# Patient Record
Sex: MALE | Race: WHITE | NOT HISPANIC OR LATINO | Employment: UNEMPLOYED | URBAN - METROPOLITAN AREA
[De-identification: names, ages, dates, MRNs, and addresses within clinical notes are randomized per-mention and may not be internally consistent; named-entity substitution may affect disease eponyms.]

---

## 2017-08-02 ENCOUNTER — TRANSFERRED RECORDS (OUTPATIENT)
Dept: HEALTH INFORMATION MANAGEMENT | Facility: CLINIC | Age: 27
End: 2017-08-02

## 2017-08-08 ENCOUNTER — TRANSFERRED RECORDS (OUTPATIENT)
Dept: HEALTH INFORMATION MANAGEMENT | Facility: CLINIC | Age: 27
End: 2017-08-08

## 2017-08-31 ENCOUNTER — TRANSFERRED RECORDS (OUTPATIENT)
Dept: HEALTH INFORMATION MANAGEMENT | Facility: CLINIC | Age: 27
End: 2017-08-31

## 2017-12-22 ENCOUNTER — TRANSFERRED RECORDS (OUTPATIENT)
Dept: HEALTH INFORMATION MANAGEMENT | Facility: CLINIC | Age: 27
End: 2017-12-22

## 2017-12-27 ENCOUNTER — TRANSFERRED RECORDS (OUTPATIENT)
Dept: HEALTH INFORMATION MANAGEMENT | Facility: CLINIC | Age: 27
End: 2017-12-27

## 2018-10-17 ENCOUNTER — TRANSFERRED RECORDS (OUTPATIENT)
Dept: HEALTH INFORMATION MANAGEMENT | Facility: CLINIC | Age: 28
End: 2018-10-17

## 2018-10-17 ENCOUNTER — OFFICE VISIT (OUTPATIENT)
Dept: FAMILY MEDICINE | Facility: OTHER | Age: 28
End: 2018-10-17
Payer: COMMERCIAL

## 2018-10-17 ENCOUNTER — RADIANT APPOINTMENT (OUTPATIENT)
Dept: GENERAL RADIOLOGY | Facility: OTHER | Age: 28
End: 2018-10-17
Attending: PHYSICIAN ASSISTANT
Payer: COMMERCIAL

## 2018-10-17 VITALS
BODY MASS INDEX: 23.04 KG/M2 | HEART RATE: 72 BPM | SYSTOLIC BLOOD PRESSURE: 116 MMHG | TEMPERATURE: 98.5 F | RESPIRATION RATE: 16 BRPM | DIASTOLIC BLOOD PRESSURE: 78 MMHG | HEIGHT: 71 IN | WEIGHT: 164.6 LBS

## 2018-10-17 DIAGNOSIS — R10.84 ABDOMINAL PAIN, GENERALIZED: ICD-10-CM

## 2018-10-17 DIAGNOSIS — F32.2 CURRENT SEVERE EPISODE OF MAJOR DEPRESSIVE DISORDER WITHOUT PSYCHOTIC FEATURES WITHOUT PRIOR EPISODE (H): Primary | ICD-10-CM

## 2018-10-17 LAB
ALBUMIN SERPL-MCNC: 4.1 G/DL (ref 3.4–5)
ALBUMIN UR-MCNC: ABNORMAL MG/DL
ALP SERPL-CCNC: 49 U/L (ref 40–150)
ALT SERPL W P-5'-P-CCNC: 33 U/L (ref 0–70)
ANION GAP SERPL CALCULATED.3IONS-SCNC: 6 MMOL/L (ref 3–14)
APPEARANCE UR: ABNORMAL
AST SERPL W P-5'-P-CCNC: 18 U/L (ref 0–45)
BASOPHILS # BLD AUTO: 0 10E9/L (ref 0–0.2)
BASOPHILS NFR BLD AUTO: 0.1 %
BILIRUB SERPL-MCNC: 1.7 MG/DL (ref 0.2–1.3)
BILIRUB UR QL STRIP: ABNORMAL
BUN SERPL-MCNC: 14 MG/DL (ref 7–30)
CALCIUM SERPL-MCNC: 8.5 MG/DL (ref 8.5–10.1)
CHLORIDE SERPL-SCNC: 105 MMOL/L (ref 94–109)
CO2 SERPL-SCNC: 30 MMOL/L (ref 20–32)
COLOR UR AUTO: ABNORMAL
CREAT SERPL-MCNC: 1.02 MG/DL (ref 0.66–1.25)
DIFFERENTIAL METHOD BLD: NORMAL
EOSINOPHIL # BLD AUTO: 0.3 10E9/L (ref 0–0.7)
EOSINOPHIL NFR BLD AUTO: 3.8 %
ERYTHROCYTE [DISTWIDTH] IN BLOOD BY AUTOMATED COUNT: 13.5 % (ref 10–15)
GFR SERPL CREATININE-BSD FRML MDRD: 86 ML/MIN/1.7M2
GLUCOSE SERPL-MCNC: 87 MG/DL (ref 70–99)
GLUCOSE UR STRIP-MCNC: ABNORMAL MG/DL
HCT VFR BLD AUTO: 45.3 % (ref 40–53)
HGB BLD-MCNC: 15.3 G/DL (ref 13.3–17.7)
HGB UR QL STRIP: ABNORMAL
KETONES UR STRIP-MCNC: ABNORMAL MG/DL
LEUKOCYTE ESTERASE UR QL STRIP: ABNORMAL
LYMPHOCYTES # BLD AUTO: 2 10E9/L (ref 0.8–5.3)
LYMPHOCYTES NFR BLD AUTO: 24.9 %
MCH RBC QN AUTO: 28.3 PG (ref 26.5–33)
MCHC RBC AUTO-ENTMCNC: 33.8 G/DL (ref 31.5–36.5)
MCV RBC AUTO: 84 FL (ref 78–100)
MONOCYTES # BLD AUTO: 0.7 10E9/L (ref 0–1.3)
MONOCYTES NFR BLD AUTO: 8.1 %
NEUTROPHILS # BLD AUTO: 5.2 10E9/L (ref 1.6–8.3)
NEUTROPHILS NFR BLD AUTO: 63.1 %
NITRATE UR QL: ABNORMAL
PH UR STRIP: ABNORMAL PH (ref 5–7)
PLATELET # BLD AUTO: 170 10E9/L (ref 150–450)
POTASSIUM SERPL-SCNC: 4.3 MMOL/L (ref 3.4–5.3)
PROT SERPL-MCNC: 7.3 G/DL (ref 6.8–8.8)
RBC # BLD AUTO: 5.41 10E12/L (ref 4.4–5.9)
SODIUM SERPL-SCNC: 141 MMOL/L (ref 133–144)
SOURCE: ABNORMAL
SP GR UR STRIP: ABNORMAL (ref 1–1.03)
T4 FREE SERPL-MCNC: 1.21 NG/DL (ref 0.76–1.46)
TSH SERPL DL<=0.005 MIU/L-ACNC: 0.32 MU/L (ref 0.4–4)
UROBILINOGEN UR STRIP-ACNC: ABNORMAL EU/DL (ref 0.2–1)
WBC # BLD AUTO: 8.2 10E9/L (ref 4–11)

## 2018-10-17 PROCEDURE — 99204 OFFICE O/P NEW MOD 45 MIN: CPT | Performed by: PHYSICIAN ASSISTANT

## 2018-10-17 PROCEDURE — 80053 COMPREHEN METABOLIC PANEL: CPT | Performed by: PHYSICIAN ASSISTANT

## 2018-10-17 PROCEDURE — 82306 VITAMIN D 25 HYDROXY: CPT | Performed by: PHYSICIAN ASSISTANT

## 2018-10-17 PROCEDURE — 36415 COLL VENOUS BLD VENIPUNCTURE: CPT | Performed by: PHYSICIAN ASSISTANT

## 2018-10-17 PROCEDURE — 84439 ASSAY OF FREE THYROXINE: CPT | Performed by: PHYSICIAN ASSISTANT

## 2018-10-17 PROCEDURE — 85025 COMPLETE CBC W/AUTO DIFF WBC: CPT | Performed by: PHYSICIAN ASSISTANT

## 2018-10-17 PROCEDURE — 74019 RADEX ABDOMEN 2 VIEWS: CPT

## 2018-10-17 PROCEDURE — 81003 URINALYSIS AUTO W/O SCOPE: CPT | Performed by: PHYSICIAN ASSISTANT

## 2018-10-17 PROCEDURE — 84443 ASSAY THYROID STIM HORMONE: CPT | Performed by: PHYSICIAN ASSISTANT

## 2018-10-17 RX ORDER — LEVOCETIRIZINE DIHYDROCHLORIDE 5 MG/1
5 TABLET, FILM COATED ORAL EVERY EVENING
COMMUNITY
End: 2019-07-16

## 2018-10-17 RX ORDER — OMEPRAZOLE 40 MG/1
40 CAPSULE, DELAYED RELEASE ORAL DAILY
Qty: 30 CAPSULE | Refills: 1 | Status: SHIPPED | OUTPATIENT
Start: 2018-10-17 | End: 2018-10-29

## 2018-10-17 ASSESSMENT — ANXIETY QUESTIONNAIRES
GAD7 TOTAL SCORE: 12
7. FEELING AFRAID AS IF SOMETHING AWFUL MIGHT HAPPEN: SEVERAL DAYS
7. FEELING AFRAID AS IF SOMETHING AWFUL MIGHT HAPPEN: SEVERAL DAYS
3. WORRYING TOO MUCH ABOUT DIFFERENT THINGS: MORE THAN HALF THE DAYS
2. NOT BEING ABLE TO STOP OR CONTROL WORRYING: MORE THAN HALF THE DAYS
4. TROUBLE RELAXING: NEARLY EVERY DAY
6. BECOMING EASILY ANNOYED OR IRRITABLE: NEARLY EVERY DAY
5. BEING SO RESTLESS THAT IT IS HARD TO SIT STILL: NOT AT ALL
GAD7 TOTAL SCORE: 12
GAD7 TOTAL SCORE: 12
1. FEELING NERVOUS, ANXIOUS, OR ON EDGE: SEVERAL DAYS

## 2018-10-17 ASSESSMENT — PATIENT HEALTH QUESTIONNAIRE - PHQ9
10. IF YOU CHECKED OFF ANY PROBLEMS, HOW DIFFICULT HAVE THESE PROBLEMS MADE IT FOR YOU TO DO YOUR WORK, TAKE CARE OF THINGS AT HOME, OR GET ALONG WITH OTHER PEOPLE: EXTREMELY DIFFICULT
SUM OF ALL RESPONSES TO PHQ QUESTIONS 1-9: 24
SUM OF ALL RESPONSES TO PHQ QUESTIONS 1-9: 24

## 2018-10-17 ASSESSMENT — PAIN SCALES - GENERAL: PAINLEVEL: SEVERE PAIN (7)

## 2018-10-17 NOTE — PROGRESS NOTES
"  SUBJECTIVE:   Chance Torres is a 27 year old male who presents to clinic today for the following health issues:    HPI  Abnormal Mood Symptoms  Onset: a couple of weeks ago.     Description:   Depression: YES   Anxiety: YES    Accompanying Signs & Symptoms:  Still participating in activities that you used to enjoy: no  Fatigue: yes  Irritability: YES  Difficulty concentrating: YES  Changes in appetite: YES-terrible. Everytime he eats his stomach hurts almost immediately and is nauseous.   Problems with sleep: YES  Heart racing/beating fast : sometimes.   Thoughts of hurting yourself or others: none, \"boderline''    History:   Recent stress: YES  Prior depression hospitalization: None, but has seen a psychiatrist two years ago.   Family history of depression: YES  Family history of anxiety: YES    Precipitating factors:   Alcohol/drug use: YES    Alleviating factors:  Marijuana    Therapies Tried and outcome: Marijuana, no other medications.     - Has had symptoms since he could remember, on and off.   - lately only marijuana will control his symptoms but quit.   - last year went on anti-depressants - Prozac (made him angry), Zoloft (numb)  - Gone to psychiatrist 2 years ago.  Has done counseling/therapy - helped a little bit for a little while.   - Used to be anxiety but now more depression  - Social anxiety in past.  Wasn't able to swallow food or eat in front of people, still scared and doesn't go out to eat - afraid of choking.   - Always issues with sleeping - difficulty falling asleep and staying asleep.   - Denies being suicidal but just doesn't feel like he has a purpose in life.  He is expecting a child with his wife and he is excited but can't be excited.   - He doesn't necessarily know if he could tell anyone if he was suicidal because he has a hard time talking with people.       ABDOMINAL PAIN     Onset: 1 week    Description:   Character: Dull pain  Location: generalized  Radiation: " None    Intensity: severe    Progression of Symptoms:  worsening    Accompanying Signs & Symptoms:  Fever/Chills?: YES- Chills, feeling cold.   Gas/Bloating: no   Nausea: YES  Vomitting: no   Diarrhea?: no   Constipation:YES  Dysuria or Hematuria: no   Heartburn: YES - worse than normal, burping, symptoms without even eating  Melena/Hematochezia: No   History:   Trauma: no   Previous similar pain: no    Previous tests done: none    Precipitating factors:   Does the pain change with:     Food: YES- after eating pain is present can last hours.      BM: no     Urination: no     Alleviating factors:  Not eating, laying down    Therapies Tried and outcome: None      - does have issues with IBS but states that is a different feeling, usually lower, usually more constipated.   - BMs normally once per day, sometimes less.   - Urine smells more strongly. But not dehydrated.   - No recent antibiotics, OTC medications, diet changes, raw/undercooked foods.   - no family history of GI disorders.     Problem list and histories reviewed & adjusted, as indicated.  Additional history: as documented    There is no problem list on file for this patient.    History reviewed. No pertinent surgical history.    Social History   Substance Use Topics     Smoking status: Never Smoker     Smokeless tobacco: Never Used     Alcohol use Yes      Comment: rarely     Family History   Problem Relation Age of Onset     Mental Illness Brother      Mental Illness Brother          Current Outpatient Prescriptions   Medication Sig Dispense Refill     levocetirizine (XYZAL ALLERGY 24HR) 5 MG tablet Take 5 mg by mouth every evening       omeprazole (PRILOSEC) 40 MG capsule Take 1 capsule (40 mg) by mouth daily Take 30-60 minutes before a meal. 30 capsule 1       ROS:  Constitutional, HEENT, cardiovascular, pulmonary, GI, , musculoskeletal, neuro, skin, endocrine and psych systems are negative, except as otherwise noted.    OBJECTIVE:     /78   "Pulse 72  Temp 98.5  F (36.9  C) (Temporal)  Resp 16  Ht 5' 10.98\" (1.803 m)  Wt 164 lb 9.6 oz (74.7 kg)  BMI 22.97 kg/m2  Body mass index is 22.97 kg/(m^2).  GENERAL: healthy, alert and no distress  EYES: Eyes grossly normal to inspection, PERRL and conjunctivae and sclerae normal  HENT: ear canals and TM's normal, nose and mouth without ulcers or lesions  NECK: no adenopathy, no asymmetry, masses, or scars and thyroid normal to palpation  RESP: lungs clear to auscultation - no rales, rhonchi or wheezes  CV: regular rate and rhythm, normal S1 S2, no S3 or S4, no murmur, click or rub, no peripheral edema and peripheral pulses strong  ABDOMEN: bowel sounds normal and soft, non-distended, tenderness to palpation RUQ, periumbilical, bilateral lower abdomen slightly, positive [place].   MS: no gross musculoskeletal defects noted, no edema  SKIN: no suspicious lesions or rashes  NEURO: Normal strength and tone, mentation intact and speech normal  PSYCH: mentation appears normal, affect flat, judgement and insight intact, appearance well groomed and poor eye contact.     Diagnostic Test Results:  Results for orders placed or performed in visit on 10/17/18 (from the past 24 hour(s))   CBC with platelets differential   Result Value Ref Range    WBC 8.2 4.0 - 11.0 10e9/L    RBC Count 5.41 4.4 - 5.9 10e12/L    Hemoglobin 15.3 13.3 - 17.7 g/dL    Hematocrit 45.3 40.0 - 53.0 %    MCV 84 78 - 100 fl    MCH 28.3 26.5 - 33.0 pg    MCHC 33.8 31.5 - 36.5 g/dL    RDW 13.5 10.0 - 15.0 %    Platelet Count 170 150 - 450 10e9/L    % Neutrophils 63.1 %    % Lymphocytes 24.9 %    % Monocytes 8.1 %    % Eosinophils 3.8 %    % Basophils 0.1 %    Absolute Neutrophil 5.2 1.6 - 8.3 10e9/L    Absolute Lymphocytes 2.0 0.8 - 5.3 10e9/L    Absolute Monocytes 0.7 0.0 - 1.3 10e9/L    Absolute Eosinophils 0.3 0.0 - 0.7 10e9/L    Absolute Basophils 0.0 0.0 - 0.2 10e9/L    Diff Method Automated Method    *UA reflex to Microscopic   Result Value Ref " Range    Color Urine Test canceled - Lab  error     Appearance Urine Test canceled - Lab  error     Glucose Urine Test canceled - Lab  error (A) NEG^Negative mg/dL    Bilirubin Urine Test canceled - Lab  error (A) NEG^Negative    Ketones Urine Test canceled - Lab  error (A) NEG^Negative mg/dL    Specific Gravity Urine Test canceled - Lab  error 1.003 - 1.035    Blood Urine Test canceled - Lab  error (A) NEG^Negative    pH Urine Test canceled - Lab  error 5.0 - 7.0 pH    Protein Albumin Urine Test canceled - Lab  error (A) NEG^Negative mg/dL    Urobilinogen Urine Test canceled - Lab  error 0.2 - 1.0 EU/dL    Nitrite Urine Test canceled - Lab  error (A) NEG^Negative    Leukocyte Esterase Urine Test canceled - Lab  error (A) NEG^Negative    Source Test canceled - Lab  error         X-ray Abdomen:  ABDOMEN TWO VIEWS 10/17/2018 3:04 PM      HISTORY: ; Abdominal pain, generalized     COMPARISON: None.         IMPRESSION: Bowel gas within normal limits. No evidence for free air.  Lung bases clear.     EZIO NORTON MD    ASSESSMENT/PLAN:       ICD-10-CM    1. Current severe episode of major depressive disorder without psychotic features without prior episode (H) F32.2 TSH with free T4 reflex     Vitamin D Deficiency     MENTAL HEALTH REFERRAL  - Adult; Outpatient Treatment; Individual/Couples/Family/Group Therapy/Health Psychology; [facility] [place]: [facility] [phone]; We will contact you to schedule the appointment or please call with any questions   2. Abdominal pain, generalized R10.84 CBC with platelets differential     *UA reflex to Microscopic     Comprehensive metabolic panel     XR Abdomen 2 Views     US Abdomen Complete     omeprazole (PRILOSEC) 40 MG capsule       1. He has severe symptoms of depression and has failed Prozac and Zoloft.  Recommended either  trialing a new medication or Genesight, patient would like to have Genesight testing done today, will call with results and make medication recommendation.     Discussed with patient the risks and benefits of anti-depressant/anti-anxiety medications. We discussed the common side effects with the medication that most resolve within 2 weeks of starting the medication.  We discussed the black box warning of increased risk of suicidal ideation.  Patient today is is not experiencing suicidal ideation.  Discussed importance of taking medication once daily and not missing doses.  Also how we slowly titrate medication to limit side effects to the most effective dose and if they are ever going to stop the medication they should taper off the medication.  Reminded patient these medications can take 4-6 weeks to see their maximum potential.  Recommend titration to highest dose tolerated before switching medication types.  We also discussed GeneSight testing, when it would be helpful, cost, etc.     Encouraged counseling in addition to medication management.      2. Uncertain the cause of the abdominal pain.  Differential includes but not limited to gastritis, GERD, Gallbladder pathology, ulcer, constipation, IBS, IFB.  His x-ray is nonspecific there is stool throughout the colon and he has a history of constipation and recommend working on this and starting Miralax and Docusate Sodium to have more regular bowel movements.  Also recommend we start Omeprazole to help with any acid production which could be causing more irritation especially since eating makes the pain worse.  Will have him take once daily for the next month.  We are waiting on other labs but CBC is normal therefore less concerned for acute infection or acute abdomen and no red flag symptoms or exam findings today.  Did order ultrasound to assess his gallbladder.  His symptoms could also be related to dysfunctional eating given his history of anxiety with eating  and having others see him eat.  For now bland diet, clear liquids, small portions.  We will call with results and make next step of plan.     Follow-up pending results of the labs and genesight, needs to come in for ultrasound which was scheduled.  Go to the ER if severe pain, high fever or major concerns in the meantime.     Options for treatment and follow-up care were reviewed with the patient and/or guardian. Patient and/or guardian engaged in the decision making process and verbalized understanding of the options discussed and agreed with the final plan.     Nikki Benitez PA-C  [facility]      Answers for HPI/ROS submitted by the patient on 10/17/2018   If you checked off any problems, how difficult have these problems made it for you to do your work, take care of things at home, or get along with other people?: Extremely difficult  PHQ9 TOTAL SCORE: 24  ANSELMO 7 TOTAL SCORE: 12

## 2018-10-17 NOTE — PATIENT INSTRUCTIONS
- We will call with [employer](INOR) results and then recommend medication  - Reminder medication can take 4-6 weeks to see benefits, take once daily, slowly increase dose and if needed slowly reduce dose if not tolerating.   - Typically side effects last 2 weeks and improve, monitor if severe we need to stop medication  - If you are feeling suicidal please call 911 or TEXT [phone] for Crisis Text Line.  - If severe pain or high fevers please go to the ER  - otherwise let's get the ultrasound done - need to not eat/drink for 8 hours prior.   - Start Omeprazole, take once daily preferably before eating in the morning  - [place] diet, very small amounts, water only  - We will call with results and make plan for next evaluation and treatment.   - Start Miralax 17 grams once daily and Docusate Sodium 100 mg daily.

## 2018-10-17 NOTE — MR AVS SNAPSHOT
After Visit Summary   10/17/2018    Chance Torres    MRN: [id number]           Patient Information     Date Of Birth          1990        Visit Information        Provider Department      10/17/2018 2:00 PM Nikki Benitez PA-C [facility]        Today's Diagnoses     Current severe episode of major depressive disorder without psychotic features without prior episode (H)    -  1    Abdominal pain, generalized          Care Instructions    - We will call with Genesight results and then recommend medication  - Reminder medication can take 4-6 weeks to see benefits, take once daily, slowly increase dose and if needed slowly reduce dose if not tolerating.   - Typically side effects last 2 weeks and improve, monitor if severe we need to stop medication  - If you are feeling suicidal please call 911 or TEXT [phone] for Crisis Text Line.  - If severe pain or high fevers please go to the ER  - otherwise let's get the ultrasound done - need to not eat/drink for 8 hours prior.   - Start Omeprazole, take once daily preferably before eating in the morning  - [place] diet, very small amounts, water only  - We will call with results and make plan for next evaluation and treatment.   - Start Miralax 17 grams once daily and Docusate Sodium 100 mg daily.                 Follow-ups after your visit        Additional Services     MENTAL HEALTH REFERRAL  - Adult; Outpatient Treatment; Individual/Couples/Family/Group Therapy/Health Psychology; [facility] [place]: [facility] [phone]; We will contact you to schedule the appointment or please call with any questions       All scheduling is subject to the client's specific insurance plan & benefits, provider/location availability, and provider clinical specialities.  Please arrive 15 minutes early for your first appointment and bring your completed paperwork.    Please be aware that coverage of these services is subject to the terms and limitations  of your health insurance plan.  Call member services at your health plan with any benefit or coverage questions.                            Follow-up notes from your care team     Return in about 2 days (around 10/19/2018) for Ultrasound.      Your next 10 appointments already scheduled     Oct 22, 2018  8:30 AM CDT   [facility]BDOM[facility] COM[facility]TE [facility]US[facility]    [address] [facility] [address]   [phone]           How do I prepare for my exam? (Food and drink instructions) Adults: No eating, smoking, gum chewing or drinking for 8 hours before the exam. You may take medicine with a small sip of water.  Children: * Infants, breast-fed: may have breast milk up to 2 hours before exam. * Infants, formula: may have bottle until 4 hours before exam. * Children 1-5 years: No food or drink for 4 hours before exam. * Children 6 -12 years: No food or drink for 6 hours before exam. * Children over 12 years: No food or drink for 8 hours before exam.  * J Tube Fed: No need to stop feedings.  What should I wear: Wear comfortable clothes.  How long does the exam take: Most ultrasounds take 30 to 60 minutes.  What should I bring: Bring a list of your medicines, including vitamins, minerals and over-the-counter drugs. It is safest to leave personal items at home.  Do I need a :  No  is needed.  What do I need to tell my doctor: Tell your doctor about any allergies you may have.  What should I do after the exam: No restrictions, You may resume normal activities.  What is this test: An ultrasound uses sound waves to make pictures of the body. Sound waves do not cause pain. The only discomfort may be the pressure of the wand against your skin or full bladder.  Who should I call with questions: If you have any questions, please call the Imaging Department where you will have your exam. Directions, parking instructions, and other information is available on our  "website, [email or web address].              Future tests that were ordered for you today     Open Future Orders        Priority Expected Expires Ordered    US Abdomen Complete Routine  10/17/2019 10/17/2018            Who to contact     If you have questions or need follow up information about today's clinic visit or your schedule please contact [facility] directly at [phone].  Normal or non-critical lab and imaging results will be communicated to you by MyChart, letter or phone within 4 business days after the clinic has received the results. If you do not hear from us within 7 days, please contact the clinic through [employer] or phone. If you have a critical or abnormal lab result, we will notify you by phone as soon as possible.  Submit refill requests through [employer] or call your pharmacy and they will forward the refill request to us. Please allow 3 business days for your refill to be completed.          Additional Information About Your Visit        [employer] Information     [employer] gives you secure access to your electronic health record. If you see a primary care provider, you can also send messages to your care team and make appointments. If you have questions, please call your primary care clinic.  If you do not have a primary care provider, please call [phone] and they will assist you.        Care EveryWhere ID     This is your Care EveryWhere ID. This could be used by other organizations to access your [facility] medical records  [id number]        Your Vitals Were     Pulse Temperature Respirations Height BMI (Body Mass Index)       72 98.5  F (36.9  C) (Temporal) 16 5' 10.98\" (1.803 m) 22.97 kg/m2        Blood Pressure from Last 3 Encounters:   10/17/18 116/78    Weight from Last 3 Encounters:   10/17/18 164 lb 9.6 oz (74.7 kg)              We Performed the Following     *UA reflex to Microscopic     CBC with platelets differential     Comprehensive metabolic panel     MENTAL " HEALTH REFERRAL  - Adult; Outpatient Treatment; Individual/Couples/Family/Group Therapy/Health Psychology; G: [facility] [phone]; We will contact you to schedule the appointment or please call with any questions     TSH with free T4 reflex     Vitamin D Deficiency          Today's Medication Changes          These changes are accurate as of 10/17/18  3:23 PM.  If you have any questions, ask your nurse or doctor.               Start taking these medicines.        Dose/Directions    omeprazole 40 MG capsule   Commonly known as:  priLOSEC   Used for:  Abdominal pain, generalized   Started by:  Nikki Benitez PA-C        Dose:  40 mg   Take 1 capsule (40 mg) by mouth daily Take 30-60 minutes before a meal.   Quantity:  30 capsule   Refills:  1            Where to get your medicines      These medications were sent to [facility] [address] [place] [id number] [facility] AT [facility] [address] & [facility]  [address] [facility], [facility] [address]     Phone:  [phone]     omeprazole 40 MG capsule                Primary Care Provider Office Phone # Fax #    Nikki Benitez PA-C [phone]       [address] [place] [address]        Equal Access to Services     MILDRED MONTEZ : [place] cuello Soemilia, waaxda luqadaha, qaybta kaalmada adeleahda, braden rae. So [facility] [phone].    ATENCIÓN: Si habla español, tiene a womack disposición servicios gratuitos de asistencia lingüística. Zaki al [phone].    We comply with applicable federal civil rights laws and [place] laws. We do not discriminate on the basis of race, color, national origin, age, disability, sex, sexual orientation, or gender identity.            Thank you!     Thank you for choosing [facility]  for your care. Our goal is always to provide you with excellent care. Hearing back from our patients is one way we can continue to improve our services. Please take  a few minutes to complete the written survey that you may receive in the mail after your visit with us. Thank you!             Your Updated Medication List - Protect others around you: Learn how to safely use, store and throw away your medicines at www.disposemymeds.org.          This list is accurate as of 10/17/18  3:23 PM.  Always use your most recent med list.                   Brand Name Dispense Instructions for use Diagnosis    omeprazole 40 MG capsule    priLOSEC    30 capsule    Take 1 capsule (40 mg) by mouth daily Take 30-60 minutes before a meal.    Abdominal pain, generalized       XYZAL ALLERGY 24HR 5 MG tablet   Generic drug:  levocetirizine      Take 5 mg by mouth every evening

## 2018-10-18 LAB — DEPRECATED CALCIDIOL+CALCIFEROL SERPL-MC: 21 UG/L (ref 20–75)

## 2018-10-18 ASSESSMENT — PATIENT HEALTH QUESTIONNAIRE - PHQ9: SUM OF ALL RESPONSES TO PHQ QUESTIONS 1-9: 24

## 2018-10-18 ASSESSMENT — ANXIETY QUESTIONNAIRES: GAD7 TOTAL SCORE: 12

## 2018-10-22 ENCOUNTER — RADIANT APPOINTMENT (OUTPATIENT)
Dept: ULTRASOUND IMAGING | Facility: OTHER | Age: 28
End: 2018-10-22
Attending: PHYSICIAN ASSISTANT
Payer: COMMERCIAL

## 2018-10-22 DIAGNOSIS — R10.84 ABDOMINAL PAIN, GENERALIZED: ICD-10-CM

## 2018-10-22 PROCEDURE — 76700 US EXAM ABDOM COMPLETE: CPT

## 2018-10-23 ENCOUNTER — TELEPHONE (OUTPATIENT)
Dept: FAMILY MEDICINE | Facility: OTHER | Age: 28
End: 2018-10-23

## 2018-10-23 DIAGNOSIS — R10.11 ABDOMINAL PAIN, RIGHT UPPER QUADRANT: Primary | ICD-10-CM

## 2018-10-23 NOTE — TELEPHONE ENCOUNTER
Patient returned call and was given message below, patient states he never really has pain but he will call and make an appointment with GI.    Thank you Ita

## 2018-10-23 NOTE — TELEPHONE ENCOUNTER
----- Message from Nikki Benitez PA-C sent at 10/23/2018  7:16 AM CDT -----  Please call patient. See if his abdominal pain has improved at all.  If not then I would recommend that we have him see GI since this is a chronic on going issue.  His Ultrasound showed no gallstones or other concerns but this does not necessarily rule out all gallbladder issues just not gallstones.    Nikki Benitez PA-C

## 2018-10-23 NOTE — TELEPHONE ENCOUNTER
Attempted to reach the patient with the following results:  Left message on voicemail for the patient to call back.   Octavia Bourne MA

## 2018-10-24 ENCOUNTER — TELEPHONE (OUTPATIENT)
Dept: FAMILY MEDICINE | Facility: OTHER | Age: 28
End: 2018-10-24

## 2018-10-24 NOTE — TELEPHONE ENCOUNTER
Please call patient.  His [place] results are available, would like him to make an appointment to discuss the medication selection and initiation.     Nikki Benitez PA-C

## 2018-10-25 NOTE — PROGRESS NOTES
"  SUBJECTIVE:   Chance Torres is a 27 year old male who presents to clinic today for the following health issues:    Gene site testing results.     HPI    Patient Active Problem List   Diagnosis     Current severe episode of major depressive disorder without psychotic features without prior episode (H)     History reviewed. No pertinent surgical history.    Social History   Substance Use Topics     Smoking status: Never Smoker     Smokeless tobacco: Never Used     Alcohol use Yes      Comment: rarely     Family History   Problem Relation Age of Onset     Mental Illness Brother      Mental Illness Brother          Current Outpatient Prescriptions   Medication Sig Dispense Refill     desvenlafaxine succinate (PRISTIQ) 25 MG 24 hr tablet Take 1 tablet daily x 7 days then increase to 2 tablets daily. 60 tablet 1     levocetirizine (XYZAL ALLERGY 24HR) 5 MG tablet Take 5 mg by mouth every evening         ROS:  Constitutional, musculoskeletal, neuro, skin, endocrine and psych systems are negative, except as otherwise noted.    OBJECTIVE:     /68  Pulse 76  Temp 97.7  F (36.5  C) (Temporal)  Resp 14  Wt 170 lb 6.4 oz (77.3 kg)  SpO2 95%  BMI 23.78 kg/m2  Body mass index is 23.78 kg/(m^2).  GENERAL: healthy, alert and no distress  MS: no gross musculoskeletal defects noted, no edema  SKIN: no suspicious lesions or rashes  PSYCH: mentation appears normal and affect flat    Diagnostic Test Results:  none     ASSESSMENT/PLAN:       ICD-10-CM    1. Current severe episode of major depressive disorder without psychotic features without prior episode (H) F32.2 desvenlafaxine succinate (PRISTIQ) 25 MG 24 hr tablet       His Savoredight results show \"Use as Directed\" - Desvenlafaxine, levomilnacipran, selegiline, vilazodone.     Patient has been on Zoloft and Prozac in past and had significant side effects from both medications.   Moderate Gene-Drug Interaction: Sertraline, citalopram, escitalopram, mirtazapine, " trazodone, duloxetine, fluvoxamine  Significant Gene-Drug Interaction: bupropion, venlafaxine, fluoxetine, amitriptyline, cloimipramine, desipramine, doxepin, imipramine, nortriptyline, vortioxetine, paroxetine.     Will scan results into Media Tab.     Discussed with patient the risks and benefits of anti-depressant/anti-anxiety medications. We discussed the common side effects with the medication that most resolve within 2 weeks of starting the medication.  We discussed the black box warning of increased risk of suicidal ideation.  Patient today is not experiencing suicidal ideation.  Discussed importance of taking medication once daily and not missing doses.  Also how we slowly titrate medication to limit side effects to the most effective dose and if they are ever going to stop the medication they should taper off the medication.  Reminded patient these medications can take 4-6 weeks to see their maximum potential.  Recommend titration to highest dose tolerated before switching medication types.      Encouraged counseling in addition to medication management.     Follow-up in 2-4 weeks, sooner if worse or new concerns.     Greater than 20 minutes were spent today with more than 50% dedicated to counseling and coordination of care of the above mentioned problems.     Options for treatment and follow-up care were reviewed with the patient and/or guardian. Patient and/or guardian engaged in the decision making process and verbalized understanding of the options discussed and agreed with the final plan.     Nikki Benitez PA-C  [facility]

## 2018-10-29 ENCOUNTER — OFFICE VISIT (OUTPATIENT)
Dept: FAMILY MEDICINE | Facility: OTHER | Age: 28
End: 2018-10-29
Payer: COMMERCIAL

## 2018-10-29 VITALS
WEIGHT: 170.4 LBS | BODY MASS INDEX: 23.78 KG/M2 | OXYGEN SATURATION: 95 % | HEART RATE: 76 BPM | DIASTOLIC BLOOD PRESSURE: 68 MMHG | SYSTOLIC BLOOD PRESSURE: 124 MMHG | RESPIRATION RATE: 14 BRPM | TEMPERATURE: 97.7 F

## 2018-10-29 DIAGNOSIS — F32.2 CURRENT SEVERE EPISODE OF MAJOR DEPRESSIVE DISORDER WITHOUT PSYCHOTIC FEATURES WITHOUT PRIOR EPISODE (H): Primary | ICD-10-CM

## 2018-10-29 PROCEDURE — 99213 OFFICE O/P EST LOW 20 MIN: CPT | Performed by: PHYSICIAN ASSISTANT

## 2018-10-29 RX ORDER — DESVENLAFAXINE 25 MG/1
TABLET, EXTENDED RELEASE ORAL
Qty: 60 TABLET | Refills: 1 | Status: SHIPPED | OUTPATIENT
Start: 2018-10-29 | End: 2019-04-02

## 2018-10-29 ASSESSMENT — PAIN SCALES - GENERAL: PAINLEVEL: NO PAIN (0)

## 2018-10-29 NOTE — MR AVS SNAPSHOT
After Visit Summary   10/29/2018    Chance Torres    MRN: [id number]           Patient Information     Date Of Birth          1990        Visit Information        Provider Department      10/29/2018 5:10 PM Nikki Benitez PA-C [facility]        Today's Diagnoses     Current severe episode of major depressive disorder without psychotic features without prior episode (H)    -  1       Follow-ups after your visit        Your next 10 appointments already scheduled     Nov 12, 2018  3:50 PM CST   Office Visit with Nikki Benitez PA-C   [facility]    [address]  [facility] [address]   [phone]           Bring a current list of meds and any records pertaining to this visit. For Physicals, please bring immunization records and any forms needing to be filled out. Please arrive 10 minutes early to complete paperwork.              Who to contact     If you have questions or need follow up information about today's clinic visit or your schedule please contact [facility] directly at [phone].  Normal or non-critical lab and imaging results will be communicated to you by Smadexhart, letter or phone within 4 business days after the clinic has received the results. If you do not hear from us within 7 days, please contact the clinic through [employer] or phone. If you have a critical or abnormal lab result, we will notify you by phone as soon as possible.  Submit refill requests through L[employer]izer or call your pharmacy and they will forward the refill request to us. Please allow 3 business days for your refill to be completed.          Additional Information About Your Visit        Smadexhart Information     L[employer]izer gives you secure access to your electronic health record. If you see a primary care provider, you can also send messages to your care team and make appointments. If you have questions, please call your primary  care clinic.  If you do not have a primary care provider, please call [phone] and they will assist you.        Care EveryWhere ID     This is your Care EveryWhere ID. This could be used by other organizations to access your [place] medical records  [id number]        Your Vitals Were     Pulse Temperature Respirations Pulse Oximetry BMI (Body Mass Index)       76 97.7  F (36.5  C) (Temporal) 14 95% 23.78 kg/m2        Blood Pressure from Last 3 Encounters:   10/29/18 124/68   10/17/18 116/78    Weight from Last 3 Encounters:   10/29/18 170 lb 6.4 oz (77.3 kg)   10/17/18 164 lb 9.6 oz (74.7 kg)              Today, you had the following     No orders found for display         Today's Medication Changes          These changes are accurate as of 10/29/18  5:18 PM.  If you have any questions, ask your nurse or doctor.               Start taking these medicines.        Dose/Directions    desvenlafaxine succinate 25 MG 24 hr tablet   Commonly known as:  PRISTIQ   Used for:  Current severe episode of major depressive disorder without psychotic features without prior episode (H)   Started by:  Nikki Benitez PA-C        Take 1 tablet daily x 7 days then increase to 2 tablets daily.   Quantity:  60 tablet   Refills:  1         Stop taking these medicines if you haven't already. Please contact your care team if you have questions.     omeprazole 40 MG capsule   Commonly known as:  priLOSEC   Stopped by:  Nikki Benitez PA-C                Where to get your medicines      These medications were sent to [facility] [address] [place] [id number] [facility] [address]  [address] [facility] [address]     Phone:  [phone]     desvenlafaxine succinate 25 MG 24 hr tablet                Primary Care Provider Office Phone # Fax #    Nikki Benitez PA-C [phone]       [address] [place] [address]        Equal Access to Services     MILDRED MONTEZ AH: [place] cuello  Sarina, channingda ludwayneadaha, qatonota kajaswinder romero, braden rodrigues janimikie laPerfectoharry kyra. So [facility] [phone].    ATENCIÓN: Si milly lentz, tiene a womack disposición servicios gratuitos de asistencia lingüística. Zaki al [phone].    We comply with applicable federal civil rights laws and [place] laws. We do not discriminate on the basis of race, color, national origin, age, disability, sex, sexual orientation, or gender identity.            Thank you!     Thank you for choosing [facility]  for your care. Our goal is always to provide you with excellent care. Hearing back from our patients is one way we can continue to improve our services. Please take a few minutes to complete the written survey that you may receive in the mail after your visit with us. Thank you!             Your Updated Medication List - Protect others around you: Learn how to safely use, store and throw away your medicines at www.disposemymeds.org.          This list is accurate as of 10/29/18  5:18 PM.  Always use your most recent med list.                   Brand Name Dispense Instructions for use Diagnosis    desvenlafaxine succinate 25 MG 24 hr tablet    PRISTIQ    60 tablet    Take 1 tablet daily x 7 days then increase to 2 tablets daily.    Current severe episode of major depressive disorder without psychotic features without prior episode (H)       XYZAL ALLERGY 24HR 5 MG tablet   Generic drug:  levocetirizine      Take 5 mg by mouth every evening

## 2018-11-09 PROBLEM — K58.2 IRRITABLE BOWEL SYNDROME WITH BOTH CONSTIPATION AND DIARRHEA: Status: ACTIVE | Noted: 2017-07-12

## 2018-11-28 ENCOUNTER — TELEPHONE (OUTPATIENT)
Dept: FAMILY MEDICINE | Facility: OTHER | Age: 28
End: 2018-11-28

## 2018-11-28 NOTE — TELEPHONE ENCOUNTER
Reason for call:  Medication   If this is a refill request, has the caller requested the refill from the pharmacy already? N/A  Will the patient be using a [facility]? No  Name of the pharmacy and phone number for the current request: [facility] [place]: [phone]     Name of the medication requested: desvenlafaxine succinate (PRISTIQ) 25 MG 24 hr tablet    Other request: Patient missed a follow up appointment, because he never took the medication. Patient states that he will start taking medication and he will schedule a follow up appointment. He would like to medication today if possible.    Phone number to reach patient:  Home number on file [phone] (home)    Best Time:  anytime    Can we leave a detailed message on this number?  YES

## 2018-12-26 ENCOUNTER — TELEPHONE (OUTPATIENT)
Dept: FAMILY MEDICINE | Facility: OTHER | Age: 28
End: 2018-12-26

## 2018-12-26 NOTE — TELEPHONE ENCOUNTER
Spoke to pt and discussed the message below. Pt scheduled with Pamela Barone MA  December 26, 2018

## 2018-12-26 NOTE — TELEPHONE ENCOUNTER
Reason for call:  Other   Patient called regarding (reason for call): appointment  Additional comments: patient recently started taking anti depressants and wanted to schedule follow but provider not available until April 2019. Patient wants to know how to get more meds once it runs out since provider is not available    Phone number to reach patient:  Home number on file [phone] (home)    Best Time:  After 3:30pm    Can we leave a detailed message on this number?  YES

## 2018-12-27 NOTE — PROGRESS NOTES
SUBJECTIVE:   Chance Torres is a 28 year old male who presents to clinic today for the following health issues:      History of Present Illness     Depression & Anxiety Follow-up:     Depression/Anxiety:  Depression & Anxiety    Status since last visit::  Stable    Other associated symptoms of depression and anxiety::  YES    Significant life event::  No    Current substance use::  None       Today's PHQ-9         PHQ-9 Total Score:     (P) 16   PHQ-9 Q9 Suicidal ideation:   (P) Not at all   Thoughts of suicide or self harm:      Self-harm Plan:        Self-harm Action:          Safety concerns for self or others:       ANSELMO-7 Total Score: (P) 8    He was started on Pristiq a few months ago by Nikki Benitez for depression with after Gene Sight testing revealed this would be a good medication to try, but did not start it until 2 weeks ago because he was nervous about possible side effects. He states he was tolerating the 25 mg dose quite well but ever since he has been on the 50 mg dose, he has has less energy and feels more scatter brained. He also had a headache for a few days while on the 50 mg dose. He does think it is helping somewhat with his depression as he is having less frequent episodes of depression, although he still has notices the symptoms intermittently. He also states he just quit smoking marijuana so is unsure if withdrawing from this is playing a role as he has been smoking marijuana for many years and has found it difficult to quit. He states he also wonders about a possible diagnosis of bipolar disorder as he states he will have periods where he will have a lot of energy with pressured speech and difficulty sleeping for a few days in a row and this will rotate between episodes of severe depression. He has never been tested for bipolar and denies any family history. He denies any thoughts of self harm. He states he has tried counseling before without much benefit.     Answers for HPI/ROS  "submitted by the patient on 1/3/2019   Chronic problems general questions HPI Form  ANSELMO 7 TOTAL SCORE: 8  If you checked off any problems, how difficult have these problems made it for you to do your work, take care of things at home, or get along with other people?: Somewhat difficult  PHQ9 TOTAL SCORE: 16    Problem list and histories reviewed & adjusted, as indicated.  Additional history: none    ROS:  GENERAL: +Fatigue. Denies fever, weakness, weight gain, or weight loss.  CARDIOVASCULAR: Denies chest pain, shortness of breath, irregular heartbeats,  palpitations, or edema.  RESPIRATORY: Denies cough, hemoptysis, and shortness of breath.  NEUROLOGIC: Denies headache, fainting, dizziness, memory loss, numbness, tingling, or seizures.  PSYCHIATRIC: +Slightly improved depression, mood swings. Denies and thoughts of suicide.    OBJECTIVE:     /80   Pulse 86   Temp 97.6  F (36.4  C) (Temporal)   Resp 16   Ht 1.803 m (5' 11\")   Wt 72.6 kg (160 lb)   SpO2 97%   BMI 22.32 kg/m    Body mass index is 22.32 kg/m .  GENERAL: healthy, alert and no distress  RESP: lungs clear to auscultation - no rales, rhonchi or wheezes  CV: regular rate and rhythm, normal S1 S2, no S3 or S4, no murmur, click or rub  NEURO: Normal strength and tone, mentation intact and speech normal. Gait is stable.  PSYCH: mentation appears normal, affect normal/bright    ASSESSMENT/PLAN:       ICD-10-CM    1. Current severe episode of major depressive disorder without psychotic features without prior episode (H) F32.2 MENTAL HEALTH REFERRAL  - Adult; Psychiatry and Medication Management; [place] [facility] [phone].  Medication management & future refills will be returned to G PCP upon completion of evaluation; We matheus...     desvenlafaxine succinate (PRISTIQ) 25 MG 24 hr tablet   2. Recurrent manic episodes, mild (H) F31.89 MENTAL HEALTH REFERRAL  - Adult; Psychiatry and Medication Management; " Psychiatry; [facility] [phone].  Medication management & future refills will be returned to [facility] PCP upon completion of evaluation; We matheus...   3. Marijuana use F12.90        His symptoms are suspicious for bipolar disorder as he explains intermittent mood swings which sound like manic/hypomanic episodes along with episodes of depression. I recommend he go down to the 25 mg dose of Pristiq since he was tolerating this dose better and if he does have bipolar, Pristiq may actually worsen his symptoms but I recommend staying on a low dose for now and will send him to psychiatry for further evaluation. I also recommend he remain off the marijuana as this could precipitate worsening symptoms as well. Will plan on follow up within the next 3 months after he is seen by psychiatry.       Prashant Escobar PA-C  [facility]

## 2019-01-03 ENCOUNTER — OFFICE VISIT (OUTPATIENT)
Dept: FAMILY MEDICINE | Facility: OTHER | Age: 29
End: 2019-01-03
Payer: COMMERCIAL

## 2019-01-03 VITALS
SYSTOLIC BLOOD PRESSURE: 120 MMHG | WEIGHT: 160 LBS | DIASTOLIC BLOOD PRESSURE: 80 MMHG | HEART RATE: 86 BPM | OXYGEN SATURATION: 97 % | RESPIRATION RATE: 16 BRPM | BODY MASS INDEX: 22.4 KG/M2 | TEMPERATURE: 97.6 F | HEIGHT: 71 IN

## 2019-01-03 DIAGNOSIS — F32.2 CURRENT SEVERE EPISODE OF MAJOR DEPRESSIVE DISORDER WITHOUT PSYCHOTIC FEATURES WITHOUT PRIOR EPISODE (H): Primary | ICD-10-CM

## 2019-01-03 DIAGNOSIS — F31.89: ICD-10-CM

## 2019-01-03 DIAGNOSIS — F12.90 MARIJUANA USE: ICD-10-CM

## 2019-01-03 PROCEDURE — 99213 OFFICE O/P EST LOW 20 MIN: CPT | Performed by: PHYSICIAN ASSISTANT

## 2019-01-03 RX ORDER — DESVENLAFAXINE 25 MG/1
TABLET, EXTENDED RELEASE ORAL
Qty: 60 TABLET | Refills: 1 | Status: CANCELLED | OUTPATIENT
Start: 2019-01-03

## 2019-01-03 ASSESSMENT — ANXIETY QUESTIONNAIRES
1. FEELING NERVOUS, ANXIOUS, OR ON EDGE: SEVERAL DAYS
7. FEELING AFRAID AS IF SOMETHING AWFUL MIGHT HAPPEN: SEVERAL DAYS
6. BECOMING EASILY ANNOYED OR IRRITABLE: MORE THAN HALF THE DAYS
GAD7 TOTAL SCORE: 8
3. WORRYING TOO MUCH ABOUT DIFFERENT THINGS: SEVERAL DAYS
5. BEING SO RESTLESS THAT IT IS HARD TO SIT STILL: SEVERAL DAYS
GAD7 TOTAL SCORE: 8
GAD7 TOTAL SCORE: 8
2. NOT BEING ABLE TO STOP OR CONTROL WORRYING: SEVERAL DAYS
4. TROUBLE RELAXING: SEVERAL DAYS

## 2019-01-03 ASSESSMENT — PATIENT HEALTH QUESTIONNAIRE - PHQ9
SUM OF ALL RESPONSES TO PHQ QUESTIONS 1-9: 16
SUM OF ALL RESPONSES TO PHQ QUESTIONS 1-9: 16
10. IF YOU CHECKED OFF ANY PROBLEMS, HOW DIFFICULT HAVE THESE PROBLEMS MADE IT FOR YOU TO DO YOUR WORK, TAKE CARE OF THINGS AT HOME, OR GET ALONG WITH OTHER PEOPLE: SOMEWHAT DIFFICULT

## 2019-01-03 ASSESSMENT — MIFFLIN-ST. JEOR: SCORE: 1707.89

## 2019-01-03 NOTE — PATIENT INSTRUCTIONS
Will continue with the Pristiq but recommend going down to 25 mg to avoid any worsening symptoms since you may have bipolar disorder.  Avoid marijuana.  Will refer to psychiatry to further evaluate for bipolar.

## 2019-01-04 PROBLEM — F12.90 MARIJUANA USE: Status: ACTIVE | Noted: 2019-01-04

## 2019-01-04 ASSESSMENT — PATIENT HEALTH QUESTIONNAIRE - PHQ9: SUM OF ALL RESPONSES TO PHQ QUESTIONS 1-9: 16

## 2019-01-04 ASSESSMENT — ANXIETY QUESTIONNAIRES: GAD7 TOTAL SCORE: 8

## 2019-04-01 DIAGNOSIS — F32.2 CURRENT SEVERE EPISODE OF MAJOR DEPRESSIVE DISORDER WITHOUT PSYCHOTIC FEATURES WITHOUT PRIOR EPISODE (H): ICD-10-CM

## 2019-04-01 RX ORDER — DESVENLAFAXINE 25 MG/1
TABLET, EXTENDED RELEASE ORAL
Qty: 60 TABLET | Refills: 1 | Status: CANCELLED | OUTPATIENT
Start: 2019-04-01

## 2019-04-01 NOTE — TELEPHONE ENCOUNTER
Clinic Action Needed: Yes, please contact patient, okay to leave a detailed message if no answer.     Presenting Problem: Requesting refill on desvenlafaxine succinate (PRISTIQ) 25 MG 24 hr tablet, needs by 4/2/19    Pharmacy: [facility]    Routed to: RN pool    Protocol failed, see below.     Serotonin-Norepinephrine Reuptake Inhibitors Failed4/1 6:45 PM   PHQ-9 score of less than 5 in past 6 months                         1/3/19 OV note reviewed, advised pt to make a follow up appointment with pcp.   He verbalized understanding and had no further questions.     Gilda Palacio RN/FNA

## 2019-04-02 RX ORDER — DESVENLAFAXINE 25 MG/1
25 TABLET, EXTENDED RELEASE ORAL DAILY
Qty: 30 TABLET | Refills: 0 | Status: SHIPPED | OUTPATIENT
Start: 2019-04-02 | End: 2019-04-24

## 2019-04-02 NOTE — TELEPHONE ENCOUNTER
Has patient been able to see Psychiatry yet? Per JM note in January recommended to be evaluated by Psychiatry for Bipolar.     Nikki Benitez PA-C

## 2019-04-02 NOTE — TELEPHONE ENCOUNTER
I refilled medication for a month but he was informed by JM he needs to see psychiatry for evaluation for Bipolar as the medication he is on could make his symptoms worse or not improve his symptoms.     Nikki Benitez PA-C

## 2019-04-11 ENCOUNTER — TRANSFERRED RECORDS (OUTPATIENT)
Dept: HEALTH INFORMATION MANAGEMENT | Facility: CLINIC | Age: 29
End: 2019-04-11

## 2019-04-22 NOTE — PROGRESS NOTES
SUBJECTIVE:   Chance Torres is a 28 year old male who presents to clinic today for the following health issues:      History of Present Illness     Depression & Anxiety Follow-up:     Depression/Anxiety:  Depression & Anxiety    Status since last visit::  Improved    Other associated symptoms of depression and anxiety::  None    Significant life event::  YES    Current substance use::  None       Today's PHQ-9         PHQ-9 Total Score:     (P) 12   PHQ-9 Q9 Thoughts of better off dead/self-harm past 2 weeks :   (P) Not at all   Thoughts of suicide or self harm:      Self-harm Plan:        Self-harm Action:          Safety concerns for self or others:       ANSELMO-7 Total Score: (P) 12    Diet:  Other  Frequency of exercise:  None  Taking medications regularly:  Yes  Medication side effects:  Lightheadedness (from possibly pristiq)  Additional concerns today:  No    Went to [facility] two weeks ago and was prescribed Lamictal. Stopped taking the Lamictal due to side effects-delusions, tired, heard voices.    Wasn't impressed with psychiatrist, spent 20 minutes with him, told him he was bipolar and started him on Lamictal.  Didn't really ask any questions or get to know about Chance.     He did start seeing a [facility] who said he was depressed but not bipolar.  Really enjoyed the therapist and is scheduled to follow-up.     Has had increased stress at home - started remodel of basement, pipes froze, had to deal with insurance, finished the basement just recently, baby is due on May 18th but mom is more swollen so baby may come early.      He noticed when he started pristiq he was more alert in morning, sleeping better.  He did experience some sexual dysfunction, decreased libido.  He also noted some mild jaw clenching.  He did notice however that choking on food went away.  He noticed he was more open to talking as well while on pristiq.  Didn't care for when he increased dose to 50 mg but he was only on  it for a week. Depression didn't worsen and only improved.     Additional history: as documented    Reviewed and updated as needed this visit by clinical staff         Reviewed and updated as needed this visit by Provider         Patient Active Problem List   Diagnosis     Current severe episode of major depressive disorder without psychotic features without prior episode (H)     Irritable bowel syndrome with both constipation and diarrhea     Labral tear of shoulder     Marijuana use     History reviewed. No pertinent surgical history.    Social History     Tobacco Use     Smoking status: Never Smoker     Smokeless tobacco: Never Used   Substance Use Topics     Alcohol use: Yes     Comment: rarely     Family History   Problem Relation Age of Onset     Mental Illness Brother      Mental Illness Brother          Current Outpatient Medications   Medication Sig Dispense Refill     desvenlafaxine succinate (PRISTIQ) 25 MG 24 hr tablet Take 2 tablets (50 mg) by mouth daily 60 tablet 1     levocetirizine (XYZAL ALLERGY 24HR) 5 MG tablet Take 5 mg by mouth every evening         ROS:  Constitutional, HEENT, cardiovascular, pulmonary, GI, , musculoskeletal, neuro, skin, endocrine and psych systems are negative, except as otherwise noted.    OBJECTIVE:     /82   Pulse 68   Temp 98  F (36.7  C) (Temporal)   Resp 14   Wt 77.5 kg (170 lb 12.8 oz)   SpO2 100%   BMI 23.82 kg/m    Body mass index is 23.82 kg/m .  GENERAL: healthy, alert and no distress  MS: no gross musculoskeletal defects noted, no edema  SKIN: no suspicious lesions or rashes  NEURO: Normal strength and tone, mentation intact and speech normal  PSYCH: mentation appears normal, affect flat, judgement and insight intact and appearance well groomed    Diagnostic Test Results:  none     ASSESSMENT/PLAN:       ICD-10-CM    1. Current severe episode of major depressive disorder without psychotic features without prior episode (H) F32.2 desvenlafaxine  succinate (PRISTIQ) 25 MG 24 hr tablet       He is open to restarting the Pristiq and increasing the dose to 50 mg again as he only tried it for a week.  He is going to monitor for worsening side effects (sexual dysfunction, jaw clenching) and if those are not improving while on the higher dose we will discontinue the medication and try something new.     We are waiting on records from Psychiatry to review their diagnosis.  Tend to agree more with therapist that he is more depressed versus Bipolar.  Would be reasonable to continue trying standard SSRI/SNRI therapy versus mood stabilizers at this point.     Reminded of potential side effects.     Follow-up in 2 weeks, sooner if any concerns.     Options for treatment and follow-up care were reviewed with the patient and/or guardian. Patient and/or guardian engaged in the decision making process and verbalized understanding of the options discussed and agreed with the final plan.     Nikki Benitez PA-C  [facility]    Answers for HPI/ROS submitted by the patient on 4/24/2019   Chronic problems general questions HPI Form  If you checked off any problems, how difficult have these problems made it for you to do your work, take care of things at home, or get along with other people?: Somewhat difficult  PHQ9 TOTAL SCORE: 12  ANSELMO 7 TOTAL SCORE: 12

## 2019-04-24 ENCOUNTER — OFFICE VISIT (OUTPATIENT)
Dept: FAMILY MEDICINE | Facility: OTHER | Age: 29
End: 2019-04-24
Payer: COMMERCIAL

## 2019-04-24 VITALS
WEIGHT: 170.8 LBS | DIASTOLIC BLOOD PRESSURE: 82 MMHG | TEMPERATURE: 98 F | RESPIRATION RATE: 14 BRPM | SYSTOLIC BLOOD PRESSURE: 114 MMHG | OXYGEN SATURATION: 100 % | HEART RATE: 68 BPM | BODY MASS INDEX: 23.82 KG/M2

## 2019-04-24 DIAGNOSIS — F32.2 CURRENT SEVERE EPISODE OF MAJOR DEPRESSIVE DISORDER WITHOUT PSYCHOTIC FEATURES WITHOUT PRIOR EPISODE (H): Primary | ICD-10-CM

## 2019-04-24 PROCEDURE — 99213 OFFICE O/P EST LOW 20 MIN: CPT | Performed by: PHYSICIAN ASSISTANT

## 2019-04-24 RX ORDER — LAMOTRIGINE 25 MG/1
TABLET ORAL
Refills: 1 | COMMUNITY
Start: 2019-04-11 | End: 2019-04-24

## 2019-04-24 RX ORDER — DESVENLAFAXINE 25 MG/1
50 TABLET, EXTENDED RELEASE ORAL DAILY
Qty: 60 TABLET | Refills: 1 | Status: SHIPPED | OUTPATIENT
Start: 2019-04-24 | End: 2019-05-08

## 2019-04-24 ASSESSMENT — ANXIETY QUESTIONNAIRES
GAD7 TOTAL SCORE: 12
5. BEING SO RESTLESS THAT IT IS HARD TO SIT STILL: MORE THAN HALF THE DAYS
7. FEELING AFRAID AS IF SOMETHING AWFUL MIGHT HAPPEN: SEVERAL DAYS
6. BECOMING EASILY ANNOYED OR IRRITABLE: MORE THAN HALF THE DAYS
2. NOT BEING ABLE TO STOP OR CONTROL WORRYING: SEVERAL DAYS
1. FEELING NERVOUS, ANXIOUS, OR ON EDGE: SEVERAL DAYS
GAD7 TOTAL SCORE: 12
GAD7 TOTAL SCORE: 12
7. FEELING AFRAID AS IF SOMETHING AWFUL MIGHT HAPPEN: SEVERAL DAYS
3. WORRYING TOO MUCH ABOUT DIFFERENT THINGS: MORE THAN HALF THE DAYS
4. TROUBLE RELAXING: NEARLY EVERY DAY

## 2019-04-24 ASSESSMENT — PATIENT HEALTH QUESTIONNAIRE - PHQ9
SUM OF ALL RESPONSES TO PHQ QUESTIONS 1-9: 12
10. IF YOU CHECKED OFF ANY PROBLEMS, HOW DIFFICULT HAVE THESE PROBLEMS MADE IT FOR YOU TO DO YOUR WORK, TAKE CARE OF THINGS AT HOME, OR GET ALONG WITH OTHER PEOPLE: SOMEWHAT DIFFICULT
SUM OF ALL RESPONSES TO PHQ QUESTIONS 1-9: 12

## 2019-04-25 ASSESSMENT — ANXIETY QUESTIONNAIRES: GAD7 TOTAL SCORE: 12

## 2019-04-25 ASSESSMENT — PATIENT HEALTH QUESTIONNAIRE - PHQ9: SUM OF ALL RESPONSES TO PHQ QUESTIONS 1-9: 12

## 2019-04-26 NOTE — PROGRESS NOTES
SUBJECTIVE:   Chance Torres is a 28 year old male who presents to clinic today for the following health issues:      History of Present Illness     Depression & Anxiety Follow-up:     Depression/Anxiety:  Depression & Anxiety    Status since last visit::  Improved    Other associated symptoms of depression and anxiety::  None    Significant life event::  No    Current substance use::  None       Today's PHQ-9         PHQ-9 Total Score:     (P) 6   PHQ-9 Q9 Thoughts of better off dead/self-harm past 2 weeks :   (P) Not at all   Thoughts of suicide or self harm:      Self-harm Plan:        Self-harm Action:          Safety concerns for self or others:       ANSELMO-7 Total Score: (P) 5    Diet:  Other  Frequency of exercise:  1 day/week  Duration of exercise:  Greater than 60 minutes  Taking medications regularly:  Yes  Medication side effects:  None  Additional concerns today:  No    - Has noticed improvement in his anxiety and depression symptoms.   - Side effects still present but not worse. Tolerable.     Additional history: as documented    Reviewed and updated as needed this visit by clinical staff         Reviewed and updated as needed this visit by Provider             Current Outpatient Medications   Medication Sig Dispense Refill     desvenlafaxine (PRISTIQ) 50 MG 24 hr tablet Take 1 tablet by mouth daily  1     levocetirizine (XYZAL ALLERGY 24HR) 5 MG tablet Take 5 mg by mouth every evening         ROS:  Constitutional, HEENT, cardiovascular, pulmonary, GI, , musculoskeletal, neuro, skin, endocrine and psych systems are negative, except as otherwise noted.    OBJECTIVE:     /70   Pulse 60   Temp 97.8  F (36.6  C) (Temporal)   Resp 16   Wt 78.7 kg (173 lb 6.4 oz)   SpO2 97%   BMI 24.18 kg/m    Body mass index is 24.18 kg/m .  GENERAL: healthy, alert and no distress  MS: no gross musculoskeletal defects noted, no edema  SKIN: no suspicious lesions or rashes  NEURO: Normal strength and tone,  mentation intact and speech normal  PSYCH: mentation appears normal, affect normal/bright    Diagnostic Test Results:  none     ASSESSMENT/PLAN:       ICD-10-CM    1. Current severe episode of major depressive disorder without psychotic features without prior episode (H) F32.2        His mood has improved since increasing his Pristiq dose to 50 mg daily.  He hasn't noticed increase in side effects, they are still present but right now tolerable.  He is going to stay on the 50 mg dose as it will likely continue to be more beneficial over the next 2-4 weeks.     Follow-up in 2-4 weeks if he feels as though his mood is not improved enough or bothered by side effects, otherwise 6 months for medication re-check.     Options for treatment and follow-up care were reviewed with the patient and/or guardian. Patient and/or guardian engaged in the decision making process and verbalized understanding of the options discussed and agreed with the final plan.     Nikki Benitez PA-C  [facility]    Answers for HPI/ROS submitted by the patient on 5/8/2019   Chronic problems general questions HPI Form  If you checked off any problems, how difficult have these problems made it for you to do your work, take care of things at home, or get along with other people?: Not difficult at all  PHQ9 TOTAL SCORE: 6  ANSELMO 7 TOTAL SCORE: 5

## 2019-05-08 ENCOUNTER — OFFICE VISIT (OUTPATIENT)
Dept: FAMILY MEDICINE | Facility: OTHER | Age: 29
End: 2019-05-08
Payer: COMMERCIAL

## 2019-05-08 VITALS
HEART RATE: 60 BPM | TEMPERATURE: 97.8 F | DIASTOLIC BLOOD PRESSURE: 70 MMHG | RESPIRATION RATE: 16 BRPM | WEIGHT: 173.4 LBS | SYSTOLIC BLOOD PRESSURE: 116 MMHG | OXYGEN SATURATION: 97 % | BODY MASS INDEX: 24.18 KG/M2

## 2019-05-08 DIAGNOSIS — F32.2 CURRENT SEVERE EPISODE OF MAJOR DEPRESSIVE DISORDER WITHOUT PSYCHOTIC FEATURES WITHOUT PRIOR EPISODE (H): Primary | ICD-10-CM

## 2019-05-08 PROCEDURE — 99213 OFFICE O/P EST LOW 20 MIN: CPT | Performed by: PHYSICIAN ASSISTANT

## 2019-05-08 RX ORDER — DESVENLAFAXINE 50 MG/1
1 TABLET, FILM COATED, EXTENDED RELEASE ORAL DAILY
Refills: 1 | COMMUNITY
Start: 2019-04-28 | End: 2019-07-10

## 2019-05-08 ASSESSMENT — ANXIETY QUESTIONNAIRES
7. FEELING AFRAID AS IF SOMETHING AWFUL MIGHT HAPPEN: NOT AT ALL
7. FEELING AFRAID AS IF SOMETHING AWFUL MIGHT HAPPEN: NOT AT ALL
1. FEELING NERVOUS, ANXIOUS, OR ON EDGE: SEVERAL DAYS
5. BEING SO RESTLESS THAT IT IS HARD TO SIT STILL: SEVERAL DAYS
GAD7 TOTAL SCORE: 5
GAD7 TOTAL SCORE: 5
3. WORRYING TOO MUCH ABOUT DIFFERENT THINGS: NOT AT ALL
GAD7 TOTAL SCORE: 5
2. NOT BEING ABLE TO STOP OR CONTROL WORRYING: NOT AT ALL
4. TROUBLE RELAXING: MORE THAN HALF THE DAYS
6. BECOMING EASILY ANNOYED OR IRRITABLE: SEVERAL DAYS

## 2019-05-08 ASSESSMENT — PATIENT HEALTH QUESTIONNAIRE - PHQ9
SUM OF ALL RESPONSES TO PHQ QUESTIONS 1-9: 6
10. IF YOU CHECKED OFF ANY PROBLEMS, HOW DIFFICULT HAVE THESE PROBLEMS MADE IT FOR YOU TO DO YOUR WORK, TAKE CARE OF THINGS AT HOME, OR GET ALONG WITH OTHER PEOPLE: NOT DIFFICULT AT ALL
SUM OF ALL RESPONSES TO PHQ QUESTIONS 1-9: 6

## 2019-05-09 ASSESSMENT — ANXIETY QUESTIONNAIRES: GAD7 TOTAL SCORE: 5

## 2019-05-09 ASSESSMENT — PATIENT HEALTH QUESTIONNAIRE - PHQ9: SUM OF ALL RESPONSES TO PHQ QUESTIONS 1-9: 6

## 2019-05-30 NOTE — PROGRESS NOTES
Subjective     Chance Torres is a 28 year old male who presents to clinic today for the following health issues:    History of Present Illness     Mental Health Follow-up:  Patient presents to follow-up on Depression & Anxiety.Patient's depression since last visit has been:  Worse  The patient is not having other symptoms associated with depression.  Patient's anxiety since last visit has been:  Worse  The patient is not having other symptoms associated with anxiety.  Any significant life events: other  Patient is feeling anxious or having panic attacks.  Patient has no concerns about alcohol or drug use.     Social History  Tobacco Use    Smoking status: Never Smoker    Smokeless tobacco: Never Used  Alcohol use: Yes    Comment: rarely  Drug use: Yes    Types: Marijuana      Today's PHQ-9         PHQ-9 Total Score:     (P) 22   PHQ-9 Q9 Thoughts of better off dead/self-harm past 2 weeks :   (P) Several days   Thoughts of suicide or self harm:  (P) No   Self-harm Plan:        Self-harm Action:          Safety concerns for self or others: (P) Yes         He eats 2-3 servings of fruits and vegetables daily.He consumes 0 sweetened beverage(s) daily.  He is taking medications regularly.     - Issues with lying on back, snoring and daytime somnolence.  This was weeks before baby arrived.  With baby here only waking up once per night, sleeping well though.   - Was worried the sleepiness was because of Pristiq so he backed down on the dose to 25 mg and now noticing his anxiety is increasing again.      Current Outpatient Medications   Medication Sig Dispense Refill     busPIRone (BUSPAR) 5 MG tablet Take 1 tablet (5 mg) by mouth 3 times daily 90 tablet 0     desvenlafaxine succinate (PRISTIQ) 25 MG 24 hr tablet   0     levocetirizine (XYZAL ALLERGY 24HR) 5 MG tablet Take 5 mg by mouth every evening       levomilnacipran (FETZIMA) 20 MG 24 hr capsule Take 1 capsule (20 mg) by mouth daily 60 capsule 1     desvenlafaxine  (PRISTIQ) 50 MG 24 hr tablet Take 1 tablet by mouth daily  1     No Known Allergies    Reviewed and updated as needed this visit by Provider  Tobacco  Allergies  Meds  Problems  Med Hx  Surg Hx  Fam Hx         Review of Systems   ROS COMP: Constitutional, HEENT, cardiovascular, pulmonary, GI, , musculoskeletal, neuro, skin, endocrine and psych systems are negative, except as otherwise noted.      Objective    /80   Pulse 72   Temp 98.2  F (36.8  C) (Temporal)   Resp 14   Wt 77.9 kg (171 lb 12.8 oz)   SpO2 96%   BMI 23.96 kg/m    Body mass index is 23.96 kg/m .  Physical Exam   GENERAL: healthy, alert and no distress  MS: no gross musculoskeletal defects noted, no edema  SKIN: no suspicious lesions or rashes  NEURO: Normal strength and tone, mentation intact and speech normal  PSYCH: mentation appears normal, affect flat, judgement and insight intact and appearance well groomed    Diagnostic Test Results:  Labs reviewed in Epic        Assessment & Plan       ICD-10-CM    1. Current severe episode of major depressive disorder without psychotic features without prior episode (H) F32.2 levomilnacipran (FETZIMA) 20 MG 24 hr capsule     busPIRone (BUSPAR) 5 MG tablet   2. Recurrent manic episodes, mild (H) F31.89 levomilnacipran (FETZIMA) 20 MG 24 hr capsule     busPIRone (BUSPAR) 5 MG tablet          Very worried about side effects with medications.  We reviewed side effects of all medications in full.  Discussed there are risks but that we can stop the medications if symptoms develop.  In regards to sleeping concerns, he is now sleeping on his side again and not snoring.  Will see if adjusting medications will help with this and result in resolution of the daytime somnolence, if not consider sleep study or more work-up.    He will stay on 25 mg of Pristiq while getting on Fetzima.  Once up to 40 mg of Fetzima can stop the Pristiq.   Will try Buspar up to 3 times per day, monitor for side effects  from medication.   Advised against benzodiazepines but consider in future.   Recommend referral to Psychiatry for medication assistance if this is not helping enough.     Return in about 3 weeks (around 6/28/2019) for Medication Re-check.    Options for treatment and follow-up care were reviewed with the patient and/or guardian. Patient and/or guardian engaged in the decision making process and verbalized understanding of the options discussed and agreed with the final plan.    Nikki Benitez PA-C  [facility]    Answers for HPI/ROS submitted by the patient on 6/7/2019   If you checked off any problems, how difficult have these problems made it for you to do your work, take care of things at home, or get along with other people?: Very difficult  PHQ9 TOTAL SCORE: 22  ANSELMO 7 TOTAL SCORE: 12

## 2019-06-07 ENCOUNTER — OFFICE VISIT (OUTPATIENT)
Dept: FAMILY MEDICINE | Facility: OTHER | Age: 29
End: 2019-06-07
Payer: COMMERCIAL

## 2019-06-07 VITALS
TEMPERATURE: 98.2 F | RESPIRATION RATE: 14 BRPM | SYSTOLIC BLOOD PRESSURE: 110 MMHG | BODY MASS INDEX: 23.96 KG/M2 | WEIGHT: 171.8 LBS | HEART RATE: 72 BPM | OXYGEN SATURATION: 96 % | DIASTOLIC BLOOD PRESSURE: 80 MMHG

## 2019-06-07 DIAGNOSIS — F32.2 CURRENT SEVERE EPISODE OF MAJOR DEPRESSIVE DISORDER WITHOUT PSYCHOTIC FEATURES WITHOUT PRIOR EPISODE (H): Primary | ICD-10-CM

## 2019-06-07 DIAGNOSIS — F31.89: ICD-10-CM

## 2019-06-07 PROCEDURE — 99214 OFFICE O/P EST MOD 30 MIN: CPT | Performed by: PHYSICIAN ASSISTANT

## 2019-06-07 RX ORDER — DESVENLAFAXINE 25 MG/1
TABLET, EXTENDED RELEASE ORAL
Refills: 0 | COMMUNITY
Start: 2019-04-02 | End: 2019-07-10

## 2019-06-07 RX ORDER — BUSPIRONE HYDROCHLORIDE 5 MG/1
5 TABLET ORAL 3 TIMES DAILY
Qty: 90 TABLET | Refills: 0 | Status: SHIPPED | OUTPATIENT
Start: 2019-06-07 | End: 2019-07-10

## 2019-06-07 ASSESSMENT — ANXIETY QUESTIONNAIRES
GAD7 TOTAL SCORE: 12
4. TROUBLE RELAXING: MORE THAN HALF THE DAYS
GAD7 TOTAL SCORE: 12
5. BEING SO RESTLESS THAT IT IS HARD TO SIT STILL: SEVERAL DAYS
GAD7 TOTAL SCORE: 12
6. BECOMING EASILY ANNOYED OR IRRITABLE: MORE THAN HALF THE DAYS
2. NOT BEING ABLE TO STOP OR CONTROL WORRYING: MORE THAN HALF THE DAYS
1. FEELING NERVOUS, ANXIOUS, OR ON EDGE: NEARLY EVERY DAY
3. WORRYING TOO MUCH ABOUT DIFFERENT THINGS: SEVERAL DAYS
7. FEELING AFRAID AS IF SOMETHING AWFUL MIGHT HAPPEN: SEVERAL DAYS
7. FEELING AFRAID AS IF SOMETHING AWFUL MIGHT HAPPEN: SEVERAL DAYS

## 2019-06-07 ASSESSMENT — PATIENT HEALTH QUESTIONNAIRE - PHQ9
SUM OF ALL RESPONSES TO PHQ QUESTIONS 1-9: 22
SUM OF ALL RESPONSES TO PHQ QUESTIONS 1-9: 22
10. IF YOU CHECKED OFF ANY PROBLEMS, HOW DIFFICULT HAVE THESE PROBLEMS MADE IT FOR YOU TO DO YOUR WORK, TAKE CARE OF THINGS AT HOME, OR GET ALONG WITH OTHER PEOPLE: VERY DIFFICULT

## 2019-06-07 NOTE — PATIENT INSTRUCTIONS
Buspar:  Try at home first time.   If tolerating can take 3 times per day.     Continue on Pristiq 25 mg once daily.   Start Fetzima take 1 capsule daily x 7 days and then increase to 2 capsules daily. If you increase dose to 40 mg daily then stop the Pristiq    Follow-up in 2-3 weeks.

## 2019-06-08 ASSESSMENT — ANXIETY QUESTIONNAIRES: GAD7 TOTAL SCORE: 12

## 2019-06-08 ASSESSMENT — PATIENT HEALTH QUESTIONNAIRE - PHQ9: SUM OF ALL RESPONSES TO PHQ QUESTIONS 1-9: 22

## 2019-06-13 NOTE — TELEPHONE ENCOUNTER
"RECORDS RECEIVED FROM: Cyst on Left Ankle, MRI imaging (2-3 years ago) with [facility], Seeking second opinion, Appt per Pt, no Hx of surgery   DATE RECEIVED: Jun 21, 2019    NOTES STATUS DETAILS   OFFICE NOTE from referring provider N/A    OFFICE NOTE from other specialist N/A    DISCHARGE SUMMARY from hospital N/A    DISCHARGE REPORT from the ER N/A    OPERATIVE REPORT N/A    MEDICATION LIST N/A    IMPLANT RECORD/STICKER N/A    LABS     CBC/DIFF N/A    CULTURES N/A    INJECTIONS DONE IN RADIOLOGY N/A    MRI N/A 2/3 years ago   CT SCAN N/A    XRAYS (IMAGES & REPORTS) N/A    TUMOR     PATHOLOGY  Slides & report N/A      06/13/19   12:17 PM  Faxed request to [facility] for medical records    06/14/19   10:38 AM  \"there is no record for this person having been a patient at [facility]\"  Potentially these are different than [facility]?    06/17/19   10:39 AM  Called to get [facility]'s fax, and sent request.    06/18/19   1:55 PM  [facility] requires signed auth from patient.   "

## 2019-06-20 NOTE — PROGRESS NOTES
Date of Service: 6/21/2019    Chief Complaint   Patient presents with     Consult     Left ankle cyst          HPI: Chance is a 28 year old male who presents today for further evaluation of left ankle cyst.  Nature: sharp and aching    Location: Left bottom of the arch    Duration: years    Onset: gradual. No inciting trauma.     Course: worsening.     Aggravating/alleviating factors: all weight bearing activities are aggravating. Rest also does not alleviate.     Previous Treatments: Wife is a PT and has tried numerous techniques including stretching and dry needling. Was bed bound after hernia sx and this was still painful. Has been booted and given orthotics and these made the pain worse.         Review of Systems: Answers for HPI/ROS submitted by the patient on 6/21/2019   General Symptoms: Yes  Skin Symptoms: No  HENT Symptoms: No  EYE SYMPTOMS: No  HEART SYMPTOMS: No  LUNG SYMPTOMS: Yes  INTESTINAL SYMPTOMS: Yes  URINARY SYMPTOMS: No  REPRODUCTIVE SYMPTOMS: Yes  SKELETAL SYMPTOMS: No  BLOOD SYMPTOMS: No  NERVOUS SYSTEM SYMPTOMS: No  MENTAL HEALTH SYMPTOMS: Yes  Fever: No  Loss of appetite: No  Weight loss: No  Weight gain: No  Fatigue: Yes  Night sweats: No  Chills: No  Increased stress: No  Excessive hunger: No  Excessive thirst: No  Feeling hot or cold when others believe the temperature is normal: No  Loss of height: No  Post-operative complications: No  Surgical site pain: No  Hallucinations: No  Change in or Loss of Energy: Yes  Hyperactivity: No  Confusion: Yes  Cough: No  Sputum or phlegm: No  Coughing up blood: No  Difficulty breating or shortness of breath: Yes  Snoring: Yes  Wheezing: No  Difficulty breathing on exertion: No  Nighttime Cough: No  Difficulty breathing when lying flat: No  Heart burn or indigestion: Yes  Nausea: No  Vomiting: No  Abdominal pain: No  Bloating: No  Constipation: Yes  Diarrhea: No  Blood in stool: No  Black stools: No  Rectal or Anal pain: No  Fecal incontinence:  No  Yellowing of skin or eyes: No  Vomit with blood: No  Change in stools: No  Scrotal pain or swelling: No  Erectile dysfunction: No  Penile discharge: No  Genital ulcers: No  Reduced libido: Yes  Nervous or Anxious: Yes  Depression: Yes  Trouble sleeping: Yes  Trouble thinking or concentrating: Yes  Mood changes: Yes  Panic attacks: Yes      PMH: No past medical history on file.    PSxH: No past surgical history on file.    Allergies: Patient has no known allergies.    SH:   Social History     Socioeconomic History     Marital status:      Spouse name: Not on file     Number of children: Not on file     Years of education: Not on file     Highest education level: Not on file   Occupational History     Not on file   Social Needs     Financial resource strain: Not on file     Food insecurity:     Worry: Not on file     Inability: Not on file     Transportation needs:     Medical: Not on file     Non-medical: Not on file   Tobacco Use     Smoking status: Never Smoker     Smokeless tobacco: Never Used   Substance and Sexual Activity     Alcohol use: Yes     Comment: rarely     Drug use: Yes     Types: Marijuana     Sexual activity: Yes     Partners: Female   Lifestyle     Physical activity:     Days per week: Not on file     Minutes per session: Not on file     Stress: Not on file   Relationships     Social connections:     Talks on phone: Not on file     Gets together: Not on file     Attends Faith service: Not on file     Active member of club or organization: Not on file     Attends meetings of clubs or organizations: Not on file     Relationship status: Not on file     Intimate partner violence:     Fear of current or ex partner: Not on file     Emotionally abused: Not on file     Physically abused: Not on file     Forced sexual activity: Not on file   Other Topics Concern     Parent/sibling w/ CABG, MI or angioplasty before 65F 55M? No   Social History Narrative     Not on file       FH:   Family  History   Problem Relation Age of Onset     Mental Illness Brother      Mental Illness Brother        Objective:  Data Unavailable Data Unavailable Data Unavailable Data Unavailable Data Unavailable 0 lbs 0 oz    PT and DP pulses are 2/4 bilaterally. CRT is instant. Robust pedal hair.   Gross sensation is intact bilaterally.   Equinus is noted bilaterally. No pain with active or passive ROM of the ankle, MTJ, 1st ray, or halluces bilaterally,. Pain noted with palpation of the plantar surface of the left navicular tuberosity. No pain along the courses of the PT, peroneal, or Achilles tendons on the left. Normal ROM. MMT 5/5 without pain.   Nails normal bilaterally. No open lesions are noted.     left foot xrays indicated in 3 weightbearing views.    Some mild arthritic changes noted to the 1st met and medial cuneiform. No acute processes noted.       Assessment: Cyst seen on this phone from a previous MRI. Showed a cyst-like mass plantar to the navicular and medial cuneiform with some cystic changes noted to the above mentioned bones.     Plan:  - Pt seen and evaluated.  - XRs taken and discussed with him.  - Recommend MRI with contrast.   - See again after MRI.

## 2019-06-21 ENCOUNTER — PRE VISIT (OUTPATIENT)
Dept: ORTHOPEDICS | Facility: CLINIC | Age: 29
End: 2019-06-21

## 2019-06-21 ENCOUNTER — ANCILLARY PROCEDURE (OUTPATIENT)
Dept: GENERAL RADIOLOGY | Facility: CLINIC | Age: 29
End: 2019-06-21
Attending: PODIATRIST
Payer: COMMERCIAL

## 2019-06-21 ENCOUNTER — OFFICE VISIT (OUTPATIENT)
Dept: ORTHOPEDICS | Facility: CLINIC | Age: 29
End: 2019-06-21
Payer: COMMERCIAL

## 2019-06-21 VITALS — WEIGHT: 176 LBS | HEIGHT: 72 IN | BODY MASS INDEX: 23.84 KG/M2

## 2019-06-21 DIAGNOSIS — M85.672 BONE CYST OF LEFT FOOT: ICD-10-CM

## 2019-06-21 DIAGNOSIS — M79.672 LEFT FOOT PAIN: Primary | ICD-10-CM

## 2019-06-21 DIAGNOSIS — M79.672 LEFT FOOT PAIN: ICD-10-CM

## 2019-06-21 ASSESSMENT — ENCOUNTER SYMPTOMS
RECTAL PAIN: 0
FEVER: 0
SPUTUM PRODUCTION: 0
INSOMNIA: 1
DYSPNEA ON EXERTION: 0
HEMOPTYSIS: 0
DECREASED CONCENTRATION: 1
NAUSEA: 0
SHORTNESS OF BREATH: 1
PANIC: 1
POLYDIPSIA: 0
FATIGUE: 1
COUGH DISTURBING SLEEP: 0
INCREASED ENERGY: 1
DIARRHEA: 0
BLOATING: 0
WEIGHT GAIN: 0
DECREASED APPETITE: 0
SNORES LOUDLY: 1
NIGHT SWEATS: 0
JAUNDICE: 0
BLOOD IN STOOL: 0
ABDOMINAL PAIN: 0
POLYPHAGIA: 0
VOMITING: 0
WHEEZING: 0
ALTERED TEMPERATURE REGULATION: 0
HEARTBURN: 1
CHILLS: 0
WEIGHT LOSS: 0
POSTURAL DYSPNEA: 0
COUGH: 0
HALLUCINATIONS: 0
BOWEL INCONTINENCE: 0
NERVOUS/ANXIOUS: 1
CONSTIPATION: 1
DEPRESSION: 1

## 2019-06-21 ASSESSMENT — MIFFLIN-ST. JEOR: SCORE: 1796.33

## 2019-06-21 NOTE — LETTER
6/21/2019       RE: Chance Torres  [address]  [place] [address]     Dear Colleague,    Thank you for referring your patient, Chance Torres, to the [facility]. Please see a copy of my visit note below.    Date of Service: 6/21/2019    Chief Complaint   Patient presents with     Consult     Left ankle cyst        HPI: Chance is a 28 year old male who presents today for further evaluation of left ankle cyst.  Nature: sharp and aching    Location: Left bottom of the arch    Duration: years    Onset: gradual. No inciting trauma.     Course: worsening.     Aggravating/alleviating factors: all weight bearing activities are aggravating. Rest also does not alleviate.     Previous Treatments: Wife is a PT and has tried numerous techniques including stretching and dry needling. Was bed bound after hernia sx and this was still painful. Has been booted and given orthotics and these made the pain worse.     PMH: No past medical history on file.    PSxH: No past surgical history on file.    Allergies: Patient has no known allergies.    SH:   Social History     Socioeconomic History     Marital status:      Spouse name: Not on file     Number of children: Not on file     Years of education: Not on file     Highest education level: Not on file   Occupational History     Not on file   Social Needs     Financial resource strain: Not on file     Food insecurity:     Worry: Not on file     Inability: Not on file     Transportation needs:     Medical: Not on file     Non-medical: Not on file   Tobacco Use     Smoking status: Never Smoker     Smokeless tobacco: Never Used   Substance and Sexual Activity     Alcohol use: Yes     Comment: rarely     Drug use: Yes     Types: Marijuana     Sexual activity: Yes     Partners: Female   Lifestyle     Physical activity:     Days per week: Not on file     Minutes per session: Not on file     Stress: Not on file    Relationships     Social connections:     Talks on phone: Not on file     Gets together: Not on file     Attends Gnosticist service: Not on file     Active member of club or organization: Not on file     Attends meetings of clubs or organizations: Not on file     Relationship status: Not on file     Intimate partner violence:     Fear of current or ex partner: Not on file     Emotionally abused: Not on file     Physically abused: Not on file     Forced sexual activity: Not on file   Other Topics Concern     Parent/sibling w/ CABG, MI or angioplasty before 65F 55M? No   Social History Narrative     Not on file       FH:   Family History   Problem Relation Age of Onset     Mental Illness Brother      Mental Illness Brother        Objective:  Data Unavailable Data Unavailable Data Unavailable Data Unavailable Data Unavailable 0 lbs 0 oz    PT and DP pulses are 2/4 bilaterally. CRT is instant. Robust pedal hair.   Gross sensation is intact bilaterally.   Equinus is noted bilaterally. No pain with active or passive ROM of the ankle, MTJ, 1st ray, or halluces bilaterally,. Pain noted with palpation of the plantar surface of the left navicular tuberosity. No pain along the courses of the PT, peroneal, or Achilles tendons on the left. Normal ROM. MMT 5/5 without pain.   Nails normal bilaterally. No open lesions are noted.     left foot xrays indicated in 3 weightbearing views.    Some mild arthritic changes noted to the 1st met and medial cuneiform. No acute processes noted.     Assessment: Cyst seen on this phone from a previous MRI. Showed a cyst-like mass plantar to the navicular and medial cuneiform with some cystic changes noted to the above mentioned bones.     Plan:  - Pt seen and evaluated.  - XRs taken and discussed with him.  - Recommend MRI with contrast.   - See again after MRI.      Again, thank you for allowing me to participate in the care of your patient.      Sincerely,    Baron Mayorga DPM

## 2019-06-21 NOTE — NURSING NOTE
Reason For Visit:   Chief Complaint   Patient presents with     Consult     Left ankle cyst       Ht 1.829 m (6')   Wt 79.8 kg (176 lb)   BMI 23.87 kg/m      Pain Assessment  Patient Currently in Pain: Yes  0-10 Pain Scale: 5  Primary Pain Location: Ankle    Rosemarie Chow ATC

## 2019-06-28 ENCOUNTER — ANCILLARY PROCEDURE (OUTPATIENT)
Dept: MRI IMAGING | Facility: CLINIC | Age: 29
End: 2019-06-28
Attending: PODIATRIST
Payer: COMMERCIAL

## 2019-06-28 DIAGNOSIS — M79.672 LEFT FOOT PAIN: ICD-10-CM

## 2019-06-28 DIAGNOSIS — M85.672 BONE CYST OF LEFT FOOT: ICD-10-CM

## 2019-07-10 ENCOUNTER — OFFICE VISIT (OUTPATIENT)
Dept: SLEEP MEDICINE | Facility: CLINIC | Age: 29
End: 2019-07-10
Payer: COMMERCIAL

## 2019-07-10 VITALS
BODY MASS INDEX: 23.87 KG/M2 | DIASTOLIC BLOOD PRESSURE: 62 MMHG | OXYGEN SATURATION: 100 % | HEIGHT: 72 IN | SYSTOLIC BLOOD PRESSURE: 104 MMHG | HEART RATE: 71 BPM

## 2019-07-10 DIAGNOSIS — G47.33 OSA (OBSTRUCTIVE SLEEP APNEA): Primary | ICD-10-CM

## 2019-07-10 PROCEDURE — 99205 OFFICE O/P NEW HI 60 MIN: CPT | Performed by: INTERNAL MEDICINE

## 2019-07-10 NOTE — PROGRESS NOTES
SLEEP MEDICINE CLINIC NOTE   [facility]  Chance Torres 28 year old male  : 1990  MRN: [id number]      PRIMARY CARE PROVIDER: Nikki Benitez    REFERRING PROVIDER: Referred Self      Visit Date:   07/10/2019      REASON FOR VISIT:  Excessive daytime sleepiness despite getting adequate sleep.      HISTORY OF PRESENT ILLNESS:  The patient is a very pleasant 28-year-old gentleman who is seen today with his wife and accompanied by his 2-month-old daughter.  His comorbidities include severe depression, irritable bowel syndrome, history of significant daily marijuana use.      The patient reports that he is generally tired all day with excessive daytime sleepiness, nonrestorative sleep, snoring.  These issues have been going on for the last 6 months to 1 year.  He reports that he was diagnosed with depression several years ago and about 6 months ago, was started on Pristiq.  At that time, he was also smoking marijuana, but he discontinued the use upon starting Pristiq.  While he was on that medication he reported being very awake and somewhat hyperactive.  He was thought to potentially display symptoms of leah, but Pristiq did not appear to improve his depression symptoms and did not make him feel better, so this was discontinued after 4 months of trial.  Since then, he has been excessively sleepy, falling asleep multiple times through the day.  He reports his [place] Sleepiness Score as 14 with moderate chance of falling asleep while driving.  He reports that while he was on Pristiq he had difficulty initiating and maintaining sleep and felt rested upon awakening.  However, after the Pristiq was discontinued, he has developed difficulty staying awake at night.      He describes his current routine as going to bed between 10:00 and 11:00 p.m.  Prior to this is usually either doing chores around his house or putting his daughter to sleep.  It takes him just a few minutes to fall  asleep.  He does not wake up throughout the night except toss and turn occasionally and use the bathroom once or twice.  It is easy for him to return to sleep.  He finally wakes up with the help of an alarm at 6:45 a.m.  He has sleep inertia.  He does not feel rested.  He feels excessively sleepy and fatigued through the day.  He also described other symptoms like frequent dizziness and shortness of breath.  Up until recently, he was working in a job that required him to be at work at 6:00 a.m. and return at 4:30 a.m.  At that time he would go to sleep at 10:00 and wake up at 4:45 with the help of an alarm.  He just started a new job yesterday.  The work hours began at 8:00 until 4:30 on weekdays.  He follows a similar schedule on weekends for his sleep.      He denies any features of motor restlessness suggestive of restless legs syndrome.  He denies any frequent dreams, nightmares, dream enactment behavior.  He does have bruxism.      He reports waking up with a headache in the morning at least twice a week.  He reports snoring, which is not very loud in intensity.  He reports having gained about 15 pounds of weight over the last 2 months, which has likely resulted in worsening of snoring.  His snoring was much louder when he was on Pristiq, but it has not been that much of a problem since he got off that medication.  He denies any snort arousals or witnessed apneas.  He frequently wakes up with a dry mouth.  He reports difficulty breathing through his nose and has seasonal allergies.  He takes levocetirizine daily for the last several years.  He denies any GERD, but reports occasional dysphagia.  He prefers to sleep on his back or on his sides.  Over the last 1 year, he has been consistently sleeping on his back, but prior to that he used to sleep on his sides.      He denies cataplexy or sleep paralysis, but does report frequent hypnagogic or hypnopompic hallucinations.      SOCIAL HISTORY:  The patient is  , works as a .  His wife works at our hospital as a physical therapist.  He has a 2-month-old daughter.  He denies smoking or any significant alcohol use.  He reports using marijuana inhaled every day up until December last year.      FAMILY HISTORY:  Reports that his mother has hypothyroidism.  His father has excessive daytime sleepiness and snoring.  One of his brothers who is 2 years older, not obese, was diagnosed with obstructive sleep apnea.  Another of his brother and sister have excessive daytime sleepiness.      PAST SURGICAL HISTORY:  Includes inguinal hernia repair, shoulder repair.      PAST MEDICAL HISTORY:  Depression.      MEDICATIONS:  Levocetirizine.      REVIEW OF SYSTEMS:  A complete 14-point review of systems was performed and found negative except as noted above.      PHYSICAL EXAMINATION:   /62   Pulse 71   Ht 1.829 m (6')   SpO2 100%   BMI 23.87 kg/m    GENERAL:  Pleasant gentleman sitting up, speaking in full sentences without any discomfort.   HEENT:  Mallampati class 3 airway.  Tonsils grade 2.   NECK:  Circumference 34 cm.  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   MUSCULOSKELETAL:  No cervical or submandibular lymphadenopathy.   PULMONARY:  Normal intensity breath sounds bilaterally with no added sounds.   CARDIOVASCULAR:  S1, S2 normal.  No murmurs, gallops.  Regular rate and rhythm.   ABDOMEN:  Soft, nontender, nondistended, normoactive bowel sounds.   NEUROLOGIC:  Grossly intact.   PSYCHIATRIC:  Flat affect.   SKIN:  No rashes on limited exam.   MUSCULOSKELETAL:  No lower extremity edema or upper extremity tremors.      ASSESSMENT AND PLAN:  The patient is a very pleasant 28-year-old gentleman with significant history of depression who is being evaluated for excessive daytime sleepiness of relatively recent onset along with a relatively recent onset of snoring.      The patient's excessive daytime sleepiness is likely multifactorial.  He  appears to be getting 6.5 to 7 hours of sleep on a consistent basis, which apparently was adequate for him in the past, but more recently he has been excessively sleepy.  I advised him to increase his sleep intake by at least half an hour to 1 hour on a daily basis consistently to rule out chronic sleep deprivation as a cause of his excessive daytime sleepiness.  He does have some symptoms of obstructive sleep apnea as well as risk factors for ADRIANNA, which include crowded airway and family history of ADRIANNA.  Although overall the pretest probability for obstructive sleep apnea is low to intermediate, we will perform a PSG according to split-night protocol to evaluate for this and treat it as indicated.  We reviewed in detail the pathophysiology of ADRIANNA as well as its various treatment options.  Finally, it is very likely that the primary etiology of his excessive daytime sleepiness is inadequately treated depression.  The patient was encouraged to establish care with a new psychiatrist as he reports that he does not want to go back to his previous psychiatrist.  He felt motivated to that.  Finally, I also suggested discontinuing the use of levocetirizine as antihistamines can cause excessive sleepiness.  The patient will do that on a trial basis.  He will use nasal saline instead for seasonal allergies.      The patient was counseled regarding not driving if drowsy or sleepy.         I spent a total of 60 minutes face to face with Jerry Villatoro during today's office visit. Over 50% of this time was spent counseling the patient and/or coordinating care regarding their sleep disorder.     Abdulkadir Villar MD   of Medicine  Pulmonary, Critical Care and Sleep Medicine  [facility]  Pager: [phone]              D: 07/10/2019   T: 07/10/2019   MT: MARTHA      Name:     JERRY VILLATORO   MRN:      [id number]        Account:      [id number]   :      1990           Visit Date:    07/10/2019      Document: [id number]

## 2019-07-10 NOTE — PATIENT INSTRUCTIONS
Increase your sleep duration by 30 minutes to 1 hour daily on a consistent basis.    Work with a psychiatrist for evaluation and management of depression / mental health conditions.     We are evaluating you for sleep apnea.     Drive Safe... Drive Alive     Sleep health profoundly affects your health, mood, and your safety. 33% of the population (one in three of us) is not getting enough sleep and many have a sleep disorder. Not getting enough sleep or having an untreated / undertreated sleep condition may make us sleepy without even knowing it. In fact, our driving could be dramatically impaired due to our sleep health. As your provider, here are some things I would like you to know about driving:     Here are some warning signs for impairment and dangerous drowsy driving:              -Having been awake more than 16 hours               -Looking tired               -Eyelid drooping              -Head nodding (it could be too late at this point)              -Driving for more than 30 minutes     Some things you could do to make the driving safer if you are experiencing some drowsiness:              -Stop driving and rest              -Call for transportation              -Make sure your sleep disorder is adequately treated     Some things that have been shown NOT to work when experiencing drowsiness while driving:              -Turning on the radio              -Opening windows              -Eating any  distracting  /  entertaining  foods (e.g., sunflower seeds, candy, or any other)              -Talking on the phone      Your decision may not only impact your life, but also the life of others. Please, remember to drive safe for yourself and all of us.    Abdulkadir Villar

## 2019-07-14 NOTE — PROGRESS NOTES
Chief Complaint   Patient presents with     RECHECK     Cyst, left ankle.      Results     MRI          No Known Allergies      Subjective: Chance is a 28 year old male who presents to the clinic today for a follow up of left foot pain. Relates that he did have the MRI performed. Continues to have pain in the foot.     Objective  PT and DP pulses are 2/4 bilaterally. CRT is instant. Robust pedal hair.   Gross sensation is intact bilaterally.   Equinus is noted bilaterally. No pain with active or passive ROM of the ankle, MTJ, 1st ray, or halluces bilaterally,. Pain noted with palpation of the plantar surface of the left navicular tuberosity. No pain along the courses of the PT, peroneal, or Achilles tendons on the left. Normal ROM. MMT 5/5 without pain.   Nails normal bilaterally. No open lesions are noted.      Previous MRI pictures from pt's phone:               MRI Impression:  1. Marker placed tibial to the first tarsometatarsal joint. No  immediately underlying ganglion or synovial cyst.  2. Lobulated/intramammary septated cystic-appearing mass measuring  approximately 5 x 8 x 12 mm at the plantar aspect of the intermediate  and lateral cuneiform near the posterior tibialis, likely ganglion  cyst.     ANDREA HOWARD     Assessment: Cyst seen  from a previous MRI. Showed a cyst-like mass plantar to the navicular and medial cuneiform with some cystic changes noted to the above mentioned bones.      Plan:  - Pt seen and evaluated.  - I would like to speak to Dr. Cuello about management of this lesion. My first impression would be to send him to radiology for US guided injection of the area. Will call Chance in a week and let him know about discussion with Dr. Cuello.

## 2019-07-16 ENCOUNTER — OFFICE VISIT (OUTPATIENT)
Dept: ORTHOPEDICS | Facility: CLINIC | Age: 29
End: 2019-07-16
Payer: COMMERCIAL

## 2019-07-16 DIAGNOSIS — M79.672 LEFT FOOT PAIN: ICD-10-CM

## 2019-07-16 DIAGNOSIS — M85.672 BONE CYST OF LEFT FOOT: Primary | ICD-10-CM

## 2019-07-16 NOTE — LETTER
7/16/2019       RE: Chance Torres  [address]  [place] [address]     Dear Colleague,    Thank you for referring your patient, Chance Torres, to the [facility]. Please see a copy of my visit note below.    Chief Complaint   Patient presents with     RECHECK     Cyst, left ankle.      Results     MRI          No Known Allergies      Subjective: Chance is a 28 year old male who presents to the clinic today for a follow up of left foot pain. Relates that he did have the MRI performed. Continues to have pain in the foot.     Objective  PT and DP pulses are 2/4 bilaterally. CRT is instant. Robust pedal hair.   Gross sensation is intact bilaterally.   Equinus is noted bilaterally. No pain with active or passive ROM of the ankle, MTJ, 1st ray, or halluces bilaterally,. Pain noted with palpation of the plantar surface of the left navicular tuberosity. No pain along the courses of the PT, peroneal, or Achilles tendons on the left. Normal ROM. MMT 5/5 without pain.   Nails normal bilaterally. No open lesions are noted.      Previous MRI pictures from pt's phone:               MRI Impression:  1. Marker placed tibial to the first tarsometatarsal joint. No  immediately underlying ganglion or synovial cyst.  2. Lobulated/intramammary septated cystic-appearing mass measuring  approximately 5 x 8 x 12 mm at the plantar aspect of the intermediate  and lateral cuneiform near the posterior tibialis, likely ganglion  cyst.     ANDREA HOWARD     Assessment: Cyst seen  from a previous MRI. Showed a cyst-like mass plantar to the navicular and medial cuneiform with some cystic changes noted to the above mentioned bones.      Plan:  - Pt seen and evaluated.  - I would like to speak to Dr. Cuello about management of this lesion. My first impression would be to send him to radiology for US guided injection of the area. Will call Chance in a week and let him know  about discussion with Dr. Cuello.           Again, thank you for allowing me to participate in the care of your patient.      Sincerely,    Baron Mayorga DPM

## 2019-07-16 NOTE — NURSING NOTE
Reason For Visit:   Chief Complaint   Patient presents with     RECHECK     Cyst, left ankle.      Results     MRI       Pain Assessment  Patient Currently in Pain: Denies        No Known Allergies        Christiana Chatman LPN

## 2019-07-22 ENCOUNTER — TELEPHONE (OUTPATIENT)
Dept: ORTHOPEDICS | Facility: CLINIC | Age: 29
End: 2019-07-22

## 2019-07-22 NOTE — TELEPHONE ENCOUNTER
[facility] Call Center    Phone Message    May a detailed message be left on voicemail: yes    Reason for Call: Other: Pt calling back, as he is still waiting to hear back from Dr. Mayorga. He was going to talk to Dr. Cuello and discuss the patient's current state, and the patient is wondering what the status of that conversation is. Please follow up with the patient as soon as possible at [phone]     Action Taken: Message routed to:  [facility] ([facility] Ortho

## 2019-07-23 DIAGNOSIS — M67.472 GANGLION CYST OF LEFT FOOT: Primary | ICD-10-CM

## 2019-07-30 ENCOUNTER — TELEPHONE (OUTPATIENT)
Dept: GENERAL RADIOLOGY | Facility: CLINIC | Age: 29
End: 2019-07-30

## 2019-08-13 ENCOUNTER — ANCILLARY PROCEDURE (OUTPATIENT)
Dept: ULTRASOUND IMAGING | Facility: CLINIC | Age: 29
End: 2019-08-13
Attending: PODIATRIST
Payer: COMMERCIAL

## 2019-08-13 DIAGNOSIS — M67.472 GANGLION CYST OF LEFT FOOT: ICD-10-CM

## 2019-08-23 ENCOUNTER — MYC MEDICAL ADVICE (OUTPATIENT)
Dept: FAMILY MEDICINE | Facility: OTHER | Age: 29
End: 2019-08-23

## 2019-08-23 ENCOUNTER — NURSE TRIAGE (OUTPATIENT)
Dept: NURSING | Facility: CLINIC | Age: 29
End: 2019-08-23

## 2019-08-23 NOTE — TELEPHONE ENCOUNTER
"Patient calling with several c/o. Dizziness off and on is a \"long standing\" problem. Admits to history of sleep problems, anxiety and depression. Transferred to central scheduling to set up appointment with his PCP for early next week. (also advised UC or ER is symptoms worsen or change). Patient hung up for call transfer completed so no appointment scheduled.  Alba Alvarado RN  [facility]    Additional Information    Negative: Shock suspected (e.g., cold/pale/clammy skin, too weak to stand, low BP, rapid pulse)    Negative: Difficult to awaken or acting confused (e.g., disoriented, slurred speech)    Negative: Fainted, and still feels dizzy afterwards    Negative: Severe difficulty breathing (e.g., struggling for each breath, speaks in single words)    Negative: Overdose (accidental or intentional) of medications    Negative: New neurologic deficit that is present now: * Weakness of the face, arm, or leg on one side of the body * Numbness of the face, arm, or leg on one side of the body * Loss of speech or garbled speech    Negative: Heart beating < 50 beats per minute OR > 140 beats per minute    Negative: Sounds like a life-threatening emergency to the triager    Negative: Chest pain    Negative: Rectal bleeding, bloody stool, or tarry-black stool    Negative: Vomiting is the main symptom    Negative: Diarrhea is the main symptom    Negative: Headache is the main symptom    Negative: Heat exhaustion suspected (i.e., dehydration from heat exposure)    Negative: Patient states that he/she is having an anxiety/panic attack    Negative: SEVERE dizziness (e.g., unable to stand, requires support to walk, feels like passing out now)    Negative: Severe headache    Negative: Extra heart beats OR irregular heart beating (i.e., 'palpitations')    Negative: Difficulty breathing    Negative: Drinking very little and has signs of dehydration (e.g., no urine > 12 hours, very dry mouth, very lightheaded)    " "Negative: Follows bleeding (e.g., stomach, rectum, vagina) (Exception: became dizzy from sight of small amount blood)    Negative: Patient sounds very sick or weak to the triager    Negative: Lightheadedness (dizziness) present now, after 2 hours of rest and fluids    Negative: Spinning or tilting sensation (vertigo) present now    Negative: Fever > 103 F (39.4 C)    Negative: Fever > 100.0 F (37.8 C) and has diabetes mellitus or a weak immune system (e.g., HIV positive, cancer chemotherapy, organ transplant, splenectomy, chronic steroids)    Negative: Vomiting occurs with dizziness    Patient wants to be seen    Answer Assessment - Initial Assessment Questions  1. DESCRIPTION: \"Describe your dizziness.\"      Off and on for many months  2. LIGHTHEADED: \"Do you feel lightheaded?\" (e.g., somewhat faint, woozy, weak upon standing)      no  3. VERTIGO: \"Do you feel like either you or the room is spinning or tilting?\" (i.e. vertigo)      no  4. SEVERITY: \"How bad is it?\"  \"Do you feel like you are going to faint?\" \"Can you stand and walk?\"    - MILD - walking normally    - MODERATE - interferes with normal activities (e.g., work, school)     - SEVERE - unable to stand, requires support to walk, feels like passing out now.       mild  5. ONSET:  \"When did the dizziness begin?\"      Several months  6. AGGRAVATING FACTORS: \"Does anything make it worse?\" (e.g., standing, change in head position)      No, ? Sleep problems  7. HEART RATE: \"Can you tell me your heart rate?\" \"How many beats in 15 seconds?\"  (Note: not all patients can do this)        ?  8. CAUSE: \"What do you think is causing the dizziness?\"      ?  9. RECURRENT SYMPTOM: \"Have you had dizziness before?\" If so, ask: \"When was the last time?\" \"What happened that time?\"      yes  10. OTHER SYMPTOMS: \"Do you have any other symptoms?\" (e.g., fever, chest pain, vomiting, diarrhea, bleeding)        Eye twitching, increased nosebleeds that are easily stopped  11. " "PREGNANCY: \"Is there any chance you are pregnant?\" \"When was your last menstrual period?\"        no    Protocols used: DIZZINESS-A-[place]      "

## 2019-11-09 ENCOUNTER — HEALTH MAINTENANCE LETTER (OUTPATIENT)
Age: 29
End: 2019-11-09

## 2019-11-24 ENCOUNTER — IMMUNIZATION (OUTPATIENT)
Dept: URGENT CARE | Facility: RETAIL CLINIC | Age: 29
End: 2019-11-24
Payer: COMMERCIAL

## 2019-11-24 DIAGNOSIS — Z23 NEED FOR PROPHYLACTIC VACCINATION AND INOCULATION AGAINST INFLUENZA: Primary | ICD-10-CM

## 2019-11-24 PROCEDURE — 90471 IMMUNIZATION ADMIN: CPT | Performed by: PHYSICIAN ASSISTANT

## 2019-11-24 PROCEDURE — 90686 IIV4 VACC NO PRSV 0.5 ML IM: CPT | Performed by: PHYSICIAN ASSISTANT

## 2019-11-24 PROCEDURE — 99207 ZZC NO CHARGE NURSE ONLY: CPT | Performed by: PHYSICIAN ASSISTANT

## 2019-11-24 NOTE — NURSING NOTE
Prior to injection verified patient identity using patient's name and date of birth.   Patient instructed to remain in clinic for 20 minutes afterwards, and to report any adverse reaction to me immediately.  Jennifer Hernandez MA

## 2019-12-10 ENCOUNTER — OFFICE VISIT (OUTPATIENT)
Dept: FAMILY MEDICINE | Facility: OTHER | Age: 29
End: 2019-12-10
Payer: COMMERCIAL

## 2019-12-10 VITALS
DIASTOLIC BLOOD PRESSURE: 72 MMHG | HEART RATE: 74 BPM | RESPIRATION RATE: 14 BRPM | OXYGEN SATURATION: 95 % | TEMPERATURE: 97.1 F | SYSTOLIC BLOOD PRESSURE: 118 MMHG | WEIGHT: 172.4 LBS | BODY MASS INDEX: 24.14 KG/M2 | HEIGHT: 71 IN

## 2019-12-10 DIAGNOSIS — F32.4 MAJOR DEPRESSIVE DISORDER WITH SINGLE EPISODE, IN PARTIAL REMISSION (H): ICD-10-CM

## 2019-12-10 DIAGNOSIS — Z00.00 ANNUAL PHYSICAL EXAM: Primary | ICD-10-CM

## 2019-12-10 PROBLEM — F12.90 MARIJUANA USE: Status: RESOLVED | Noted: 2019-01-04 | Resolved: 2019-12-10

## 2019-12-10 PROCEDURE — 99395 PREV VISIT EST AGE 18-39: CPT | Performed by: PHYSICIAN ASSISTANT

## 2019-12-10 ASSESSMENT — ENCOUNTER SYMPTOMS
EYE PAIN: 0
SHORTNESS OF BREATH: 1
DIARRHEA: 0
HEMATURIA: 0
NERVOUS/ANXIOUS: 0
COUGH: 0
DYSURIA: 0
HEMATOCHEZIA: 0
CONSTIPATION: 1
WEAKNESS: 0
JOINT SWELLING: 0
HEADACHES: 0
HEARTBURN: 0
MYALGIAS: 0
ARTHRALGIAS: 0
PARESTHESIAS: 0
DIFFICULTY URINATING: 0
CHILLS: 0
SORE THROAT: 0
FEVER: 0
CHEST TIGHTNESS: 0
FREQUENCY: 0
NAUSEA: 0
WHEEZING: 0
PALPITATIONS: 0
DIZZINESS: 0
ABDOMINAL PAIN: 0

## 2019-12-10 ASSESSMENT — ANXIETY QUESTIONNAIRES
1. FEELING NERVOUS, ANXIOUS, OR ON EDGE: NOT AT ALL
4. TROUBLE RELAXING: MORE THAN HALF THE DAYS
7. FEELING AFRAID AS IF SOMETHING AWFUL MIGHT HAPPEN: NOT AT ALL
GAD7 TOTAL SCORE: 5
2. NOT BEING ABLE TO STOP OR CONTROL WORRYING: NOT AT ALL
GAD7 TOTAL SCORE: 5
6. BECOMING EASILY ANNOYED OR IRRITABLE: SEVERAL DAYS
GAD7 TOTAL SCORE: 5
3. WORRYING TOO MUCH ABOUT DIFFERENT THINGS: NOT AT ALL
5. BEING SO RESTLESS THAT IT IS HARD TO SIT STILL: MORE THAN HALF THE DAYS
7. FEELING AFRAID AS IF SOMETHING AWFUL MIGHT HAPPEN: NOT AT ALL

## 2019-12-10 ASSESSMENT — PATIENT HEALTH QUESTIONNAIRE - PHQ9
SUM OF ALL RESPONSES TO PHQ QUESTIONS 1-9: 6
10. IF YOU CHECKED OFF ANY PROBLEMS, HOW DIFFICULT HAVE THESE PROBLEMS MADE IT FOR YOU TO DO YOUR WORK, TAKE CARE OF THINGS AT HOME, OR GET ALONG WITH OTHER PEOPLE: SOMEWHAT DIFFICULT
SUM OF ALL RESPONSES TO PHQ QUESTIONS 1-9: 6

## 2019-12-10 ASSESSMENT — MIFFLIN-ST. JEOR: SCORE: 1763.25

## 2019-12-10 NOTE — PROGRESS NOTES
SUBJECTIVE:   CC: Chance Torres is an 29 year old male who presents for preventative health visit.     Healthy Habits:     Getting at least 3 servings of Calcium per day:  Yes    Bi-annual eye exam:  NO    Dental care twice a year:  Yes    Sleep apnea or symptoms of sleep apnea:  None    Diet:  Gluten-free/reduced and Vegetarian/vegan    Frequency of exercise:  None    Duration of exercise:  N/A    Taking medications regularly:  No    Barriers to taking medications:  None    Medication side effects:  None    PHQ-2 Total Score: 2    Additional concerns today:  No    Today's PHQ-2 Score:   PHQ-2 ( 1999 [employer]) 12/10/2019   Q1: Little interest or pleasure in doing things 1   Q2: Feeling down, depressed or hopeless 1   PHQ-2 Score 2   Q1: Little interest or pleasure in doing things Several days   Q2: Feeling down, depressed or hopeless Several days   PHQ-2 Score 2     Abuse: Current or Past(Physical, Sexual or Emotional)- No  Do you feel safe in your environment? Yes    Social History     Tobacco Use     Smoking status: Never Smoker     Smokeless tobacco: Never Used   Substance Use Topics     Alcohol use: Yes     Comment: rarely     If you drink alcohol do you typically have >3 drinks per day or >7 drinks per week? No    Alcohol Use 12/10/2019   Prescreen: >3 drinks/day or >7 drinks/week? No       Last PSA: No results found for: PSA    Reviewed orders with patient. Reviewed health maintenance and updated orders accordingly - Yes  BP Readings from Last 3 Encounters:   12/10/19 118/72   07/10/19 104/62   06/07/19 110/80    Wt Readings from Last 3 Encounters:   12/10/19 78.2 kg (172 lb 6.4 oz)   06/21/19 79.8 kg (176 lb)   06/07/19 77.9 kg (171 lb 12.8 oz)                  Patient Active Problem List   Diagnosis     Major depressive disorder with single episode, in partial remission (H)     Irritable bowel syndrome with both constipation and diarrhea     Labral tear of shoulder     Past Surgical History:   Procedure  Laterality Date     ARTHROSCOPY SHOULDER SUPERIOR LABRUM ANTERIOR TO POSTERIOR REPAIR Left 2012     HERNIA REPAIR Left 2013    open     HERNIA REPAIR Right 2016    laparoscopic       Social History     Tobacco Use     Smoking status: Never Smoker     Smokeless tobacco: Never Used   Substance Use Topics     Alcohol use: Yes     Comment: rarely     Family History   Problem Relation Age of Onset     Mental Illness Brother      Mental Illness Brother          No current outpatient medications on file.     No Known Allergies    Reviewed and updated as needed this visit by clinical staff  Tobacco  Allergies  Meds  Med Hx  Surg Hx  Fam Hx  Soc Hx        Reviewed and updated as needed this visit by Provider          Review of Systems   Constitutional: Negative for chills and fever.   HENT: Negative for congestion, ear pain, hearing loss and sore throat.    Eyes: Negative for pain and visual disturbance.   Respiratory: Positive for shortness of breath. Negative for cough, chest tightness and wheezing.    Cardiovascular: Negative for chest pain, palpitations and peripheral edema.   Gastrointestinal: Positive for constipation. Negative for abdominal pain, diarrhea, heartburn, hematochezia and nausea.   Genitourinary: Negative for difficulty urinating, discharge, dysuria, frequency, genital sores, hematuria, impotence, penile pain, penile swelling and urgency.   Musculoskeletal: Negative for arthralgias, joint swelling and myalgias.   Skin: Negative for rash.   Neurological: Negative for dizziness, weakness, headaches and paresthesias.   Psychiatric/Behavioral: Negative for mood changes. The patient is not nervous/anxious.      Shortness of breath - notices it if his wife is lying on his chest, just sometimes notices he feels short of breath.  Not related to activity.  No cough, chest pain. He did have an episode of reflux recently but was also dealing with stomach flu through the house.     Urination - he noticed that  "when he is urinating he doesn't seem like he can try to stop it as easily as he has been able to in the past but no pain, no frequency.  He has a normal stream strength, no dribbling.   OBJECTIVE:   /72   Pulse 74   Temp 97.1  F (36.2  C) (Temporal)   Resp 14   Ht 1.81 m (5' 11.26\")   Wt 78.2 kg (172 lb 6.4 oz)   SpO2 95%   BMI 23.87 kg/m      Physical Exam  GENERAL: healthy, alert and no distress  EYES: Eyes grossly normal to inspection, PERRL and conjunctivae and sclerae normal  HENT: ear canals and TM's normal, nose and mouth without ulcers or lesions  NECK: no adenopathy, no asymmetry, masses, or scars and thyroid normal to palpation  RESP: lungs clear to auscultation - no rales, rhonchi or wheezes  CV: regular rate and rhythm, normal S1 S2, no S3 or S4, no murmur, click or rub, no peripheral edema and peripheral pulses strong  ABDOMEN: soft, nontender, no hepatosplenomegaly, no masses and bowel sounds normal   (male): testicles normal without atrophy or masses, hernia - Negative on right, possible small on left and penis normal without urethral discharge  MS: no gross musculoskeletal defects noted, no edema  SKIN: no suspicious lesions or rashes  NEURO: Normal strength and tone, mentation intact and speech normal  PSYCH: mentation appears normal, affect normal/bright    Diagnostic Test Results:  Labs reviewed in Epic    ASSESSMENT/PLAN:       ICD-10-CM    1. Annual physical exam Z00.00    2. Major depressive disorder with single episode, in partial remission (H) F32.4      - His mood has improved since quitting marijuana use about a year ago.  He does crave marijuana but that is getting better.  He doesn't really have any anxiety anymore. Sometimes will go dark and be more depressed but very short period and then is better.  Likely marijuana was worsening his mental health. His sleep has improved with the exception of having an infant.   - Monitor shortness of breath symptoms for now, not " "activity related.   - Monitor urine symptoms, discussed exercise may help.  Improving any constipation symptoms may help as well.  Doesn't appear to be prostate related.   - Possible small inguinal hernia on the left side this has been repaired in the past (both sides have been), he doesn't notice symptoms, we will monitor.     COUNSELING:   Reviewed preventive health counseling, as reflected in patient instructions  Special attention given to:        Regular exercise       Healthy diet/nutrition       Immunizations        Family planning    Estimated body mass index is 23.87 kg/m  as calculated from the following:    Height as of this encounter: 1.81 m (5' 11.26\").    Weight as of this encounter: 78.2 kg (172 lb 6.4 oz).          reports that he has never smoked. He has never used smokeless tobacco.      Counseling Resources:  ATP IV Guidelines  Pooled Cohorts Equation Calculator  FRAX Risk Assessment  ICSI Preventive Guidelines  Dietary Guidelines for Americans, 2010  USDA's MyPlate  ASA Prophylaxis  Lung CA Screening    Nikki Benitez PA-C  [facility]  Answers for HPI/ROS submitted by the patient on 12/10/2019   Chronic problems general questions HPI Form  If you checked off any problems, how difficult have these problems made it for you to do your work, take care of things at home, or get along with other people?: Somewhat difficult  PHQ9 TOTAL SCORE: 6  ANSELMO 7 TOTAL SCORE: 5    "

## 2019-12-11 ASSESSMENT — ANXIETY QUESTIONNAIRES: GAD7 TOTAL SCORE: 5

## 2019-12-11 ASSESSMENT — PATIENT HEALTH QUESTIONNAIRE - PHQ9: SUM OF ALL RESPONSES TO PHQ QUESTIONS 1-9: 6

## 2020-02-03 NOTE — PROGRESS NOTES
Subjective     Chance Torres is a 29 year old male who presents to clinic today for the following health issues:    History of Present Illness        Mental Health Follow-up:  Patient presents to follow-up on Depression.Patient's depression since last visit has been:  Good (not as long lasting, only lasts for about a day or two. )  The patient is not having other symptoms associated with depression.      Any significant life events: No  Patient is not feeling anxious or having panic attacks. (no panic attacks)  Patient has no concerns about alcohol or drug use.     Social History  Tobacco Use    Smoking status: Never Smoker    Smokeless tobacco: Never Used  Alcohol use: Yes    Comment: rarely  Drug use: Not Currently    Types: Marijuana      Today's PHQ-9         PHQ-9 Total Score:     (P) 13   PHQ-9 Q9 Thoughts of better off dead/self-harm past 2 weeks :   (P) Not at all   Thoughts of suicide or self harm:      Self-harm Plan:        Self-harm Action:          Safety concerns for self or others:           He eats 2-3 servings of fruits and vegetables daily.He consumes 0 sweetened beverage(s) daily.He exercises with enough effort to increase his heart rate 20 to 29 minutes per day.  He exercises with enough effort to increase his heart rate 3 or less days per week.   He is taking medications regularly.     Answers for HPI/ROS submitted by the patient on 2/4/2020   Chronic problems general questions HPI Form  If you checked off any problems, how difficult have these problems made it for you to do your work, take care of things at home, or get along with other people?: Very difficult  PHQ9 TOTAL SCORE: 13  ANSELMO 7 TOTAL SCORE: 12      Patient would like to discuss possible ADHD. In regards to not being able to still and relax. He is having a hard time with starting a project and then not finishing it, he will start with one thing and then move onto something else. He has a hard time with racing thoughts, waking up  in the middle of the night, and then he will end up not waking up in enough time and being late. Short term memory has became an issue as well.     He hasn't been treated in the past but should have been.   He has been on Buspar and Pristiq in the past for depression. He says his depression is fine, it is not as severe as it used to be. Patient hasn't smoked marijuana for about 13 months now. He denies any panic attacks, but is currently anxious because of caffeine use.      Chance presents today with his wife and daughter.   He is wondering about ADHD.   His wife had recently asked him if ever thought he had issues with ADD or ADHD.  He was very upset at even the notion that he had that kind of an issue.  However he started to look into and realized he had a lot of the characteristics.   Wife notes he has a problem with interjecting into conversations, has a hard time waiting his turn.  At home he has a hard time completing a task, he'll start many different tasks but get distracted and start another before he finishes one.  He doesn't take care of any finances because he doesn't have the attention to detail to and would forget.  He supposedly did not graduate college he found out because he failed one course freshmen year and his advisor had emailed him but he never responded and Chance doesn't ever remember this.   In college and in highschool he was able to complete tasks and do well on tests because he would procrastinate and study 1 day prior (couldn't study 2 days prior or he would fail).  He  Attributes his ability to do well in course work because he has easy time with memorization and remembering key terms and definitions.  He retains none of this information.  His job he does well at because it is repetitive.      His family is not accepting of mental health concerns.  Mother denied that he had issues with depression.  Chance thinks maybe one of his siblings and father has characteristics of ADHD but  "again they don't treat or discuss this.  He did find an old report card that had noted he had issues with attention and completing tasks.      He thinks that Marijuana helped some of these symptoms (depending on the strain), he would slow down some, he could complete things more or be less anxious about things.  He has not had any marijuana products for 13 months and continues to have no issues with depression or anxiety.     No current outpatient medications on file.     No Known Allergies    Reviewed and updated as needed this visit by Provider  Tobacco  Allergies  Meds  Problems  Med Hx  Surg Hx  Fam Hx         Review of Systems   ROS COMP: Constitutional, cardiovascular, musculoskeletal, neuro, skin, and psych systems are negative, except as otherwise noted.      Objective    /80   Pulse 80   Temp 97.7  F (36.5  C) (Temporal)   Resp 14   Ht 1.81 m (5' 11.26\")   Wt 79.1 kg (174 lb 6.4 oz)   SpO2 98%   BMI 24.15 kg/m    Body mass index is 24.15 kg/m .  Physical Exam   GENERAL: healthy, alert and no distress  MS: no gross musculoskeletal defects noted, no edema  PSYCH: mentation appears normal, affect normal/bright    Diagnostic Test Results:  Labs reviewed in Epic        Assessment & Plan       ICD-10-CM    1. Inattention R41.840 MENTAL HEALTH REFERRAL  - Adult; Assessments and Testing; ADHD; Other: Not Listed - Enter Referral Details in Scheduling Comments Below          Review of patient's history is suspicious for ADHD both inattention and hyperactivity.  He has been doing well as far as depression symptoms since he stopped marijuana use.  He may have underlying anxiety yet but possibly more because of the inattention issues.      Recommended evaluation with [facility] for ADHD assessment, if they feel it is possible then would trial stimulant medications.     Discussed CSA.  Discussed follow-up policy with controlled substances. We discussed use of stimulants for ADHD are to help " patient but not to resolve his symptoms entirely and that therapy can be beneficial to help him with organization and coping with his symptoms.     Greater than 20 minutes were spent today with more than 50% dedicated to counseling and coordination of care of the above mentioned problems.     Return in about 1 month (around 3/4/2020) for Recheck follow-up on evaluation.    Options for treatment and follow-up care were reviewed with the patient and/or guardian. Patient and/or guardian engaged in the decision making process and verbalized understanding of the options discussed and agreed with the final plan.      Nikki Benitez PA-C  [facility]

## 2020-02-04 ENCOUNTER — OFFICE VISIT (OUTPATIENT)
Dept: FAMILY MEDICINE | Facility: OTHER | Age: 30
End: 2020-02-04
Payer: COMMERCIAL

## 2020-02-04 VITALS
HEIGHT: 71 IN | RESPIRATION RATE: 14 BRPM | BODY MASS INDEX: 24.42 KG/M2 | WEIGHT: 174.4 LBS | OXYGEN SATURATION: 98 % | DIASTOLIC BLOOD PRESSURE: 80 MMHG | TEMPERATURE: 97.7 F | SYSTOLIC BLOOD PRESSURE: 114 MMHG | HEART RATE: 80 BPM

## 2020-02-04 DIAGNOSIS — R41.840 INATTENTION: Primary | ICD-10-CM

## 2020-02-04 PROCEDURE — 99213 OFFICE O/P EST LOW 20 MIN: CPT | Performed by: PHYSICIAN ASSISTANT

## 2020-02-04 ASSESSMENT — ANXIETY QUESTIONNAIRES
4. TROUBLE RELAXING: NEARLY EVERY DAY
2. NOT BEING ABLE TO STOP OR CONTROL WORRYING: SEVERAL DAYS
6. BECOMING EASILY ANNOYED OR IRRITABLE: MORE THAN HALF THE DAYS
5. BEING SO RESTLESS THAT IT IS HARD TO SIT STILL: NEARLY EVERY DAY
GAD7 TOTAL SCORE: 12
1. FEELING NERVOUS, ANXIOUS, OR ON EDGE: SEVERAL DAYS
GAD7 TOTAL SCORE: 12
3. WORRYING TOO MUCH ABOUT DIFFERENT THINGS: SEVERAL DAYS
GAD7 TOTAL SCORE: 12
7. FEELING AFRAID AS IF SOMETHING AWFUL MIGHT HAPPEN: SEVERAL DAYS
7. FEELING AFRAID AS IF SOMETHING AWFUL MIGHT HAPPEN: SEVERAL DAYS

## 2020-02-04 ASSESSMENT — PATIENT HEALTH QUESTIONNAIRE - PHQ9
SUM OF ALL RESPONSES TO PHQ QUESTIONS 1-9: 13
SUM OF ALL RESPONSES TO PHQ QUESTIONS 1-9: 13
10. IF YOU CHECKED OFF ANY PROBLEMS, HOW DIFFICULT HAVE THESE PROBLEMS MADE IT FOR YOU TO DO YOUR WORK, TAKE CARE OF THINGS AT HOME, OR GET ALONG WITH OTHER PEOPLE: VERY DIFFICULT

## 2020-02-04 ASSESSMENT — MIFFLIN-ST. JEOR: SCORE: 1772.32

## 2020-02-04 NOTE — PATIENT INSTRUCTIONS
[facility]:  Contact us at [phone] with questions or to schedule an appointment.  [facility]  [address], [address]  [place]  [id number] [phone] office  ?[phone] fax

## 2020-02-05 ASSESSMENT — ANXIETY QUESTIONNAIRES: GAD7 TOTAL SCORE: 12

## 2020-02-05 ASSESSMENT — PATIENT HEALTH QUESTIONNAIRE - PHQ9: SUM OF ALL RESPONSES TO PHQ QUESTIONS 1-9: 13

## 2020-02-06 ENCOUNTER — MYC MEDICAL ADVICE (OUTPATIENT)
Dept: FAMILY MEDICINE | Facility: OTHER | Age: 30
End: 2020-02-06

## 2020-02-19 ENCOUNTER — TRANSFERRED RECORDS (OUTPATIENT)
Dept: HEALTH INFORMATION MANAGEMENT | Facility: CLINIC | Age: 30
End: 2020-02-19

## 2020-02-19 LAB — PHQ9 SCORE: 21

## 2020-02-26 ENCOUNTER — OFFICE VISIT (OUTPATIENT)
Dept: FAMILY MEDICINE | Facility: OTHER | Age: 30
End: 2020-02-26
Payer: COMMERCIAL

## 2020-02-26 VITALS
WEIGHT: 177 LBS | HEART RATE: 61 BPM | HEIGHT: 72 IN | BODY MASS INDEX: 23.98 KG/M2 | DIASTOLIC BLOOD PRESSURE: 62 MMHG | SYSTOLIC BLOOD PRESSURE: 112 MMHG | OXYGEN SATURATION: 97 % | RESPIRATION RATE: 16 BRPM | TEMPERATURE: 97.8 F

## 2020-02-26 DIAGNOSIS — F32.4 MAJOR DEPRESSIVE DISORDER WITH SINGLE EPISODE, IN PARTIAL REMISSION (H): ICD-10-CM

## 2020-02-26 DIAGNOSIS — F90.0 ADHD (ATTENTION DEFICIT HYPERACTIVITY DISORDER), INATTENTIVE TYPE: Primary | ICD-10-CM

## 2020-02-26 PROCEDURE — 99213 OFFICE O/P EST LOW 20 MIN: CPT | Performed by: PHYSICIAN ASSISTANT

## 2020-02-26 ASSESSMENT — PATIENT HEALTH QUESTIONNAIRE - PHQ9
10. IF YOU CHECKED OFF ANY PROBLEMS, HOW DIFFICULT HAVE THESE PROBLEMS MADE IT FOR YOU TO DO YOUR WORK, TAKE CARE OF THINGS AT HOME, OR GET ALONG WITH OTHER PEOPLE: SOMEWHAT DIFFICULT
SUM OF ALL RESPONSES TO PHQ QUESTIONS 1-9: 13
SUM OF ALL RESPONSES TO PHQ QUESTIONS 1-9: 13

## 2020-02-26 ASSESSMENT — ANXIETY QUESTIONNAIRES
6. BECOMING EASILY ANNOYED OR IRRITABLE: SEVERAL DAYS
2. NOT BEING ABLE TO STOP OR CONTROL WORRYING: SEVERAL DAYS
5. BEING SO RESTLESS THAT IT IS HARD TO SIT STILL: MORE THAN HALF THE DAYS
GAD7 TOTAL SCORE: 9
7. FEELING AFRAID AS IF SOMETHING AWFUL MIGHT HAPPEN: NOT AT ALL
7. FEELING AFRAID AS IF SOMETHING AWFUL MIGHT HAPPEN: NOT AT ALL
3. WORRYING TOO MUCH ABOUT DIFFERENT THINGS: SEVERAL DAYS
GAD7 TOTAL SCORE: 9
4. TROUBLE RELAXING: NEARLY EVERY DAY
GAD7 TOTAL SCORE: 9
1. FEELING NERVOUS, ANXIOUS, OR ON EDGE: SEVERAL DAYS

## 2020-02-26 ASSESSMENT — MIFFLIN-ST. JEOR: SCORE: 1787.93

## 2020-02-26 NOTE — PROGRESS NOTES
"Subjective     Chance Torres is a 29 year old male who presents to clinic today for the following health issues:    History of Present Illness        He eats 2-3 servings of fruits and vegetables daily.He consumes 0 sweetened beverage(s) daily.He exercises with enough effort to increase his heart rate 20 to 29 minutes per day.  He exercises with enough effort to increase his heart rate 3 or less days per week.   He is taking medications regularly.     Here to follow up on ADHD results. Was assessed by [facility]   See scan of letter from [facility].   Concerns for depression and ADHD.     Answers for HPI/ROS submitted by the patient on 2/26/2020   Chronic problems general questions HPI Form  If you checked off any problems, how difficult have these problems made it for you to do your work, take care of things at home, or get along with other people?: Somewhat difficult  PHQ9 TOTAL SCORE: 13  ANSELMO 7 TOTAL SCORE: 9    Current Outpatient Medications   Medication Sig Dispense Refill     amphetamine-dextroamphetamine (ADDERALL) 10 MG tablet Take 1 tablet (10 mg) by mouth daily 30 tablet 0     No Known Allergies    Reviewed and updated as needed this visit by Provider  Tobacco  Allergies  Meds  Problems  Med Hx  Surg Hx  Fam Hx         Review of Systems   ROS COMP: Constitutional, HEENT, cardiovascular, pulmonary, GI, , musculoskeletal, neuro, skin, endocrine and psych systems are negative, except as otherwise noted.      Objective    /62 (BP Location: Left arm, Patient Position: Chair, Cuff Size: Adult Large)   Pulse 61   Temp 97.8  F (36.6  C) (Temporal)   Resp 16   Ht 1.816 m (5' 11.5\")   Wt 80.3 kg (177 lb)   SpO2 97%   BMI 24.34 kg/m    Body mass index is 24.34 kg/m .  Physical Exam   GENERAL: healthy, alert and no distress  MS: no gross musculoskeletal defects noted, no edema  NEURO: Normal strength and tone, mentation intact and speech normal  PSYCH: mentation appears normal, affect " normal/bright, speech pressured, judgement and insight intact and appearance well groomed    Diagnostic Test Results:  Labs reviewed in Epic        Assessment & Plan       ICD-10-CM    1. ADHD (attention deficit hyperactivity disorder), inattentive type F90.0 amphetamine-dextroamphetamine (ADDERALL) 10 MG tablet   2. Major depressive disorder with single episode, in partial remission (H) F32.4           - We had extensive discussion about medication, stimulant versus non-stimulant, how the medications work, potential side effects of medications.  Extended release versus immediate release.  CSA was discussed (no early refills, no replaced lost scripts, drug screening, follow-up every 3 months), not signed today, will complete once we find correct dose.  His other mental health issues are well controlled and likely a result of his ADHD.    - Low concerns for diversion of medication.     Return in about 1 week (around 3/4/2020) for Medication Re-check.    Options for treatment and follow-up care were reviewed with the patient and/or guardian. Patient and/or guardian engaged in the decision making process and verbalized understanding of the options discussed and agreed with the final plan.    Nikki Benitez PA-C  [facility]

## 2020-02-27 ASSESSMENT — PATIENT HEALTH QUESTIONNAIRE - PHQ9: SUM OF ALL RESPONSES TO PHQ QUESTIONS 1-9: 13

## 2020-02-27 ASSESSMENT — ANXIETY QUESTIONNAIRES: GAD7 TOTAL SCORE: 9

## 2020-02-28 ENCOUNTER — TELEPHONE (OUTPATIENT)
Dept: FAMILY MEDICINE | Facility: OTHER | Age: 30
End: 2020-02-28

## 2020-02-28 ENCOUNTER — TRANSFERRED RECORDS (OUTPATIENT)
Dept: HEALTH INFORMATION MANAGEMENT | Facility: CLINIC | Age: 30
End: 2020-02-28

## 2020-02-28 RX ORDER — DEXTROAMPHETAMINE SACCHARATE, AMPHETAMINE ASPARTATE, DEXTROAMPHETAMINE SULFATE AND AMPHETAMINE SULFATE 2.5; 2.5; 2.5; 2.5 MG/1; MG/1; MG/1; MG/1
10 TABLET ORAL DAILY
Qty: 30 TABLET | Refills: 0 | Status: SHIPPED | OUTPATIENT
Start: 2020-02-28 | End: 2020-03-27 | Stop reason: DRUGHIGH

## 2020-02-28 NOTE — TELEPHONE ENCOUNTER
*UPDATE*  We did receive the notes if they call back please inform them and close note thank you.      Anila Porter CMA

## 2020-02-28 NOTE — TELEPHONE ENCOUNTER
Called and Left message for [facility] in [place]. ES is in urgent need of notes from them regarding this patient. We still have not received the notes. Please advise and have them send notes over ASAP.      Anila Porter CMA

## 2020-03-12 ENCOUNTER — MYC MEDICAL ADVICE (OUTPATIENT)
Dept: FAMILY MEDICINE | Facility: OTHER | Age: 30
End: 2020-03-12

## 2020-03-16 ENCOUNTER — VIRTUAL VISIT (OUTPATIENT)
Dept: FAMILY MEDICINE | Facility: OTHER | Age: 30
End: 2020-03-16
Payer: COMMERCIAL

## 2020-03-16 DIAGNOSIS — F90.0 ADHD (ATTENTION DEFICIT HYPERACTIVITY DISORDER), INATTENTIVE TYPE: Primary | ICD-10-CM

## 2020-03-16 PROCEDURE — 99441 ZZC PHYSICIAN TELEPHONE EVALUATION 5-10 MIN: CPT | Performed by: PHYSICIAN ASSISTANT

## 2020-03-16 RX ORDER — DEXTROAMPHETAMINE SACCHARATE, AMPHETAMINE ASPARTATE MONOHYDRATE, DEXTROAMPHETAMINE SULFATE AND AMPHETAMINE SULFATE 5; 5; 5; 5 MG/1; MG/1; MG/1; MG/1
20 CAPSULE, EXTENDED RELEASE ORAL DAILY
Qty: 15 CAPSULE | Refills: 0 | Status: SHIPPED | OUTPATIENT
Start: 2020-03-16 | End: 2020-03-27 | Stop reason: DRUGHIGH

## 2020-03-16 NOTE — PROGRESS NOTES
"Chance Torres is a 29 year old male who is being evaluated via a billable telephone visit.      The patient has been notified of following:     \"This telephone visit will be conducted via a call between you and your physician/provider. We have found that certain health care needs can be provided without the need for a physical exam.  This service lets us provide the care you need with a short phone conversation.  If a prescription is necessary we can send it directly to your pharmacy.  If lab work is needed we can place an order for that and you can then stop by our lab to have the test done at a later time.    If during the course of the call the physician/provider feels a telephone visit is not appropriate, you will not be charged for this service.\"       Chance Torres complains of  No chief complaint on file.      I have reviewed and updated the patient's Past Medical History, Social History, Family History and Medication List.    ALLERGIES  Patient has no known allergies.    Lidia Cobb MA  (MA signature)    SUBJECTIVE:  Chance is here today to recheck ADHD/ADD.    Updates since last visit: when its working, better. When its not working it is worse.     Routine for taking medicine, including time: 7am  Time medicine wears off: usually around noon or so.   Issues at school/work: none  Issues at home: none  Control of symptoms: yes, when it works.     Side effects:  Headaches: Yes, has had a few migraines. Last week he says he had a bad migraine, he said he drank a gallon of water by 2pm and then 3pm he had a really bad headache and then only urinated about twice.   Stomach aches: No  Irritability/mood swings: Yes, feeling a little more emotional than normal. A lot has been going on.   Difficulties with sleep: Yes, he says he \"can't slow down\" as easily like used.   Social withdrawal: No - maybe improved very slightly but not by much.   Unusual movements/tics: No  Decreased appetite: Yes, has " decreased a little bit.     Other concerns: none    He thinks that right now it isn't helping more than it is possibly causing him problems.  His potential side effects are on and off and not consistent so not sure if it is even related to the medication or just his mood.     Patient Active Problem List   Diagnosis     Major depressive disorder with single episode, in partial remission (H)     Irritable bowel syndrome with both constipation and diarrhea     Labral tear of shoulder     ADHD (attention deficit hyperactivity disorder), inattentive type       Past Medical History:   Diagnosis Date     Marijuana use 1/4/2019       Past Surgical History:   Procedure Laterality Date     ARTHROSCOPY SHOULDER SUPERIOR LABRUM ANTERIOR TO POSTERIOR REPAIR Left 2012     HERNIA REPAIR Left 2013    open     HERNIA REPAIR Right 2016    laparoscopic       Current Outpatient Medications   Medication     amphetamine-dextroamphetamine (ADDERALL) 10 MG tablet     No current facility-administered medications for this visit.        Assessment/Plan:  (F90.0) ADHD (attention deficit hyperactivity disorder), inattentive type  (primary encounter diagnosis)  Plan: amphetamine-dextroamphetamine (ADDERALL XR) 20         MG 24 hr capsule            Not sure if his symptoms are side effects or mood side effects from the stimulants.   He wants to try to increase dose to see if this levels off.  We will also try an extended release as he doesn't think when it does help he doesn't feel like it lasts long enough.   Sent in 2 weeks of medication and will re-check via Phone or E visit at that time.       I have reviewed the note as documented above.  This accurately captures the substance of my conversation with the patient.    Phone call contact time 5:54 min    Nikki Benitez PA-C

## 2020-03-24 ENCOUNTER — HOSPITAL ENCOUNTER (EMERGENCY)
Age: 30
Discharge: HOME | End: 2020-03-24
Payer: MEDICAID

## 2020-03-24 ENCOUNTER — APPOINTMENT (OUTPATIENT)
Dept: GENERAL RADIOLOGY | Age: 30
End: 2020-03-24
Payer: MEDICAID

## 2020-03-24 VITALS
WEIGHT: 120 LBS | RESPIRATION RATE: 15 BRPM | HEART RATE: 101 BPM | TEMPERATURE: 97.4 F | BODY MASS INDEX: 18.83 KG/M2 | DIASTOLIC BLOOD PRESSURE: 65 MMHG | HEIGHT: 67 IN | SYSTOLIC BLOOD PRESSURE: 107 MMHG | OXYGEN SATURATION: 97 %

## 2020-03-24 LAB
ACETAMINOPHEN LEVEL: < 5 UG/ML (ref 0–20)
ALBUMIN SERPL-MCNC: 5.2 G/DL (ref 3.5–5.1)
ALP BLD-CCNC: 130 U/L (ref 38–126)
ALT SERPL-CCNC: 155 U/L (ref 11–66)
AMPHETAMINE+METHAMPHETAMINE URINE SCREEN: NEGATIVE
ANION GAP SERPL CALCULATED.3IONS-SCNC: 13 MEQ/L (ref 8–16)
AST SERPL-CCNC: 106 U/L (ref 5–40)
BARBITURATE QUANTITATIVE URINE: NEGATIVE
BASOPHILS # BLD: 1.3 %
BASOPHILS ABSOLUTE: 0.2 THOU/MM3 (ref 0–0.1)
BENZODIAZEPINE QUANTITATIVE URINE: NEGATIVE
BILIRUB SERPL-MCNC: 0.2 MG/DL (ref 0.3–1.2)
BILIRUBIN DIRECT: < 0.2 MG/DL (ref 0–0.3)
BILIRUBIN URINE: NEGATIVE
BLOOD, URINE: NEGATIVE
BUN BLDV-MCNC: 12 MG/DL (ref 7–22)
CALCIUM SERPL-MCNC: 9.7 MG/DL (ref 8.5–10.5)
CANNABINOID QUANTITATIVE URINE: NEGATIVE
CHARACTER, URINE: CLEAR
CHLORIDE BLD-SCNC: 105 MEQ/L (ref 98–111)
CO2: 28 MEQ/L (ref 23–33)
COCAINE METABOLITE QUANTITATIVE URINE: NEGATIVE
COLOR: YELLOW
CREAT SERPL-MCNC: 0.8 MG/DL (ref 0.4–1.2)
EKG ATRIAL RATE: 74 BPM
EKG P AXIS: 73 DEGREES
EKG P-R INTERVAL: 132 MS
EKG Q-T INTERVAL: 388 MS
EKG QRS DURATION: 82 MS
EKG QTC CALCULATION (BAZETT): 430 MS
EKG R AXIS: 62 DEGREES
EKG T AXIS: 57 DEGREES
EKG VENTRICULAR RATE: 74 BPM
EOSINOPHIL # BLD: 1.1 %
EOSINOPHILS ABSOLUTE: 0.2 THOU/MM3 (ref 0–0.4)
ERYTHROCYTE [DISTWIDTH] IN BLOOD BY AUTOMATED COUNT: 13.1 % (ref 11.5–14.5)
ERYTHROCYTE [DISTWIDTH] IN BLOOD BY AUTOMATED COUNT: 42.1 FL (ref 35–45)
ETHYL ALCOHOL, SERUM: 0.05 %
ETHYL ALCOHOL, SERUM: 0.18 %
GFR SERPL CREATININE-BSD FRML MDRD: > 90 ML/MIN/1.73M2
GLUCOSE BLD-MCNC: 76 MG/DL (ref 70–108)
GLUCOSE URINE: NEGATIVE MG/DL
HCT VFR BLD CALC: 46.8 % (ref 42–52)
HEMOGLOBIN: 16.6 GM/DL (ref 14–18)
IMMATURE GRANS (ABS): 0.17 THOU/MM3 (ref 0–0.07)
IMMATURE GRANULOCYTES: 1.2 %
KETONES, URINE: NEGATIVE
LEUKOCYTE ESTERASE, URINE: NEGATIVE
LYMPHOCYTES # BLD: 32.1 %
LYMPHOCYTES ABSOLUTE: 4.9 THOU/MM3 (ref 1–4.8)
MCH RBC QN AUTO: 31.3 PG (ref 26–33)
MCHC RBC AUTO-ENTMCNC: 35.5 GM/DL (ref 32.2–35.5)
MCV RBC AUTO: 88.1 FL (ref 80–94)
MONOCYTES # BLD: 6.8 %
MONOCYTES ABSOLUTE: 1 THOU/MM3 (ref 0.4–1.3)
NITRITE, URINE: NEGATIVE
NUCLEATED RED BLOOD CELLS: 0 /100 WBC
OPIATES, URINE: NEGATIVE
OSMOLALITY CALCULATION: 289.1 MOSMOL/KG (ref 275–300)
OXYCODONE: NEGATIVE
PH UA: 6.5 (ref 5–9)
PHENCYCLIDINE QUANTITATIVE URINE: NEGATIVE
PLATELET # BLD: 353 THOU/MM3 (ref 130–400)
PMV BLD AUTO: 8.8 FL (ref 9.4–12.4)
POTASSIUM SERPL-SCNC: 4 MEQ/L (ref 3.5–5.2)
PROTEIN UA: NEGATIVE
RBC # BLD: 5.31 MILL/MM3 (ref 4.7–6.1)
SALICYLATE, SERUM: < 0.3 MG/DL (ref 2–10)
SEG NEUTROPHILS: 57.5 %
SEGMENTED NEUTROPHILS ABSOLUTE COUNT: 8.7 THOU/MM3 (ref 1.8–7.7)
SODIUM BLD-SCNC: 146 MEQ/L (ref 135–145)
SPECIFIC GRAVITY, URINE: 1.02 (ref 1–1.03)
TOTAL PROTEIN: 8.3 G/DL (ref 6.1–8)
TROPONIN T: < 0.01 NG/ML
TSH SERPL DL<=0.05 MIU/L-ACNC: 1.96 UIU/ML (ref 0.4–4.2)
UROBILINOGEN, URINE: 0.2 EU/DL (ref 0–1)
WBC # BLD: 15.2 THOU/MM3 (ref 4.8–10.8)

## 2020-03-24 PROCEDURE — 82248 BILIRUBIN DIRECT: CPT

## 2020-03-24 PROCEDURE — G0480 DRUG TEST DEF 1-7 CLASSES: HCPCS

## 2020-03-24 PROCEDURE — 80307 DRUG TEST PRSMV CHEM ANLYZR: CPT

## 2020-03-24 PROCEDURE — 84443 ASSAY THYROID STIM HORMONE: CPT

## 2020-03-24 PROCEDURE — 36415 COLL VENOUS BLD VENIPUNCTURE: CPT

## 2020-03-24 PROCEDURE — 80053 COMPREHEN METABOLIC PANEL: CPT

## 2020-03-24 PROCEDURE — 93005 ELECTROCARDIOGRAM TRACING: CPT | Performed by: NURSE PRACTITIONER

## 2020-03-24 PROCEDURE — 81003 URINALYSIS AUTO W/O SCOPE: CPT

## 2020-03-24 PROCEDURE — 85025 COMPLETE CBC W/AUTO DIFF WBC: CPT

## 2020-03-24 PROCEDURE — 2709999900 HC NON-CHARGEABLE SUPPLY

## 2020-03-24 PROCEDURE — 99284 EMERGENCY DEPT VISIT MOD MDM: CPT

## 2020-03-24 PROCEDURE — 71046 X-RAY EXAM CHEST 2 VIEWS: CPT

## 2020-03-24 PROCEDURE — 84484 ASSAY OF TROPONIN QUANT: CPT

## 2020-03-24 ASSESSMENT — ENCOUNTER SYMPTOMS
VOMITING: 0
ABDOMINAL PAIN: 0
SHORTNESS OF BREATH: 0
NAUSEA: 0

## 2020-03-24 NOTE — ED NOTES
Pt back from radiology. VS reassessed and respirations unlabored. Temperature increased in room for pt comfort. Lights dimmed for pt comfort. Pt denied other needs at this time.  Will continue to monitor      Claudene Pupa, RN  03/24/20 [id number]
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
## 2020-03-24 NOTE — ED NOTES
ED nurse-to-nurse bedside report    Chief Complaint   Patient presents with    Drug Overdose      LOC: alert and orientated to name, place, date  Vital signs   Vitals:    03/24/20 0431 03/24/20 0511 03/24/20 0600 03/24/20 0654   BP: 102/70 104/64 104/73 96/60   Pulse: 87 84 96 95   Resp: 19 20 18 20   Temp:       TempSrc:       SpO2: 99% 98% 98% 98%   Weight:       Height:          Pain:    Pain Interventions: none  Pain Goal: 0  Oxygen: No    Current needs required room air    Telemetry: Yes  LDAs:    Continuous Infusions:   Mobility: Independent  Guzman Fall Risk Score: No flowsheet data found.   Fall Interventions: side rails up, call light in reach, bed locked and lowest position  Report given to: Brenda Singleton RN  03/24/20 6797

## 2020-03-24 NOTE — ED NOTES
Pt sitting up in bed with food and drink. Vs reassessed. Pt respirations unlabored and regular at this time. Pt denied other needs at this time. Pt is updated on POC.  Pt verbalized understanding      Mita Alvarez RN  03/24/20 [id number]

## 2020-03-24 NOTE — ED NOTES
Pt resting in bed watching tv. VS reassessed and respirations regular. Pt denies other needs at this time.       Victor Manuel Rosales RN  03/24/20 9900

## 2020-03-24 NOTE — ED NOTES
Bed: 004A  Expected date: 3/24/20  Expected time: 2:54 AM  Means of arrival: [facility]  Comments:     Emanuel Thorpe RN  03/24/20 6278

## 2020-03-24 NOTE — ED NOTES
Upon first contact with patient this RN receives bedside shift report received from ZENAIDA Lopez at bedside. Pt awake, resting on cot. Pt denies needs, updated on plan of care.       Ama Brownlee RN  03/24/20 2575

## 2020-03-25 ENCOUNTER — MYC MEDICAL ADVICE (OUTPATIENT)
Dept: FAMILY MEDICINE | Facility: OTHER | Age: 30
End: 2020-03-25

## 2020-03-25 ASSESSMENT — ANXIETY QUESTIONNAIRES
2. NOT BEING ABLE TO STOP OR CONTROL WORRYING: SEVERAL DAYS
6. BECOMING EASILY ANNOYED OR IRRITABLE: SEVERAL DAYS
7. FEELING AFRAID AS IF SOMETHING AWFUL MIGHT HAPPEN: SEVERAL DAYS
7. FEELING AFRAID AS IF SOMETHING AWFUL MIGHT HAPPEN: SEVERAL DAYS
GAD7 TOTAL SCORE: 10
5. BEING SO RESTLESS THAT IT IS HARD TO SIT STILL: MORE THAN HALF THE DAYS
4. TROUBLE RELAXING: NEARLY EVERY DAY
3. WORRYING TOO MUCH ABOUT DIFFERENT THINGS: SEVERAL DAYS
GAD7 TOTAL SCORE: 10
1. FEELING NERVOUS, ANXIOUS, OR ON EDGE: SEVERAL DAYS
GAD7 TOTAL SCORE: 10

## 2020-03-25 ASSESSMENT — PATIENT HEALTH QUESTIONNAIRE - PHQ9
SUM OF ALL RESPONSES TO PHQ QUESTIONS 1-9: 8
10. IF YOU CHECKED OFF ANY PROBLEMS, HOW DIFFICULT HAVE THESE PROBLEMS MADE IT FOR YOU TO DO YOUR WORK, TAKE CARE OF THINGS AT HOME, OR GET ALONG WITH OTHER PEOPLE: SOMEWHAT DIFFICULT
SUM OF ALL RESPONSES TO PHQ QUESTIONS 1-9: 8

## 2020-03-26 ASSESSMENT — PATIENT HEALTH QUESTIONNAIRE - PHQ9: SUM OF ALL RESPONSES TO PHQ QUESTIONS 1-9: 8

## 2020-03-26 ASSESSMENT — ANXIETY QUESTIONNAIRES: GAD7 TOTAL SCORE: 10

## 2020-03-27 ENCOUNTER — VIRTUAL VISIT (OUTPATIENT)
Dept: FAMILY MEDICINE | Facility: OTHER | Age: 30
End: 2020-03-27
Payer: COMMERCIAL

## 2020-03-27 DIAGNOSIS — F90.0 ADHD (ATTENTION DEFICIT HYPERACTIVITY DISORDER), INATTENTIVE TYPE: ICD-10-CM

## 2020-03-27 PROCEDURE — 99213 OFFICE O/P EST LOW 20 MIN: CPT | Mod: TEL | Performed by: PHYSICIAN ASSISTANT

## 2020-03-27 RX ORDER — DEXTROAMPHETAMINE SACCHARATE, AMPHETAMINE ASPARTATE MONOHYDRATE, DEXTROAMPHETAMINE SULFATE AND AMPHETAMINE SULFATE 7.5; 7.5; 7.5; 7.5 MG/1; MG/1; MG/1; MG/1
CAPSULE, EXTENDED RELEASE ORAL
Qty: 15 CAPSULE | Refills: 0 | Status: SHIPPED | OUTPATIENT
Start: 2020-03-27 | End: 2020-04-13

## 2020-03-27 RX ORDER — DEXTROAMPHETAMINE SACCHARATE, AMPHETAMINE ASPARTATE, DEXTROAMPHETAMINE SULFATE AND AMPHETAMINE SULFATE 2.5; 2.5; 2.5; 2.5 MG/1; MG/1; MG/1; MG/1
10 TABLET ORAL DAILY
Qty: 30 TABLET | Refills: 0 | Status: SHIPPED | OUTPATIENT
Start: 2020-03-27 | End: 2020-04-13

## 2020-04-10 ENCOUNTER — MYC MEDICAL ADVICE (OUTPATIENT)
Dept: FAMILY MEDICINE | Facility: OTHER | Age: 30
End: 2020-04-10

## 2020-04-10 ASSESSMENT — ANXIETY QUESTIONNAIRES
5. BEING SO RESTLESS THAT IT IS HARD TO SIT STILL: SEVERAL DAYS
1. FEELING NERVOUS, ANXIOUS, OR ON EDGE: SEVERAL DAYS
7. FEELING AFRAID AS IF SOMETHING AWFUL MIGHT HAPPEN: NOT AT ALL
3. WORRYING TOO MUCH ABOUT DIFFERENT THINGS: NOT AT ALL
GAD7 TOTAL SCORE: 5
7. FEELING AFRAID AS IF SOMETHING AWFUL MIGHT HAPPEN: NOT AT ALL
2. NOT BEING ABLE TO STOP OR CONTROL WORRYING: NOT AT ALL
4. TROUBLE RELAXING: MORE THAN HALF THE DAYS
GAD7 TOTAL SCORE: 5
GAD7 TOTAL SCORE: 5
6. BECOMING EASILY ANNOYED OR IRRITABLE: SEVERAL DAYS

## 2020-04-10 ASSESSMENT — PATIENT HEALTH QUESTIONNAIRE - PHQ9
SUM OF ALL RESPONSES TO PHQ QUESTIONS 1-9: 5
10. IF YOU CHECKED OFF ANY PROBLEMS, HOW DIFFICULT HAVE THESE PROBLEMS MADE IT FOR YOU TO DO YOUR WORK, TAKE CARE OF THINGS AT HOME, OR GET ALONG WITH OTHER PEOPLE: SOMEWHAT DIFFICULT
SUM OF ALL RESPONSES TO PHQ QUESTIONS 1-9: 5

## 2020-04-10 NOTE — PROGRESS NOTES
"Subjective     Chance Torres is a 29 year old male who is being evaluated via a billable telephone visit.      The patient has been notified of following:     \"This telephone visit will be conducted via a call between you and your physician/provider. We have found that certain health care needs can be provided without the need for a physical exam.  This service lets us provide the care you need with a short phone conversation.  If a prescription is necessary we can send it directly to your pharmacy.  If lab work is needed we can place an order for that and you can then stop by our lab to have the test done at a later time.    Telephone visits are billed at different rates depending on your insurance coverage. During this emergency period, for some insurers they may be billed the same as an in-person visit.  Please reach out to your insurance provider with any questions.    If during the course of the call the physician/provider feels a telephone visit is not appropriate, you will not be charged for this service.\"    Patient has given verbal consent for Telephone visit?  Yes    How would you like to obtain your AVS? Jeremiah    Chance Torres complains of   Chief Complaint   Patient presents with     Recheck Medication     Mood FU       ALLERGIES  Patient has no known allergies.    Depression and Anxiety Follow-Up    How are you doing with your depression since your last visit? Improved     How are you doing with your anxiety since your last visit?  Improved     Are you having other symptoms that might be associated with depression or anxiety? Yes:  Hard to fall asleep     Have you had a significant life event? No     Do you have any concerns with your use of alcohol or other drugs? No    Social History     Tobacco Use     Smoking status: Never Smoker     Smokeless tobacco: Never Used   Substance Use Topics     Alcohol use: Yes     Comment: rarely     Drug use: Not Currently     Types: Marijuana     Comment: " has been over a year     PHQ 2/4/2020 2/26/2020 3/25/2020   PHQ-9 Total Score 13 13 8   Q9: Thoughts of better off dead/self-harm past 2 weeks Not at all Not at all Not at all   F/U: Thoughts of suicide or self-harm - - -   F/U: Safety concerns - - -     ANSELMO-7 SCORE 2/4/2020 2/26/2020 3/25/2020   Total Score 12 (moderate anxiety) 9 (mild anxiety) 10 (moderate anxiety)   Total Score 12 9 10       Suicide Assessment Five-step Evaluation and Treatment (SAFE-T)      How many servings of fruits and vegetables do you eat daily?  2-3    On average, how many sweetened beverages do you drink each day (Examples: soda, juice, sweet tea, etc.  Do NOT count diet or artificially sweetened beverages)?   0    How many days per week do you exercise enough to make your heart beat faster? 5    How many minutes a day do you exercise enough to make your heart beat faster? 20 - 29    How many days per week do you miss taking your medication? 0      SUBJECTIVE:  Chance is here today to recheck ADHD/ADD.    Updates since last visit: Last visit: 3/23/2020     Routine for taking medicine, including time: Just taking 30 mg XR right now.  Taking it daily with breakfast.  This has helped.  Around 8/8:30 AM.  Has been working well.  Taking care of his daughter daily right now with being off work.   Time medicine wears off: Lasting all day, 10 hours per day. This is ideally how he would like to last.   Control of symptoms: Good  Right now not working but trying to stay on the same schedule.     Side effects:  Headaches: Yes - sometimes, but needs to remember drink fluids.    Stomach aches: No, improved with taking medication with food.   Irritability/mood swings: Yes Much better now that he stopped taking 10 mg in the AM.   Difficulties with sleep: No  Social withdrawal: No  Unusual movements/tics: No  Decreased appetite: No    Not getting down.  Focusing better.  Keeping routine at home.     Last Visit Plan:  He will continue to take 10 mg  immediate release in the morning and then mid morning/early afternoon start taking 30 mg XR (this is a 10 mg increase from 2 weeks ago).    We will monitor mood, side effects especially insomnia.   If not tolerating to still having problems consider trial of Concerta or Vyvanse at that point.   - amphetamine-dextroamphetamine (ADDERALL XR) 30 MG 24 hr capsule; Take 1 tablet mid morning/early afternoon.  Dispense: 15 capsule; Refill: 0  - amphetamine-dextroamphetamine (ADDERALL) 10 MG tablet; Take 1 tablet (10 mg) by mouth daily  Dispense: 30 tablet; Refill: 0      Patient Active Problem List   Diagnosis     Major depressive disorder with single episode, in partial remission (H)     Irritable bowel syndrome with both constipation and diarrhea     Labral tear of shoulder     ADHD (attention deficit hyperactivity disorder), inattentive type     Past Surgical History:   Procedure Laterality Date     ABDOMEN SURGERY  2013/2016    Hernia repair     ARTHROSCOPY SHOULDER SUPERIOR LABRUM ANTERIOR TO POSTERIOR REPAIR Left 2012     HERNIA REPAIR Left 2013    open     HERNIA REPAIR Right 2016    laparoscopic       Social History     Tobacco Use     Smoking status: Never Smoker     Smokeless tobacco: Former User   Substance Use Topics     Alcohol use: Yes     Comment: rarely     Family History   Problem Relation Age of Onset     Mental Illness Brother      Mental Illness Brother      Cerebrovascular Disease Paternal Grandmother      Thyroid Disease Mother      Unknown/Adopted Mother          Current Outpatient Medications   Medication Sig Dispense Refill     [START ON 6/12/2020] amphetamine-dextroamphetamine (ADDERALL XR) 30 MG 24 hr capsule Take 1 capsule (30 mg) by mouth every morning Take 1 tablet mid morning/early afternoon. 30 capsule 0     No Known Allergies    Reviewed and updated as needed this visit by Provider  Tobacco  Allergies  Meds  Problems  Med Hx  Surg Hx  Fam Hx         Review of Systems   ROS COMP:  Constitutional, HEENT, cardiovascular, pulmonary, GI, , musculoskeletal, neuro, skin, endocrine and psych systems are negative, except as otherwise noted.       Objective   Reported vitals:  There were no vitals taken for this visit.   healthy, alert and no distress  Psych: Alert and oriented times 3; coherent speech, normal   rate and volume, able to articulate logical thoughts, able   to abstract reason, no tangential thoughts, no hallucinations   or delusions  His affect is normal     Diagnostic Test Results:  Labs reviewed in Epic        Assessment/Plan:  1. ADHD (attention deficit hyperactivity disorder), inattentive type  He switched to not taking 10 mg immediate release in the morning and that has helped his irritability.   He is taking 30 mg XR only daily and this seems to be lasting him the whole day, mood is stable, side effects are very minimal.  Needs to remember to stay hydrated to help with intermittent headaches.   He has established a routine being at home.  We will have to see what happens when he returns to work.   Refilled medication for 3 months.  Due for re-check then, sooner if concerns or changes when he returns to work.   - amphetamine-dextroamphetamine (ADDERALL XR) 30 MG 24 hr capsule; Take 1 capsule (30 mg) by mouth every morning Take 1 tablet mid morning/early afternoon.  Dispense: 30 capsule; Refill: 0    Return in about 3 months (around 7/13/2020) for Medication Re-check.      Phone call duration:  10:35 minutes    Options for treatment and follow-up care were reviewed with the patient and/or guardian. Patient and/or guardian engaged in the decision making process and verbalized understanding of the options discussed and agreed with the final plan.    Nikki Benitez PA-C

## 2020-04-11 ASSESSMENT — ANXIETY QUESTIONNAIRES: GAD7 TOTAL SCORE: 5

## 2020-04-13 ENCOUNTER — VIRTUAL VISIT (OUTPATIENT)
Dept: FAMILY MEDICINE | Facility: OTHER | Age: 30
End: 2020-04-13
Payer: COMMERCIAL

## 2020-04-13 DIAGNOSIS — F90.0 ADHD (ATTENTION DEFICIT HYPERACTIVITY DISORDER), INATTENTIVE TYPE: Primary | ICD-10-CM

## 2020-04-13 PROCEDURE — 99213 OFFICE O/P EST LOW 20 MIN: CPT | Mod: TEL | Performed by: PHYSICIAN ASSISTANT

## 2020-04-13 RX ORDER — DEXTROAMPHETAMINE SACCHARATE, AMPHETAMINE ASPARTATE MONOHYDRATE, DEXTROAMPHETAMINE SULFATE AND AMPHETAMINE SULFATE 7.5; 7.5; 7.5; 7.5 MG/1; MG/1; MG/1; MG/1
30 CAPSULE, EXTENDED RELEASE ORAL EVERY MORNING
Qty: 30 CAPSULE | Refills: 0 | Status: SHIPPED | OUTPATIENT
Start: 2020-05-13 | End: 2020-04-13

## 2020-04-13 RX ORDER — DEXTROAMPHETAMINE SACCHARATE, AMPHETAMINE ASPARTATE MONOHYDRATE, DEXTROAMPHETAMINE SULFATE AND AMPHETAMINE SULFATE 7.5; 7.5; 7.5; 7.5 MG/1; MG/1; MG/1; MG/1
30 CAPSULE, EXTENDED RELEASE ORAL EVERY MORNING
Qty: 30 CAPSULE | Refills: 0 | Status: SHIPPED | OUTPATIENT
Start: 2020-04-13 | End: 2020-04-13

## 2020-04-13 RX ORDER — DEXTROAMPHETAMINE SACCHARATE, AMPHETAMINE ASPARTATE MONOHYDRATE, DEXTROAMPHETAMINE SULFATE AND AMPHETAMINE SULFATE 7.5; 7.5; 7.5; 7.5 MG/1; MG/1; MG/1; MG/1
30 CAPSULE, EXTENDED RELEASE ORAL EVERY MORNING
Qty: 30 CAPSULE | Refills: 0 | Status: SHIPPED | OUTPATIENT
Start: 2020-06-12 | End: 2020-07-10

## 2020-04-13 ASSESSMENT — ANXIETY QUESTIONNAIRES
7. FEELING AFRAID AS IF SOMETHING AWFUL MIGHT HAPPEN: NOT AT ALL
1. FEELING NERVOUS, ANXIOUS, OR ON EDGE: SEVERAL DAYS
2. NOT BEING ABLE TO STOP OR CONTROL WORRYING: NOT AT ALL
6. BECOMING EASILY ANNOYED OR IRRITABLE: SEVERAL DAYS
GAD7 TOTAL SCORE: 5
5. BEING SO RESTLESS THAT IT IS HARD TO SIT STILL: SEVERAL DAYS
3. WORRYING TOO MUCH ABOUT DIFFERENT THINGS: NOT AT ALL

## 2020-04-13 ASSESSMENT — PATIENT HEALTH QUESTIONNAIRE - PHQ9
SUM OF ALL RESPONSES TO PHQ QUESTIONS 1-9: 5
5. POOR APPETITE OR OVEREATING: MORE THAN HALF THE DAYS

## 2020-05-13 ENCOUNTER — MYC MEDICAL ADVICE (OUTPATIENT)
Dept: FAMILY MEDICINE | Facility: OTHER | Age: 30
End: 2020-05-13

## 2020-06-15 ENCOUNTER — MYC MEDICAL ADVICE (OUTPATIENT)
Dept: FAMILY MEDICINE | Facility: OTHER | Age: 30
End: 2020-06-15

## 2020-07-08 ENCOUNTER — MYC MEDICAL ADVICE (OUTPATIENT)
Dept: FAMILY MEDICINE | Facility: OTHER | Age: 30
End: 2020-07-08

## 2020-07-08 NOTE — PROGRESS NOTES
"Chance Torres is a 29 year old male who is being evaluated via a billable telephone visit.      The patient has been notified of following:     \"This telephone visit will be conducted via a call between you and your physician/provider. We have found that certain health care needs can be provided without the need for a physical exam.  This service lets us provide the care you need with a short phone conversation.  If a prescription is necessary we can send it directly to your pharmacy.  If lab work is needed we can place an order for that and you can then stop by our lab to have the test done at a later time.    Telephone visits are billed at different rates depending on your insurance coverage. During this emergency period, for some insurers they may be billed the same as an in-person visit.  Please reach out to your insurance provider with any questions.    If during the course of the call the physician/provider feels a telephone visit is not appropriate, you will not be charged for this service.\"    Patient has given verbal consent for Telephone visit?  Yes    What phone number would you like to be contacted at? [phone]    How would you like to obtain your AVS? Jeremiah Mccoy     Chance Torres is a 29 year old male who presents via phone visit today for the following health issues:    HPI      How many servings of fruits and vegetables do you eat daily?  2-3    On average, how many sweetened beverages do you drink each day (Examples: soda, juice, sweet tea, etc.  Do NOT count diet or artificially sweetened beverages)?   0    How many days per week do you exercise enough to make your heart beat faster? 4     How many minutes a day do you exercise enough to make your heart beat faster? 60 or more    How many days per week do you miss taking your medication? 0    Medication Followup of adderall     Taking Medication as prescribed: yes    Side Effects:  None    Medication Helping Symptoms:  " Yes    Working every other week at work right now, has a lot of work, would like to be back full time.     Has been doing well on the medication.     Works well when he is at work compared to when he is at home.  Trying to keep a routine at home but sometimes just can't with a little kid.  He can get very scrambled when the routine is not kept up.     With work things are doing well.  Lasting long enough for him.  Getting out the door is a lot easier.      Not having to drink caffeine in the morning.     Sometimes on days when he is off his routine then he'll notice side effects from medications.     Has a hard time relaxing.  Thinks maybe he worries about depression.       Patient Active Problem List   Diagnosis     Major depressive disorder with single episode, in partial remission (H)     Irritable bowel syndrome with both constipation and diarrhea     Labral tear of shoulder     ADHD (attention deficit hyperactivity disorder), inattentive type     Past Surgical History:   Procedure Laterality Date     ABDOMEN SURGERY  2013/2016    Hernia repair     ARTHROSCOPY SHOULDER SUPERIOR LABRUM ANTERIOR TO POSTERIOR REPAIR Left 2012     HERNIA REPAIR Left 2013    open     HERNIA REPAIR Right 2016    laparoscopic       Social History     Tobacco Use     Smoking status: Never Smoker     Smokeless tobacco: Former User   Substance Use Topics     Alcohol use: Yes     Comment: rarely     Family History   Problem Relation Age of Onset     Mental Illness Brother      Mental Illness Brother      Cerebrovascular Disease Paternal Grandmother      Thyroid Disease Mother      Unknown/Adopted Mother          Current Outpatient Medications   Medication Sig Dispense Refill     [START ON 9/8/2020] amphetamine-dextroamphetamine (ADDERALL XR) 30 MG 24 hr capsule Take 1 capsule (30 mg) by mouth every morning Take 1 tablet mid morning/early afternoon. 30 capsule 0     No Known Allergies    Reviewed and updated as needed this visit by  Provider  Tobacco  Allergies  Meds  Problems  Med Hx  Surg Hx  Fam Hx         Review of Systems   Constitutional, HEENT, cardiovascular, pulmonary, GI, , musculoskeletal, neuro, skin, endocrine and psych systems are negative, except as otherwise noted.       Objective   Reported vitals:  There were no vitals taken for this visit.   healthy, alert and no distress  PSYCH: Alert and oriented times 3; coherent speech, normal   rate and volume, able to articulate logical thoughts, able   to abstract reason, no tangential thoughts, no hallucinations   or delusions  His affect is normal  RESP: No cough, no audible wheezing, able to talk in full sentences  Remainder of exam unable to be completed due to telephone visits    Diagnostic Test Results:  Labs reviewed in Epic        Assessment/Plan:  1. ADHD (attention deficit hyperactivity disorder), inattentive type  Refilled medication for 3 months, tolerating for the most part and seeing benefits just hard when he is out of his routine.   Encouraged counseling, doesn't have time right now but consider.   - amphetamine-dextroamphetamine (ADDERALL XR) 30 MG 24 hr capsule; Take 1 capsule (30 mg) by mouth every morning Take 1 tablet mid morning/early afternoon.  Dispense: 30 capsule; Refill: 0    Return in about 3 months (around 10/10/2020) for Medication Re-check.    Phone call duration:  6:54 minutes    Options for treatment and follow-up care were reviewed with the patient and/or guardian. Patient and/or guardian engaged in the decision making process and verbalized understanding of the options discussed and agreed with the final plan.     Nikki Benitez PA-C

## 2020-07-10 ENCOUNTER — VIRTUAL VISIT (OUTPATIENT)
Dept: FAMILY MEDICINE | Facility: OTHER | Age: 30
End: 2020-07-10
Payer: COMMERCIAL

## 2020-07-10 DIAGNOSIS — F90.0 ADHD (ATTENTION DEFICIT HYPERACTIVITY DISORDER), INATTENTIVE TYPE: Primary | ICD-10-CM

## 2020-07-10 PROCEDURE — 99213 OFFICE O/P EST LOW 20 MIN: CPT | Mod: 95 | Performed by: PHYSICIAN ASSISTANT

## 2020-07-10 RX ORDER — DEXTROAMPHETAMINE SACCHARATE, AMPHETAMINE ASPARTATE MONOHYDRATE, DEXTROAMPHETAMINE SULFATE AND AMPHETAMINE SULFATE 7.5; 7.5; 7.5; 7.5 MG/1; MG/1; MG/1; MG/1
30 CAPSULE, EXTENDED RELEASE ORAL EVERY MORNING
Qty: 30 CAPSULE | Refills: 0 | Status: SHIPPED | OUTPATIENT
Start: 2020-07-10 | End: 2020-07-10

## 2020-07-10 RX ORDER — DEXTROAMPHETAMINE SACCHARATE, AMPHETAMINE ASPARTATE MONOHYDRATE, DEXTROAMPHETAMINE SULFATE AND AMPHETAMINE SULFATE 7.5; 7.5; 7.5; 7.5 MG/1; MG/1; MG/1; MG/1
30 CAPSULE, EXTENDED RELEASE ORAL EVERY MORNING
Qty: 30 CAPSULE | Refills: 0 | Status: SHIPPED | OUTPATIENT
Start: 2020-08-09 | End: 2020-07-10

## 2020-07-10 RX ORDER — DEXTROAMPHETAMINE SACCHARATE, AMPHETAMINE ASPARTATE MONOHYDRATE, DEXTROAMPHETAMINE SULFATE AND AMPHETAMINE SULFATE 7.5; 7.5; 7.5; 7.5 MG/1; MG/1; MG/1; MG/1
30 CAPSULE, EXTENDED RELEASE ORAL EVERY MORNING
Qty: 30 CAPSULE | Refills: 0 | Status: SHIPPED | OUTPATIENT
Start: 2020-09-08 | End: 2020-10-02

## 2020-09-15 ENCOUNTER — MYC MEDICAL ADVICE (OUTPATIENT)
Dept: FAMILY MEDICINE | Facility: OTHER | Age: 30
End: 2020-09-15

## 2020-10-01 ASSESSMENT — PATIENT HEALTH QUESTIONNAIRE - PHQ9
10. IF YOU CHECKED OFF ANY PROBLEMS, HOW DIFFICULT HAVE THESE PROBLEMS MADE IT FOR YOU TO DO YOUR WORK, TAKE CARE OF THINGS AT HOME, OR GET ALONG WITH OTHER PEOPLE: SOMEWHAT DIFFICULT
SUM OF ALL RESPONSES TO PHQ QUESTIONS 1-9: 6
SUM OF ALL RESPONSES TO PHQ QUESTIONS 1-9: 6

## 2020-10-01 NOTE — PROGRESS NOTES
Subjective     Chance Torres is a 29 year old male who presents to clinic today for the following health issues:    HPI        Answers for HPI/ROS submitted by the patient on 10/1/2020   If you checked off any problems, how difficult have these problems made it for you to do your work, take care of things at home, or get along with other people?: Somewhat difficult  PHQ9 TOTAL SCORE: 6    Concern - nose bleeds  Onset: 2 years  Description: would like to talk about getting it cauterized  Intensity: moderate, severe  Progression of Symptoms:  worsening and and becoming more frequent  Accompanying Signs & Symptoms: gets lightheadedness when it happens  Previous history of similar problem:   Precipitating factors: yes        Worsened by:   Alleviating factors:        Improved by:   Therapies tried and outcome:  none     - Right side, worse in august, worse this last year.   - Always happens on the right side.  Has gotten them for years.   - Notices that spring and fall are worst and that is when he has more allergy symptoms  - Was using Nettipot but recently stopped and that seemed to help resolve the bloody nose.    - does notice some congestion one side more than other at times.      Concern - low sex drive  Onset: 1 year  Description: has been getting lower over the years  Intensity: moderate  Progression of Symptoms:  worsening  Accompanying Signs & Symptoms:   Previous history of similar problem: thinks it from the medication  Precipitating factors:        Worsened by:   Alleviating factors:        Improved by:   Therapies tried and outcome:  none     - Has had this as a chronic issue but noticed that it hasn't gotten worse over time and ever since starting Adderall.   - He has decreased drive, he says that it is boring sex, that it is short lived.   - He denies any testicular pain or masses, no changes in size of testicles, no urinary changes.  He has noticed he'll drink close to a gallon of water a day and  "he'll not urinate much.  He notices this mainly just when at work he won't urinate but still urinating regularly just not as much as he would expect, the urine is light yellow in color.  Yesterday slightly cloudy once but otherwise normal.      ADHD:  - Doing very well on Adderall.   - Still knows he has symptoms he needs to work on overall but much improved with medication  - Side effects - headaches, decreased libido.       Review of Systems   Constitutional, HEENT, cardiovascular, pulmonary, GI, , musculoskeletal, neuro, skin, endocrine and psych systems are negative, except as otherwise noted.      Objective    /76   Pulse 104   Temp 97.4  F (36.3  C) (Temporal)   Resp 16   Ht 1.827 m (5' 11.93\")   Wt 76 kg (167 lb 8 oz)   SpO2 99%   BMI 22.76 kg/m    Body mass index is 22.76 kg/m .  Physical Exam   GENERAL: healthy, alert and no distress  EYES: Eyes grossly normal to inspection, PERRL and conjunctivae and sclerae normal  HENT: normal cephalic/atraumatic, ear canals and TM's normal, nasal mucosa edematous , rhinorrhea clear, oropharynx clear, oral mucous membranes moist and Left side nasal septum mild irritation without bleeding, right side nasal septum there is a small brown superficial scab without oozing, drainage, surrounding erythema or ulceration.   MS: no gross musculoskeletal defects noted, no edema  NEURO: Normal strength and tone, mentation intact and speech normal  PSYCH: mentation appears normal, affect normal/bright    No results found for this or any previous visit (from the past 24 hour(s)).        Assessment & Plan     Chance was seen today for imm/inj.    Diagnoses and all orders for this visit:    ADHD (attention deficit hyperactivity disorder), inattentive type  -     Discontinue: amphetamine-dextroamphetamine (ADDERALL XR) 30 MG 24 hr capsule; Take 1 capsule (30 mg) by mouth every morning Take 1 tablet mid morning/early afternoon.  -     Discontinue: " amphetamine-dextroamphetamine (ADDERALL XR) 30 MG 24 hr capsule; Take 1 capsule (30 mg) by mouth every morning Take 1 tablet mid morning/early afternoon.  -     amphetamine-dextroamphetamine (ADDERALL XR) 30 MG 24 hr capsule; Take 1 capsule (30 mg) by mouth every morning Take 1 tablet mid morning/early afternoon.    Low libido  -     CBC with platelets; Future  -     **Testosterone Free and Total FUTURE anytime; Future  -     Lutropin; Future  -     Follicle stimulating hormone; Future  -     Comprehensive metabolic panel; Future  -     TSH with free T4 reflex; Future    Epistaxis  -     CBC with platelets; Future    Major depressive disorder with single episode, in full remission (H)    Need for prophylactic vaccination and inoculation against influenza  -     INFLUENZA VACCINE IM > 6 MONTHS VALENT IIV4 [97401]  -     Vaccine Administration, Each Additional [83713]            ADHD:  -  reviewed 10/02/20, as expected  - Refilled medication for 3 months  - Discussed that his symptoms could be related to stimulant but he doesn't want to change this for now so he will monitor symptoms, consider switching stimulant in the future.   - Depression is in full remission.     Epistaxis:  - Likely related to his allergy seasons.    - Recommended topical vaseline regularly  - Recommended use of nasal steroids for allergies, discussed how to properly use to avoid septal irritation.   - No active bleeding at this time therefore left the area alone, no cautery today.  The lesion does not appear concerning as it is superficial without erythema or necrosis present.   - If getting worse cautery or see ENT.     Decreased Libido:  - Acute on chronic, worsened with start of adderall  - Will obtain labs to rule out other causes for this.   - Consider switching stimulant.       Return in about 3 months (around 1/2/2021) for Medication Re-check.    Options for treatment and follow-up care were reviewed with the patient and/or guardian.  Patient and/or guardian engaged in the decision making process and verbalized understanding of the options discussed and agreed with the final plan.    Nikki Benitez PA-C  [facility]

## 2020-10-02 ENCOUNTER — OFFICE VISIT (OUTPATIENT)
Dept: FAMILY MEDICINE | Facility: OTHER | Age: 30
End: 2020-10-02
Payer: COMMERCIAL

## 2020-10-02 VITALS
HEIGHT: 72 IN | RESPIRATION RATE: 16 BRPM | WEIGHT: 167.5 LBS | TEMPERATURE: 97.4 F | HEART RATE: 104 BPM | BODY MASS INDEX: 22.69 KG/M2 | DIASTOLIC BLOOD PRESSURE: 76 MMHG | OXYGEN SATURATION: 99 % | SYSTOLIC BLOOD PRESSURE: 116 MMHG

## 2020-10-02 DIAGNOSIS — Z23 NEED FOR PROPHYLACTIC VACCINATION AND INOCULATION AGAINST INFLUENZA: ICD-10-CM

## 2020-10-02 DIAGNOSIS — F32.5 MAJOR DEPRESSIVE DISORDER WITH SINGLE EPISODE, IN FULL REMISSION (H): ICD-10-CM

## 2020-10-02 DIAGNOSIS — R68.82 LOW LIBIDO: ICD-10-CM

## 2020-10-02 DIAGNOSIS — F90.0 ADHD (ATTENTION DEFICIT HYPERACTIVITY DISORDER), INATTENTIVE TYPE: Primary | ICD-10-CM

## 2020-10-02 DIAGNOSIS — R04.0 EPISTAXIS: ICD-10-CM

## 2020-10-02 PROCEDURE — 90686 IIV4 VACC NO PRSV 0.5 ML IM: CPT | Performed by: PHYSICIAN ASSISTANT

## 2020-10-02 PROCEDURE — 99214 OFFICE O/P EST MOD 30 MIN: CPT | Performed by: PHYSICIAN ASSISTANT

## 2020-10-02 RX ORDER — DEXTROAMPHETAMINE SACCHARATE, AMPHETAMINE ASPARTATE MONOHYDRATE, DEXTROAMPHETAMINE SULFATE AND AMPHETAMINE SULFATE 7.5; 7.5; 7.5; 7.5 MG/1; MG/1; MG/1; MG/1
30 CAPSULE, EXTENDED RELEASE ORAL EVERY MORNING
Qty: 30 CAPSULE | Refills: 0 | Status: SHIPPED | OUTPATIENT
Start: 2020-11-01 | End: 2020-10-02

## 2020-10-02 RX ORDER — DEXTROAMPHETAMINE SACCHARATE, AMPHETAMINE ASPARTATE MONOHYDRATE, DEXTROAMPHETAMINE SULFATE AND AMPHETAMINE SULFATE 7.5; 7.5; 7.5; 7.5 MG/1; MG/1; MG/1; MG/1
30 CAPSULE, EXTENDED RELEASE ORAL EVERY MORNING
Qty: 30 CAPSULE | Refills: 0 | Status: SHIPPED | OUTPATIENT
Start: 2020-12-01 | End: 2021-01-20

## 2020-10-02 RX ORDER — DEXTROAMPHETAMINE SACCHARATE, AMPHETAMINE ASPARTATE MONOHYDRATE, DEXTROAMPHETAMINE SULFATE AND AMPHETAMINE SULFATE 7.5; 7.5; 7.5; 7.5 MG/1; MG/1; MG/1; MG/1
30 CAPSULE, EXTENDED RELEASE ORAL EVERY MORNING
Qty: 30 CAPSULE | Refills: 0 | Status: SHIPPED | OUTPATIENT
Start: 2020-10-02 | End: 2020-10-02

## 2020-10-02 ASSESSMENT — PATIENT HEALTH QUESTIONNAIRE - PHQ9: SUM OF ALL RESPONSES TO PHQ QUESTIONS 1-9: 6

## 2020-10-02 ASSESSMENT — MIFFLIN-ST. JEOR: SCORE: 1751.65

## 2020-10-06 DIAGNOSIS — R68.82 LOW LIBIDO: ICD-10-CM

## 2020-10-06 DIAGNOSIS — R04.0 EPISTAXIS: ICD-10-CM

## 2020-10-06 LAB
ALBUMIN SERPL-MCNC: 4.2 G/DL (ref 3.4–5)
ALP SERPL-CCNC: 56 U/L (ref 40–150)
ALT SERPL W P-5'-P-CCNC: 28 U/L (ref 0–70)
ANION GAP SERPL CALCULATED.3IONS-SCNC: 4 MMOL/L (ref 3–14)
AST SERPL W P-5'-P-CCNC: 21 U/L (ref 0–45)
BILIRUB SERPL-MCNC: 1.1 MG/DL (ref 0.2–1.3)
BUN SERPL-MCNC: 19 MG/DL (ref 7–30)
CALCIUM SERPL-MCNC: 9 MG/DL (ref 8.5–10.1)
CHLORIDE SERPL-SCNC: 106 MMOL/L (ref 94–109)
CO2 SERPL-SCNC: 29 MMOL/L (ref 20–32)
CREAT SERPL-MCNC: 0.94 MG/DL (ref 0.66–1.25)
ERYTHROCYTE [DISTWIDTH] IN BLOOD BY AUTOMATED COUNT: 14.2 % (ref 10–15)
FSH SERPL-ACNC: 5.3 IU/L (ref 0.7–10.8)
GFR SERPL CREATININE-BSD FRML MDRD: >90 ML/MIN/{1.73_M2}
GLUCOSE SERPL-MCNC: 81 MG/DL (ref 70–99)
HCT VFR BLD AUTO: 45.8 % (ref 40–53)
HGB BLD-MCNC: 15.4 G/DL (ref 13.3–17.7)
LH SERPL-ACNC: 7.4 IU/L (ref 1.5–9.3)
MCH RBC QN AUTO: 28.1 PG (ref 26.5–33)
MCHC RBC AUTO-ENTMCNC: 33.6 G/DL (ref 31.5–36.5)
MCV RBC AUTO: 84 FL (ref 78–100)
PLATELET # BLD AUTO: 178 10E9/L (ref 150–450)
POTASSIUM SERPL-SCNC: 3.9 MMOL/L (ref 3.4–5.3)
PROT SERPL-MCNC: 7.7 G/DL (ref 6.8–8.8)
RBC # BLD AUTO: 5.48 10E12/L (ref 4.4–5.9)
SODIUM SERPL-SCNC: 139 MMOL/L (ref 133–144)
TSH SERPL DL<=0.005 MIU/L-ACNC: 0.86 MU/L (ref 0.4–4)
WBC # BLD AUTO: 6 10E9/L (ref 4–11)

## 2020-10-06 PROCEDURE — 83001 ASSAY OF GONADOTROPIN (FSH): CPT | Performed by: PHYSICIAN ASSISTANT

## 2020-10-06 PROCEDURE — 80053 COMPREHEN METABOLIC PANEL: CPT | Performed by: PHYSICIAN ASSISTANT

## 2020-10-06 PROCEDURE — 99000 SPECIMEN HANDLING OFFICE-LAB: CPT | Performed by: PHYSICIAN ASSISTANT

## 2020-10-06 PROCEDURE — 83002 ASSAY OF GONADOTROPIN (LH): CPT | Performed by: PHYSICIAN ASSISTANT

## 2020-10-06 PROCEDURE — 36415 COLL VENOUS BLD VENIPUNCTURE: CPT | Performed by: PHYSICIAN ASSISTANT

## 2020-10-06 PROCEDURE — 84270 ASSAY OF SEX HORMONE GLOBUL: CPT | Performed by: PHYSICIAN ASSISTANT

## 2020-10-06 PROCEDURE — 84443 ASSAY THYROID STIM HORMONE: CPT | Performed by: PHYSICIAN ASSISTANT

## 2020-10-06 PROCEDURE — 85027 COMPLETE CBC AUTOMATED: CPT | Performed by: PHYSICIAN ASSISTANT

## 2020-10-06 PROCEDURE — 84403 ASSAY OF TOTAL TESTOSTERONE: CPT | Mod: 90 | Performed by: PHYSICIAN ASSISTANT

## 2020-10-08 LAB
SHBG SERPL-SCNC: 30 NMOL/L (ref 11–80)
TESTOST FREE SERPL-MCNC: 16.11 NG/DL (ref 4.7–24.4)
TESTOST SERPL-MCNC: 688 NG/DL (ref 240–950)

## 2020-10-15 ENCOUNTER — TELEPHONE (OUTPATIENT)
Dept: FAMILY MEDICINE | Facility: OTHER | Age: 30
End: 2020-10-15

## 2020-10-15 NOTE — TELEPHONE ENCOUNTER
Prior Authorization Approval    Authorization Effective Date: 10/15/2020  Authorization Expiration Date: 10/15/2021  Medication: amphetamine-dextroamphetamine (ADDERALL XR) 30 MG 24 hr capsule  Approved Dose/Quantity:    Reference #:     Insurance Company: [facility] - Phone [phone] Fax [phone]  Expected CoPay:       CoPay Card Available:      Foundation Assistance Needed:    Which Pharmacy is filling the prescription (Not needed for infusion/clinic administered): [id number] - [place], [place] - [address] AT [facility] [address]  Pharmacy Notified: Yes  Patient Notified: Yes  **Instructed pharmacy to notify patient when script is ready to /ship.**

## 2020-10-15 NOTE — TELEPHONE ENCOUNTER
Prior Authorization Retail Medication Request    Medication/Dose: amphetamine-dextroamphetamine (ADDERALL XR) 30 MG 24 hr capsule  ICD code (if different than what is on RX):    Previously Tried and Failed:    Rationale:      Insurance Name:    Insurance ID:        Pharmacy Information (if different than what is on RX)  Name:    Phone:

## 2020-10-15 NOTE — TELEPHONE ENCOUNTER
Central Prior Authorization Team   Phone: [phone]      PA Initiation    Medication: amphetamine-dextroamphetamine (ADDERALL XR) 30 MG 24 hr capsule  Insurance Company: [employer] - Phone [phone] Fax [phone]  Pharmacy Filling the Rx: [facility] #[id number] - [place], [place] - [address] [facility] [address]  Filling Pharmacy Phone: [phone]  Filling Pharmacy Fax:    Start Date: 10/15/2020

## 2020-10-19 ENCOUNTER — MYC MEDICAL ADVICE (OUTPATIENT)
Dept: FAMILY MEDICINE | Facility: OTHER | Age: 30
End: 2020-10-19

## 2020-10-19 DIAGNOSIS — N52.9 ERECTILE DYSFUNCTION, UNSPECIFIED ERECTILE DYSFUNCTION TYPE: Primary | ICD-10-CM

## 2020-10-19 RX ORDER — SILDENAFIL 50 MG/1
50 TABLET, FILM COATED ORAL DAILY PRN
Qty: 6 TABLET | Refills: 0 | Status: SHIPPED | OUTPATIENT
Start: 2020-10-19 | End: 2020-11-23

## 2020-10-19 RX ORDER — SILDENAFIL 50 MG/1
50 TABLET, FILM COATED ORAL DAILY PRN
Qty: 6 TABLET | Refills: 0 | Status: SHIPPED | OUTPATIENT
Start: 2020-10-19 | End: 2020-10-19

## 2020-10-19 NOTE — TELEPHONE ENCOUNTER
Please let patient know in the next hour. Could be [facility] also if needed depending on availability   [phone]

## 2020-11-23 ENCOUNTER — VIRTUAL VISIT (OUTPATIENT)
Dept: FAMILY MEDICINE | Facility: OTHER | Age: 30
End: 2020-11-23
Payer: COMMERCIAL

## 2020-11-23 ENCOUNTER — MYC MEDICAL ADVICE (OUTPATIENT)
Dept: FAMILY MEDICINE | Facility: OTHER | Age: 30
End: 2020-11-23

## 2020-11-23 ENCOUNTER — OFFICE VISIT (OUTPATIENT)
Dept: URGENT CARE | Facility: URGENT CARE | Age: 30
End: 2020-11-23
Attending: PHYSICIAN ASSISTANT
Payer: COMMERCIAL

## 2020-11-23 DIAGNOSIS — Z20.822 EXPOSURE TO COVID-19 VIRUS: Primary | ICD-10-CM

## 2020-11-23 DIAGNOSIS — Z20.822 SUSPECTED COVID-19 VIRUS INFECTION: ICD-10-CM

## 2020-11-23 DIAGNOSIS — Z20.822 EXPOSURE TO COVID-19 VIRUS: ICD-10-CM

## 2020-11-23 PROCEDURE — U0003 INFECTIOUS AGENT DETECTION BY NUCLEIC ACID (DNA OR RNA); SEVERE ACUTE RESPIRATORY SYNDROME CORONAVIRUS 2 (SARS-COV-2) (CORONAVIRUS DISEASE [COVID-19]), AMPLIFIED PROBE TECHNIQUE, MAKING USE OF HIGH THROUGHPUT TECHNOLOGIES AS DESCRIBED BY CMS-2020-01-R: HCPCS | Performed by: PHYSICIAN ASSISTANT

## 2020-11-23 PROCEDURE — 99213 OFFICE O/P EST LOW 20 MIN: CPT | Mod: TEL | Performed by: PHYSICIAN ASSISTANT

## 2020-11-23 NOTE — PROGRESS NOTES
"Chance Torres is a 29 year old male who is being evaluated via a billable telephone visit.      The patient has been notified of following:     \"This telephone visit will be conducted via a call between you and your physician/provider. We have found that certain health care needs can be provided without the need for a physical exam.  This service lets us provide the care you need with a short phone conversation.  If a prescription is necessary we can send it directly to your pharmacy.  If lab work is needed we can place an order for that and you can then stop by our lab to have the test done at a later time.    Telephone visits are billed at different rates depending on your insurance coverage. During this emergency period, for some insurers they may be billed the same as an in-person visit.  Please reach out to your insurance provider with any questions.    If during the course of the call the physician/provider feels a telephone visit is not appropriate, you will not be charged for this service.\"    Patient has given verbal consent for Telephone visit?  Yes    What phone number would you like to be contacted at? [phone]    How would you like to obtain your AVS? Monishat    Subjective     Chance Torres is a 29 year old male who presents via phone visit today for the following health issues:    HPI      COVID CONCERNS  Niece is confirmed positive about 2 weeks ago, saw her on election day    Pt is having some chills, fatigue, some sob, decreased appetite with some slight nausea. Some diarrhea, different than his usual IBS    Concern for COVID-19  About how many days ago did these symptoms start? Felt some symptoms developing last week but says he was working through them at that time.  He has been more distracted because he has a lot of work right now.  Has been feeling Fatigued.  Saturday he had chills.  He had upset stomach, didn't want to eat.  Still like that right now.  If he eats something he'll " get nauseated.  Has been tired more than normal.  He just feels out of it.  He has had symptoms like this before in the winter.    Is this your first visit for this illness? Yes  In the 14 days before your symptoms started, have you had close contact with someone with COVID-19 (Coronavirus)? Yes, I have been in contact with someone who has COVID-19/Coronavirus (confirmed by lab test). Has been over 2 weeks since he was exposed to his Niece. She was at her house and with her kids. Kids have been feeling well, wife has not had any symptoms.   Do you have a fever or chills? Yes has had chills, has not checked temp.   Are you having new or worsening difficulty breathing? Has noticed more restricted breathing than normal.   Do you have new or worsening cough? No  Have you had any new or unexplained body aches? YES    Have you experienced any of the following NEW symptoms?    Headache: YES    Sore throat: No    Loss of taste or smell: No    Chest pain: No    Diarrhea: yes    Rash: No  What treatments have you tried? None  Who do you live with? Wife and two children  Are you, or a household member, a healthcare worker or a ? YES  Do you live in a nursing home, group home, or shelter? No  Do you have a way to get food/medications if quarantined? Yes, I have a friend or family member who can help me.      Review of Systems   Constitutional, HEENT, cardiovascular, pulmonary, gi and gu systems are negative, except as otherwise noted.       Objective          Vitals:  No vitals were obtained today due to virtual visit.    alert and no distress  PSYCH: Alert and oriented times 3; coherent speech, normal   rate and volume, able to articulate logical thoughts, able   to abstract reason, no tangential thoughts, no hallucinations   or delusions  His affect is normal  RESP: No cough, no audible wheezing, able to talk in full sentences  Remainder of exam unable to be completed due to telephone visits    No results found  for this or any previous visit (from the past 24 hour(s)).        Assessment/Plan:    Assessment & Plan     Diagnoses and all orders for this visit:    Exposure to COVID-19 virus  -     Symptomatic COVID-19 Virus (Coronavirus) by PCR; Future    Suspected COVID-19 virus infection  -     Symptomatic COVID-19 Virus (Coronavirus) by PCR; Future            Patient has had exposure but was > 14 days ago but has also developed symptoms.  His wife is a healthcare worker for [employer].  Recommended testing for COVID.  Discussed proper isolation at this time.  Treat symptomatically as needed.  COVID testing pending recommend follow-up if not improving, worse or new concerns.     Return in about 5 days (around 11/28/2020) for If not improving, sooner if worse or new concerns.    Options for treatment and follow-up care were reviewed with the patient and/or guardian. Patient and/or guardian engaged in the decision making process and verbalized understanding of the options discussed and agreed with the final plan.    Nikki Benitez PA-C  [facility]    Phone call duration:  7:16 minutes

## 2020-11-24 LAB
SARS-COV-2 RNA SPEC QL NAA+PROBE: NOT DETECTED
SPECIMEN SOURCE: NORMAL

## 2020-11-29 ENCOUNTER — HOSPITAL ENCOUNTER (EMERGENCY)
Age: 30
Discharge: HOME | End: 2020-11-29
Payer: MEDICAID

## 2020-11-29 VITALS
BODY MASS INDEX: 21.97 KG/M2 | RESPIRATION RATE: 20 BRPM | SYSTOLIC BLOOD PRESSURE: 128 MMHG | WEIGHT: 140 LBS | OXYGEN SATURATION: 99 % | HEIGHT: 67 IN | DIASTOLIC BLOOD PRESSURE: 86 MMHG | TEMPERATURE: 98.1 F | HEART RATE: 88 BPM

## 2020-11-29 LAB
ALBUMIN SERPL-MCNC: 4.4 G/DL (ref 3.5–5.1)
ALP BLD-CCNC: 159 U/L (ref 38–126)
ALT SERPL-CCNC: 191 U/L (ref 11–66)
ANION GAP SERPL CALCULATED.3IONS-SCNC: 17 MEQ/L (ref 8–16)
AST SERPL-CCNC: 190 U/L (ref 5–40)
BACTERIA: ABNORMAL /HPF
BASOPHILS # BLD: 1 %
BASOPHILS ABSOLUTE: 0.1 THOU/MM3 (ref 0–0.1)
BILIRUB SERPL-MCNC: 0.6 MG/DL (ref 0.3–1.2)
BILIRUBIN DIRECT: < 0.2 MG/DL (ref 0–0.3)
BILIRUBIN URINE: ABNORMAL
BLOOD, URINE: NEGATIVE
BUN BLDV-MCNC: 10 MG/DL (ref 7–22)
CALCIUM SERPL-MCNC: 9.6 MG/DL (ref 8.5–10.5)
CASTS 2: ABNORMAL /LPF
CASTS UA: ABNORMAL /LPF
CHARACTER, URINE: ABNORMAL
CHLORIDE BLD-SCNC: 98 MEQ/L (ref 98–111)
CO2: 25 MEQ/L (ref 23–33)
COLOR: ABNORMAL
CREAT SERPL-MCNC: 0.7 MG/DL (ref 0.4–1.2)
CRYSTALS, UA: ABNORMAL
EOSINOPHIL # BLD: 2.3 %
EOSINOPHILS ABSOLUTE: 0.2 THOU/MM3 (ref 0–0.4)
EPITHELIAL CELLS, UA: ABNORMAL /HPF
ERYTHROCYTE [DISTWIDTH] IN BLOOD BY AUTOMATED COUNT: 12.5 % (ref 11.5–14.5)
ERYTHROCYTE [DISTWIDTH] IN BLOOD BY AUTOMATED COUNT: 43.9 FL (ref 35–45)
GFR SERPL CREATININE-BSD FRML MDRD: > 90 ML/MIN/1.73M2
GLUCOSE BLD-MCNC: 93 MG/DL (ref 70–108)
GLUCOSE URINE: NEGATIVE MG/DL
HCT VFR BLD CALC: 47.7 % (ref 42–52)
HEMOGLOBIN: 16.3 GM/DL (ref 14–18)
ICTOTEST: NEGATIVE
IMMATURE GRANS (ABS): 0.02 THOU/MM3 (ref 0–0.07)
IMMATURE GRANULOCYTES: 0.3 %
KETONES, URINE: ABNORMAL
LEUKOCYTE ESTERASE, URINE: ABNORMAL
LIPASE: 13.2 U/L (ref 5.6–51.3)
LYMPHOCYTES # BLD: 32.5 %
LYMPHOCYTES ABSOLUTE: 2.4 THOU/MM3 (ref 1–4.8)
MCH RBC QN AUTO: 32.5 PG (ref 26–33)
MCHC RBC AUTO-ENTMCNC: 34.2 GM/DL (ref 32.2–35.5)
MCV RBC AUTO: 95.2 FL (ref 80–94)
MISCELLANEOUS 2: ABNORMAL
MONOCYTES # BLD: 11.1 %
MONOCYTES ABSOLUTE: 0.8 THOU/MM3 (ref 0.4–1.3)
NITRITE, URINE: NEGATIVE
NUCLEATED RED BLOOD CELLS: 0 /100 WBC
OSMOLALITY CALCULATION: 278.1 MOSMOL/KG (ref 275–300)
PH UA: 7.5 (ref 5–9)
PLATELET # BLD: 202 THOU/MM3 (ref 130–400)
PMV BLD AUTO: 9.2 FL (ref 9.4–12.4)
POTASSIUM REFLEX MAGNESIUM: 3.9 MEQ/L (ref 3.5–5.2)
PROTEIN UA: NEGATIVE
RBC # BLD: 5.01 MILL/MM3 (ref 4.7–6.1)
RBC URINE: ABNORMAL /HPF
RENAL EPITHELIAL, UA: ABNORMAL
SEG NEUTROPHILS: 52.8 %
SEGMENTED NEUTROPHILS ABSOLUTE COUNT: 3.9 THOU/MM3 (ref 1.8–7.7)
SODIUM BLD-SCNC: 140 MEQ/L (ref 135–145)
SPECIFIC GRAVITY, URINE: 1.03 (ref 1–1.03)
TOTAL PROTEIN: 7.3 G/DL (ref 6.1–8)
UROBILINOGEN, URINE: 1 EU/DL (ref 0–1)
WBC # BLD: 7.3 THOU/MM3 (ref 4.8–10.8)
WBC UA: ABNORMAL /HPF
YEAST: ABNORMAL

## 2020-11-29 PROCEDURE — 81001 URINALYSIS AUTO W/SCOPE: CPT

## 2020-11-29 PROCEDURE — 80076 HEPATIC FUNCTION PANEL: CPT

## 2020-11-29 PROCEDURE — 36415 COLL VENOUS BLD VENIPUNCTURE: CPT

## 2020-11-29 PROCEDURE — 85025 COMPLETE CBC W/AUTO DIFF WBC: CPT

## 2020-11-29 PROCEDURE — 6370000000 HC RX 637 (ALT 250 FOR IP): Performed by: NURSE PRACTITIONER

## 2020-11-29 PROCEDURE — 83690 ASSAY OF LIPASE: CPT

## 2020-11-29 PROCEDURE — 99284 EMERGENCY DEPT VISIT MOD MDM: CPT

## 2020-11-29 PROCEDURE — 80048 BASIC METABOLIC PNL TOTAL CA: CPT

## 2020-11-29 RX ORDER — PANTOPRAZOLE SODIUM 40 MG/1
40 TABLET, DELAYED RELEASE ORAL ONCE
Status: COMPLETED | OUTPATIENT
Start: 2020-11-29 | End: 2020-11-29

## 2020-11-29 RX ORDER — ONDANSETRON 2 MG/ML
4 INJECTION INTRAMUSCULAR; INTRAVENOUS ONCE
Status: DISCONTINUED | OUTPATIENT
Start: 2020-11-29 | End: 2020-11-29

## 2020-11-29 RX ORDER — PANTOPRAZOLE SODIUM 40 MG/10ML
40 INJECTION, POWDER, LYOPHILIZED, FOR SOLUTION INTRAVENOUS ONCE
Status: DISCONTINUED | OUTPATIENT
Start: 2020-11-29 | End: 2020-11-29

## 2020-11-29 RX ORDER — 0.9 % SODIUM CHLORIDE 0.9 %
1000 INTRAVENOUS SOLUTION INTRAVENOUS ONCE
Status: DISCONTINUED | OUTPATIENT
Start: 2020-11-29 | End: 2020-11-29

## 2020-11-29 RX ORDER — SUCRALFATE 1 G/1
1 TABLET ORAL 2 TIMES DAILY
Qty: 120 TABLET | Refills: 3 | Status: SHIPPED | OUTPATIENT
Start: 2020-11-29 | End: 2021-04-01

## 2020-11-29 RX ORDER — OMEPRAZOLE 20 MG/1
20 CAPSULE, DELAYED RELEASE ORAL
Qty: 30 CAPSULE | Refills: 0 | Status: SHIPPED | OUTPATIENT
Start: 2020-11-29 | End: 2021-04-01

## 2020-11-29 RX ADMIN — PANTOPRAZOLE SODIUM 40 MG: 40 TABLET, DELAYED RELEASE ORAL at 17:48

## 2020-11-29 RX ADMIN — LIDOCAINE HYDROCHLORIDE: 20 SOLUTION ORAL; TOPICAL at 17:48

## 2020-11-29 ASSESSMENT — ENCOUNTER SYMPTOMS
NAUSEA: 0
COUGH: 0
BLOOD IN STOOL: 1
WHEEZING: 0
ABDOMINAL PAIN: 1
SORE THROAT: 0
EYE REDNESS: 1
ABDOMINAL DISTENTION: 0
SHORTNESS OF BREATH: 0
VOMITING: 0

## 2020-11-29 ASSESSMENT — PAIN DESCRIPTION - PAIN TYPE: TYPE: ACUTE PAIN

## 2020-11-29 ASSESSMENT — PAIN SCALES - GENERAL: PAINLEVEL_OUTOF10: 7

## 2020-11-29 ASSESSMENT — PAIN DESCRIPTION - LOCATION: LOCATION: ABDOMEN

## 2020-11-29 NOTE — ED PROVIDER NOTES
Edgardo Griffith 13 COMPLAINT       Chief Complaint   Patient presents with    Abdominal Pain       Nurses Notes reviewed and I agree except as noted in the HPI. HISTORY OF PRESENT ILLNESS    Christophe Erickson is a 26 y.o. male who presents to the Emergency Department for the evaluation of abdominal pain x 3-4 days and an episode of vomiting x3 yesterday. He complains of intermittent heartburn with his symptoms worsening when he drinks alcohol. He reports having \"a pint of wine\" yesterday. He reports his pain as being \"dull\" and a 7/10 with radiation to his back. He took TUMS and omeprazole this AM which helped \"a little\". He reports one black bowel movement yesterday. He reports he used to take Prilosec for GERD but doesn't currently. Denies hematemesis, hematochezia, chest pain, shortness of breath, illicit drug use, urinary symptoms. The HPI was provided by the patient. REVIEW OF SYSTEMS     Review of Systems   Constitutional: Negative for chills, diaphoresis and fever. HENT: Negative for congestion and sore throat. Eyes: Positive for redness. Respiratory: Negative for cough, shortness of breath and wheezing. Cardiovascular: Negative for chest pain and leg swelling. Gastrointestinal: Positive for abdominal pain (epigastric) and blood in stool (melena x1). Negative for abdominal distention, nausea and vomiting. Genitourinary: Negative for dysuria, frequency and urgency. Skin: Negative for pallor. Neurological: Negative for dizziness, syncope and light-headedness. PAST MEDICAL HISTORY    has a past medical history of Hypertension and Seizures ([address] SURGICAL HISTORY      has a past surgical history that includes [place] tooth extraction. CURRENT MEDICATIONS       Discharge Medication List as of 11/29/2020  5:37 PM          ALLERGIES     is allergic to dilantin [phenytoin sodium extended].     FAMILY HISTORY     has no family status information on file. family history is not on file. SOCIAL HISTORY      reports that he has been smoking cigarettes. He has never used smokeless tobacco. He reports current alcohol use of about 1.0 standard drinks of alcohol per week. He reports current drug use. Drug: Opiates . PHYSICAL EXAM     INITIAL VITALS:  height is 5' 7\" (1.702 m) and weight is 140 lb (63.5 kg). His oral temperature is 98.1 °F (36.7 °C). His blood pressure is 128/86 and his pulse is 88. His respiration is 20 and oxygen saturation is 99%. Physical Exam  Constitutional:       General: He is not in acute distress. Appearance: He is well-developed. He is not ill-appearing. HENT:      Mouth/Throat:      Mouth: Mucous membranes are moist.   Eyes:      Extraocular Movements: Extraocular movements intact. Pupils: Pupils are equal, round, and reactive to light. Cardiovascular:      Rate and Rhythm: Normal rate and regular rhythm. Heart sounds: Normal heart sounds. No murmur. Pulmonary:      Effort: Pulmonary effort is normal. No respiratory distress. Breath sounds: Normal breath sounds. No wheezing. Abdominal:      General: Abdomen is flat. Bowel sounds are normal. There is no distension. Palpations: Abdomen is soft. Tenderness: There is abdominal tenderness in the epigastric area. There is no guarding. Negative signs include Hartley's sign and McBurney's sign. Skin:     General: Skin is warm. Coloration: Skin is not jaundiced or pale. Neurological:      Mental Status: He is alert.           DIFFERENTIAL DIAGNOSIS:   Alcoholic gastritis, PUD, less likely pancreatitis    DIAGNOSTIC RESULTS     EKG: All EKG's are interpreted by the Emergency Department Physician who either signs or Co-signs this chart in the absence of a cardiologist.    None    RADIOLOGY: non-plainfilm images(s) such as CT, Ultrasound and MRI are read by the radiologist.    No orders to display       LABS:     Labs Reviewed   CBC WITH AUTO DIFFERENTIAL - Abnormal; Notable for the following components:       Result Value    MCV 95.2 (*)     MPV 9.2 (*)     All other components within normal limits   HEPATIC FUNCTION PANEL - Abnormal; Notable for the following components:    Alkaline Phosphatase 159 (*)      (*)      (*)     All other components within normal limits   URINE WITH REFLEXED MICRO - Abnormal; Notable for the following components:    Bilirubin Urine SMALL (*)     Ketones, Urine TRACE (*)     Leukocyte Esterase, Urine TRACE (*)     Color, UA DK YELLOW (*)     Character, Urine CLOUDY (*)     All other components within normal limits   ANION GAP - Abnormal; Notable for the following components:    Anion Gap 17.0 (*)     All other components within normal limits   BASIC METABOLIC PANEL W/ REFLEX TO MG FOR LOW K   LIPASE   BILE ACIDS, TOTAL   GLOMERULAR FILTRATION RATE, ESTIMATED   OSMOLALITY       EMERGENCY DEPARTMENT COURSE:   Vitals:    Vitals:    11/29/20 1528 11/29/20 1718   BP: (!) 122/92 128/86   Pulse: 92 88   Resp: 18 20   Temp: 98.1 °F (36.7 °C)    TempSrc: Oral    SpO2: 98% 99%   Weight: 140 lb (63.5 kg)    Height: 5' 7\" (1.702 m)        3:30 PM EST: The patient was seen and evaluated. MDM:  Mr. Nathan Koenig presented to the ED for abdominal pain x 3-4 days with an episode of 3 emesis yesterday after drinking a pint of wine. He reports taking some TUMS and omeprazole this AM which did help. Upon arrival to the ED vitals were assessed and found to be stable. On PE, there is epigastric tenderness to palpation. Appropriate labs were ordered and showed no acute blood loss anemia. Patient states that he wishes to be evaluated for ulcers, explained that ED testing unlikely to reveal peptic ulcers. Recommended follow-up with GI. Patient will be prescribed Prilosec and Carafate, encouraged to limit alcohol consumption.   He had no other complaints or concerns, he will be referred to GI, patient discharged in stable condition    CRITICAL CARE:   None    CONSULTS:  None    PROCEDURES:  None    FINAL IMPRESSION      1. Abdominal pain, epigastric    2. Nausea    3. Non-intractable vomiting with nausea, unspecified vomiting type          DISPOSITION/PLAN   Discharge    PATIENT REFERRED TO:  Scott King MD  [address] [facility] [address]  [phone]    Schedule an appointment as soon as possible for a visit in 1 day        DISCHARGE MEDICATIONS:  Discharge Medication List as of 11/29/2020  5:37 PM      START taking these medications    Details   omeprazole (PRILOSEC) 20 MG delayed release capsule Take 1 capsule by mouth every morning (before breakfast), Disp-30 capsule,R-0Print      sucralfate (CARAFATE) 1 GM tablet Take 1 tablet by mouth 2 times daily, Disp-120 tablet,R-3Print             (Please note that portions of this note were completed with a voice recognition program.  Efforts were made to edit the dictations but occasionally words are mis-transcribed.)    The patient was given an opportunity to see the Emergency Attending. The patient voiced understanding that I was a Mid-LevelProvider and was in agreement with being seen independently by myself.      HALI Bunch CNP, 11/29/20, 9:29 AM       HALI Bunch CNP  12/09/20 5760

## 2020-11-29 NOTE — ED TRIAGE NOTES
Patient to room 30 from triage for complaint of abdominal pain x 2 days. Patient states that his stools have appeared darker than normal as well. Patient denies other complaints at this time. Patient ambulatory to restroom to obtain urine sample.

## 2020-12-11 ENCOUNTER — APPOINTMENT (OUTPATIENT)
Dept: CT IMAGING | Age: 30
DRG: 930 | End: 2020-12-11
Payer: MEDICAID

## 2020-12-11 ENCOUNTER — APPOINTMENT (OUTPATIENT)
Dept: GENERAL RADIOLOGY | Age: 30
DRG: 930 | End: 2020-12-11
Payer: MEDICAID

## 2020-12-11 ENCOUNTER — HOSPITAL ENCOUNTER (INPATIENT)
Age: 30
LOS: 5 days | Discharge: NURSING, REHAB OR LONG-TERM CARE | DRG: 930 | End: 2020-12-16
Attending: EMERGENCY MEDICINE | Admitting: SURGERY
Payer: MEDICAID

## 2020-12-11 PROBLEM — J93.9 PNEUMOTHORAX ON RIGHT: Status: ACTIVE | Noted: 2020-12-11

## 2020-12-11 LAB
ABO: NORMAL
ALBUMIN SERPL-MCNC: 4.7 G/DL (ref 3.5–5.1)
ALP BLD-CCNC: 164 U/L (ref 38–126)
ALT SERPL-CCNC: 135 U/L (ref 11–66)
AMORPHOUS: ABNORMAL
AMPHETAMINE+METHAMPHETAMINE URINE SCREEN: NEGATIVE
ANION GAP SERPL CALCULATED.3IONS-SCNC: 15 MEQ/L (ref 8–16)
ANTIBODY SCREEN: NORMAL
AST SERPL-CCNC: 202 U/L (ref 5–40)
BACTERIA: ABNORMAL
BARBITURATE QUANTITATIVE URINE: NEGATIVE
BASOPHILS # BLD: 0.3 %
BASOPHILS ABSOLUTE: 0.1 THOU/MM3 (ref 0–0.1)
BENZODIAZEPINE QUANTITATIVE URINE: NEGATIVE
BILIRUB SERPL-MCNC: 0.6 MG/DL (ref 0.3–1.2)
BILIRUBIN URINE: ABNORMAL
BLOOD, URINE: ABNORMAL
BUN BLDV-MCNC: 9 MG/DL (ref 7–22)
CALCIUM SERPL-MCNC: 9.7 MG/DL (ref 8.5–10.5)
CANNABINOID QUANTITATIVE URINE: NEGATIVE
CASTS: ABNORMAL /LPF
CASTS: ABNORMAL /LPF
CHARACTER, URINE: ABNORMAL
CHLORIDE BLD-SCNC: 100 MEQ/L (ref 98–111)
CO2: 24 MEQ/L (ref 23–33)
COCAINE METABOLITE QUANTITATIVE URINE: NEGATIVE
COLOR: ABNORMAL
CREAT SERPL-MCNC: 0.9 MG/DL (ref 0.4–1.2)
CRYSTALS: ABNORMAL
EOSINOPHIL # BLD: 0.1 %
EOSINOPHILS ABSOLUTE: 0 THOU/MM3 (ref 0–0.4)
EPITHELIAL CELLS, UA: ABNORMAL /HPF
ERYTHROCYTE [DISTWIDTH] IN BLOOD BY AUTOMATED COUNT: 13.2 % (ref 11.5–14.5)
ERYTHROCYTE [DISTWIDTH] IN BLOOD BY AUTOMATED COUNT: 45.9 FL (ref 35–45)
ETHYL ALCOHOL, SERUM: 0.03 %
GFR SERPL CREATININE-BSD FRML MDRD: > 90 ML/MIN/1.73M2
GLUCOSE BLD-MCNC: 90 MG/DL (ref 70–108)
GLUCOSE, URINE: NEGATIVE MG/DL
HCT VFR BLD CALC: 46.1 % (ref 42–52)
HEMOGLOBIN: 16.1 GM/DL (ref 14–18)
ICTOTEST: NEGATIVE
IMMATURE GRANS (ABS): 0.25 THOU/MM3 (ref 0–0.07)
IMMATURE GRANULOCYTES: 1.3 %
KETONES, URINE: 15
LEUKOCYTE EST, POC: ABNORMAL
LIPASE: 53.5 U/L (ref 5.6–51.3)
LYMPHOCYTES # BLD: 6.6 %
LYMPHOCYTES ABSOLUTE: 1.3 THOU/MM3 (ref 1–4.8)
MCH RBC QN AUTO: 33.1 PG (ref 26–33)
MCHC RBC AUTO-ENTMCNC: 34.9 GM/DL (ref 32.2–35.5)
MCV RBC AUTO: 94.9 FL (ref 80–94)
MONOCYTES # BLD: 5.3 %
MONOCYTES ABSOLUTE: 1 THOU/MM3 (ref 0.4–1.3)
MUCUS: ABNORMAL
NITRITE, URINE: NEGATIVE
NUCLEATED RED BLOOD CELLS: 0 /100 WBC
OPIATES, URINE: NEGATIVE
OSMOLALITY CALCULATION: 275.8 MOSMOL/KG (ref 275–300)
OXYCODONE: NEGATIVE
PH UA: 5.5 (ref 5–9)
PHENCYCLIDINE QUANTITATIVE URINE: NEGATIVE
PLATELET # BLD: 213 THOU/MM3 (ref 130–400)
PMV BLD AUTO: 8.9 FL (ref 9.4–12.4)
POTASSIUM REFLEX MAGNESIUM: 4.1 MEQ/L (ref 3.5–5.2)
PROTEIN UA: >= 300 MG/DL
RBC # BLD: 4.86 MILL/MM3 (ref 4.7–6.1)
RBC URINE: ABNORMAL /HPF
RH FACTOR: NORMAL
SEG NEUTROPHILS: 86.4 %
SEGMENTED NEUTROPHILS ABSOLUTE COUNT: 17 THOU/MM3 (ref 1.8–7.7)
SODIUM BLD-SCNC: 139 MEQ/L (ref 135–145)
SPECIFIC GRAVITY UA: > 1.03 (ref 1–1.03)
TOTAL PROTEIN: 7.6 G/DL (ref 6.1–8)
UROBILINOGEN, URINE: 1 EU/DL (ref 0–1)
WBC # BLD: 19.7 THOU/MM3 (ref 4.8–10.8)
WBC UA: ABNORMAL /HPF

## 2020-12-11 PROCEDURE — 85025 COMPLETE CBC W/AUTO DIFF WBC: CPT

## 2020-12-11 PROCEDURE — 74177 CT ABD & PELVIS W/CONTRAST: CPT

## 2020-12-11 PROCEDURE — 99252 IP/OBS CONSLTJ NEW/EST SF 35: CPT | Performed by: PSYCHIATRY & NEUROLOGY

## 2020-12-11 PROCEDURE — 71260 CT THORAX DX C+: CPT

## 2020-12-11 PROCEDURE — 36415 COLL VENOUS BLD VENIPUNCTURE: CPT

## 2020-12-11 PROCEDURE — 72170 X-RAY EXAM OF PELVIS: CPT

## 2020-12-11 PROCEDURE — 71045 X-RAY EXAM CHEST 1 VIEW: CPT

## 2020-12-11 PROCEDURE — G0480 DRUG TEST DEF 1-7 CLASSES: HCPCS

## 2020-12-11 PROCEDURE — 6360000004 HC RX CONTRAST MEDICATION: Performed by: EMERGENCY MEDICINE

## 2020-12-11 PROCEDURE — 2580000003 HC RX 258: Performed by: PHYSICIAN ASSISTANT

## 2020-12-11 PROCEDURE — 96375 TX/PRO/DX INJ NEW DRUG ADDON: CPT

## 2020-12-11 PROCEDURE — 6360000002 HC RX W HCPCS

## 2020-12-11 PROCEDURE — 86901 BLOOD TYPING SEROLOGIC RH(D): CPT

## 2020-12-11 PROCEDURE — 6360000002 HC RX W HCPCS: Performed by: PHYSICIAN ASSISTANT

## 2020-12-11 PROCEDURE — 76376 3D RENDER W/INTRP POSTPROCES: CPT

## 2020-12-11 PROCEDURE — 80053 COMPREHEN METABOLIC PANEL: CPT

## 2020-12-11 PROCEDURE — 86900 BLOOD TYPING SEROLOGIC ABO: CPT

## 2020-12-11 PROCEDURE — 99223 1ST HOSP IP/OBS HIGH 75: CPT | Performed by: SURGERY

## 2020-12-11 PROCEDURE — 80307 DRUG TEST PRSMV CHEM ANLYZR: CPT

## 2020-12-11 PROCEDURE — 0W9930Z DRAINAGE OF RIGHT PLEURAL CAVITY WITH DRAINAGE DEVICE, PERCUTANEOUS APPROACH: ICD-10-PCS | Performed by: SURGERY

## 2020-12-11 PROCEDURE — 83690 ASSAY OF LIPASE: CPT

## 2020-12-11 PROCEDURE — 2060000000 HC ICU INTERMEDIATE R&B

## 2020-12-11 PROCEDURE — 86850 RBC ANTIBODY SCREEN: CPT

## 2020-12-11 PROCEDURE — 99285 EMERGENCY DEPT VISIT HI MDM: CPT

## 2020-12-11 PROCEDURE — 6360000002 HC RX W HCPCS: Performed by: EMERGENCY MEDICINE

## 2020-12-11 PROCEDURE — 72125 CT NECK SPINE W/O DYE: CPT

## 2020-12-11 PROCEDURE — 96376 TX/PRO/DX INJ SAME DRUG ADON: CPT

## 2020-12-11 PROCEDURE — 96365 THER/PROPH/DIAG IV INF INIT: CPT

## 2020-12-11 PROCEDURE — 70450 CT HEAD/BRAIN W/O DYE: CPT

## 2020-12-11 PROCEDURE — APPSS180 APP SPLIT SHARED TIME > 60 MINUTES: Performed by: PHYSICIAN ASSISTANT

## 2020-12-11 PROCEDURE — 81001 URINALYSIS AUTO W/SCOPE: CPT

## 2020-12-11 RX ORDER — MORPHINE SULFATE 2 MG/ML
2 INJECTION, SOLUTION INTRAMUSCULAR; INTRAVENOUS
Status: DISCONTINUED | OUTPATIENT
Start: 2020-12-11 | End: 2020-12-16 | Stop reason: HOSPADM

## 2020-12-11 RX ORDER — MORPHINE SULFATE 4 MG/ML
4 INJECTION, SOLUTION INTRAMUSCULAR; INTRAVENOUS
Status: DISCONTINUED | OUTPATIENT
Start: 2020-12-11 | End: 2020-12-16 | Stop reason: HOSPADM

## 2020-12-11 RX ORDER — FENTANYL CITRATE 50 UG/ML
50 INJECTION, SOLUTION INTRAMUSCULAR; INTRAVENOUS ONCE
Status: COMPLETED | OUTPATIENT
Start: 2020-12-11 | End: 2020-12-11

## 2020-12-11 RX ORDER — LIDOCAINE HYDROCHLORIDE AND EPINEPHRINE 10; 10 MG/ML; UG/ML
INJECTION, SOLUTION INFILTRATION; PERINEURAL
Status: DISPENSED
Start: 2020-12-11 | End: 2020-12-12

## 2020-12-11 RX ORDER — FENTANYL CITRATE 50 UG/ML
INJECTION, SOLUTION INTRAMUSCULAR; INTRAVENOUS
Status: COMPLETED
Start: 2020-12-11 | End: 2020-12-11

## 2020-12-11 RX ADMIN — FENTANYL CITRATE 50 MCG: 50 INJECTION, SOLUTION INTRAMUSCULAR; INTRAVENOUS at 23:01

## 2020-12-11 RX ADMIN — MORPHINE SULFATE 4 MG: 4 INJECTION, SOLUTION INTRAMUSCULAR; INTRAVENOUS at 20:13

## 2020-12-11 RX ADMIN — FENTANYL CITRATE 50 MCG: 50 INJECTION, SOLUTION INTRAMUSCULAR; INTRAVENOUS at 22:08

## 2020-12-11 RX ADMIN — LEVETIRACETAM 500 MG: 100 INJECTION, SOLUTION INTRAVENOUS at 21:50

## 2020-12-11 RX ADMIN — IOPAMIDOL 80 ML: 755 INJECTION, SOLUTION INTRAVENOUS at 20:18

## 2020-12-11 ASSESSMENT — PAIN DESCRIPTION - FREQUENCY
FREQUENCY: CONTINUOUS
FREQUENCY: CONTINUOUS

## 2020-12-11 ASSESSMENT — ENCOUNTER SYMPTOMS
SORE THROAT: 0
COUGH: 0
BACK PAIN: 1
PHOTOPHOBIA: 0
SINUS PRESSURE: 0
VOMITING: 0
CONSTIPATION: 0
SHORTNESS OF BREATH: 0
EYE PAIN: 0
DIARRHEA: 0
SINUS PAIN: 0
ABDOMINAL PAIN: 0
ABDOMINAL DISTENTION: 0
NAUSEA: 0

## 2020-12-11 ASSESSMENT — PAIN DESCRIPTION - LOCATION
LOCATION: HIP;LEG
LOCATION: HIP;LEG;CHEST

## 2020-12-11 ASSESSMENT — PAIN SCALES - GENERAL
PAINLEVEL_OUTOF10: 5
PAINLEVEL_OUTOF10: 9
PAINLEVEL_OUTOF10: 10
PAINLEVEL_OUTOF10: 10
PAINLEVEL_OUTOF10: 5
PAINLEVEL_OUTOF10: 10
PAINLEVEL_OUTOF10: 8

## 2020-12-11 ASSESSMENT — PAIN DESCRIPTION - DESCRIPTORS
DESCRIPTORS: SHARP
DESCRIPTORS: SHARP;SORE

## 2020-12-11 ASSESSMENT — PAIN DESCRIPTION - ORIENTATION
ORIENTATION: RIGHT
ORIENTATION: RIGHT

## 2020-12-11 ASSESSMENT — PAIN DESCRIPTION - PAIN TYPE
TYPE: ACUTE PAIN
TYPE: ACUTE PAIN

## 2020-12-11 NOTE — ED NOTES
Warm blankets applied at this time, room temperature also increased.       Namrata Horn RN  12/11/20 7364

## 2020-12-12 ENCOUNTER — APPOINTMENT (OUTPATIENT)
Dept: GENERAL RADIOLOGY | Age: 30
DRG: 930 | End: 2020-12-12
Payer: MEDICAID

## 2020-12-12 LAB
ALBUMIN SERPL-MCNC: 4 G/DL (ref 3.5–5.1)
ALP BLD-CCNC: 132 U/L (ref 38–126)
ALT SERPL-CCNC: 103 U/L (ref 11–66)
ANION GAP SERPL CALCULATED.3IONS-SCNC: 13 MEQ/L (ref 8–16)
AST SERPL-CCNC: 120 U/L (ref 5–40)
BILIRUB SERPL-MCNC: 0.8 MG/DL (ref 0.3–1.2)
BUN BLDV-MCNC: 9 MG/DL (ref 7–22)
CALCIUM SERPL-MCNC: 8.7 MG/DL (ref 8.5–10.5)
CHLORIDE BLD-SCNC: 104 MEQ/L (ref 98–111)
CO2: 21 MEQ/L (ref 23–33)
CREAT SERPL-MCNC: 0.7 MG/DL (ref 0.4–1.2)
ERYTHROCYTE [DISTWIDTH] IN BLOOD BY AUTOMATED COUNT: 13.5 % (ref 11.5–14.5)
ERYTHROCYTE [DISTWIDTH] IN BLOOD BY AUTOMATED COUNT: 48.4 FL (ref 35–45)
GFR SERPL CREATININE-BSD FRML MDRD: > 90 ML/MIN/1.73M2
GLUCOSE BLD-MCNC: 103 MG/DL (ref 70–108)
HCT VFR BLD CALC: 34.5 % (ref 42–52)
HCT VFR BLD CALC: 35.9 % (ref 42–52)
HCT VFR BLD CALC: 42.1 % (ref 42–52)
HEMOGLOBIN: 12.4 GM/DL (ref 14–18)
HEMOGLOBIN: 13 GM/DL (ref 14–18)
HEMOGLOBIN: 14 GM/DL (ref 14–18)
MCH RBC QN AUTO: 32.7 PG (ref 26–33)
MCHC RBC AUTO-ENTMCNC: 33.3 GM/DL (ref 32.2–35.5)
MCV RBC AUTO: 98.4 FL (ref 80–94)
MRSA SCREEN RT-PCR: NEGATIVE
PLATELET # BLD: 172 THOU/MM3 (ref 130–400)
PMV BLD AUTO: 9.3 FL (ref 9.4–12.4)
POTASSIUM REFLEX MAGNESIUM: 4 MEQ/L (ref 3.5–5.2)
RBC # BLD: 4.28 MILL/MM3 (ref 4.7–6.1)
REASON FOR REJECTION: NORMAL
REJECTED TEST: NORMAL
SODIUM BLD-SCNC: 138 MEQ/L (ref 135–145)
TOTAL PROTEIN: 6.4 G/DL (ref 6.1–8)
VANCOMYCIN RESISTANT ENTEROCOCCUS: NEGATIVE
WBC # BLD: 11.7 THOU/MM3 (ref 4.8–10.8)

## 2020-12-12 PROCEDURE — 87500 VANOMYCIN DNA AMP PROBE: CPT

## 2020-12-12 PROCEDURE — 87641 MR-STAPH DNA AMP PROBE: CPT

## 2020-12-12 PROCEDURE — 71045 X-RAY EXAM CHEST 1 VIEW: CPT

## 2020-12-12 PROCEDURE — 85014 HEMATOCRIT: CPT

## 2020-12-12 PROCEDURE — 92610 EVALUATE SWALLOWING FUNCTION: CPT

## 2020-12-12 PROCEDURE — 6370000000 HC RX 637 (ALT 250 FOR IP): Performed by: PSYCHIATRY & NEUROLOGY

## 2020-12-12 PROCEDURE — 87086 URINE CULTURE/COLONY COUNT: CPT

## 2020-12-12 PROCEDURE — 85027 COMPLETE CBC AUTOMATED: CPT

## 2020-12-12 PROCEDURE — 85018 HEMOGLOBIN: CPT

## 2020-12-12 PROCEDURE — APPSS60 APP SPLIT SHARED TIME 46-60 MINUTES: Performed by: PHYSICIAN ASSISTANT

## 2020-12-12 PROCEDURE — 6370000000 HC RX 637 (ALT 250 FOR IP): Performed by: PHYSICIAN ASSISTANT

## 2020-12-12 PROCEDURE — 2580000003 HC RX 258: Performed by: PHYSICIAN ASSISTANT

## 2020-12-12 PROCEDURE — 99233 SBSQ HOSP IP/OBS HIGH 50: CPT | Performed by: SURGERY

## 2020-12-12 PROCEDURE — 87081 CULTURE SCREEN ONLY: CPT

## 2020-12-12 PROCEDURE — 2060000000 HC ICU INTERMEDIATE R&B

## 2020-12-12 PROCEDURE — 36415 COLL VENOUS BLD VENIPUNCTURE: CPT

## 2020-12-12 PROCEDURE — 80053 COMPREHEN METABOLIC PANEL: CPT

## 2020-12-12 RX ORDER — HYDROCODONE BITARTRATE AND ACETAMINOPHEN 5; 325 MG/1; MG/1
1 TABLET ORAL EVERY 4 HOURS PRN
Status: DISCONTINUED | OUTPATIENT
Start: 2020-12-12 | End: 2020-12-14

## 2020-12-12 RX ORDER — NICOTINE 21 MG/24HR
1 PATCH, TRANSDERMAL 24 HOURS TRANSDERMAL DAILY
Status: DISCONTINUED | OUTPATIENT
Start: 2020-12-13 | End: 2020-12-12

## 2020-12-12 RX ORDER — CARBAMAZEPINE 200 MG/1
200 TABLET ORAL 2 TIMES DAILY
Status: DISCONTINUED | OUTPATIENT
Start: 2020-12-12 | End: 2020-12-16 | Stop reason: HOSPADM

## 2020-12-12 RX ORDER — LIDOCAINE 4 G/G
1 PATCH TOPICAL DAILY
Status: DISCONTINUED | OUTPATIENT
Start: 2020-12-12 | End: 2020-12-16 | Stop reason: HOSPADM

## 2020-12-12 RX ORDER — FENTANYL CITRATE 50 UG/ML
50 INJECTION, SOLUTION INTRAMUSCULAR; INTRAVENOUS
Status: DISCONTINUED | OUTPATIENT
Start: 2020-12-12 | End: 2020-12-12

## 2020-12-12 RX ORDER — FAMOTIDINE 20 MG/1
20 TABLET, FILM COATED ORAL 2 TIMES DAILY
Status: DISCONTINUED | OUTPATIENT
Start: 2020-12-12 | End: 2020-12-16 | Stop reason: HOSPADM

## 2020-12-12 RX ORDER — SODIUM CHLORIDE 0.9 % (FLUSH) 0.9 %
10 SYRINGE (ML) INJECTION PRN
Status: DISCONTINUED | OUTPATIENT
Start: 2020-12-12 | End: 2020-12-16 | Stop reason: HOSPADM

## 2020-12-12 RX ORDER — PROMETHAZINE HYDROCHLORIDE 25 MG/1
12.5 TABLET ORAL EVERY 6 HOURS PRN
Status: DISCONTINUED | OUTPATIENT
Start: 2020-12-12 | End: 2020-12-16 | Stop reason: HOSPADM

## 2020-12-12 RX ORDER — HYDROCODONE BITARTRATE AND ACETAMINOPHEN 5; 325 MG/1; MG/1
2 TABLET ORAL EVERY 4 HOURS PRN
Status: DISCONTINUED | OUTPATIENT
Start: 2020-12-12 | End: 2020-12-14

## 2020-12-12 RX ORDER — ACETAMINOPHEN 325 MG/1
650 TABLET ORAL EVERY 4 HOURS PRN
Status: DISCONTINUED | OUTPATIENT
Start: 2020-12-12 | End: 2020-12-16 | Stop reason: HOSPADM

## 2020-12-12 RX ORDER — NICOTINE 21 MG/24HR
1 PATCH, TRANSDERMAL 24 HOURS TRANSDERMAL DAILY
Status: DISCONTINUED | OUTPATIENT
Start: 2020-12-12 | End: 2020-12-16 | Stop reason: HOSPADM

## 2020-12-12 RX ORDER — SODIUM CHLORIDE 9 MG/ML
INJECTION, SOLUTION INTRAVENOUS CONTINUOUS
Status: DISCONTINUED | OUTPATIENT
Start: 2020-12-12 | End: 2020-12-14

## 2020-12-12 RX ORDER — CYCLOBENZAPRINE HCL 10 MG
10 TABLET ORAL 2 TIMES DAILY PRN
Status: DISCONTINUED | OUTPATIENT
Start: 2020-12-12 | End: 2020-12-16 | Stop reason: HOSPADM

## 2020-12-12 RX ORDER — POLYETHYLENE GLYCOL 3350 17 G/17G
17 POWDER, FOR SOLUTION ORAL DAILY PRN
Status: DISCONTINUED | OUTPATIENT
Start: 2020-12-12 | End: 2020-12-16 | Stop reason: HOSPADM

## 2020-12-12 RX ORDER — ONDANSETRON 2 MG/ML
4 INJECTION INTRAMUSCULAR; INTRAVENOUS EVERY 6 HOURS PRN
Status: DISCONTINUED | OUTPATIENT
Start: 2020-12-12 | End: 2020-12-16 | Stop reason: HOSPADM

## 2020-12-12 RX ORDER — SODIUM CHLORIDE 0.9 % (FLUSH) 0.9 %
10 SYRINGE (ML) INJECTION EVERY 12 HOURS SCHEDULED
Status: DISCONTINUED | OUTPATIENT
Start: 2020-12-12 | End: 2020-12-16 | Stop reason: HOSPADM

## 2020-12-12 RX ORDER — FENTANYL CITRATE 50 UG/ML
25 INJECTION, SOLUTION INTRAMUSCULAR; INTRAVENOUS
Status: DISCONTINUED | OUTPATIENT
Start: 2020-12-12 | End: 2020-12-12

## 2020-12-12 RX ADMIN — CARBAMAZEPINE 200 MG: 200 TABLET ORAL at 10:06

## 2020-12-12 RX ADMIN — CARBAMAZEPINE 200 MG: 200 TABLET ORAL at 01:50

## 2020-12-12 RX ADMIN — CARBAMAZEPINE 200 MG: 200 TABLET ORAL at 20:27

## 2020-12-12 RX ADMIN — HYDROCODONE BITARTRATE AND ACETAMINOPHEN 2 TABLET: 5; 325 TABLET ORAL at 10:06

## 2020-12-12 RX ADMIN — SODIUM CHLORIDE: 9 INJECTION, SOLUTION INTRAVENOUS at 00:53

## 2020-12-12 RX ADMIN — HYDROCODONE BITARTRATE AND ACETAMINOPHEN 2 TABLET: 5; 325 TABLET ORAL at 00:43

## 2020-12-12 RX ADMIN — SODIUM CHLORIDE: 9 INJECTION, SOLUTION INTRAVENOUS at 17:52

## 2020-12-12 RX ADMIN — FAMOTIDINE 20 MG: 20 TABLET, FILM COATED ORAL at 20:28

## 2020-12-12 RX ADMIN — FAMOTIDINE 20 MG: 20 TABLET, FILM COATED ORAL at 00:43

## 2020-12-12 RX ADMIN — FAMOTIDINE 20 MG: 20 TABLET, FILM COATED ORAL at 10:06

## 2020-12-12 RX ADMIN — HYDROCODONE BITARTRATE AND ACETAMINOPHEN 2 TABLET: 5; 325 TABLET ORAL at 15:30

## 2020-12-12 ASSESSMENT — PAIN DESCRIPTION - FREQUENCY
FREQUENCY: CONTINUOUS
FREQUENCY: CONTINUOUS

## 2020-12-12 ASSESSMENT — PAIN SCALES - GENERAL
PAINLEVEL_OUTOF10: 8
PAINLEVEL_OUTOF10: 8
PAINLEVEL_OUTOF10: 5
PAINLEVEL_OUTOF10: 8
PAINLEVEL_OUTOF10: 10

## 2020-12-12 ASSESSMENT — PAIN DESCRIPTION - LOCATION
LOCATION: PELVIS;CHEST
LOCATION: CHEST;PELVIS
LOCATION: PELVIS

## 2020-12-12 ASSESSMENT — PAIN DESCRIPTION - DESCRIPTORS
DESCRIPTORS: SHARP;SORE
DESCRIPTORS: ACHING

## 2020-12-12 ASSESSMENT — PAIN DESCRIPTION - PROGRESSION
CLINICAL_PROGRESSION: NOT CHANGED
CLINICAL_PROGRESSION: GRADUALLY IMPROVING

## 2020-12-12 ASSESSMENT — PAIN DESCRIPTION - ONSET
ONSET: ON-GOING
ONSET: ON-GOING

## 2020-12-12 ASSESSMENT — PAIN DESCRIPTION - PAIN TYPE
TYPE: ACUTE PAIN

## 2020-12-12 ASSESSMENT — PAIN DESCRIPTION - ORIENTATION
ORIENTATION: MID
ORIENTATION: MID;RIGHT

## 2020-12-12 NOTE — PROGRESS NOTES
[facility]  Daily Progress Note  Pt Name: Kevin Thorne  Medical Record Number: [id number]  Date of Birth 1990   Today's Date: 12/12/2020    HD: # 1    CC: \"Good\"    ASSESSMENT  1.   Active Hospital Problems    Diagnosis Date Noted    Pneumothorax on right [J93.9] 12/11/2020         PLAN  Patient admitted under Trauma Services with Telemetry     Right superior and inferior pubic rami fracture, mildly displaced, with adjacent pelvic hematoma              -Consult Ortho              -Pain control   -Awaiting further recommendations from orthopedic surgery   -Bedrest until seen by orthopedic surgery     Fall from 2 story roof              -Pain control              -Secondary from seizure while on roof     Right sided pneumothorax              - Monitor O2 and respiratory status              - Chest tube in place, no air leak   - Repeat CXR this AM showed moderate right apical pneumo which has increased from prior exam   - Repeat daily CXR while chest tube in place     Liver Laceration, possible renal contusion              -1.5 cm on CT abdomen              - Serial Hgb every 6 hours x 3, hgb 14.0 this AM              - Liver enzymes appear chronically elevated     Fx of left 11th rib              -rib fracture protocol              -pain control              -Lidoderm patches              -Flexeril              -IS, cough and deep breathing     Fx of right sacral Ala              -pain control              -ortho consulted   -Awaiting further recommendations from orthopedic surgery     Leukocytosis              -WBC 19.7 on admission, down to 11.7 this AM              -Likely reactive to trauma              -Repeat labs in AM            Seizure              -Consult Neuro              -Neuro checks, seizure precautions              -Neuro ordered Carbamezapine   -Follow up with neurology outpatient in 1 to 2 months   -No further seizure work-up per neurology as this is a known seizure patient   -No driving, no climbing, no heavy machinery use, no swimming per neurology     History of IV Drug use              -Urine drug screen negative              -Sober x 1 year per patient     Consults: Ortho, Neurology     Pain Management              -Morphine, Norco     Prophylaxis: SCD's, Incentive Spirometry, Gylcolax, Pepcid, Zofran     General diet     IVF Management  Regular Neurovascular Checks  Repeat Labs Tomorrow AM  PT/OT/SLP Eval and Treat  Bedrest     Planned Discharge pending clinical course      SUBJECTIVE  Patient seen on 4K this morning. Patient stated he was doing good. Patient endorsed having pain in his right hip and sacrum. Patient also endorsed having tingling in his right foot. Patient denied any other pain or complaints. Patient denied any headaches, lightheadedness, dizziness, chest pain, shortness of breath, abdominal pain, nausea/vomiting. On exam patient was awake, alert and oriented, no acute distress. PMS intact in all 4 extremities. Strength equal bilaterally. Abrasions noted to the right side of his forehead and right forearm with no bleeding or drainage. Awaiting recommendations from orthopedic surgery regarding sacral and pelvic fractures. Continue to monitor H&H's every 6 hours. Repeat hemoglobin today was 14. Chest tube in place in right side with no air leak noted. Repeat chest x-ray this morning showed moderate right apical pneumo which has increased in size. Continue to monitor. Daily chest x-ray with chest tube in place. Case discussed with trauma surgeon, Dr. Carlos Xavier.     Wt Readings from Last 3 Encounters:   12/12/20 133 lb 11.2 oz (60.6 kg)   11/29/20 140 lb (63.5 kg)   03/24/20 120 lb (54.4 kg)     Temp Readings from Last 3 Encounters:   12/12/20 97.8 °F (36.6 °C) (Oral)   11/29/20 98.1 °F (36.7 °C) (Oral)   03/24/20 97.4 °F (36.3 °C) (Oral)     BP Readings from Last 3 Encounters:   12/12/20 111/69   11/29/20 128/86   03/24/20 107/65     Pulse Readings from Last 3 Encounters:   12/12/20 67   11/29/20 88   03/24/20 101       24 HR INTAKE/OUTPUT :     Intake/Output Summary (Last 24 hours) at 12/12/2020 0759  Last data filed at 12/12/2020 0445  Gross per 24 hour   Intake 685.2 ml   Output 250 ml   Net 435.2 ml     Diet NPO Effective Now    OBJECTIVE  CURRENT VITALS /69   Pulse 67   Temp 97.8 °F (36.6 °C) (Oral)   Resp 18   Ht 5' 7\" (1.702 m)   Wt 133 lb 11.2 oz (60.6 kg)   SpO2 100%   BMI 20.94 kg/m²   GENERAL: Awake, alert and oriented, in no acute distress, on supplemental oxygen via nasal cannula. Pleasant and cooperative with exam  NEURO: Alert and oriented, conversing appropriately, PMS intact, no signs of focal neurological deficits. LUNGS: Lungs are clear to auscultation bilaterally with no wheezes, rales, rhonchi. HEART: normal rate, regular rhythm, normal S1 and S2, no murmurs, no gallops and intact distal pulses  ABDOMEN: soft, non-tender, non-distended, normal bowel sounds, no masses or organomegaly  WOUNDS: forehead and right forearm abrasions healing well, no significant drainage, no significant erythema  EXTREMITY: no cyanosis, clubbing or edema. PMS intact in all 4 extremities. Strength equal bilaterally.       LABS  CBC :   Recent Labs     12/11/20 2010 12/12/20 0419   WBC 19.7* 11.7*   HGB 16.1 14.0   HCT 46.1 42.1   MCV 94.9* 98.4*    172     BMP:   Recent Labs     12/11/20 2010 12/12/20 0419    138   K 4.1 4.0    104   CO2 24 21*   BUN 9 9   CREATININE 0.9 0.7     COAGS:   Recent Labs     12/11/20 2010 12/12/20 0419   PROT 7.6 6.4     Pancreas/HFP:    Recent Labs     12/11/20 2010   LIPASE 53.5*     Recent Labs     12/11/20 2010 12/12/20 0419   * 120*   * 103*   BILITOT 0.6 0.8   ALKPHOS 164* 132*       RADIOLOGY  Narrative   PROCEDURE: XR CHEST PORTABLE       CLINICAL INFORMATION: Pneumothorax        COMPARISON: 12/11/2020       TECHNIQUE:  AP mobile chest single view.         FINDINGS:        There is a large-bore right-sided chest tube present with the tip overlying the lateral right apex. There is a moderate sized right apical pneumothorax. This is increased in size from the prior examination.       The heart size is normal. No pleural effusion is seen. No focal consolidation is demonstrated.           Impression   1. Large bore right-sided chest tube is present with the tip overlying the lateral right apex. There is a moderate size right apical pneumothorax which is increased from prior examination.               **This report has been created using voice recognition software.  It may contain minor errors which are inherent in voice recognition technology. **       Final report electronically signed by Dr. Hermelinda Winn on 12/12/2020 8:59 AM         Electronically signed by Cesar Lim PA-C on 12/12/2020 at 7:59 AM  Patient seen independently in a.m. on 12/12/2020. Hemodynamically stable. Awaiting orthopedic consultation. Hemoglobin slight drop very small possible liver contusion. Pelvic hematoma associated with fractures. Watts darker urine. Chronic elevations liver function test.  Continue to monitor. Await input from consultants. Neurology has seen. Tegretol for seizures. Care coordinated directly with Aubrey Morillo PA-C. All new data reviewed patient examined. Patient has no new complaints.

## 2020-12-12 NOTE — PROGRESS NOTES
[address] THERAPY MISSED TREATMENT NOTE  [facility] ICU STEPDOWN TELEMETRY 4K  4K-20/020-A      Date: 2020  Patient Name: Kevin Thorne        CSN: [id number]   : 1990  (26 y.o.)  Gender: male                REASON FOR MISSED TREATMENT: Hold Treatment per Nursing; patient is still on strict bedrest per orders.

## 2020-12-12 NOTE — ED NOTES
Patient to room 3 from triage following a witnessed fall from a second story roof. 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 Patient has not been taking his seizure medication. Patient then fell to the first story roof and hit his head on the shingles there. Patient states he then fell to the ground and landed on his right leg/hip. Patient reports that bystanders then noted that he lost consciousness and continued to seize. Reported LOC for 15 minutes. Patient is reporting right hip, leg, and chest pain. Patient with abrasions as documented. Dr. Tiara Alcala at bedside for assessment.      Ranulfo Limon, ZENAIDA  12/11/20 0704

## 2020-12-12 NOTE — ED NOTES
ED nurse-to-nurse bedside report    Chief Complaint   Patient presents with    Fall    Head Injury      LOC: alert and orientated to name, place, date  Vital signs   Vitals:    12/11/20 2107 12/11/20 2155 12/11/20 2227 12/11/20 2256   BP: 112/74 111/70 116/83 120/81   Pulse: 87 85 92 110   Resp: 21 18 23 24   Temp:       SpO2: 96% 100% 100% 100%   Weight:       Height:          Pain:    Pain Interventions: Fentanyl, Morphine  Pain Goal: 4  Oxygen: Yes    Current needs required NRB   Telemetry: Yes  LDAs:   Peripheral IV 12/11/20 Right Upper arm (Active)   Site Assessment Clean;Dry; Intact 12/11/20 2007   Line Status Blood return noted; Flushed;Specimen collected;Normal saline locked 12/11/20 2007   Dressing Status Clean;Dry; Intact 12/11/20 2007   Dressing Intervention New 12/11/20 2007     Continuous Infusions:   Mobility: Requires assistance * 2  Guzman Fall Risk Score: No flowsheet data found.   Fall Interventions: Hourly Rounding  Report given to: Anna Paulino RN  12/11/20 0274

## 2020-12-12 NOTE — FLOWSHEET NOTE
12/12/20 0130   Provider Notification   Reason for Communication Evaluate;New orders   Provider Name Lyn Harvey   Provider Notification Advance Practice Clinician (CNS, NP, CNM, CRNA, PA)   Method of Communication Secure Message   Response See orders   Notification Time 0130 0130- Secure message sent for order for urine culture with catheter placement.  And clarification on pain med orders    Orders given for urine culture and to discontinue IV fentanyl orders

## 2020-12-12 NOTE — ED PROVIDER NOTES
[address] [facility] [address] [address] EMERGENCY DEPT    EMERGENCY MEDICINE     Pt Name: Simeon Hills  MRN: [id number]  [place] 1990  Date of evaluation: 12/11/2020  Provider: Herbie Carranza MD, [address]       Chief Complaint   Patient presents with    Fall    Head Injury       HISTORY OF PRESENT ILLNESS    Simeon Hills is a 26 y.o. male who just prior to arrival, was on a roof, and had a seizure. Patient's had a witnessed seizure, and fell onto the roof below, and then onto the ground. Patient was postictal and loss of consciousness for approximately 10 to 15 minutes. When he woke up, he was somewhat confused, and lethargic, and his friend carried him to the car and brought him to the hospital.  Patient's otherwise complains of right hip pain, and has various scrapes on head, and extremities, but does not complain of any headache, nausea or vomiting, vision changes, fever chills, chest pain or shortness of breath. No abdominal pain. Right hip pain is described as throbbing, severe, worse with movement, unable to walk. Triage notes and Nursing notes were reviewed by myself. Any discrepancies are addressed above. PAST MEDICAL HISTORY     Past Medical History:   Diagnosis Date    Hypertension     Seizures ([address]        SURGICAL HISTORY       Past Surgical History:   Procedure Laterality Date    WISDOM TOOTH EXTRACTION         CURRENT MEDICATIONS       Previous Medications    OMEPRAZOLE (PRILOSEC) 20 MG DELAYED RELEASE CAPSULE    Take 1 capsule by mouth every morning (before breakfast)    SUCRALFATE (CARAFATE) 1 GM TABLET    Take 1 tablet by mouth 2 times daily       ALLERGIES     Dilantin [phenytoin sodium extended]    FAMILY HISTORY     History reviewed. No pertinent family history.      SOCIAL HISTORY       Social History     Socioeconomic History    Marital status: Single     Spouse name: None    Number of children: None    Years of education: None    Highest education level: None   Occupational History    None   Social Needs    Financial resource strain: None    Food insecurity     Worry: None     Inability: None    Transportation needs     Medical: None     Non-medical: None   Tobacco Use    Smoking status: Current Every Day Smoker     Types: Cigarettes    Smokeless tobacco: Never Used   Substance and Sexual Activity    Alcohol use: Yes     Alcohol/week: 1.0 standard drinks     Types: 1 Cans of beer per week    Drug use: Yes     Types: Opiates     Sexual activity: None   Lifestyle    Physical activity     Days per week: None     Minutes per session: None    Stress: None   Relationships    Social connections     Talks on phone: None     Gets together: None     Attends Confucianist service: None     Active member of club or organization: None     Attends meetings of clubs or organizations: None     Relationship status: None    Intimate partner violence     Fear of current or ex partner: None     Emotionally abused: None     Physically abused: None     Forced sexual activity: None   Other Topics Concern    None   Social History Narrative    None       REVIEW OF SYSTEMS     Review of Systems    A full 10 point review of systems is otherwise negative except as mentioned in the above HPI    PHYSICAL EXAM    (up to 7 for level 4, 8 or more for level 5)     ED Triage Vitals   BP Temp Temp src Pulse Resp SpO2 Height Weight   12/11/20 1852 12/11/20 1852 -- 12/11/20 1852 12/11/20 1852 12/11/20 1852 12/11/20 1853 12/11/20 1853   130/81 98.4 °F (36.9 °C)  101 22 99 % 5' 7\" (1.702 m) 135 lb (61.2 kg)       Physical Exam  Vitals signs and nursing note reviewed. Constitutional:       General: He is not in acute distress. HENT:      Head:      Comments: Patient has a superficial abrasion to the right side of the forehead, extending into patient's right scalp. No laceration or bleeding. Patient does not have any obvious hematoma.      Right Ear: Tympanic membrane normal.      Left Ear: Tympanic membrane normal.      Nose: Nose normal.      Mouth/Throat:      Mouth: Mucous membranes are moist.      Comments: Laceration to the right lateral tongue  Eyes:      Extraocular Movements: Extraocular movements intact. Pupils: Pupils are equal, round, and reactive to light. Comments: 3 mm and reactive bilaterally   Neck:      Musculoskeletal: Normal range of motion and neck supple. Comments: Nontender, no obvious deformity. Cardiovascular:      Rate and Rhythm: Normal rate and regular rhythm. Pulses: Normal pulses. Pulmonary:      Effort: Pulmonary effort is normal.      Breath sounds: Normal breath sounds. No wheezing or rhonchi. Abdominal:      Palpations: Abdomen is soft. Tenderness: There is no abdominal tenderness. Comments: Nontender, no bruising, FAST exam negative and did not show any intra-abdominal bleeding. Musculoskeletal: Normal range of motion. General: No deformity. Comments: Patient has full range of motion of the shoulders, elbows, wrists, with no pain in the fingers, and full range of motion of the left hip, knee, and ankle. Patient cannot move the right hip, due to pain. Patient has full range of motion of the right knee and right ankle. No obvious deformity or limb the length disparity. Skin:     General: Skin is warm. Capillary Refill: Capillary refill takes less than 2 seconds. Coloration: Skin is not pale. Comments: Patient has abrasion to the right forearm and the right posterior shoulder with no bleeding or laceration. Neurological:      General: No focal deficit present. Mental Status: He is alert and oriented to person, place, and time. Comments: No confusion, alert and oriented, follows all commands, no acute's numbness or tingling or weakness.    Psychiatric:         Mood and Affect: Mood normal.         Behavior: Behavior normal.         DIAGNOSTIC RESULTS     EKG:  All EKG's are interpreted by the Emergency Department Physician who either signs or Co-signs this chart in the absence of a cardiologist.  EKG interpretation will be found in the ED Course    RADIOLOGY: (none if blank)   Interpretation per the Radiologist below, if available at the time of this note:    XR CHEST PORTABLE   Final Result   Interval placement of a right-sided chest tube with its tip overlying the    lateral aspect of the upper lobe. Complete or near complete interval    resolution of the previously seen right-sided pneumothorax. There may be a    tiny residual apical pneumothorax. This document has been electronically signed by: Mary Grace Christianson MD on    12/11/2020 11:44 PM         CT ABDOMEN PELVIS W IV CONTRAST Additional Contrast? Radiologist Recommendation   Final Result   1. Acute fractures of the right sacral ala with extension to involve the    S1, S2 and S3 neural foramina. Acute fractures of the right superior and    inferior pubic rami. Adjacent extraperitoneal hematoma within the pelvis. 2. Possible nondisplaced fracture of the posterior medial aspect of the    left 11th rib. 3. Small focus of liver laceration involving the anterior cortex of the    medial segment extending approximately 1.5 cm in parenchymal depth. Trace    adjacent peritoneal blood products. 4.  Infiltration of fat adjacent to the inferior pole of the right kidney    with trace adjacent blood products. Cannot exclude an otherwise occult    renal contusion. Alternatively, this may have extended from one of the    other documented areas of injury. This document has been electronically signed by: Mary Grace Christianson MD on    12/11/2020 09:50 PM      All CT scans at this facility use dose modulation, iterative    reconstruction, and/or weight-based   dosing when appropriate to reduce radiation dose to as low as reasonably    achievable. CT CERVICAL SPINE WO CONTRAST   Final Result   1. No evidence of cervical spine fracture.    2. Partial visualization of a right-sided pneumothorax. Please see    dedicated chest CT report. This document has been electronically signed by: Talia Denton MD on    12/11/2020 09:37 PM      All CT scans at this facility use dose modulation, iterative    reconstruction, and/or weight-based   dosing when appropriate to reduce radiation dose to as low as reasonably    achievable. CT CHEST W CONTRAST   Final Result   1. Small right-sided pneumothorax estimated at 15-20%. 2. Groundglass opacity within the posterior aspect of the right lower lobe    may be secondary to atelectasis or contusion. 3. Possible nondisplaced fracture of the posterior medial aspect of the    left 11th rib. This document has been electronically signed by: Talia Denton MD on    12/11/2020 09:33 PM      All CT scans at this facility use dose modulation, iterative    reconstruction, and/or weight-based   dosing when appropriate to reduce radiation dose to as low as reasonably    achievable. CT HEAD WO CONTRAST   Final Result   1. No acute intracranial findings      This document has been electronically signed by: Talia Denton MD on    12/11/2020 09:26 PM      All CT scans at this facility use dose modulation, iterative    reconstruction, and/or weight-based   dosing when appropriate to reduce radiation dose to as low as reasonably    achievable. CT LUMBAR RECONSTRUCTION WO POST PROCESS   Final Result   No evidence of lumbar spine fracture. Acute fracture of the right sacral    ala with extension to involve the S1-S3 neural foramina and the right S1    facet. Adjacent extraperitoneal hematoma.   Possible nondisplaced fracture    of the left 11th rib      This document has been electronically signed by: Talia Denton MD on    12/11/2020 09:52 PM      All CT scans at this facility use dose modulation, iterative    reconstruction, and/or weight-based   dosing when appropriate to reduce radiation dose to as low as reasonably    achievable. CT THORACIC RECONSTRUCTION WO POST PROCESS   Final Result   No evidence of thoracic spine fracture. Possible nondisplaced fracture of    the posterior medial aspect of the left 11th rib. This document has been electronically signed by: Kwasi Curry MD on    12/11/2020 09:53 PM      All CT scans at this facility use dose modulation, iterative    reconstruction, and/or weight-based   dosing when appropriate to reduce radiation dose to as low as reasonably    achievable. XR CHEST PORTABLE   Final Result   No acute abnormality identified. **This report has been created using voice recognition software. It may contain minor errors which are inherent in voice recognition technology. **      Final report electronically signed by Dr. Renan Whlean MD on 12/11/2020 7:30 PM      XR PELVIS (1-2 VIEWS)   Final Result    Mildly displaced superior pubic ramus fracture on the right. **This report has been created using voice recognition software. It may contain minor errors which are inherent in voice recognition technology. **      Final report electronically signed by Dr. Renan Whelan MD on 12/11/2020 7:31 PM          LABS:  Labs Reviewed   CBC WITH AUTO DIFFERENTIAL - Abnormal; Notable for the following components:       Result Value    WBC 19.7 (*)     MCV 94.9 (*)     MCH 33.1 (*)     RDW-SD 45.9 (*)     MPV 8.9 (*)     Segs Absolute 17.0 (*)     Immature Grans (Abs) 0.25 (*)     All other components within normal limits   COMPREHENSIVE METABOLIC PANEL W/ REFLEX TO MG FOR LOW K - Abnormal; Notable for the following components:     (*)     Alkaline Phosphatase 164 (*)      (*)     All other components within normal limits   LIPASE - Abnormal; Notable for the following components:    Lipase 53.5 (*)     All other components within normal limits   MICROSCOPIC URINALYSIS - Abnormal; Notable for the following components: Bilirubin Urine SMALL (*)     Ketones, Urine 15 (*)     Specific Gravity, UA >1.030 (*)     Blood, Urine LARGE (*)     Protein, UA >= 300 (*)     Leukocytes, UA TRACE (*)     Color, UA DK YELLOW (*)     Character, Urine CLOUDY (*)     All other components within normal limits   ETHANOL   URINE DRUG SCREEN   GLOMERULAR FILTRATION RATE, ESTIMATED   OSMOLALITY   ICTOTEST, URINE   ANION GAP   TYPE AND SCREEN       All other labs were within normal range or not returned as of this dictation. Please note, any cultures that may have been sent were not resulted at the time of this patient visit. EMERGENCY DEPARTMENT COURSE andMedical Decision Making:     MDM/  ED Course as of Dec 12 0018   Fri Dec 11, 2020   7631 Patient was evaluated by the trauma team, as well as by me. Patient has abrasion to the right forehead, abrasion to the right forearm and the right posterior shoulder, and has right hip pain. Pan scan, no neurological deficits, had a seizure prior to falling, laceration to the right side of tongue as well. Patient had loss of consciousness. Patient does not drive, and cannot drive until cleared by neurologist.  Will have patient follow-up with neurology, due to his seizure that he had today, last seizure being 3 years ago. Is not on any medications at this time for seizure    [AL]   1918 Patient has normal FAST exam, hemodynamically stable. Alert and oriented, normal neurological examination. [AL]   1918 Tetanus up-to-date    [AL]   1936 X-ray pelvis shows Mildly displaced superior pubic ramus fracture on the right    [AL]   1943 Spoke with trauma team.  Patient most likely will need to be admitted, with Ortho consult. [AL]   2147 Patient CT scan of the chest did show a small pneumothorax, putting patient on nonrebreather at this time, 15 L, I do not believe that pneumothorax is big enough at this time to warrant a chest tube, and may self resolve.   We will consult with trauma team to decide if intervention is needed. No acute distress at this time, satting well on room air prior to this finding     [AL]   2147 CT chest showed 1. Small right-sided pneumothorax estimated at 15-20%. 2. Groundglass opacity within the posterior aspect of the right lower lobe   may be secondary to atelectasis or contusion. 3. Possible nondisplaced fracture of the posterior medial aspect of the   left 11th rib. [AL]   2156 Spoke with trauma PAdio about CT findings, liver laceration, fractures of sacral Ala, S1-s3, pneumothorax. [AL]   2159 Trauma team is okay with conservatively managing patient pneumothorax at this time, will get repeat imaging while patient is upstairs, to make sure that it is not expanding.    [AL]   2159 No tension pneumothorax    [AL]   2204 Upon my evaluation, this patient had a high probability of imminent or life-threatening deterioration due to pneumothorax, trauma, multiple fractures, intraabdominal bleed, which required my direct attention, intervention, and personal management. I have personally provided 40 minutes of critical care time exclusive of time spent on separately billable procedures. Time includes review of laboratory data, radiology results, discussion with consultants, and managing and monitoring for potential decompensation. Interventions were performed as documented above. [AL]   1313 Trauma surgery called and would like patient to have pigtail catheter for pneumothorax, which she will place here in the emergency department. [AL]   2218 Chest tube was placed, no complication.     [AL]      ED Course User Index  [AL] Daniela Frost MD       Strict returnprecautions and follow up instructions were discussed with the patient with which the patient agrees    ED Medications administered this visit:    Medications   morphine (PF) injection 2 mg ( Intravenous See Alternative 12/11/20 2013)     Or   morphine injection 4 mg (4 mg Intravenous Given 12/11/20 2013) iopamidol (ISOVUE-370) 76 % injection 80 mL (has no administration in time range)   lidocaine-EPINEPHrine 1 percent-1:[id number] injection (has no administration in time range)   iopamidol (ISOVUE-370) 76 % injection 80 mL (80 mLs Intravenous Given 12/11/20 2018)   levETIRAcetam (KEPPRA) 500 mg in sodium chloride 0.9 % 100 mL IVPB (0 mg Intravenous Stopped 12/11/20 2216)   fentaNYL (SUBLIMAZE) injection 50 mcg (50 mcg Intravenous Given 12/11/20 2208)   fentaNYL (SUBLIMAZE) injection 50 mcg (50 mcg Intravenous Given 12/11/20 2301)         Chest Tube    Date/Time: 12/11/2020 11:08 PM  Performed by: Diana Garnett MD  Authorized by: Diana Garnett MD     Consent:     Consent obtained:  Verbal    Consent given by:  Patient    Risks discussed:  Bleeding, incomplete drainage, pain, infection, nerve damage and damage to surrounding structures    Alternatives discussed:  No treatment  Pre-procedure details:     Skin preparation:  ChloraPrep  Sedation:     Sedation type: Anxiolysis  Anesthesia (see MAR for exact dosages): Anesthesia method:  Local infiltration    Local anesthetic:  Lidocaine 1% w/o epi  Procedure details:     Placement location:  R lateral    Scalpel size:  10    Tube size (Fr):  24    Dissection instrument:  Finger, Martha clamp and scissors    Ultrasound guidance: no      Tension pneumothorax: no      Tube connected to:  Suction    Drainage characteristics:  Air only    Suture material:  0 silk    Dressing:  4x4 sterile gauze, petrolatum-impregnated gauze and Xeroform gauze  Post-procedure details:     Post-insertion x-ray findings: tube in good position      Patient tolerance of procedure: Tolerated well, no immediate complications    : (None if blank)       CLINICAL       1. Traumatic pneumothorax, initial encounter    2.  Closed fracture of multiple rami of right pubis, initial encounter ([facility] [address]    3. Closed fracture of sacrum, unspecified portion of sacrum, initial encounter ([facility] [address]          DISPOSITION/PLAN DISPOSITION Admitted 12/11/2020 11:25:21 PM      PATIENT REFERRED TO:  No follow-up provider specified.     DISCHARGE MEDICATIONS:  New Prescriptions    No medications on file              (Please note that portions of this note were completed with a voice recognition program.  Efforts were made to edit the dictations but occasionallywords are mis-transcribed.)      Sung Smith MD,FACEP (electronically signed)  Attending Physician, Emergency Department         Sung Smith MD  12/12/20 [id number]

## 2020-12-12 NOTE — PROGRESS NOTES
SBIRT screening completed per trauma protocol. SBIRT Findings are Negative. Patient denies alcohol and/or drug use. Also denies depressive symptoms. Brief Intervention and Referral to Treatment Summary N/A    Patient was provided PHQ-9, AUDIT and DAST Screening:      PHQ-9 Score:  Negative  AUDIT Score:  Negative  DAST Score:   Negative    Patients substance use is considered-Negative    Low Risk/Healthy  Moderate Risk  Harmful  Dependent    Patients depression is considered:-Negative    Minimal  Mild   Moderate  Moderately Severe  Severe    Brief Education Was Provided N/A    Patient was receptive  Patient was not receptive      Brief Intervention Is Provided (Only for AUDIT or DAST) N/A    Patient reports readiness to decrease and/or stop use and a plan was discussed   Patient denies readiness to decrease and/or stop use and a plan was not discussed      Recommendations/Referrals for Brief and/or Specialized Treatment Provided to Patient  N/A    Clinician would like to note pt reportedly is diagnosed with anxiety, prescribed vistaril by his psychiatrist,Dr. Chema Tang.

## 2020-12-12 NOTE — ED NOTES
ED to inpatient nurses report    Chief Complaint   Patient presents with    Fall    Head Injury      Present to ED from home  LOC: alert and orientated to name, place, date  Vital signs   Vitals:    12/11/20 1904 12/11/20 2004 12/11/20 2107 12/11/20 2155   BP: 120/86 (!) 125/99 112/74 111/70   Pulse: 104 101 87 85   Resp: 18 22 21 18   Temp:       SpO2: 96% 97% 96% 100%   Weight:       Height:          Oxygen Baseline Room Air 96%    Current needs required NRB due to trauma Bipap/Cpap No  LDAs:   Peripheral IV 12/11/20 Right Upper arm (Active)   Site Assessment Clean;Dry; Intact 12/11/20 2007   Line Status Blood return noted; Flushed;Specimen collected;Normal saline locked 12/11/20 2007   Dressing Status Clean;Dry; Intact 12/11/20 2007   Dressing Intervention New 12/11/20 2007     Mobility: Requires assistance * 2  Pending ED orders: None  Present condition: Stable      Electronically signed by Artemus Osgood, RN on 12/11/2020 at 9:58 PM       Artemus Osgood, [address] [place] [address]  12/11/20 [id number]

## 2020-12-12 NOTE — PROGRESS NOTES
[address] ICU STEPDOWN TELEMETRY 4K  Bedside Swallowing Evaluation      SLP Individual Minutes  Time In: 1639  Time Out: 3949  Minutes: 12  Timed Code Treatment Minutes: 0 Minutes       Date: 2020  Patient Name: Sandeep Jolly      CSN: [id number]   : 1990  (26 y.o.)  Gender: male   Referring Physician:  Yisel Boucher PA-C  Diagnosis: Pneumothorax  Secondary Diagnosis: Concerns related to swallowing    History of Present Illness/Injury: Patient admitted to [facility] with above diagnosis; see physician H&P for full report. Per chart review, \"Patient is a 66-year-old male who presented to the ED per private vehicle as a level II trauma activation for a fall from two-story roof. Patient was alert and oriented x3, with patent airway and in no respiratory distress on presentation. Hemodynamically stable. He endorses pain to his right hip and right buttocks area as well as an abrasion to his right scalp. Patient past medical history significant for seizures and IV drug use. Patient states that while in the roof he began to feel a seizure coming on at which point he remembers nothing until woken up on the ground. Witnesses report patient began to have a seizure on the roof and fell off, hitting the first story roof then his right side at impact on the ground. Patient had a loss of consciousness for 15 minutes following fall. Patient notes he had a period of confusion after awakening that resolved shortly after return of consciousness. \" ST consulted to evaluate patient swallowing function and determine appropriate POC as medically warranted. Past Medical History:   Diagnosis Date    Hypertension     Seizures ([facility] [address]        SUBJECTIVE:  Patient seen with RN approval to complete PO trials this date despite NPO status. Patient alert and cooperative throughout evaluation. Patient with family member present during evaluation.     OBJECTIVE:    Pain:  2/10 - Pain location: Patient reported generalized pain with CareBoard indicating most recent pain medication administered at 4:45AM    Current Diet: NPO    Respiratory Status:  Independent    Behavioral Observation:  Alert    Oral Mechanism Evaluation:      Facial / Labial WFL    Lingual WFL    Dentition Impaired Sparse natural dentition   Velum Not Tested    Vocal Quality WFL    Sensation [facility]    Cough WFL      Patient Evaluated Using:  Ice chips, thin via cup and straw, puree, puree with ramila cracker, ramila cracker    Oral Phase:  WFL    Pharyngeal Phase: WFL:  Pharyngeal phase appears WFL but cannot rule out pharyngeal phase deficits from a bedside swallowing evaluation alone. Signs and Symptoms of Laryngeal Penetration/Aspiration: No signs/symptoms of aspiration evident in this evaluation, but cannot rule out silent aspiration. Impresssions: Patient presents with swallowing function grossly intact as evidenced by evaluation results outlined above. Patient completed PO trials of thin liquid via cup and straw, puree, puree with ramila cracker, and ramila cracker. Patient oral phase with all PO trials characterized by good bolus control and formation with adequate AP transit; no oral residuals to follow. Pharyngeal phase with adequate hyolaryngeal elevation and excursion evidenced upon manual palpation. NO overt s/s of aspiration present during evaluation, however, silent aspiration cannot be ruled out at bedside alone. Instrumental evaluation not warranted at this time due to overall success with PO trials. Recommend regular diet with thin liquids with diet monitor x1-2 with meal tray to determine patient safely consuming recommended diet level. Addtionally, patient cognitive status to be monitored and eptoca-[id number] evaluation to be completed as appropriate. Patient and RN informed of ST recommendation and in agreement with plan.      RECOMMENDATIONS/ASSESSMENT:   Modified Barium Swallow:  MBS is not

## 2020-12-12 NOTE — ED NOTES
Patient returns to ED at this time. Provider in to update patient on plan to admit.       Ban Mcqueen, RN  12/11/20 2036

## 2020-12-12 NOTE — CONSULTS
NEUROLOGY CONSULT NOTE      Requesting Physician: Ledy Mayer MD    Reason for Consult:  Evaluate for seizure    History of Present Illness:  Liliane Goodwin is a 26 y.o. male admitted to [address] [place] [address] on 12/11/2020.       26year old man with seizure since 13years old, last seizure was 3 years ago, pt was on a roof today, he suddenly felt the aura that a seizure is about to come and he went into a seizure and fell, he now has a right pneumothorax. Pt used to take tegetrol 200mg bID for seizure, but stopped taking it for a long time as he believes his seizure likely went away. He is not seeing any neurologist now. Reports No shortness of breath with exertion. Reports no neck pain. No vision changes. No dysphagia. No fever. No rash. No weight loss. Past Medical History:        Diagnosis Date    Hypertension     Seizures ([facility] [address]            Procedure Laterality Date    WISDOM TOOTH EXTRACTION         Allergies: Allergies   Allergen Reactions    Dilantin [Phenytoin Sodium Extended] Hives        Current Medications:   morphine (PF) injection 2 mg, Q2H PRN    Or  morphine injection 4 mg, Q2H PRN  iopamidol (ISOVUE-370) 76 % injection 80 mL, ONCE PRN  levETIRAcetam (KEPPRA) 500 mg in sodium chloride 0.9 % 100 mL IVPB, Q12H         Social History:  Social History     Tobacco Use   Smoking Status Current Every Day Smoker    Types: Cigarettes   Smokeless Tobacco Never Used     Social History     Substance and Sexual Activity   Alcohol Use Yes    Alcohol/week: 1.0 standard drinks    Types: 1 Cans of beer per week     Social History     Substance and Sexual Activity   Drug Use Yes    Types: Opiates      Single    Family History:   History reviewed. No pertinent family history. Review of Systems:  All systems reviewed and are all negative except what is mentioned in history of present illness. I reviewed the patient PMH,medications, family history, social history.     Physical Exam:  BP 111/70   Pulse 85   Temp 98.4 °F (36.9 °C)   Resp 18   Ht 5' 7\" (1.702 m)   Wt 135 lb (61.2 kg)   SpO2 100%   BMI 21.14 kg/m²  I Body mass index is 21.14 kg/m². I   Wt Readings from Last 1 Encounters:   12/11/20 135 lb (61.2 kg)           General appearance - alert, in no distress, oriented to  person, place, and time and weight  Mental status -Level of Alertness: awake  Orientation: person, place, time  Memory: normal  Fund of Knowledge: normal  Attention/Concentration: normal  Language: normal. Mood is normal  Neck - supple, no neck adenopathy, carotids upstroke normal bilaterally, no carotid bruits. There is no LAP in the neck. There is no thyroid enlargement. Neurological -   Cranial Gnpcfn-VU-WIR:   Cranial nerve II: Normal. There is full visual fields  Cranial nerve III: Pupils: equal, round, reactive to light   Cranial nerves III, IV, VI: Extraocular Movements: intact   Cranial nerve V: Facial sensation: intact   Cranial nerve VII:Facial strength: intact   Cranial nerve VIII: Hearing: intact  Cranial nerve IX: Palate Elevation intact bilaterally  Cranial nerve XI: Shoulder shrug intact bilaterally  Cranial nerve XII: Tongue midline   neck supple without rigidity    Motor exam is 5/5 in the upper and lower extremities . Normal muscle tone . There is no muscle atrophy. There is no muscle fasciculation  Sensation normal  Coordination: finger to nose intact  Gait and station not tested    Abnormal movement none. Long tracts are intact  Skin - no rashes or lesions, warm and dry to touch  Superficial temporal artery pulses are normal.   Musculoskeletal: Has no hand arthritis, no limitation of ROM in any of the four extremities, . no joint tenderness, deformity or swelling. There is no leg edema. The Heart was regular in rate and rhythm. No heart murmur  Chest- Clear  Abdomen: soft, intact bowel sounds.         Labs:    CBC:   Recent Labs     12/11/20 2010   WBC 19.7*   HGB 16.1      MCV 94.9* MCH 33.1*   MCHC 34.9     CMP:  Recent Labs     12/11/20 2010         CO2 24   BUN 9   CREATININE 0.9   LABGLOM >90   GLUCOSE 90   CALCIUM 9.7     Liver:   Recent Labs     12/11/20 2010   *   *   ALKPHOS 164*   PROT 7.6   LABALBU 4.7   BILITOT 0.6     MRI BRAIN:  No results found for this or any previous visit. No results found for this or any previous visit. No results found for this or any previous visit. No results found for this or any previous visit. No results found for this or any previous visit. No results found for this or any previous visit. No results found for this or any previous visit. Results for orders placed during the hospital encounter of 12/11/20   CT HEAD WO CONTRAST    Narrative CT head without contrast    Comparison: None    Findings:  No intracranial mass, midline shift, hydrocephalus, or acute hemorrhage. No significant atrophy-like change or white matter disease. The visualized paranasal sinuses and mastoid air cells are normal.  The orbits are unremarkable. There is no acute fracture. Impression 1. No acute intracranial findings    This document has been electronically signed by: Shaq Crane MD on   12/11/2020 09:26 PM    All CT scans at this facility use dose modulation, iterative   reconstruction, and/or weight-based  dosing when appropriate to reduce radiation dose to as low as reasonably   achievable. We reviewed the patient records and available information in the EHR       Impression:    26year old man with breakthrough seizure and not being on seizure meds, now also with pneumothorax.     Recommendations:    - will place him back carbamazepine 200mg BID, it was his seizure med that worked for him before he took it off himself  - he will need to see me in the clinic in 1 - 2 months to see if adjustment is needed  - no further seizure workup, this is a known seizure patient  - no driving, no climbing, no heavy machine use, no swimming                                            1. Discussed with primary service. 2. Please call if any questions. Time spent was at least 46 min of time evaluating patient, discussion with staff,  planning for treatment and evaluation. The time was spent examining patient, reviewing the images personally, reviewing the chart, perform high complexity decision making and speaking with the nursing staff regarding recommendations.      Electronically signed by Gene Dodson MD on 12/11/2020 at 9:58 PM    Nery Taylor MD  Attending Neurologist/Neurointensivist

## 2020-12-12 NOTE — ED NOTES
Pt is transported to [facility] without incident. Floor nurse was contacted prior to departure.

## 2020-12-12 NOTE — H&P
Trauma H&P    Patient:  Virgilio Orlando  Admit date: 12/11/2020   YOB: 1990 Date of Evaluation: 12/11/2020  MRN: [id number]  Acct: [de-identified]    Injury Date: 12/11/20  Injury time: 17:30  PCP: No primary care provider on file.    Referring physician: Dr. Michael Yee MD  Time of Trauma Surgeon Notification:  18:52  Time of COLLEEN Arrival: 18:56  Time of Trauma Surgeon Arrival:  19:24    Assessment:      Right superior pubic ramius, mildly displaced    Fall from 2 story roof    Right sided Pneumothorax    Liver Laceration    Fx of left 11th rib    Fx of right sacral ala    Leukocytosis    Seizure    History of IV drug use      Plan:    Patient admitted under Trauma Services with Telemetry    Right superior pubic ramius, mildly displaced   -Consult Ortho   -Pain control    Fall from 2 story roof   -Pain control   -Secondary from seizure while on roof    Right sided pneumothorax   -Monitor O2 and respiratory status, repeat CXR in am   -Chest tube placed in the OR    Liver Laceration   -1.5 cm on CT abdomen   -serial HMG every 6 hours x 3   -recheck liver panel in am    Fx of left 11th rib   -rib fracture protocol   -pain control   -Lidoderm patches   -Flexeril   -IS, cough and deep breathing    Fx of right sacral Ala   -pain control   -ortho consulted    Leukocytosis   -WBC 19.7 on admission   -Likely reactive to trauma   -repeat labs in AM     Seizure   -Consult Neuro   -Neuro checks, seizure precautions   -Seizure Prophylaxis with Keppra    History of IV Drug use   -Urine drug screen negative   -Sober x 1 year per patient    Consults: Ortho, Neurology    Pain Management   -Fentynal, Norco    Prophylaxis: SCD's, Incentive Spirometry, Colace, Pepcid, Zofran    NPO    IVF Management  Regular Neurovascular Checks  Repeat Labs Tomorrow AM  PT/OT/SLP Eval and Treat  Bedrest    Planned Discharge pending clinical course       Activation: []Level I (Trauma Alert) [x]Level II (Injury Call) []Level III (Trauma liver laceration approximately 1.5 cm in parenchymal depth trace sedation peritoneal blood and infiltration of fat adjacent to inferior pole of the right kidney indicating a possible occult renal contusion, and mass-effect on the prostate and urinary bladder by adjacent extraperitoneal hematoma. CT of the head and neck were negative, thoracic and lumbar spine were negative for fractures. CBC shows Leukocytosis with WBC at 19.7 and hemoglobin stable at 16.1, elevated liver enzymes with AST at 202 and ALT at 135. Chest tube was placed at bedside in the ED. Patient notes that he has been non-compliant with his seizure medication, states he thought he \"got over them\". He is not currently following with a neurologist.    Care in coordination with Dr. Lizette Perez MD    Review of Systems:   Review of Systems   Constitutional: Negative for activity change and fever. HENT: Negative for congestion, ear pain, sinus pressure, sinus pain and sore throat. Eyes: Negative for photophobia and pain. Respiratory: Negative for cough and shortness of breath. Cardiovascular: Negative for chest pain and palpitations. Gastrointestinal: Negative for abdominal distention, abdominal pain, constipation, diarrhea, nausea and vomiting. Genitourinary: Negative for difficulty urinating, dysuria, frequency and hematuria. Musculoskeletal: Positive for back pain. Negative for neck pain and neck stiffness. Right hip pain   Skin: Positive for wound. Negative for pallor. Abrasion to right forehead, right shoulder   Neurological: Positive for seizures. Negative for dizziness, syncope, weakness, light-headedness, numbness and headaches. LOC   Psychiatric/Behavioral: Negative for agitation and confusion. The patient is not nervous/anxious.       Dilantin [phenytoin sodium extended]  Past Surgical History:   Procedure Laterality Date    WISDOM TOOTH EXTRACTION       Past Medical History:   Diagnosis Date    Hypertension     Seizures ([facility] [address]      Past Surgical History:   Procedure Laterality Date    WISDOM TOOTH EXTRACTION       Social History     Socioeconomic History    Marital status: Single     Spouse name: None    Number of children: None    Years of education: None    Highest education level: None   Occupational History    None   Social Needs    Financial resource strain: None    Food insecurity     Worry: None     Inability: None    Transportation needs     Medical: None     Non-medical: None   Tobacco Use    Smoking status: Current Every Day Smoker     Types: Cigarettes    Smokeless tobacco: Never Used   Substance and Sexual Activity    Alcohol use: Yes     Alcohol/week: 1.0 standard drinks     Types: 1 Cans of beer per week    Drug use: Yes     Types: Opiates     Sexual activity: None   Lifestyle    Physical activity     Days per week: None     Minutes per session: None    Stress: None   Relationships    Social connections     Talks on phone: None     Gets together: None     Attends [employer] service: None     Active member of club or organization: None     Attends meetings of clubs or organizations: None     Relationship status: None    Intimate partner violence     Fear of current or ex partner: None     Emotionally abused: None     Physically abused: None     Forced sexual activity: None   Other Topics Concern    None   Social History Narrative    None     History reviewed. No pertinent family history.     Home medications:    Previous Medications    OMEPRAZOLE (PRILOSEC) 20 MG DELAYED RELEASE CAPSULE    Take 1 capsule by mouth every morning (before breakfast)    SUCRALFATE (CARAFATE) 1 GM TABLET    Take 1 tablet by mouth 2 times daily       Hospital medications:  Scheduled Meds:  Continuous Infusions:  PRN Meds:  Objective   ED TRIAGE VITALS  BP: 120/86, Temp: 98.4 °F (36.9 °C), Pulse: 104, Resp: 18, SpO2: 96 %  [place] Coma Scale  Eye Opening: Spontaneous  Best Verbal Response: Oriented  Best Motor Response: Obeys commands  Raul Coma Scale Score: 15  No results found for this visit on 12/11/20. Physical Exam:  Patient Vitals for the past 24 hrs:   BP Temp Pulse Resp SpO2 Height Weight   12/11/20 1904 120/86 -- 104 18 96 % -- --   12/11/20 1853 -- -- -- -- -- 5' 7\" (1.702 m) 135 lb (61.2 kg)   12/11/20 1852 130/81 98.4 °F (36.9 °C) 101 22 99 % -- --     Primary Assessment:  Airway: Patent, trachea midline  Breathing: Breath sounds present and equal bilaterally, spontaneous, and unlabored  Circulation: Hemodynamically stable, 2+ cental and peripheral pulses. Disability: MCKAY x 4, following commands. GCS =15    Secondary Assessment:  General: Alert, NAD. Head: Normocephalic, mid face stable, Tympanic membranes intact, Nares patent bilaterally, no epistaxis. Mouth clear of foreign bodies, Tongue with bite laceration on left with swelling, small amount of oozing blood, teeth intact, abrasion to right head. Eyes: PERRLA, EOMI, Nontraumatic  Neurologic: A & O x3. Following commands. CN 2-12 intact  Neck: trachea midline. Cervical spines NTTP midline, without step-offs, crepitus or deformity. Back:T spines are TTP, no step offs or crepitus, L spines NTTP midline, without step-offs, crepitus or deformity. Abrasion on right shoulder  Lungs: Clear to auscultation bilaterally. Chest Wall: Chest rise symmetrical.  Chest wall without tenderness to palpation. No crepitus, deformities, lacerations, or abrasions. Heart: RRR. Normal S1/S2. No obvious M/G/R. Abdomen:  Soft, NTTP. No guarding. Non-peritoneal. FAST negative  Pelvis:  NTTP, stable to compression. Femoral pulses 2+. GI/: No blood at the urinary meatus. No gross hematuria. Extremities: No gross deformities. PMS intact. Radial /DP/PT pulses 2+ bilaterally. Skin: Skin warm and dry. Normal for ethnicity.       Radiology:     CT ABDOMEN PELVIS W IV CONTRAST Additional Contrast? Radiologist Recommendation    (Results Pending)   CT CERVICAL SPINE WO CONTRAST    (Results Pending)   CT CHEST W CONTRAST    (Results Pending)   CT HEAD WO CONTRAST    (Results Pending)   CT LUMBAR RECONSTRUCTION WO POST PROCESS    (Results Pending)   CT THORACIC RECONSTRUCTION WO POST PROCESS    (Results Pending)   XR CHEST PORTABLE    (Results Pending)   XR PELVIS INLET OUTLET    (Results Pending)     Fast Exam: Yes. Negative for fluid in all four quadrants performed by me under qualified supervision physician. Electronically signed by Eros Garcia PA-C on 12/11/2020 at 7:18 PM     Patient seen and evaluated in the emergency department in conjunction with Laura Pabon PA-C. Arrived hemodynamically stable 90 minutes after a fall from roof. Patient had witnessed seizure. Known seizure disorder not taking medications. Complaining of right hip pain and multiple abrasions. Question brief loss of consciousness versus post ictal.  Initial chest x-ray no evidence of pneumothorax or visualized rib fractures. Superior pubic ramus fracture on plain pelvis film. Subsequent CT scans multiple injuries as above. On chest CT fair amount of air anterior right chest with pneumothorax. Small available chest tube placed without complication. Admit for pain control. Neurology to see recommends Tegretol for seizure disorder. Orthopedics to evaluate bedrest until evaluated by orthopedics. Continue chest tube to suction for 24 to 48 hours. No evidence of air leak after chest tube placed. Greater than 90 minutes spent in direct patient care.

## 2020-12-12 NOTE — ED NOTES
IV access established per ultrasound guided procedure. Patient providing urine sample then to be transported to CT.      Artemus Osgood, RN  12/11/20 2010

## 2020-12-12 NOTE — ED NOTES
Keppra started as documented. Patient placed on NRB due to finding of pneumothorax. Provider updated on new CT results. Patient and significant other reminded of patient's NPO status pending radiology results.      Vega Plummer RN  12/11/20 6376

## 2020-12-13 ENCOUNTER — APPOINTMENT (OUTPATIENT)
Dept: GENERAL RADIOLOGY | Age: 30
DRG: 930 | End: 2020-12-13
Payer: MEDICAID

## 2020-12-13 LAB
ALBUMIN SERPL-MCNC: 3.3 G/DL (ref 3.5–5.1)
ALP BLD-CCNC: 104 U/L (ref 38–126)
ALT SERPL-CCNC: 68 U/L (ref 11–66)
ANION GAP SERPL CALCULATED.3IONS-SCNC: 9 MEQ/L (ref 8–16)
AST SERPL-CCNC: 62 U/L (ref 5–40)
BILIRUB SERPL-MCNC: 0.5 MG/DL (ref 0.3–1.2)
BUN BLDV-MCNC: 8 MG/DL (ref 7–22)
CALCIUM SERPL-MCNC: 7.7 MG/DL (ref 8.5–10.5)
CHLORIDE BLD-SCNC: 108 MEQ/L (ref 98–111)
CO2: 21 MEQ/L (ref 23–33)
CREAT SERPL-MCNC: 0.6 MG/DL (ref 0.4–1.2)
ERYTHROCYTE [DISTWIDTH] IN BLOOD BY AUTOMATED COUNT: 13.5 % (ref 11.5–14.5)
ERYTHROCYTE [DISTWIDTH] IN BLOOD BY AUTOMATED COUNT: 49.7 FL (ref 35–45)
GFR SERPL CREATININE-BSD FRML MDRD: > 90 ML/MIN/1.73M2
GLUCOSE BLD-MCNC: 92 MG/DL (ref 70–108)
HCT VFR BLD CALC: 38.9 % (ref 42–52)
HEMOGLOBIN: 12.9 GM/DL (ref 14–18)
MCH RBC QN AUTO: 33.3 PG (ref 26–33)
MCHC RBC AUTO-ENTMCNC: 33.2 GM/DL (ref 32.2–35.5)
MCV RBC AUTO: 100.5 FL (ref 80–94)
PLATELET # BLD: 143 THOU/MM3 (ref 130–400)
PMV BLD AUTO: 9.4 FL (ref 9.4–12.4)
POTASSIUM SERPL-SCNC: 3.9 MEQ/L (ref 3.5–5.2)
RBC # BLD: 3.87 MILL/MM3 (ref 4.7–6.1)
SODIUM BLD-SCNC: 138 MEQ/L (ref 135–145)
TOTAL PROTEIN: 5.3 G/DL (ref 6.1–8)
WBC # BLD: 9.3 THOU/MM3 (ref 4.8–10.8)

## 2020-12-13 PROCEDURE — 99232 SBSQ HOSP IP/OBS MODERATE 35: CPT | Performed by: PSYCHIATRY & NEUROLOGY

## 2020-12-13 PROCEDURE — 94761 N-INVAS EAR/PLS OXIMETRY MLT: CPT

## 2020-12-13 PROCEDURE — 36415 COLL VENOUS BLD VENIPUNCTURE: CPT

## 2020-12-13 PROCEDURE — 94799 UNLISTED PULMONARY SVC/PX: CPT

## 2020-12-13 PROCEDURE — 6370000000 HC RX 637 (ALT 250 FOR IP): Performed by: PHYSICIAN ASSISTANT

## 2020-12-13 PROCEDURE — 2580000003 HC RX 258: Performed by: PHYSICIAN ASSISTANT

## 2020-12-13 PROCEDURE — 2060000000 HC ICU INTERMEDIATE R&B

## 2020-12-13 PROCEDURE — APPSS60 APP SPLIT SHARED TIME 46-60 MINUTES: Performed by: PHYSICIAN ASSISTANT

## 2020-12-13 PROCEDURE — 71045 X-RAY EXAM CHEST 1 VIEW: CPT

## 2020-12-13 PROCEDURE — 85027 COMPLETE CBC AUTOMATED: CPT

## 2020-12-13 PROCEDURE — 99232 SBSQ HOSP IP/OBS MODERATE 35: CPT | Performed by: SURGERY

## 2020-12-13 PROCEDURE — 6370000000 HC RX 637 (ALT 250 FOR IP): Performed by: PSYCHIATRY & NEUROLOGY

## 2020-12-13 PROCEDURE — 80053 COMPREHEN METABOLIC PANEL: CPT

## 2020-12-13 RX ORDER — DOCUSATE SODIUM 100 MG/1
100 CAPSULE, LIQUID FILLED ORAL DAILY
Status: DISCONTINUED | OUTPATIENT
Start: 2020-12-13 | End: 2020-12-14

## 2020-12-13 RX ADMIN — HYDROCODONE BITARTRATE AND ACETAMINOPHEN 2 TABLET: 5; 325 TABLET ORAL at 19:40

## 2020-12-13 RX ADMIN — HYDROCODONE BITARTRATE AND ACETAMINOPHEN 2 TABLET: 5; 325 TABLET ORAL at 00:12

## 2020-12-13 RX ADMIN — CARBAMAZEPINE 200 MG: 200 TABLET ORAL at 19:40

## 2020-12-13 RX ADMIN — HYDROCODONE BITARTRATE AND ACETAMINOPHEN 2 TABLET: 5; 325 TABLET ORAL at 14:35

## 2020-12-13 RX ADMIN — CYCLOBENZAPRINE 10 MG: 10 TABLET, FILM COATED ORAL at 08:42

## 2020-12-13 RX ADMIN — DOCUSATE SODIUM 100 MG: 100 CAPSULE, LIQUID FILLED ORAL at 14:35

## 2020-12-13 RX ADMIN — HYDROCODONE BITARTRATE AND ACETAMINOPHEN 2 TABLET: 5; 325 TABLET ORAL at 08:42

## 2020-12-13 RX ADMIN — CARBAMAZEPINE 200 MG: 200 TABLET ORAL at 08:42

## 2020-12-13 RX ADMIN — FAMOTIDINE 20 MG: 20 TABLET, FILM COATED ORAL at 08:42

## 2020-12-13 RX ADMIN — SODIUM CHLORIDE: 9 INJECTION, SOLUTION INTRAVENOUS at 01:34

## 2020-12-13 RX ADMIN — FAMOTIDINE 20 MG: 20 TABLET, FILM COATED ORAL at 19:40

## 2020-12-13 ASSESSMENT — PAIN DESCRIPTION - LOCATION
LOCATION: CHEST

## 2020-12-13 ASSESSMENT — PAIN DESCRIPTION - ORIENTATION
ORIENTATION: RIGHT

## 2020-12-13 ASSESSMENT — PAIN DESCRIPTION - DESCRIPTORS
DESCRIPTORS: SHARP
DESCRIPTORS: SHARP

## 2020-12-13 ASSESSMENT — PAIN SCALES - GENERAL
PAINLEVEL_OUTOF10: 8
PAINLEVEL_OUTOF10: 8
PAINLEVEL_OUTOF10: 7
PAINLEVEL_OUTOF10: 3
PAINLEVEL_OUTOF10: 7
PAINLEVEL_OUTOF10: 8

## 2020-12-13 ASSESSMENT — PAIN DESCRIPTION - PAIN TYPE
TYPE: ACUTE PAIN

## 2020-12-13 NOTE — PROGRESS NOTES
[address] THERAPY MISSED TREATMENT NOTE  [facility] ICU STEPDOWN TELEMETRY 4K  4K-20/020-A      Date: 2020  Patient Name: Benjamin Bolden        CSN: [id number]   : 1990  (26 y.o.)  Gender: male                REASON FOR MISSED TREATMENT: Patient continues to be on strict bed rest orders. OT to evaluate patient when permitted for out of bed activities.

## 2020-12-13 NOTE — PROGRESS NOTES
[facility] RESPIRATORY ASSESSMENT PROGRAM (RAP)  30 kg and over      Patient Name: Gregor Price Room#: 4K-20/020-A : 1990     Admitting diagnosis:      ASSESSMENT     Vitals  Pulse: 83   Resp: 12  BP: 122/80  SpO2: 98 %  Temp: 98.1 °F (36.7 °C)  Breath Sounds:clear         Inspiratory Capacity:   Preoperative/predictive value: 3000 ml   33% of value: 990 ml      75% of value: 2250 ml   10 ml/kg of IBW: 661 ml   Patient's Actual Inspiratory Capacity: 3000 ml    CARE PLAN  All Care Plan selections must be based on the approved algorithms located on line in the Policy and Procedures under Respiratory Assessment Adult Protocol Handbook    INDICATIONS FOR THERAPY BASED ON HISTORY AND ASSESSMENT    Bronchial Hygiene Goal: Improvement in sputum mobilization in patients with ineffective airway clearance. Reverse the atelectasis. No indications  Volume Expansion Goal: Prevent atelectasis, Absence or improvement in signs of atelectasis, improve respiratory muscle performance   Presence of pulmonary atelectasis or conditions predisposing to the development of pulmonary atelectasis. Example: Upper/lower abdominal surgery, thoracic surgery, surgery in COPD patient, prolonged bed rest, lack of pain control, presence of thoracic or abdominal binders. Inhaled Medications Goal: Improve respiratory functions in patients with airway disease and decrease WOB  Albuterol Indications   No indications. Ipratropium Bromide Indications   No indications  Oxygenation Goal: Reverse hypoxemia and improve tissue oxygenation. (See oxygen protocol order)   No indications    THERAPIES SELECTED BASED ON ALGORITHMS    Bronchial Hygiene   No therapy recommended  Volume expansion   Incentive spirometry  Inhaled Medication   No therapy recommended   Oxygenation   No therapy recommended    BRONCHODILATOR ASSESSMENT SCORE      ASSESSMENT OUTCOMES     Bronchial Hygiene    Not assessed. Volume Expansion   Rib. Fx.  And chest tube Inhaled Medication   Not assessed.      Oxygenation   SpO2 greater than or equal to physician's ordered SpO2 goal.    Action Plan Based on Assessment:

## 2020-12-13 NOTE — PROGRESS NOTES
Respiratory Care is following the rib fracture order set. Patient's position when testing was done was semi fowlers. A Negative Inspiratory Force (NIF) was performed with patient achieving a NIF of >-40 cm H2O. The NIF was greater than 25 cm H2O. A Forced Vital Capacity (FVC) was obtained with patient achieving an FVC of 3.51 liters. The patient's calculated ideal body weight, (IBW) is  66.1 kg. 0.020 liters/kg of the patient's IBW is 1.32 liters. The patient's FVC was greater than 0.020 liters/kg of IBW. Based on the spirometry measurement alone, patient does not meet ICU admission criteria. This was the first screening. Physician was not called regarding spirometry measurement.

## 2020-12-13 NOTE — PROGRESS NOTES
Silvina Conrad 58  PHYSICAL THERAPY MISSED TREATMENT NOTE  [facility] ICU STEPDOWN TELEMETRY 4K    Date: 2020  Patient Name: Jaden Galicia        MRN: [id number]   : 1990  (26 y.o.)  Gender: male                REASON FOR MISSED TREATMENT:  Hold treatment per nursing request.  Still on strict bedrest orders. Will perform PT assessment once permitted for out of bed activities.

## 2020-12-13 NOTE — PROGRESS NOTES
[]Quadriplegic or bed rest, unable to position self  Pulmonary Function Test   [x]None available   []Normal   []Obstructive  []Restrictive   []Diffusion defect        LAB  Hematology:   Lab Results   Component Value Date    WBC 9.3 12/13/2020    RBC 3.87 12/13/2020    HGB 12.9 12/13/2020    HCT 38.9 12/13/2020     12/13/2020     Chemistry:   No results found for: PH, PO2, PCO2, HCO3, BE, O2SAT  Blood Culture:   Sputum Culture:     Home pulmonary medications: none  Home Bipap or CPAP No  Pulmonary Diagnosis none

## 2020-12-13 NOTE — CONSULTS
Orthopedic Consult    Requesting Physician: Kaaren Hodgkin PA-C    CHIEF COMPLAINT:  Right sided Pelvis pain    HISTORY OF PRESENT ILLNESS:      The patient is a 26 y.o. male  who presents with right sided pelvis pain after sustaining a traumatic injury on 12/11/2020. Patient has a history of epilepsy and states he was on a roof that day and actually had an epileptic episode while on the roof. This episode caused him to fall off of one tier of the roof and onto the lower tier and eventually off of the lower tier and to the ground. He does not recall the fall and only remembers waking up while on the ground with increased right hip/pelvis pain. He was brought to [address] [place] [address] ED and further work-up showed a mildly displaced inferior and superior pubic rami fractures on xray and CT scan showed a mildly comminuted minimally displaced right sacral ala fracture. Patient was admitted under trauma services for further monitoring. We were asked to evaluate secondary to the above mentioned fractures.     Past Medical History:    Past Medical History:   Diagnosis Date    Hypertension     Seizures ([facility] [address]        Past Surgical History:    Past Surgical History:   Procedure Laterality Date    WISDOM TOOTH EXTRACTION         Medications Prior to Admission:   Current Facility-Administered Medications   Medication Dose Route Frequency Provider Last Rate Last Admin    sodium chloride flush 0.9 % injection 10 mL  10 mL Intravenous 2 times per day Paula Wynne PA-C        sodium chloride flush 0.9 % injection 10 mL  10 mL Intravenous PRN Paula Wynne PA-C        acetaminophen (TYLENOL) tablet 650 mg  650 mg Oral Q4H PRN Paulangoc Wynne PA-C        polyethylene glycol (GLYCOLAX) packet 17 g  17 g Oral Daily PRN KEELY Sales-C        promethazine (PHENERGAN) tablet 12.5 mg  12.5 mg Oral Q6H PRN PaulaKEELY Brito-LUPE        Or    ondansetron (ZOFRAN) injection 4 mg  4 mg Intravenous Q6H PRN PaulaKEELY Brito-C        0.9 % diabetes  Heme: Negative for DVT   Neuro: Positive for seizure  Ortho:  Positive for arthralgia, joint swelling, pelvis pain    PHYSICAL EXAM:  Patient Vitals for the past 24 hrs:   BP Temp Temp src Pulse Resp SpO2 Height Weight   12/12/20 2316 109/68 98.6 °F (37 °C) Oral 82 18 96 %     12/12/20 2025 106/64 97.5 °F (36.4 °C) Oral 79 18 97 %     12/12/20 1547 132/69 98.3 °F (36.8 °C) Oral 82 12 99 %     12/12/20 1123 (!) 101/59 98.4 °F (36.9 °C) Oral 72 18 99 %     12/12/20 0445 111/69 97.8 °F (36.6 °C) Oral 67 18 100 %     12/12/20 0023 126/75 97.9 °F (36.6 °C) Oral 77 22 100 % 5' 7\" (1.702 m) 133 lb 11.2 oz (60.6 kg)     Gen: alert and oriented x3  Head: normorcephalic  Abdomen: soft  Pelvis: stable  Bilateral lower extremities:  Log roll of bilateral hips does not elicit tenderness however flexion of the right hip causes mild discomfort right side pelvis. Non-tender throughout palpation of bilateral femurs, knees, calves, ankles, feet, and toes. No obvious deformities noted. Patient able to easily move bilateral ankle/toes without pain. Neurovascular status equal and intact in bilateral extremities. Dorsalis pedal and posterior tibialis pulses strong and regular bilaterally. No signs of neurodeficits bilaterally. DTRs intact bilateral lower extremities. Capillary refill brisk and intact. Intact sensation to light touch proximal to distal and bilaterally. DATA:  CBC:   Lab Results   Component Value Date    WBC 11.7 12/12/2020    HGB 12.4 12/12/2020     12/12/2020     BMP:    Lab Results   Component Value Date     12/12/2020    K 4.0 12/12/2020     12/12/2020    CO2 21 12/12/2020    BUN 9 12/12/2020    CREATININE 0.7 12/12/2020    CALCIUM 8.7 12/12/2020    GLUCOSE 103 12/12/2020     PT/INR:  No results found for: PROTIME, INR  Troponin:  No results found for: Kristina Georgia    Radiology: Xray Pelvis:  Mildly displaced inferior and superior pubic rami fractures on the right.   CT ABD/PELVIS:    1. Acute fractures of the right sacral ala with extension to involve the    S1, S2 and S3 neural foramina. Acute fractures of the right superior and    inferior pubic rami.  Adjacent extraperitoneal hematoma within the pelvis. 2. Possible nondisplaced fracture of the posterior medial aspect of the    left 11th rib. 3. Small focus of liver laceration involving the anterior cortex of the    medial segment extending approximately 1.5 cm in parenchymal depth. Trace    adjacent peritoneal blood products. 4.  Infiltration of fat adjacent to the inferior pole of the right kidney    with trace adjacent blood products.  Cannot exclude an otherwise occult    renal contusion.  Alternatively, this may have extended from one of the    other documented areas of injury. ASSESSMENT:  1)  Inferior and Superior Pubic Rami Fractures  2)  Right Sacral Ala fracture     PLAN:  As discussed with Dr Deepak Paz, ortho attending surgeon, plan at this time is to keep the patient NWB to right lower extremity, WBAT left lower extremity. Continue with routine neurovascular checks to both lower extremities and notify orthopaedics if any changes in his neuro status. PT/OT to evaluate and treat. Patient can follow-up with Dr Graciela Schwartz at [address] [place] [address] in 2-3 weeks, please call to make an appointment. This case was discussed in full detail with Dr Deepak Paz and he agrees with the above noted plan at this time. Sandi King PA-C    Attending Supervising Physician's Attestation Statement  I performed a history and physical examination on the patient and discussed the management with the physician assistant. I reviewed and agree with the findings and plan as documented in his note. Exam:  RLE: Skin intact. Soft compartments. 2+ DP pulse. TA/EHL/FHL/GS motor intact. DP/SP/T/S/Saph SILT with paresthesia in S1 distribution. Plan:  NWB RLE. PT/OT. Pain control and med management per primary.   OK to DC when medically appropriate. F/u [facility] Dr. Esther Pollard in 2 weeks.       Electronically signed by Dada Jeffries DO on 12/13/20 at 12:35 PM EST

## 2020-12-14 ENCOUNTER — APPOINTMENT (OUTPATIENT)
Dept: CT IMAGING | Age: 30
DRG: 930 | End: 2020-12-14
Payer: MEDICAID

## 2020-12-14 ENCOUNTER — APPOINTMENT (OUTPATIENT)
Dept: GENERAL RADIOLOGY | Age: 30
DRG: 930 | End: 2020-12-14
Payer: MEDICAID

## 2020-12-14 LAB
ANION GAP SERPL CALCULATED.3IONS-SCNC: 13 MEQ/L (ref 8–16)
BASOPHILS # BLD: 0.6 %
BASOPHILS ABSOLUTE: 0.1 THOU/MM3 (ref 0–0.1)
BUN BLDV-MCNC: 6 MG/DL (ref 7–22)
CALCIUM SERPL-MCNC: 8.6 MG/DL (ref 8.5–10.5)
CHLORIDE BLD-SCNC: 107 MEQ/L (ref 98–111)
CO2: 21 MEQ/L (ref 23–33)
CREAT SERPL-MCNC: 0.6 MG/DL (ref 0.4–1.2)
EOSINOPHIL # BLD: 1.4 %
EOSINOPHILS ABSOLUTE: 0.2 THOU/MM3 (ref 0–0.4)
ERYTHROCYTE [DISTWIDTH] IN BLOOD BY AUTOMATED COUNT: 13.4 % (ref 11.5–14.5)
ERYTHROCYTE [DISTWIDTH] IN BLOOD BY AUTOMATED COUNT: 44.4 FL (ref 35–45)
GFR SERPL CREATININE-BSD FRML MDRD: > 90 ML/MIN/1.73M2
GLUCOSE BLD-MCNC: 100 MG/DL (ref 70–108)
HCT VFR BLD CALC: 36.4 % (ref 42–52)
HCT VFR BLD CALC: 37 % (ref 42–52)
HEMOGLOBIN: 13.4 GM/DL (ref 14–18)
HEMOGLOBIN: 13.5 GM/DL (ref 14–18)
IMMATURE GRANS (ABS): 0.25 THOU/MM3 (ref 0–0.07)
IMMATURE GRANULOCYTES: 2.3 %
LYMPHOCYTES # BLD: 12.5 %
LYMPHOCYTES ABSOLUTE: 1.4 THOU/MM3 (ref 1–4.8)
MCH RBC QN AUTO: 33.2 PG (ref 26–33)
MCHC RBC AUTO-ENTMCNC: 36.5 GM/DL (ref 32.2–35.5)
MCV RBC AUTO: 90.9 FL (ref 80–94)
MONOCYTES # BLD: 6.3 %
MONOCYTES ABSOLUTE: 0.7 THOU/MM3 (ref 0.4–1.3)
MRSA SCREEN: NORMAL
NUCLEATED RED BLOOD CELLS: 1 /100 WBC
PLATELET # BLD: 112 THOU/MM3 (ref 130–400)
PMV BLD AUTO: 10 FL (ref 9.4–12.4)
POTASSIUM SERPL-SCNC: 5.7 MEQ/L (ref 3.5–5.2)
RBC # BLD: 4.07 MILL/MM3 (ref 4.7–6.1)
REASON FOR REJECTION: NORMAL
REASON FOR REJECTION: NORMAL
REJECTED TEST: NORMAL
REJECTED TEST: NORMAL
SEG NEUTROPHILS: 76.9 %
SEGMENTED NEUTROPHILS ABSOLUTE COUNT: 8.4 THOU/MM3 (ref 1.8–7.7)
SODIUM BLD-SCNC: 141 MEQ/L (ref 135–145)
URINE CULTURE, ROUTINE: NORMAL
WBC # BLD: 10.9 THOU/MM3 (ref 4.8–10.8)

## 2020-12-14 PROCEDURE — 71045 X-RAY EXAM CHEST 1 VIEW: CPT

## 2020-12-14 PROCEDURE — 97162 PT EVAL MOD COMPLEX 30 MIN: CPT

## 2020-12-14 PROCEDURE — 92523 SPEECH SOUND LANG COMPREHEN: CPT

## 2020-12-14 PROCEDURE — 6370000000 HC RX 637 (ALT 250 FOR IP): Performed by: NURSE PRACTITIONER

## 2020-12-14 PROCEDURE — 6370000000 HC RX 637 (ALT 250 FOR IP): Performed by: PHYSICIAN ASSISTANT

## 2020-12-14 PROCEDURE — 94010 BREATHING CAPACITY TEST: CPT

## 2020-12-14 PROCEDURE — 97110 THERAPEUTIC EXERCISES: CPT

## 2020-12-14 PROCEDURE — 85018 HEMOGLOBIN: CPT

## 2020-12-14 PROCEDURE — 85025 COMPLETE CBC W/AUTO DIFF WBC: CPT

## 2020-12-14 PROCEDURE — C1729 CATH, DRAINAGE: HCPCS

## 2020-12-14 PROCEDURE — APPSS60 APP SPLIT SHARED TIME 46-60 MINUTES: Performed by: NURSE PRACTITIONER

## 2020-12-14 PROCEDURE — 6370000000 HC RX 637 (ALT 250 FOR IP): Performed by: PSYCHIATRY & NEUROLOGY

## 2020-12-14 PROCEDURE — 97166 OT EVAL MOD COMPLEX 45 MIN: CPT

## 2020-12-14 PROCEDURE — 77012 CT SCAN FOR NEEDLE BIOPSY: CPT

## 2020-12-14 PROCEDURE — C1769 GUIDE WIRE: HCPCS

## 2020-12-14 PROCEDURE — 36415 COLL VENOUS BLD VENIPUNCTURE: CPT

## 2020-12-14 PROCEDURE — 94799 UNLISTED PULMONARY SVC/PX: CPT

## 2020-12-14 PROCEDURE — 6360000002 HC RX W HCPCS: Performed by: RADIOLOGY

## 2020-12-14 PROCEDURE — 97530 THERAPEUTIC ACTIVITIES: CPT

## 2020-12-14 PROCEDURE — C1894 INTRO/SHEATH, NON-LASER: HCPCS

## 2020-12-14 PROCEDURE — 6360000002 HC RX W HCPCS

## 2020-12-14 PROCEDURE — 80048 BASIC METABOLIC PNL TOTAL CA: CPT

## 2020-12-14 PROCEDURE — 2060000000 HC ICU INTERMEDIATE R&B

## 2020-12-14 PROCEDURE — 2580000003 HC RX 258: Performed by: PHYSICIAN ASSISTANT

## 2020-12-14 PROCEDURE — 32551 INSERTION OF CHEST TUBE: CPT

## 2020-12-14 PROCEDURE — 2709999900 HC NON-CHARGEABLE SUPPLY

## 2020-12-14 PROCEDURE — 85014 HEMATOCRIT: CPT

## 2020-12-14 PROCEDURE — 99232 SBSQ HOSP IP/OBS MODERATE 35: CPT | Performed by: SURGERY

## 2020-12-14 PROCEDURE — 97535 SELF CARE MNGMENT TRAINING: CPT

## 2020-12-14 RX ORDER — FENTANYL CITRATE 50 UG/ML
50 INJECTION, SOLUTION INTRAMUSCULAR; INTRAVENOUS ONCE
Status: COMPLETED | OUTPATIENT
Start: 2020-12-14 | End: 2020-12-14

## 2020-12-14 RX ORDER — SENNA PLUS 8.6 MG/1
2 TABLET ORAL NIGHTLY
Status: DISCONTINUED | OUTPATIENT
Start: 2020-12-14 | End: 2020-12-16 | Stop reason: HOSPADM

## 2020-12-14 RX ORDER — OXYCODONE HYDROCHLORIDE AND ACETAMINOPHEN 5; 325 MG/1; MG/1
1 TABLET ORAL EVERY 4 HOURS PRN
Status: DISCONTINUED | OUTPATIENT
Start: 2020-12-14 | End: 2020-12-16 | Stop reason: HOSPADM

## 2020-12-14 RX ORDER — OXYCODONE HYDROCHLORIDE AND ACETAMINOPHEN 5; 325 MG/1; MG/1
2 TABLET ORAL EVERY 4 HOURS PRN
Status: DISCONTINUED | OUTPATIENT
Start: 2020-12-14 | End: 2020-12-16 | Stop reason: HOSPADM

## 2020-12-14 RX ORDER — DOCUSATE SODIUM 100 MG/1
100 CAPSULE, LIQUID FILLED ORAL 2 TIMES DAILY
Status: DISCONTINUED | OUTPATIENT
Start: 2020-12-14 | End: 2020-12-16 | Stop reason: HOSPADM

## 2020-12-14 RX ORDER — POLYETHYLENE GLYCOL 3350 17 G/17G
17 POWDER, FOR SOLUTION ORAL DAILY
Status: DISCONTINUED | OUTPATIENT
Start: 2020-12-14 | End: 2020-12-16 | Stop reason: HOSPADM

## 2020-12-14 RX ADMIN — FAMOTIDINE 20 MG: 20 TABLET, FILM COATED ORAL at 09:02

## 2020-12-14 RX ADMIN — HYDROCODONE BITARTRATE AND ACETAMINOPHEN 2 TABLET: 5; 325 TABLET ORAL at 06:25

## 2020-12-14 RX ADMIN — OXYCODONE HYDROCHLORIDE AND ACETAMINOPHEN 2 TABLET: 5; 325 TABLET ORAL at 18:16

## 2020-12-14 RX ADMIN — Medication 10 ML: at 20:48

## 2020-12-14 RX ADMIN — FENTANYL CITRATE 50 MCG: 50 INJECTION, SOLUTION INTRAMUSCULAR; INTRAVENOUS at 13:51

## 2020-12-14 RX ADMIN — NALOXEGOL OXALATE 12.5 MG: 12.5 TABLET, FILM COATED ORAL at 14:26

## 2020-12-14 RX ADMIN — CARBAMAZEPINE 200 MG: 200 TABLET ORAL at 20:48

## 2020-12-14 RX ADMIN — DOCUSATE SODIUM 100 MG: 100 CAPSULE, LIQUID FILLED ORAL at 09:02

## 2020-12-14 RX ADMIN — CYCLOBENZAPRINE 10 MG: 10 TABLET, FILM COATED ORAL at 09:02

## 2020-12-14 RX ADMIN — FAMOTIDINE 20 MG: 20 TABLET, FILM COATED ORAL at 20:50

## 2020-12-14 RX ADMIN — OXYCODONE HYDROCHLORIDE AND ACETAMINOPHEN 2 TABLET: 5; 325 TABLET ORAL at 11:05

## 2020-12-14 RX ADMIN — SODIUM CHLORIDE: 9 INJECTION, SOLUTION INTRAVENOUS at 06:26

## 2020-12-14 RX ADMIN — POLYETHYLENE GLYCOL 3350 17 G: 17 POWDER, FOR SOLUTION ORAL at 11:59

## 2020-12-14 RX ADMIN — DOCUSATE SODIUM 100 MG: 100 CAPSULE, LIQUID FILLED ORAL at 20:50

## 2020-12-14 RX ADMIN — CYCLOBENZAPRINE 10 MG: 10 TABLET, FILM COATED ORAL at 20:50

## 2020-12-14 RX ADMIN — STANDARDIZED SENNA CONCENTRATE 17.2 MG: 8.6 TABLET ORAL at 20:48

## 2020-12-14 RX ADMIN — CARBAMAZEPINE 200 MG: 200 TABLET ORAL at 09:02

## 2020-12-14 ASSESSMENT — PAIN SCALES - GENERAL
PAINLEVEL_OUTOF10: 10
PAINLEVEL_OUTOF10: 8
PAINLEVEL_OUTOF10: 4
PAINLEVEL_OUTOF10: 7
PAINLEVEL_OUTOF10: 8

## 2020-12-14 ASSESSMENT — PAIN DESCRIPTION - PAIN TYPE
TYPE: ACUTE PAIN

## 2020-12-14 ASSESSMENT — PAIN DESCRIPTION - ONSET: ONSET: ON-GOING

## 2020-12-14 ASSESSMENT — PAIN DESCRIPTION - ORIENTATION
ORIENTATION: RIGHT;LEFT
ORIENTATION: RIGHT

## 2020-12-14 ASSESSMENT — PAIN DESCRIPTION - FREQUENCY: FREQUENCY: CONTINUOUS

## 2020-12-14 ASSESSMENT — PAIN DESCRIPTION - DESCRIPTORS
DESCRIPTORS: SHARP
DESCRIPTORS: SHARP

## 2020-12-14 ASSESSMENT — PAIN - FUNCTIONAL ASSESSMENT: PAIN_FUNCTIONAL_ASSESSMENT: ACTIVITIES ARE NOT PREVENTED

## 2020-12-14 ASSESSMENT — PAIN DESCRIPTION - PROGRESSION: CLINICAL_PROGRESSION: GRADUALLY WORSENING

## 2020-12-14 ASSESSMENT — PAIN DESCRIPTION - LOCATION
LOCATION: LEG
LOCATION: HIP;CHEST
LOCATION: HIP;BUTTOCKS
LOCATION: CHEST

## 2020-12-14 NOTE — H&P
Formulation and discussion of sedation / procedure plans, risks, benefits, side effects and alternatives with patient and/or responsible adult completed.     Electronically signed by Jered Boggs MD on 12/14/2020 at 2:18 PM

## 2020-12-14 NOTE — OP NOTE
Department of Radiology  Post Procedure Progress Note      Pre-Procedure Diagnosis:  Right pneumothorax    Procedure Performed:  CT guided chest tube placement    Anesthesia: local and fentanyl    Findings: successful    Immediate Complications:  None    Estimated Blood Loss: minimal    SEE DICTATED PROCEDURE NOTE FOR COMPLETE DETAILS.     Electronically signed by Anai Sims MD on 12/14/2020 at 2:19 PM

## 2020-12-14 NOTE — PROGRESS NOTES
Patient Currently in Pain: Yes  Pain Assessment: 0-10  Pain Level: 8  Pain Location: Hip; Buttocks  Pain Orientation: Right    Social/Functional History:  Lives With: Significant other(girlfriend and her mother)  Type of Home: House  Home Layout: Two level, Bed/Bath upstairs  Home Access: Stairs to enter with rails  Entrance Stairs - Number of Steps: full flight of stairs to bedroom/bathroom  Home Equipment: Rolling walker, 4 wheeled walker   Bathroom Shower/Tub: Tub/Shower unit  Bathroom Toilet: Standard       ADL Assistance: [address]: Independent  Homemaking Responsibilities: Yes  Ambulation Assistance: Independent  Transfer Assistance: Independent    Active : Yes     Additional Comments: SO works during the day, but there will always be someone at the house when SO is at work. VISION:WFL    HEARING:  WFL    COGNITION: Slow Processing--monitor cog, slow to respond & easily agitated    RANGE OF MOTION:  Bilateral Upper Extremity:  WFL    STRENGTH:  Bilateral Upper Extremity:  MOHSEN d/t rib fx and pain with movement    ADL:   Lower Extremity Dressing: Moderate Assistance. dependent for socks and Ro for pants. (donned pants while in bed then stood to complete remainder of task    BALANCE:  Sitting Balance:  Supervision. Standing Balance: Contact Guard Assistance. BED MOBILITY:  Supine to Sit: Minimal Assistance    Scooting: Minimal Assistance      TRANSFERS:  Sit to Stand:  Contact Guard Assistance. Stand to Sit: Contact Guard Assistance. FUNCTIONAL MOBILITY:  Assistive Device: Rolling Walker  Assist Level:  Contact Guard Assistance. Distance: EOB to recliner       Activity Tolerance:  Patient tolerance of  treatment: good. Educated on progression of therapy and monitoring for concussion. Pt with agitation and slow processing noted at times during session. Educated on decreasing stimuli. Assessment:    Pt s/p admission for pneumothorax.  Pt exhibiting deficits detailed below, requiring additional OT intervention to restore independent PLOF. Pt now requiring assist for ADLs, IADLs, mobility, t/fs, and standing balance. Pt with significant increase in burden of care. Without skilled OT intervention pt at risk for functional decline, increased falls, and readmission to hospital.    Performance deficits / Impairments: Decreased functional mobility , Decreased balance, Decreased ADL status, Decreased endurance, Decreased strength  Prognosis: Good  REQUIRES OT FOLLOW UP: Yes    Treatment Initiated: Treatment and education initiated within context of evaluation. Evaluation time included review of current medical information, gathering information related to past medical, social and functional history, completion of standardized testing, formal and informal observation of tasks, assessment of data and development of plan of care and goals. Treatment time included skilled education and facilitation of tasks to increase safety and independence with ADL's for improved functional independence and quality of life. Discharge Recommendations:  Continue to assess pending progress    Patient Education:  OT Education: OT Role, Plan of Care, Precautions, ADL Adaptive Strategies, Transfer Training, Family Education    Equipment Recommendations: Other: BSC, walker, continue to assess needs    Plan:  Times per week: 6x  Current Treatment Recommendations: Strengthening, Balance Training, Functional Mobility Training, Endurance Training, Self-Care / ADL, Patient/Caregiver Education & Training, Safety Education & Training, Equipment Evaluation, Education, & procurement, Home Management Training. See long-term goal time frame for expected duration of plan of care. If no long-term goals established, a short length of stay is anticipated.     Goals:     Short term goals  Time Frame for Short term goals: By Discharge  Short term goal 1: Pt will complete functional mobility to/from [facility] and

## 2020-12-14 NOTE — PROGRESS NOTES
WNL per nurse, Milad, and pt, no difficulty consuming current diet at this time. RECOMMENDATIONS/ASSESSMENT:  DIAGNOSTIC IMPRESSIONS: Pt demonstrated evidence of a mild cognitive-communication deficit characterized by decreased short term recall and math computation. Pt with initial errors during 2/3 executive functioning tasks, however, independently identified and corrected errors. Pt does report he has noticed intermittent deficits with memory possibly correlated to seizures. It is recommended pt continue to received skilled ST services to address aforementioned cognitive deficits in order for pt to safely d/c to least restrictive environment and return to work. Rehabilitation Potential: good    EDUCATION:  Learner: Patient  Education:  Reviewed results and recommendations of this evaluation, Reviewed ST goals and Plan of Care and Reviewed recommendations for follow-up  Evaluation of Education: Verbalizes understanding and Family not present    PLAN:  Skilled SLP intervention on acute care 3-5 x per week or until goals met and/or pt plateaus in function. Specific interventions for next session may include: memory strategies and medication management tasks. PATIENT GOAL:    Return to prior level of function. SHORT TERM GOALS:  Short-term Goals  Timeframe for Short-term Goals: 2 weeks  Goal 1: Patient will safely consume regular diet with thin liquids (ok straw) withOUT overt s/s of aspiration in order to maintain adequate nutrition standards.   Goal 2: Pt will complete memory tasks (working and short term recall) with focus on memory strategies with 80% accuracy given min cues in order to recall functional information to complete ADLs/IADLs  Goal 3: Pt will complete higher level executive functioning tasks (medication/financial management) with 85% accuracy given min cues in order to independently complete ADLs/IADLs    LONG TERM GOALS:  No LTGs d/t short AMANDO Wilkins M.A., CCC-SLP [id number]

## 2020-12-14 NOTE — PROGRESS NOTES
[facility]  Daily Progress Note    Pt Name: Estella Oh  Medical Record Number: [id number]  Date of Birth 1990   Today's Date: 12/14/2020    HD: # 3    CC: \"My right hip hurts a lot today\"    [address] Problems    Diagnosis Date Noted    Pneumothorax on right [J93.9] 12/11/2020       PLAN  Patient admitted under Trauma Services to  with tele.     Fall from second story roof   - Secondary to seizure   - Neurology assisting with management, has now signed off   - Carbamazepine 200mg BID per Neuro   - No further seizure work-up per neurology as this is a known seizure patient   - Follow up outpatient 1-2 months   - No driving, climbing, heavy machine use, swimming x 3 months    Right superior and inferior pubic rami fracture, mildly displaced, with adjacent pelvic hematoma, right sacral ala fracture              - Ortho managing non operatively    - NWB RLE, WBAT LLE    - Neurovascular checks   - PT/OT continue to treat as warranted   - Follow up with Dr. Kira Bolden in 2-3 weeks              - Pain control     Right sided pneumothorax   - Right sided chest tube retracted with sentinel hole exposed, noted on film this AM   - CT removed, follow up CXR ordered   - If pneumothorax >10%, will send to IR for pigtail catheter   - Repeat CXR in AM     Liver Laceration, possible renal contusion              - 1.5 cm on CT abdomen              - Serial Hgb remain stable              - Liver enzymes appear chronically elevated     Fx of left 11th rib              - Rib fracture protocol continues, tolerating well   - IS, cough and deep breathing              - Flexeril   - Lidoderm   - Pain control     Leukocytosis - resolved              - WBC 19.7 on admission, down to 9.3               - Likely reactive to trauma          History of IV Drug use              - Urine drug screen negative              - Sober x 1 year per patient     Pain Management - Change Norco to Percocet     Prophylaxis: SCD's, Incentive Spirometry, Gylcolax, Pepcid, Zofran   - Add Movantik, Senna and Miralax     General diet     Stop IVF Management  Regular Neurovascular Checks  Repeat Labs Tomorrow AM  PT/OT/SLP continue to treat as warranted  Weightbearing status per Ortho     Planned Discharge pending clinical course. - PM&R consulted for possible IPR    SUBJECTIVE  Patient seen on 4K this morning. He states that he has more pain in his right hip today. Pain at the chest tube site and right chest have improved over the last 24 hours. Chest tube removed without incident. Dressing applied to right chest wall. No respiratory distress post chest tube removal.  Patient states that he is tolerating a general diet and is passing flatus but has not had a bowel movement yet. He is having a difficult time sitting in the chair due to right hip pain. He is leaning on his left hip. Also states that he has numbness in his right foot, states it has been this way since the fall. Has been working with therapies, continues to have difficulties with ambulating due to pain. Will check CXR at 11 am.  If pneumothorax is greater than 10%, will have IR place pigtail catheter to right chest for apical pneumothorax. If 10% or less, will just repeat CXR in AM.  Case discussed with trauma surgeon, Dr. Carlos Xavier.      Wt Readings from Last 3 Encounters:   12/14/20 131 lb 4.8 oz (59.6 kg)   11/29/20 140 lb (63.5 kg)   03/24/20 120 lb (54.4 kg)     Temp Readings from Last 3 Encounters:   12/14/20 97.6 °F (36.4 °C) (Oral)   11/29/20 98.1 °F (36.7 °C) (Oral)   03/24/20 97.4 °F (36.3 °C) (Oral)     BP Readings from Last 3 Encounters:   12/14/20 110/75   11/29/20 128/86   03/24/20 107/65     Pulse Readings from Last 3 Encounters:   12/14/20 80   11/29/20 88   03/24/20 101       24 HR INTAKE/OUTPUT :     Intake/Output Summary (Last 24 hours) at 12/14/2020 0823  Last data filed at 12/14/2020 0298  Gross per 24 hour   Intake 2021.2 ml   Output 1096 ml   Net 925.2 ml     DIET GENERAL;    OBJECTIVE  CURRENT VITALS /75   Pulse 80   Temp 97.6 °F (36.4 °C) (Oral)   Resp 20   Ht 5' 7\" (1.702 m)   Wt 131 lb 4.8 oz (59.6 kg)   SpO2 98%   BMI 20.56 kg/m²     GENERAL: Awake, alert and oriented, in no acute distress. Pleasant and cooperative with exam.  NEURO: Alert and oriented, conversing appropriately, PMS intact, no signs of focal neurological deficits. LUNGS: Lungs are clear to left and diminished throughout on right with no wheezes, rales, rhonchi. Chest tube in place to right chest - removed as noted above. HEART: Normal rate, regular rhythm, normal S1 and S2, no murmurs, no gallops and intact distal pulses. ABDOMEN: Soft, non-tender, non-distended, normal bowel sounds, no masses or organomegaly. WOUNDS: Forehead and right forearm abrasions healing well, no significant drainage, no significant erythema. EXTREMITY: No cyanosis, clubbing or edema. PMS intact in all 4 extremities. Strength equal bilaterally.     LABS  CBC :   Recent Labs     12/11/20 2010 12/12/20 0419 12/12/20  1444 12/12/20  2105 12/13/20  0445   WBC 19.7* 11.7*  --   --  9.3   HGB 16.1 14.0 13.0* 12.4* 12.9*   HCT 46.1 42.1 35.9* 34.5* 38.9*   MCV 94.9* 98.4*  --   --  100.5*    172  --   --  143     BMP:   Recent Labs     12/11/20 2010 12/12/20 0419 12/13/20  0445    138 138   K 4.1 4.0 3.9    104 108   CO2 24 21* 21*   BUN 9 9 8   CREATININE 0.9 0.7 0.6     COAGS:   Recent Labs     12/11/20 2010 12/12/20 0419 12/13/20  0445   PROT 7.6 6.4 5.3*     Pancreas/HFP:    Recent Labs     12/11/20 2010   LIPASE 53.5*     Recent Labs     12/11/20 2010 12/12/20 0419 12/13/20  0445   * 120* 62*   * 103* 68*   BILITOT 0.6 0.8 0.5   ALKPHOS 164* 132* 104       RADIOLOGY:  Narrative   ** ADDENDUM #1 **   This report was discussed with DANNA EVANS RN on Dec 14, 2020 06:19:00 EST.       This document has been electronically signed by: Lucinda Navarro on    12/14/2020 06:20 AM   ** ORIGINAL REPORT **   1 view chest x-ray.       Comparison: 12/13/2020 12:34 AM EST (12/12/2020 11:34 PM CST) : CR,SR: XR    CHEST PORTABLE       Findings:       Right chest tube has retracted from the apex to the mid lung level.  There    is now an approximately 10 percent right apical pneumothorax with 1.9 cm    of pleural separation. No acute infiltrates noted. Heart size normal.   No bony abnormality.           Impression   Impression:   Right apical pneumothorax. RIght chest tube position change as described.       This document has been electronically signed by: Carolee Garcia MD on    12/14/2020 06:15 AM           Electronically signed by HALI Magallanes - CNP on 12/14/2020 at 8:23 AM     Patient seen and evaluated independently this a.m. Orthopedics nonoperative management. Nonweightbearing right lower extremity with weightbearing as tolerated on left. He is up in chair eating breakfast.  Watts catheter removed he is voiding. All new data reviewed. Chest x-ray chest tube retracted last hole is out of the chest causing chamber to bubble. Will have chest tube removed if pneumothorax enlarges will have interventional radiology placed pigtail catheter. Therapies to see and evaluate and consult rehab. If hemoglobin stable will start Lovenox for VTE prophylaxis. Agree as documented. Care coordinated directly with ZIYAD Gomez.

## 2020-12-14 NOTE — CARE COORDINATION
DISASTER CHARTING    12/14/20, 7:53 AM EST    DISCHARGE ONGOING EVALUATION:     [facility] day: 3  Location: 4K-20/020-A Reason for admit: Pneumothorax on right [J93.9]   Barriers to Discharge: fell off 2nd story roof, seizure on Keppra, neurology, Right superior and inferior pubic rami fracture, rib fracture, right sided chest tube , on room air, PT/OT evals, pain control/patch, 12/14 removed rt chest tube, rib fracture order set. PCP: No primary care provider on file. 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 New PCP set up; see AVS. Follow for possible pigtail catheter to right chest post CXR.

## 2020-12-14 NOTE — PROGRESS NOTES
1340 Patient received in CT scanning for pre-procedure assessment with family at bedside. Monitor applied. 26 Dr. Siomne Taylor here; spoke to patient. 1345 This procedure has been fully reviewed with the patient and written informed consent has been obtained. 1350 Patient assisted to CT table and pre scan started. 1351 Procedure started with Dr. Simone Taylor. 1353 An 8french pigtail chest tube inserted to right anterior chest.   1400 Procedure completed; patient tolerated well. Post scan obtained. 1402 Patient on bed; comfort ensured. 1405 Report called to 4K and patient taken to 4K via bed with family at bedside.

## 2020-12-14 NOTE — PROGRESS NOTES
Presence of pulmonary atelectasis or conditions predisposing to the development of pulmonary atelectasis. Example: Upper/lower abdominal surgery, thoracic surgery, surgery in COPD patient, prolonged bed rest, lack of pain control, presence of thoracic or abdominal binders. Inhaled Medications Goal: Improve respiratory functions in patients with airway disease and decrease WOB  Albuterol Indications   No indications. Ipratropium Bromide Indications   No indications  Oxygenation Goal: Reverse hypoxemia and improve tissue oxygenation. (See oxygen protocol order)   No indications    THERAPIES SELECTED BASED ON ALGORITHMS    Bronchial Hygiene   No therapy recommended  Volume expansion   Incentive spirometry  Inhaled Medication   No therapy recommended   Oxygenation   No therapy recommended      ASSESSMENT OUTCOMES     Bronchial Hygiene    Other NA    Volume Expansion   Continue incentive spirometry     Inhaled Medication   Other NA    Oxygenation   Other NA    Action Plan Based on Assessment:    Continue plan as ordered.     Comments:

## 2020-12-14 NOTE — PROGRESS NOTES
Subjective: RN approved PT evaluation. Upon entry, pt seated in bedside chair, pleasant and agreeable to participate in therapy. Requested to return to bed at end of session to releive pressure on R pelvis. Post session, pt supine in bed with bed alarm on and all needs within reach. General:  Overall Orientation Status: Within Functional Limits  Follows Commands: Within Functional Limits    Vision: Impaired(reports vision going blurry randomly)    Hearing: Within functional limits         Pain: 8/10: R pelvis, buttock and LE    Social/Functional History:    Lives With: Significant other  Type of Home: House  Home Layout: Two level, Bed/Bath upstairs  Home Access: Stairs to enter with rails  Entrance Stairs - Number of Steps: 5  Entrance Stairs - Rails: Both  Home Equipment: Rolling walker, Crutches     Bathroom Shower/Tub: Tub/Shower unit  Bathroom Toilet: Standard       ADL Assistance: [address]: Independent  Homemaking Responsibilities: Yes  Ambulation Assistance: Independent  Transfer Assistance: Independent    Active : Yes     Additional Comments: SO works during the day, but there will always be someone at the house when SO is at work. Reports being very Indep PTA, was not using AD for mobility. OBJECTIVE:  Range of Motion:  Right Lower Extremity: knee and ankle WFL, hip limited secondary to multiple fractures  Left Lower Extremity: [facility]    Strength:  Right Lower Extremity: hip not tested secondary to multiple fractures, able to perform march.  knee and ankle 4/5  Left Lower Extremity: grossly 4/5    Balance:  Static Standing Balance: Contact Guard Assistance  Dynamic Standing Balance: Contact Guard Assistance, with RW    Bed Mobility:  Supine to Sit: Minimal Assistance, with verbal cues , with increased time for completion, assist at R LE  Scooting: Supervision    Transfers:  Sit to Stand: [employer], to/from chair with arms  Stand to [employer] Assistance, to/from chair with arms, cues for safety to ensure pt completes turn prior to initiating sitting   *maintained NWB R LE    Ambulation:  Contact Guard Assistance, with increased time for completion  Distance: 15ft  Surface: Level Tile  Device:Rolling Walker  Gait Deviations:  Slow Oralia, Decreased Step Length on Left, Decreased Gait Speed, Decreased Heel Strike on Right and good stability. *maintain NWB R LE    Exercise:  Patient was guided in 1 set(s) 10 reps of exercise to both lower extremities. Ankle pumps, Glut sets, Quad sets, Heelslides and Hip abduction/adduction. Exercises were completed for increased independence with functional mobility. Active assist required to complete R LE heel slide and B hip ABD/ADD    Functional Outcome Measures: Completed  AM-PAC Inpatient Mobility Raw Score : 15  -PAC Inpatient T-Scale Score : 39.45    ASSESSMENT:  Activity Tolerance:  Patient tolerance of  treatment: good. Limited by R LE/pelvis pain      Treatment Initiated: Treatment and education initiated within context of evaluation. Evaluation time included review of current medical information, gathering information related to past medical, social and functional history, completion of standardized testing, formal and informal observation of tasks, assessment of data and development of plan of care and goals. Treatment time included skilled education and facilitation of tasks to increase safety and independence with functional mobility for improved independence and quality of life. Pt education provided regarding the importance of mobility during admission. Pt encouraged to ambulate 3x/day with staff and sit in bedside chair for all meals. Assessment:   Body structures, Functions, Activity limitations: Decreased functional mobility , Decreased strength, Decreased endurance, Decreased safe awareness, Decreased balance, Increased pain  Assessment: Pt presents with R pneumothorax and multiple

## 2020-12-14 NOTE — PROGRESS NOTES
NEUROLOGY INPATIENT PROGRESS NOTE    Sirena Stewart    MRN -  [id number]   Acct # - [de-identified]      - 1990    26 y.o. Subjective: The patient is seen as follow up for breakthrough seizure. Patient is alert at this time. No event, c/o pain in the chest tube site. Objective:   /85   Pulse 83   Temp 98.8 °F (37.1 °C) (Oral)   Resp 18   Ht 5' 7\" (1.702 m)   Wt 133 lb 11.2 oz (60.6 kg)   SpO2 98%   BMI 20.94 kg/m²       Intake/Output Summary (Last 24 hours) at 2020 2213  Last data filed at 2020 1935  Gross per 24 hour   Intake 3574.26 ml   Output 792 ml   Net 2782.26 ml       Physical Exam:  General:  Awake,  laying in bed,  not in distress. Language is intact. HEENT: pink conjunctiva, unicteric sclera, moist oral mucosa. There is no neck lymphadenopathy. Neck: There is no carotid bruits. The Neck is supple. Neuro: CN 2-12 grossly intact with no focal deficits. Power 5/5 Throughout symmetric,  Long tracts are intact. Cerebellar exam is Intact. Sensory exam is intact to light touch. Gait is not testable. No abnormal movement. Osteo: There is no LROM of any of the 4 extremity joints, no joint tenderness. Leg edema none  Skin: no lesions, no rash, warm and moist to touch. Abdomen is soft, intact bowel sounds. ROS:    Cardiac: no chest pain. No palpitations.   Renal : no flank pain, no hematuria  Skin: no rash    Medications:     docusate sodium  100 mg Oral Daily    sodium chloride flush  10 mL Intravenous 2 times per day    lidocaine  1 patch Transdermal Daily    famotidine  20 mg Oral BID    carBAMazepine  200 mg Oral BID    nicotine  1 patch Transdermal Daily       Data:   CBC:   Recent Labs     209 20  1444 205   WBC 19.7* 11.7*  --   --  9.3   HGB 16.1 14.0 13.0* 12.4* 12.9*    172  --   --  143     BMP:    Recent Labs     200 12/13/20  0445    138 138   K 4.1 4.0 3.9    104 108   CO2 24 21* 21*   BUN 9 9 8   CREATININE 0.9 0.7 0.6   GLUCOSE 90 103 92         No results found for this or any previous visit. No results found for this or any previous visit. No results found for this or any previous visit. No results found for this or any previous visit. No results found for this or any previous visit. No results found for this or any previous visit. No results found for this or any previous visit. Results for orders placed during the hospital encounter of 12/11/20   CT HEAD WO CONTRAST    Narrative CT head without contrast    Comparison: None    Findings:  No intracranial mass, midline shift, hydrocephalus, or acute hemorrhage. No significant atrophy-like change or white matter disease. The visualized paranasal sinuses and mastoid air cells are normal.  The orbits are unremarkable. There is no acute fracture. Impression 1. No acute intracranial findings    This document has been electronically signed by: Kwasi Curry MD on   12/11/2020 09:26 PM    All CT scans at this facility use dose modulation, iterative   reconstruction, and/or weight-based  dosing when appropriate to reduce radiation dose to as low as reasonably   achievable. Assessment:    26year old man with breakthrough seizure and not being on seizure meds, now also with pneumothorax. Plan:    1. Con't CBZ 200mg BID  2. No neurological work up needed at this time  3.  no driving, no climbing, no heavy machine use, no swimming for at least 3 months  4. Will sign off, please call if there is question  5.  Patient need to see me or any neurologist to have his seizure medication monitored and adjusted    Viridiana Cardenas MD, 12/13/2020 10:13 PM     Gilbert Lucas MD  Attending Neurologist/Neurointensivist

## 2020-12-15 ENCOUNTER — APPOINTMENT (OUTPATIENT)
Dept: GENERAL RADIOLOGY | Age: 30
DRG: 930 | End: 2020-12-15
Payer: MEDICAID

## 2020-12-15 LAB
ANION GAP SERPL CALCULATED.3IONS-SCNC: 12 MEQ/L (ref 8–16)
BASOPHILS # BLD: 0.7 %
BASOPHILS ABSOLUTE: 0.1 THOU/MM3 (ref 0–0.1)
BUN BLDV-MCNC: 8 MG/DL (ref 7–22)
CALCIUM SERPL-MCNC: 8.8 MG/DL (ref 8.5–10.5)
CHLORIDE BLD-SCNC: 110 MEQ/L (ref 98–111)
CO2: 22 MEQ/L (ref 23–33)
CREAT SERPL-MCNC: 0.6 MG/DL (ref 0.4–1.2)
EOSINOPHIL # BLD: 3.5 %
EOSINOPHILS ABSOLUTE: 0.3 THOU/MM3 (ref 0–0.4)
ERYTHROCYTE [DISTWIDTH] IN BLOOD BY AUTOMATED COUNT: 13.4 % (ref 11.5–14.5)
ERYTHROCYTE [DISTWIDTH] IN BLOOD BY AUTOMATED COUNT: 44.9 FL (ref 35–45)
GFR SERPL CREATININE-BSD FRML MDRD: > 90 ML/MIN/1.73M2
GLUCOSE BLD-MCNC: 118 MG/DL (ref 70–108)
HCT VFR BLD CALC: 37.7 % (ref 42–52)
HEMOGLOBIN: 13.8 GM/DL (ref 14–18)
IMMATURE GRANS (ABS): 0.12 THOU/MM3 (ref 0–0.07)
IMMATURE GRANULOCYTES: 1.4 %
LYMPHOCYTES # BLD: 27 %
LYMPHOCYTES ABSOLUTE: 2.2 THOU/MM3 (ref 1–4.8)
MCH RBC QN AUTO: 33.6 PG (ref 26–33)
MCHC RBC AUTO-ENTMCNC: 36.6 GM/DL (ref 32.2–35.5)
MCV RBC AUTO: 91.7 FL (ref 80–94)
MONOCYTES # BLD: 9.2 %
MONOCYTES ABSOLUTE: 0.8 THOU/MM3 (ref 0.4–1.3)
NUCLEATED RED BLOOD CELLS: 0 /100 WBC
PLATELET # BLD: 168 THOU/MM3 (ref 130–400)
PMV BLD AUTO: 9.6 FL (ref 9.4–12.4)
POTASSIUM SERPL-SCNC: 5.1 MEQ/L (ref 3.5–5.2)
RBC # BLD: 4.11 MILL/MM3 (ref 4.7–6.1)
SEG NEUTROPHILS: 58.2 %
SEGMENTED NEUTROPHILS ABSOLUTE COUNT: 4.8 THOU/MM3 (ref 1.8–7.7)
SODIUM BLD-SCNC: 144 MEQ/L (ref 135–145)
WBC # BLD: 8.3 THOU/MM3 (ref 4.8–10.8)

## 2020-12-15 PROCEDURE — 85025 COMPLETE CBC W/AUTO DIFF WBC: CPT

## 2020-12-15 PROCEDURE — 80048 BASIC METABOLIC PNL TOTAL CA: CPT

## 2020-12-15 PROCEDURE — 2060000000 HC ICU INTERMEDIATE R&B

## 2020-12-15 PROCEDURE — 97110 THERAPEUTIC EXERCISES: CPT

## 2020-12-15 PROCEDURE — 6370000000 HC RX 637 (ALT 250 FOR IP): Performed by: PHYSICIAN ASSISTANT

## 2020-12-15 PROCEDURE — 94010 BREATHING CAPACITY TEST: CPT

## 2020-12-15 PROCEDURE — 6370000000 HC RX 637 (ALT 250 FOR IP): Performed by: PSYCHIATRY & NEUROLOGY

## 2020-12-15 PROCEDURE — 92526 ORAL FUNCTION THERAPY: CPT

## 2020-12-15 PROCEDURE — 97530 THERAPEUTIC ACTIVITIES: CPT

## 2020-12-15 PROCEDURE — APPSS60 APP SPLIT SHARED TIME 46-60 MINUTES: Performed by: PHYSICIAN ASSISTANT

## 2020-12-15 PROCEDURE — 36415 COLL VENOUS BLD VENIPUNCTURE: CPT

## 2020-12-15 PROCEDURE — 71045 X-RAY EXAM CHEST 1 VIEW: CPT

## 2020-12-15 PROCEDURE — 97129 THER IVNTJ 1ST 15 MIN: CPT

## 2020-12-15 PROCEDURE — 99232 SBSQ HOSP IP/OBS MODERATE 35: CPT | Performed by: SURGERY

## 2020-12-15 PROCEDURE — 94760 N-INVAS EAR/PLS OXIMETRY 1: CPT

## 2020-12-15 PROCEDURE — 94799 UNLISTED PULMONARY SVC/PX: CPT

## 2020-12-15 PROCEDURE — 6370000000 HC RX 637 (ALT 250 FOR IP): Performed by: NURSE PRACTITIONER

## 2020-12-15 PROCEDURE — 2580000003 HC RX 258: Performed by: PHYSICIAN ASSISTANT

## 2020-12-15 RX ADMIN — CARBAMAZEPINE 200 MG: 200 TABLET ORAL at 07:54

## 2020-12-15 RX ADMIN — NALOXEGOL OXALATE 12.5 MG: 12.5 TABLET, FILM COATED ORAL at 07:54

## 2020-12-15 RX ADMIN — Medication 10 ML: at 19:35

## 2020-12-15 RX ADMIN — STANDARDIZED SENNA CONCENTRATE 17.2 MG: 8.6 TABLET ORAL at 19:34

## 2020-12-15 RX ADMIN — OXYCODONE HYDROCHLORIDE AND ACETAMINOPHEN 2 TABLET: 5; 325 TABLET ORAL at 07:54

## 2020-12-15 RX ADMIN — FAMOTIDINE 20 MG: 20 TABLET, FILM COATED ORAL at 19:34

## 2020-12-15 RX ADMIN — DOCUSATE SODIUM 100 MG: 100 CAPSULE, LIQUID FILLED ORAL at 07:54

## 2020-12-15 RX ADMIN — OXYCODONE HYDROCHLORIDE AND ACETAMINOPHEN 2 TABLET: 5; 325 TABLET ORAL at 15:20

## 2020-12-15 RX ADMIN — DOCUSATE SODIUM 100 MG: 100 CAPSULE, LIQUID FILLED ORAL at 19:35

## 2020-12-15 RX ADMIN — Medication 10 ML: at 07:55

## 2020-12-15 RX ADMIN — FAMOTIDINE 20 MG: 20 TABLET, FILM COATED ORAL at 07:55

## 2020-12-15 RX ADMIN — CARBAMAZEPINE 200 MG: 200 TABLET ORAL at 19:34

## 2020-12-15 RX ADMIN — OXYCODONE HYDROCHLORIDE AND ACETAMINOPHEN 1 TABLET: 5; 325 TABLET ORAL at 19:40

## 2020-12-15 ASSESSMENT — PAIN SCALES - GENERAL
PAINLEVEL_OUTOF10: 4
PAINLEVEL_OUTOF10: 4
PAINLEVEL_OUTOF10: 10
PAINLEVEL_OUTOF10: 4
PAINLEVEL_OUTOF10: 8
PAINLEVEL_OUTOF10: 8

## 2020-12-15 ASSESSMENT — PAIN DESCRIPTION - PAIN TYPE
TYPE: ACUTE PAIN
TYPE: ACUTE PAIN

## 2020-12-15 ASSESSMENT — PAIN DESCRIPTION - DESCRIPTORS: DESCRIPTORS: SHARP

## 2020-12-15 ASSESSMENT — PAIN DESCRIPTION - FREQUENCY: FREQUENCY: INTERMITTENT

## 2020-12-15 ASSESSMENT — PAIN DESCRIPTION - ORIENTATION: ORIENTATION: RIGHT;LEFT

## 2020-12-15 ASSESSMENT — PAIN DESCRIPTION - LOCATION: LOCATION: LEG

## 2020-12-15 NOTE — PROGRESS NOTES
[facility] ICU STEPDOWN TELEMETRY 4K  Occupational Therapy  Daily Note  Time:   Time In: 08  Time Out: 0848  Timed Code Treatment Minutes: 23 Minutes  Minutes: 23          Date: 12/15/2020  Patient Name: Joanne Conde,   Gender: male      Room: [facility]/020-A  MRN: [id number]  : 1990  (26 y.o.)  Referring Practitioner: Reed Singh PA-C  Diagnosis: Pneumothorax on R  Additional Pertinent Hx: The patient is a 26 y.o. male  who presents with right sided pelvis pain after sustaining a traumatic injury on 2020. Patient has a history of epilepsy and states he was on a roof that day and actually had an epileptic episode while on the roof. This episode caused him to fall off of one tier of the roof and onto the lower tier and eventually off of the lower tier and to the ground. He does not recall the fall and only remembers waking up while on the ground with increased right hip/pelvis pain. He was brought to [facility] ED and further work-up showed a mildly displaced inferior and superior pubic rami fractures on xray and CT scan showed a mildly comminuted minimally displaced right sacral ala fracture. Patient was admitted under trauma services for further monitoring.   Pt s/p Right superior and inferior pubic rami fracture, mildly displaced, with adjacent pelvic hematoma, right sacral ala fracture, Right sided pneumothorax, Liver Laceration, possible renal contusion, Fx of left 11th rib, Leukocytosis    Restrictions/Precautions:  Restrictions/Precautions: Weight Bearing, General Precautions, Fall Risk  Right Lower Extremity Weight Bearing: Non Weight Bearing  Left Lower Extremity Weight Bearing: Weight Bearing As Tolerated  Position Activity Restriction  Other position/activity restrictions: chest tubes to continuous suction - removed PM      SUBJECTIVE: Pt seated upright in bed upon arrival, agreeable to OT session,    PAIN: Did not rate: RLE COGNITION: Slow Processing and Decreased Insight    ADL:   No ADL's completed this session. BALANCE:  Sitting Balance:  Supervision. EOB  Standing Balance: Stand By Assistance, [address] x1 minute in prep for mobility. BED MOBILITY:  Supine to Sit: Stand By Assistance    Scooting: Stand By Assistance EOB    TRANSFERS:  Sit to Stand:  Stand By Assistance. Stand to Sit: Stand By Assistance. FUNCTIONAL MOBILITY:  Assistive Device: Rolling Walker  Assist Level:  Stand By Assistance and [address] Distance: Completed functional mobility x3 laps within pt room at slow pace, no LOB noted. Pt with good adherance to NWB throughout using RLE as balance during standing rest breaks- lenghty seated rest break after trial of mobility, mod-min fatigue noted. ADDITIONAL ACTIVITIES:  Patient identified a personal goal to increase UB strength and improve overall endurance so they can complete their toilet & shower transfers; skilled edu on UE strengthening. Completed BUE AROM exercises x10 reps x1 sets in all available joints/planes to increase strength and endurance required for ADLs- pt limited by chest tube so resistance omitted at this time. Pt required brief rest break between each exercise and min v/c for proper technique. ASSESSMENT:     Activity Tolerance:  Patient tolerance of  treatment: good. Discharge Recommendations: Continue to assess pending progress   Equipment Recommendations:  Other: BSC, walker, continue to assess needs  Plan: Times per week: 6x  Current Treatment Recommendations: Strengthening, Balance Training, Functional Mobility Training, Endurance Training, Self-Care / ADL, Patient/Caregiver Education & Training, Safety Education & Training, Equipment Evaluation, Education, & procurement, Home Management Training    Patient Education

## 2020-12-15 NOTE — CONSULTS
Patient seen and examined for fall from a roof resulting in a right pneumothorax, mild cognitive deficits, right superior and inferior pubic rami fractures with adjacent pelvic hematoma and right sided sacral alae fractures with nonweightbearing status of the right lower limb and weightbearing as tolerated for the left lower limb. Additional injuries include a liver laceration and fracture of the left 11th rib. Patient is a good candidate for rehab admission and is agreeable to do so. He understands that under his [facility] he will require prior authorization and that the length of stay would be expected to be approximately 8 days given his current nonweightbearing status and limitations on progression with that typically offered rehab therapies. Thank you for the consult.     Candace Menchaca MD

## 2020-12-15 NOTE — FLOWSHEET NOTE
12/15/20 1025   Encounter Summary   Services provided to: Patient   Referral/Consult From: Rounding   Continue Visiting Yes  (12/15)   Complexity of Encounter Moderate   Length of Encounter 15 minutes   Routine   Type Initial   Assessment Calm; Approachable   Intervention Nurtured hope   Outcome Comfort;Expressed gratitude   Assessment: In my encounter with the 26 yr old patient, while rounding  the unit 4K,  I provided spiritual care to patient through conversation, I also came to assess the patients spiritual needs present. The pt was admitted due to pneumonia. Interventions:  I provided prayer, emotional support and words of comfort. Outcomes: The patient was encouraged and didnt share any further spiritual needs at this time. The pt remains optimistic and hopeful. The pt shared that they were appreciative for the support. Plan:  1. Chaplains will follow-up at a later time for assessment of any spiritual care needs present.         2.   The Chaplains will be available to provide further emotional support per request.

## 2020-12-15 NOTE — PROGRESS NOTES
[address] PHYSICAL THERAPY  DAILY NOTE  [facility] ICU STEPDOWN TELEMETRY 4K - 4K-20/020-A    Time In: 1320  Time Out: 1010  Timed Code Treatment Minutes: 24 Minutes  Minutes: 24          Date: 12/15/2020  Patient Name: Simeon Hills,  Gender:  male        MRN: [id number]  : 1990  (26 y.o.)     Referring Practitioner: Lidia Brunner PA-C  Diagnosis: Pneumothorax on right  Additional Pertinent Hx: Per H&P, \"The patient is a 26 y.o. male  who presents with right sided pelvis pain after sustaining a traumatic injury on 2020. Patient has a history of epilepsy and states he was on a roof that day and actually had an epileptic episode while on the roof. This episode caused him to fall off of one tier of the roof and onto the lower tier and eventually off of the lower tier and to the ground. He does not recall the fall and only remembers waking up while on the ground with increased right hip/pelvis pain. He was brought to [facility] ED and further work-up showed a mildly displaced inferior and superior pubic rami fractures on xray and CT scan showed a mildly comminuted minimally displaced right sacral ala fracture. Patient was admitted under trauma services for further monitoring. Pt s/p Right superior and inferior pubic rami fracture, mildly displaced, with adjacent pelvic hematoma, right sacral ala fracture, Right sided pneumothorax, Liver Laceration, possible renal contusion, Fx of left 11th rib, Leukocytosis. \"     Prior Level of Function:  Lives With: Significant other  Type of Home: House  Home Layout: Two level, Bed/Bath upstairs  Home Access: Stairs to enter with rails  Entrance Stairs - Number of Steps: 5  Entrance Stairs - Rails: Both  Home Equipment: Rolling walker, Crutches   Bathroom Shower/Tub: Tub/Shower unit  Bathroom Toilet: Standard    ADL Assistance: [facility]: Independent  Homemaking Responsibilities: Yes  Ambulation Assistance: Independent Transfer Assistance: Independent  Active : Yes  Additional Comments: SO works during the day, but there will always be someone at the house when SO is at work. Reports being very Indep PTA, was not using AD for mobility. Restrictions/Precautions:  Restrictions/Precautions: Weight Bearing, General Precautions, Fall Risk  Right Lower Extremity Weight Bearing: Non Weight Bearing  Left Lower Extremity Weight Bearing: Weight Bearing As Tolerated  Position Activity Restriction  Other position/activity restrictions: chest tubes to continuous suction 12/14- removed PM 12/14     SUBJECTIVE: RN approved PT treatment. Upon entry, pt seated in bedside chair, pleasant and agreeable to participate in therapy. Post session, pt reclined in bedside chair, with chair alarm on and all needs within reach. PAIN: 8/10: R pelvis, buttock, and LE with mobiltiy    OBJECTIVE:  Bed Mobility:  Not Tested    Transfers:  Sit to Stand: [employer], with verbal cues, cues for technique to crutches  Stand to Sit:Contact [employer], with verbal cues, cues to complete turn prior to intitiaing sitting. Ambulation:  Contact Guard Assistance, with verbal cues , with increased time for completion, cues to reduce pace during turns for improved stability  Distance: 30ft, and 60ft  Surface: Level Tile  Device:Crutches  Gait Deviations:  Slow Oralia, Decreased Step Length on Left, Decreased Gait Speed, Decreased Heel Strike on Left and unsteady during turns. Required min A to correct balance dur to mild LOB during turn. Maintained R LE NWB precautions throughout amb     Balance:  Dynamic Sitting Balance: Supervision, indep with doffing pants in sitting  Static Standing Balance: Contact Guard Assistance  Dynamic Standing Balance: Contact Guard Assistance, able to assist with donning pants. Exercise:  Patient was guided in 1 set(s) 10 reps of exercise to both lower extremities.   Glut sets, Seated marches, Seated hamstring curls, Seated heel/toe raises and Long arc quads. Exercises were completed for increased independence with functional mobility. Functional Outcome Measures: Completed  AM-PAC Inpatient Mobility Raw Score : 15  AM-PAC Inpatient T-Scale Score : 39.45    ASSESSMENT:  Assessment: Patient progressing toward established goals. Activity Tolerance:  Patient tolerance of  treatment: good. Demonstrated good gait mechanics when amb with crutches. Slightly unsteady during turns however stability improved with cues to reduce pace. Equipment Recommendations:Equipment Needed: No  Discharge Recommendations:  IP Rehab    Plan: Times per week: 6x T  Current Treatment Recommendations: Strengthening, ROM, Balance Training, Functional Mobility Training, Transfer Training, Stair training, Gait Training, Endurance Training, Home Exercise Program, Safety Education & Training, Patient/Caregiver Education & Training, Equipment Evaluation, Education, & procurement    Patient Education  Patient Education: Plan of Care, Equipment Education, Transfers, Gait, Verbal Exercise Instruction, discussed safety when using crutches and proper technique of transfers and ambulation. Discussed reducing pace during amb, especially during turns to impropve stability. Also discussed rehab for further PT services prior to returning home. Goals:  Patient goals : to return home  Short term goals  Time Frame for Short term goals: by discharge  Short term goal 1: Pt to perform bed mobility with SBA to promote ease of getting in and out of bed. Short term goal 2: Pt to perform sit<>stand with mod I to improve ability to perform transfers. Short term goal 3: Pt to ambulate > 75ft with RW with supervision to improve ability to navigate within the home. Short term goal 4: Pt to negotiate 5 steps with B HR and 1 flight of step with HR and AD at CGA to improve ability to enter home and get to bed and bath.   Long term goals  Time Frame for Long term goals :

## 2020-12-15 NOTE — PROGRESS NOTES
Martha Maria  Daily Progress Note    Pt Name: Indy Koenig  Medical Record Number: [id number]  Date of Birth 1990   Today's Date: 12/15/2020    HD: # 4    CC: \"My right hip is better today with Percocet\"    [address] Problems    Diagnosis Date Noted    Pneumothorax on right [J93.9] 12/11/2020       PLAN  Patient admitted under Trauma Services to  with tele.     Fall from second story roof   - Secondary to seizure   - Neurology assisting with management, has now signed off   - Carbamazepine 200mg BID per Neuro   - No further seizure work-up per neurology as this is a known seizure patient   - Follow up outpatient 1-2 months   - No driving, climbing, heavy machine use, swimming x 3 months    Right superior and inferior pubic rami fracture, mildly displaced, with adjacent pelvic hematoma, right sacral ala fracture              - Ortho managing non operatively    - NWB RLE, WBAT LLE    - Neurovascular checks   - PT/OT continue to treat as warranted   - Follow up with Dr. Anuja Lugo in 2-3 weeks              - Pain control     Right sided pneumothorax   - Right sided chest tube retracted with sentinel hole exposed, noted on film this AM   - CT removed, follow up CXR ordered   - IR placed pigtail catheter 12/15   - Repeat CXR shows residual 5% pneumothorax on the right, repeat CXR in AM     Liver Laceration, possible renal contusion              - 1.5 cm on CT abdomen              - Serial Hgb remain stable              - Liver enzymes appear chronically elevated     Fx of left 11th rib              - Rib fracture protocol continues, tolerating well   - IS, cough and deep breathing              - Flexeril   - Lidoderm   - Pain control     Leukocytosis - resolved              - WBC 19.7 on admission, down to 8.3               - Likely reactive to trauma          History of IV Drug use              - Urine drug screen negative              - Sober x 1 year per patient     Pain Management              - Change Norco to Percocet     Prophylaxis: SCD's, Incentive Spirometry, Gylcolax, Pepcid, Zofran   - Add Movantik, Senna and Miralax     General diet     Stop IVF Management  Regular Neurovascular Checks  Repeat Labs Tomorrow AM  PT/OT/SLP continue to treat as warranted  Weightbearing status per Ortho     Planned Discharge pending clinical course. - PM&R consulted for possible IPR    SUBJECTIVE  Patient seen on 4K this morning. Patient notes improved pain control with oral Percocet and notes he can move better today. Patient was noted to have a repeat Pneumothorax on repeat CXR after chest tube was pulled, IR placed a pigtail catheter. Repeat chest x-ray shows residual pneumothorax of 5% on the right side today. Patient states that he is tolerating a general diet and is passing flatus but has not had a bowel movement yet. He denies any changes since yesterday on movement or sensation. He has been working with therapies, continues to have difficulties with ambulating due to pain. Case discussed with trauma surgeon, Dr. Freddi Burkitt.      Wt Readings from Last 3 Encounters:   12/15/20 138 lb 9.6 oz (62.9 kg)   11/29/20 140 lb (63.5 kg)   03/24/20 120 lb (54.4 kg)     Temp Readings from Last 3 Encounters:   12/15/20 97.6 °F (36.4 °C) (Oral)   11/29/20 98.1 °F (36.7 °C) (Oral)   03/24/20 97.4 °F (36.3 °C) (Oral)     BP Readings from Last 3 Encounters:   12/15/20 125/70   11/29/20 128/86   03/24/20 107/65     Pulse Readings from Last 3 Encounters:   12/15/20 68   11/29/20 88   03/24/20 101       24 HR INTAKE/OUTPUT :     Intake/Output Summary (Last 24 hours) at 12/15/2020 0846  Last data filed at 12/15/2020 0803  Gross per 24 hour   Intake 1006.38 ml   Output 1555 ml   Net -548.62 ml     DIET GENERAL;    OBJECTIVE  CURRENT VITALS /70   Pulse 68   Temp 97.6 °F (36.4 °C) (Oral)   Resp 18   Ht 5' 7\" (1.702 m)   Wt 138 lb 9.6 oz (62.9 kg)   SpO2 99%   BMI 21.71 kg/m²     GENERAL: Awake, alert and oriented, in no acute distress. Pleasant and cooperative with exam.  NEURO: Alert and oriented, conversing appropriately, PMS intact, no signs of focal neurological deficits. LUNGS: Lungs are clear to left and diminished throughout on right with no wheezes, rales, rhonchi. Chest tube in place to right chest - removed as noted above. HEART: Normal rate, regular rhythm, normal S1 and S2, no murmurs, no gallops and intact distal pulses. ABDOMEN: Soft, non-tender, non-distended, hypoactive bowel sounds, no masses or organomegaly. WOUNDS: Forehead and right forearm abrasions healing well, no significant drainage, no significant erythema. EXTREMITY: No cyanosis, clubbing or edema. PMS intact in all 4 extremities. Strength equal bilaterally. LABS  CBC :   Recent Labs     12/13/20  0445 12/14/20  1218 12/14/20  1224 12/15/20  0752   WBC 9.3  --  10.9* 8.3   HGB 12.9* 13.4* 13.5* 13.8*   HCT 38.9* 36.4* 37.0* 37.7*   .5*  --  90.9 91.7     --  112* 168     BMP:   Recent Labs     12/13/20  0445 12/14/20  1225 12/15/20  0752    141 144   K 3.9 5.7* 5.1    107 110   CO2 21* 21* 22*   BUN 8 6* 8   CREATININE 0.6 0.6 0.6     COAGS:   Recent Labs     12/13/20  0445   PROT 5.3*     Pancreas/HFP:    No results for input(s): LIPASE, AMYLASE in the last 72 hours.   Recent Labs     12/13/20  0445   AST 62*   ALT 68*   BILITOT 0.5   ALKPHOS 104       RADIOLOGY:  Narrative   ** ADDENDUM #1 **   This report was discussed with DANNA EVANS RN on Dec 14, 2020 06:19:00 EST.       This document has been electronically signed by: Lucinda Navarro on    12/14/2020 06:20 AM   ** ORIGINAL REPORT **   1 view chest x-ray.       Comparison: 12/13/2020 12:34 AM EST (12/12/2020 11:34 PM CST) : CR,SR: XR    CHEST PORTABLE       Findings:       Right chest tube has retracted from the apex to the mid lung level.  There    is now an approximately 10 percent right apical pneumothorax with 1.9 cm    of pleural separation. No acute infiltrates noted. Heart size normal.   No bony abnormality.           Impression   Impression:   Right apical pneumothorax. RIght chest tube position change as described.       This document has been electronically signed by: Micki Law MD on    12/14/2020 06:15 AM           Electronically signed by Daisha Limon PA-C on 12/15/2020 at 8:46 AM     Patient seen and evaluated independently this AM.  Sitting at bedside. Has pleural catheter to Heimlich valve. New x-rays reviewed. Small apical pneumothorax stable. Patient deemed to be a good candidate for rehab by Dr. Bridegt Panchal. Await precertification. All new data and imaging reviewed. Care coordinated with Charissa Molina PA-C. Agree as documented.

## 2020-12-15 NOTE — PROGRESS NOTES
[address] [place] [facility]  [facility] ICU STEPDOWN TELEMETRY 4K  DAILY NOTE    TIME   SLP Individual Minutes  Time In: 1753  Time Out: 9080  Minutes: 19  Timed Code Treatment Minutes: 11 Minutes     Cog tx: 11 min   Dysphagia tx: 8 min     Date: 12/15/2020  Patient Name: Christian Rey      CSN: [id number]   : 1990  (26 y.o.)  Gender: male   Referring Physician: Jocelyn Cantu PA-C  Diagnosis: Pneumothorax Right  Secondary Diagnosis: Cognitive-Communication Deficit  Precautions: Fall Risk   Current Diet: Regular and Thins   Swallowing Strategies: Standard Universal Swallow Precautions  Date of Last MBS: Not Applicable    Pain:   - Pain location: R Hip/Leg    Subjective:  RN, Usama Hidden, approved tx session this date. Upon arrival to room, pt awake, sitting in recliner. Pt was agreeable to participate in skilled ST services this date. Pt was alert, cooperative, and pleasant throughout tx session. Short-Term Goals:  SHORT TERM GOAL #1: GOAL MET   Goal 1: Patient will safely consume regular diet with thin liquids (ok straw) withOUT overt s/s of aspiration in order to maintain adequate nutrition standards. INTERVENTIONS: With po trials of hard/coarse texture and thin liquids, pt demonstrated evidence of good bolus control, timely mastication, timely bolus formation, timely ap transit, timely swallow initiation, good bolus clearance, and no overt s/s of aspiration noted with trials this date. Pt does not report any difficulty with current diet level or overt s/s of aspiration with meal trays. It is recommended pt continue with a regular texture and thin liquids at this time.      SHORT TERM GOAL #2:  Goal 2: Pt will complete memory tasks (working and short term recall) with focus on memory strategies with 80% accuracy given min cues in order to recall functional information to complete ADLs/IADLs INTERVENTIONS: Completed review of STM strategies using the acronym WRAP--Write it down, Repeat it, Associate it, and Pictures it. ST then provided pt with 5 new units of information related to ST Asiya Salgado, Speech Therapist, [facility], and  in October). Immediate Recall:   5/5 Independently     Delayed Recall:   4/5 Independently (4 min)  4/5 Independently (8 min)     SHORT TERM GOAL #3:  Goal 3: Pt will complete higher level executive functioning tasks (medication/financial management) with 85% accuracy given min cues in order to independently complete ADLs/IADLs  INTERVENTIONS:  Reading a prescription label: 7/9 Independently       Long-Term Goals:     No long term goals recommended d/t short ELOS          EDUCATION:  Learner: Patient  Education: Reviewed diet and strategies, Reviewed ST goals and Plan of Care and Reviewed recommendations for follow-up  Evaluation of Education: Verbalizes understanding and Demonstrates without assistance    ASSESSMENT/PLAN:  Activity Tolerance:  Patient tolerance of  treatment: good. Assessment/Plan: Patient progressing toward established goals. Continues to require skilled care of licensed speech pathologist to progress toward achievement of established goals and plan of care. .     Plan for Next Session: Higher Level Executive Functioning Tasks      Romayne Nao, Jerline Links., [address]

## 2020-12-15 NOTE — CARE COORDINATION
Update: IPR precert (day 2); collaborated with Stephen Akhtar, IPR Coordinator  Electronically signed by Joel Beasley RN on 12/15/2020 at 12:57 PM

## 2020-12-15 NOTE — PROGRESS NOTES
[facility] RESPIRATORY ASSESSMENT PROGRAM (RAP)  30 kg and over      Patient Name: Sally Uvalda Room#: 4K-20/020-A : 1990     Admitting diagnosis:      ASSESSMENT     Vitals  Pulse: 74   Resp: 18  BP: 128/88  SpO2: 99 %  Temp: 98 °F (36.7 °C)  Breath Sounds: clear      Inspiratory Capacity:   Preoperative/predictive value: 3000 ml   33% of value: 990 ml      75% of value: 2250 ml   10 ml/kg of IBW: 661 ml   Patient's Actual Inspiratory Capacity: 4000 ml    CARE PLAN  All Care Plan selections must be based on the approved algorithms located on line in the Policy and Procedures under Respiratory Assessment Adult Protocol Handbook    INDICATIONS FOR THERAPY BASED ON HISTORY AND ASSESSMENT    Bronchial Hygiene Goal: Improvement in sputum mobilization in patients with ineffective airway clearance. Reverse the atelectasis. No indications  Volume Expansion Goal: Prevent atelectasis, Absence or improvement in signs of atelectasis, improve respiratory muscle performance   Presence of pulmonary atelectasis or conditions predisposing to the development of pulmonary atelectasis. Example: Upper/lower abdominal surgery, thoracic surgery, surgery in COPD patient, prolonged bed rest, lack of pain control, presence of thoracic or abdominal binders. Inhaled Medications Goal: Improve respiratory functions in patients with airway disease and decrease WOB  Albuterol Indications   No indications. Ipratropium Bromide Indications   No indications  Oxygenation Goal: Reverse hypoxemia and improve tissue oxygenation. (See oxygen protocol order)   No indications    THERAPIES SELECTED BASED ON ALGORITHMS    Bronchial Hygiene   No therapy recommended  Volume expansion   Incentive spirometry  Inhaled Medication   No therapy recommended   Oxygenation   No therapy recommended      Action Plan Based on Assessment:    Discontinue therapy and RAP (indications no longer present).     Comments: Encouraged pt to do IS on own

## 2020-12-15 NOTE — PROGRESS NOTES
Respiratory Care is following the rib fracture order set. Patient's position when testing was done was sitting. A Negative Inspiratory Force (NIF) was performed with patient achieving a NIF of >-40 cm H2O. The NIF was greater than 25 cm H2O. A Forced Vital Capacity (FVC) was obtained with patient achieving an FVC of 4.35 liters. The patient's calculated ideal body weight, (IBW) is  66.1 kg. 0.020 liters/kg of the patient's IBW is 1.32 liters. The patient's FVC was greater than 0.020 liters/kg of IBW. Based on the spirometry measurement alone, patient does not meet ICU admission criteria. Previous FVC was 3.66 liters and previous NIF was -40 cm H2O. Patient's NIF and FVC are improving from previous screening(s). Last pain medication was given on  12/15 @ 0754. Physician was not called regarding spirometry measurement. FVC and NIF changed to PRN.

## 2020-12-16 ENCOUNTER — HOSPITAL ENCOUNTER (INPATIENT)
Age: 30
LOS: 6 days | Discharge: HOME | DRG: 930 | End: 2020-12-22
Attending: PHYSICAL MEDICINE & REHABILITATION | Admitting: PHYSICAL MEDICINE & REHABILITATION
Payer: MEDICAID

## 2020-12-16 ENCOUNTER — APPOINTMENT (OUTPATIENT)
Dept: GENERAL RADIOLOGY | Age: 30
DRG: 930 | End: 2020-12-16
Payer: MEDICAID

## 2020-12-16 VITALS
WEIGHT: 136.1 LBS | HEART RATE: 87 BPM | DIASTOLIC BLOOD PRESSURE: 73 MMHG | BODY MASS INDEX: 21.36 KG/M2 | SYSTOLIC BLOOD PRESSURE: 132 MMHG | RESPIRATION RATE: 18 BRPM | HEIGHT: 67 IN | TEMPERATURE: 98.2 F | OXYGEN SATURATION: 100 %

## 2020-12-16 PROBLEM — T07.XXXA MULTIPLE INJURIES DUE TO TRAUMA: Status: ACTIVE | Noted: 2020-12-16

## 2020-12-16 PROCEDURE — 71046 X-RAY EXAM CHEST 2 VIEWS: CPT

## 2020-12-16 PROCEDURE — 6370000000 HC RX 637 (ALT 250 FOR IP): Performed by: PHYSICIAN ASSISTANT

## 2020-12-16 PROCEDURE — 6370000000 HC RX 637 (ALT 250 FOR IP): Performed by: SURGERY

## 2020-12-16 PROCEDURE — 6370000000 HC RX 637 (ALT 250 FOR IP): Performed by: NURSE PRACTITIONER

## 2020-12-16 PROCEDURE — 2580000003 HC RX 258: Performed by: PHYSICIAN ASSISTANT

## 2020-12-16 PROCEDURE — 97129 THER IVNTJ 1ST 15 MIN: CPT

## 2020-12-16 PROCEDURE — 2580000003 HC RX 258: Performed by: SURGERY

## 2020-12-16 PROCEDURE — 97110 THERAPEUTIC EXERCISES: CPT

## 2020-12-16 PROCEDURE — APPSS60 APP SPLIT SHARED TIME 46-60 MINUTES: Performed by: NURSE PRACTITIONER

## 2020-12-16 PROCEDURE — 97116 GAIT TRAINING THERAPY: CPT

## 2020-12-16 PROCEDURE — 99233 SBSQ HOSP IP/OBS HIGH 50: CPT | Performed by: SURGERY

## 2020-12-16 PROCEDURE — 1180000000 HC REHAB R&B

## 2020-12-16 PROCEDURE — 6370000000 HC RX 637 (ALT 250 FOR IP): Performed by: PSYCHIATRY & NEUROLOGY

## 2020-12-16 PROCEDURE — 71045 X-RAY EXAM CHEST 1 VIEW: CPT

## 2020-12-16 RX ORDER — POLYETHYLENE GLYCOL 3350 17 G/17G
17 POWDER, FOR SOLUTION ORAL DAILY
Status: CANCELLED | OUTPATIENT
Start: 2020-12-17

## 2020-12-16 RX ORDER — SENNA PLUS 8.6 MG/1
2 TABLET ORAL NIGHTLY
Status: CANCELLED | OUTPATIENT
Start: 2020-12-16

## 2020-12-16 RX ORDER — CARBAMAZEPINE 200 MG/1
200 TABLET ORAL 2 TIMES DAILY
Status: CANCELLED | OUTPATIENT
Start: 2020-12-16

## 2020-12-16 RX ORDER — LIDOCAINE 4 G/G
1 PATCH TOPICAL DAILY
Status: DISCONTINUED | OUTPATIENT
Start: 2020-12-17 | End: 2020-12-22 | Stop reason: HOSPADM

## 2020-12-16 RX ORDER — NICOTINE 21 MG/24HR
1 PATCH, TRANSDERMAL 24 HOURS TRANSDERMAL DAILY
Status: DISCONTINUED | OUTPATIENT
Start: 2020-12-17 | End: 2020-12-19

## 2020-12-16 RX ORDER — DOCUSATE SODIUM 100 MG/1
100 CAPSULE, LIQUID FILLED ORAL 2 TIMES DAILY
Status: DISCONTINUED | OUTPATIENT
Start: 2020-12-16 | End: 2020-12-22 | Stop reason: HOSPADM

## 2020-12-16 RX ORDER — POLYETHYLENE GLYCOL 3350 17 G/17G
17 POWDER, FOR SOLUTION ORAL DAILY PRN
Status: CANCELLED | OUTPATIENT
Start: 2020-12-16

## 2020-12-16 RX ORDER — SODIUM CHLORIDE 0.9 % (FLUSH) 0.9 %
10 SYRINGE (ML) INJECTION PRN
Status: DISCONTINUED | OUTPATIENT
Start: 2020-12-16 | End: 2020-12-19

## 2020-12-16 RX ORDER — FAMOTIDINE 20 MG/1
20 TABLET, FILM COATED ORAL 2 TIMES DAILY
Status: CANCELLED | OUTPATIENT
Start: 2020-12-16

## 2020-12-16 RX ORDER — DOCUSATE SODIUM 100 MG/1
100 CAPSULE, LIQUID FILLED ORAL 2 TIMES DAILY
Status: CANCELLED | OUTPATIENT
Start: 2020-12-16

## 2020-12-16 RX ORDER — SODIUM CHLORIDE 0.9 % (FLUSH) 0.9 %
10 SYRINGE (ML) INJECTION PRN
Status: CANCELLED | OUTPATIENT
Start: 2020-12-16

## 2020-12-16 RX ORDER — CYCLOBENZAPRINE HCL 10 MG
10 TABLET ORAL 2 TIMES DAILY PRN
Status: CANCELLED | OUTPATIENT
Start: 2020-12-16

## 2020-12-16 RX ORDER — ACETAMINOPHEN 325 MG/1
650 TABLET ORAL EVERY 4 HOURS PRN
Status: DISCONTINUED | OUTPATIENT
Start: 2020-12-16 | End: 2020-12-22 | Stop reason: HOSPADM

## 2020-12-16 RX ORDER — SODIUM CHLORIDE 0.9 % (FLUSH) 0.9 %
10 SYRINGE (ML) INJECTION EVERY 12 HOURS SCHEDULED
Status: DISCONTINUED | OUTPATIENT
Start: 2020-12-16 | End: 2020-12-18

## 2020-12-16 RX ORDER — MORPHINE SULFATE 2 MG/ML
4 INJECTION, SOLUTION INTRAMUSCULAR; INTRAVENOUS
Status: DISCONTINUED | OUTPATIENT
Start: 2020-12-16 | End: 2020-12-16

## 2020-12-16 RX ORDER — NICOTINE 21 MG/24HR
1 PATCH, TRANSDERMAL 24 HOURS TRANSDERMAL DAILY
Status: CANCELLED | OUTPATIENT
Start: 2020-12-17

## 2020-12-16 RX ORDER — SENNA PLUS 8.6 MG/1
2 TABLET ORAL NIGHTLY
Status: DISCONTINUED | OUTPATIENT
Start: 2020-12-16 | End: 2020-12-22 | Stop reason: HOSPADM

## 2020-12-16 RX ORDER — SUCRALFATE 1 G/1
1 TABLET ORAL 2 TIMES DAILY
Status: DISCONTINUED | OUTPATIENT
Start: 2020-12-16 | End: 2020-12-22 | Stop reason: HOSPADM

## 2020-12-16 RX ORDER — CARBAMAZEPINE 200 MG/1
200 TABLET ORAL 2 TIMES DAILY
Status: DISCONTINUED | OUTPATIENT
Start: 2020-12-16 | End: 2020-12-22 | Stop reason: HOSPADM

## 2020-12-16 RX ORDER — MORPHINE SULFATE 2 MG/ML
2 INJECTION, SOLUTION INTRAMUSCULAR; INTRAVENOUS
Status: DISCONTINUED | OUTPATIENT
Start: 2020-12-16 | End: 2020-12-16

## 2020-12-16 RX ORDER — OXYCODONE HYDROCHLORIDE AND ACETAMINOPHEN 5; 325 MG/1; MG/1
1 TABLET ORAL EVERY 4 HOURS PRN
Status: DISCONTINUED | OUTPATIENT
Start: 2020-12-16 | End: 2020-12-22 | Stop reason: HOSPADM

## 2020-12-16 RX ORDER — OXYCODONE HYDROCHLORIDE AND ACETAMINOPHEN 5; 325 MG/1; MG/1
2 TABLET ORAL EVERY 4 HOURS PRN
Status: CANCELLED | OUTPATIENT
Start: 2020-12-16

## 2020-12-16 RX ORDER — ONDANSETRON 2 MG/ML
4 INJECTION INTRAMUSCULAR; INTRAVENOUS EVERY 6 HOURS PRN
Status: DISCONTINUED | OUTPATIENT
Start: 2020-12-16 | End: 2020-12-19

## 2020-12-16 RX ORDER — POLYETHYLENE GLYCOL 3350 17 G/17G
17 POWDER, FOR SOLUTION ORAL DAILY PRN
Status: DISCONTINUED | OUTPATIENT
Start: 2020-12-16 | End: 2020-12-22 | Stop reason: HOSPADM

## 2020-12-16 RX ORDER — SUCRALFATE 1 G/1
1 TABLET ORAL 2 TIMES DAILY
Status: CANCELLED | OUTPATIENT
Start: 2020-12-16

## 2020-12-16 RX ORDER — SODIUM CHLORIDE 0.9 % (FLUSH) 0.9 %
10 SYRINGE (ML) INJECTION EVERY 12 HOURS SCHEDULED
Status: CANCELLED | OUTPATIENT
Start: 2020-12-16

## 2020-12-16 RX ORDER — MORPHINE SULFATE 4 MG/ML
4 INJECTION, SOLUTION INTRAMUSCULAR; INTRAVENOUS
Status: CANCELLED | OUTPATIENT
Start: 2020-12-16

## 2020-12-16 RX ORDER — PROMETHAZINE HYDROCHLORIDE 25 MG/1
12.5 TABLET ORAL EVERY 6 HOURS PRN
Status: DISCONTINUED | OUTPATIENT
Start: 2020-12-16 | End: 2020-12-19

## 2020-12-16 RX ORDER — PROMETHAZINE HYDROCHLORIDE 25 MG/1
12.5 TABLET ORAL EVERY 6 HOURS PRN
Status: CANCELLED | OUTPATIENT
Start: 2020-12-16

## 2020-12-16 RX ORDER — POLYETHYLENE GLYCOL 3350 17 G/17G
17 POWDER, FOR SOLUTION ORAL DAILY
Status: DISCONTINUED | OUTPATIENT
Start: 2020-12-17 | End: 2020-12-22 | Stop reason: HOSPADM

## 2020-12-16 RX ORDER — CYCLOBENZAPRINE HCL 10 MG
10 TABLET ORAL 2 TIMES DAILY PRN
Status: DISCONTINUED | OUTPATIENT
Start: 2020-12-16 | End: 2020-12-22 | Stop reason: HOSPADM

## 2020-12-16 RX ORDER — FAMOTIDINE 20 MG/1
20 TABLET, FILM COATED ORAL 2 TIMES DAILY
Status: DISCONTINUED | OUTPATIENT
Start: 2020-12-16 | End: 2020-12-22 | Stop reason: HOSPADM

## 2020-12-16 RX ORDER — ONDANSETRON 2 MG/ML
4 INJECTION INTRAMUSCULAR; INTRAVENOUS EVERY 6 HOURS PRN
Status: CANCELLED | OUTPATIENT
Start: 2020-12-16

## 2020-12-16 RX ORDER — OXYCODONE HYDROCHLORIDE AND ACETAMINOPHEN 5; 325 MG/1; MG/1
1 TABLET ORAL EVERY 4 HOURS PRN
Status: CANCELLED | OUTPATIENT
Start: 2020-12-16

## 2020-12-16 RX ORDER — LIDOCAINE 4 G/G
1 PATCH TOPICAL DAILY
Status: CANCELLED | OUTPATIENT
Start: 2020-12-17

## 2020-12-16 RX ORDER — MORPHINE SULFATE 2 MG/ML
2 INJECTION, SOLUTION INTRAMUSCULAR; INTRAVENOUS
Status: CANCELLED | OUTPATIENT
Start: 2020-12-16

## 2020-12-16 RX ORDER — ACETAMINOPHEN 325 MG/1
650 TABLET ORAL EVERY 4 HOURS PRN
Status: CANCELLED | OUTPATIENT
Start: 2020-12-16

## 2020-12-16 RX ORDER — OXYCODONE HYDROCHLORIDE AND ACETAMINOPHEN 5; 325 MG/1; MG/1
2 TABLET ORAL EVERY 4 HOURS PRN
Status: DISCONTINUED | OUTPATIENT
Start: 2020-12-16 | End: 2020-12-22 | Stop reason: HOSPADM

## 2020-12-16 RX ADMIN — FAMOTIDINE 20 MG: 20 TABLET, FILM COATED ORAL at 08:17

## 2020-12-16 RX ADMIN — FAMOTIDINE 20 MG: 20 TABLET, FILM COATED ORAL at 21:11

## 2020-12-16 RX ADMIN — SUCRALFATE 1 G: 1 TABLET ORAL at 21:10

## 2020-12-16 RX ADMIN — OXYCODONE AND ACETAMINOPHEN 1 TABLET: 5; 325 TABLET ORAL at 21:03

## 2020-12-16 RX ADMIN — OXYCODONE HYDROCHLORIDE AND ACETAMINOPHEN 1 TABLET: 5; 325 TABLET ORAL at 12:38

## 2020-12-16 RX ADMIN — DOCUSATE SODIUM 100 MG: 100 CAPSULE, LIQUID FILLED ORAL at 21:11

## 2020-12-16 RX ADMIN — OXYCODONE HYDROCHLORIDE AND ACETAMINOPHEN 2 TABLET: 5; 325 TABLET ORAL at 08:17

## 2020-12-16 RX ADMIN — DOCUSATE SODIUM 100 MG: 100 CAPSULE, LIQUID FILLED ORAL at 08:17

## 2020-12-16 RX ADMIN — CARBAMAZEPINE 200 MG: 200 TABLET ORAL at 08:16

## 2020-12-16 RX ADMIN — NALOXEGOL OXALATE 12.5 MG: 12.5 TABLET, FILM COATED ORAL at 08:16

## 2020-12-16 RX ADMIN — POLYETHYLENE GLYCOL 3350 17 G: 17 POWDER, FOR SOLUTION ORAL at 08:20

## 2020-12-16 RX ADMIN — CYCLOBENZAPRINE 10 MG: 10 TABLET, FILM COATED ORAL at 21:10

## 2020-12-16 RX ADMIN — CARBAMAZEPINE 200 MG: 200 TABLET ORAL at 21:10

## 2020-12-16 RX ADMIN — STANDARDIZED SENNA CONCENTRATE 17.2 MG: 8.6 TABLET ORAL at 21:10

## 2020-12-16 RX ADMIN — OXYCODONE AND ACETAMINOPHEN 2 TABLET: 5; 325 TABLET ORAL at 17:01

## 2020-12-16 RX ADMIN — Medication 10 ML: at 08:27

## 2020-12-16 RX ADMIN — SODIUM CHLORIDE, PRESERVATIVE FREE 10 ML: 5 INJECTION INTRAVENOUS at 21:09

## 2020-12-16 ASSESSMENT — PAIN SCALES - GENERAL
PAINLEVEL_OUTOF10: 8
PAINLEVEL_OUTOF10: 2
PAINLEVEL_OUTOF10: 7
PAINLEVEL_OUTOF10: 2
PAINLEVEL_OUTOF10: 8
PAINLEVEL_OUTOF10: 0
PAINLEVEL_OUTOF10: 6
PAINLEVEL_OUTOF10: 8
PAINLEVEL_OUTOF10: 5

## 2020-12-16 ASSESSMENT — PAIN DESCRIPTION - PAIN TYPE: TYPE: ACUTE PAIN

## 2020-12-16 ASSESSMENT — PAIN DESCRIPTION - ORIENTATION: ORIENTATION: RIGHT;LEFT

## 2020-12-16 ASSESSMENT — PAIN DESCRIPTION - LOCATION: LOCATION: LEG

## 2020-12-16 NOTE — DISCHARGE SUMMARY
Discharge Summary     Patient Identification:  Leslie Haney  : 1990  MRN: [id number]   Account: [de-identified]     Admit date: 2020  Discharge date: 20   Attending provider: Jeronimo Murillo MD        Primary care provider: No primary care provider on file. Discharge Diagnoses: Active Problems:    Pneumothorax on right  Resolved Problems:    * No resolved hospital problems. *       Hospital Course:   Leslie Haney is a 26 y.o. male admitted to [address] [place] [address] on 2020 for treatment of injuries sustained when he suffered a seizure and fell from a two-story roof. He reported that he had stopped taking the Tegretol as he did not like the side effects and he felt that he no longer suffered from seizures. He was diagnosed with a right sided pneumothorax, liver laceration, left 11th rib fracture, right sacral ala fracture and right superior pubic rami fracture. Orthopedic surgery was consulted for the pelvic fractures. He was managed non operatively with no weight bearing to the right lower extremity. A chest tube was inserted for the pneumothorax. Three days following admission the chest tube was removed due to the sentinel hole being exposed from retraction and a small pigtail catheter was inserted by IR for recurrent pneumothorax. The pigtail catheter was kept in place for 2 days and once the pneumothorax resolved, the small catheter was removed. The liver laceration was monitored with serial hgb checks. The rib fracture protocol was instituted and Candace Martinez was able to meet calculated indices. Therapies recommended continued treatment on IPR of which Candace Martinez was agreeable to and he was discharged there in stable condition when insurance approval was obtained.                Discharge Medications:   Margarita Flores   Home Medication Instructions [facility]:[id number]    Printed on:20 1442   Medication Information                      omeprazole (PRILOSEC) 20 MG delayed release capsule  Take 1 capsule by mouth every morning (before breakfast)             sucralfate (CARAFATE) 1 GM tablet  Take 1 tablet by mouth 2 times daily                 Patient Instructions: Activity: NWB to RLE  Diet: DIET GENERAL;    Code Status: Full Code    Follow-up visits:   HALI Lopez - CNP  [address] [facility] [address]  [phone]    On 12/29/2020  appt 12/29  at 12:40 pm, bring photo ID, insurance card, wear mask, co-pay       Procedures: Chest tube insertion in ER, pigtail catheter chest tube insertion by IR    Consults:   rehabilitation medicine and orthopedic surgery    Examination:  Vitals:  Vitals:    12/15/20 2334 12/16/20 0318 12/16/20 0814 12/16/20 1118   BP: 119/72 114/66 (!) 140/65 132/73   Pulse: 71 79 71 87   Resp: 18 16 16 18   Temp: 97.6 °F (36.4 °C) 97.6 °F (36.4 °C) 97.8 °F (36.6 °C) 98.2 °F (36.8 °C)   TempSrc: Oral Oral Oral Oral   SpO2: 99% 98% 99% 100%   Weight:  136 lb 1.6 oz (61.7 kg)     Height:         Weight: Weight: 136 lb 1.6 oz (61.7 kg)     24 hour intake/output:    Intake/Output Summary (Last 24 hours) at 12/16/2020 1446  Last data filed at 12/16/2020 1230  Gross per 24 hour   Intake 1150 ml   Output 259 ml   Net 891 ml         Significant Diagnostics:   Radiology: Xr Pelvis (1-2 Views)    Result Date: 12/11/2020  PROCEDURE: XR PELVIS (1-2 VIEWS) CLINICAL INFORMATION: trauma. COMPARISON: No prior study. TECHNIQUE: AP view of the pelvis. FINDINGS:  There is a mildly displaced fracture of the right superior pubic ramus near its junction with the acetabulum. No other fracture is evident. Mildly displaced superior pubic ramus fracture on the right. **This report has been created using voice recognition software. It may contain minor errors which are inherent in voice recognition technology. ** Final report electronically signed by Dr. Ellyn Mcpherson MD on 12/11/2020 7:31 PM    Ct Head Wo Contrast    Result Date: 12/11/2020  CT head without contrast Comparison: None Findings: No intracranial mass, midline shift, hydrocephalus, or acute hemorrhage. No significant atrophy-like change or white matter disease. The visualized paranasal sinuses and mastoid air cells are normal. The orbits are unremarkable. There is no acute fracture. 1. No acute intracranial findings This document has been electronically signed by: Rebeca Zhang MD on 12/11/2020 09:26 PM All CT scans at this facility use dose modulation, iterative reconstruction, and/or weight-based dosing when appropriate to reduce radiation dose to as low as reasonably achievable. Ct Chest W Contrast    Result Date: 12/11/2020  * ADDENDUM #1 * This report was discussed with Guillaume Albrecht RN on Dec 11, 2020 21:36:00 EST. This document has been electronically signed by: Skye Gould on 12/11/2020 09:36 PM * ORIGINAL REPORT * CT chest with contrast. Comparison: None Findings: The heart size is normal. The thyroid and mediastinum are unremarkable. No consolidation or pleural effusion. No evidence of vascular injury. 1. Small right-sided pneumothorax estimated at 15-20%. 2. Groundglass opacity within the posterior aspect of the right lower lobe may be secondary to atelectasis or contusion. 3. Possible nondisplaced fracture of the posterior medial aspect of the left 11th rib. This document has been electronically signed by: Rebeca Zhang MD on 12/11/2020 09:33 PM All CT scans at this facility use dose modulation, iterative reconstruction, and/or weight-based dosing when appropriate to reduce radiation dose to as low as reasonably achievable. Ct Cervical Spine Wo Contrast    Result Date: 12/11/2020  CT cervical spine without contrast Comparison: None Findings: No acute findings on limited view of the intracranial contents. Contusion of the soft tissues of the left jaw. Vertebral alignment is within normal limits. No acute fractures or dislocations. No significant degenerative change.      1. No evidence of cervical spine fracture. 2. Partial visualization of a right-sided pneumothorax. Please see dedicated chest CT report. This document has been electronically signed by: Elisabeth Tolliver MD on 12/11/2020 09:37 PM All CT scans at this facility use dose modulation, iterative reconstruction, and/or weight-based dosing when appropriate to reduce radiation dose to as low as reasonably achievable. Ct Abdomen Pelvis W Iv Contrast Additional Contrast? Radiologist Recommendation    Result Date: 12/11/2020  * ADDENDUM #1 * This report was discussed with Antonia Alvarado RN on Dec 11, 2020 21:53:00 EST. This document has been electronically signed by: Bettie Quinones on 12/11/2020 09:53 PM * ORIGINAL REPORT * CT abdomen and pelvis with contrast Comparison: None Findings: Partial visualization of a right-sided pneumothorax. Groundglass opacity within the posterior aspect of the right lower lobe. Please see dedicated chest CT report. Unremarkable spleen, pancreas, adrenal glands and kidneys. No bowel obstruction, pneumoperitoneum, or pneumatosis. Poor distention of the urinary bladder. Mass-effect on the prostate and urinary bladder by an adjacent extraperitoneal hematoma. Normal appendix. 1. Acute fractures of the right sacral ala with extension to involve the S1, S2 and S3 neural foramina. Acute fractures of the right superior and inferior pubic rami. Adjacent extraperitoneal hematoma within the pelvis. 2. Possible nondisplaced fracture of the posterior medial aspect of the left 11th rib. 3. Small focus of liver laceration involving the anterior cortex of the medial segment extending approximately 1.5 cm in parenchymal depth. Trace adjacent peritoneal blood products. 4.  Infiltration of fat adjacent to the inferior pole of the right kidney with trace adjacent blood products. Cannot exclude an otherwise occult renal contusion.   Alternatively, this may have extended from one of the other documented areas of appear clear. The pulmonary vasculature and cardiomediastinal silhouette are within normal limits. Visualized osseous structures appear grossly intact. No acute abnormality identified. **This report has been created using voice recognition software. It may contain minor errors which are inherent in voice recognition technology. ** Final report electronically signed by Dr. Thao Valdez MD on 12/11/2020 7:30 PM    Ct Lumbar Reconstruction Wo Post Process    Result Date: 12/11/2020  CT lumbar spine without contrast Comparison: None Findings: Normal vertebral body alignment. No lumbar spine fractures or dislocations. No significant degenerative change. Findings at the level of the chest, abdomen and pelvis reported separately. No evidence of lumbar spine fracture. Acute fracture of the right sacral ala with extension to involve the S1-S3 neural foramina and the right S1 facet. Adjacent extraperitoneal hematoma. Possible nondisplaced fracture of the left 11th rib This document has been electronically signed by: Julio Banks MD on 12/11/2020 09:52 PM All CT scans at this facility use dose modulation, iterative reconstruction, and/or weight-based dosing when appropriate to reduce radiation dose to as low as reasonably achievable. Ct Thoracic Reconstruction Wo Post Process    Result Date: 12/11/2020  CT thoracic spine without contrast Comparison: None Findings: Normal vertebral body alignment. No acute fractures or dislocations. No significant degenerative change. Right-sided pneumothorax. Please see dedicated chest CT report. No evidence of thoracic spine fracture. Possible nondisplaced fracture of the posterior medial aspect of the left 11th rib.  This document has been electronically signed by: Julio Banks MD on 12/11/2020 09:53 PM All CT scans at this facility use dose modulation, iterative reconstruction, and/or weight-based dosing when appropriate to reduce radiation dose to as low as 12:25 PM   Result Value Ref Range    Est, Glom Filt Rate >90 ml/min/1.73m2   CBC Auto Differential    Collection Time: 12/15/20  7:52 AM   Result Value Ref Range    WBC 8.3 4.8 - 10.8 thou/mm3    RBC 4.11 (L) 4.70 - 6.10 mill/mm3    Hemoglobin 13.8 (L) 14.0 - 18.0 gm/dl    Hematocrit 37.7 (L) 42.0 - 52.0 %    MCV 91.7 80.0 - 94.0 fL    MCH 33.6 (H) 26.0 - 33.0 pg    MCHC 36.6 (H) 32.2 - 35.5 gm/dl    RDW-CV 13.4 11.5 - 14.5 %    RDW-SD 44.9 35.0 - 45.0 fL    Platelets 440 645 - 249 thou/mm3    MPV 9.6 9.4 - 12.4 fL    Seg Neutrophils 58.2 %    Lymphocytes 27.0 %    Monocytes 9.2 %    Eosinophils 3.5 %    Basophils 0.7 %    Immature Granulocytes 1.4 %    Segs Absolute 4.8 1.8 - 7.7 thou/mm3    Lymphocytes Absolute 2.2 1.0 - 4.8 thou/mm3    Monocytes Absolute 0.8 0.4 - 1.3 thou/mm3    Eosinophils Absolute 0.3 0.0 - 0.4 thou/mm3    Basophils Absolute 0.1 0.0 - 0.1 thou/mm3    Immature Grans (Abs) 0.12 (H) 0.00 - 0.07 thou/mm3    nRBC 0 /100 wbc   Basic Metabolic Panel    Collection Time: 12/15/20  7:52 AM   Result Value Ref Range    Sodium 144 135 - 145 meq/L    Potassium 5.1 3.5 - 5.2 meq/L    Chloride 110 98 - 111 meq/L    CO2 22 (L) 23 - 33 meq/L    Glucose 118 (H) 70 - 108 mg/dL    BUN 8 7 - 22 mg/dL    CREATININE 0.6 0.4 - 1.2 mg/dL    Calcium 8.8 8.5 - 10.5 mg/dL   Anion Gap    Collection Time: 12/15/20  7:52 AM   Result Value Ref Range    Anion Gap 12.0 8.0 - 16.0 meq/L   Glomerular Filtration Rate, Estimated    Collection Time: 12/15/20  7:52 AM   Result Value Ref Range    Est, Glom Filt Rate >90 ml/min/1.73m2       Discharge condition: stable  Disposition: Discharge/Readmit  Time spent on discharge: >35 minutes    Electronically signed by HALI Tenorio CNP on 12/16/2020 at 2:46 PM

## 2020-12-16 NOTE — PROGRESS NOTES
[address] [facility] PHYSICAL THERAPY  DAILY NOTE  STRZ ICU STEPDOWN TELEMETRY 4K - 4K-20/020-A    Time In: 0818  Time Out: 3953  Timed Code Treatment Minutes: 23 Minutes  Minutes: 23          Date: 2020  Patient Name: Myrna Stuart,  Gender:  male        MRN: [id number]  : 1990  (26 y.o.)     Referring Practitioner: Gerson Whelan PA-C  Diagnosis: Pneumothorax on right  Additional Pertinent Hx: Per H&P, \"The patient is a 26 y.o. male  who presents with right sided pelvis pain after sustaining a traumatic injury on 2020. Patient has a history of epilepsy and states he was on a roof that day and actually had an epileptic episode while on the roof. This episode caused him to fall off of one tier of the roof and onto the lower tier and eventually off of the lower tier and to the ground. He does not recall the fall and only remembers waking up while on the ground with increased right hip/pelvis pain. He was brought to [facility] ED and further work-up showed a mildly displaced inferior and superior pubic rami fractures on xray and CT scan showed a mildly comminuted minimally displaced right sacral ala fracture. Patient was admitted under trauma services for further monitoring. Pt s/p Right superior and inferior pubic rami fracture, mildly displaced, with adjacent pelvic hematoma, right sacral ala fracture, Right sided pneumothorax, Liver Laceration, possible renal contusion, Fx of left 11th rib, Leukocytosis. \"     Prior Level of Function:  Lives With: Significant other  Type of Home: House  Home Layout: Two level, Bed/Bath upstairs  Home Access: Stairs to enter with rails  Entrance Stairs - Number of Steps: 5  Entrance Stairs - Rails: Both  Home Equipment: Rolling walker, Crutches   Bathroom Shower/Tub: Tub/Shower unit  Bathroom Toilet: Standard    ADL Assistance: [address] [place] [address] Avenue: Independent  Homemaking Responsibilities: Yes  Ambulation Assistance: Independent Transfer Assistance: Independent  Active : Yes  Additional Comments: SO works during the day, but there will always be someone at the house when SO is at work. Reports being very Indep PTA, was not using AD for mobility. Restrictions/Precautions:  Restrictions/Precautions: Weight Bearing, General Precautions, Fall Risk  Right Lower Extremity Weight Bearing: Non Weight Bearing  Left Lower Extremity Weight Bearing: Weight Bearing As Tolerated  Position Activity Restriction  Other position/activity restrictions: chest tubes to continuous suction 12/14- removed PM 12/14     SUBJECTIVE: RN approved session. Pt cooperative for session, pt reporting increased pain in R hip/LE    PAIN: tailbone and Rhip/LE did not quantify    OBJECTIVE:  Bed Mobility:  Supine to Sit: Stand By Assistance  Scooting: Stand By Assistance    Transfers:  Sit to Stand: Stand By Assistance, Contact Guard Assistance  Stand to Sit:Stand By Assistance, [employer], with verbal cues  Pt stood on RLE, put down crutches and turned to sit- educated pt on propr technique. Pt NWB to LLE throughout    Ambulation:  Stand By Assistance, [employer], with verbal cues   Distance: 20ft  Surface: Level Tile  Device:Crutches  Gait Deviations:  Slow Oralia, Decreased Step Length Bilaterally, Decreased Gait Speed, Decreased Heel Strike Bilaterally, Unsteady Gait and ues to slow down, pt switching between 3 point and 4 point gait patterns    Exercise:  Patient was guided in 1 set(s) 10 reps of exercise to both lower extremities. Ankle pumps, Glut sets, Quad sets, Heelslides and Hip abduction/adduction. Exercises were completed for increased independence with functional mobility. Functional Outcome Measures: Completed  AM-PAC Inpatient Mobility Raw Score : 15  AM-PAC Inpatient T-Scale Score : 39.45    ASSESSMENT:  Assessment: Patient progressing toward established goals.   Activity Tolerance:  Patient tolerance of  treatment: good. Pt limited by pain, needing rest breaks throughout session     Equipment Recommendations:Equipment Needed: No  Discharge Recommendations:  IP Rehab    Plan: Times per week: 6x T  Current Treatment Recommendations: Strengthening, ROM, Balance Training, Functional Mobility Training, Transfer Training, Stair training, Gait Training, Endurance Training, Home Exercise Program, Safety Education & Training, Patient/Caregiver Education & Training, Equipment Evaluation, Education, & procurement    Patient Education  Patient Education: Plan of Care, [employer] Mobility, Transfers, Gait, Verbal Exercise Instruction    Goals:  Patient goals : to return home  Short term goals  Time Frame for Short term goals: by discharge  Short term goal 1: Pt to perform bed mobility with SBA to promote ease of getting in and out of bed. Short term goal 2: Pt to perform sit<>stand with mod I to improve ability to perform transfers. Short term goal 3: Pt to ambulate > 75ft with RW with supervision to improve ability to navigate within the home. Short term goal 4: Pt to negotiate 5 steps with B HR and 1 flight of step with HR and AD at CGA to improve ability to enter home and get to bed and bath. Long term goals  Time Frame for Long term goals : n/a due to short ELOS    Following session, patient left in safe position with all fall risk precautions in place.

## 2020-12-16 NOTE — PROGRESS NOTES
[address] [facility] THERAPY MISSED TREATMENT NOTE  STRZ ICU STEPDOWN TELEMETRY 4K  4K-20/020-A      Date: 2020  Patient Name: Estella Oh        CSN: [id number]   : 1990  (26 y.o.)  Gender: male   Referring Practitioner: Rosalba Curiel PA-C  Diagnosis: Pneumothorax on R         REASON FOR MISSED TREATMENT: Pt preparing to transfer to IP rehab. OT discharging at this time.

## 2020-12-16 NOTE — PROGRESS NOTES
[address] [address]  Acute Inpatient Rehab Preadmission Assessment    Patient Name: Mariana Villa        MRN: [id number]    : 1990  (26 y.o.)  Gender: male     Admitted from:. Initial Assessment    Date of admission to the hospital: 2020  6:47 PM  Date patient eligible for admission:2020    Primary Diagnosis:Multiple Trauma   Did patient have surgery?  no     Physicians: Yany Jay MD, Dr. Mason Chandra, Dr. Carla Yeager, Dr. Hardin Aid for clinical complications/co-morbidities:   Past Medical History:   Diagnosis Date    Hypertension     Seizures [facility])      Financial Information  Primary insurance: Medicaid HMO    Secondary Insurance:  . Has the patient had two or more falls in the past year or any fall with injury in the past year? yes    Did the patient have major surgery during the 100 days prior to admission? yes    Precautions:   falls, infections and skin  Restrictions/Precautions: Weight Bearing, General Precautions, Fall Risk  Other position/activity restrictions: chest tubes to continuous suction - removed PM   Right Lower Extremity Weight Bearing: Non Weight Bearing  Left Lower Extremity Weight Bearing: Weight Bearing As Tolerated    Isolation Precautions: None       Physiatrist: Dr. Holly Schultz    Patients Occupation: Retired  Reviewed Lab and Diagnostic reports from Current Admission: Yes    Patients Prior Functional  Level: Prior Function  ADL Assistance: Independent  Homemaking Assistance: Independent  Ambulation Assistance: Independent  Transfer Assistance: Independent  Additional Comments: SO works during the day, but there will always be someone at the house when SO is at work. Reports being very Indep PTA, was not using AD for mobility.     Current functional status for upper extremity ADLs: contact guard assistance    Current functional status for lower extremity ADLs: contact guard assistance    Current functional status for bed, chair, wheelchair transfers: Rehabilitation Admissions Coordinator.     Antolin Purvis MD

## 2020-12-16 NOTE — PROGRESS NOTES
[facility]  Daily Progress Note    Pt Name: Gregor Price  Medical Record Number: [id number]  Date of Birth 1990   Today's Date: 12/16/2020    HD: # 5    CC: \"I feel a lot better today\"    [address] Problems    Diagnosis Date Noted    Pneumothorax on right [J93.9] 12/11/2020       PLAN  Patient admitted under Trauma Services to  with tele.     Fall from second story roof   - Secondary to seizure   - Neurology assisting with management, has now signed off   - Carbamazepine 200mg BID per Neuro   - No further seizure work-up per neurology as this is a known seizure patient   - Follow up outpatient 1-2 months   - No driving, climbing, heavy machine use, swimming x 3 months    Right superior and inferior pubic rami fracture, mildly displaced, with adjacent pelvic hematoma, right sacral ala fracture              - Ortho managing non operatively    - NWB RLE, WBAT LLE    - Neurovascular checks   - PT/OT continue to treat as warranted   - Follow up with Dr. Ant Gallegos in 2-3 weeks              - Pain control     Right sided pneumothorax   - Right sided chest tube retracted with sentinel hole exposed, noted on film 12/14   - CT removed 12/14, follow up CXR noted increase in pneumo with slight tension, patient sent to IR for pigtail catheter insertion   - Repeat CXR 12/15 shows residual 5% pneumothorax on the right   - CXR this AM notes resolution of pneumo - pigtail removed today - post removal CXR ordered     Liver Laceration, possible renal contusion              - 1.5 cm on CT abdomen              - Serial Hgb remain stable              - Liver enzymes appear chronically elevated     Fx of left 11th rib              - Rib fracture protocol continues, tolerating well   - IS, cough and deep breathing              - Flexeril   - Lidoderm   - Pain control     Leukocytosis - resolved              - WBC 19.7 on admission, down to 8.3               - Likely reactive to trauma          History of IV Drug use              - Urine drug screen negative              - Sober x 1 year per patient     Pain Management              - Percocet     Prophylaxis: SCD's, Incentive Spirometry, Gylcolax, Pepcid, Zofran   - Add Movantik, Senna and Miralax     General diet     Regular Neurovascular Checks  Repeat Labs Tomorrow AM  PT/OT/SLP continue to treat as warranted  Weightbearing status per Ortho     Planned Discharge pending clinical course. - IPR today    SUBJECTIVE  Patient seen on 4K this morning. He states that he feels much better. Pigtail catheter removed from right anterior chest wall. No respiratory distress prior to or after pigtail removal.  Small amount of sanguinous drainage in heimlich valve. Dressing applied to right anterior chest wall. Patient states that he is tolerating a general diet. Has had a bowel movement. Percocet controlling his pain and he states that he is able to sit and ambulate more comfortably now as compared to when he was taking [address] for IPR today, pending CXR results post pigtail removal. Case discussed with trauma surgeon, Dr. Sharmin Galvez.      Wt Readings from Last 3 Encounters:   12/16/20 136 lb 1.6 oz (61.7 kg)   11/29/20 140 lb (63.5 kg)   03/24/20 120 lb (54.4 kg)     Temp Readings from Last 3 Encounters:   12/16/20 97.8 °F (36.6 °C) (Oral)   11/29/20 98.1 °F (36.7 °C) (Oral)   03/24/20 97.4 °F (36.3 °C) (Oral)     BP Readings from Last 3 Encounters:   12/16/20 (!) 140/65   11/29/20 128/86   03/24/20 107/65     Pulse Readings from Last 3 Encounters:   12/16/20 71   11/29/20 88   03/24/20 101       24 HR INTAKE/OUTPUT :     Intake/Output Summary (Last 24 hours) at 12/16/2020 0932  Last data filed at 12/16/2020 0319  Gross per 24 hour   Intake 750 ml   Output 259 ml   Net 491 ml     DIET GENERAL;    OBJECTIVE  CURRENT VITALS BP (!) 140/65   Pulse 71   Temp 97.8 °F (36.6 °C) (Oral)   Resp 16   Ht 5' 7\" (1.702 m)   Wt 136 lb 1.6 oz Pj Smith CNP on 12/16/2020 at 9:47 AM     Patient seen and evaluated independently this a.m. He is feeling much better reports he has had a bowel movement. Chest x-ray pneumothorax resolved. Patient has been accepted to rehab unit. He is agreeable to be transferred there to continue his therapies. Discharge care coordinated with ZIYAD Mayo. Pull pleural catheter prior to discharge. Agree as documented. Patient to follow-up in 2 weeks with Dr. Murlene Rinne at [address]

## 2020-12-16 NOTE — PROGRESS NOTES
Plan inpatient rehab today if CT comes out and patient is medically cleared. Soledad Schmitt will go to [phone].  Bib BUTLER made aware

## 2020-12-16 NOTE — PROGRESS NOTES
[address] [address]  INPATIENT SPEECH THERAPY  [facility] ICU STEPDOWN TELEMETRY 4K  DAILY NOTE    TIME   SLP Individual Minutes  Time In: 5067  Time Out: 8485  Minutes: 14  Timed Code Treatment Minutes: 14 Minutes       Date: 2020  Patient Name: Magdalena Lowery      CSN: [id number]   : 1990  (26 y.o.)  Gender: male   Referring Physician: Pavithra Nascimento PA-C  Diagnosis: Pneumothorax Right  Secondary Diagnosis: Cognitive-Communication Deficit  Precautions: Fall Risk   Current Diet: Regular and Thins   Swallowing Strategies: Standard Universal Swallow Precautions  Date of Last MBS: Not Applicable    Pain:   \"While sitting still\"    Subjective:  Patient seen sitting upright in chair; alert and very pleasant. Patient and RN Carlo Barrera reporting planning IP Rehab, waiting orders for transfer.      Short-Term Goals:  SHORT TERM GOAL #1: GOAL MET     SHORT TERM GOAL #2:  Goal 2: Pt will complete memory tasks (working and short term recall) with focus on memory strategies with 80% accuracy given min cues in order to recall functional information to complete ADLs/IADLs  INTERVENTIONS:   O-lo/30     Functional recall x5 unit list from [employer]: Immediate recall: 5/5 indep, 10 minute delay: 5/5 indep    Working memory via mental manipulation of x3 single digit number in reverse order: 8/10 indep, 1/10 min cues (x1 repetition), 1/10 mod cues (x3 repetitions)    Patient denies concussive-like symptoms including headache, nausea, vomiting, sensitivity to light or noise     SHORT TERM GOAL #3:  Goal 3: Pt will complete higher level executive functioning tasks (medication/financial management) with 85% accuracy given min cues in order to independently complete ADLs/IADLs  INTERVENTIONS: DNT secondary to focus on other goals    Long-Term Goals:     No long term goals recommended d/t short ELOS          EDUCATION:  Learner: Patient  Education: Reviewed diet and strategies, Reviewed ST goals and Plan of Care and Reviewed recommendations for follow-up  Evaluation of Education: Verbalizes understanding and Demonstrates without assistance    ASSESSMENT/PLAN:  Activity Tolerance:  Patient tolerance of  treatment: good. Assessment/Plan: Patient progressing toward established goals. Continues to require skilled care of licensed speech pathologist to progress toward achievement of established goals and plan of care. .     Plan for Next Session: IP Rehab; BREANNE Pelaez 23

## 2020-12-16 NOTE — PROGRESS NOTES
Admitted to the Inpatient Rehabilitation Unit via wheelchair. Patient was then oriented to room and unit. Education provided on the rehabilitation routine: three hours of therapy five days per week and dining room for lunch. Explained patients right to have family, representative or physician notified of their admission. Patient has Declined for physician to be notified. Patient has Declined for family/representative to be notified. Admitting medication orders compared with acute stay medications; home medication list reviewed with patient/family. Medication issues identified No  Medication issue: No  If yes, physician notified Dr Ted Aponte. Bladder and Bowel Function Assessment:  1. Prior history of bladder problems: no problems with bladder  2. Number of pads used per day: 1  3. Frequency of night time voiding: once  4. Fluid intake volume and pattern: 6-8 glasses  5. Last BM: 12/16  6. Prior history of bowel problems: No      Incontinence      Frequent diarrhea      Constipation      Hemorrhoids      Diverticulitis      Bowel Surgery     Two nurse skin assessment performed by Maria D ESTEVEZ and  Aspen Carter LPN . Care plan was created with patient's input and goals were agreed upon. Admission folder provided with education regarding patients diagnoses, fall prevention, skin care, and M in the box. \"Data Collection Information Summary for Patients in Inpatient Rehabilitation Facilities\" and \"Privacy Act Statement - Health Care Records\" provided. Please refer to the admission navigator for further information.

## 2020-12-16 NOTE — CARE COORDINATION
12/16/20, 2:37 PM EST  Plans IPR today; collaborated with nursing, SONU Hussein Amber, IPR Coordinator  Patient goals/plan/ treatment preferences discussed by  and . Patient goals/plan/ treatment preferences reviewed with patient/ family. Patient/ family verbalize understanding of discharge plan and are in agreement with goal/plan/treatment preferences. Understanding was demonstrated using the teach back method. AVS provided by RN at time of discharge, which includes all necessary medical information pertaining to the patients current course of illness, treatment, post-discharge goals of care, and treatment preferences.

## 2020-12-16 NOTE — DISCHARGE INSTR - DIET
? Good nutrition is important when healing from an illness, injury, or surgery. Follow any nutrition recommendations given to you during your hospital stay. ? If you were given an oral nutrition supplement while in the hospital, continue to take this supplement at home. You can take it with meals, in-between meals, and/or before bedtime. These supplements can be purchased at most local grocery stores, pharmacies, and chain [employer]-stores. ? If you have any questions about your diet or nutrition, call the hospital and ask for the dietitian.     Regular as tolerated

## 2020-12-17 ENCOUNTER — APPOINTMENT (OUTPATIENT)
Dept: GENERAL RADIOLOGY | Age: 30
DRG: 930 | End: 2020-12-17
Attending: PHYSICAL MEDICINE & REHABILITATION
Payer: MEDICAID

## 2020-12-17 PROBLEM — S36.113A LIVER LACERATION, CLOSED, INITIAL ENCOUNTER: Status: ACTIVE | Noted: 2020-12-17

## 2020-12-17 PROBLEM — S32.810D MULTIPLE CLOSED FRACTURES OF PELVIS WITH STABLE DISRUPTION OF PELVIC RING WITH ROUTINE HEALING: Status: ACTIVE | Noted: 2020-12-17

## 2020-12-17 PROBLEM — S22.32XD CLOSED FRACTURE OF ONE RIB OF LEFT SIDE WITH ROUTINE HEALING: Status: ACTIVE | Noted: 2020-12-17

## 2020-12-17 PROBLEM — S37.019A: Status: ACTIVE | Noted: 2020-12-17

## 2020-12-17 PROBLEM — D62 ACUTE BLOOD LOSS ANEMIA: Status: ACTIVE | Noted: 2020-12-17

## 2020-12-17 LAB
ALBUMIN SERPL-MCNC: 3.3 G/DL (ref 3.5–5.1)
ALP BLD-CCNC: 103 U/L (ref 38–126)
ALT SERPL-CCNC: 38 U/L (ref 11–66)
ANION GAP SERPL CALCULATED.3IONS-SCNC: 9 MEQ/L (ref 8–16)
AST SERPL-CCNC: 36 U/L (ref 5–40)
BILIRUB SERPL-MCNC: 0.5 MG/DL (ref 0.3–1.2)
BUN BLDV-MCNC: 11 MG/DL (ref 7–22)
CALCIUM SERPL-MCNC: 8.8 MG/DL (ref 8.5–10.5)
CHLORIDE BLD-SCNC: 101 MEQ/L (ref 98–111)
CO2: 26 MEQ/L (ref 23–33)
CREAT SERPL-MCNC: 0.6 MG/DL (ref 0.4–1.2)
ERYTHROCYTE [DISTWIDTH] IN BLOOD BY AUTOMATED COUNT: 13.9 % (ref 11.5–14.5)
ERYTHROCYTE [DISTWIDTH] IN BLOOD BY AUTOMATED COUNT: 49.5 FL (ref 35–45)
GFR SERPL CREATININE-BSD FRML MDRD: > 90 ML/MIN/1.73M2
GLUCOSE BLD-MCNC: 89 MG/DL (ref 70–108)
HCT VFR BLD CALC: 38.8 % (ref 42–52)
HEMOGLOBIN: 13.3 GM/DL (ref 14–18)
MCH RBC QN AUTO: 33.1 PG (ref 26–33)
MCHC RBC AUTO-ENTMCNC: 34.3 GM/DL (ref 32.2–35.5)
MCV RBC AUTO: 96.5 FL (ref 80–94)
PLATELET # BLD: 227 THOU/MM3 (ref 130–400)
PMV BLD AUTO: 8.7 FL (ref 9.4–12.4)
POTASSIUM SERPL-SCNC: 3.8 MEQ/L (ref 3.5–5.2)
RBC # BLD: 4.02 MILL/MM3 (ref 4.7–6.1)
SODIUM BLD-SCNC: 136 MEQ/L (ref 135–145)
TOTAL PROTEIN: 6.1 G/DL (ref 6.1–8)
VITAMIN D 25-HYDROXY: 36 NG/ML (ref 30–100)
WBC # BLD: 10.4 THOU/MM3 (ref 4.8–10.8)

## 2020-12-17 PROCEDURE — 97530 THERAPEUTIC ACTIVITIES: CPT

## 2020-12-17 PROCEDURE — 2580000003 HC RX 258: Performed by: SURGERY

## 2020-12-17 PROCEDURE — 97116 GAIT TRAINING THERAPY: CPT

## 2020-12-17 PROCEDURE — 82306 VITAMIN D 25 HYDROXY: CPT

## 2020-12-17 PROCEDURE — 71045 X-RAY EXAM CHEST 1 VIEW: CPT

## 2020-12-17 PROCEDURE — 1180000000 HC REHAB R&B

## 2020-12-17 PROCEDURE — 97110 THERAPEUTIC EXERCISES: CPT

## 2020-12-17 PROCEDURE — 97162 PT EVAL MOD COMPLEX 30 MIN: CPT

## 2020-12-17 PROCEDURE — 85027 COMPLETE CBC AUTOMATED: CPT

## 2020-12-17 PROCEDURE — 97165 OT EVAL LOW COMPLEX 30 MIN: CPT

## 2020-12-17 PROCEDURE — 6370000000 HC RX 637 (ALT 250 FOR IP): Performed by: SURGERY

## 2020-12-17 PROCEDURE — 80053 COMPREHEN METABOLIC PANEL: CPT

## 2020-12-17 PROCEDURE — 96125 COGNITIVE TEST BY HC PRO: CPT | Performed by: SPEECH-LANGUAGE PATHOLOGIST

## 2020-12-17 PROCEDURE — 36415 COLL VENOUS BLD VENIPUNCTURE: CPT

## 2020-12-17 PROCEDURE — 97535 SELF CARE MNGMENT TRAINING: CPT

## 2020-12-17 RX ADMIN — SUCRALFATE 1 G: 1 TABLET ORAL at 08:30

## 2020-12-17 RX ADMIN — SUCRALFATE 1 G: 1 TABLET ORAL at 23:19

## 2020-12-17 RX ADMIN — DOCUSATE SODIUM 100 MG: 100 CAPSULE, LIQUID FILLED ORAL at 08:30

## 2020-12-17 RX ADMIN — CARBAMAZEPINE 200 MG: 200 TABLET ORAL at 23:20

## 2020-12-17 RX ADMIN — NALOXEGOL OXALATE 12.5 MG: 12.5 TABLET, FILM COATED ORAL at 08:30

## 2020-12-17 RX ADMIN — FAMOTIDINE 20 MG: 20 TABLET, FILM COATED ORAL at 08:30

## 2020-12-17 RX ADMIN — DOCUSATE SODIUM 100 MG: 100 CAPSULE, LIQUID FILLED ORAL at 23:23

## 2020-12-17 RX ADMIN — CYCLOBENZAPRINE 10 MG: 10 TABLET, FILM COATED ORAL at 23:21

## 2020-12-17 RX ADMIN — SODIUM CHLORIDE, PRESERVATIVE FREE 10 ML: 5 INJECTION INTRAVENOUS at 08:33

## 2020-12-17 RX ADMIN — CARBAMAZEPINE 200 MG: 200 TABLET ORAL at 08:30

## 2020-12-17 RX ADMIN — STANDARDIZED SENNA CONCENTRATE 17.2 MG: 8.6 TABLET ORAL at 23:19

## 2020-12-17 RX ADMIN — OXYCODONE AND ACETAMINOPHEN 2 TABLET: 5; 325 TABLET ORAL at 06:00

## 2020-12-17 RX ADMIN — OXYCODONE AND ACETAMINOPHEN 1 TABLET: 5; 325 TABLET ORAL at 23:22

## 2020-12-17 RX ADMIN — OXYCODONE AND ACETAMINOPHEN 2 TABLET: 5; 325 TABLET ORAL at 10:55

## 2020-12-17 RX ADMIN — FAMOTIDINE 20 MG: 20 TABLET, FILM COATED ORAL at 23:22

## 2020-12-17 ASSESSMENT — PAIN SCALES - GENERAL
PAINLEVEL_OUTOF10: 10
PAINLEVEL_OUTOF10: 8
PAINLEVEL_OUTOF10: 5
PAINLEVEL_OUTOF10: 0
PAINLEVEL_OUTOF10: 8

## 2020-12-17 ASSESSMENT — ENCOUNTER SYMPTOMS
TROUBLE SWALLOWING: 0
SHORTNESS OF BREATH: 0
ALLERGIC/IMMUNOLOGIC NEGATIVE: 1
SHORTNESS OF BREATH: 1
NAUSEA: 0
BLOOD IN STOOL: 0
VOICE CHANGE: 0
DIARRHEA: 0
CONSTIPATION: 0
BACK PAIN: 1
COUGH: 0

## 2020-12-17 NOTE — PROGRESS NOTES
Standard  Bathroom Accessibility: Walker accessible    ADL Assistance: Independent  Ambulation Assistance: Independent  Transfer Assistance: Independent  Active : Yes  Additional Comments: S/O works during the day, but there will always be someone at the house when S/O is at work. Reports being very Indep PTA, was not using AD for mobility. Pt reports 8 dogs, 8 cats and other animals in the home. Restrictions/Precautions:  Restrictions/Precautions: Weight Bearing, General Precautions, Fall Risk  Right Lower Extremity Weight Bearing: Non Weight Bearing  Left Lower Extremity Weight Bearing: Weight Bearing As Tolerated  Position Activity Restriction  Other position/activity restrictions: No driving, climbing, heavy machine use, swimming x 3 months per neuro     SUBJECTIVE: pt in w/c on arrival, agrees to therapy session    PAIN: not rated during session    OBJECTIVE:  Bed Mobility:  Not Tested    Transfers:  Sit to Stand: Contact Guard Assistance  Stand to [employer] Assistance    Ambulation:  Contact Guard Assistance, with verbal cues , for consistency of hop length on L and crutch placement  Distance: 10ft, 160ft with increased instability last 30ft  Surface: Level Tile  Device:Crutches  Gait Deviations:  Slow Oralia, Decreased Step Length on Left, Decreased Gait Speed, Mild Path Deviations and 2 instances of not clearing crutches while advancing resulting in instability with no LOB. 3 point pattern throughout    Stairs:  Stairs:  6\" steps. X 4 using L ascending rail and both crutches on contralateral side and Contact Guard Assistance, with verbal cues , ascended in retro fashion, cues for sequencing. Platform:  4\" platform X 4 - first 2 trials with B rails in // bars, final 2 trials with crutch and single rail for home simulation.  one rep of each trial performed ascending retro using see above for AD and Contact Guard Assistance, with verbal cues , with increased time for completion. Balance:  Dynamic Standing Balance: Contact Guard Assistance  Pt. Completed standing dynamic balance activity with Normal ZAIRE on level tile and single UE support progressing to no UE support on One Handrail with reaching 2-3 inches outside ZAIRE in all planes with RUE. Activity completed to improve balance, enhance functional mobility and, reduce risk of falls. Exercise:  Patient was guided in 1 set(s) 12 reps of exercise to both lower extremities. Seated hamstring curls with green tband and Long arc quads. Exercises were completed for increased independence with functional mobility. Functional Outcome Measures: Not completed       ASSESSMENT:  Assessment: Patient progressing toward established goals. Activity Tolerance:  Patient tolerance of  treatment: good. Much improved gait mechanics and safety     Equipment Recommendations:Equipment Needed: No(continue to monitor for w/c)  Discharge Recommendations:  Continue to assess pending progress    Plan: Times per week: 5x/wk 90 min, 1x/wk 30 min  Current Treatment Recommendations: Strengthening, ROM, Balance Training, Functional Mobility Training, Transfer Training, Stair training, Gait Training, Endurance Training, Home Exercise Program, Safety Education & Training, Patient/Caregiver Education & Training, Equipment Evaluation, Education, & procurement, Modalities, Wheelchair Mobility Training    Patient Education  Patient Education: Plan of Care, Transfers, Gait, Stairs, Verbal Exercise Instruction    Goals:  Patient goals : go home using crutches  Short term goals  Time Frame for Short term goals: 1 week  Short term goal 1: Pt to perform bed mobility with SBA to promote ease of getting in and out of bed. Short term goal 2: Pt to perform sit<>stand at SBA to improve ability to perform transfers. Short term goal 3: Pt to ambulate > 100ft with crutches with SBA to improve ability to navigate within the home.   Short term goal 4: Pt to negotiate 5 steps with B HR and AD as needed at [address] [address] to improve ability to enter home . Short term goal 5: Pt to negotiate 1 flight of step with L ascending rail and AD as needed at [address] [address] to get to bed and bath. Long term goals  Time Frame for Long term goals : 2 weeks  Long term goal 1: Pt to perform bed mobility at Mod I to get in and out of bed. Long term goal 2: Pt to perform sit<>stand at Mod I to improve ability to perform transfers. Long term goal 3: Pt to ambulate 50ft with crutches at Mod I, >150ft at [facility] for mobility in the home and community  Long term goal 4: Pt to negotiate 5 steps with B HR and AD as needed at S to improve ability to enter home . Long term goal 5: Pt to negotiate 1 flight of step with L ascending rail and AD as needed at S to get to bed and bath. Long term goal 6: pt to perform car transfer at Mod I for transport to home and medical appointments    Following session, patient left in safe position with all fall risk precautions in place.

## 2020-12-17 NOTE — PROGRESS NOTES
[address]  EVALUATION    Time:    Time In: 1100  Time Out: 1230  Timed Code Treatment Minutes: 75 Minutes  Minutes: 90      Date: 2020  Patient Name: Liliane Goodwin,   Gender: male      MRN: [id number]  : 1990  (26 y.o.)  Referring Practitioner: Dr. Martinez Moravian  Diagnosis: Multiple injuries due to trauma  Additional Pertinent Hx: The patient is a 26 y.o. male  who presents with right sided pelvis pain after sustaining a traumatic injury on 2020. Patient has a history of epilepsy and states he was on a roof that day and actually had an epileptic episode while on the roof. This episode caused him to fall off of one tier of the roof and onto the lower tier and eventually off of the lower tier and to the ground. He does not recall the fall and only remembers waking up while on the ground with increased right hip/pelvis pain. He was brought to [facility] ED and further work-up showed a mildly displaced inferior and superior pubic rami fractures on xray and CT scan showed a mildly comminuted minimally displaced right sacral ala fracture. Patient was admitted under trauma services for further monitoring. Pt s/p Right superior and inferior pubic rami fracture, mildly displaced, with adjacent pelvic hematoma, right sacral ala fracture, Right sided pneumothorax, Liver Laceration, possible renal contusion, Fx of left 11th rib, Leukocytosis. \" To IPR on     Restrictions/Precautions:  Restrictions/Precautions: Weight Bearing, General Precautions, Fall Risk  Right Lower Extremity Weight Bearing: Non Weight Bearing  Left Lower Extremity Weight Bearing: Weight Bearing As Tolerated  Position Activity Restriction  Other position/activity restrictions: No driving, climbing, heavy machine use, swimming x 3 months per neuro    Subjective  Chart Reviewed: Yes, Orders, Progress Notes, History and Physical  Patient assessed for rehabilitation services?: Yes    Subjective: Pt seated in wheelchair, agreeable to OT. Dr Beth Reece approved a shower. Pain:  Pain Assessment  Patient Currently in Pain: Yes  Pain Level: 5    Social/Functional History:  Lives With: Significant other(s/o's mother and brother)  Type of Home: House  Home Layout: Two level, Bed/Bath upstairs(~12 steps with L ascending rail to upstairs)  Home Access: Stairs to enter with rails  Entrance Stairs - Number of Steps: 5  Entrance Stairs - Rails: Both(wide - difficut to grasp simaltaneously)  Home Equipment: Rolling walker, Crutches   Bathroom Shower/Tub: Tub/Shower unit, Curtain  Bathroom Toilet: Standard  Bathroom Accessibility: Walker accessible       ADL Assistance: Independent  Ambulation Assistance: Independent  Transfer Assistance: Independent    Active : Yes     Additional Comments: S/O works during the day, but there will always be someone at the house when S/O is at work. Reports being very Indep PTA, was not using AD for mobility. Pt reports 8 dogs, 8 cats and other animals in the home. VISION:WFL    HEARING:  WNL    COGNITION: Decreased Problem Solving, Decreased Safety Awareness and Impaired Attention    RANGE OF MOTION:  Bilateral Upper Extremity:  WNL    STRENGTH:  Bilateral Upper Extremity:  4+/5     ADL:   EATING:Setup or clean-up assistance. Candelario Flores CARE Score: 5. ORAL HYGIENE:Supervision or touching assistance. Candelario Flores CARE Score: 4. TOILETING HYGIENE:Partial/moderate assistance. A to pull up back of pants. CARE Score: 3. SHOWERING/BATHING:Partial/moderate assistance. A for feet. CARE Score: 3.     UPPER BODY DRESSING:Supervision or touching assistance. Candelario Flores CARE Score: 4. LOWER BODY DRESSING:Partial/moderate assistance. A to thread RLE, CGA for standing balance, mod vcs for NWBing. CARE Score: 3. FOOTWEAR:Dependent   . CARE Score: 1.     TOILET TRANSFER: Supervision or touching assistance.   CGA, cues for NWBing as patient tends to touch down R foot for steadying assist.  Edu on transfer tech. CARE Score: 4.      TUB SHOWER TRANSFER: CGA to sit down/stand up from tub transfer bench. SBA as pt able to bring BLEs over tub. Edu on tub transfer bench provided. BALANCE:  Sitting Balance:  Supervision, Stand By Assistance. Standing Balance: Contact Guard Assistance, Minimal Assistance. TRANSFERS:  Sit to Stand: impulsive at times to stand, [employer], Minimal Assistance, X 1, cues for hand placement, with verbal cues, to/from chair with arms. cues for NWBing as patient tends to touch down toes to steady self. OT provided demonstration with improved compliance following demo and cues. Stand to Sit: Contact Guard Assistance, X 1, with increased time for completion, cues for hand placement. pt struggled to maintain [address] RLE when reaching back with RUE first. 2nd trial, pt prompted to reach back with LUE to lower self and able to maintain NWBing during remaining transfers     FUNCTIONAL MOBILITY:  Assistive Device: Crutches   Assist Level:  Contact Guard Assistance, Minimal Assistance, X 1 and with verbal cues . Distance: To and from bathroom  1 loss of balance when pt tends to move at fast pace, cues to slow down for safety. Improved with prompts and education. Assistive Device: Wheelchair  Assist level: supervision  Distance: to and from apartment using BUEs to propell wheelchair. Pt with fair recall of new environment requiring mod vcs for navigation back to room. Anticipate path deviation related to attention. Patient completed IADL homemaking task this date of retrieving items - task was graded to challenge wheelchair maneuverability and safety while maintaining NWBING. Patient was able to retrieve all items from table top, fridge and counter with mod I  and no VCs for safety during the retrieval and transportation of items.  Patient reports the home he shares with his girlfriend is cluttered and a w/c most likely will not fit into spaces due to furniture and numerous pets (8 dogs, 8 cats, guinea pigs and rabbits). OT discussed POC goal to use crutches next session to complete meal prep/ home mgt tasks to improve access to home and IADLs. Pt agreeable     Activity Tolerance:  Patient tolerance of  treatment: good. Assessment:  Assessment: Pt presents to occupational therapy following fall with resultant Right superior and inferior pubic rami fracture, mildly displaced, with adjacent pelvic hematoma, right sacral ala fracture, Right sided pneumothorax, Liver Laceration, possible renal contusion, Fx of left 11th rib, Leukocytosis. with patient complaints of pain, weakness, NWBing RLE impacting patient's ability to complete self care at prior level of functioning. Pt currently requires CGA to light min A during transfer and mobility due to unsteadiness, min A with LB ADLs and CGA for standing tasks. Due to patient's performance deficits, patient would greatly benefit from continued occupational therapy for ADL remediation, endurance building, strengthening and adaptive stragies to return to prior level of functioning and safe return to home environment. Performance deficits / Impairments: Decreased functional mobility , Decreased balance, Decreased ADL status, Decreased endurance, Decreased strength, Decreased safe awareness, Decreased coordination, Decreased high-level IADLs  Prognosis: Good    Treatment Initiated: Treatment and education initiated within context of evaluation. Evaluation time included review of current medical information, gathering information related to past medical, social and functional history, completion of standardized testing, formal and informal observation of tasks, assessment of data and development of plan of care and goals.   Treatment time included skilled education and facilitation of tasks to increase safety and independence with ADL's for improved functional independence and quality of life. Discharge Recommendations:  Home with assist PRN, Home with Home health OT    Patient Education:  OT Education: OT Role, Plan of Care, Precautions, ADL Adaptive Strategies, Transfer Training, Family Education, Equipment    Equipment Recommendations: Other: Recommend tub transfer bench, BSC. Monitor for a reacher. Plan:  Times per week: 5x/wk for 90 min and 1x/wk for 30 min  Current Treatment Recommendations: Strengthening, Balance Training, Functional Mobility Training, Endurance Training, Self-Care / ADL, Patient/Caregiver Education & Training, Safety Education & Training, Equipment Evaluation, Education, & procurement, Home Management Training. See long-term goal time frame for expected duration of plan of care. If no long-term goals established, a short length of stay is anticipated. Goals:  Patient goals : To get home  Short term goals  Time Frame for Short term goals: 1 week  Short term goal 1: Pt will maintain NWBing RLE during transfers on/off toilet with SBA and 0 vcs for safety/NWBing to improve indep with toileting. Short term goal 2: Pt will maintain NWBing during 2 minute unilateral release task to improve clothing mgt and standing ADL tasks. Short term goal 3: Pt will navigate to and from bathroom using crutches with SBA to improve indep with accessing bathroom upstairs. Short term goal 4: Pt will use crutches to prepare light meal with SBA and min vcs for safety to improve indep  with preparing lunch for self at home. Short term goal 5: Pt will complete LB dressing tasks with CGA to improve indep with self care. Long term goals  Time Frame for Long term goals : 2 weeks  Long term goal 1: Pt will complete BADL routine with mod I and 0 vcs for adaptive technique to improve indep with self care. Long term goal 2: Pt will safely complete IADL task with mod I and 0 vcs for safety to improve indep within home.          Following session, patient left in safe position with all fall risk precautions in place.

## 2020-12-17 NOTE — PROGRESS NOTES
[facility]  Speech - Language - Cognitive Evaluation    SLP Individual Minutes  Time In: 1000  Time Out: 1100  Minutes: 60  Timed Code Treatment Minutes: 60 Minutes       Date: 2020  Patient Name: Magdalena Lowery      CSN: [id number]   : 1990  (26 y.o.)  Gender: male   Referring Physician:  Dr. 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 CT of the head was negative for any intracranial process. Per documentation from acute stay, patient has cleared post concussive symptoms. He did sustain mildly displaced inferior and superior pubic rami fractures and a mildly comminuted minimally displaced right sacral ala fracture. Please refer to physician H&P for further details. ST consulted to complete standardized cognitive assessment. Past Medical History:   Diagnosis Date    Hypertension     Seizures ([facility] [address]        Pain: 8/10 - Pain location: Right hip pain. ZENAIDA Montiel notified. Subjective:  Patient seen sitting upright in recliner, pleasant and cooperative. SOCIAL HISTORY:   Living Arrangements: Lives with girlfriend   Work History: Works in construction  Education Level: 12th grade  Driving Status: Active   Finance Management: Independent  Medication Management: Independent  ADL's: Independent.    Vision Status: WNL  Hearing: WNL  Type of Home: House  Home Layout: Two level, Bed/Bath upstairs(~12 steps with L ascending rail to upstairs)  Home Access: Stairs to enter with rails  Entrance Stairs - Number of Steps: 5  Entrance Stairs - Rails: Both(wide - difficut to grasp simaltaneously)  Home Equipment: Rolling walker, functioning. Question some underlying difficulty with cognition given history of epilepsy, however given patient is an active , works and manages his own finances/household responsibilities, recommend inpatient rehab speech therapy services to focus on areas of deficit outlined above to maximize cognitive functioning. Rehabilitation Potential: excellent    EDUCATION:  Learner: Patient  Education:  Reviewed results and recommendations of this evaluation and Reviewed ST goals and Plan of Care  Evaluation of Education: Verbalizes understanding, Needs further instruction and Family not present    PLAN:  Skilled SLP intervention on IP Rehab or TCU 30 minutes per day 5 days per week. Specific interventions for next session may include: Functional math, Attention, Problem solving    PATIENT GOAL:    Return to prior level of function. SHORT TERM GOALS:  Short-term Goals  Timeframe for Short-term Goals: 1 week  Goal 1: Patient will complete functional problem solving, reasoning and information processing tasks (including math computation related to measurements for job and finances) with 75% accuracy, mod cues to improve logical thinking and processing skills needed for return to work and household functions. Goal 2: Patient will complete higher level executive functioning and thought organization tasks with 80% accuracy given min cues in order to independently complete ADLs/IADL's  Goal 3: Patient will complete higher level attention (selective, alternating, divided) tasks with no more than 3 errors in a 3-5 minute time frame to improve skills needed for return to driving and work related tasks. LONG TERM GOALS:  Long-term Goals  Timeframe for Long-term Goals: 2 weeks  Goal 1: Patient will improve cognitive functioning to a modified independent level to improve skills needed for successful return to work and household responsibilties. Yamilex Slot.  [address], [address], [address] [place]

## 2020-12-17 NOTE — PROGRESS NOTES
[facility]  Individualized Disclosure Statement      Patient: Andrea President      Scope of Service  [facility] provides 24 hour individualized service to patients with functional limitations due to, but not limited to: stroke, brain injury, spinal cord injury, major multiple trauma, fractures, amputation, and neurological disorders. The [address] [place] [address] provides rehabilitative nursing and medical services as well as physical, occupational, speech, and recreation therapies. [facility] [facility] is fully accredited by the Commission on Accreditation of Rehabilitation Facilities (CARF) as a comprehensive provider of rehabilitation services. Patients admitted to the [address] [place] [address] receive a minimum of three hours of therapy per day, at least six days per week, with a revised therapy schedule on weekends and holidays. Physical therapy, occupational therapy, and speech therapy are provided seven days per week including holidays. Other therapeutic services are available on weekends and evenings as needed or scheduled. Intensity of Treatment  Your treatment program will consist of Nursing Care and:  1.5 hours of Physical Therapy, per day  1.5 hours of Occupational Therapy, per day   30-60 minutes of Speech Therapy, per day  1 hour of Recreational Therapy, per week    [address] [address] maintains contracts with most insurance plans. Depending on the type of coverage, the insurance may impose limits on the coverage for rehabilitation care. Coverage is based on the premise that you are able to fully participate in the rehabilitation program and show continued progress. Please verify your own insurance information A copy of this was given to the patient/ family on this date.   Insurance Coverage  Your insurance company has made the following determination relative to the length of your stay:   Your estimated length of stay is 14 days   Your insurance Coverage has been verified as follows:    Primary Insurance:[place]   Deductible:  Coverage:active  Secondary Insurance:;secondary insurance policies often cover co-pay amounts, but to ensure payment please contact your insurance company.     Alternative Resources: Please ask the  for more information [phone]

## 2020-12-17 NOTE — PLAN OF CARE
education, O2 saturation management, energy conservation, stress management techniques, fall prevention, alarms protocol, seating and positioning, skin/wound care, pressure relief instruction, dressing changes, infection protection, DVT prophylaxis, assistance with safe transfers , and/or assistance with bathroom activities and hygiene. PHYSICAL THERAPY:  Goals:        Short term goals  Time Frame for Short term goals: 1 week  Short term goal 1: Pt to perform bed mobility with SBA to promote ease of getting in and out of bed. Short term goal 2: Pt to perform sit<>stand at SBA to improve ability to perform transfers. Short term goal 3: Pt to ambulate > 100ft with crutches with SBA to improve ability to navigate within the home. Short term goal 4: Pt to negotiate 5 steps with B HR and AD as needed at [facility] to improve ability to enter home . Short term goal 5: Pt to negotiate 1 flight of step with L ascending rail and AD as needed at [facility] to get to bed and bath. Long term goals  Time Frame for Long term goals : 2 weeks  Long term goal 1: Pt to perform bed mobility at Mod I to get in and out of bed. Long term goal 2: Pt to perform sit<>stand at [facility] I to improve ability to perform transfers. Long term goal 3: Pt to ambulate 50ft with crutches at [facility] I, >150ft at A for mobility in the home and community  Long term goal 4: Pt to negotiate 5 steps with B HR and AD as needed at S to improve ability to enter home . Long term goal 5: Pt to negotiate 1 flight of step with L ascending rail and AD as needed at S to get to bed and bath.   Long term goal 6: pt to perform car transfer at [facility] I for transport to home and medical appointments    Plan of Care:  Patient to be seen by physical therapy services 90 minutes per day Monday through Friday and 30 minutes on Saturday or Sunday    Anticipated interventions may include therapeutic exercises, gait training, neuromuscular re-ed, transfer training, community reintegration, bed mobility, w/c mobility and training. OCCUPATIONAL THERAPY:  Goals:             Short term goals  Time Frame for Short term goals: 1 week  Short term goal 1: Pt will maintain NWBing RLE during transfers on/off toilet with SBA and 0 vcs for safety/NWBing to improve indep with toileting. Short term goal 2: Pt will maintain NWBing during 2 minute unilateral release task to improve clothing mgt and standing ADL tasks. Short term goal 3: Pt will navigate to and from bathroom using crutches with SBA to improve indep with accessing bathroom upstairs. Short term goal 4: Pt will use crutches to prepare light meal with SBA and min vcs for safety to improve indep  with preparing lunch for self at home. Short term goal 5: Pt will complete LB dressing tasks with CGA to improve indep with self care. Long term goals  Time Frame for Long term goals : 2 weeks  Long term goal 1: Pt will complete BADL routine with mod I and 0 vcs for adaptive technique to improve indep with self care. Long term goal 2: Pt will safely complete IADL task with mod I and 0 vcs for safety to improve indep within home. Plan of Care:  Patient to be seen by occupational therapy services 90 minutes per day Monday through Friday and 30 minutes on Saturday or Sunday    Anticipated interventions may include ADL and IADL retraining, strengthening, safety education and training, patient/caregiver education and training, equipment evaluation/ training/procurement, neuromuscular reeducation, wheelchair mobility training. SPEECH THERAPY:   Goals:  Short-term Goals  Timeframe for Short-term Goals: 1 week  Goal 1: Patient will complete functional problem solving, reasoning and information processing tasks (including math computation related to measurements for job and finances) with 75% accuracy, mod cues to improve logical thinking and processing skills needed for return to work and household functions.   Goal 2: Patient will complete higher level executive functioning and thought organization tasks with 80% accuracy given min cues in order to independently complete ADLs/IADL's  Goal 3: Patient will complete higher level attention (selective, alternating, divided) tasks with no more than 3 errors in a 3-5 minute time frame to improve skills needed for return to driving and work related tasks. Timeframe for Short-term Goals: 1 week    Plan of Care: Patient to be seen by speech therapy services 30 minutes per day Monday through Friday     Anticipated interventions may include speech/language/communication therapy, cognitive training, group therapy, education, and/or dysphagia therapy based on the above goals. CASE MANAGEMENT:  Goals:   Assist patient/family with discharge planning, patient/family counseling,  and coordination with insurance during the inpatient rehabilitation stay. Other members of the multidisciplinary rehabilitation team that will be involved in the patient's plan of care include recreational therapy, dietary, respiratory therapy, and neuropsychology. Medical issues being managed closely and that require 24 hour availability of a physician:  Bowel/Bladder function, Weight bearing precautions, Wound care, Pain management, Infection protection, DVT prophylaxis, Fall precautions, Fluid/Electrolyte balance, Nutritional status and Anemia                                           Physician anticipated functional outcomes: Improved independence with functional measures   Estimated length of stay for this admission 10 days. Medical Prognosis: Good  Anticipated disposition: Home. The potential to achieve the above medical and rehabilitative goals is very good. This plan of care has been developed with the assistance and input of the multidisciplinary rehabilitation team.  The plan was reviewed with the patient.   The patient has had the opportunity to provide input to the therapy team.    I have reviewed this Individualized Plan of Care and agree with its contents. Above documentation has been expanded, modified, adjusted to reflect the findings of my evaluations and goals for the patient.     Physician:  Nader Orantes MD

## 2020-12-17 NOTE — PROGRESS NOTES
[address] [address]  INPATIENT PHYSICAL THERAPY  EVALUATION  [facility] - [id number]    Time In: 0730  Time Out: 0830  Timed Code Treatment Minutes: 50 Minutes  Minutes: 60     Date: 2020  Patient Name: Mally Hodges,  Gender:  male        MRN: [id number]  : 1990  (26 y.o.)      Referring Practitioner: ELIAZAR Lund MD  Diagnosis: Multiple injuries due to trauma  Additional Pertinent Hx: Per H&P, \"The patient is a 26 y.o. male  who presents with right sided pelvis pain after sustaining a traumatic injury on 2020. Patient has a history of epilepsy and states he was on a roof that day and actually had an epileptic episode while on the roof. This episode caused him to fall off of one tier of the roof and onto the lower tier and eventually off of the lower tier and to the ground. He does not recall the fall and only remembers waking up while on the ground with increased right hip/pelvis pain. He was brought to [facility] ED and further work-up showed a mildly displaced inferior and superior pubic rami fractures on xray and CT scan showed a mildly comminuted minimally displaced right sacral ala fracture. Patient was admitted under trauma services for further monitoring. Pt s/p Right superior and inferior pubic rami fracture, mildly displaced, with adjacent pelvic hematoma, right sacral ala fracture, Right sided pneumothorax, Liver Laceration, possible renal contusion, Fx of left 11th rib, Leukocytosis. \" To [facility] on      Restrictions/Precautions:  Restrictions/Precautions: Weight Bearing, General Precautions, Fall Risk  Right Lower Extremity Weight Bearing: Non Weight Bearing  Left Lower Extremity Weight Bearing: Weight Bearing As Tolerated    Subjective:  Chart Reviewed: Yes  Patient assessed for rehabilitation services?: Yes  Subjective: pt in chair on arrival, agrees to therapy session    General:       Vision: Impaired(reports intermittent blurry improve level of independence with maintenance of NWB of RLE         Discharge Recommendations:  Discharge Recommendations: Continue to assess pending progress    Patient Education:  PT Education: Goals, PT Role, Plan of Care, Home Exercise Program, Transfer Training, Gait Training, Functional Mobility Training, Weight-bearing Education    Equipment Recommendations:  Equipment Needed: No(continue to monitor for w/c)    Plan:  Times per week: 5x/wk 90 min, 1x/wk 30 min  Current Treatment Recommendations: Strengthening, ROM, Balance Training, Functional Mobility Training, Transfer Training, Stair training, Gait Training, Endurance Training, Home Exercise Program, Safety Education & Training, Patient/Caregiver Education & Training, Equipment Evaluation, Education, & procurement, Modalities, Wheelchair Mobility Training    Goals:  Patient goals : go home using crutches  Short term goals  Time Frame for Short term goals: 1 week  Short term goal 1: Pt to perform bed mobility with SBA to promote ease of getting in and out of bed. Short term goal 2: Pt to perform sit<>stand at SBA to improve ability to perform transfers. Short term goal 3: Pt to ambulate > 100ft with crutches with SBA to improve ability to navigate within the home. Short term goal 4: Pt to negotiate 5 steps with B HR and AD as needed at [facility] to improve ability to enter home . Short term goal 5: Pt to negotiate 1 flight of step with L ascending rail and AD as needed at [facility] to get to bed and bath. Long term goals  Time Frame for Long term goals : 2 weeks  Long term goal 1: Pt to perform bed mobility at Mod I to get in and out of bed. Long term goal 2: Pt to perform sit<>stand at Mod I to improve ability to perform transfers. Long term goal 3: Pt to ambulate 50ft with crutches at Mod I, >150ft at SBA for mobility in the home and community  Long term goal 4: Pt to negotiate 5 steps with B HR and AD as needed at S to improve ability to enter home .   Long

## 2020-12-17 NOTE — PROGRESS NOTES
[address] [address]  Recreational Therapy  Evaluation  Inpatient Rehabilitation Unit      Time Spent with Patient: 15 minutes    Date:  12/17/2020       Patient Name: Denise Ferraro      MRN: [id number]       YOB: 1990 (26 y.o.)       Gender: male  Diagnosis: Multiple injuries due to trauma  Referring Practitioner: Dr. Selena Pallas    RESTRICTIONS/PRECAUTIONS:  Restrictions/Precautions: Weight Bearing, General Precautions, Fall Risk  Vision: Impaired(reports intermittent blurry vision)  Hearing: Within functional limits    PAIN: 8-just finished with therapy    SUBJECTIVE:  Pt lives with his girlfriend, her mom and her brother     VISION:  Visual Impairment-blurred vision    HEARING: Within Normal Limit    LEISURE INTERESTS:   Pt states he likes to [employer] and clean, he works construction, he does his own laundry, gave him some word search puzzles and a deck of cards to play solitaire -pleasant and appreciative -states he moved here from [place] [employer] he was living with his boss but they had a falling out and now lives with girlfriend and her family    BARRIERS TO LEISURE INTERESTS:    Decreased endurance, Impaired vision and Pain      Patient Education  New Education Provided: Importance of Leisure, RT Plan of Care    Plan:  Continue to follow patient through this admission  See patient individually    Electronically signed by: REX Wilhelm  Date: 12/17/2020

## 2020-12-17 NOTE — PLAN OF CARE
Problem: DISCHARGE BARRIERS  Goal: Patient's continuum of care needs are met  Note:   [address] [address]  Physical Medicine Case Management Assessment    [x] Inpatient Rehabilitation Unit  [] Transitional Care Unit    Patient Name: Gregor Price        MRN: [id number]    : 1990  (26 y.o.)  Gender: male     Date of Admission: 2020      Family/Social/Home Environment: Prior to accident and hospitalization, patient was independent with ADL's and personal care. Patient lives with girlfriend, Apryl Serrano, and her mother and brother. Patient was working independently in construction. Patient was able to drive, manage own finances, complete errands, laundry, meal preparation, and housekeeping. Patient indicates girlfriend, Apryl Serrano, works, but will have someone available throughout the day if girlfriend is not available. Social/Functional History  Lives With: Significant other(s/o's mother and brother)  Type of Home: House  Home Layout: Two level, Bed/Bath upstairs(~12 steps with L ascending rail to upstairs)  Home Access: Stairs to enter with rails  Entrance Stairs - Number of Steps: 5  Entrance Stairs - Rails: Both(wide - difficut to grasp simaltaneously)  Home Equipment: Rolling walker, Crutches  Ambulation Assistance: Independent  Transfer Assistance: Independent  Active : Yes  Additional Comments: S/O works during the day, but there will always be someone at the house when S/O is at work. Reports being very Indep PTA, was not using AD for mobility. Contact/Guardian Information: GirlienTelly lema, [phone]. Piter Agosto, [phone] (lives in [place]). Community Resources Utilized: No community resources utilized prior to admission. Sexuality/Intimacy: No issues or concerns identified at time of SW assessment. Complementary Health Approaches: Patient with no current interest in complementary health approaches at time of SW assessment. Anticipated Needs/Discharge Plans: Anticipate patient would benefit from continued therapy upon discharge. SW met with patient to introduce self and explain role, complete SW assessment, and initiate discharge planning. Prior to accident and hospitalization, patient was independent with ADL's and personal care. Patient lives with girlfriend, Fan Collins, and her mother and brother. Patient was working independently in construction. Patient was able to drive, manage own finances, complete errands, laundry, meal preparation, and housekeeping. Patient indicates girlfriend, Fan Collins, works, but will have someone available throughout the day if girlfriend is not available. Anticipate patient would benefit from continued therapy upon discharge. SW reviewed with patient team conference on Monday, 12/21/2020, to further discuss progress and discharge recommendations. SW to follow and maintain involvement in discharge planning.             Discharge Planning  Living Arrangements: Spouse/Significant Other  Support Systems: Spouse/Significant Other  Potential Assistance Needed: N/A  Potential Assistance Purchasing Medications: No  Meds-to-Beds: Does the patient want to have any new prescriptions delivered to bedside prior to discharge?: No  Patient expects to be discharged to[de-identified] home  Expected Discharge Date: (Undetermined)  Follow Up Appointment: Best Day/Time : Wednesday PM    Electronically signed by JORGE Gardner on 12/17/2020 at 9:36 AM

## 2020-12-17 NOTE — H&P
was discontinued on 12/14 secondary to a leak, on 12/15 he had a persistent 5% pneumothorax on the right and a pigtail has been placed by Interventional Radiology on 12/14 and was removed on 12/16 with interval imaging showing no presence of the pneumothorax at this time. On initial consultation exam the patient was seen resting comfortably in his hospital bed. He stated that his pain was well controlled with his current pain medications and did not provide pain scale rating. He currently has the pigtail catheter on the right chest wall with a very small amount of fluid noted in the collection container. The potential for coming to the acute inpatient rehabilitation unit was discussed  and he felt that this would be beneficial.  He did understand that under his [facility] benefits he would have to be approved to do so. He remained in agreement with pursuing this option as part of his recovery from his injuries. In the interim the pigtail drain was removed from the right chest wall with patient stating he does not have any issues breathing or any significant pain over the right chest wall. His last bowel movement was noted to have been today. He states that his pain is well controlled at this time. The expectations for the acute inpatient rehabilitation unit were discussed and all of his questions were answered to his satisfaction. Previously was independent of ADLs and used no AD for ambulation prior to recent admission. Initially has ambulated 20' with crutches at contact-guard assistance with PT. With therapy is standby assistance for bed mobility, standby assistance for transfers, and contact-guard assistance with balance.     ROM restrictions, WB restrictions, precautions:      Fall Risk    Current Rehabilitation Assessments:  Physical Therapy:  OBJECTIVE:  Bed Mobility:  Supine to Sit: Stand By Assistance  Scooting: Stand By Assistance     Transfers:  Sit to Stand: Stand By Assistance, Contact Guard Assistance  Stand to Sit:Stand By Assistance, [employer], with verbal cues  Pt stood on RLE, put down crutches and turned to sit- educated pt on propr technique. Pt NWB to LLE throughout     Ambulation:  Stand By Assistance, [employer], with verbal cues   Distance: 20ft  Surface: Level Tile  Device:Crutches  Gait Deviations:  Slow Oralia, Decreased Step Length Bilaterally, Decreased Gait Speed, Decreased Heel Strike Bilaterally, Unsteady Gait and ues to slow down, pt switching between 3 point and 4 point gait patterns     Exercise:  Patient was guided in 1 set(s) 10 reps of exercise to both lower extremities. Ankle pumps, Glut sets, Quad sets, Heelslides and Hip abduction/adduction. Exercises were completed for increased independence with functional mobility.     Functional Outcome Measures: Completed  AM-PAC Inpatient Mobility Raw Score : 15  AM-PAC Inpatient T-Scale Score : 39.45     ASSESSMENT:  Assessment: Patient progressing toward established goals. Activity Tolerance:  Patient tolerance of  treatment: good. Pt limited by pain, needing rest breaks throughout session     Equipment Recommendations:Equipment Needed: No  Discharge Recommendations:  IP Rehab 1     Occupational Therapy:  COGNITION: Slow Processing and Decreased Insight     ADL:   No ADL's completed this session.     BALANCE:  Sitting Balance:  Supervision. EOB  Standing Balance: Stand By Assistance, [employer]. x1 minute in prep for mobility.     BED MOBILITY:  Supine to Sit: Stand By Assistance    Scooting: Stand By Assistance EOB     TRANSFERS:  Sit to Stand:  Stand By Assistance. Stand to Sit: Stand By Assistance.       FUNCTIONAL MOBILITY:  Assistive Device: Rolling Walker  Assist Level:  Stand By Assistance and [employer]. Distance: Completed functional mobility x3 laps within pt room at slow pace, no LOB noted.  Pt with good adherance to NWB throughout using RLE as balance during standing rest breaks- lenghty seated rest break after trial of mobility, mod-min fatigue noted.     ADDITIONAL ACTIVITIES:  Patient identified a personal goal to increase UB strength and improve overall endurance so they can complete their toilet & shower transfers; skilled edu on UE strengthening. Completed BUE AROM exercises x10 reps x1 sets in all available joints/planes to increase strength and endurance required for ADLs- pt limited by chest tube so resistance omitted at this time. Pt required brief rest break between each exercise and min v/c for proper technique.     ASSESSMENT:  Activity Tolerance:  Patient tolerance of  treatment: good. Discharge Recommendations: Continue to assess pending progress   Equipment Recommendations: Other: BSC, walker, continue to assess needs    Speech Therapy:  RECOMMENDATIONS/ASSESSMENT:  DIAGNOSTIC IMPRESSIONS: Pt demonstrated evidence of a mild cognitive-communication deficit characterized by decreased short term recall and math computation. Pt with initial errors during 2/3 executive functioning tasks, however, independently identified and corrected errors. Pt does report he has noticed intermittent deficits with memory possibly correlated to seizures. It is recommended pt continue to received skilled ST services to address aforementioned cognitive deficits in order for pt to safely d/c to least restrictive environment and return to work. Rehabilitation Potential: good    Past Medical History:      Diagnosis Date    Closed fracture of one rib of left side with routine healing 12/17/2020    Hypertension     Liver laceration, closed, initial encounter 12/17/2020    Multiple closed fractures of pelvis with stable disruption of pelvic ring with routine healing 12/17/2020    Pneumothorax, traumatic     Seizures ([facility] [address]      Primary care provider: No primary care provider on file.      Past Surgical History:      Procedure Laterality Date    CT GUIDED CHEST TUBE  12/14/2020    CT GUIDED CHEST TUBE 12/14/2020 [facility] CT SCAN    WISDOM TOOTH EXTRACTION       Allergies:    Dilantin [phenytoin sodium extended]    Current Medications:    Current Facility-Administered Medications: acetaminophen (TYLENOL) tablet 650 mg, 650 mg, Oral, Q4H PRN  famotidine (PEPCID) tablet 20 mg, 20 mg, Oral, BID  [START ON 12/17/2020] lidocaine 4 % external patch 1 patch, 1 patch, Transdermal, Daily  polyethylene glycol (GLYCOLAX) packet 17 g, 17 g, Oral, Daily PRN  promethazine (PHENERGAN) tablet 12.5 mg, 12.5 mg, Oral, Q6H PRN **OR** ondansetron (ZOFRAN) injection 4 mg, 4 mg, Intravenous, Q6H PRN  sodium chloride flush 0.9 % injection 10 mL, 10 mL, Intravenous, 2 times per day  sodium chloride flush 0.9 % injection 10 mL, 10 mL, Intravenous, PRN  carBAMazepine (TEGRETOL) tablet 200 mg, 200 mg, Oral, BID  cyclobenzaprine (FLEXERIL) tablet 10 mg, 10 mg, Oral, BID PRN  docusate sodium (COLACE) capsule 100 mg, 100 mg, Oral, BID  [START ON 12/17/2020] naloxegol (MOVANTIK) tablet 12.5 mg, 12.5 mg, Oral, QAM  [START ON 12/17/2020] nicotine (NICODERM CQ) 21 MG/24HR 1 patch, 1 patch, Transdermal, Daily  oxyCODONE-acetaminophen (PERCOCET) 5-325 MG per tablet 1 tablet, 1 tablet, Oral, Q4H PRN **OR** oxyCODONE-acetaminophen (PERCOCET) 5-325 MG per tablet 2 tablet, 2 tablet, Oral, Q4H PRN  [START ON 12/17/2020] polyethylene glycol (GLYCOLAX) packet 17 g, 17 g, Oral, Daily  senna (SENOKOT) tablet 17.2 mg, 2 tablet, Oral, Nightly  sucralfate (CARAFATE) tablet 1 g, 1 g, Oral, BID     Social History:   reports that he has been smoking cigarettes. He has never used smokeless tobacco. He reports current alcohol use of about 1.0 standard drinks of alcohol per week. He reports current drug use. Drug: Opiates . Lives at [address] [address].      Social/Functional History  Lives With: Significant other(s/o's mother and brother)  Type of Home: House  Home Layout: Two level, Bed/Bath upstairs(~12 steps with L ascending rail to upstairs)  Home Access: Stairs to enter with rails  Entrance Stairs - Number of Steps: 5  Entrance Stairs - Rails: Both(wide - difficut to grasp simaltaneously)  Bathroom Shower/Tub: Tub/Shower unit, Curtain  Bathroom Toilet: Standard  Bathroom Accessibility: Walker accessible  Home Equipment: Rolling walker, Crutches  ADL Assistance: Independent  Ambulation Assistance: Independent  Transfer Assistance: Independent  Active : Yes  Additional Comments: S/O works during the day, but there will always be someone at the house when S/O is at work. Reports being very Indep PTA, was not using AD for mobility. Pt reports 8 dogs, 8 cats and other animals in the home. Functional History:  Prior Function  ADL Assistance: Independent  Ambulation Assistance: Independent  Transfer Assistance: Independent  Additional Comments: S/O works during the day, but there will always be someone at the house when S/O is at work. Reports being very Indep PTA, was not using AD for mobility. Pt reports 8 dogs, 8 cats and other animals in the home. Family History:   History reviewed. No pertinent family history. Review of Systems:  Review of Systems   Constitutional: Positive for activity change. Negative for appetite change, chills, fatigue and fever. HENT: Negative for trouble swallowing and voice change. Eyes: Negative for visual disturbance. Respiratory: Negative for cough and shortness of breath. Cardiovascular: Positive for chest pain (rib pain). Gastrointestinal: Negative for constipation, diarrhea and nausea. Endocrine: Negative for cold intolerance. Genitourinary: Negative for frequency and urgency. Musculoskeletal: Positive for back pain, gait problem and myalgias. Skin: Negative for pallor. Allergic/Immunologic: Negative. Neurological: Positive for seizures and weakness. Negative for dizziness, tremors, speech difficulty and headaches.    Hematological: Negative. Psychiatric/Behavioral: Negative for confusion, decreased concentration, dysphoric mood and hallucinations. The patient is not nervous/anxious. All other systems reviewed and are negative. Physical Exam:  /67   Pulse 77   Temp 98.4 °F (36.9 °C) (Oral)   Resp 18   Ht 5' 7\" (1.702 m)   Wt 131 lb 6.4 oz (59.6 kg)   SpO2 97%   BMI 20.58 kg/m²     awake  Orientation:   person, place, time, situation  Mood: within normal limits  Affect: calm  General appearance: Patient is well nourished, well developed, well groomed and in no acute distress, appearing stated age, up in chair    Memory:   Grossly normal,   Attention/Concentration: grossly normal  Language:  normal    Cranial Nerves:  cranial nerves II-XII are grossly intact  ROM:  abnormal -right pubic rami fractures    Motor Exam:  Motor exam is 5 out of 5 all extremities with the exception of right hip not tested    Tone:  normal  Muscle bulk: within normal limits  Sensory:  Sensory intact  Coordination:   normal  Deep Tendon Reflexes:  Reflexes are intact and symmetrical bilaterally    Skin: warm and dry, no rash or erythema and abrasion over right forehead blending into the hairline at the temple  Peripheral vascular: Pulses: Normal upper and lower extremity pulses; Edema: no    Diagnostics:  No results found for this or any previous visit (from the past 24 hour(s)). Recent Labs     12/15/20  0752 12/14/20  1224 12/14/20  1218 12/13/20  0445   WBC 8.3 10.9*  --  9.3   HGB 13.8* 13.5* 13.4* 12.9*   HCT 37.7* 37.0* 36.4* 38.9*   MCV 91.7 90.9  --  100.5*    112*  --  143     Impression:  1. Fall from second story roof secondary to having active seizure. 2. Noncompliance with seizure medications. 3. History of seizures since age 13.  3. Traumatic brain injury with loss of consciousness of 15 minutes. 5. Mild cognitive-communication deficit. (MOCA) version 7.2 completed.   Patient scored 25/30.   6. Right superior and inferior pubic rami fractures on the right with mild displacement and pelvic hematoma. 7. Gait instability. 8. Decreased ability for ADLs. 9. Posttraumatic pain. 10. Right sacral alae fracture with involvement of the S1, S2, and S3 neural foramina. 11. Nondisplaced fracture of the posterior medial aspect of the left 11th rib. 12. Liver laceration. 13. Renal contusion. 14. Right-sided pneumothorax requiring placement of chest tube and subsequent placement of pigtail catheter by Interventional Radiology. 15. Prior history of IV drug abuse, sober x1 year. 16. Current smoker. Plan:   The patient demonstrates good potential to participate in an inpatient rehabilitation program involving at least 3 hours per day, 5 days per week of intensive rehabilitation. Rehabilitation services will include PT, OT and SLP/RT in order to improve functional status prior to discharge. Family education and training will be completed. Equipment evaluations and recommendations will be completed as appropriate. Medical management: Per primary team and Dr. Young Eugene. Consultants: Surgery, Interventional Radiology, Neurology, Orthopedic Surgery, Family Medicine, Physical Medicine    Narcotic usage: Oxycodone last 24 hour usage pending. Last BM:    12/16    Acute/Rehabilitation Problems:  1. Fall from second story roof secondary to having active seizure. 2. Noncompliance with seizure medications. 1. Patient started on Tegretol by Neurology. 2. Seizure precautions. 3. 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 1. (MOCA) version 7.2 completed. Patient scored 25/30. 2. SLP. 3. TBI guidelines if indicated. 4. Right superior and inferior pubic rami fractures on the right with mild displacement and pelvic hematoma. 1. NWB to right lower limb, WBAT left lower limb. 5. Gait instability/Decreased ability for ADLs. 1. PT/OT.   6. Posttraumatic pain.  1. Flexeril. 2. Percocet. 3. Tylenol. 4. Lidoderm patches. 7. Right sacral alae fracture with involvement of the S1, S2, and S3 neural foramina. 1. Pain control as above. 8. Nondisplaced fracture of the posterior medial aspect of the left 11th rib. 1. Pain control as above. 9. Liver laceration. 1. Stable. 10. Renal contusion. 1. Stable. 11. Right-sided pneumothorax requiring placement of chest tube and subsequent placement of pigtail catheter by Interventional Radiology. 1. Interval chest x-ray on 12/16 was absent sign of pneumothorax after pigtail was removed on 12/16. 2. Trauma Surgery has ordered follow-up imaging for 12/17. 12. Nutrition:  Consultation to dietician for nutritional counseling and recommendations. 1. CMP pending. 13. Electrolytes. 14. Bladder: No issues. 15. Bowel: Senna, colace, MOM  16. Rehabilitation nursing will be involved for bowel, bladder, skin, and pain management. Nursing will also provide education and training to patient and family. 17. Prophylaxis:  DVT: SCDs. GI: Sucralfate/Pepcid. 18.  and case management consultations for coordination of care and discharge planning. Chronic Problems:  1. History of seizures since age 13.  2. Prior history of IV drug abuse, sober x1 year. Same  1. Wean patient off of Percocet prior to discharge given his history. 3. Current smoker. 1. NicoDerm patch. Labs reviewed on:  1. 12/15. Infectious Disease:  1. N/A. The main medical problem(s) and comorbidities being actively managed by the physicians and requiring 24 hour rehabilitation nursing care during this stay include wound care, pain control, bowel management, seizure management, and maintenance of weightbearing precautions.       The domains of functional impairment present in this patient which will require an intensive and interdisciplinary rehabilitation environment include self care, mobility, bowel/bladder management, pain management, safety and cognitive function. Estimated length of stay for this admission 10 days. Anticipated disposition: Home. The potential to achieve that is very good. The post admission physician evaluation (JIMMIE) is consistent with the pre-admission assessment. See above findings to reflect the elements required in the JIMMIE. Patient's admitting condition is consistent with the findings of the preadmission assessment by the rehabilitation admissions coordinator. Greater than 50% of 70 minutes was spent formulating an acute inpatient rehabilitation unit admission plan, reviewing imaging, labs, the electronic medical record, and examining and answering the patient's questions in detail.     Prachi Dalal MD

## 2020-12-17 NOTE — PROGRESS NOTES
Physical Medicine & Rehabilitation  Progress Note    Chief Complaint:  Fall from roof resulting in multiple trauma    Subjective: Today he states that his pain is well controlled with his current pain regimen with pain being rated as a 4 out of 10 primarily at the right pelvis. He has continued walking with crutches with Physical Therapy. His labs were discussed with encouragement for him to make sure that he eats all his meals to maintain adequate reserves for healing his injuries. He did not have any additional specific concerns or questions. Rehabilitation:   Physical Therapy:  OBJECTIVE:  Bed Mobility:  Not Tested     Transfers:  Sit to Stand: Contact Guard Assistance  Stand to [employer] Assistance     Ambulation:  Contact Guard Assistance, with verbal cues , for consistency of hop length on L and crutch placement  Distance: 10ft, 160ft with increased instability last 30ft  Surface: Level Tile  Device:Crutches  Gait Deviations:  Slow Oralia, Decreased Step Length on Left, Decreased Gait Speed, Mild Path Deviations and 2 instances of not clearing crutches while advancing resulting in instability with no LOB. 3 point pattern throughout     Stairs:  Stairs:  6\" steps. X 4 using L ascending rail and both crutches on contralateral side and Contact Guard Assistance, with verbal cues , ascended in retro fashion, cues for sequencing. Platform:  4\" platform X 4 - first 2 trials with B rails in // bars, final 2 trials with crutch and single rail for home simulation. one rep of each trial performed ascending retro using see above for AD and Contact Guard Assistance, with verbal cues , with increased time for completion.     Balance:  Dynamic Standing Balance: Contact Guard Assistance  Pt. Completed standing dynamic balance activity with Normal ZAIRE on level tile and single UE support progressing to no UE support on One Handrail with reaching 2-3 inches outside ZAIRE in all planes with RUE.   Activity completed to improve balance, enhance functional mobility and, reduce risk of falls.      Exercise:  Patient was guided in 1 set(s) 12 reps of exercise to both lower extremities. Seated hamstring curls with green tband and Long arc quads. Exercises were completed for increased independence with functional mobility.     Functional Outcome Measures: Not completed     ASSESSMENT:  Assessment: Patient progressing toward established goals. Activity Tolerance:  Patient tolerance of  treatment: good. Much improved gait mechanics and safety     Equipment Recommendations:Equipment Needed: No(continue to monitor for w/c)  Discharge Recommendations:  Continue to assess pending progress    Occupational Therapy:  COGNITION: Decreased Problem Solving, Decreased Safety Awareness and Impaired Attention     RANGE OF MOTION:  Bilateral Upper Extremity:  WNL     STRENGTH:  Bilateral Upper Extremity:  4+/5      ADL:   EATING:Setup or clean-up assistance. Fionann Maglida CARE Score: 5. ORAL HYGIENE:Supervision or touching assistance. Robbielynn Magic CARE Score: 4. TOILETING HYGIENE:Partial/moderate assistance. A to pull up back of pants. CARE Score: 3. SHOWERING/BATHING:Partial/moderate assistance. A for feet. CARE Score: 3.     UPPER BODY DRESSING:Supervision or touching assistance. Darlynn Magic CARE Score: 4. LOWER BODY DRESSING:Partial/moderate assistance. A to thread RLE, CGA for standing balance, mod vcs for NWBing. CARE Score: 3. FOOTWEAR:Dependent   . CARE Score: 1.     TOILET TRANSFER: Supervision or touching assistance. CGA, cues for NWBing as patient tends to touch down R foot for steadying assist.  Edu on transfer tech. CARE Score: 4.      TUB SHOWER TRANSFER: CGA to sit down/stand up from tub transfer bench. SBA as pt able to bring BLEs over tub. Edu on tub transfer bench provided.      BALANCE:  Sitting Balance:  Supervision, Stand By Assistance. Standing Balance: Contact Guard Assistance, Minimal Assistance.    TRANSFERS:  Sit to Stand: impulsive at times to stand, [employer], Minimal Assistance, X 1, cues for hand placement, with verbal cues, to/from chair with arms. cues for NWBing as patient tends to touch down toes to steady self. OT provided demonstration with improved compliance following demo and cues. Stand to Sit: Contact Guard Assistance, X 1, with increased time for completion, cues for hand placement. pt struggled to maintain [address] RLE when reaching back with RUE first. 2nd trial, pt prompted to reach back with LUE to lower self and able to maintain NWBing during remaining transfers      FUNCTIONAL MOBILITY:  Assistive Device: Crutches   Assist Level:  Contact Guard Assistance, Minimal Assistance, X 1 and with verbal cues . Distance: To and from bathroom  1 loss of balance when pt tends to move at fast pace, cues to slow down for safety. Improved with prompts and education. Assistive Device: Wheelchair  Assist level: supervision  Distance: to and from apartment using BUEs to propell wheelchair. Pt with fair recall of new environment requiring mod vcs for navigation back to room. Anticipate path deviation related to attention.      Patient completed IADL homemaking task this date of retrieving items - task was graded to challenge wheelchair maneuverability and safety while maintaining NWBING. Patient was able to retrieve all items from table top, fridge and counter with mod I  and no VCs for safety during the retrieval and transportation of items. Patient reports the home he shares with his girlfriend is cluttered and a w/c most likely will not fit into spaces due to furniture and numerous pets (8 dogs, 8 cats, guinea pigs and rabbits). OT discussed POC goal to use crutches next session to complete meal prep/ home mgt tasks to improve access to home and IADLs.   Pt agreeable      Activity Tolerance:  Patient tolerance of  treatment: good.         Assessment:  Assessment: Pt presents to occupational therapy following fall with resultant Right superior and inferior pubic rami fracture, mildly displaced, with adjacent pelvic hematoma, right sacral ala fracture, Right sided pneumothorax, Liver Laceration, possible renal contusion, Fx of left 11th rib, Leukocytosis. with patient complaints of pain, weakness, NWBing RLE impacting patient's ability to complete self care at prior level of functioning. Pt currently requires CGA to light min A during transfer and mobility due to unsteadiness, min A with LB ADLs and CGA for standing tasks. Due to patient's performance deficits, patient would greatly benefit from continued occupational therapy for ADL remediation, endurance building, strengthening and adaptive stragies to return to prior level of functioning and safe return to home environment.      Performance deficits / Impairments: Decreased functional mobility , Decreased balance, Decreased ADL status, Decreased endurance, Decreased strength, Decreased safe awareness, Decreased coordination, Decreased high-level IADLs  Prognosis: Good     Treatment Initiated: Treatment and education initiated within context of evaluation. Evaluation time included review of current medical information, gathering information related to past medical, social and functional history, completion of standardized testing, formal and informal observation of tasks, assessment of data and development of plan of care and goals. Treatment time included skilled education and facilitation of tasks to increase safety and independence with ADL's for improved functional independence and quality of life.     Discharge Recommendations:  Home with assist PRN, Home with Home health OT     Patient Education:  OT Education: OT Role, Plan of Care, Precautions, ADL Adaptive Strategies, Transfer Training, Family Education, Equipment     Equipment Recommendations: Other: Recommend tub transfer bench, BSC.  Monitor for a simon. Speech Therapy:  RECOMMENDATIONS/ASSESSMENT:  DIAGNOSTIC IMPRESSIONS:  Patient present with mild cognitive impairments evidenced by a score of 82 on the [facility] evaluation. Areas of notable deficit were problem solving/reasoning (including functional math computation), attention (including selective, alternating and divided), writing (difficulty with spelling), and reading comprehension (including information processing). Overall verbal expression and auditory comprehension were WNL for basic and moderately complex communication. Patient reporting that he feels he is \"close to baseline\" functioning. Question some underlying difficulty with cognition given history of epilepsy, however given patient is an active , works and manages his own finances/household responsibilities, recommend inpatient rehab speech therapy services to focus on areas of deficit outlined above to maximize cognitive functioning. Rehabilitation Potential: excellent    Review of Systems:  Review of Systems   Constitutional: Positive for activity change. HENT: Negative for trouble swallowing and voice change. Eyes: Negative for visual disturbance. Respiratory: Positive for shortness of breath. Negative for cough. Cardiovascular: Positive for chest pain (Rib pain). Gastrointestinal: Negative for blood in stool, constipation, diarrhea and nausea. Endocrine: Positive for cold intolerance. Genitourinary: Negative for frequency and urgency. Musculoskeletal: Positive for back pain, gait problem and myalgias. Skin: Negative for pallor. Allergic/Immunologic: Negative. Neurological: Positive for seizures and weakness. Negative for dizziness, speech difficulty, light-headedness, numbness and headaches. Hematological: Negative. Psychiatric/Behavioral: Negative for confusion, decreased concentration, dysphoric mood and hallucinations. The patient is not nervous/anxious.     All other systems reviewed and are negative. Objective:  /75   Pulse 93   Temp 98.1 °F (36.7 °C) (Oral)   Resp 18   Ht 5' 7\" (1.702 m)   Wt 131 lb 6.4 oz (59.6 kg)   SpO2 98%   BMI 20.58 kg/m²   CURRENT VITALS:  height is 5' 7\" (1.702 m) and weight is 131 lb 6.4 oz (59.6 kg). His oral temperature is 98.1 °F (36.7 °C). His blood pressure is 122/75 and his pulse is 93. His respiration is 18 and oxygen saturation is 98%. Body mass index is 20.58 kg/m².   Temperature Range (24h):Temp: 98.1 °F (36.7 °C) Temp  Av.1 °F (36.7 °C)  Min: 98.1 °F (36.7 °C)  Max: 98.1 °F (36.7 °C)  BP Range (62S): Systolic (32CWJ), RMF:515 , Min:122 , TVY:987     Diastolic (26WIV), YLB:17, Min:75, Max:75    Pulse Range (24h): Pulse  Av  Min: 93  Max: 93  Respiration Range (24h): Resp  Av  Min: 18  Max: 18  Current Pulse Ox (24h):  SpO2: 98 %  Pulse Ox Range (24h):  SpO2  Av %  Min: 98 %  Max: 98 %  Oxygen Amount and Delivery:      awake  Orientation:   person, place, time, situation  Mood: within normal limits  Affect: calm  General appearance: in no acute distress, up in chair    Memory:   Grossly normal,   Attention/Concentration: grossly normal  Language:  normal    Cranial Nerves:  cranial nerves II-XII are grossly intact  ROM:  abnormal -right pubic rami fractures    Motor Exam:  Motor exam is 5 out of 5 all extremities with the exception of right hip not tested    Tone:  normal  Muscle bulk: within normal limits  Sensory:  Sensory intact  Coordination:   normal  Deep Tendon Reflexes:  Reflexes are intact and symmetrical bilaterally    Skin: warm and dry, no rash or erythema and abrasion over right forehead blending into the hairline at the temple  Peripheral vascular: Pulses: Normal upper and lower extremity pulses; Edema: no    Diagnostics:   Recent Results (from the past 24 hour(s))   CBC    Collection Time: 20  4:47 AM   Result Value Ref Range    WBC 10.4 4.8 - 10.8 thou/mm3    RBC 4.02 (L) 4.70 - 6.10 mill/mm3 Hemoglobin 13.3 (L) 14.0 - 18.0 gm/dl    Hematocrit 38.8 (L) 42.0 - 52.0 %    MCV 96.5 (H) 80.0 - 94.0 fL    MCH 33.1 (H) 26.0 - 33.0 pg    MCHC 34.3 32.2 - 35.5 gm/dl    RDW-CV 13.9 11.5 - 14.5 %    RDW-SD 49.5 (H) 35.0 - 45.0 fL    Platelets 998 823 - 015 thou/mm3    MPV 8.7 (L) 9.4 - 12.4 fL   Comprehensive Metabolic Panel    Collection Time: 12/17/20  4:47 AM   Result Value Ref Range    Glucose 89 70 - 108 mg/dL    CREATININE 0.6 0.4 - 1.2 mg/dL    BUN 11 7 - 22 mg/dL    Sodium 136 135 - 145 meq/L    Potassium 3.8 3.5 - 5.2 meq/L    Chloride 101 98 - 111 meq/L    CO2 26 23 - 33 meq/L    Calcium 8.8 8.5 - 10.5 mg/dL    AST 36 5 - 40 U/L    Alkaline Phosphatase 103 38 - 126 U/L    Total Protein 6.1 6.1 - 8.0 g/dL    Alb 3.3 (L) 3.5 - 5.1 g/dL    Total Bilirubin 0.5 0.3 - 1.2 mg/dL    ALT 38 11 - 66 U/L   Vitamin D 25 Hydroxy    Collection Time: 12/17/20  4:47 AM   Result Value Ref Range    Vit D, 25-Hydroxy 36 30 - 100 ng/ml   Anion Gap    Collection Time: 12/17/20  4:47 AM   Result Value Ref Range    Anion Gap 9.0 8.0 - 16.0 meq/L   Glomerular Filtration Rate, Estimated    Collection Time: 12/17/20  4:47 AM   Result Value Ref Range    Est, Glom Filt Rate >90 ml/min/1.73m2     Labs Renal Latest Ref Rng & Units 12/17/2020 12/15/2020 12/14/2020 12/13/2020 12/12/2020   BUN 7 - 22 mg/dL 11 8 6(L) 8 9   Cr 0.4 - 1.2 mg/dL 0.6 0.6 0.6 0.6 0.7   K 3.5 - 5.2 meq/L 3.8 5.1 5.7(H) 3.9 4.0   Na 135 - 145 meq/L 136 144 141 138 138      Recent Labs     12/17/20  0447 12/15/20  0752 12/14/20  1224   WBC 10.4 8.3 10.9*   HGB 13.3* 13.8* 13.5*   HCT 38.8* 37.7* 37.0*   MCV 96.5* 91.7 90.9    168 112*      Xr Chest (2 Vw)    Result Date: 12/16/2020  PROCEDURE: XR CHEST (2 VW) CLINICAL INFORMATION: s/p pigtail removal. COMPARISON: December 16, 2020. TECHNIQUE: PA and lateral views the chest. FINDINGS: Interval removal of right pigtail catheter. The lungs appear well expanded. Costophrenic recesses are preserved.  No cardiomegaly. No acute osseous findings. Interval removal of right pigtail catheter. No definite residual pneumothorax. **This report has been created using voice recognition software. It may contain minor errors which are inherent in voice recognition technology. ** Final report electronically signed by Dr. Rachel Major on 12/16/2020 1:08 PM    Xr Chest Portable    Result Date: 12/17/2020  Exam: One View of the chest Comparison: 12/16/2020, 12/15/2020 Findings: Pigtail catheter in the right chest has been removed. Cardiac silhouette is not enlarged. No pneumothorax. No pleural fluid collection. No pneumonia. Impression: 1. Status post removal of right-sided chest tube. No pneumothorax. This document has been electronically signed by: Mayuri Simpson MD on 12/17/2020 01:53 AM     Xr Chest Portable    Result Date: 12/16/2020  1 view chest x-ray Comparison:  CR,SR  - XR CHEST PORTABLE  - 12/15/2020 12:58 AM EST Findings: Stable right chest tubes curled in the right upper thorax. Previous residual right apical pneumothorax appears resolved. Normal size heart. No pulmonary vascular congestion. No acute fracture. Previous residual right apical pneumothorax appears resolved. This document has been electronically signed by: Fredi Petersen MD on 12/16/2020 03:15 AM     Impression:  1. Fall from second story roof secondary to having active seizure. 2. Noncompliance with seizure medications. 3. History of seizures since age 13.  4. Mild cognitive-communication deficit. (MOCA) version 7.2 completed. Patient scored 25/30. SCCAN of 82.  5. 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 6. Right superior and inferior pubic rami fractures on the right with mild displacement and pelvic hematoma. 7. Gait instability. 8. Decreased ability for ADLs. 9. Posttraumatic pain. 10. Right sacral alae fracture with involvement of the S1, S2, and S3 neural foramina.   11. Nondisplaced fracture of the posterior medial aspect of the left 11th rib. 12. Liver laceration. 13. Renal contusion. 14. Right-sided pneumothorax requiring placement of chest tube and subsequent placement of pigtail catheter by Interventional Radiology. 15. Prior history of IV drug abuse, sober x1 year. 16. Current smoker. Plan:     Medical management: Per primary team and Dr. Derrell Sarmiento. Consultants: Surgery, Interventional Radiology, Neurology, Orthopedic Surgery, Family Medicine, Physical Medicine    Narcotic usage: Oxycodone last 24 hour usage 20 mg. Last BM:       FUNCTIONAL OUTCOMES TOOLS:    ALEXANDER -      Tinetti -      TUG -      Acute/Rehabilitation Problems:  1. Fall from second story roof secondary to having active seizure. 2. Noncompliance with seizure medications. 1. Patient started on Tegretol by Neurology. 2. Seizure precautions. 3. 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 1. (MOCA) version 7.2 completed. Patient scored 25/30.   2. SCCAN of 82.  3. SLP. 4. TBI guidelines if indicated. 5. Consider bright light phototherapy if tolerated. 4. Right superior and inferior pubic rami fractures on the right with mild displacement and pelvic hematoma. 1. NWB to right lower limb, WBAT left lower limb. 5. Gait instability/Decreased ability for ADLs. 1. PT/OT. 6. Posttraumatic pain. 1. Flexeril. 2. Percocet. 3. Tylenol. 4. Lidoderm patches. 7. Right sacral alae fracture with involvement of the S1, S2, and S3 neural foramina. 1. Pain control as above. 8. Nondisplaced fracture of the posterior medial aspect of the left 11th rib. 1. Pain control as above. 9. Liver laceration. 1. Stable. 10. Renal contusion. 1. Stable. 11. Right-sided pneumothorax requiring placement of chest tube and subsequent placement of pigtail catheter by Interventional Radiology.   1. Interval chest x-ray on 12/16 was absent sign of pneumothorax after pigtail was

## 2020-12-18 PROCEDURE — 97530 THERAPEUTIC ACTIVITIES: CPT

## 2020-12-18 PROCEDURE — 1180000000 HC REHAB R&B

## 2020-12-18 PROCEDURE — 97129 THER IVNTJ 1ST 15 MIN: CPT | Performed by: SPEECH-LANGUAGE PATHOLOGIST

## 2020-12-18 PROCEDURE — 94760 N-INVAS EAR/PLS OXIMETRY 1: CPT

## 2020-12-18 PROCEDURE — 97535 SELF CARE MNGMENT TRAINING: CPT

## 2020-12-18 PROCEDURE — 97110 THERAPEUTIC EXERCISES: CPT

## 2020-12-18 PROCEDURE — 6370000000 HC RX 637 (ALT 250 FOR IP): Performed by: SURGERY

## 2020-12-18 PROCEDURE — 97116 GAIT TRAINING THERAPY: CPT

## 2020-12-18 PROCEDURE — 97130 THER IVNTJ EA ADDL 15 MIN: CPT | Performed by: SPEECH-LANGUAGE PATHOLOGIST

## 2020-12-18 RX ADMIN — FAMOTIDINE 20 MG: 20 TABLET, FILM COATED ORAL at 20:25

## 2020-12-18 RX ADMIN — DOCUSATE SODIUM 100 MG: 100 CAPSULE, LIQUID FILLED ORAL at 20:25

## 2020-12-18 RX ADMIN — STANDARDIZED SENNA CONCENTRATE 17.2 MG: 8.6 TABLET ORAL at 20:22

## 2020-12-18 RX ADMIN — CARBAMAZEPINE 200 MG: 200 TABLET ORAL at 20:22

## 2020-12-18 RX ADMIN — NALOXEGOL OXALATE 12.5 MG: 12.5 TABLET, FILM COATED ORAL at 09:40

## 2020-12-18 RX ADMIN — CARBAMAZEPINE 200 MG: 200 TABLET ORAL at 09:40

## 2020-12-18 RX ADMIN — OXYCODONE AND ACETAMINOPHEN 2 TABLET: 5; 325 TABLET ORAL at 13:14

## 2020-12-18 RX ADMIN — SUCRALFATE 1 G: 1 TABLET ORAL at 20:22

## 2020-12-18 RX ADMIN — POLYETHYLENE GLYCOL 3350 17 G: 17 POWDER, FOR SOLUTION ORAL at 09:41

## 2020-12-18 RX ADMIN — FAMOTIDINE 20 MG: 20 TABLET, FILM COATED ORAL at 09:40

## 2020-12-18 RX ADMIN — DOCUSATE SODIUM 100 MG: 100 CAPSULE, LIQUID FILLED ORAL at 09:40

## 2020-12-18 RX ADMIN — CYCLOBENZAPRINE 10 MG: 10 TABLET, FILM COATED ORAL at 20:22

## 2020-12-18 RX ADMIN — SUCRALFATE 1 G: 1 TABLET ORAL at 09:40

## 2020-12-18 RX ADMIN — OXYCODONE AND ACETAMINOPHEN 2 TABLET: 5; 325 TABLET ORAL at 07:28

## 2020-12-18 RX ADMIN — OXYCODONE AND ACETAMINOPHEN 2 TABLET: 5; 325 TABLET ORAL at 20:21

## 2020-12-18 ASSESSMENT — PAIN SCALES - GENERAL
PAINLEVEL_OUTOF10: 8
PAINLEVEL_OUTOF10: 9
PAINLEVEL_OUTOF10: 7
PAINLEVEL_OUTOF10: 8
PAINLEVEL_OUTOF10: 7
PAINLEVEL_OUTOF10: 7

## 2020-12-18 ASSESSMENT — ENCOUNTER SYMPTOMS
VOICE CHANGE: 0
CONSTIPATION: 0
NAUSEA: 0
BLOOD IN STOOL: 0
ALLERGIC/IMMUNOLOGIC NEGATIVE: 1
SHORTNESS OF BREATH: 1
DIARRHEA: 0
TROUBLE SWALLOWING: 0
BACK PAIN: 1
COUGH: 0

## 2020-12-18 ASSESSMENT — PAIN DESCRIPTION - LOCATION
LOCATION: PELVIS;LEG

## 2020-12-18 ASSESSMENT — PAIN DESCRIPTION - PAIN TYPE: TYPE: ACUTE PAIN

## 2020-12-18 NOTE — PROGRESS NOTES
[address] [facility] THERAPY  [address] [facility]  PROGRESS NOTE    TIME   SLP Individual Minutes  Time In: 7453  Time Out: 8823  Minutes: 32  Timed Code Treatment Minutes: 32 Minutes       Date: 2020  Patient Name: Linda Matos      CSN: [id number]   : 1990  (26 y.o.)  Gender: male   Referring Physician:  Dr. Kari Palomares   Diagnosis: Multiple injuries due to trauma   Secondary Diagnosis: Cognitive impairments  Precautions: Fall risk   Current Diet: Regular with thin liquids  Swallowing Strategies: Standard Universal Swallow Precautions  Date of Last MBS: Not Applicable    Pain:  No pain reported. Subjective:  Patient seen sitting upright in wheelchair, pleasant and cooperative. Reviewed results of standardized cognitive assessment that was completed during yesterday's session, discussing recommendations for patient to continue with speech therapy services to address deficits identified with the testing. Patient agreed with POC. Short-Term Goals:  SHORT TERM GOAL #1:  Goal 1: Patient will complete functional problem solving, reasoning and information processing tasks (including math computation related to measurements for job and finances) with 75% accuracy, mod cues to improve logical thinking and processing skills needed for return to work and household functions. 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 Patient states that he \"isn't great with numbers\" and requires fractions to be listed on his tape measure, but utilizes these skills on a daily basis. Functional math word problems (with use of a calculator)- 8/10 indep, 1/10 with self correction during review, 1/10 with min cues to correct.    *Errors related to mis interpretation of the prompt, but able to correct without additional cueing, as well as x1 error with basic math computation (despite use of calculator). **Will continue to address in a contextualized setting (measuring pieces of wood) next session. SHORT TERM GOAL #2:  Goal 2: Patient will complete higher level executive functioning and thought organization tasks with 80% accuracy given min cues in order to independently complete ADLs/IADL's  INTERVENTIONS: Moderate complexity executive functioning task (generating a schedule to organize steps needed to complete 2 major home renovation projects): 10/10 indep with patient explaining that he holds all clean up until all tasks are completed. *Good success with inclusion of all pertinent steps and attention to details. *Appropriate sequential ordering. SHORT TERM GOAL #3:  Goal 3: Patient will complete higher level attention (selective, alternating, divided) tasks with no more than 3 errors in a 3-5 minute time frame to improve skills needed for return to driving and work related tasks. INTERVENTIONS: Selective attention (Identifying 2 targets in a random list of letters):  -Task duration- 45 seconds. -x2 errors    Divided attention (Identifying 2 visual targets from a random list of letters, while simultaneously listening for an auditory target):   -Task duration- 2 minutes, 14 seconds  -x5 errors  -Patient stating that he heard \"10\" auditory targets, when only x6 were presented. *Reviewed performance, discussing breakdowns in divided attention and how this can impact job performance and safety with work related tasks, as well as driving. Patient expressed understanding. Provided skilled education on strategies to assist with mental focus of multiple domains to improve success with future complex attention tasks.      Long-Term Goals:  Timeframe for Long-term Goals: 2 weeks    LONG TERM GOAL #1:  Goal 1: Patient will improve cognitive functioning to a modified independent level to improve skills needed for successful return to work and household responsibilties. Comprehension: 6 - Complex ideas 90% or device (hearing aid or glasses- if patient is primarily a visual learner)  Expression: 6 - Device used to express complex ideas/needs  Social Interaction: 7 - Patient has appropriate behavior/relations 100% of the time  Problem Solvin - Patient solves simple/routine tasks 75-90%+   Memory: 5 - Patient requires prompting with stress/unfamiliar situations    EDUCATION:  Learner: Patient  Education:  Reviewed results and recommendations of this evaluation, Reviewed ST goals and Plan of Care, Reviewed recommendations for follow-up and Education Related to Potential Risks and Complications Due to Impairment/Illness/Injury  Evaluation of Education: Verbalizes understanding and Family not present    ASSESSMENT/PLAN:  Activity Tolerance:  Patient tolerance of  treatment: good. Appropriate participation throughout session     Assessment/Plan: Patient progressing toward established goals. Continues to require skilled care of licensed speech pathologist to progress toward achievement of established goals and plan of care. .     Plan for Next Session: Math computation (measurements), Deductive reasoning, Complex attention     Meenakshi RODRIGUEZ  [address], [address]

## 2020-12-18 NOTE — PROGRESS NOTES
[address] [place] [address]  INPATIENT PHYSICAL THERAPY  DAILY NOTE  [phone] [facility],4[address] Floor - [id number]    Time In: 0730  Time Out: 0830  Timed Code Treatment Minutes: 60 Minutes  Minutes: 60          Date: 2020  Patient Name: Joanne Conde,  Gender:  male        MRN: [id number]  : 1990  (26 y.o.)     Referring Practitioner: ELIAZAR Luna MD  Diagnosis: Multiple injuries due to trauma  Additional Pertinent Hx: Per H&P, \"The patient is a 26 y.o. male  who presents with right sided pelvis pain after sustaining a traumatic injury on 2020. Patient has a history of epilepsy and states he was on a roof that day and actually had an epileptic episode while on the roof. This episode caused him to fall off of one tier of the roof and onto the lower tier and eventually off of the lower tier and to the ground. He does not recall the fall and only remembers waking up while on the ground with increased right hip/pelvis pain. He was brought to [facility] ED and further work-up showed a mildly displaced inferior and superior pubic rami fractures on xray and CT scan showed a mildly comminuted minimally displaced right sacral ala fracture. Patient was admitted under trauma services for further monitoring. Pt s/p Right superior and inferior pubic rami fracture, mildly displaced, with adjacent pelvic hematoma, right sacral ala fracture, Right sided pneumothorax, Liver Laceration, possible renal contusion, Fx of left 11th rib, Leukocytosis. \" To [facility] on      Prior Level of Function:  Lives With: Significant other(s/o's mother and brother)  Type of Home: House  Home Layout: Two level, Bed/Bath upstairs(~12 steps with L ascending rail to upstairs)  Home Access: Stairs to enter with rails  Entrance Stairs - Number of Steps: 5  Entrance Stairs - Rails: Both(wide - difficut to grasp simaltaneously)  Home Equipment: Rolling walker, Crutches   Bathroom Shower/Tub: Tub/Shower unit, Curtain  Bathroom Toilet: Standard  Bathroom Accessibility: Walker accessible    ADL Assistance: Independent  Ambulation Assistance: Independent  Transfer Assistance: Independent  Active : Yes  Additional Comments: S/O works during the day, but there will always be someone at the house when S/O is at work. Reports being very Indep PTA, was not using AD for mobility. Pt reports 8 dogs, 8 cats and other animals in the home. Restrictions/Precautions:  Restrictions/Precautions: Weight Bearing, General Precautions, Fall Risk  Right Lower Extremity Weight Bearing: Non Weight Bearing  Left Lower Extremity Weight Bearing: Weight Bearing As Tolerated  Position Activity Restriction  Other position/activity restrictions: No driving, climbing, heavy machine use, swimming x 3 months per neuro     SUBJECTIVE: Patient up in [facility] upon arrival, agreed and cooperative for therapy. Reports just receiving pain meds right before session started. PAIN: 8/10 R hip/groin/tail-bone region. OBJECTIVE:  Bed Mobility:  Supine to Sit: Stand By Assistance, with increased time for completion  Sit to Supine: Contact Guard Assistance, with increased time for completion     Transfers:  Sit to Stand: Contact Guard Assistance  Stand to [facility], cues for hand placement, with verbal cues, for correct placement of crutches when going to sit  Stand Pivot:Contact Guard Assistance, with crutches    Ambulation:  Contact Guard Assistance  Distance: 15 ft x1; 5 ft. x1   Surface: Level Tile  Device:Crutches  Gait Deviations: Forward Flexed Posture, Slow Oralia and Decreased Gait Speed, mild path deviations    Stairs:  Stairs:  6\" steps. X 4 using left ascending rail and crutch on R and Contact Guard Assistance, with increased time for completion, used retro technique to ascend steps, forward to descend. Exercise:  Patient was guided in 1 set(s) 10-15 reps of exercise to both lower extremities.   Ankle pumps, Glut sets, [employer], Heelslides, Short and AD as needed at [facility] to improve ability to enter home . Short term goal 5: Pt to negotiate 1 flight of step with L ascending rail and AD as needed at [facility] to get to bed and bath. Long term goals  Time Frame for Long term goals : 2 weeks  Long term goal 1: Pt to perform bed mobility at Mod I to get in and out of bed. Long term goal 2: Pt to perform sit<>stand at [facility] to improve ability to perform transfers. Long term goal 3: Pt to ambulate 50ft with crutches at [facility], >150ft at [facility] for mobility in the home and community  Long term goal 4: Pt to negotiate 5 steps with B HR and AD as needed at S to improve ability to enter home . Long term goal 5: Pt to negotiate 1 flight of step with L ascending rail and AD as needed at S to get to bed and bath. Long term goal 6: pt to perform car transfer at [facility] for transport to home and medical appointments    Following session, patient left in safe position with all fall risk precautions in place.

## 2020-12-18 NOTE — PROGRESS NOTES
functional math computation), attention (including selective, alternating and divided), writing (difficulty with spelling), and reading comprehension (including information processing). Overall verbal expression and auditory comprehension were WNL for basic and moderately complex communication. Patient reporting that he feels he is \"close to baseline\" functioning. Question some underlying difficulty with cognition given history of epilepsy, however given patient is an active , works and manages his own finances/household responsibilities, recommend inpatient rehab speech therapy services to focus on areas of deficit outlined above to maximize cognitive functioning. OCCUPATIONAL THERAPY  Kimi Diaz is making steady progress on IP Rehab. He has progressed to a set-up level with UB ADLs and intermittently requires min A for LB ADLs due to pelvic pain, but can complete with pain levels are controlled. Patient completes IADLs at a SBA level for balance but does require min cues for NWB status (he often attempts to do TTWB). Pt does demo some decreaed safety awareness at times, requiring cues to problem solve better scenarioes (example: wanted to pivot into tub, but placed w/c 5 ft away from tub and did not have crutches. Pt attempted to stand, but required cues to problem solve that he couldn't hop 5 pt without any AD). He does live with a girlfriend who can provide intermittent A but will be alone a lot so he will need to be mod I at discharge. He cont to require skilled OT services to progress with ADL and IADL remediation, improve safety & problem solving with daily routines and to decrease risk of fall / injury. Other: Recommend tub transfer bench and BSC if patient cannot get up stairs to bathroom. RECREATIONAL THERAPY  Patient has been offered participation in recreational therapy activities and participates as able. Pt states he likes to cook and clean, he works construction, he does his own laundry, gave him some word search puzzles and a deck of cards to play [employer] -pleasant and appreciative -states he moved here from [facility] he was living with his boss but they had a falling out and now lives with girlfriend and her family-Pt states he will have visitors this weekend and explained to pt that his girl friend is able to take him to the lobby downstairs to visit to get off the floor for awhile-is not interested in any other leisure materials for in room use -he has word search puzzles and deck of cards to play [employer]-also will enjoy the tv -Pt declined playing bingo with peers this am due to increase pain and wanting to rest and watch baseball-RT gave him a word  Puzzle book to use in his free time and enjoys looking at the newspaper      NUTRITION  Weight: 131 lb 6.4 oz (59.6 kg) / Body mass index is 20.58 kg/m². Current diet: DIET GENERAL;  Dietary Nutrition Supplements: Standard High Calorie Oral Supplement  Please see nutrition note for details. NURSING  Continent of Bowel and Bladder: Yes  Pain is Managed:  Yes  Signs and Symptoms of Infection:  No  Signs and Symptoms of Skin Breakdown:  No  Injury and Fall Free during Inpatient Rehabilitation Admission: Yes    Consultations/Labs/X-rays: No medical issues    No results for input(s): POCGLU in the last 72 hours.     No results found for: LDLCALC, LDLCHOLESTEROL, LDLDIRECT      Vitals:    12/20/20 0900 12/20/20 0949 12/20/20 2030 12/21/20 0815   BP: 107/62  126/61 122/81   Pulse: 76  74 86   Resp: 18   18   Temp: 97.6 °F (36.4 °C)  97.5 °F (36.4 °C) 97.3 °F (36.3 °C)   TempSrc: Oral  Oral Oral   SpO2:  96% 98% 99%   Weight:       Height:                Family Education: Family available and participating in education   Fall Risk:  Falling star program initiated  Is the patient appropriate for a stay in the functional apartment? no    Discharge Plan   Estimated Discharge Date: 12/22/2020   Destination: discharge home with supervision  Services at Discharge: None  Is patient appropriate for an outpatient driving evaluation? no  Equipment at Discharge: tub transfer bench and bedside commode, crutches, rolling walker  Factors facilitating achievement of predicted outcomes: Family support, Motivated and Cooperative  Barriers to the achievement of predicted outcomes: Pain, Decreased endurance and Lower extremity weakness    Team Members Present at Conference:  :Lluvia Clinton, [address] [facility] OTR/L [address], [address], [address], [address]  Nurse:Lori Merlinda Govern, RN  Psychologist: N/A    I approve the established interdisciplinary plan of care as documented within the medical record of Mally Hodges.     Meghann Pastor

## 2020-12-18 NOTE — PROGRESS NOTES
Physical Medicine & Rehabilitation  Progress Note    Chief Complaint:  Fall from roof resulting in multiple trauma    Subjective: With activity is stated to be 8-10 out of 10 with primary referral of the pain from his right pelvis into the leg. He also states that he feels itchy over his chest where he had his chest tube. He denies any shortness of breath at this time. Pain medications are working appropriately. He has been using crutches from home and has demonstrated household level distances at this point as well as negotiating steps. He feels he is progressing well towards being able to discharge home soon. He did not have any other concerns or questions at this time. Rehabilitation:   Physical Therapy:  OBJECTIVE:     Transfers:  Sit to Stand: Contact Guard Assistance  Stand to Sit:Contact Guard Assistance     Ambulation:  Contact Guard Assistance  Distance: 60 ft. X1; 20 ft x1   Surface: Level Tile  Device:Crutches  Gait Deviations: Forward Flexed Posture, Slow Oralia, Decreased Gait Speed and Mild Path Deviations     Stairs:  Stairs:  6\" steps. X 4 using left ascending rail, crutch on R and Contact Guard Assistance, ascent with retro technique, descent forward.     Wheelchair Mobility:  Modified Independent  Extremities Used: Bilateral Upper Extremities  Type of Chair:Manual  Surface: Level Tile  Distance: 150 ft. X1; 90 ft x1   Quality: Good pace and Velocity and Short Strokes     Exercise:  Patient was guided in 1 set(s) 10 reps of exercise to both lower extremities. Seated marches, Seated heel/toe raises and Long arc quads. Exercises were completed for increased independence with functional mobility.     Functional Outcome Measures: Not completed     ASSESSMENT:  Assessment: Patient progressing toward established goals. Activity Tolerance:  Patient tolerance of  treatment: good. Limited by pain.       Equipment Recommendations:Equipment Needed: No(continue to monitor for w/c)  Discharge Recommendations:  Continue to assess pending progress     Occupational Therapy:  COGNITION: Decreased Insight and Decreased Safety Awareness     ADL:   No ADL's completed this session. .     BALANCE:  Sitting Balance:  Modified Independent. W/c. Standing Balance: Stand By Assistance, Contact Guard Assistance.       BED MOBILITY:  Not Tested     TRANSFERS:  Sit to Stand:  Stand By Assistance. Stand to Sit: Stand By Assistance.       FUNCTIONAL MOBILITY:  Assistive Device: Crutches  Assist Level:  Stand By Assistance and [employer]. Distance:   Completed functional mobility within therapy apartment x2 trials and from apartment to pt room at slow pace, no LOB noted. Pt requires seated rest break after trial of mobility, min fatigue noted.      ADDITIONAL ACTIVITIES:  Pt participated in IADL task to ambulate within simulated home set-up with use of AD to maneuver around obstacles on floor level- task graded to include moving obstacles as pt has multiple pets at home, good righting reactions avoiding two obstacles simultaneously. Pt required CGA-SBA for balance and min cues for safe technique. Completed to increase kitchen safety and facilitate functional reaching required for simple meal prep tasks.     Patient identified a personal goal to increase UB strength and improve overall endurance so they can complete their toilet & shower transfers; skilled edu on UE strengthening. Completed BUE exercises x10 reps x1 sets using mod resistance band in all joints/planes to increase strength and endurance required for ADLs. Pt required brief rest break between each exercise and no v/c for proper technique.     ASSESSMENT:  Activity Tolerance:  Patient tolerance of  treatment: good. Discharge Recommendations: Home with assist PRN, Home with Home health OT   Equipment Recommendations: Other: Recommend tub transfer bench, BSC. Monitor for a reacher.     Speech Therapy:  Short-Term Goals:  SHORT TERM GOAL #1:  Goal 1: Patient will complete functional problem solving, reasoning and information processing tasks (including math computation related to measurements for job and finances) with 75% accuracy, mod cues to improve logical thinking and processing skills needed for return to work and household functions. 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 Patient states that he \"isn't great with numbers\" and requires fractions to be listed on his tape measure, but utilizes these skills on a daily basis.      Functional math word problems (with use of a calculator)- 8/10 indep, 1/10 with self correction during review, 1/10 with min cues to correct. *Errors related to mis interpretation of the prompt, but able to correct without additional cueing, as well as x1 error with basic math computation (despite use of calculator). **Will continue to address in a contextualized setting (measuring pieces of wood) next session.       SHORT TERM GOAL #2:  Goal 2: Patient will complete higher level executive functioning and thought organization tasks with 80% accuracy given min cues in order to independently complete ADLs/IADL's  INTERVENTIONS: Moderate complexity executive functioning task (generating a schedule to organize steps needed to complete 2 major home renovation projects): 10/10 indep with patient explaining that he holds all clean up until all tasks are completed. *Good success with inclusion of all pertinent steps and attention to details. *Appropriate sequential ordering.         SHORT TERM GOAL #3:  Goal 3: Patient will complete higher level attention (selective, alternating, divided) tasks with no more than 3 errors in a 3-5 minute time frame to improve skills needed for return to driving and work related tasks.   INTERVENTIONS: Selective attention (Identifying 2 targets in a random list of letters):  -Task duration- 45 seconds. -x2 errors     Divided attention (Identifying 2 visual targets from a random list of letters, while simultaneously listening for an auditory target):   -Task duration- 2 minutes, 14 seconds  -x5 errors  -Patient stating that he heard \"10\" auditory targets, when only x6 were presented. *Reviewed performance, discussing breakdowns in divided attention and how this can impact job performance and safety with work related tasks, as well as driving. Patient expressed understanding. Provided skilled education on strategies to assist with mental focus of multiple domains to improve success with future complex attention tasks.      Long-Term Goals:  Timeframe for Long-term Goals: 2 weeks     LONG TERM GOAL #1:  Goal 1: Patient will improve cognitive functioning to a modified independent level to improve skills needed for successful return to work and household responsibilties.     Comprehension: 6 - Complex ideas 90% or device (hearing aid or glasses- if patient is primarily a visual learner)  Expression: 6 - Device used to express complex ideas/needs  Social Interaction: 7 - Patient has appropriate behavior/relations 100% of the time  Problem Solvin - Patient solves simple/routine tasks 75-90%+   Memory: 5 - Patient requires prompting with stress/unfamiliar situations     EDUCATION:  Learner: Patient  Education:  Reviewed results and recommendations of this evaluation, Reviewed ST goals and Plan of Care, Reviewed recommendations for follow-up and Education Related to Potential Risks and Complications Due to Impairment/Illness/Injury  Evaluation of Education: Verbalizes understanding and Family not present     ASSESSMENT/PLAN:  Activity Tolerance:  Patient tolerance of  treatment: good. Appropriate participation throughout session     Assessment/Plan: Patient progressing toward established goals.   Continues to require skilled care of inferior pubic rami fractures on the right with mild displacement and pelvic hematoma. 7. Gait instability. 8. Decreased ability for ADLs. 9. Posttraumatic pain. 10. Right sacral alae fracture with involvement of the S1, S2, and S3 neural foramina. 11. Nondisplaced fracture of the posterior medial aspect of the left 11th rib. 12. Liver laceration. 13. Renal contusion. 14. Right-sided pneumothorax requiring placement of chest tube and subsequent placement of pigtail catheter by Interventional Radiology. 15. Prior history of IV drug abuse, sober x1 year. 16. Current smoker. Plan:     Medical management: Per primary team and Dr. Kendall Powell. Consultants: Surgery, Interventional Radiology, Neurology, Orthopedic Surgery, Family Medicine, Physical Medicine    Narcotic usage: Oxycodone last 24 hour usage 30 mg. Recent. Last BM:       FUNCTIONAL OUTCOMES TOOLS:    ALEXANDER -      Tinetti -      TUG -      Acute/Rehabilitation Problems:  1. Fall from second story roof secondary to having active seizure. 2. Noncompliance with seizure medications. 1. Patient started on Tegretol by Neurology. 2. Seizure precautions. 3. 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 1. (MOCA) version 7.2 completed. Patient scored 25/30.   2. SCCAN of 82.  3. SLP. 4. TBI guidelines if indicated. 5. Consider bright light phototherapy if tolerated. 4. Right superior and inferior pubic rami fractures on the right with mild displacement and pelvic hematoma. 1. NWB to right lower limb, WBAT left lower limb. 5. Gait instability/Decreased ability for ADLs. 1. PT/OT. 6. Posttraumatic pain. 1. Flexeril. 2. Percocet. 3. Tylenol. 4. Lidoderm patches. 7. Right sacral alae fracture with involvement of the S1, S2, and S3 neural foramina. 1. Pain control as above. 8. Nondisplaced fracture of the posterior medial aspect of the left 11th rib.   1. Pain control as above.  9. Liver laceration. 1. Stable. 10. Renal contusion. 1. Stable. 11. Right-sided pneumothorax requiring placement of chest tube and subsequent placement of pigtail catheter by Interventional Radiology. 1. Interval chest x-ray on 12/16 was absent signs of pneumothorax after pigtail was removed on 12/16. 2. Trauma Surgery has ordered follow-up imaging for 12/17. No pneumothorax noted. 12. Nutrition:  Consultation to dietician for nutritional counseling and recommendations. 1. Total protein 6.1, normal and albumin 3.3, low on 12/17/2020.  2. Vitamin D 25 hydroxy level was 36. Normal.  13. Electrolytes. 1. Normal on 12/17. 14. Bladder: No issues. 15. Bowel: Senna, colace, MOM  16. Rehabilitation nursing will be involved for bowel, bladder, skin, and pain management. Nursing will also provide education and training to patient and family. 17. Prophylaxis:  DVT: SCDs. GI: Sucralfate/Pepcid. 18.  and case management consultations for coordination of care and discharge planning. Chronic Problems:  1. History of seizures since age 13.  2. Prior history of IV drug abuse, sober x1 year. Same  1. Wean patient off of Percocet prior to discharge given his history. 3. Current smoker. 1. NicoDerm patch. Labs reviewed on:  1. 12/15.  2. 12/17. Infectious Disease:  1. N/A. Missed Therapy Time:  None     DME:    Discharge Plan:      Greater than 50% of 30 minutes was spent with the patient reviewing his progress with using crutches for ambulation, his current level of pain control, and progress with therapies.      Jose Angel Shelby MD

## 2020-12-18 NOTE — PROGRESS NOTES
Apical pulse 72. Respirations 14. Blood pressure 120/62. States constant pain in pelvis and right leg, 8/10. Pleasant and cooperative. Alert and oriented to person, place, and time. CHRIS 3-2 mm brisk. Mucous membranes pink and moist. Skin warm and dry. Speech clear. Denies difficulty swallowing. Lung sounds clear, anterior, posterior, and lateral. Respirations easy and regular. No cough noted. Heart sounds strong and regular. Bowel sounds active in all 4 quadrants. Abdomen soft and flat. Denies tenderness to palpation. Denies difficulty passing gas. Denies difficulty urinating. Last BM 12/16/20. Radial pulse strong and equal bilaterally. Hand grasp strong and equal bilaterally. Arm drift negative bilaterally. Capillary refill less than 3 seconds. Skin turgor brisk. No edema noted. Pedal pulses strong bilaterally. Homans sign negative on left leg, positive on right leg due to fracture. Pedal push and pull strong left leg, weak right. Leg lift strong on left side, weak on right. Denies numbness or tingling. Chair wheels locked. Chair alarm on. Call light and over bed table within reach. Requests 2 percocet tablets before therapy.

## 2020-12-18 NOTE — PLAN OF CARE
Problem: Falls - Risk of:  Goal: Will remain free from falls  Description: Will remain free from falls  Outcome: Ongoing     Problem: Falls - Risk of:  Goal: Absence of physical injury  Description: Absence of physical injury  Outcome: Ongoing     Problem: Pain:  Goal: Pain level will decrease  Description: Pain level will decrease  Outcome: Ongoing     Problem: Pain:  Goal: Control of acute pain  Description: Control of acute pain  Outcome: Ongoing     Problem: IP BOWEL ELIMINATION  Goal: STG - Patient will verbalize signs and symptoms of constipation and how to prevent/alleviate  Outcome: Ongoing     Problem: Physical Regulation:  Goal: Will remain free from infection  Description: Will remain free from infection  Outcome: Ongoing

## 2020-12-18 NOTE — PROGRESS NOTES
Apical pulse 90. Respirations 15. Blood pressure 122/72. States constant pain in right leg and pelvis, 7/10. Alert and oriented to person, place, and time. CHRIS 3-2 mm brisk. Mucous membranes pink and moist. Skin warm and dry. Speech clear. Denies difficulty swallowing. Lung sounds clear anterior, posterior, and lateral. Respirations easy and regular. No cough noted. Heart sounds strong and regular. Bowel sounds active in all 4 quadrants. Abdomen soft and flat. Denies tenderness to palpation. Denies difficulty passing gas. Denies difficulty urinating. Last BM 12/16/20. Radial pulse strong and equal bilaterally. Hand grasp strong and equal bilaterally. Arm drift negative bilaterally. Capillary refill less than 3 seconds. Skin turgor brisk. No edema noted. Pedal pulses strong bilaterally. Alfonso's sign negative on left leg, positive on right due to fracture. Pedal push and pull strong on left side, weak on right. Leg lift strong on left side, weak on right. Denies numbness or tingling. Girlfriend in room with him. Wheelchair wheels locked. Call light and over bed table in reach. Requests 2 percocet tablets.

## 2020-12-18 NOTE — PROGRESS NOTES
[facility]  [address] [facility]  Occupational Therapy  Daily Note  Time:   Time In: 1130  Time Out: 1230  Timed Code Treatment Minutes: 60 Minutes  Minutes: 60    Date: 2020  Patient Name: Sally Black,   Gender: male      Room: -67/067-A  MRN: [id number]  : 1990  (26 y.o.)  Referring Practitioner: Dr. Sugar Mendoza  Diagnosis: Multiple injuries due to trauma  Additional Pertinent Hx: The patient is a 26 y.o. male  who presents with right sided pelvis pain after sustaining a traumatic injury on 2020. Patient has a history of epilepsy and states he was on a roof that day and actually had an epileptic episode while on the roof. This episode caused him to fall off of one tier of the roof and onto the lower tier and eventually off of the lower tier and to the ground. He does not recall the fall and only remembers waking up while on the ground with increased right hip/pelvis pain. He was brought to [facility] ED and further work-up showed a mildly displaced inferior and superior pubic rami fractures on xray and CT scan showed a mildly comminuted minimally displaced right sacral ala fracture. Patient was admitted under trauma services for further monitoring. Pt s/p Right superior and inferior pubic rami fracture, mildly displaced, with adjacent pelvic hematoma, right sacral ala fracture, Right sided pneumothorax, Liver Laceration, possible renal contusion, Fx of left 11th rib, Leukocytosis. \" To IPR on     Restrictions/Precautions:  Restrictions/Precautions: Weight Bearing, General Precautions, Fall Risk  Right Lower Extremity Weight Bearing: Non Weight Bearing  Left Lower Extremity Weight Bearing: Weight Bearing As Tolerated  Position Activity Restriction  Other position/activity restrictions: No driving, climbing, heavy machine use, swimming x 3 months per neuro     SUBJECTIVE: Patient pleasant and cooperative, agreeable to OT     PAIN: 8 to 10/10 in pelvis and chest tube site. Discussed ice for non pharmaceutical interventions, pt declined. COGNITION: Impaired Attention and possibly baseline, was appropriate with meal prep tasks     ADL:   No ADL's completed this session. Rubens [place]in BALANCE:  Sitting Balance:  Modified Independent. Standing Balance: Stand By Assistance. Does struggle with NWB adherence during static and dynamic standing IADLs, completes TTWB a lot. BED MOBILITY:  Not Tested    TRANSFERS:  Sit to Stand:  Stand By Assistance. w/c, recliner. Pain with transitions, but good hand placement. Stand to Sit: Stand By Assistance. pain with transitions. FUNCTIONAL MOBILITY:  Assistive Device: Crutches   Assist Level:  Stand By Assistance. Good adherence to NWB during crutches. Distance: x 15 ft in apartment during IADLs. MI with w/c to/from apt. ADDITIONAL ACTIVITIES:  Patient completed IADL homemaking task this date of making an egg on stovetop as well as making a pot of coffee  - task was graded to challenge crutches safety, adherence to NWB precautions, standing endurance nabd lanace. Patient was able to retrieve all items from upper / lower cupboards and drawers with SBA and min VCs for safety during the retrieval and transportation of items. Patient required SBA throughout task with a standing endurance of 15 min. Patient required no VCs for stovetop mgmt and safety, fair + attn. Patient identified a personal goal to increase UB strength and improve overall endurance so they can complete their toilet & shower transfers; skilled edu on UE strengthening and patient completed BUE strengthening exercises x15 reps x1 set this date with a med resistive band in all joints and all planes. Patient tolerated well, requiring min rest breaks. Pt required min cues for technique. ASSESSMENT:     Activity Tolerance:  Patient tolerance of  treatment: good.        Discharge Recommendations: Home with assist PRN, Home with [address]

## 2020-12-18 NOTE — CONSULTS
Comprehensive Nutrition Assessment    Type and Reason for Visit:  Initial, Consult(New Rehab Admit)    Nutrition Recommendations/Plan:   *Recommend a Multivitamin w/minerals daily. *Started Ensure Enlive TID. *Continue current diet. Nutrition Assessment: Pt. nutritionally compromised AEB increased nutrient needs d/t multiple fractures. At risk for further nutrition compromise r/t -3.7% weight loss in one weekl; however, note different scales used on different floors and underlying medical condition (hx HTN). Nutrition recommendations/interventions as per above. Malnutrition Assessment:  Malnutrition Status:  No malnutrition    Context:  Acute Illness     Findings of the 6 clinical characteristics of malnutrition:  Energy Intake:  No significant decrease in energy intake  Weight Loss:  No significant weight loss     Body Fat Loss:  No significant body fat loss     Muscle Mass Loss:  No significant muscle mass loss    Fluid Accumulation:  No significant fluid accumulation Extremities   Strength:  Not Performed    Nutrition Related Findings:  admit s/p epileptic seizure during work resulting in a fall from a building roof with multiple fractures; pt seen; he reports good appetite and intake since admit consuming most of his meals; pt amenable to Chocolate Ensure Enlive TID; pt denies N/V or difficulty chewing/swallowing food; last BM x1 on 12/17. SLP following d/t cognitive impairment. Rx includes: Colace, Movantik, Glycolax, Senna and Carafate. Wounds:  (pubic rami fractures, closed fracture of one rib of left side and right sacral fracture; liver laceration)       Current Nutrition Therapies:    DIET GENERAL;     Anthropometric Measures:  · Height: 5' 7\" (170.2 cm)  · Current Body Weight: 131 lb 6.4 oz (59.6 kg)(12/16; no edema noted)   · Admission Body Weight: 131 lb 6.4 oz (59.6 kg)(12/16; no edema noted)    · Usual Body Weight: (Per EMR: 136 lb 1.6 oz on 12/11/20)     · Ideal Body Weight: 148 lbs   · BMI: 20.6  · BMI Categories: Normal Weight (BMI 18.5-24. 9)       Nutrition Diagnosis:   · Increased nutrient needs related to increase demand for energy/nutrients as evidenced by wounds(multiple fractures)    Nutrition Interventions:   Food and/or Nutrient Delivery:  Continue Current Diet, Start Oral Nutrition Supplement, Vitamin Supplement  Nutrition Education/Counseling:  Education initiated(Encouraged po intake of meals and ONS at best effort)   Coordination of Nutrition Care:  Continue to monitor while inpatient    Goals:  Pt will consume 75% or more of meals during LOS       Nutrition Monitoring and Evaluation:   Behavioral-Environmental Outcomes:  None Identified   Food/Nutrient Intake Outcomes:  Food and Nutrient Intake, Supplement Intake, Vitamin/Mineral Intake  Physical Signs/Symptoms Outcomes:  Biochemical Data, Weight, Skin, Nutrition Focused Physical Findings, Constipation, Fluid Status or Edema     Discharge Planning:     Too soon to determine     Electronically signed by Dyana Cantu RD, LD on 12/18/20 at 10:43 AM EST    Contact: [phone]

## 2020-12-18 NOTE — PROGRESS NOTES
[facility]  [address]  Occupational Therapy  Daily Note  Time:   Time In: 1430  Time Out: 1500  Timed Code Treatment Minutes: 30 Minutes  Minutes: 30          Date: 2020  Patient Name: King Carline,   Gender: male      Room: 7E-67/067-A  MRN: [id number]  : 1990  (26 y.o.)  Referring Practitioner: Dr. Galdino Strickland  Diagnosis: Multiple injuries due to trauma  Additional Pertinent Hx: The patient is a 26 y.o. male  who presents with right sided pelvis pain after sustaining a traumatic injury on 2020. Patient has a history of epilepsy and states he was on a roof that day and actually had an epileptic episode while on the roof. This episode caused him to fall off of one tier of the roof and onto the lower tier and eventually off of the lower tier and to the ground. He does not recall the fall and only remembers waking up while on the ground with increased right hip/pelvis pain. He was brought to [facility] ED and further work-up showed a mildly displaced inferior and superior pubic rami fractures on xray and CT scan showed a mildly comminuted minimally displaced right sacral ala fracture. Patient was admitted under trauma services for further monitoring. Pt s/p Right superior and inferior pubic rami fracture, mildly displaced, with adjacent pelvic hematoma, right sacral ala fracture, Right sided pneumothorax, Liver Laceration, possible renal contusion, Fx of left 11th rib, Leukocytosis. \" To IPR on     Restrictions/Precautions:  Restrictions/Precautions: Weight Bearing, General Precautions, Fall Risk  Right Lower Extremity Weight Bearing: Non Weight Bearing  Left Lower Extremity Weight Bearing: Weight Bearing As Tolerated  Position Activity Restriction  Other position/activity restrictions: No driving, climbing, heavy machine use, swimming x 3 months per neuro     SUBJECTIVE: Pt seated in w/c upon arrival, agreeable to OT session.     PAIN: 7/10: RLE at end of session    COGNITION: Decreased Insight and Decreased Safety Awareness    ADL:   No ADL's completed this session. Sherron Torres BALANCE:  Sitting Balance:  Modified Independent. W/c. Standing Balance: Stand By Assistance, [employer]. BED MOBILITY:  Not Tested    TRANSFERS:  Sit to Stand:  Stand By Assistance. Stand to Sit: Stand By Assistance. FUNCTIONAL MOBILITY:  Assistive Device: Crutches  Assist Level:  Stand By Assistance and Contact Guard Assistance. Distance:   Completed functional mobility within therapy apartment x2 trials and from apartment to pt room at slow pace, no LOB noted. Pt requires seated rest break after trial of mobility, min fatigue noted. ADDITIONAL ACTIVITIES:  Pt participated in IADL task to ambulate within simulated home set-up with use of AD to maneuver around obstacles on floor level- task graded to include moving obstacles as pt has multiple pets at home, good righting reactions avoiding two obstacles simultaneously. Pt required CGA-SBA for balance and min cues for safe technique. Completed to increase kitchen safety and facilitate functional reaching required for simple meal prep tasks. Patient identified a personal goal to increase UB strength and improve overall endurance so they can complete their toilet & shower transfers; skilled edu on UE strengthening. Completed BUE exercises x10 reps x1 sets using mod resistance band in all joints/planes to increase strength and endurance required for ADLs. Pt required brief rest break between each exercise and no v/c for proper technique. ASSESSMENT:     Activity Tolerance:  Patient tolerance of  treatment: good. Discharge Recommendations: Home with assist PRN, Home with Home health OT   Equipment Recommendations: Other: Recommend tub transfer bench, BSC. Monitor for a reacher.   Plan: Times per week: 5x/wk for 90 min and 1x/wk for 30 min  Current Treatment Recommendations: Strengthening, Balance Training, Functional Mobility Training, Endurance Training, Self-Care / ADL, Patient/Caregiver Education & Training, Safety Education & Training, Equipment Evaluation, Education, & procurement, Home Management Training    Patient Education  Patient Education: IADL's, Home Exercise Program, Home Safety, Assistive Device Safety and Safety with transfers and mobility. Goals  Short term goals  Time Frame for Short term goals: 1 week  Short term goal 1: Pt will maintain NWBing RLE during transfers on/off toilet with SBA and 0 vcs for safety/NWBing to improve indep with toileting. Short term goal 2: Pt will maintain NWBing during 2 minute unilateral release task to improve clothing mgt and standing ADL tasks. Short term goal 3: Pt will navigate to and from bathroom using crutches with SBA to improve indep with accessing bathroom upstairs. Short term goal 4: Pt will use crutches to prepare light meal with SBA and min vcs for safety to improve indep  with preparing lunch for self at home. Short term goal 5: Pt will complete LB dressing tasks with CGA to improve indep with self care. Long term goals  Time Frame for Long term goals : 2 weeks  Long term goal 1: Pt will complete BADL routine with mod I and 0 vcs for adaptive technique to improve indep with self care. Long term goal 2: Pt will safely complete IADL task with mod I and 0 vcs for safety to improve indep within home. Following session, patient left in safe position with all fall risk precautions in place.

## 2020-12-18 NOTE — PROGRESS NOTES
[address] [address]  INPATIENT PHYSICAL THERAPY  DAILY NOTE  [facility]- [address] - 7E-67/067-A    Time In: 1330  Time Out: 1400  Timed Code Treatment Minutes: 30 Minutes  Minutes: 30          Date: 2020  Patient Name: Andrea Dean,  Gender:  male        MRN: [id number]  : 1990  (26 y.o.)     Referring Practitioner: ELIAZAR Hart MD  Diagnosis: Multiple injuries due to trauma  Additional Pertinent Hx: Per H&P, \"The patient is a 26 y.o. male  who presents with right sided pelvis pain after sustaining a traumatic injury on 2020. Patient has a history of epilepsy and states he was on a roof that day and actually had an epileptic episode while on the roof. This episode caused him to fall off of one tier of the roof and onto the lower tier and eventually off of the lower tier and to the ground. He does not recall the fall and only remembers waking up while on the ground with increased right hip/pelvis pain. He was brought to [facility] ED and further work-up showed a mildly displaced inferior and superior pubic rami fractures on xray and CT scan showed a mildly comminuted minimally displaced right sacral ala fracture. Patient was admitted under trauma services for further monitoring. Pt s/p Right superior and inferior pubic rami fracture, mildly displaced, with adjacent pelvic hematoma, right sacral ala fracture, Right sided pneumothorax, Liver Laceration, possible renal contusion, Fx of left 11th rib, Leukocytosis. \" To [facility] on      Prior Level of Function:  Lives With: Significant other(s/o's mother and brother)  Type of Home: House  Home Layout: Two level, Bed/Bath upstairs(~12 steps with L ascending rail to upstairs)  Home Access: Stairs to enter with rails  Entrance Stairs - Number of Steps: 5  Entrance Stairs - Rails: Both(wide - difficut to grasp simaltaneously)  Home Equipment: Rolling walker, Crutches   Bathroom Shower/Tub: Tub/Shower unit, Curtain  Bathroom Toilet: Exercises were completed for increased independence with functional mobility. Functional Outcome Measures: Not completed       ASSESSMENT:  Assessment: Patient progressing toward established goals. Activity Tolerance:  Patient tolerance of  treatment: good. Limited by pain. Equipment Recommendations:Equipment Needed: No(continue to monitor for w/c)  Discharge Recommendations:  Continue to assess pending progress    Plan: Times per week: 5x/wk 90 min, 1x/wk 30 min  Current Treatment Recommendations: Strengthening, ROM, Balance Training, Functional Mobility Training, Transfer Training, Stair training, Gait Training, Endurance Training, Home Exercise Program, Safety Education & Training, Patient/Caregiver Education & Training, Equipment Evaluation, Education, & procurement, Modalities, Wheelchair Mobility Training    Patient Education  Patient Education: Plan of Care, Precautions/Restrictions, Equipment Education, Transfers, Reviewed Prior Education, Gait, Stairs, Wheelchair Mobility, Health Promotion and Wellness Education, Home Safety Education,  - Patient Verbalized Understanding, - Patient Requires Continued Education    Goals:  Patient goals : go home using crutches  Short term goals  Time Frame for Short term goals: 1 week  Short term goal 1: Pt to perform bed mobility with SBA to promote ease of getting in and out of bed. Short term goal 2: Pt to perform sit<>stand at SBA to improve ability to perform transfers. Short term goal 3: Pt to ambulate > 100ft with crutches with SBA to improve ability to navigate within the home. Short term goal 4: Pt to negotiate 5 steps with B HR and AD as needed at [facility] to improve ability to enter home . Short term goal 5: Pt to negotiate 1 flight of step with L ascending rail and AD as needed at [facility] to get to bed and bath. Long term goals  Time Frame for Long term goals : 2 weeks  Long term goal 1: Pt to perform bed mobility at Mod I to get in and out of bed.   Long term goal 2: Pt to perform sit<>stand at Mod I to improve ability to perform transfers. Long term goal 3: Pt to ambulate 50ft with crutches at Mod I, >150ft at SBA for mobility in the home and community  Long term goal 4: Pt to negotiate 5 steps with B HR and AD as needed at S to improve ability to enter home . Long term goal 5: Pt to negotiate 1 flight of step with L ascending rail and AD as needed at S to get to bed and bath. Long term goal 6: pt to perform car transfer at Mod I for transport to home and medical appointments    Following session, patient left in safe position with all fall risk precautions in place.

## 2020-12-19 PROCEDURE — 97535 SELF CARE MNGMENT TRAINING: CPT

## 2020-12-19 PROCEDURE — 97129 THER IVNTJ 1ST 15 MIN: CPT

## 2020-12-19 PROCEDURE — 94760 N-INVAS EAR/PLS OXIMETRY 1: CPT

## 2020-12-19 PROCEDURE — 97530 THERAPEUTIC ACTIVITIES: CPT

## 2020-12-19 PROCEDURE — 1180000000 HC REHAB R&B

## 2020-12-19 PROCEDURE — 6370000000 HC RX 637 (ALT 250 FOR IP): Performed by: SURGERY

## 2020-12-19 PROCEDURE — 97130 THER IVNTJ EA ADDL 15 MIN: CPT

## 2020-12-19 PROCEDURE — 97116 GAIT TRAINING THERAPY: CPT

## 2020-12-19 PROCEDURE — 97110 THERAPEUTIC EXERCISES: CPT

## 2020-12-19 RX ORDER — NICOTINE 21 MG/24HR
1 PATCH, TRANSDERMAL 24 HOURS TRANSDERMAL EVERY 24 HOURS
Status: DISCONTINUED | OUTPATIENT
Start: 2020-12-19 | End: 2020-12-22 | Stop reason: HOSPADM

## 2020-12-19 RX ORDER — ONDANSETRON 4 MG/1
4 TABLET, FILM COATED ORAL EVERY 8 HOURS PRN
Status: DISCONTINUED | OUTPATIENT
Start: 2020-12-19 | End: 2020-12-22 | Stop reason: HOSPADM

## 2020-12-19 RX ADMIN — OXYCODONE AND ACETAMINOPHEN 2 TABLET: 5; 325 TABLET ORAL at 05:57

## 2020-12-19 RX ADMIN — OXYCODONE AND ACETAMINOPHEN 2 TABLET: 5; 325 TABLET ORAL at 17:24

## 2020-12-19 RX ADMIN — DOCUSATE SODIUM 100 MG: 100 CAPSULE, LIQUID FILLED ORAL at 20:49

## 2020-12-19 RX ADMIN — FAMOTIDINE 20 MG: 20 TABLET, FILM COATED ORAL at 20:49

## 2020-12-19 RX ADMIN — NALOXEGOL OXALATE 12.5 MG: 12.5 TABLET, FILM COATED ORAL at 08:40

## 2020-12-19 RX ADMIN — SUCRALFATE 1 G: 1 TABLET ORAL at 20:49

## 2020-12-19 RX ADMIN — DOCUSATE SODIUM 100 MG: 100 CAPSULE, LIQUID FILLED ORAL at 08:44

## 2020-12-19 RX ADMIN — CYCLOBENZAPRINE 10 MG: 10 TABLET, FILM COATED ORAL at 20:49

## 2020-12-19 RX ADMIN — STANDARDIZED SENNA CONCENTRATE 17.2 MG: 8.6 TABLET ORAL at 20:49

## 2020-12-19 RX ADMIN — CARBAMAZEPINE 200 MG: 200 TABLET ORAL at 08:40

## 2020-12-19 RX ADMIN — SUCRALFATE 1 G: 1 TABLET ORAL at 08:40

## 2020-12-19 RX ADMIN — OXYCODONE AND ACETAMINOPHEN 2 TABLET: 5; 325 TABLET ORAL at 10:57

## 2020-12-19 RX ADMIN — FAMOTIDINE 20 MG: 20 TABLET, FILM COATED ORAL at 08:40

## 2020-12-19 RX ADMIN — CARBAMAZEPINE 200 MG: 200 TABLET ORAL at 20:49

## 2020-12-19 ASSESSMENT — PAIN SCALES - GENERAL
PAINLEVEL_OUTOF10: 7
PAINLEVEL_OUTOF10: 0
PAINLEVEL_OUTOF10: 8
PAINLEVEL_OUTOF10: 9

## 2020-12-19 NOTE — PROGRESS NOTES
[facility] [address] [facility]  [address] [facility]  Occupational Therapy  Daily Note  Time:   Time In: 730  Time Out: 830  Timed Code Treatment Minutes: 30 Minutes  Minutes: 60          Date: 2020  Patient Name: Mariana Villa,   Gender: male      Room: [facility]67/067-A  MRN: [id number]  : 1990  (26 y.o.)  Referring Practitioner: Dr. Oleg Tony  Diagnosis: Multiple injuries due to trauma  Additional Pertinent Hx: The patient is a 26 y.o. male  who presents with right sided pelvis pain after sustaining a traumatic injury on 2020. Patient has a history of epilepsy and states he was on a roof that day and actually had an epileptic episode while on the roof. This episode caused him to fall off of one tier of the roof and onto the lower tier and eventually off of the lower tier and to the ground. He does not recall the fall and only remembers waking up while on the ground with increased right hip/pelvis pain. He was brought to [facility] ED and further work-up showed a mildly displaced inferior and superior pubic rami fractures on xray and CT scan showed a mildly comminuted minimally displaced right sacral ala fracture. Patient was admitted under trauma services for further monitoring. Pt s/p Right superior and inferior pubic rami fracture, mildly displaced, with adjacent pelvic hematoma, right sacral ala fracture, Right sided pneumothorax, Liver Laceration, possible renal contusion, Fx of left 11th rib, Leukocytosis. \" To IPR on     Restrictions/Precautions:  Restrictions/Precautions: Weight Bearing, General Precautions, Fall Risk  Right Lower Extremity Weight Bearing: Non Weight Bearing  Left Lower Extremity Weight Bearing: Weight Bearing As Tolerated  Position Activity Restriction  Other position/activity restrictions: No driving, climbing, heavy machine use, swimming x 3 months per neuro     SUBJECTIVE: Pt okay with OT session for ADLs today.     PAIN: 7/10: Pt reports that he was pretty sore this morning and his pain was 10/10 but he is feeling better after having been given pain meds    COGNITION: Decreased Insight and Decreased Safety Awareness    ADL:   Grooming: Stand By Assistance and with verbal cues standing at sink while shaving  Bathing: Stand By Assistance, with verbal cues  and with increased time for completion. Upper Extremity Dressing: Stand By Assistance and with verbal cues . Lower Extremity Dressing: Minimal Assistance and with verbal cues . Tub Transfer: [address] and with verbal cues . Verbal cues were provided throughout for maintaining NWB status. Pt states that he is, but that his toe is touching lightly for balance. Discussed the difference between TTWB and NWB. Also provided strategies to reduce pain with LB dressing to lois/susan DANG first. Pt notes this is helpful. BALANCE:  Sitting Balance:  Independent. EOB, tub bench  Standing Balance: Stand By Assistance, [address] no LOB, cues for adherance to WB precautions, pt does feel more comfortable with TTWB. Strategies to utilize sink/crutches for support to maintain NWB      TRANSFERS:  Sit to Stand:  Stand By Assistance, [address], with verbal cues. Stand to Sit: Stand By Assistance, [address], with verbal cues. FUNCTIONAL MOBILITY:  Assistive Device: crutches    Assist Level:  Stand By Assistance. Distance: To and from bathroom  And within therapy apartment kitchen. No LOB. ADDITIONAL ACTIVITIES:  Task completed of heating up coffee. Pt unable to problem solve transporting cup with liquid from sink to microwave. Strategy to slide along counter and perform. Pt states that he would like to go home before [place] and that he will be alone for the most part. Discussed importance of OT role in implementing strategies for preparing meals and transporting safely noting that these things will be addressed at his upcoming care conference. ASSESSMENT:     Activity Tolerance:  Patient tolerance of  treatment: good. Discharge Recommendations: Home with assist PRN, Home with Home health OT     Equipment Recommendations: Other: Recommend tub transfer bench, BSC. Monitor for a reacher. Plan: Times per week: 5x/wk for 90 min and 1x/wk for 30 min  Current Treatment Recommendations: Strengthening, Balance Training, Functional Mobility Training, Endurance Training, Self-Care / ADL, Patient/Caregiver Education & Training, Safety Education & Training, Equipment Evaluation, Education, & procurement, Home Management Training    Patient Education  Patient Education: Role of OT, Plan of Care, ADL's, IADL's, Precautions and Reviewed Prior Education    Goals  Short term goals  Time Frame for Short term goals: 1 week  Short term goal 1: Pt will maintain NWBing RLE during transfers on/off toilet with SBA and 0 vcs for safety/NWBing to improve indep with toileting. Short term goal 2: Pt will maintain NWBing during 2 minute unilateral release task to improve clothing mgt and standing ADL tasks. Short term goal 3: Pt will navigate to and from bathroom using crutches with SBA to improve indep with accessing bathroom upstairs. Short term goal 4: Pt will use crutches to prepare light meal with SBA and min vcs for safety to improve indep  with preparing lunch for self at home. Short term goal 5: Pt will complete LB dressing tasks with CGA to improve indep with self care. Long term goals  Time Frame for Long term goals : 2 weeks  Long term goal 1: Pt will complete BADL routine with mod I and 0 vcs for adaptive technique to improve indep with self care. Long term goal 2: Pt will safely complete IADL task with mod I and 0 vcs for safety to improve indep within home. Following session, patient left in safe position with all fall risk precautions in place.

## 2020-12-19 NOTE — PLAN OF CARE
Care plan reviewed with patient and verbalize understanding of the plan of care and contribute to goal setting. Problem: Falls - Risk of:  Goal: Will remain free from falls  Description: Will remain free from falls  Outcome: Ongoing  Note: Remains free from falls. Will continue to monitor. Problem: Falls - Risk of:  Goal: Absence of physical injury  Description: Absence of physical injury  Outcome: Ongoing     Problem: Pain:  Goal: Pain level will decrease  Description: Pain level will decrease  Outcome: Ongoing     Problem: Physical Regulation:  Goal: Will remain free from infection  Description: Will remain free from infection  Outcome: Ongoing  Note: Free from infection. Will continue to monitor.

## 2020-12-19 NOTE — PROGRESS NOTES
[address] [facility] THERAPY  [address] [facility]  PROGRESS NOTE    TIME   SLP Individual Minutes  Time In: 9017  Time Out: 2100  Minutes: 30          Date: 2020  Patient Name: Mariana Villa      CSN: [id number]   : 1990  (26 y.o.)  Gender: male   Referring Physician:  Dr. Last Stiles   Diagnosis: Multiple injuries due to trauma   Secondary Diagnosis: Cognitive impairments  Precautions: Fall risk   Current Diet: Regular with thin liquids  Swallowing Strategies: Standard Universal Swallow Precautions  Date of Last MBS: Not Applicable    Pain:  No pain reported. Subjective:  Patient seen sitting upright in wheelchair, pleasant and cooperative. Short-Term Goals:  SHORT TERM GOAL #1:  Goal 1: Patient will complete functional problem solving, reasoning and information processing tasks (including math computation related to measurements for job and finances) with 75% accuracy, mod cues to improve logical thinking and processing skills needed for return to work and household functions. 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 Patient states that he \"isn't great with numbers\" and requires fractions to be listed on his tape measure, but utilizes these skills on a daily basis. Functional math word problems (with use of a calculator)- 3/6 min cues, 3/6 mod cues   Needed assistance with computing square footage and some assistance with use of phone calculator. Put a decimal in where comma went, ST told him he did not need to worry about the comma ([phone]). Basic math functions like remembering math formulas for perimeter and area, how to find the DIFFERENCE between 2 numbers was difficult and required some refreshers.      Reading measurements from a tape measure- without fractions listed on them. ST reviewed and walked through first one with him. 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 Also talked about different jobs- cutting boards, cutting the shingles, measuring, requesting specific items to guys on the ground, supervisor of job gives jobs. Organized, sequential verbalization of steps. ST not familiar with job but could easily follow patient's description. SHORT TERM GOAL #3:  Goal 3: Patient will complete higher level attention (selective, alternating, divided) tasks with no more than 3 errors in a 3-5 minute time frame to improve skills needed for return to driving and work related tasks. INTERVENTIONS: Did not directly address this due to focus on other goals- specifically math and measurements for goal #1. Long-Term Goals:  Timeframe for Long-term Goals: 2 weeks    LONG TERM GOAL #1:  Goal 1: Patient will improve cognitive functioning to a modified independent level to improve skills needed for successful return to work and household responsibilties.             Comprehension: 6 - Complex ideas 90% or device (hearing aid or glasses- if patient is primarily a visual learner)  Expression: 6 - Device used to express complex ideas/needs  Social Interaction: 7 - Patient has appropriate behavior/relations 100% of the time  Problem Solvin - Patient solves simple/routine tasks 75-90%+   Memory: 5 - Patient requires prompting with stress/unfamiliar situations    EDUCATION:  Learner: Patient  Education:  Education Related to Potential Risks and Complications Due to Impairment/Illness/Injury, Education Related to Prevention of Recurrence of Impairment/Illness/Injury and Education Related to [employer] and Wellness  Evaluation of Education: Verbalizes understanding and Family not present- patient asked questions about his crutches, ST let him know PT would follow ST today, they would be able to answer his questions. ASSESSMENT/PLAN:  Activity Tolerance:  Patient tolerance of  treatment: good. Appropriate participation throughout session     Assessment/Plan: Patient progressing toward established goals. Continues to require skilled care of licensed speech pathologist to progress toward achievement of established goals and plan of care. .     Plan for Next Session: Complex attention, math computation      Anali Jensen M.A.  [id number] [phone]

## 2020-12-19 NOTE — PROGRESS NOTES
[address] [address]  INPATIENT PHYSICAL THERAPY  DAILY NOTE  [address] [facility],4Th Floor - [id number]    Time In: 1000  Time Out: 1100  Timed Code Treatment Minutes: 60 Minutes  Minutes: 60          Date: 2020  Patient Name: Andra Calvo,  Gender:  male        MRN: [id number]  : 1990  (26 y.o.)     Referring Practitioner: ELIAZAR Mccoy MD  Diagnosis: Multiple injuries due to trauma  Additional Pertinent Hx: Per H&P, \"The patient is a 26 y.o. male  who presents with right sided pelvis pain after sustaining a traumatic injury on 2020. Patient has a history of epilepsy and states he was on a roof that day and actually had an epileptic episode while on the roof. This episode caused him to fall off of one tier of the roof and onto the lower tier and eventually off of the lower tier and to the ground. He does not recall the fall and only remembers waking up while on the ground with increased right hip/pelvis pain. He was brought to [facility] ED and further work-up showed a mildly displaced inferior and superior pubic rami fractures on xray and CT scan showed a mildly comminuted minimally displaced right sacral ala fracture. Patient was admitted under trauma services for further monitoring. Pt s/p Right superior and inferior pubic rami fracture, mildly displaced, with adjacent pelvic hematoma, right sacral ala fracture, Right sided pneumothorax, Liver Laceration, possible renal contusion, Fx of left 11th rib, Leukocytosis. \" To [facility] on      Prior Level of Function:  Lives With: Significant other(s/o's mother and brother)  Type of Home: House  Home Layout: Two level, Bed/Bath upstairs(~12 steps with L ascending rail to upstairs)  Home Access: Stairs to enter with rails  Entrance Stairs - Number of Steps: 5  Entrance Stairs - Rails: Both(wide - difficut to grasp simaltaneously)  Home Equipment: Rolling walker, Crutches   Bathroom Shower/Tub: Tub/Shower unit, Curtain  Bathroom Toilet: Standard  Bathroom Accessibility: Walker accessible    ADL Assistance: Independent  Ambulation Assistance: Independent  Transfer Assistance: Independent  Active : Yes  Additional Comments: S/O works during the day, but there will always be someone at the house when S/O is at work. Reports being very Indep PTA, was not using AD for mobility. Pt reports 8 dogs, 8 cats and other animals in the home. Restrictions/Precautions:  Restrictions/Precautions: Weight Bearing, General Precautions, Fall Risk  Right Lower Extremity Weight Bearing: Non Weight Bearing  Left Lower Extremity Weight Bearing: Weight Bearing As Tolerated  Position Activity Restriction  Other position/activity restrictions: No driving, climbing, heavy machine use, swimming x 3 months per neuro    SUBJECTIVE: Patient in [facility] upon arrival, agreed and cooperative for therapy. Reports more pain and soreness today. Girlfriend bring in crutches from home for therapist to check. This PTA adjusting to patient height, appeared to be in good condition and worked well for pt. PAIN: Not rated. Left shoulder and R hip/groin    OBJECTIVE:  Bed Mobility:  Supine to Sit: Stand By Assistance, with increased time for completion  Sit to Supine: Stand By Assistance, with verbal cues , with increased time for completion   Scooting: Stand By Assistance    Transfers:  Sit to Stand: Stand By Assistance, with verbal cues, for correct hand placement on crutches with R hand  Stand to Sit:Stand By Assistance, cues for hand placement, on crutches  Car:Contact Guard Assistance, with verbal cues   Comments: Completed several sit <- > stand trials in apartment from different surfaces. Ambulation:  Stand By Assistance  Distance: 10 ft. X2, 4 ft. x1   Surface: Level Tile and carpet  Device:Crutches  Gait Deviations:  Slow Oralia, Decreased Gait Speed and good foot clearance on LLE with hopping, does touch R toe down for balance but reports no weight being placed. Wheelchair Mobility:  Modified Independent  Extremities Used: Bilateral Upper Extremities  Type of Chair:Manual  Surface: Level Tile  Distance: 150 ft. X1; 75 ft. x2   Quality: Good pace and maneuverability    Exercise:  Patient was guided in 1 set(s) 10-15 reps of exercise to both lower extremities. Ankle pumps, Glut sets, Quad sets, Heelslides, Short arc quads and Hip abduction/adduction. Exercises were completed for increased independence with functional mobility. Functional Outcome Measures: Not completed       ASSESSMENT:  Assessment: Patient progressing toward established goals. Activity Tolerance:  Patient tolerance of  treatment: good. Limited due to pain. Equipment Recommendations:Equipment Needed: No(continue to monitor for w/c)  Discharge Recommendations: Home with assist, HEP, Continue to assess pending progress    Plan: Times per week: 5x/wk 90 min, 1x/wk 30 min  Current Treatment Recommendations: Strengthening, ROM, Balance Training, Functional Mobility Training, Transfer Training, Stair training, Gait Training, Endurance Training, Home Exercise Program, Safety Education & Training, Patient/Caregiver Education & Training, Equipment Evaluation, Education, & procurement, Modalities, Wheelchair Mobility Training    Patient Education  Patient Education: Plan of Care, Precautions/Restrictions, [employer] Mobility, Equipment Education, Transfers, Reviewed Prior Education, Gait, Car Transfers, Wheelchair Mobility, Health Promotion and Wellness Education, Home Safety Education,  - Patient Verbalized Understanding    Goals:  Patient goals : go home using crutches  Short term goals  Time Frame for Short term goals: 1 week  Short term goal 1: Pt to perform bed mobility with SBA to promote ease of getting in and out of bed. Short term goal 2: Pt to perform sit<>stand at SBA to improve ability to perform transfers.   Short term goal 3: Pt to ambulate > 100ft with crutches with SBA to improve ability to navigate within the home. Short term goal 4: Pt to negotiate 5 steps with B HR and AD as needed at [facility] to improve ability to enter home . Short term goal 5: Pt to negotiate 1 flight of step with L ascending rail and AD as needed at [facility] to get to bed and bath. Long term goals  Time Frame for Long term goals : 2 weeks  Long term goal 1: Pt to perform bed mobility at Mod I to get in and out of bed. Long term goal 2: Pt to perform sit<>stand at Mod I to improve ability to perform transfers. Long term goal 3: Pt to ambulate 50ft with crutches at Mod I, >150ft at [facility] for mobility in the home and community  Long term goal 4: Pt to negotiate 5 steps with B HR and AD as needed at S to improve ability to enter home . Long term goal 5: Pt to negotiate 1 flight of step with L ascending rail and AD as needed at S to get to bed and bath. Long term goal 6: pt to perform car transfer at Mod I for transport to home and medical appointments    Following session, patient left in safe position with all fall risk precautions in place.

## 2020-12-19 NOTE — PROGRESS NOTES
Standard  Bathroom Accessibility: Walker accessible    ADL Assistance: Independent  Ambulation Assistance: Independent  Transfer Assistance: Independent  Active : Yes  Additional Comments: S/O works during the day, but there will always be someone at the house when S/O is at work. Reports being very Indep PTA, was not using AD for mobility. Pt reports 8 dogs, 8 cats and other animals in the home. Restrictions/Precautions:  Restrictions/Precautions: Weight Bearing, General Precautions, Fall Risk  Right Lower Extremity Weight Bearing: Non Weight Bearing  Left Lower Extremity Weight Bearing: Weight Bearing As Tolerated  Position Activity Restriction  Other position/activity restrictions: No driving, climbing, heavy machine use, swimming x 3 months per neuro     SUBJECTIVE: Patient in [facility] upon arrival, agreed and cooperative for therapy. Girlfriend present during session. Patient reports feeling better than this morning. PAIN: Yes, R hip/groin, not rated. Donned ice at the end of session. OBJECTIVE:    Transfers:  Sit to Stand: Stand By Assistance  Stand to Sit:Stand By Assistance    Ambulation:  Stand By Assistance  Distance: 150 ft. X1; 15 ft. x1  Surface: Level Tile  Device:Crutches  Gait Deviations:  Decreased Gait Speed and good foot clearance on LLE when hopping, touches R toe down for balance but no weight being distributed. Stairs:  Stairs:  6\" steps. X 8 using left ascending rail, crutch on R and Stand By Assistance. Platform:  6\" platform X 2 using Crutches and Contact Guard Assistance, with verbal cues . Wheelchair Mobility:  Modified Independent  Extremities Used: Bilateral Upper Extremities  Type of Chair:Manual  Surface: Level Tile  Distance: 150 ft x1   Quality: Good pace and manueverability    Exercise:  Patient was guided in 1 set(s) 10-15 reps of exercise to both lower extremities.   Seated marches, Seated hamstring curls against orange band on LLE only, Seated heel/toe raises, bed.  Long term goal 2: Pt to perform sit<>stand at Mod I to improve ability to perform transfers. Long term goal 3: Pt to ambulate 50ft with crutches at Mod I, >150ft at SBA for mobility in the home and community  Long term goal 4: Pt to negotiate 5 steps with B HR and AD as needed at S to improve ability to enter home . Long term goal 5: Pt to negotiate 1 flight of step with L ascending rail and AD as needed at S to get to bed and bath. Long term goal 6: pt to perform car transfer at Mod I for transport to home and medical appointments    Following session, patient left in safe position with all fall risk precautions in place.

## 2020-12-20 LAB — CARBAMAZEPINE, TOTAL: 8.7 MCG/ML (ref 2–10)

## 2020-12-20 PROCEDURE — 36415 COLL VENOUS BLD VENIPUNCTURE: CPT

## 2020-12-20 PROCEDURE — 6370000000 HC RX 637 (ALT 250 FOR IP): Performed by: FAMILY MEDICINE

## 2020-12-20 PROCEDURE — 1180000000 HC REHAB R&B

## 2020-12-20 PROCEDURE — 94760 N-INVAS EAR/PLS OXIMETRY 1: CPT

## 2020-12-20 PROCEDURE — 6370000000 HC RX 637 (ALT 250 FOR IP): Performed by: SURGERY

## 2020-12-20 PROCEDURE — 80156 ASSAY CARBAMAZEPINE TOTAL: CPT

## 2020-12-20 RX ADMIN — DOCUSATE SODIUM 100 MG: 100 CAPSULE, LIQUID FILLED ORAL at 20:43

## 2020-12-20 RX ADMIN — FAMOTIDINE 20 MG: 20 TABLET, FILM COATED ORAL at 08:37

## 2020-12-20 RX ADMIN — OXYCODONE AND ACETAMINOPHEN 2 TABLET: 5; 325 TABLET ORAL at 12:38

## 2020-12-20 RX ADMIN — SUCRALFATE 1 G: 1 TABLET ORAL at 20:38

## 2020-12-20 RX ADMIN — STANDARDIZED SENNA CONCENTRATE 17.2 MG: 8.6 TABLET ORAL at 20:38

## 2020-12-20 RX ADMIN — SUCRALFATE 1 G: 1 TABLET ORAL at 08:37

## 2020-12-20 RX ADMIN — FAMOTIDINE 20 MG: 20 TABLET, FILM COATED ORAL at 20:42

## 2020-12-20 RX ADMIN — OXYCODONE AND ACETAMINOPHEN 2 TABLET: 5; 325 TABLET ORAL at 08:38

## 2020-12-20 RX ADMIN — NALOXEGOL OXALATE 12.5 MG: 12.5 TABLET, FILM COATED ORAL at 08:37

## 2020-12-20 RX ADMIN — DOCUSATE SODIUM 100 MG: 100 CAPSULE, LIQUID FILLED ORAL at 08:37

## 2020-12-20 RX ADMIN — OXYCODONE AND ACETAMINOPHEN 2 TABLET: 5; 325 TABLET ORAL at 02:22

## 2020-12-20 RX ADMIN — OXYCODONE AND ACETAMINOPHEN 2 TABLET: 5; 325 TABLET ORAL at 19:40

## 2020-12-20 RX ADMIN — CARBAMAZEPINE 200 MG: 200 TABLET ORAL at 20:39

## 2020-12-20 RX ADMIN — CARBAMAZEPINE 200 MG: 200 TABLET ORAL at 08:37

## 2020-12-20 ASSESSMENT — PAIN SCALES - GENERAL
PAINLEVEL_OUTOF10: 9
PAINLEVEL_OUTOF10: 7
PAINLEVEL_OUTOF10: 6
PAINLEVEL_OUTOF10: 5
PAINLEVEL_OUTOF10: 8

## 2020-12-20 NOTE — CONSULTS
Department of Family Practice  Consult Note        Reason for Consult:  Medical management while on the Inpatient Rehab unit. Requesting Physician:  Dr. Geraldo Matos:  The need to continue the time with therapies following the acute hospital stay for trauma. History Obtained From:  patient, EMR    HISTORY OF PRESENT ILLNESS:              The patient is a 26 y.o. male with significant past medical history of       Diagnosis Date    Closed fracture of one rib of left side with routine healing 12/17/2020    Hypertension     Liver laceration, closed, initial encounter 12/17/2020    Multiple closed fractures of pelvis with stable disruption of pelvic ring with routine healing 12/17/2020    Pneumothorax, traumatic     Seizures ([facility] [address]       who presents with a fall from a second story roof from which he sustained multiple injuries. He had several fractures, a pneumothorax, liver laceration and renal contusion. He eventually became more medically stable and has come to Inpatient Rehab for more intensive time with therapies before the return home.     Past Medical History:        Diagnosis Date    Closed fracture of one rib of left side with routine healing 12/17/2020    Hypertension     Liver laceration, closed, initial encounter 12/17/2020    Multiple closed fractures of pelvis with stable disruption of pelvic ring with routine healing 12/17/2020    Pneumothorax, traumatic     Seizures (HCC)      Past Surgical History:        Procedure Laterality Date    CT GUIDED CHEST TUBE  12/14/2020    CT GUIDED CHEST TUBE 12/14/2020 [facility] CT SCAN    WISDOM TOOTH EXTRACTION       Current Medications:   Current Facility-Administered Medications: ondansetron (ZOFRAN) tablet 4 mg, 4 mg, Oral, Q8H PRN  nicotine (NICODERM CQ) 14 MG/24HR 1 patch, 1 patch, Transdermal, Q24H  acetaminophen (TYLENOL) tablet 650 mg, 650 mg, Oral, Q4H PRN  famotidine (PEPCID) tablet 20 mg, 20 mg, Oral, BID  lidocaine 4 % external patch 1 patch, 1 patch, Transdermal, Daily  polyethylene glycol (GLYCOLAX) packet 17 g, 17 g, Oral, Daily PRN  carBAMazepine (TEGRETOL) tablet 200 mg, 200 mg, Oral, BID  cyclobenzaprine (FLEXERIL) tablet 10 mg, 10 mg, Oral, BID PRN  docusate sodium (COLACE) capsule 100 mg, 100 mg, Oral, BID  naloxegol (MOVANTIK) tablet 12.5 mg, 12.5 mg, Oral, QAM  oxyCODONE-acetaminophen (PERCOCET) 5-325 MG per tablet 1 tablet, 1 tablet, Oral, Q4H PRN **OR** oxyCODONE-acetaminophen (PERCOCET) 5-325 MG per tablet 2 tablet, 2 tablet, Oral, Q4H PRN  polyethylene glycol (GLYCOLAX) packet 17 g, 17 g, Oral, Daily  senna (SENOKOT) tablet 17.2 mg, 2 tablet, Oral, Nightly  sucralfate (CARAFATE) tablet 1 g, 1 g, Oral, BID  Allergies:  Dilantin [phenytoin sodium extended]    Social History:   MARITAL STATUS:  single    Family History:   History reviewed. No pertinent family history.   REVIEW OF SYSTEMS:    CONSTITUTIONAL:  negative for  fatigue  EYES:  negative for  eye discharge  HEENT:  negative for  nasal congestion  RESPIRATORY:  negative for  dyspnea  CARDIOVASCULAR:  negative for  chest pain  GASTROINTESTINAL:  negative for constipation  GENITOURINARY:  negative for dysuria  INTEGUMENT/BREAST:  negative for rash  HEMATOLOGIC/LYMPHATIC:  negative for petechiae  ALLERGIC/IMMUNOLOGIC:  negative for anaphylaxis  ENDOCRINE:  negative for tremor  MUSCULOSKELETAL:  positive for  myalgias, arthralgias, stiff joints, muscle weakness and bone pain  NEUROLOGICAL:  positive for gait problems and weakness  BEHAVIOR/PSYCH:  negative for elated mood  PHYSICAL EXAM:      Vitals:    /66   Pulse 102   Temp 97.9 °F (36.6 °C) (Oral)   Resp 18   Ht 5' 7\" (1.702 m)   Wt 131 lb 6.4 oz (59.6 kg)   SpO2 97%   BMI 20.58 kg/m²     Well developed well nourished white male who is awake alert and cooperative  Skin warm and dry  Membranes moist  Head normocephalic  Neck without mass  Chest symmetrical expansion  Heart S1S2 without murmur  Lungs CTA  Abd soft, non tender, normoactive BS and no mass  Ext without edema  Neuro weak  Psy pleasant    IMPRESSION/RECOMMENDATIONS:      Active Hospital Problems    Diagnosis Date Noted    Closed fracture of one rib of left side with routine healing [S22.32XD] 12/17/2020    Liver laceration, closed, initial encounter [S36.113A] 12/17/2020    Multiple closed fractures of pelvis with stable disruption of pelvic ring with routine healing [S32.810D] 12/17/2020    Acute blood loss anemia [D62] 12/17/2020    Renal contusion, unspecified laterality, initial encounter [S37.019A] 12/17/2020    Multiple injuries due to trauma [T07. XXXA] 12/16/2020    Pneumothorax on right [J93.9] 12/11/2020     Reduce the strength of the nicotine patch

## 2020-12-20 NOTE — PLAN OF CARE
Problem: Falls - Risk of:  Goal: Will remain free from falls  Description: Will remain free from falls  12/20/2020 0420 by Kylee Garcia LPN  Outcome: Ongoing  12/19/2020 1512 by Toy Valdez LPN  Outcome: Ongoing  Note: Remains free from falls. Will continue to monitor. Goal: Absence of physical injury  Description: Absence of physical injury  12/20/2020 0420 by Kylee Garcia LPN  Outcome: Ongoing  12/19/2020 1512 by Toy Valdez LPN  Outcome: Ongoing     Problem: Pain:  Goal: Pain level will decrease  Description: Pain level will decrease  12/20/2020 0420 by Kylee Garcia LPN  Outcome: Ongoing  12/19/2020 1512 by Toy Valdez LPN  Outcome: Ongoing  Goal: Control of acute pain  Description: Control of acute pain  12/20/2020 0420 by Kylee Garcia LPN  Outcome: Ongoing  12/19/2020 1512 by Toy Valdez LPN  Outcome: Ongoing     Problem: IP BOWEL ELIMINATION  Goal: STG - Patient will verbalize signs and symptoms of constipation and how to prevent/alleviate  12/20/2020 0420 by Kylee Garcia LPN  Outcome: Ongoing  12/19/2020 1512 by Toy Valdez LPN  Outcome: Ongoing     Problem: Physical Regulation:  Goal: Will remain free from infection  Description: Will remain free from infection  12/20/2020 0420 by Kylee Garcia LPN  Outcome: Ongoing  12/19/2020 1512 by Toy Valdez LPN  Outcome: Ongoing  Note: Free from infection. Will continue to monitor.       Problem: IP BALANCE  Goal: LTG - Patient will maintain balance to allow for safe/functional mobility  12/20/2020 0420 by Kylee Garcia LPN  Outcome: Ongoing  12/19/2020 1512 by Toy Valdez LPN  Outcome: Ongoing     Problem: DISCHARGE BARRIERS  Goal: Patient's continuum of care needs are met  12/20/2020 0420 by Kylee Garcia LPN  Outcome: Ongoing  12/19/2020 1512 by Toy Valdez LPN  Outcome: Ongoing     Problem: IP DRESSINGS LOWER EXTREMITIES  Goal: LTG - patient will dress lower body with or without assistive device  12/19/2020 1512 by Hans Subramanian LPN  Outcome: Ongoing     Problem: IP COMMUNICATION/DYSARTHRIA  Goal: LTG - Patient will effectively communicate in all situations with the use of compensatory strategies  12/19/2020 1512 by Hans Subramanian LPN  Outcome: Ongoing  Care plan reviewed with patient. Patient verbalizes understanding of the plan of care and contribute to goal setting.

## 2020-12-21 PROBLEM — J93.9 PNEUMOTHORAX ON RIGHT: Status: RESOLVED | Noted: 2020-12-11 | Resolved: 2020-12-21

## 2020-12-21 PROBLEM — D62 ACUTE BLOOD LOSS ANEMIA: Status: RESOLVED | Noted: 2020-12-17 | Resolved: 2020-12-21

## 2020-12-21 PROBLEM — T07.XXXA MULTIPLE INJURIES DUE TO TRAUMA: Status: RESOLVED | Noted: 2020-12-16 | Resolved: 2020-12-21

## 2020-12-21 PROCEDURE — 97116 GAIT TRAINING THERAPY: CPT

## 2020-12-21 PROCEDURE — 97129 THER IVNTJ 1ST 15 MIN: CPT | Performed by: SPEECH-LANGUAGE PATHOLOGIST

## 2020-12-21 PROCEDURE — 6370000000 HC RX 637 (ALT 250 FOR IP): Performed by: NURSE PRACTITIONER

## 2020-12-21 PROCEDURE — 97110 THERAPEUTIC EXERCISES: CPT

## 2020-12-21 PROCEDURE — 99232 SBSQ HOSP IP/OBS MODERATE 35: CPT | Performed by: NURSE PRACTITIONER

## 2020-12-21 PROCEDURE — 6370000000 HC RX 637 (ALT 250 FOR IP): Performed by: SURGERY

## 2020-12-21 PROCEDURE — 97530 THERAPEUTIC ACTIVITIES: CPT

## 2020-12-21 PROCEDURE — 97542 WHEELCHAIR MNGMENT TRAINING: CPT

## 2020-12-21 PROCEDURE — 6370000000 HC RX 637 (ALT 250 FOR IP): Performed by: FAMILY MEDICINE

## 2020-12-21 PROCEDURE — 97535 SELF CARE MNGMENT TRAINING: CPT

## 2020-12-21 PROCEDURE — 94760 N-INVAS EAR/PLS OXIMETRY 1: CPT

## 2020-12-21 PROCEDURE — 97130 THER IVNTJ EA ADDL 15 MIN: CPT | Performed by: SPEECH-LANGUAGE PATHOLOGIST

## 2020-12-21 PROCEDURE — 1180000000 HC REHAB R&B

## 2020-12-21 RX ORDER — SENNA PLUS 8.6 MG/1
2 TABLET ORAL NIGHTLY
Qty: 60 TABLET | Refills: 0 | Status: SHIPPED | OUTPATIENT
Start: 2020-12-21 | End: 2021-04-01

## 2020-12-21 RX ORDER — IBUPROFEN 400 MG/1
400 TABLET ORAL EVERY 8 HOURS PRN
COMMUNITY
Start: 2020-12-21 | End: 2021-04-01 | Stop reason: ALTCHOICE

## 2020-12-21 RX ORDER — CYCLOBENZAPRINE HCL 10 MG
10 TABLET ORAL 2 TIMES DAILY PRN
Qty: 60 TABLET | Refills: 0 | Status: SHIPPED | OUTPATIENT
Start: 2020-12-21 | End: 2021-01-20

## 2020-12-21 RX ORDER — IBUPROFEN 400 MG/1
400 TABLET ORAL EVERY 6 HOURS PRN
Status: DISCONTINUED | OUTPATIENT
Start: 2020-12-21 | End: 2020-12-22 | Stop reason: HOSPADM

## 2020-12-21 RX ORDER — OXYCODONE HYDROCHLORIDE AND ACETAMINOPHEN 5; 325 MG/1; MG/1
1 TABLET ORAL EVERY 4 HOURS PRN
Qty: 40 TABLET | Refills: 0 | Status: SHIPPED
Start: 2020-12-21 | End: 2020-12-22 | Stop reason: HOSPADM

## 2020-12-21 RX ORDER — CARBAMAZEPINE 200 MG/1
200 TABLET ORAL 2 TIMES DAILY
Qty: 60 TABLET | Refills: 0 | Status: SHIPPED | OUTPATIENT
Start: 2020-12-21 | End: 2020-12-29 | Stop reason: SDUPTHER

## 2020-12-21 RX ORDER — PSEUDOEPHEDRINE HCL 30 MG
100 TABLET ORAL 2 TIMES DAILY
Qty: 60 CAPSULE | Refills: 0 | Status: SHIPPED | OUTPATIENT
Start: 2020-12-21 | End: 2022-07-22 | Stop reason: SDUPTHER

## 2020-12-21 RX ORDER — NICOTINE 21 MG/24HR
1 PATCH, TRANSDERMAL 24 HOURS TRANSDERMAL EVERY 24 HOURS
Qty: 30 PATCH | Refills: 0 | Status: SHIPPED | OUTPATIENT
Start: 2020-12-22 | End: 2021-04-01

## 2020-12-21 RX ADMIN — OXYCODONE AND ACETAMINOPHEN 2 TABLET: 5; 325 TABLET ORAL at 16:30

## 2020-12-21 RX ADMIN — NALOXEGOL OXALATE 12.5 MG: 12.5 TABLET, FILM COATED ORAL at 08:31

## 2020-12-21 RX ADMIN — DOCUSATE SODIUM 100 MG: 100 CAPSULE, LIQUID FILLED ORAL at 08:30

## 2020-12-21 RX ADMIN — OXYCODONE AND ACETAMINOPHEN 2 TABLET: 5; 325 TABLET ORAL at 03:48

## 2020-12-21 RX ADMIN — SUCRALFATE 1 G: 1 TABLET ORAL at 21:18

## 2020-12-21 RX ADMIN — FAMOTIDINE 20 MG: 20 TABLET, FILM COATED ORAL at 21:19

## 2020-12-21 RX ADMIN — FAMOTIDINE 20 MG: 20 TABLET, FILM COATED ORAL at 08:30

## 2020-12-21 RX ADMIN — STANDARDIZED SENNA CONCENTRATE 17.2 MG: 8.6 TABLET ORAL at 21:18

## 2020-12-21 RX ADMIN — OXYCODONE AND ACETAMINOPHEN 2 TABLET: 5; 325 TABLET ORAL at 21:19

## 2020-12-21 RX ADMIN — IBUPROFEN 400 MG: 400 TABLET, FILM COATED ORAL at 21:19

## 2020-12-21 RX ADMIN — CARBAMAZEPINE 200 MG: 200 TABLET ORAL at 08:30

## 2020-12-21 RX ADMIN — CARBAMAZEPINE 200 MG: 200 TABLET ORAL at 21:18

## 2020-12-21 RX ADMIN — DOCUSATE SODIUM 100 MG: 100 CAPSULE, LIQUID FILLED ORAL at 21:19

## 2020-12-21 RX ADMIN — OXYCODONE AND ACETAMINOPHEN 2 TABLET: 5; 325 TABLET ORAL at 08:30

## 2020-12-21 RX ADMIN — SUCRALFATE 1 G: 1 TABLET ORAL at 08:30

## 2020-12-21 RX ADMIN — CYCLOBENZAPRINE 10 MG: 10 TABLET, FILM COATED ORAL at 03:47

## 2020-12-21 ASSESSMENT — PAIN SCALES - GENERAL
PAINLEVEL_OUTOF10: 4
PAINLEVEL_OUTOF10: 6
PAINLEVEL_OUTOF10: 7
PAINLEVEL_OUTOF10: 10

## 2020-12-21 NOTE — PLAN OF CARE
Problem: Falls - Risk of:  Goal: Will remain free from falls  Description: Will remain free from falls  Outcome: Met This Shift  Note: No falls or incidents noted.  Patient without attempts  Goal: Absence of physical injury  Description: Absence of physical injury  Outcome: Met This Shift     Problem: Pain:  Goal: Pain level will decrease  Description: Pain level will decrease  Outcome: Ongoing  Goal: Control of acute pain  Description: Control of acute pain  Outcome: Ongoing

## 2020-12-21 NOTE — PROGRESS NOTES
Physical Medicine & Rehabilitation   Progress Note    Chief Complaint:  TBI. Fall from roof. Rehab needs    Subjective:  Patient sitting up in chair. Patient with complaints of heaviness in right leg and soreness. Discussed low dose ibuprofen. Patient understanding. Discharge planning for tomorrow. Patient denies any other questions or concerns. Rehabilitation:  PT:   Bed Mobility:  Supine to Sit: Stand By Assistance  Sit to Supine: Minimal Assistance   Transfers:  Sit to Stand: Stand By Assistance, Contact Guard Assistance  Stand to Sit:Stand By Assistance  Stand Pivot:Stand By Assistance  Ambulation:  Stand By Assistance, Contact Guard Assistance  Distance: 45' x 1  Surface: Level Tile  Device:Crutches  Gait Deviations:  Slow Oralia, Decreased Gait Speed and cues for NWBing compliance. Wheelchair Mobility:  Modified Independent  Extremities Used: Bilateral Upper Extremities  Type of Chair:Manual  Surface: Level Tile  Distance: 150' x 2  Quality: good maneuverability and velocity  Stairs:  Stairs:  6\" steps. X 8 using one handrail and one crutch and Stand By Assistance, [address] Cues for NWB status. PT. Ascending retro, descending fwd. Balance:  Pt. Completed standing dynamic balance activity with Single Leg Stance on level tile and Single UE support on Crutches with Stand By Assistance, [address] Activity completed to improve balance, enhance functional mobility and, reduce risk of falls. OT:   FUNCTIONAL MOBILITY:  Assistive Device: Wheelchair  Assist Level:  Modified Independent. Distance:   Completed functional mobility to/from therapy apartment and gym at good pace, no LOB noted. Pt requires brief rest break after trial of mobility, min fatigue noted. ADDITIONAL ACTIVITIES:  Patient identified a personal goal to increase UB strength and improve overall endurance so they can complete their toilet & shower transfers; skilled edu on UE strengthening.  Completed BUE exercises x15-20 reps x1 sets using min resistance band in all joints/planes to increase strength and endurance required for ADLs. Pt required brief rest break between each exercise and min v/c for proper technique. Pt participated in IADL task at w/c level to reach outside of ZAIRE to retrieve clothing from dryer. Pt completed with Ira- no cues for safe technique. Completed to increase safety and facilitate functional reaching required for IADL tasks. ST:  SHORT TERM GOAL #1:  Goal 1: Patient will complete functional problem solving, reasoning and information processing tasks (including math computation related to measurements for job and finances) with 75% accuracy, mod cues to improve logical thinking and processing skills needed for return to work and household functions. INTERVENTIONS: Functional time word problems: 7/9 indep, 1/9 self correction with clarification of instructions, 1/9 with min cues to ensure all details of instructions were addressed. *Good success overall with basic math computation. *Cueing needed x2 for thoroughness of responses and comprehension of the prompts.      SHORT TERM GOAL #2:  Goal 2: Patient will complete higher level executive functioning and thought organization tasks with 80% accuracy given min cues in order to independently complete ADLs/IADL's  INTERVENTIONS: Reviewed patient's method for recalling appointments/events, to which he states that he asks for reminder card and places it in his wallet. When asked if he utilizes a personal calendar, he stated \"yes\" and indicated that he takes the appointment cards home with him and places them on his written calendar. Calendar organization task (patient given 10 events and prompted to organize the information on his calendar with inclusion of all pertinent details):  10/10 indep  *Excellent selective attention and visual scanning  *Appropriate organization of information with inclusion of pertinent details.   *No spelling errors      SHORT TERM GOAL #3:  Goal 3: Patient will complete higher level attention (selective, alternating, divided) tasks with no more than 3 errors in a 3-5 minute time frame to improve skills needed for return to driving and work related tasks. INTERVENTIONS: Complex attention task (Patient instructed on rules for a novel card game, with baseball game playing on TV): patient sustained attention to task for 15 minutes with x2 rounds of card play with EXCELLENT attention to task despite external distractions and EXCELLENT recall/reasoning for retention/comprehension of rules of game play. Review of Systems:  CONSTITUTIONAL:  negative  RESPIRATORY:  negative  CARDIOVASCULAR:  negative  GASTROINTESTINAL:  negative  GENITOURINARY:  negative  MUSCULOSKELETAL:  positive for  pain, decreased range of motion and muscle weakness  NEUROLOGICAL:  positive for gait problems  BEHAVIOR/PSYCH:  negative  System review otherwise negative    Objective:  /81   Pulse 86   Temp 97.3 °F (36.3 °C) (Oral)   Resp 18   Ht 5' 7\" (1.702 m)   Wt 131 lb 6.4 oz (59.6 kg)   SpO2 99%   BMI 20.58 kg/m²   awake  Orientation:   person, place, time  Mood: within normal limits  Affect: calm  General appearance: Patient is well nourished, well developed, well groomed and in no acute distress    Memory:   normal,   Attention/Concentration: normal  Language:  normal    Cranial Nerves:  cranial nerves II-XII are grossly intact  ROM:  abnormal - right hip limited due to pain  Muscle bulk: within normal limits  Sensory:  Sensory intact     Lungs: no labored breathing, wheezing or cough  Skin: warm and dry, no rash or erythema  Peripheral vascular: Pulses: Normal upper and lower extremity pulses; Edema: no      Diagnostics:   No results found for this or any previous visit (from the past 24 hour(s)). Impression:  1. Fall from second story roof secondary to having active seizure.   2. Noncompliance with seizure medications. 3. History of seizures since age 13.  3. Traumatic brain injury with loss of consciousness of 15 minutes. 5. Mild cognitive-communication deficit. (MOCA)  25/30.   6. Right superior and inferior pubic rami fractures on the right with mild displacement and pelvic hematoma. 7. Gait instability. 8. Decreased ability for ADLs. 9. Posttraumatic pain. 10. Right sacral alae fracture with involvement of the S1, S2, and S3 neural foramina. 11. Nondisplaced fracture of the posterior medial aspect of the left 11th rib. 12. Liver laceration. 13. Renal contusion. 14. Right-sided pneumothorax requiring placement of chest tube and subsequent placement of pigtail catheter by Interventional Radiology. 15. Prior history of IV drug abuse, sober x1 year. 16. Current smoker. Plan:  Continue current therapies  Prophylaxis: DVT - scd, GI - Carafate, pepcid  Pain: tylenol, Percocet 1-2 tabs every 4 hours PRN   Taking 2 tabs regularly 4 times a day. Bowels: miralax, senna, colace, movantik  Seizure: tegretolHAYDE  Discharge planning for 12/22/2020. Home.    Add ibuprofen for pain    Missed Therapy Time:  · None    Brianna Cohen, APRN - CNP

## 2020-12-21 NOTE — PROGRESS NOTES
[address] [facility] THERAPY  [address] [facility]  DAILY NOTE    TIME   SLP Individual Minutes  Time In: 0900  Time Out: 0930  Minutes: 30  Timed Code Treatment Minutes: 30 Minutes       Date: 2020  Patient Name: Linda Matos      CSN: [id number]   : 1990  (26 y.o.)  Gender: male   Referring Physician:  Dr. Bety Alvarez  Diagnosis: Multiple injuries due to trauma   Secondary Diagnosis: Cognitive impairments  Precautions: Fall risk   Current Diet: Regular with thin liquids  Swallowing Strategies: Standard Universal Swallow Precautions  Date of Last MBS: Not Applicable    Pain:   - Pain location: Right hip. RN had adminstered medication prior to session. Patient also utilizing ice. Subjective:  Patient sitting upright in wheelchair for duration of session, pleasant and attentive. Short-Term Goals:  SHORT TERM GOAL #1:  Goal 1: Patient will complete functional problem solving, reasoning and information processing tasks (including math computation related to measurements for job and finances) with 75% accuracy, mod cues to improve logical thinking and processing skills needed for return to work and household functions. INTERVENTIONS: Functional time word problems:  indep,  self correction with clarification of instructions,  with min cues to ensure all details of instructions were addressed. *Good success overall with basic math computation. *Cueing needed x2 for thoroughness of responses and comprehension of the prompts. SHORT TERM GOAL #2:  Goal 2: Patient will complete higher level executive functioning and thought organization tasks with 80% accuracy given min cues in order to independently complete ADLs/IADL's  INTERVENTIONS: Reviewed patient's method for recalling appointments/events, to which he states that he asks for reminder card and places it in his wallet.  When asked if he utilizes a personal calendar, he stated \"yes\" and indicated that he takes the appointment cards home with him and places them on his written calendar. Calendar organization task (patient given 10 events and prompted to organize the information on his calendar with inclusion of all pertinent details):  10/10 indep  *Excellent selective attention and visual scanning  *Appropriate organization of information with inclusion of pertinent details. *No spelling errors     SHORT TERM GOAL #3:  Goal 3: Patient will complete higher level attention (selective, alternating, divided) tasks with no more than 3 errors in a 3-5 minute time frame to improve skills needed for return to driving and work related tasks. INTERVENTIONS: Complex attention task (Patient instructed on rules for a novel card game, with baseball game playing on TV): patient sustained attention to task for 15 minutes with x2 rounds of card play with EXCELLENT attention to task despite external distractions and EXCELLENT recall/reasoning for retention/comprehension of rules of game play. Long-Term Goals:  Timeframe for Long-term Goals: 2 weeks    LONG TERM GOAL #1:  Goal 1: Patient will improve cognitive functioning to a modified independent level to improve skills needed for successful return to work and household responsibilties. Comprehension: 7 - Patient understands complex ideas (math/planning)  Expression: 7 - Patient expresses complex ideas/needs  Social Interaction: 7 - Patient has appropriate behavior/relations 100% of the time  Problem Solvin - Patient able to solve simple/routine tasks  Memory: 6 - Patient requires device to recall (e.g. memory book)    EDUCATION:  Learner: Patient  Education:  Reviewed recommendations for follow-up  Evaluation of Education: Verbalizes understanding and Family not present    ASSESSMENT/PLAN:  Activity Tolerance:  Patient tolerance of  treatment: good.  Appropriate participation throughout session     Assessment/Plan: Patient progressing toward established goals. Continues to require skilled care of licensed speech pathologist to progress toward achievement of established goals and plan of care. .     Plan for Next Session: Complex attention, math computation      Meenakshi RODRIGUEZ  [address], [address] [place]

## 2020-12-21 NOTE — PLAN OF CARE
Care plan reviewed with patient and verbalize understanding of the plan of care and contribute to goal setting. Problem: Falls - Risk of:  Goal: Will remain free from falls  Description: Will remain free from falls  12/21/2020 1119 by Cullen Pham LPN  Outcome: Ongoing  Note: Remains free from falls. Continues to use crutches to ambulate. Tolerated well. Problem: Falls - Risk of:  Goal: Absence of physical injury  Description: Absence of physical injury  12/21/2020 1119 by Cullen Pham LPN  Outcome: Ongoing     Problem: Pain:  Goal: Pain level will decrease  Description: Pain level will decrease  12/21/2020 1119 by Cullen Pham LPN  Outcome: Ongoing     Problem: IP BOWEL ELIMINATION  Goal: STG - Patient will verbalize signs and symptoms of constipation and how to prevent/alleviate  Outcome: Ongoing     Problem: Physical Regulation:  Goal: Will remain free from infection  Description: Will remain free from infection  Outcome: Ongoing  Note: Remains free from infection. Will continue to monitor.

## 2020-12-21 NOTE — PROGRESS NOTES
Date: 2020  Patient Name: Myrna Stuart        MRN: [id number]   Account: [de-identified]   : 1990  (26 y.o.)  Gender: male   Referring Practitioner: Dr. Adelina Gage  Diagnosis: Multiple injuries due to trauma  Additional Pertinent Hx: The patient is a 26 y.o. male  who presents with right sided pelvis pain after sustaining a traumatic injury on 2020. Patient has a history of epilepsy and states he was on a roof that day and actually had an epileptic episode while on the roof. This episode caused him to fall off of one tier of the roof and onto the lower tier and eventually off of the lower tier and to the ground. He does not recall the fall and only remembers waking up while on the ground with increased right hip/pelvis pain. He was brought to [facility] ED and further work-up showed a mildly displaced inferior and superior pubic rami fractures on xray and CT scan showed a mildly comminuted minimally displaced right sacral ala fracture. Patient was admitted under trauma services for further monitoring. Pt s/p Right superior and inferior pubic rami fracture, mildly displaced, with adjacent pelvic hematoma, right sacral ala fracture, Right sided pneumothorax, Liver Laceration, possible renal contusion, Fx of left 11th rib, Leukocytosis. \" To [facility] on     Myrna Stuart requires a Bedside Commode and a tub transfer bench to complete bathing, toileting, dressing and grooming tasks. Patient requires a Bedside Commode and a tub transfer bench due to Upper Extremity/Lower Extremity Weakness and limited ambulation and is unable to walk to the bathroom at home. Without the Bedside Commode and tub transfer bench, Myrna Stuart is at increased risk for falls and would require increased assistance for ADL's and mobility.

## 2020-12-21 NOTE — PROGRESS NOTES
[address] [place] [address]  Recreational Therapy  Daily Note  [address]    Time Spent with Patient: 10 minutes    Date:  12/21/2020       Patient Name: Benjamin Bolden      MRN: [id number]      YOB: 1990 (26 y.o.)       Gender: male  Diagnosis: Multiple injuries due to trauma  Referring Practitioner: Dr. Ashley Manley    RESTRICTIONS/PRECAUTIONS:  Restrictions/Precautions: Weight Bearing, General Precautions, Fall Risk  Vision: Impaired(reports intermittent blurry vision)  Hearing: Within functional limits    PAIN: 8    SUBJECTIVE:  I just want to rest right now    OBJECTIVE:  Pt declined playing bingo with peers this am due to increase pain and wanting to rest and watch baseball-RT gave him a word  Puzzle book to use in his free time and enjoys looking at the newspaper       Patient Education  New Education Provided: Importance of Leisure,     Electronically signed by: REX Arnold  Date: 12/21/2020

## 2020-12-21 NOTE — PROGRESS NOTES
[address] [place] [address]  INPATIENT PHYSICAL THERAPY  DAILY NOTE  [address] [facility],[address] - [id number]    Time In: 0730  Time Out: 0830  Timed Code Treatment Minutes: 60 Minutes  Minutes: 60          Date: 2020  Patient Name: Christian Rey,  Gender:  male        MRN: [id number]  : 1990  (26 y.o.)     Referring Practitioner: ELIAZAR Reece MD  Diagnosis: Multiple injuries due to trauma  Additional Pertinent Hx: Per H&P, \"The patient is a 26 y.o. male  who presents with right sided pelvis pain after sustaining a traumatic injury on 2020. Patient has a history of epilepsy and states he was on a roof that day and actually had an epileptic episode while on the roof. This episode caused him to fall off of one tier of the roof and onto the lower tier and eventually off of the lower tier and to the ground. He does not recall the fall and only remembers waking up while on the ground with increased right hip/pelvis pain. He was brought to [facility] ED and further work-up showed a mildly displaced inferior and superior pubic rami fractures on xray and CT scan showed a mildly comminuted minimally displaced right sacral ala fracture. Patient was admitted under trauma services for further monitoring. Pt s/p Right superior and inferior pubic rami fracture, mildly displaced, with adjacent pelvic hematoma, right sacral ala fracture, Right sided pneumothorax, Liver Laceration, possible renal contusion, Fx of left 11th rib, Leukocytosis. \" To [facility] on      Prior Level of Function:  Lives With: Significant other(s/o's mother and brother)  Type of Home: House  Home Layout: Two level, Bed/Bath upstairs(~12 steps with L ascending rail to upstairs)  Home Access: Stairs to enter with rails  Entrance Stairs - Number of Steps: 5  Entrance Stairs - Rails: Both(wide - difficut to grasp simaltaneously)  Home Equipment: Rolling walker, Crutches   Bathroom Shower/Tub: Tub/Shower unit, Curtain  Bathroom Toilet: Standard  Bathroom Accessibility: Walker accessible    ADL Assistance: Independent  Ambulation Assistance: Independent  Transfer Assistance: Independent  Active : Yes  Additional Comments: S/O works during the day, but there will always be someone at the house when S/O is at work. Reports being very Indep PTA, was not using AD for mobility. Pt reports 8 dogs, 8 cats and other animals in the home. Restrictions/Precautions:  Restrictions/Precautions: Weight Bearing, General Precautions, Fall Risk  Right Lower Extremity Weight Bearing: Non Weight Bearing  Left Lower Extremity Weight Bearing: Weight Bearing As Tolerated  Position Activity Restriction  Other position/activity restrictions: No driving, climbing, heavy machine use, swimming x 3 months per neuro     SUBJECTIVE: Pt. Seated in WC upon arrival and pleasantly agrees to therapy session. PAIN: Not rated: R pelvis    OBJECTIVE:  Bed Mobility:  Supine to Sit: Stand By Assistance  Sit to Supine: Minimal Assistance     Transfers:  Sit to Stand: Stand By Assistance, Contact Guard Assistance  Stand to Sit:Stand By Assistance  Stand Pivot:Stand By Assistance    Ambulation:  Stand By Assistance, Contact Guard Assistance  Distance: 45' x 1  Surface: Level Tile  Device:Crutches  Gait Deviations:  Slow Orlaia, Decreased Gait Speed and cues for NWBing compliance. Wheelchair Mobility:  Modified Independent  Extremities Used: Bilateral Upper Extremities  Type of Chair:Manual  Surface: Level Tile  Distance: 150' x 2  Quality: good maneuverability and velocity    Stairs:  Stairs:  6\" steps. X 8 using one handrail and one crutch and Stand By Assistance, [employer]. Cues for NWB status. PT. Ascending retro, descending fwd. Balance:  Pt. Completed standing dynamic balance activity with Single Leg Stance on level tile and Single UE support on Crutches with Stand By Assistance, [employer].  Activity completed to improve balance, enhance functional mobility and, reduce risk of falls. Exercise:  Patient was guided in 1 set(s) 10 reps of exercise to both lower extremities. Glut sets, Quad sets, Heelslides, Hip abduction/adduction, Seated marches, Seated hamstring curls, Seated heel/toe raises and Long arc quads. Exercises were completed for increased independence with functional mobility. Functional Outcome Measures: Not completed       ASSESSMENT:  Assessment: Patient progressing toward established goals. Limited by pain. Activity Tolerance:  Patient tolerance of  treatment: good. Equipment Recommendations:Equipment Needed: No(continue to monitor for w/c)  Discharge Recommendations:  Continue to assess pending progress    Plan: Times per week: 5x/wk 90 min, 1x/wk 30 min  Current Treatment Recommendations: Strengthening, ROM, Balance Training, Functional Mobility Training, Transfer Training, Stair training, Gait Training, Endurance Training, Home Exercise Program, Safety Education & Training, Patient/Caregiver Education & Training, Equipment Evaluation, Education, & procurement, Modalities, Wheelchair Mobility Training    Patient Education  Patient Education: Plan of Care, Precautions/Restrictions, Transfers, Gait, Stairs, Verbal Exercise Instruction    Goals:  Patient goals : go home using crutches  Short term goals  Time Frame for Short term goals: 1 week  Short term goal 1: Pt to perform bed mobility with SBA to promote ease of getting in and out of bed. Short term goal 2: Pt to perform sit<>stand at SBA to improve ability to perform transfers. Short term goal 3: Pt to ambulate > 100ft with crutches with SBA to improve ability to navigate within the home. Short term goal 4: Pt to negotiate 5 steps with B HR and AD as needed at [facility] to improve ability to enter home . Short term goal 5: Pt to negotiate 1 flight of step with L ascending rail and AD as needed at [facility] to get to bed and bath.   Long term goals  Time Frame for Long

## 2020-12-21 NOTE — PROGRESS NOTES
[facility]  [address] [facility]  Occupational Therapy  Daily Note  Time:   Time In: 1000  Time Out: 1100  Timed Code Treatment Minutes: 60 Minutes  Minutes: 60    Date: 2020  Patient Name: Joanne Conde,   Gender: male      Room: [facility]67/067-A  MRN: [id number]  : 1990  (26 y.o.)  Referring Practitioner: Dr. Eneida Garland  Diagnosis: Multiple injuries due to trauma  Additional Pertinent Hx: The patient is a 26 y.o. male  who presents with right sided pelvis pain after sustaining a traumatic injury on 2020. Patient has a history of epilepsy and states he was on a roof that day and actually had an epileptic episode while on the roof. This episode caused him to fall off of one tier of the roof and onto the lower tier and eventually off of the lower tier and to the ground. He does not recall the fall and only remembers waking up while on the ground with increased right hip/pelvis pain. He was brought to [facility] ED and further work-up showed a mildly displaced inferior and superior pubic rami fractures on xray and CT scan showed a mildly comminuted minimally displaced right sacral ala fracture. Patient was admitted under trauma services for further monitoring. Pt s/p Right superior and inferior pubic rami fracture, mildly displaced, with adjacent pelvic hematoma, right sacral ala fracture, Right sided pneumothorax, Liver Laceration, possible renal contusion, Fx of left 11th rib, Leukocytosis. \" To IPR on     Restrictions/Precautions:  Restrictions/Precautions: Weight Bearing, General Precautions, Fall Risk  Right Lower Extremity Weight Bearing: Non Weight Bearing  Left Lower Extremity Weight Bearing: Weight Bearing As Tolerated  Position Activity Restriction  Other position/activity restrictions: No driving, climbing, heavy machine use, swimming x 3 months per neuro     SUBJECTIVE: Patient pleasant and cooperative. Agreeable to OT.      PAIN: 8/10: Pelvis     COGNITION: Decreased Insight and Decreased Safety Awareness    ADL:   Grooming: Supervision. seated, self retrieved and completed. Bathing: Stand By Assistance. in tub shower combo. min cues for NWB as he attempted to TTWB. Upper Extremity Dressing: Supervision. retrieved in w/c, donned without issue. Lower Extremity Dressing: Minimal Assistance. A to doff B socks and to don due to pelvic pain. SBA clothing mgmt but with mod cues for safety and NWB adherence. Tub Transfer: Contact Guard Assistance. mod VCs for safety and problem solving technique and pivot. Pt attempting to park w/c 5 ft away from bench but pivot over. Pt stood and attemped to lean forward to \"grab\" edge of tub tf bench and hop 5 ft. Disccsused why this is unsafe and pt showed min inisight, safety awareness still impaired. Assist reqired with safe set-up, where pt did report \"oh that is easier\" . BALANCE:  Sitting Balance:  Supervision. Standing Balance: Stand By Assistance. BED MOBILITY:  Not Tested    TRANSFERS:  Sit to Stand:  Stand By Assistance. w/c, tub tf bench. Stand to Sit: Stand By Assistance. Stand Pivot: Stand By Assistance. w/c <> tub tf bench. FUNCTIONAL MOBILITY:  Assistive Device: Wheelchair  Assist Level:  Modified Independent. Distance: To and from bathroom, To and from shower room and To and from therapy apartment     ADDITIONAL ACTIVITIES:  Pt completed IADL task of laundry - pt stood at washer x 4 min with SBA and released 1 UE to place clothing in washer - no LOB. Pt able to problem solve washer settings. ASSESSMENT:  Assessment: Francisca Aldana is making steady progress on IP Rehab. He has progressed to a set-up level with UB ADLs and intermittently requires min A for LB ADLs due to pelvic pain, but can complete with pain levels are controlled. Patient completes IADLs at a SBA level for balance but does require min cues for NWB status (he often attempts to do TTWB).  Pt does demo some decreaed safety awareness at times, requiring cues to problem solve better scenarioes (example: wanted to pivot into tub, but placed w/c 5 ft away from tub and did not have crutches. Pt attempted to stand, but required cues to problem solve that he couldn't hop 5 pt without any AD). He does live with a girlfriend who can provide intermittent A but will be alone a lot so he will need to be mod I at discharge. He cont to require skilled OT services to progress with ADL and IADL remediation, improve safety & problem solving with daily routines and to decrease risk of fall / injury. Activity Tolerance:  Patient tolerance of  treatment: good. Discharge Recommendations: Home with assist PRN, Home with Home health OT   Equipment Recommendations: Other: Recommend tub transfer bench and BSC if patient cannot get up stairs to bathroom. Plan: Times per week: 5x/wk for 90 min and 1x/wk for 30 min  Current Treatment Recommendations: Strengthening, Balance Training, Functional Mobility Training, Endurance Training, Self-Care / ADL, Patient/Caregiver Education & Training, Safety Education & Training, Equipment Evaluation, Education, & procurement, Home Management Training    Patient Education  Patient Education: ADL's, IADL's, Precautions, Equipment Education, Reviewed Prior Education, Assistive Device Safety and importance of NWB adherence, safety with transfers     Goals  Short term goals  Time Frame for Short term goals: 1 week  Short term goal 1: Pt will maintain NWBing RLE during transfers on/off toilet with SBA and 0 vcs for safety/NWBing to improve indep with toileting. Short term goal 2: Pt will maintain NWBing during 2 minute unilateral release task to improve clothing mgt and standing ADL tasks. Short term goal 3: Pt will navigate to and from bathroom using crutches with SBA to improve indep with accessing bathroom upstairs.   Short term goal 4: Pt will use crutches to prepare light meal with SBA and min vcs for safety to improve indep

## 2020-12-21 NOTE — PROGRESS NOTES
[address] [place] [address]  Diagnosis List for Inpatient Rehab facility (IRF) - Patient Assessment Instrument (NATHALIE)    Patient Name: Tereza Hunt        MRN: [id number]    : 1990  (26 y.o.)  Gender: male     Primary impairment requiring rehabilitation: 14.2 Brain + Multiple Fracture/ Amputation     Etiologic Diagnosis that led to the condition:  Traumatic brain injury with loss of consciousness of 15 minutes. Right superior and inferior pubic rami fractures  Right sacral alae fracture with involvement of the S1, S2, and S3 neural foramina. Comorbid conditions affecting rehabilitation:  1. Fall from second story roof secondary to having active seizure. 2. Noncompliance with seizure medications. 3. History of seizures since age 13.  3. Traumatic brain injury with loss of consciousness of 15 minutes. 5. Mild cognitive-communication deficit. 6. Right superior and inferior pubic rami fractures on the right with mild displacement and pelvic hematoma. 7. Gait instability. 8. Decreased ability for ADLs. 9. Posttraumatic pain. 10. Right sacral alae fracture with involvement of the S1, S2, and S3 neural foramina. 11. Nondisplaced fracture of the posterior medial aspect of the left 11th rib. 12. Liver laceration. 13. Renal contusion. 14. Right-sided pneumothorax requiring placement of chest tube and subsequent placement of pigtail catheter by Interventional Radiology. 15. Prior history of IV drug abuse, sober x1 year.   16. Current smoker.     Austin Robertson MD

## 2020-12-21 NOTE — PROGRESS NOTES
oz (59.6 kg)   SpO2 99%   BMI 20.58 kg/m²   General appearance: alert, appears stated age and cooperative  Head: Normocephalic, without obvious abnormality, atraumatic  Lungs: clear to auscultation bilaterally  Chest wall: no tenderness  Heart: regular rate and rhythm, S1, S2 normal, no murmur, click, rub or gallop  Abdomen: soft, non-tender; bowel sounds normal; no masses,  no organomegaly  Extremities: extremities normal, atraumatic, no cyanosis or edema  Skin: Skin color, texture, turgor normal. No rashes or lesions  Neurologic: weak   Back with no spasm felt to the posterior left shoulder or skin changes seen      Electronically signed by Hever Driscoll MD on 12/21/2020 at 4:27 PM

## 2020-12-21 NOTE — PROGRESS NOTES
[facility]  INPATIENT PHYSICAL THERAPY  DAILY NOTE  [address] [facility],4Th Floor - 7E-67/067-A    Time In: 1200  Time Out: 1230  Timed Code Treatment Minutes: 30 Minutes  Minutes: 30          Date: 2020  Patient Name: Sami Waters,  Gender:  male        MRN: [id number]  : 1990  (26 y.o.)     Referring Practitioner: ELIAZAR Harmon MD  Diagnosis: Multiple injuries due to trauma  Additional Pertinent Hx: Per H&P, \"The patient is a 26 y.o. male  who presents with right sided pelvis pain after sustaining a traumatic injury on 2020. Patient has a history of epilepsy and states he was on a roof that day and actually had an epileptic episode while on the roof. This episode caused him to fall off of one tier of the roof and onto the lower tier and eventually off of the lower tier and to the ground. He does not recall the fall and only remembers waking up while on the ground with increased right hip/pelvis pain. He was brought to [facility] ED and further work-up showed a mildly displaced inferior and superior pubic rami fractures on xray and CT scan showed a mildly comminuted minimally displaced right sacral ala fracture. Patient was admitted under trauma services for further monitoring. Pt s/p Right superior and inferior pubic rami fracture, mildly displaced, with adjacent pelvic hematoma, right sacral ala fracture, Right sided pneumothorax, Liver Laceration, possible renal contusion, Fx of left 11th rib, Leukocytosis. \" To [facility] on      Prior Level of Function:  Lives With: Significant other(s/o's mother and brother)  Type of Home: House  Home Layout: Two level, Bed/Bath upstairs(~12 steps with L ascending rail to upstairs)  Home Access: Stairs to enter with rails  Entrance Stairs - Number of Steps: 5  Entrance Stairs - Rails: Both(wide - difficut to grasp simaltaneously)  Home Equipment: Rolling walker, Crutches   Bathroom Shower/Tub: Tub/Shower unit, Curtain  Bathroom Toilet: Standard  Bathroom Accessibility: Walker accessible    ADL Assistance: Independent  Ambulation Assistance: Independent  Transfer Assistance: Independent  Active : Yes  Additional Comments: S/O works during the day, but there will always be someone at the house when S/O is at work. Reports being very Indep PTA, was not using AD for mobility. Pt reports 8 dogs, 8 cats and other animals in the home. Restrictions/Precautions:  Restrictions/Precautions: Weight Bearing, General Precautions, Fall Risk  Right Lower Extremity Weight Bearing: Non Weight Bearing  Left Lower Extremity Weight Bearing: Weight Bearing As Tolerated  Position Activity Restriction  Other position/activity restrictions: No driving, climbing, heavy machine use, swimming x 3 months per neuro     SUBJECTIVE: Pt. Seated in WC upon arrival and pleasantly agrees to therapy session. PAIN: Not rated    OBJECTIVE:  Bed Mobility:  Not Tested    Transfers:  Sit to Stand: Modified Independent  Stand to Sit:Modified Independent    Ambulation:  Modified Independent  Distance: 150' x 1  Surface: Level Tile  Device:Crutches  Gait Deviations: Pt. Maneuvering with crutches well. Wheelchair Mobility:  Modified Independent  Extremities Used: Bilateral Upper Extremities  Type of Chair:Manual  Surface: Level Tile  Distance: 150' x 1  Quality: Good maneuverability and velocity. Stairs:  Stairs:  6\" steps. X 4 using One handrail and one crutch and Modified Independent. Balance:  Pt. Completed standing dynamic balance activity with Normal ZAIRE on level tile and on foam Surface and Single UE support and No UE support on Crutches/no UE support with Stand By Assistance, [employer]. Activity completed to improve balance, enhance functional mobility and, reduce risk of falls. Exercise:  None    Functional Outcome Measures: Not completed       ASSESSMENT:  Assessment: Patient progressing toward established goals.   Activity Tolerance: Patient tolerance of  treatment: good. Equipment Recommendations:Equipment Needed: No(continue to monitor for w/c)  Discharge Recommendations:  Continue to assess pending progress(HEP)    Plan: Times per week: 5x/wk 90 min, 1x/wk 30 min  Current Treatment Recommendations: Strengthening, ROM, Balance Training, Functional Mobility Training, Transfer Training, Stair training, Gait Training, Endurance Training, Home Exercise Program, Safety Education & Training, Patient/Caregiver Education & Training, Equipment Evaluation, Education, & procurement, Modalities, Wheelchair Mobility Training    Patient Education  Patient Education: Plan of Care, Reviewed Prior Education, Home Safety Education    Goals:  Patient goals : go home using crutches  Short term goals  Time Frame for Short term goals: 1 week  Short term goal 1: Pt to perform bed mobility with SBA to promote ease of getting in and out of bed. Short term goal 2: Pt to perform sit<>stand at SBA to improve ability to perform transfers. Short term goal 3: Pt to ambulate > 100ft with crutches with SBA to improve ability to navigate within the home. Short term goal 4: Pt to negotiate 5 steps with B HR and AD as needed at [place] to improve ability to enter home . Short term goal 5: Pt to negotiate 1 flight of step with L ascending rail and AD as needed at [place] to get to bed and bath. Long term goals  Time Frame for Long term goals : 2 weeks  Long term goal 1: Pt to perform bed mobility at Mod I to get in and out of bed. Long term goal 2: Pt to perform sit<>stand at Mod I to improve ability to perform transfers. Long term goal 3: Pt to ambulate 50ft with crutches at Mod I, >150ft at SBA for mobility in the home and community  Long term goal 4: Pt to negotiate 5 steps with B HR and AD as needed at S to improve ability to enter home . Long term goal 5: Pt to negotiate 1 flight of step with L ascending rail and AD as needed at S to get to bed and bath.   Long term goal 6: pt to perform car transfer at Mod I for transport to home and medical appointments    Following session, patient left in safe position with all fall risk precautions in place.

## 2020-12-21 NOTE — PROGRESS NOTES
[address]  Occupational Therapy  Daily Note  Time:   Time In: 1330  Time Out: 1400  Timed Code Treatment Minutes: 30 Minutes  Minutes: 30          Date: 2020  Patient Name: Magdalena Lowery,   Gender: male      Room: -67/067-A  MRN: [id number]  : 1990  (26 y.o.)  Referring Practitioner: Dr. Jensen Gama  Diagnosis: Multiple injuries due to trauma  Additional Pertinent Hx: The patient is a 26 y.o. male  who presents with right sided pelvis pain after sustaining a traumatic injury on 2020. Patient has a history of epilepsy and states he was on a roof that day and actually had an epileptic episode while on the roof. This episode caused him to fall off of one tier of the roof and onto the lower tier and eventually off of the lower tier and to the ground. He does not recall the fall and only remembers waking up while on the ground with increased right hip/pelvis pain. He was brought to [facility] ED and further work-up showed a mildly displaced inferior and superior pubic rami fractures on xray and CT scan showed a mildly comminuted minimally displaced right sacral ala fracture. Patient was admitted under trauma services for further monitoring. Pt s/p Right superior and inferior pubic rami fracture, mildly displaced, with adjacent pelvic hematoma, right sacral ala fracture, Right sided pneumothorax, Liver Laceration, possible renal contusion, Fx of left 11th rib, Leukocytosis. \" To IPR on     Restrictions/Precautions:  Restrictions/Precautions: Weight Bearing, General Precautions, Fall Risk  Right Lower Extremity Weight Bearing: Non Weight Bearing  Left Lower Extremity Weight Bearing: Weight Bearing As Tolerated  Position Activity Restriction  Other position/activity restrictions: No driving, climbing, heavy machine use, swimming x 3 months per neuro     SUBJECTIVE: Pt seated in w/c upon arrival, agreeable to OT session.     PAIN: Did not rate: RLE, B shoulders    COGNITION: Decreased Insight and Decreased Safety Awareness    ADL:   No ADL's completed this session. BALANCE:  Sitting Balance:  Supervision. BED MOBILITY:  Not Tested    TRANSFERS:  Not completed. FUNCTIONAL MOBILITY:  Assistive Device: Wheelchair  Assist Level:  Modified Independent. Distance:   Completed functional mobility to/from therapy apartment and gym at good pace, no LOB noted. Pt requires brief rest break after trial of mobility, min fatigue noted. ADDITIONAL ACTIVITIES:  Patient identified a personal goal to increase UB strength and improve overall endurance so they can complete their toilet & shower transfers; skilled edu on UE strengthening. Completed BUE exercises x15-20 reps x1 sets using min resistance band in all joints/planes to increase strength and endurance required for ADLs. Pt required brief rest break between each exercise and min v/c for proper technique. Pt participated in IADL task at w/c level to reach outside of ZAIRE to retrieve clothing from dryer. Pt completed with Ira- no cues for safe technique. Completed to increase safety and facilitate functional reaching required for IADL tasks. ASSESSMENT:  Activity Tolerance:  Patient tolerance of  treatment: good. Discharge Recommendations: Home with assist PRN, Home with Home health OT   Equipment Recommendations: Other: Recommend tub transfer bench and BSC if patient cannot get up stairs to bathroom. Plan: Times per week: 5x/wk for 90 min and 1x/wk for 30 min  Current Treatment Recommendations: Strengthening, Balance Training, Functional Mobility Training, Endurance Training, Self-Care / ADL, Patient/Caregiver Education & Training, Safety Education & Training, Equipment Evaluation, Education, & procurement, Home Management Training    Patient Education  Patient Education: IADL's, Home Exercise Program and Safety with transfers and mobility.     Goals  Short term goals  Time Frame for Short term goals: 1 week  Short term goal 1: Pt will maintain NWBing RLE during transfers on/off toilet with SBA and 0 vcs for safety/NWBing to improve indep with toileting. Short term goal 2: Pt will maintain NWBing during 2 minute unilateral release task to improve clothing mgt and standing ADL tasks. Short term goal 3: Pt will navigate to and from bathroom using crutches with SBA to improve indep with accessing bathroom upstairs. Short term goal 4: Pt will use crutches to prepare light meal with SBA and min vcs for safety to improve indep  with preparing lunch for self at home. Short term goal 5: Pt will complete LB dressing tasks with CGA to improve indep with self care. Long term goals  Time Frame for Long term goals : 2 weeks  Long term goal 1: Pt will complete BADL routine with mod I and 0 vcs for adaptive technique to improve indep with self care. Long term goal 2: Pt will safely complete IADL task with mod I and 0 vcs for safety to improve indep within home. Following session, patient left in safe position with all fall risk precautions in place.

## 2020-12-22 VITALS
WEIGHT: 131.4 LBS | HEART RATE: 93 BPM | DIASTOLIC BLOOD PRESSURE: 87 MMHG | OXYGEN SATURATION: 99 % | TEMPERATURE: 98.1 F | RESPIRATION RATE: 16 BRPM | HEIGHT: 67 IN | BODY MASS INDEX: 20.62 KG/M2 | SYSTOLIC BLOOD PRESSURE: 127 MMHG

## 2020-12-22 PROCEDURE — 97530 THERAPEUTIC ACTIVITIES: CPT

## 2020-12-22 PROCEDURE — 6370000000 HC RX 637 (ALT 250 FOR IP): Performed by: FAMILY MEDICINE

## 2020-12-22 PROCEDURE — 97535 SELF CARE MNGMENT TRAINING: CPT

## 2020-12-22 PROCEDURE — 97130 THER IVNTJ EA ADDL 15 MIN: CPT | Performed by: SPEECH-LANGUAGE PATHOLOGIST

## 2020-12-22 PROCEDURE — 6370000000 HC RX 637 (ALT 250 FOR IP): Performed by: SURGERY

## 2020-12-22 PROCEDURE — 99239 HOSP IP/OBS DSCHRG MGMT >30: CPT | Performed by: NURSE PRACTITIONER

## 2020-12-22 PROCEDURE — 97129 THER IVNTJ 1ST 15 MIN: CPT | Performed by: SPEECH-LANGUAGE PATHOLOGIST

## 2020-12-22 PROCEDURE — 97110 THERAPEUTIC EXERCISES: CPT

## 2020-12-22 RX ORDER — GABAPENTIN 100 MG/1
CAPSULE ORAL
Qty: 75 CAPSULE | Refills: 0 | Status: SHIPPED | OUTPATIENT
Start: 2020-12-22 | End: 2020-12-29 | Stop reason: ALTCHOICE

## 2020-12-22 RX ORDER — ACETAMINOPHEN 325 MG/1
650 TABLET ORAL EVERY 4 HOURS PRN
COMMUNITY
Start: 2020-12-22 | End: 2021-06-03

## 2020-12-22 RX ADMIN — DOCUSATE SODIUM 100 MG: 100 CAPSULE, LIQUID FILLED ORAL at 08:31

## 2020-12-22 RX ADMIN — OXYCODONE AND ACETAMINOPHEN 2 TABLET: 5; 325 TABLET ORAL at 08:30

## 2020-12-22 RX ADMIN — CARBAMAZEPINE 200 MG: 200 TABLET ORAL at 08:31

## 2020-12-22 RX ADMIN — SUCRALFATE 1 G: 1 TABLET ORAL at 08:31

## 2020-12-22 RX ADMIN — FAMOTIDINE 20 MG: 20 TABLET, FILM COATED ORAL at 08:31

## 2020-12-22 RX ADMIN — NALOXEGOL OXALATE 12.5 MG: 12.5 TABLET, FILM COATED ORAL at 08:31

## 2020-12-22 RX ADMIN — OXYCODONE AND ACETAMINOPHEN 2 TABLET: 5; 325 TABLET ORAL at 13:06

## 2020-12-22 ASSESSMENT — PAIN - FUNCTIONAL ASSESSMENT: PAIN_FUNCTIONAL_ASSESSMENT: PREVENTS OR INTERFERES SOME ACTIVE ACTIVITIES AND ADLS

## 2020-12-22 ASSESSMENT — PAIN SCALES - GENERAL
PAINLEVEL_OUTOF10: 8
PAINLEVEL_OUTOF10: 7

## 2020-12-22 ASSESSMENT — PAIN DESCRIPTION - PAIN TYPE: TYPE: ACUTE PAIN

## 2020-12-22 ASSESSMENT — PAIN DESCRIPTION - ONSET: ONSET: ON-GOING

## 2020-12-22 ASSESSMENT — PAIN DESCRIPTION - LOCATION: LOCATION: PELVIS

## 2020-12-22 ASSESSMENT — PAIN DESCRIPTION - PROGRESSION: CLINICAL_PROGRESSION: NOT CHANGED

## 2020-12-22 ASSESSMENT — PAIN DESCRIPTION - DIRECTION: RADIATING_TOWARDS: DOWN RIGHT LEG

## 2020-12-22 ASSESSMENT — PAIN DESCRIPTION - ORIENTATION: ORIENTATION: OTHER (COMMENT)

## 2020-12-22 ASSESSMENT — PAIN DESCRIPTION - DESCRIPTORS: DESCRIPTORS: ACHING;SHOOTING

## 2020-12-22 ASSESSMENT — PAIN DESCRIPTION - FREQUENCY: FREQUENCY: CONTINUOUS

## 2020-12-22 NOTE — PROGRESS NOTES
[facility]  INPATIENT PHYSICAL THERAPY  DISCHARGE NOTE  [address] [facility] - 7E-67/067-A    Time In: 0800  Time Out: 0823  Timed Code Treatment Minutes: 23 Minutes  Minutes: 23          Date: 2020  Patient Name: Annette Stauffer,  Gender:  male        MRN: [id number]  : 1990  (26 y.o.)     Referring Practitioner: ELIAZAR Smith MD  Diagnosis: Multiple injuries due to trauma  Additional Pertinent Hx: Per H&P, \"The patient is a 26 y.o. male  who presents with right sided pelvis pain after sustaining a traumatic injury on 2020. Patient has a history of epilepsy and states he was on a roof that day and actually had an epileptic episode while on the roof. This episode caused him to fall off of one tier of the roof and onto the lower tier and eventually off of the lower tier and to the ground. He does not recall the fall and only remembers waking up while on the ground with increased right hip/pelvis pain. He was brought to [facility] ED and further work-up showed a mildly displaced inferior and superior pubic rami fractures on xray and CT scan showed a mildly comminuted minimally displaced right sacral ala fracture. Patient was admitted under trauma services for further monitoring. Pt s/p Right superior and inferior pubic rami fracture, mildly displaced, with adjacent pelvic hematoma, right sacral ala fracture, Right sided pneumothorax, Liver Laceration, possible renal contusion, Fx of left 11th rib, Leukocytosis. \" To [facility] on      Prior Level of Function:  Lives With: Significant other(s/o's mother and brother)  Type of Home: House  Home Layout: Two level, Bed/Bath upstairs(~12 steps with L ascending rail to upstairs)  Home Access: Stairs to enter with rails  Entrance Stairs - Number of Steps: 5  Entrance Stairs - Rails: Both(wide - difficut to grasp simaltaneously)  Home Equipment: Rolling walker, Crutches   Bathroom Shower/Tub: Tub/Shower unit, Curtain  Bathroom crutches including step negotiation required for access to second story of home and home entry. He has voiced no concerns with returning home at this time and will be discharged to home with assist from s/o as needed and HEP. Activity Tolerance:  Patient tolerance of  treatment: good. Equipment Recommendations:Equipment Needed: No(continue to monitor for w/c)  Discharge Recommendations:  Continue to assess pending progress(HEP)    Plan: d/c to home with HEP    Patient Education  Patient Education: Plan of Care, Home Exercise Program, Home Safety Education    Goals:  Patient goals : go home using crutches  Short term goals  Time Frame for Short term goals: 1 week  Short term goal 1: Pt to perform bed mobility with SBA to promote ease of getting in and out of bed. - GOAL MET  Short term goal 2: Pt to perform sit<>stand at SBA to improve ability to perform transfers. - GOAL MET  Short term goal 3: Pt to ambulate > 100ft with crutches with SBA to improve ability to navigate within the home. GOAL MET  Short term goal 4: Pt to negotiate 5 steps with B HR and AD as needed at [facility] to improve ability to enter home . GOAL MET  Short term goal 5: Pt to negotiate 1 flight of step with L ascending rail and AD as needed at [facility] to get to bed and bath. GOAL MET  Long term goals  Time Frame for Long term goals : 2 weeks  Long term goal 1: Pt to perform bed mobility at Mod I to get in and out of bed. -GOAL MET  Long term goal 2: Pt to perform sit<>stand at Mod I to improve ability to perform transfers. - GOAL MET  Long term goal 3: Pt to ambulate 50ft with crutches at Mod I, >150ft at SBA for mobility in the home and community GOAL MET  Long term goal 4: Pt to negotiate 5 steps with B HR and AD as needed at S to improve ability to enter home . GOAL MET  Long term goal 5: Pt to negotiate 1 flight of step with L ascending rail and AD as needed at S to get to bed and bath.  GOAL MET  Long term goal 6: pt to perform car transfer at Mod I for transport to home and medical appointments GOAL MET    Following session, patient left in safe position with all fall risk precautions in place.

## 2020-12-22 NOTE — PROGRESS NOTES
[address] [address]  Inpatient Rehabilitation  Occupational Therapy  Discharge Note  Time:  Time In: 4249  Time Out: 0800  Timed Code Treatment Minutes: 55 Minutes  Minutes: 55    Date: 2020  Patient Name: Mally Hodges,   Gender: male      Room: [facility]67/067-A  MRN: [id number]  : 1990  (26 y.o.)  Referring Practitioner: Dr. Clenton Kayser  Diagnosis: Multiple injuries due to trauma  Additional Pertinent Hx: The patient is a 26 y.o. male  who presents with right sided pelvis pain after sustaining a traumatic injury on 2020. Patient has a history of epilepsy and states he was on a roof that day and actually had an epileptic episode while on the roof. This episode caused him to fall off of one tier of the roof and onto the lower tier and eventually off of the lower tier and to the ground. He does not recall the fall and only remembers waking up while on the ground with increased right hip/pelvis pain. He was brought to [facility] ED and further work-up showed a mildly displaced inferior and superior pubic rami fractures on xray and CT scan showed a mildly comminuted minimally displaced right sacral ala fracture. Patient was admitted under trauma services for further monitoring. Pt s/p Right superior and inferior pubic rami fracture, mildly displaced, with adjacent pelvic hematoma, right sacral ala fracture, Right sided pneumothorax, Liver Laceration, possible renal contusion, Fx of left 11th rib, Leukocytosis. \" To IPR on     Restrictions/Precautions:  Restrictions/Precautions: Weight Bearing, General Precautions, Fall Risk  Right Lower Extremity Weight Bearing: Non Weight Bearing  Left Lower Extremity Weight Bearing: Weight Bearing As Tolerated  Position Activity Restriction  Other position/activity restrictions: No driving, climbing, heavy machine use, swimming x 3 months per neuro    SUBJECTIVE: Patient in w/c upon arrival, agreeable to OT. Reports he is excited to go home today.      PAIN: No c/o pain during this session, did not ask to rate     COGNITION: Decreased Insight    ADL:     EATING:Independent. [place] Hilt CARE Score: 6. ORAL HYGIENE:Independent. retrieves in w/c, donned without issues. CARE Score: 6. TOILETING HYGIENE:Independent. MI with clothing mgmt and hygiene. CARE Score: 6. SHOWERING/BATHING:Independent. MI.  CARE Score: 6. UPPER BODY DRESSING:Independent. retrieved & completed without issues. CARE Score: 6. LOWER BODY DRESSING:Independent. MI.  CARE Score: 6. FOOTWEAR:Independent  MI.  CARE Score: 6. TOILET TRANSFER: Independent. [place] Hilt CARE Score: 6. BALANCE:  Sitting Balance:  Modified Independent. Standing Balance: Modified Independent. BED MOBILITY:  Not Tested    TRANSFERS:  Sit to Stand:  Modified Independent. w/c, sh chair. Stand to Sit: Modified Independent. Stand Pivot: Modified Independent. w/c <> tub tf bench     FUNCTIONAL MOBILITY:  Assistive Device: Wheelchair  Assist Level:  Modified Independent. Distance: To and from bathroom, To and from shower room and To and from therapy gym     ADDITIONAL ACTIVITIES:  Patient identified a personal goal to increase UB strength and improve overall endurance so they can complete their toilet & shower transfers; skilled edu on UE strengthening and patient completed BUE strengthening exercises x15 reps x1 set this date with a max resistive band in all joints and all planes. Patient tolerated well, requiring occasional rest breaks. Pt required min cues for technique. Did upgrade pt to more resistive HEP as he is progressing. ASSESSMENT:  Activity Tolerance:  Patient tolerance of  treatment: good. Assessment: Assessment: Kristin Serna has made steady progress on IP Rehab. Although pt is limited at times by safety judgement and insight, it appears that some of this was his baseline behavior. He completes his UB and LB ADLs with MI.   He does live with a girlfriend who can provide intermittent A but will be alone a lot so he will need to be mod I at discharge. He would benefit from cont skilled OT services on a [facility] basis. Discharge Recommendations: Home with assist PRN, Home with Home health OT  Equipment Recommendations: Other: Recommend tub transfer bench and BSC. Ordered on 12-21-20, cancelled [facility] per pt request as he plans on staying on the second story  Plan: Discharge home with [facility] OT and supportive girlfriend to intermittently assist.       Patient Education  Patient Education: ADL's, Home Exercise Program and Precautions    Goals  Short term goals  Time Frame for Short term goals: 1 week  Short term goal 1: Pt will maintain NWBing RLE during transfers on/off toilet with SBA and 0 vcs for safety/NWBing to improve indep with toileting. GOAL MET   Short term goal 2: Pt will maintain NWBing during 2 minute unilateral release task to improve clothing mgt and standing ADL tasks. GOAL MET   Short term goal 3: Pt will navigate to and from bathroom using crutches with SBA to improve indep with accessing bathroom upstairs. GOAL MET   Short term goal 4: Pt will use crutches to prepare light meal with SBA and min vcs for safety to improve indep  with preparing lunch for self at home. GOAL MET   Short term goal 5: Pt will complete LB dressing tasks with CGA to improve indep with self care. GOAL MET   Long term goals  Time Frame for Long term goals : 2 weeks  Long term goal 1: Pt will complete BADL routine with mod I and 0 vcs for adaptive technique to improve indep with self care. GOAL MET   Long term goal 2: Pt will safely complete IADL task with mod I and 0 vcs for safety to improve indep within home. GOAL MET     Following session, patient left in safe position with all fall risk precautions in place.

## 2020-12-22 NOTE — PROGRESS NOTES
[address] [facility]  [address]  DISCHARGE NOTE    TIME   SLP Individual Minutes  Time In: 7803  Time Out: 5107  Minutes: 23  Timed Code Treatment Minutes: 23 Minutes       Date: 2020  Patient Name: Ever Garland      CSN: [id number]   : 1990  (26 y.o.)  Gender: male   Referring Physician:  Dr. Roselyn Hayward  Diagnosis: Multiple injuries due to trauma   Secondary Diagnosis: Cognitive impairments  Precautions: Fall risk   Current Diet: Regular with thin liquids  Swallowing Strategies: Standard Universal Swallow Precautions  Date of Last MBS: Not Applicable    Pain:  No pain reported. Subjective:  Patient upright in wheelchair during session. No family present. *Reviewed need to refrain from driving until medically cleared by a physician. Short-Term Goals:  SHORT TERM GOAL #1:  Goal 1: Patient will complete functional problem solving, reasoning and information processing tasks (including math computation related to measurements for job and finances) with 75% accuracy, mod cues to improve logical thinking and processing skills needed for return to work and household functions. GOAL MET  INTERVENTIONS: Math word problems using charted information (answering questions pertaining to different lengths and depths of shelves):  indep,  with min cues. *Cueing needed x1 for problem solving for problem set up. *Cueing needed x1 for math computation. PREVIOUS SESSION:   Functional time word problems:  indep,  self correction with clarification of instructions,  with min cues to ensure all details of instructions were addressed. *Good success overall with basic math computation. *Cueing needed x2 for thoroughness of responses and comprehension of the prompts.      SHORT TERM GOAL #2:  Goal 2: Patient will complete higher level executive functioning and thought organization tasks with 80% accuracy given min cues in order to independently complete ADLs/IADL's GOAL MET  INTERVENTIONS: Did not address due to focus on other goals. PREVIOUS SESSION:  Reviewed patient's method for recalling appointments/events, to which he states that he asks for reminder card and places it in his wallet. When asked if he utilizes a personal calendar, he stated \"yes\" and indicated that he takes the appointment cards home with him and places them on his written calendar. Calendar organization task (patient given 10 events and prompted to organize the information on his calendar with inclusion of all pertinent details):  10/10 indep  *Excellent selective attention and visual scanning  *Appropriate organization of information with inclusion of pertinent details. *No spelling errors     SHORT TERM GOAL #3:  Goal 3: Patient will complete higher level attention (selective, alternating, divided) tasks with no more than 3 errors in a 3-5 minute time frame to improve skills needed for return to driving and work related tasks. GOAL MET  INTERVENTIONS: Completed moderately complex card game with ongoing conversation regarding plans for [place] with TV on. Patient sustained attention to task for ~7 minutes without difficulty. No errors evident. Discussed with patient ways to optimize success at home, during driving and at work, regarding complex attention. Suggested patient limit external distractions, when able (limiting radio/TV), and using self talk to assist with mental focus. Patient expressed understanding. PREVIOUS SESSION:  Complex attention task (Patient instructed on rules for a novel card game, with baseball game playing on TV): patient sustained attention to task for 15 minutes with x2 rounds of card play with EXCELLENT attention to task despite external distractions and EXCELLENT recall/reasoning for retention/comprehension of rules of game play.      Long-Term Goals:  Timeframe for Long-term Goals: 2 weeks    LONG TERM GOAL #1:  Goal 1: Patient will improve cognitive functioning to a modified independent level to improve skills needed for successful return to work and household responsibilties. GOAL MET            Comprehension: 6 - Complex ideas 90% or device (hearing aid or glasses- if patient is primarily a visual learner)  Expression: 7 - Patient expresses complex ideas/needs  Social Interaction: 6 - Patient requires medication for mood and/or effect  Problem Solvin - Independent with device (e.g. notes, schedules)  Memory: 6 - Patient requires device to recall (e.g. memory book)    EDUCATION:  Learner: Patient  Education:  Education Related to Prevention of Recurrence of Impairment/Illness/Injury  Evaluation of Education: Verbalizes understanding and Family not present    ASSESSMENT/PLAN:  SUMMARY: Patient met 3/3 short term goals and 1/1 long term goals over the course of his treatment. Functional memory and thought organization are relative strengths. Patient continues to present with mild difficulty completing moderately complex mathematical tasks, but reports that he is \"not good with numbers. \" Focus of treatment has been on skills related to measuring and functionally using those measurements as they relate to his job skills. Feel patient has returned to baseline functioning, therefore ongoing ST services are not warranted at this time. Activity Tolerance:  Patient tolerance of  treatment: good. Appropriate participation throughout session     Assessment/Plan: Patient discharged from Speech Therapy at this time due to completion of inpatient rehab stay . Discharge Disposition: home with g/f. Continued Speech Therapy Services recommended: Sagar RODRIGUEZ  [address], [address]

## 2020-12-22 NOTE — PROGRESS NOTES
[address] [address]  Recreational Therapy  Discharge Note  Inpatient Rehabilitation Unit           Date:  12/22/2020       Patient Name: Shreyas Aly      MRN: [id number]       YOB: 1990 (26 y.o.)       Gender: male  Diagnosis: Multiple injuries due to trauma  Referring Practitioner: Dr. Sara Kay    Patient discharged from Recreational Therapy at this time. See recreational therapy notes for details.     Electronically signed by: REX Yap  Date: 12/22/2020

## 2020-12-22 NOTE — PROGRESS NOTES
Patient discharged in stable condition as per order of attending physician to home per staff at Time: [id number] to car. AVS provided by RN at time of discharge, which includes all necessary medical information pertaining to the patients current course of illness, treatment, medications, post-discharge goals of care, and treatment preferences. Patient/ family verbalize understanding of discharge plan and are in agreement with goal/plan/treatment preferences. Belongings including clothing sent with patient. Home medications sent home with patient yes, medication from our pharmacy (meds to beds) sent with patient    Availability of \"My Chart\" offered to patient as a tool for updated health record.   Steps for activation discussed with patient as mentioned on AVS.

## 2020-12-22 NOTE — PROGRESS NOTES
Team conference held Monday, 12/21/2020. Recommendations of the team were explained to the patient and significant other, Mayank Reece, by JORGE Briones, Dr. Last Stubbs, and MARIA Bowman. Team is recommending that patient continue on acute inpatient rehab for one more day, with expected discharge date of Tuesday, 12/22/2020. Following discharge, team is recommending continued therapy using home exercise program established by therapy staff. Patient was recommended to refrain from driving until follow up with ortho and weight bearing status to be advanced. Additional therapy needs to be determined by ortho at follow up visit. Care plan reviewed with patient and significant other, Mayank Reece. Patient and significant other, Mayank Reece, verbalized understanding of the plan of care and contributed to goal setting.

## 2020-12-22 NOTE — DISCHARGE SUMMARY
pelvic region as well as NWB status of RLE. He requires skilled PT services to improve level of independence with maintenance of NWB of RLE    Occupational therapy:  ,  , Assessment: Olvin Alatorre is making steady progress on IP Rehab. He has progressed to a set-up level with UB ADLs and intermittently requires min A for LB ADLs due to pelvic pain, but can complete with pain levels are controlled. Patient completes IADLs at a SBA level for balance but does require min cues for NWB status (he often attempts to do TTWB). Pt does demo some decreaed safety awareness at times, requiring cues to problem solve better scenarioes (example: wanted to pivot into tub, but placed w/c 5 ft away from tub and did not have crutches. Pt attempted to stand, but required cues to problem solve that he couldn't hop 5 pt without any AD). He does live with a girlfriend who can provide intermittent A but will be alone a lot so he will need to be mod I at discharge. He cont to require skilled OT services to progress with ADL and IADL remediation, improve safety & problem solving with daily routines and to decrease risk of fall / injury. Speech therapy:  Comprehension: 7 - Patient understands complex ideas (math/planning)  Expression: 7 - Patient expresses complex ideas/needs  Social Interaction: 7 - Patient has appropriate behavior/relations 100% of the time  Problem Solvin - Patient able to solve simple/routine tasks  Memory: 6 - Patient requires device to recall (e.g. memory book)      Inpatient Rehabilitation Course:   Ever Garland is a 26 y.o. male admitted to inpatient rehabilitation on 2020 for trauma with multiple fractures. The patient participated in an aggressive multidisciplinary inpatient rehabilitation program involving 3 hours per day, 5 days per week of rehabilitation. Dr Janessa Dunham followed on IP Rehab for medical management    Pain controlled with tylenol and Percocet.  Patient with history of IV drug use, sober x2 year (per conversation today) with treatment of suboxone and currently on vivitrol injection monthly. Started Ibuprofen due to this to assist with pain control short term. Discussed with patient about risk with prescribing opioids at discharge, outside of a controlled environment. Patient pleasant and understanding. Discussed use of tylenol and ibuprofen at discharge and will add gabapentin taper at night. Patient understanding with plan. GI prophylaxis with pepcid and carafate. Patient started on tegretol for seizure prophylaxis, had stopped seizure meds three years prior, seizure caused fall off roof this admission. Consults:   neurology and orthopedic surgery    Significant Diagnostics:   CBC:   Lab Results   Component Value Date    WBC 10.4 12/17/2020    RBC 4.02 12/17/2020    HGB 13.3 12/17/2020    HCT 38.8 12/17/2020    MCV 96.5 12/17/2020    MCH 33.1 12/17/2020    MCHC 34.3 12/17/2020     12/17/2020    MPV 8.7 12/17/2020     BMP:    Lab Results   Component Value Date     12/17/2020    K 3.8 12/17/2020    K 4.0 12/12/2020     12/17/2020    CO2 26 12/17/2020    BUN 11 12/17/2020    LABALBU 3.3 12/17/2020    CREATININE 0.6 12/17/2020    CALCIUM 8.8 12/17/2020    LABGLOM >90 12/17/2020    GLUCOSE 89 12/17/2020     Hepatic Function Panel:    Lab Results   Component Value Date    ALKPHOS 103 12/17/2020    ALT 38 12/17/2020    AST 36 12/17/2020    PROT 6.1 12/17/2020    BILITOT 0.5 12/17/2020    BILIDIR <0.2 11/29/2020    LABALBU 3.3 12/17/2020     Calcium:    Lab Results   Component Value Date    CALCIUM 8.8 12/17/2020     TSH:    Lab Results   Component Value Date    TSH 1.960 03/24/2020       Patient Instructions:    Home with supervision  Therapy orders: HEP   Discharge lab work: none  Code status: Full Code   Activity: No driving.  NWB RLE  Diet: DIET GENERAL;  Dietary Nutrition Supplements: Standard High Calorie Oral Supplement    Wound Care: none needed  Equipment: tub transfer bench and bedside commode, crutches, rolling walker    Follow-up visits: See after visit summary from hospitalization    Discharge Medications:   Katlin Michel   Home Medication Instructions [facility]:[id number]    Printed on:12/22/20 1012   Medication Information                      acetaminophen (TYLENOL) 325 MG tablet  Take 2 tablets by mouth every 4 hours as needed for Pain             carBAMazepine (TEGRETOL) 200 MG tablet  Take 1 tablet by mouth 2 times daily             cyclobenzaprine (FLEXERIL) 10 MG tablet  Take 1 tablet by mouth 2 times daily as needed for Muscle spasms             docusate sodium (COLACE, DULCOLAX) 100 MG CAPS  Take 100 mg by mouth 2 times daily             gabapentin (NEURONTIN) 100 MG capsule  Take 1 capsule by mouth nightly for 5 days, THEN 2 capsules nightly for 5 days, THEN 3 capsules nightly for 20 days. Intended supply: 30 days. ibuprofen (ADVIL;MOTRIN) 400 MG tablet  Take 1 tablet by mouth every 8 hours as needed for Pain             nicotine (NICODERM CQ) 14 MG/24HR  Place 1 patch onto the skin every 24 hours             omeprazole (PRILOSEC) 20 MG delayed release capsule  Take 1 capsule by mouth every morning (before breakfast)             senna (SENOKOT) 8.6 MG tablet  Take 2 tablets by mouth nightly             sucralfate (CARAFATE) 1 GM tablet  Take 1 tablet by mouth 2 times daily                 Controlled substances monitoring: signs of potential drug abuse or diversion identified- previous IV drug user currently in treatment and [place]AR[place] report reviewed today- activity consistent with treatment plan.      45 minutes spent preparing the patient for discharge    HALI Hayden - CNP

## 2020-12-29 ENCOUNTER — OFFICE VISIT (OUTPATIENT)
Dept: FAMILY MEDICINE CLINIC | Age: 30
End: 2020-12-29
Payer: MEDICAID

## 2020-12-29 VITALS
HEIGHT: 66 IN | RESPIRATION RATE: 10 BRPM | SYSTOLIC BLOOD PRESSURE: 104 MMHG | WEIGHT: 144 LBS | BODY MASS INDEX: 23.14 KG/M2 | DIASTOLIC BLOOD PRESSURE: 62 MMHG | TEMPERATURE: 98.6 F | HEART RATE: 82 BPM | OXYGEN SATURATION: 98 %

## 2020-12-29 PROBLEM — M54.50 CHRONIC BILATERAL LOW BACK PAIN: Status: ACTIVE | Noted: 2020-12-29

## 2020-12-29 PROBLEM — F17.210 CIGARETTE NICOTINE DEPENDENCE WITHOUT COMPLICATION: Status: ACTIVE | Noted: 2020-12-29

## 2020-12-29 PROBLEM — G40.909 SEIZURE DISORDER (HCC): Status: ACTIVE | Noted: 2020-12-29

## 2020-12-29 PROBLEM — G89.29 CHRONIC BILATERAL LOW BACK PAIN: Status: ACTIVE | Noted: 2020-12-29

## 2020-12-29 PROCEDURE — 4004F PT TOBACCO SCREEN RCVD TLK: CPT | Performed by: NURSE PRACTITIONER

## 2020-12-29 PROCEDURE — G8427 DOCREV CUR MEDS BY ELIG CLIN: HCPCS | Performed by: NURSE PRACTITIONER

## 2020-12-29 PROCEDURE — G8484 FLU IMMUNIZE NO ADMIN: HCPCS | Performed by: NURSE PRACTITIONER

## 2020-12-29 PROCEDURE — 99406 BEHAV CHNG SMOKING 3-10 MIN: CPT | Performed by: NURSE PRACTITIONER

## 2020-12-29 PROCEDURE — G8420 CALC BMI NORM PARAMETERS: HCPCS | Performed by: NURSE PRACTITIONER

## 2020-12-29 PROCEDURE — 1111F DSCHRG MED/CURRENT MED MERGE: CPT | Performed by: NURSE PRACTITIONER

## 2020-12-29 PROCEDURE — 99203 OFFICE O/P NEW LOW 30 MIN: CPT | Performed by: NURSE PRACTITIONER

## 2020-12-29 RX ORDER — CARBAMAZEPINE 200 MG/1
200 TABLET ORAL 2 TIMES DAILY
Qty: 60 TABLET | Refills: 1 | Status: SHIPPED | OUTPATIENT
Start: 2020-12-29 | End: 2021-04-01 | Stop reason: SDUPTHER

## 2020-12-29 RX ORDER — GABAPENTIN 600 MG/1
600 TABLET ORAL 3 TIMES DAILY
Qty: 270 TABLET | Refills: 1 | Status: SHIPPED | OUTPATIENT
Start: 2020-12-29 | End: 2021-04-01 | Stop reason: SDUPTHER

## 2020-12-29 ASSESSMENT — PATIENT HEALTH QUESTIONNAIRE - PHQ9
SUM OF ALL RESPONSES TO PHQ QUESTIONS 1-9: 0
SUM OF ALL RESPONSES TO PHQ QUESTIONS 1-9: 0
SUM OF ALL RESPONSES TO PHQ9 QUESTIONS 1 & 2: 0
1. LITTLE INTEREST OR PLEASURE IN DOING THINGS: 0
2. FEELING DOWN, DEPRESSED OR HOPELESS: 0
SUM OF ALL RESPONSES TO PHQ QUESTIONS 1-9: 0

## 2020-12-29 NOTE — PROGRESS NOTES
[facility] Medicine at [address] [facility] [address]  Chief Complaint   Patient presents with   Oval Simmering Doctor     former  pt  Dr Shawn Broussard , [address] from [facility] - fall  fx left ribs  and pelvis        History obtained from chart review and the patient. SUBJECTIVE:  Kevin Thorne is a 26 y.o. male that presents today for establishing care with new physician, etc. New patient, [facility] [address]. Presents today to [facility] care and also for hospital follow up. Patient recently admitted to [facility] after a fall from 2 story roof. Prior Hx significant for seizure and IV drug use. Witnessed seizure activity on the roof, and he fell off hitting the first story roof and then ride side impact on the ground. He did have LOC for 15 minutes after fall. He suffered right superior pubic ramis fracture, right sided pneumothorax, liver laceration, sacral fracture, rib fracture. Neurology did start him on Keppra. He was stabilized and then d/c to rehab for further therapy. See d/c summary:    Inpatient Rehabilitation Course:   Kevin Thorne is a 26 y.o. male admitted to inpatient rehabilitation on 12/16/2020 for trauma with multiple fractures. The patient participated in an aggressive multidisciplinary inpatient rehabilitation program involving 3 hours per day, 5 days per week of rehabilitation. Dr Roni Wilson followed on IP Rehab for medical management     Pain controlled with tylenol and Percocet. Patient with history of IV drug use, sober x2 year (per conversation today) with treatment of suboxone and currently on vivitrol injection monthly. Started Ibuprofen due to this to assist with pain control short term. Discussed with patient about risk with prescribing opioids at discharge, outside of a controlled environment. Patient pleasant and understanding. Discussed use of tylenol and ibuprofen at discharge and will add gabapentin taper at night. Patient understanding with plan.       GI prophylaxis with pepcid and carafate.     Patient started on tegretol for seizure prophylaxis, had stopped seizure meds three years prior, seizure caused fall off roof this admission. Patient is home and is doing well. He is still pretty sore and in quite a bit of pain. He is taking tylenol and it doesn't help. He does take motrin but this bothers his stomach. He is taking Gabapentin 300 mg nightly and it isn't helping him sleep. He reports that he also has chronic back pain and prior PCP in [place] had prescribed him 600 mg tid and this did seem to help his back pain. He is moving around quite well, but reports that he is still having a lot of pain and stiffness. He declines any further need for therapy. He does have a follow up appt with [facility] tomorrow. He is currently non weight bearing on his right leg, he does use crutches. He had been on suboxone in the past and vivitrol from [employer]. He reports that he has since quit doing the Vivitrol because the injections were painful in his right hip and he is having a lot of pain in that area as it is. Seizure Disorder  He is taking the Tegretol for seizures. He does not have a follow up appt at this time. He denies any side effects from the Tegretol, had been on this previously.     Tobacco Dependence    Currently smokes 0.5 ppd, has smoked for 10 years  Attempts to quit in the past? Yes  Interventions that have helped with previous cessation attempts:  Cold turkey  Motivation to quit (scale of 1-10):  4/10      Age/Gender Health Maintenance    Lipid - 40  DM Screen - 40  Colon Cancer Screening - 48  Lung Cancer Screening (Age 54 to [de-identified] with 30 pack year hx, current smoker or quit within past 15 years) - 54    Tetanus - needs  Influenza Vaccine - yearly  Pneumonia Vaccine - 65  Zostavax - 50   HPV Vaccine - n/a    PSA testing discussion - 55  AAA Screening - 65    Falls screening - n/a    Current Outpatient Medications Medication Sig Dispense Refill    gabapentin (NEURONTIN) 600 MG tablet Take 1 tablet by mouth 3 times daily for 180 days. 270 tablet 1    carBAMazepine (TEGRETOL) 200 MG tablet Take 1 tablet by mouth 2 times daily 60 tablet 1    acetaminophen (TYLENOL) 325 MG tablet Take 2 tablets by mouth every 4 hours as needed for Pain      ibuprofen (ADVIL;MOTRIN) 400 MG tablet Take 1 tablet by mouth every 8 hours as needed for Pain      docusate sodium (COLACE, DULCOLAX) 100 MG CAPS Take 100 mg by mouth 2 times daily 60 capsule 0    cyclobenzaprine (FLEXERIL) 10 MG tablet Take 1 tablet by mouth 2 times daily as needed for Muscle spasms 60 tablet 0    omeprazole (PRILOSEC) 20 MG delayed release capsule Take 1 capsule by mouth every morning (before breakfast) 30 capsule 0    sucralfate (CARAFATE) 1 GM tablet Take 1 tablet by mouth 2 times daily 120 tablet 3    senna (SENOKOT) 8.6 MG tablet Take 2 tablets by mouth nightly 60 tablet 0    nicotine (NICODERM CQ) 14 MG/24HR Place 1 patch onto the skin every 24 hours 30 patch 0     No current facility-administered medications for this visit. Orders Placed This Encounter   Medications    gabapentin (NEURONTIN) 600 MG tablet     Sig: Take 1 tablet by mouth 3 times daily for 180 days. Dispense:  270 tablet     Refill:  1    carBAMazepine (TEGRETOL) 200 MG tablet     Sig: Take 1 tablet by mouth 2 times daily     Dispense:  60 tablet     Refill:  1         All medications reviewed and reconciled, including OTC and herbal medications. Updated list given to patient.        Patient Active Problem List   Diagnosis    Pneumothorax, traumatic    Closed fracture of one rib of left side with routine healing    Liver laceration, closed, initial encounter    Multiple closed fractures of pelvis with stable disruption of pelvic ring with routine healing    Renal contusion, unspecified laterality, initial encounter    Chronic bilateral low back pain    Seizure disorder ([facility] [address]  Cigarette nicotine dependence without complication       Past Medical History:   Diagnosis Date    Closed fracture of one rib of left side with routine healing 12/17/2020    Hypertension     Liver laceration, closed, initial encounter 12/17/2020    Multiple closed fractures of pelvis with stable disruption of pelvic ring with routine healing 12/17/2020    Pneumothorax, traumatic     Seizures (HCC)        Past Surgical History:   Procedure Laterality Date    CT GUIDED CHEST TUBE  12/14/2020    CT GUIDED CHEST TUBE 12/14/2020 [facility] CT SCAN    WISDOM TOOTH EXTRACTION         Allergies   Allergen Reactions    Dilantin [Phenytoin Sodium Extended] Hives       Social History     Socioeconomic History    Marital status: Single     Spouse name: Not on file    Number of children: Not on file    Years of education: Not on file    Highest education level: Not on file   Occupational History    Not on file   Social Needs    Financial resource strain: Not on file    Food insecurity     Worry: Not on file     Inability: Not on file    Transportation needs     Medical: Not on file     Non-medical: Not on file   Tobacco Use    Smoking status: Current Every Day Smoker     Packs/day: 0.50     Years: 10.00     Pack years: 5.00     Types: Cigarettes     Start date: 12/29/2010    Smokeless tobacco: Never Used   Substance and Sexual Activity    Alcohol use:  Yes     Alcohol/week: 1.0 standard drinks     Types: 1 Cans of beer per week    Drug use: Not Currently     Types: Opiates     Sexual activity: Yes   Lifestyle    Physical activity     Days per week: Not on file     Minutes per session: Not on file    Stress: Not on file   Relationships    Social connections     Talks on phone: Not on file     Gets together: Not on file     Attends Anabaptism service: Not on file     Active member of club or organization: Not on file     Attends meetings of clubs or organizations: Not on file     Relationship status: Not on Appearance: A&O x 3, No acute distress,well developed and well- nourished  Head: normocephalic and atraumatic  Eyes: pupils equal, round, and reactive to light, extraocular eye movements intact, conjunctivae and eye lids without erythema  ENT: external ear and ear canal clear bilaterally, TMs intact and regular, nose without deformity, nasal mucosa and turbinates normal without polyps, oropharynx normal, dentition is normal for age  Neck: supple and non-tender without mass, no thyromegaly or thyroid nodules, no cervical lymphadenopathy  Pulmonary/Chest: clear to auscultation bilaterally- no wheezes, rales or rhonchi, normal air movement, no respiratory distress or retractions  Cardiovascular: S1 and S2 auscultated w/ RRR. No murmurs, rubs, clicks, or gallops, distal pulses intact. Abdomen: soft, non-tender, non-distended, bowl sounds physiologic,  no rebound or guarding, no masses or hernias noted. Liver and spleen without enlargement. Extremities: no cyanosis, clubbing or edema of the lower extremities. +2 PT/DP bilaterally. Musculoskeletal: No joint swelling or gross deformity   Neuro:  Alert, 5/5 strength globally and symmetrically, 2+ patellar reflexes bilaterally  Psych: Affect appropriate. Mood normal. Thought process is normal without evidence of depression or psychosis. Good insight and appropriate interaction. Cognition and memory appear to be intact. Skin: warm and dry, no rash or erythema  Lymph:  No cervical, auricular or supraclavicular lymph nodes palpated    ASSESSMENT & PLAN  Mynor Omer was seen today for established new doctor and follow-up from hospital.    Diagnoses and all orders for this visit:    Multiple closed fractures of pelvis with stable disruption of pelvic ring with routine healing  -     gabapentin (NEURONTIN) 600 MG tablet; Take 1 tablet by mouth 3 times daily for 180 days.     Encounter to establish care with new doctor    Seizure disorder [facility])  Evelyn Call MD, Neurology, [facility] [address]  -     carBAMazepine (TEGRETOL) 200 MG tablet; Take 1 tablet by mouth 2 times daily    Chronic bilateral low back pain, unspecified whether sciatica present  -     gabapentin (NEURONTIN) 600 MG tablet; Take 1 tablet by mouth 3 times daily for 180 days. Closed fracture of one rib of left side with routine healing  -     gabapentin (NEURONTIN) 600 MG tablet; Take 1 tablet by mouth 3 times daily for 180 days. Cigarette nicotine dependence without complication  -     TX TOBACCO USE CESSATION INTERMEDIATE 3-10 MINUTES      - increase Gabapentin to 600 mg tid  - con't tylenol/motrin prn  - no narcotics due to prior Hx of drug abuse  - con't non weight bearing status  - f/u with ortho as scheduled  - con't Tegretol and refer to Neuro    DISPOSITION    Return in about 3 months (around 3/29/2021) for chronic conditions. Varsha Kwon released without restrictions. PATIENT COUNSELING    Counseling was provided today regarding the following topics: Healthy eating habits, Regular exercise, substance abuse and healthy sleep habits. Varsha Kwon received counseling on the following healthy behaviors: medication adherence and tobacco cessation    Patient given educational materials on: See Attached    I have instructed Varsha Kwon to complete a self tracking handout onnone  and instructed them to bring it with them to his next appointment. Barriers to learning and self management: none    Discussed use, benefit, and side effects of prescribed medications. Barriers to medication compliance addressed. All patient questions answered. Pt voiced understanding.        Electronically signed by HALI Monet CNP on 12/29/2020 at 10:03 AM

## 2020-12-31 ENCOUNTER — HOSPITAL ENCOUNTER (EMERGENCY)
Age: 30
Discharge: HOME | End: 2020-12-31
Payer: MEDICAID

## 2020-12-31 VITALS
BODY MASS INDEX: 21.5 KG/M2 | HEART RATE: 78 BPM | SYSTOLIC BLOOD PRESSURE: 121 MMHG | WEIGHT: 137 LBS | OXYGEN SATURATION: 98 % | HEIGHT: 67 IN | DIASTOLIC BLOOD PRESSURE: 68 MMHG | RESPIRATION RATE: 16 BRPM | TEMPERATURE: 96.5 F

## 2020-12-31 DIAGNOSIS — Z20.2 EXPOSURE TO TRICHOMONAS: Primary | ICD-10-CM

## 2020-12-31 LAB
CHLAMYDIA TRACHOMATIS BY RT-PCR: NOT DETECTED
CT/NG SOURCE: NORMAL
NEISSERIA GONORRHOEAE BY RT-PCR: NOT DETECTED

## 2020-12-31 PROCEDURE — 87591 N.GONORRHOEAE DNA AMP PROB: CPT

## 2020-12-31 PROCEDURE — 99213 OFFICE O/P EST LOW 20 MIN: CPT

## 2020-12-31 PROCEDURE — 99214 OFFICE O/P EST MOD 30 MIN: CPT | Performed by: NURSE PRACTITIONER

## 2020-12-31 PROCEDURE — 87491 CHLMYD TRACH DNA AMP PROBE: CPT

## 2020-12-31 RX ORDER — METRONIDAZOLE 500 MG/1
500 TABLET ORAL 2 TIMES DAILY
Qty: 14 TABLET | Refills: 0 | Status: SHIPPED | OUTPATIENT
Start: 2020-12-31 | End: 2021-01-07

## 2020-12-31 NOTE — ED TRIAGE NOTES
Pt discharge teaching taught via teach back method. Talked with pt about medication and follow up. No other concerns voiced. Pt ambulated to leave with crutches, rr easy and unlabored.

## 2020-12-31 NOTE — ED PROVIDER NOTES
[facility]  Urgent Care Encounter      CHIEF COMPLAINT       Chief Complaint   Patient presents with    Other     std check       Nurses Notes reviewed and I agree except as noted in the HPI. HISTORY OF PRESENT ILLNESS   Ladan Horn is a 26 y.o. male who presents with complaint that his girlfriend was recently treated for trichomoniasis. Patient has no STI symptoms. REVIEW OF SYSTEMS     Review of Systems   Constitutional: Negative for activity change, fatigue and fever. HENT: Negative for congestion, ear pain, rhinorrhea, sore throat and trouble swallowing. Eyes: Negative for pain, discharge and redness. Respiratory: Negative for cough, shortness of breath and wheezing. Cardiovascular: Negative. Gastrointestinal: Negative for abdominal pain, constipation, diarrhea, nausea and vomiting. Endocrine: Negative. Genitourinary: Negative for dysuria, frequency and urgency. Musculoskeletal: Negative for arthralgias, back pain and myalgias. Skin: Negative for rash. Allergic/Immunologic: Negative. Neurological: Negative for dizziness, tremors, weakness and headaches. Hematological: Negative. Psychiatric/Behavioral: Negative for dysphoric mood and sleep disturbance. The patient is not nervous/anxious. PAST MEDICAL HISTORY         Diagnosis Date    Closed fracture of one rib of left side with routine healing 12/17/2020    Hypertension     Liver laceration, closed, initial encounter 12/17/2020    Multiple closed fractures of pelvis with stable disruption of pelvic ring with routine healing 12/17/2020    Pneumothorax, traumatic     Seizures ([facility] [address]        SURGICAL HISTORY     Patient  has a past surgical history that includes [place] tooth extraction and CT GUIDED CHEST TUBE (12/14/2020).     CURRENT MEDICATIONS       Discharge Medication List as of 12/31/2020 12:35 PM      CONTINUE these medications which have NOT CHANGED    Details   gabapentin ear normal.      Nose: Nose normal.   Eyes:      General:         Right eye: No discharge. Left eye: No discharge. Conjunctiva/sclera: Conjunctivae normal.      Pupils: Pupils are equal, round, and reactive to light. Neck:      Musculoskeletal: Normal range of motion. Vascular: No JVD. Cardiovascular:      Rate and Rhythm: Normal rate and regular rhythm. Pulmonary:      Effort: Pulmonary effort is normal. No respiratory distress. Musculoskeletal: Normal range of motion. General: No tenderness or deformity. Skin:     General: Skin is warm and dry. Capillary Refill: Capillary refill takes less than 2 seconds. Coloration: Skin is not pale. Findings: No erythema or rash. Neurological:      Mental Status: He is alert and oriented to person, place, and time. Coordination: Coordination normal.   Psychiatric:         Behavior: Behavior normal.         Thought Content: Thought content normal.         Judgment: Judgment normal.         DIAGNOSTIC RESULTS   Labs: No results found for this visit on 12/31/20. IMAGING:    URGENT CARE COURSE:     Vitals:    12/31/20 1229   BP: 121/68   Pulse: 78   Resp: 16   Temp: 96.5 °F (35.8 °C)   TempSrc: Tympanic   SpO2: 98%   Weight: 137 lb (62.1 kg)   Height: 5' 7\" (1.702 m)       Medications - No data to display  PROCEDURES:  None  FINAL IMPRESSION      1. Exposure to trichomonas        DISPOSITION/PLAN   DISPOSITION Decision To Discharge 12/31/2020 12:31:40 PM    Treated for trichomoniasis. Urine sent to the lab for follow-up testing for chlamydia and gonorrhea.     PATIENT REFERRED TO:  Ryland Dutta, APRN - CNP  [address] [place]  [phone]      As needed    DISCHARGE MEDICATIONS:  Discharge Medication List as of 12/31/2020 12:35 PM      START taking these medications    Details   metroNIDAZOLE (FLAGYL) 500 MG tablet Take 1 tablet by mouth 2 times daily for 7 days, Disp-14 tablet, R-0Normal           Discharge Medication List as of 12/31/2020 12:35 PM          HALI Ledezma CNP, APRN - CNP  12/31/20 1241

## 2021-01-03 ENCOUNTER — HOSPITAL ENCOUNTER (EMERGENCY)
Age: 31
Discharge: HOME | End: 2021-01-03
Payer: MEDICAID

## 2021-01-03 ENCOUNTER — APPOINTMENT (OUTPATIENT)
Dept: GENERAL RADIOLOGY | Age: 31
End: 2021-01-03
Payer: MEDICAID

## 2021-01-03 VITALS
WEIGHT: 135 LBS | DIASTOLIC BLOOD PRESSURE: 84 MMHG | BODY MASS INDEX: 21.19 KG/M2 | HEIGHT: 67 IN | HEART RATE: 86 BPM | OXYGEN SATURATION: 100 % | SYSTOLIC BLOOD PRESSURE: 146 MMHG | RESPIRATION RATE: 16 BRPM | TEMPERATURE: 98 F

## 2021-01-03 DIAGNOSIS — S32.591S: ICD-10-CM

## 2021-01-03 DIAGNOSIS — M25.551 RIGHT HIP PAIN: Primary | ICD-10-CM

## 2021-01-03 PROCEDURE — 99282 EMERGENCY DEPT VISIT SF MDM: CPT

## 2021-01-03 PROCEDURE — 6370000000 HC RX 637 (ALT 250 FOR IP): Performed by: EMERGENCY MEDICINE

## 2021-01-03 PROCEDURE — 73502 X-RAY EXAM HIP UNI 2-3 VIEWS: CPT

## 2021-01-03 RX ORDER — HYDROCODONE BITARTRATE AND ACETAMINOPHEN 5; 325 MG/1; MG/1
1 TABLET ORAL ONCE
Status: COMPLETED | OUTPATIENT
Start: 2021-01-03 | End: 2021-01-03

## 2021-01-03 RX ADMIN — HYDROCODONE BITARTRATE AND ACETAMINOPHEN 1 TABLET: 5; 325 TABLET ORAL at 17:55

## 2021-01-03 ASSESSMENT — ENCOUNTER SYMPTOMS
SHORTNESS OF BREATH: 0
COLOR CHANGE: 0
COUGH: 0
ABDOMINAL PAIN: 0

## 2021-01-03 ASSESSMENT — PAIN SCALES - GENERAL: PAINLEVEL_OUTOF10: 8

## 2021-01-03 NOTE — ED PROVIDER NOTES
[address] COMPLAINT       Chief Complaint   Patient presents with    Leg Pain     right upper inner       Nurses Notes reviewed and I agree except as noted in the HPI. HISTORY OF PRESENT ILLNESS    Brittni Glass is a 26 y.o. male who presents to the Emergency Department for the evaluation of right hip and groin pain. Patient states that proximately 3 weeks ago he fell off of a roof. He was at the peak of the roof. Patient had a pelvic fracture, liver laceration, and other injuries. Patient has been discharged home. He is still using crutches. He states that he had Norco for pain at home but was out of the medication yesterday. The HPI was provided by the patient. REVIEW OF SYSTEMS     Review of Systems   Constitutional: Negative for chills, diaphoresis and fatigue. Respiratory: Negative for cough and shortness of breath. Gastrointestinal: Negative for abdominal pain. Musculoskeletal: Positive for arthralgias (right thigh pain, hip pain). Skin: Negative for color change. Neurological: Negative for dizziness and headaches. Psychiatric/Behavioral: Negative for behavioral problems. PAST MEDICAL HISTORY    has a past medical history of Closed fracture of one rib of left side with routine healing, Hypertension, Liver laceration, closed, initial encounter, Multiple closed fractures of pelvis with stable disruption of pelvic ring with routine healing, Pneumothorax, traumatic, and Seizures ([facility] [address] SURGICAL HISTORY      has a past surgical history that includes [place] tooth extraction and CT GUIDED CHEST TUBE (12/14/2020).     CURRENT MEDICATIONS       Discharge Medication List as of 1/3/2021  6:38 PM      CONTINUE these medications which have NOT CHANGED    Details   metroNIDAZOLE (FLAGYL) 500 MG tablet Take 1 tablet by mouth 2 times daily for 7 days, Disp-14 tablet, R-0Normal      gabapentin (NEURONTIN) 600 MG tablet Take 1 tablet by mouth 3 times daily for 180 days. , Disp-270 tablet, R-1Normal      carBAMazepine (TEGRETOL) 200 MG tablet Take 1 tablet by mouth 2 times daily, Disp-60 tablet, R-1Normal      acetaminophen (TYLENOL) 325 MG tablet Take 2 tablets by mouth every 4 hours as needed for PainOTC      ibuprofen (ADVIL;MOTRIN) 400 MG tablet Take 1 tablet by mouth every 8 hours as needed for PainOTC      docusate sodium (COLACE, DULCOLAX) 100 MG CAPS Take 100 mg by mouth 2 times daily, Disp-60 capsule, R-0Normal      senna (SENOKOT) 8.6 MG tablet Take 2 tablets by mouth nightly, Disp-60 tablet, R-0Normal      nicotine (NICODERM CQ) 14 MG/24HR Place 1 patch onto the skin every 24 hours, Disp-30 patch, R-0Normal      cyclobenzaprine (FLEXERIL) 10 MG tablet Take 1 tablet by mouth 2 times daily as needed for Muscle spasms, Disp-60 tablet, R-0Normal      omeprazole (PRILOSEC) 20 MG delayed release capsule Take 1 capsule by mouth every morning (before breakfast), Disp-30 capsule,R-0Print      sucralfate (CARAFATE) 1 GM tablet Take 1 tablet by mouth 2 times daily, Disp-120 tablet,R-3Print             ALLERGIES     is allergic to dilantin [phenytoin sodium extended]. FAMILY HISTORY     He indicated that his mother is . He indicated that his father is . family history includes No Known Problems in his mother; Other in his father. SOCIAL HISTORY      reports that he has been smoking cigarettes. He started smoking about 10 years ago. He has a 5.00 pack-year smoking history. He has never used smokeless tobacco. He reports current alcohol use of about 1.0 standard drinks of alcohol per week. He reports previous drug use. Drug: Opiates . PHYSICAL EXAM     INITIAL VITALS:  height is 5' 7\" (1.702 m) and weight is 135 lb (61.2 kg). His oral temperature is 98 °F (36.7 °C). His blood pressure is 146/84 (abnormal) and his pulse is 86. His respiration is 16 and oxygen saturation is 100%.     Physical Exam  Constitutional:       General: He is not in acute distress. Appearance: He is normal weight. He is not ill-appearing. HENT:      Head: Normocephalic. Mouth/Throat:      Mouth: Mucous membranes are moist.   Cardiovascular:      Rate and Rhythm: Normal rate. Pulmonary:      Effort: Pulmonary effort is normal.   Musculoskeletal:        Legs:    Skin:     General: Skin is warm and dry. Capillary Refill: Capillary refill takes less than 2 seconds. Neurological:      General: No focal deficit present. Mental Status: He is alert. Psychiatric:         Mood and Affect: Mood normal.         Behavior: Behavior normal.          DIFFERENTIAL DIAGNOSIS:   Pelvis fracture, hip fracture, contusion    DIAGNOSTIC RESULTS     EKG: All EKG's are interpreted by the Emergency Department Physician who either signs or Co-signs this chart in the absence of a cardiologist.        RADIOLOGY: non-plainfilm images(s) such as CT, Ultrasound and MRI are read by the radiologist.    XR HIP 2-3 VW W PELVIS RIGHT   Final Result   1. Redemonstration of fractures of the right sacral ala, right superior pubic ramus at the junction with the right acetabulum and right inferior pubic ramus which were acute on the CT abdomen/pelvis examination dated 12/11/2020. No new fracture is seen. **This report has been created using voice recognition software. It may contain minor errors which are inherent in voice recognition technology. **      Final report electronically signed by Dr. Cherl Cabot on 1/3/2021 6:25 PM          LABS:     Labs Reviewed - No data to display    EMERGENCY DEPARTMENT COURSE:   Vitals:    Vitals:    01/03/21 1630   BP: (!) 146/84   Pulse: 86   Resp: 16   Temp: 98 °F (36.7 °C)   TempSrc: Oral   SpO2: 100%   Weight: 135 lb (61.2 kg)   Height: 5' 7\" (1.702 m)       5:53 PM EST: The patient was seen and evaluated. X-ray shows previous pelvis fractures with no acute findings.     Full range of motion of right leg. Pulses intact. Sensation intact. MDM:  Patient likely has contusion to the thigh. He was given 1 [place] in the emergency department. Chart review shows patient was previously on Vivitrol before his fall from the roof. Patient was receiving Tylenol with codeine and not Norco.  He was also on ibuprofen and Flexeril. Patient states the ibuprofen irritates his stomach. He is taking his Flexeril as directed. Patient will be discharged home. Advised take Tylenol for pain. Continue his Flexeril. Ice to the area frequently. [facility] [place] [facility] for reevaluation of his injuries. Follow-up with his pain management doctor or orthopedist for any further pain needs. Patient verbalized understanding of all instructions. CRITICAL CARE:   None    CONSULTS:  None    PROCEDURES:  None    FINAL IMPRESSION      1. Right hip pain    2. Closed fracture of pubic ramus, right, sequela          DISPOSITION/PLAN   Discharge    PATIENT REFERRED TO:  [address]  [phone]  In 1 day  call tomorrow for an appointment      DISCHARGE MEDICATIONS:  Discharge Medication List as of 1/3/2021  6:38 PM          (Please note that portions of this note were completed with a voice recognition program.  Efforts were made to edit the dictations but occasionally words are mis-transcribed.)    The patient was given an opportunity to see the Emergency Attending. The patient voiced understanding that I was a Mid-LevelProvider and was in agreement with being seen independently by myself.           Ranny Goodell, APRN - CNP  01/03/21 2013

## 2021-01-03 NOTE — ED TRIAGE NOTES
Patient presents to ER with complaints of right upper inner leg pain that started last night after tripping over coax cord. Patient reports he had a hip injury 3 weeks ago and is using crutches from it. Patient reports having pelvic fractures.

## 2021-01-15 ENCOUNTER — HEALTH MAINTENANCE LETTER (OUTPATIENT)
Age: 31
End: 2021-01-15

## 2021-01-20 ENCOUNTER — MYC MEDICAL ADVICE (OUTPATIENT)
Dept: FAMILY MEDICINE | Facility: OTHER | Age: 31
End: 2021-01-20

## 2021-01-20 DIAGNOSIS — F90.0 ADHD (ATTENTION DEFICIT HYPERACTIVITY DISORDER), INATTENTIVE TYPE: ICD-10-CM

## 2021-01-20 RX ORDER — DEXTROAMPHETAMINE SACCHARATE, AMPHETAMINE ASPARTATE MONOHYDRATE, DEXTROAMPHETAMINE SULFATE AND AMPHETAMINE SULFATE 7.5; 7.5; 7.5; 7.5 MG/1; MG/1; MG/1; MG/1
30 CAPSULE, EXTENDED RELEASE ORAL EVERY MORNING
Qty: 30 CAPSULE | Refills: 0 | Status: SHIPPED | OUTPATIENT
Start: 2021-01-20 | End: 2021-01-25

## 2021-01-22 NOTE — PROGRESS NOTES
Chance is a 30 year old who is being evaluated via a billable telephone visit.      What phone number would you like to be contacted at? [phone]  How would you like to obtain your AVS? Jeremiah     Assessment & Plan     ADHD (attention deficit hyperactivity disorder), inattentive type   reviewed 01/25/21, no concerns as expected.   Received January refill on 1/20/2021, will be due for med check in May  Did continue on Adderall XR 30 mg #30 per month.   Elected to start him on Adderall IR 5 mg dose #30 per month (given one month Rx for now) Hopefully this will give him a faster boost in the morning to get into his routine.   We discussed ways to prep his routine the night before and writing down a routine regularly.   - amphetamine-dextroamphetamine (ADDERALL XR) 30 MG 24 hr capsule; Take 1 capsule (30 mg) by mouth every morning Take 1 tablet mid morning/early afternoon.  - amphetamine-dextroamphetamine (ADDERALL) 5 MG tablet; Take 1 tablet (5 mg) by mouth daily    14:30 minutes spent on the date of the encounter doing chart review, patient visit and documentation     Return in about 4 months (around 5/25/2021) for Medication Re-check.    Options for treatment and follow-up care were reviewed with the patient and/or guardian. Patient and/or guardian engaged in the decision making process and verbalized understanding of the options discussed and agreed with the final plan.    Nikki Benitez PA-C  [facility]    Subjective     Chance is a 30 year old who presents to clinic today for the following health issues     HPI     SUBJECTIVE:  Chance is here today to recheck ADHD/ADD.    Updates since last visit:     Routine for taking medicine, including time: first thing 7am, but having a hard time getting out of the house, having a hard time getting 2 kids ready in the morning.   Time medicine wears off: 2-3pm  Issues at school/work: still having persistent symptoms of COVID, brain fog and  fatigued so focusing is really tough at work but usually he is fine when he gets in a rhythm.   Issues at home: having a hard time in the mornings.   Control of symptoms: having a hard time focusing in the mornings.     Side effects:  Headaches: No  Stomach aches: No  Irritability/mood swings: No  Difficulties with sleep: Yes, has been weird, but also thought he had COVID so unsure  Social withdrawal: No  Unusual movements/tics: Yes, eye twitching.   Decreased appetite: Yes    Other concerns: left eye twitching for the last month, vision has been a little worse and not sure if it medication related. Stopped taking medication for a day and also stopped taking Melatonin and it seemed to subsided.     Wakes up at 6:20 AM  Going to bed between 10-11:30 PM    More stress with wife going back to work and she is usually out of the house by the time that the kids are getting up so he is in charge of the morning routine.     Patient Active Problem List   Diagnosis     Major depressive disorder with single episode, in full remission (H)     Irritable bowel syndrome with both constipation and diarrhea     Labral tear of shoulder     ADHD (attention deficit hyperactivity disorder), inattentive type       Past Medical History:   Diagnosis Date     Marijuana use 1/4/2019       Past Surgical History:   Procedure Laterality Date     ABDOMEN SURGERY  2013/2016    Hernia repair     ARTHROSCOPY SHOULDER SUPERIOR LABRUM ANTERIOR TO POSTERIOR REPAIR Left 2012     HERNIA REPAIR Left 2013    open     HERNIA REPAIR Right 2016    laparoscopic       Current Outpatient Medications   Medication     [START ON 4/20/2021] amphetamine-dextroamphetamine (ADDERALL XR) 30 MG 24 hr capsule     amphetamine-dextroamphetamine (ADDERALL) 5 MG tablet     No current facility-administered medications for this visit.          Review of Systems   Constitutional, cardiovascular, pulmonary, musculoskeletal, neuro, skin, and psych systems are negative, except as  otherwise noted.      Objective           Vitals:  No vitals were obtained today due to virtual visit.    Physical Exam   healthy, alert and no distress  PSYCH: Alert and oriented times 3; coherent speech, normal   rate and volume, able to articulate logical thoughts, able   to abstract reason, no tangential thoughts, no hallucinations   or delusions  His affect is normal  RESP: No cough, no audible wheezing, able to talk in full sentences  Remainder of exam unable to be completed due to telephone visits        Phone call duration: 14:30 minutes

## 2021-01-25 ENCOUNTER — VIRTUAL VISIT (OUTPATIENT)
Dept: FAMILY MEDICINE | Facility: OTHER | Age: 31
End: 2021-01-25
Payer: COMMERCIAL

## 2021-01-25 DIAGNOSIS — F90.0 ADHD (ATTENTION DEFICIT HYPERACTIVITY DISORDER), INATTENTIVE TYPE: Primary | ICD-10-CM

## 2021-01-25 PROCEDURE — 99214 OFFICE O/P EST MOD 30 MIN: CPT | Mod: 95 | Performed by: PHYSICIAN ASSISTANT

## 2021-01-25 RX ORDER — DEXTROAMPHETAMINE SACCHARATE, AMPHETAMINE ASPARTATE, DEXTROAMPHETAMINE SULFATE AND AMPHETAMINE SULFATE 1.25; 1.25; 1.25; 1.25 MG/1; MG/1; MG/1; MG/1
5 TABLET ORAL DAILY
Qty: 30 TABLET | Refills: 0 | Status: SHIPPED | OUTPATIENT
Start: 2021-01-25 | End: 2021-02-10 | Stop reason: DRUGHIGH

## 2021-01-25 RX ORDER — DEXTROAMPHETAMINE SACCHARATE, AMPHETAMINE ASPARTATE MONOHYDRATE, DEXTROAMPHETAMINE SULFATE AND AMPHETAMINE SULFATE 7.5; 7.5; 7.5; 7.5 MG/1; MG/1; MG/1; MG/1
30 CAPSULE, EXTENDED RELEASE ORAL EVERY MORNING
Qty: 30 CAPSULE | Refills: 0 | Status: SHIPPED | OUTPATIENT
Start: 2021-03-21 | End: 2021-01-25

## 2021-01-25 RX ORDER — DEXTROAMPHETAMINE SACCHARATE, AMPHETAMINE ASPARTATE MONOHYDRATE, DEXTROAMPHETAMINE SULFATE AND AMPHETAMINE SULFATE 7.5; 7.5; 7.5; 7.5 MG/1; MG/1; MG/1; MG/1
30 CAPSULE, EXTENDED RELEASE ORAL EVERY MORNING
Qty: 30 CAPSULE | Refills: 0 | Status: SHIPPED | OUTPATIENT
Start: 2021-02-19 | End: 2021-01-25

## 2021-01-25 RX ORDER — DEXTROAMPHETAMINE SACCHARATE, AMPHETAMINE ASPARTATE MONOHYDRATE, DEXTROAMPHETAMINE SULFATE AND AMPHETAMINE SULFATE 7.5; 7.5; 7.5; 7.5 MG/1; MG/1; MG/1; MG/1
30 CAPSULE, EXTENDED RELEASE ORAL EVERY MORNING
Qty: 30 CAPSULE | Refills: 0 | Status: SHIPPED | OUTPATIENT
Start: 2021-04-20 | End: 2021-02-02 | Stop reason: DRUGHIGH

## 2021-01-26 ENCOUNTER — HOSPITAL ENCOUNTER (OUTPATIENT)
Age: 31
Discharge: HOME | End: 2021-01-26
Payer: MEDICAID

## 2021-01-26 ENCOUNTER — TELEPHONE (OUTPATIENT)
Dept: FAMILY MEDICINE | Facility: OTHER | Age: 31
End: 2021-01-26

## 2021-01-26 ENCOUNTER — OFFICE VISIT (OUTPATIENT)
Dept: FAMILY MEDICINE CLINIC | Age: 31
End: 2021-01-26
Payer: MEDICAID

## 2021-01-26 VITALS
SYSTOLIC BLOOD PRESSURE: 126 MMHG | HEART RATE: 80 BPM | RESPIRATION RATE: 16 BRPM | DIASTOLIC BLOOD PRESSURE: 84 MMHG | TEMPERATURE: 97.8 F | BODY MASS INDEX: 22.49 KG/M2 | HEIGHT: 65 IN | WEIGHT: 135 LBS

## 2021-01-26 DIAGNOSIS — Z72.51 UNPROTECTED SEXUAL INTERCOURSE: ICD-10-CM

## 2021-01-26 DIAGNOSIS — Z20.2 EXPOSURE TO STD: Primary | ICD-10-CM

## 2021-01-26 DIAGNOSIS — Z20.2 EXPOSURE TO STD: ICD-10-CM

## 2021-01-26 LAB — TRICHOMONAS, URINE, MALE: NORMAL

## 2021-01-26 PROCEDURE — 87591 N.GONORRHOEAE DNA AMP PROB: CPT

## 2021-01-26 PROCEDURE — G8427 DOCREV CUR MEDS BY ELIG CLIN: HCPCS | Performed by: NURSE PRACTITIONER

## 2021-01-26 PROCEDURE — G8420 CALC BMI NORM PARAMETERS: HCPCS | Performed by: NURSE PRACTITIONER

## 2021-01-26 PROCEDURE — 99213 OFFICE O/P EST LOW 20 MIN: CPT | Performed by: NURSE PRACTITIONER

## 2021-01-26 PROCEDURE — 4004F PT TOBACCO SCREEN RCVD TLK: CPT | Performed by: NURSE PRACTITIONER

## 2021-01-26 PROCEDURE — 87210 SMEAR WET MOUNT SALINE/INK: CPT

## 2021-01-26 PROCEDURE — G8484 FLU IMMUNIZE NO ADMIN: HCPCS | Performed by: NURSE PRACTITIONER

## 2021-01-26 PROCEDURE — 87491 CHLMYD TRACH DNA AMP PROBE: CPT

## 2021-01-26 ASSESSMENT — PATIENT HEALTH QUESTIONNAIRE - PHQ9
2. FEELING DOWN, DEPRESSED OR HOPELESS: 0
1. LITTLE INTEREST OR PLEASURE IN DOING THINGS: 0
SUM OF ALL RESPONSES TO PHQ QUESTIONS 1-9: 0

## 2021-01-26 NOTE — PATIENT INSTRUCTIONS
air.  ? Pull down the loose skin (foreskin) from the head of an uncircumcised penis. ? While squeezing the tip of the condom, unroll it all the way down to the base of the firm penis. ? Never use petroleum jelly (such as Vaseline), grease, hand lotion, baby oil, or anything with oil in it. These products can make holes in the condom. ? After sex, hold the condom on your penis as you remove your penis from your partner. This will keep semen from spilling out of the condom. · Learn to use a female condom:  ? You can put in a female condom up to 8 hours before sex. ? Squeeze the smaller ring at the closed end and insert it deep into the vagina. The larger ring at the open end should stay outside the vagina. ? During sex, make sure the penis goes into the condom. ? After the penis is removed, close the open end of the condom by twisting it. Remove the condom. · Do not use a female condom and male condom at the same time. · Do not have sex with anyone who has symptoms of an STI, such as sores on the genitals or mouth. The herpes virus that causes cold sores can spread to and from the penis and vagina. · Do not drink a lot of alcohol or use drugs before sex. This can cause you to let down your guard and not practice safer sex. · Having one sex partner (who does not have STIs and does not have sex with anyone else) is a sure way to avoid STIs. · Talk to your partner before you have sex. Find out if he or she has or is at risk for any STI. Keep in mind that a person may be able to spread an STI even if he or she does not have symptoms. You and your partner may want to get an HIV test. You should get tested again 6 months later. Where can you learn more? Go to https://[email or web address]. org and sign in to your [employer] account. Enter O931 in the [employer].com box to learn more about \"Safer Sex: Care Instructions. \"     If you do not have an account, please click on the \"Sign Up Now\" link.  Current as of: February 26, 2020               Content Version: 12.6  © 6243-8205 [employer]. Care instructions adapted under license by [facility] ([facility]). If you have questions about a medical condition or this instruction, always ask your healthcare professional. [address] any warranty or liability for your use of this information.

## 2021-01-26 NOTE — PROGRESS NOTES
[facility] / [address] [place] [address] [place]  Chief Complaint   Patient presents with    Sexually Transmitted Diseases     follow from previous positive (did have issues yesterday starting)       History obtained from chart review and the patient. SUBJECTIVE:  Ladan Horn is a 26 y.o. male that presents today for follow up STD exposure    Recently exposed to [place] and he did test positive for trich. He is not currently having any symptoms. He is currently sexually active with 1 single partner. He would like to be retested before resuming sex with his partner. Age/Gender Health Maintenance    Lipid - 40  DM Screen - 40  Colon Cancer Screening - 48  Lung Cancer Screening (Age 54 to [de-identified] with 30 pack year hx, current smoker or quit within past 15 years) - 54    Tetanus - needs  Influenza Vaccine - yearly  Pneumonia Vaccine - 65  Zostavax - 50   HPV Vaccine - n/a    PSA testing discussion - 55  AAA Screening - 65    Falls screening - n/a    Current Outpatient Medications   Medication Sig Dispense Refill    gabapentin (NEURONTIN) 600 MG tablet Take 1 tablet by mouth 3 times daily for 180 days.  270 tablet 1    carBAMazepine (TEGRETOL) 200 MG tablet Take 1 tablet by mouth 2 times daily 60 tablet 1    acetaminophen (TYLENOL) 325 MG tablet Take 2 tablets by mouth every 4 hours as needed for Pain      ibuprofen (ADVIL;MOTRIN) 400 MG tablet Take 1 tablet by mouth every 8 hours as needed for Pain      docusate sodium (COLACE, DULCOLAX) 100 MG CAPS Take 100 mg by mouth 2 times daily 60 capsule 0    senna (SENOKOT) 8.6 MG tablet Take 2 tablets by mouth nightly 60 tablet 0    nicotine (NICODERM CQ) 14 MG/24HR Place 1 patch onto the skin every 24 hours 30 patch 0    omeprazole (PRILOSEC) 20 MG delayed release capsule Take 1 capsule by mouth every morning (before breakfast) 30 capsule 0    sucralfate (CARAFATE) 1 GM tablet Take 1 tablet by mouth 2 times daily 120 tablet 3     No current facility-administered medications for this visit. No orders of the defined types were placed in this encounter. All medications reviewed and reconciled, including OTC and herbal medications. Updated list given to patient.        Patient Active Problem List   Diagnosis    Pneumothorax, traumatic    Closed fracture of one rib of left side with routine healing    Liver laceration, closed, initial encounter    Multiple closed fractures of pelvis with stable disruption of pelvic ring with routine healing    Renal contusion, unspecified laterality, initial encounter    Chronic bilateral low back pain    Seizure disorder ([facility] [address]    Cigarette nicotine dependence without complication       Past Medical History:   Diagnosis Date    Closed fracture of one rib of left side with routine healing 12/17/2020    Hypertension     Liver laceration, closed, initial encounter 12/17/2020    Multiple closed fractures of pelvis with stable disruption of pelvic ring with routine healing 12/17/2020    Pneumothorax, traumatic     Seizures (HCC)        Past Surgical History:   Procedure Laterality Date    CT GUIDED CHEST TUBE  12/14/2020    CT GUIDED CHEST TUBE 12/14/2020 [facility] CT SCAN    WISDOM TOOTH EXTRACTION         Allergies   Allergen Reactions    Dilantin [Phenytoin Sodium Extended] Hives       Social History     Socioeconomic History    Marital status: Single     Spouse name: Not on file    Number of children: Not on file    Years of education: Not on file    Highest education level: Not on file   Occupational History    Not on file   Social Needs    Financial resource strain: Not on file    Food insecurity     Worry: Not on file     Inability: Not on file    Transportation needs     Medical: Not on file     Non-medical: Not on file   Tobacco Use    Smoking status: Current Every Day Smoker     Packs/day: 0.50     Years: 10.00     Pack years: 5.00     Types: Cigarettes     Start date: 12/29/2010   Jessy Koenig Smokeless tobacco: Never Used   Substance and Sexual Activity    Alcohol use: Yes     Alcohol/week: 1.0 standard drinks     Types: 1 Cans of beer per week    Drug use: Not Currently     Types: Opiates     Sexual activity: Yes   Lifestyle    Physical activity     Days per week: Not on file     Minutes per session: Not on file    Stress: Not on file   Relationships    Social connections     Talks on phone: Not on file     Gets together: Not on file     Attends Yarsani service: Not on file     Active member of club or organization: Not on file     Attends meetings of clubs or organizations: Not on file     Relationship status: Not on file    Intimate partner violence     Fear of current or ex partner: Not on file     Emotionally abused: Not on file     Physically abused: Not on file     Forced sexual activity: Not on file   Other Topics Concern    Not on file   Social History Narrative    Not on file       Family History   Problem Relation Age of Onset    No Known Problems Mother     Other Father         liver dx          I have reviewed the patient's past medical history, past surgical history, allergies, medications, social and family history and I have made updates where appropriate.       Review of Systems  Positive responses are highlighted in bold    Constitutional:  Fever, Chills, Night Sweats, Fatigue, Unexpected changes in weight  Eyes:  Eye discharge, Eye pain, Eye redness, Visual disturbances   HENT:  Ear pain, Tinnitus, Nosebleeds, Trouble swallowing, Hearing loss, Sore throat  Cardiovascular:  Chest Pain, Palpitations, Orthopnea, Paroxysmal Nocturnal Dyspnea  Respiratory:  Cough, Wheezing, Shortness of breath, Chest tightness, Apnea  Gastrointestinal:  Nausea, Vomiting, Diarrhea, Constipation, Heartburn, Blood in stool  Genitourinary:  Difficulty or painful urination, Flank pain, Change in frequency, Urgency  Skin:  Color change, Rash, Itching, Wound  Psychiatric:  Hallucinations, Anxiety, diseases. Diagnoses and all orders for this visit:    Exposure to STD  -     Trichomonas, Male, Urine; Future  -     C. Trachomatis / N. Gonorrhoeae, DNA Probe; Future    Unprotected sexual intercourse  -     Trichomonas, Male, Urine; Future  -     C. Trachomatis / N. Gonorrhoeae, DNA Probe; Future      - safe sex discussed  - retest for GC/chlamydia and Trich    DISPOSITION    Return if symptoms worsen or fail to improve. Santos Sanz released without restrictions. PATIENT COUNSELING    Counseling was provided today regarding the following topics: Healthy eating habits, Regular exercise, substance abuse and healthy sleep habits. Santos Sanz received counseling on the following healthy behaviors: medication adherence and tobacco cessation    Patient given educational materials on: See Attached    I have instructed Santos Sanz to complete a self tracking handout onnone  and instructed them to bring it with them to his next appointment. Barriers to learning and self management: none    Discussed use, benefit, and side effects of prescribed medications. Barriers to medication compliance addressed. All patient questions answered. Pt voiced understanding.        Electronically signed by HALI Isidro CNP on 1/26/2021 at 4:21 PM

## 2021-01-26 NOTE — TELEPHONE ENCOUNTER
D-Amphetamine Er 30MG salt combo    Is this to be taken once or twice daily ? Please clarify  ,thanks

## 2021-01-27 ENCOUNTER — TELEPHONE (OUTPATIENT)
Dept: FAMILY MEDICINE CLINIC | Age: 31
End: 2021-01-27

## 2021-01-27 NOTE — TELEPHONE ENCOUNTER
----- Message from Amira Marie DO sent at 1/27/2021  7:17 AM EST -----  Please let pt know that urine neg for trich  GC/chlamydia pending  Let me know if questions, thanks!

## 2021-01-29 LAB
CHLAMYDIA TRACHOMATIS AMPLIFIED DET: NEGATIVE
NEISSERIA GONORRHOEAE BY AMP: NEGATIVE
SPECIMEN SOURCE: NORMAL

## 2021-02-01 ENCOUNTER — TELEPHONE (OUTPATIENT)
Dept: FAMILY MEDICINE CLINIC | Age: 31
End: 2021-02-01

## 2021-02-01 NOTE — TELEPHONE ENCOUNTER
----- Message from HALI Lakhani - CNP sent at 2/1/2021  8:40 AM EST -----  Let Tracy Can know the chlamydia and gonorrhea test was negative.

## 2021-02-02 ENCOUNTER — MYC MEDICAL ADVICE (OUTPATIENT)
Dept: FAMILY MEDICINE | Facility: OTHER | Age: 31
End: 2021-02-02

## 2021-02-02 DIAGNOSIS — F90.0 ADHD (ATTENTION DEFICIT HYPERACTIVITY DISORDER), INATTENTIVE TYPE: Primary | ICD-10-CM

## 2021-02-02 DIAGNOSIS — K58.9 IRRITABLE BOWEL SYNDROME, UNSPECIFIED TYPE: ICD-10-CM

## 2021-02-02 RX ORDER — DEXTROAMPHETAMINE SACCHARATE, AMPHETAMINE ASPARTATE MONOHYDRATE, DEXTROAMPHETAMINE SULFATE AND AMPHETAMINE SULFATE 5; 5; 5; 5 MG/1; MG/1; MG/1; MG/1
20 CAPSULE, EXTENDED RELEASE ORAL DAILY
Qty: 30 CAPSULE | Refills: 0 | Status: SHIPPED | OUTPATIENT
Start: 2021-02-02 | End: 2021-03-04

## 2021-02-02 NOTE — TELEPHONE ENCOUNTER
Called pharmacy and cancelled the XR 30 mg doses and confirmed he will be on 20  Mg XR and then taking 10 mg with use of the 5 mg tablets he has already gotten.     Nikki Benitez PA-C

## 2021-02-10 ENCOUNTER — MYC MEDICAL ADVICE (OUTPATIENT)
Dept: FAMILY MEDICINE | Facility: OTHER | Age: 31
End: 2021-02-10

## 2021-02-10 DIAGNOSIS — F90.0 ADHD (ATTENTION DEFICIT HYPERACTIVITY DISORDER), INATTENTIVE TYPE: ICD-10-CM

## 2021-02-10 RX ORDER — DEXTROAMPHETAMINE SACCHARATE, AMPHETAMINE ASPARTATE, DEXTROAMPHETAMINE SULFATE AND AMPHETAMINE SULFATE 2.5; 2.5; 2.5; 2.5 MG/1; MG/1; MG/1; MG/1
10 TABLET ORAL DAILY
Qty: 30 TABLET | Refills: 0 | Status: SHIPPED | OUTPATIENT
Start: 2021-03-13 | End: 2021-04-12

## 2021-02-10 RX ORDER — DEXTROAMPHETAMINE SACCHARATE, AMPHETAMINE ASPARTATE, DEXTROAMPHETAMINE SULFATE AND AMPHETAMINE SULFATE 1.25; 1.25; 1.25; 1.25 MG/1; MG/1; MG/1; MG/1
5 TABLET ORAL DAILY
Qty: 30 TABLET | Refills: 0 | Status: CANCELLED | OUTPATIENT
Start: 2021-02-10

## 2021-02-10 RX ORDER — DEXTROAMPHETAMINE SACCHARATE, AMPHETAMINE ASPARTATE, DEXTROAMPHETAMINE SULFATE AND AMPHETAMINE SULFATE 2.5; 2.5; 2.5; 2.5 MG/1; MG/1; MG/1; MG/1
10 TABLET ORAL DAILY
Qty: 30 TABLET | Refills: 0 | Status: SHIPPED | OUTPATIENT
Start: 2021-04-13 | End: 2021-04-27

## 2021-02-10 RX ORDER — DEXTROAMPHETAMINE SACCHARATE, AMPHETAMINE ASPARTATE, DEXTROAMPHETAMINE SULFATE AND AMPHETAMINE SULFATE 2.5; 2.5; 2.5; 2.5 MG/1; MG/1; MG/1; MG/1
10 TABLET ORAL DAILY
Qty: 30 TABLET | Refills: 0 | Status: SHIPPED | OUTPATIENT
Start: 2021-02-10 | End: 2021-03-12

## 2021-02-11 ENCOUNTER — APPOINTMENT (OUTPATIENT)
Dept: GENERAL RADIOLOGY | Age: 31
End: 2021-02-11
Payer: MEDICAID

## 2021-02-11 ENCOUNTER — HOSPITAL ENCOUNTER (EMERGENCY)
Age: 31
Discharge: HOME | End: 2021-02-12
Attending: EMERGENCY MEDICINE
Payer: MEDICAID

## 2021-02-11 DIAGNOSIS — R10.10 PAIN OF UPPER ABDOMEN: ICD-10-CM

## 2021-02-11 DIAGNOSIS — R19.7 NAUSEA VOMITING AND DIARRHEA: Primary | ICD-10-CM

## 2021-02-11 DIAGNOSIS — R11.2 NAUSEA VOMITING AND DIARRHEA: Primary | ICD-10-CM

## 2021-02-11 LAB
ALBUMIN SERPL-MCNC: 4.6 G/DL (ref 3.5–5.1)
ALP BLD-CCNC: 236 U/L (ref 38–126)
ALT SERPL-CCNC: 80 U/L (ref 11–66)
ANION GAP SERPL CALCULATED.3IONS-SCNC: 15 MEQ/L (ref 8–16)
AST SERPL-CCNC: 89 U/L (ref 5–40)
BASOPHILS # BLD: 0.5 %
BASOPHILS ABSOLUTE: 0.1 THOU/MM3 (ref 0–0.1)
BILIRUB SERPL-MCNC: 0.4 MG/DL (ref 0.3–1.2)
BILIRUBIN DIRECT: < 0.2 MG/DL (ref 0–0.3)
BUN BLDV-MCNC: 6 MG/DL (ref 7–22)
CALCIUM SERPL-MCNC: 9.4 MG/DL (ref 8.5–10.5)
CHLORIDE BLD-SCNC: 100 MEQ/L (ref 98–111)
CO2: 26 MEQ/L (ref 23–33)
CREAT SERPL-MCNC: 0.6 MG/DL (ref 0.4–1.2)
EOSINOPHIL # BLD: 0 %
EOSINOPHILS ABSOLUTE: 0 THOU/MM3 (ref 0–0.4)
ERYTHROCYTE [DISTWIDTH] IN BLOOD BY AUTOMATED COUNT: 13.7 % (ref 11.5–14.5)
ERYTHROCYTE [DISTWIDTH] IN BLOOD BY AUTOMATED COUNT: 48.1 FL (ref 35–45)
GFR SERPL CREATININE-BSD FRML MDRD: > 90 ML/MIN/1.73M2
GLUCOSE BLD-MCNC: 130 MG/DL (ref 70–108)
HCT VFR BLD CALC: 52.1 % (ref 42–52)
HEMOGLOBIN: 17.8 GM/DL (ref 14–18)
IMMATURE GRANS (ABS): 0.08 THOU/MM3 (ref 0–0.07)
IMMATURE GRANULOCYTES: 0.4 %
LIPASE: 20.3 U/L (ref 5.6–51.3)
LYMPHOCYTES # BLD: 5.5 %
LYMPHOCYTES ABSOLUTE: 1 THOU/MM3 (ref 1–4.8)
MCH RBC QN AUTO: 32.5 PG (ref 26–33)
MCHC RBC AUTO-ENTMCNC: 34.2 GM/DL (ref 32.2–35.5)
MCV RBC AUTO: 95.1 FL (ref 80–94)
MONOCYTES # BLD: 3.7 %
MONOCYTES ABSOLUTE: 0.7 THOU/MM3 (ref 0.4–1.3)
NUCLEATED RED BLOOD CELLS: 0 /100 WBC
OSMOLALITY CALCULATION: 280.6 MOSMOL/KG (ref 275–300)
PLATELET # BLD: 317 THOU/MM3 (ref 130–400)
PMV BLD AUTO: 8.7 FL (ref 9.4–12.4)
POTASSIUM SERPL-SCNC: 4.2 MEQ/L (ref 3.5–5.2)
RBC # BLD: 5.48 MILL/MM3 (ref 4.7–6.1)
SEG NEUTROPHILS: 89.9 %
SEGMENTED NEUTROPHILS ABSOLUTE COUNT: 16.8 THOU/MM3 (ref 1.8–7.7)
SODIUM BLD-SCNC: 141 MEQ/L (ref 135–145)
TOTAL PROTEIN: 7.8 G/DL (ref 6.1–8)
WBC # BLD: 18.7 THOU/MM3 (ref 4.8–10.8)

## 2021-02-11 PROCEDURE — 96374 THER/PROPH/DIAG INJ IV PUSH: CPT | Performed by: EMERGENCY MEDICINE

## 2021-02-11 PROCEDURE — 85025 COMPLETE CBC W/AUTO DIFF WBC: CPT

## 2021-02-11 PROCEDURE — 96361 HYDRATE IV INFUSION ADD-ON: CPT

## 2021-02-11 PROCEDURE — 6360000002 HC RX W HCPCS: Performed by: EMERGENCY MEDICINE

## 2021-02-11 PROCEDURE — 96375 TX/PRO/DX INJ NEW DRUG ADDON: CPT

## 2021-02-11 PROCEDURE — 96375 TX/PRO/DX INJ NEW DRUG ADDON: CPT | Performed by: EMERGENCY MEDICINE

## 2021-02-11 PROCEDURE — U0002 COVID-19 LAB TEST NON-CDC: HCPCS

## 2021-02-11 PROCEDURE — 99284 EMERGENCY DEPT VISIT MOD MDM: CPT

## 2021-02-11 PROCEDURE — 74022 RADEX COMPL AQT ABD SERIES: CPT

## 2021-02-11 PROCEDURE — 99284 EMERGENCY DEPT VISIT MOD MDM: CPT | Performed by: EMERGENCY MEDICINE

## 2021-02-11 PROCEDURE — 36415 COLL VENOUS BLD VENIPUNCTURE: CPT

## 2021-02-11 PROCEDURE — 82248 BILIRUBIN DIRECT: CPT

## 2021-02-11 PROCEDURE — 80053 COMPREHEN METABOLIC PANEL: CPT

## 2021-02-11 PROCEDURE — 83690 ASSAY OF LIPASE: CPT

## 2021-02-11 PROCEDURE — 96361 HYDRATE IV INFUSION ADD-ON: CPT | Performed by: EMERGENCY MEDICINE

## 2021-02-11 PROCEDURE — 2580000003 HC RX 258: Performed by: EMERGENCY MEDICINE

## 2021-02-11 PROCEDURE — 96374 THER/PROPH/DIAG INJ IV PUSH: CPT

## 2021-02-11 RX ORDER — SODIUM CHLORIDE, SODIUM LACTATE, POTASSIUM CHLORIDE, CALCIUM CHLORIDE 600; 310; 30; 20 MG/100ML; MG/100ML; MG/100ML; MG/100ML
1000 INJECTION, SOLUTION INTRAVENOUS ONCE
Status: COMPLETED | OUTPATIENT
Start: 2021-02-11 | End: 2021-02-12

## 2021-02-11 RX ORDER — ONDANSETRON 2 MG/ML
4 INJECTION INTRAMUSCULAR; INTRAVENOUS ONCE
Status: COMPLETED | OUTPATIENT
Start: 2021-02-11 | End: 2021-02-11

## 2021-02-11 RX ORDER — KETOROLAC TROMETHAMINE 30 MG/ML
15 INJECTION, SOLUTION INTRAMUSCULAR; INTRAVENOUS ONCE
Status: COMPLETED | OUTPATIENT
Start: 2021-02-11 | End: 2021-02-11

## 2021-02-11 RX ADMIN — ONDANSETRON 4 MG: 2 INJECTION INTRAMUSCULAR; INTRAVENOUS at 23:21

## 2021-02-11 RX ADMIN — KETOROLAC TROMETHAMINE 15 MG: 30 INJECTION, SOLUTION INTRAMUSCULAR; INTRAVENOUS at 23:21

## 2021-02-11 RX ADMIN — SODIUM CHLORIDE, POTASSIUM CHLORIDE, SODIUM LACTATE AND CALCIUM CHLORIDE 1000 ML: 600; 310; 30; 20 INJECTION, SOLUTION INTRAVENOUS at 23:21

## 2021-02-11 ASSESSMENT — PAIN SCALES - GENERAL
PAINLEVEL_OUTOF10: 5
PAINLEVEL_OUTOF10: 7

## 2021-02-11 ASSESSMENT — PAIN DESCRIPTION - PAIN TYPE: TYPE: ACUTE PAIN

## 2021-02-12 VITALS
HEIGHT: 67 IN | DIASTOLIC BLOOD PRESSURE: 80 MMHG | OXYGEN SATURATION: 97 % | SYSTOLIC BLOOD PRESSURE: 117 MMHG | WEIGHT: 130 LBS | RESPIRATION RATE: 16 BRPM | HEART RATE: 103 BPM | BODY MASS INDEX: 20.4 KG/M2 | TEMPERATURE: 98 F

## 2021-02-12 LAB — SARS-COV-2, NAAT: NOT DETECTED

## 2021-02-12 PROCEDURE — 96375 TX/PRO/DX INJ NEW DRUG ADDON: CPT

## 2021-02-12 PROCEDURE — 96375 TX/PRO/DX INJ NEW DRUG ADDON: CPT | Performed by: EMERGENCY MEDICINE

## 2021-02-12 PROCEDURE — 6360000002 HC RX W HCPCS: Performed by: EMERGENCY MEDICINE

## 2021-02-12 RX ORDER — METOCLOPRAMIDE HYDROCHLORIDE 5 MG/ML
10 INJECTION INTRAMUSCULAR; INTRAVENOUS ONCE
Status: COMPLETED | OUTPATIENT
Start: 2021-02-12 | End: 2021-02-12

## 2021-02-12 RX ORDER — ONDANSETRON 4 MG/1
4 TABLET, ORALLY DISINTEGRATING ORAL EVERY 8 HOURS PRN
Qty: 8 TABLET | Refills: 0 | Status: SHIPPED | OUTPATIENT
Start: 2021-02-12 | End: 2021-04-01 | Stop reason: SDUPTHER

## 2021-02-12 RX ORDER — DIPHENHYDRAMINE HYDROCHLORIDE 50 MG/ML
25 INJECTION INTRAMUSCULAR; INTRAVENOUS ONCE
Status: COMPLETED | OUTPATIENT
Start: 2021-02-12 | End: 2021-02-12

## 2021-02-12 RX ADMIN — DIPHENHYDRAMINE HYDROCHLORIDE 25 MG: 50 INJECTION INTRAMUSCULAR; INTRAVENOUS at 00:38

## 2021-02-12 RX ADMIN — METOCLOPRAMIDE 10 MG: 5 INJECTION, SOLUTION INTRAMUSCULAR; INTRAVENOUS at 00:38

## 2021-02-12 ASSESSMENT — ENCOUNTER SYMPTOMS
COUGH: 0
NAUSEA: 1
DIARRHEA: 1
ABDOMINAL PAIN: 1
VOMITING: 1
SHORTNESS OF BREATH: 0
RHINORRHEA: 0

## 2021-02-12 NOTE — ED PROVIDER NOTES
[facility] ENCOUNTER          Pt Name: Laurena Litten  MRN: [id number]  [place] 1990  Date of evaluation: 2/11/2021  Emergency Physician: Elodia Gandara, [facility]       Chief Complaint   Patient presents with    Emesis     History obtained from the patient. HISTORY OF PRESENT ILLNESS    HPI  Laurena Litten is a 26 y.o. male who presents to the emergency department for evaluation of nausea and vomiting. Over the last 8 to 10 hours patient has experienced nausea and vomiting. He reports nonbilious nonbloody emesis he reports 10-12 times. Associated abdominal cramping rated at 5 out of 10. Loose stool x1 without blood. He reports chills no fever no cough no congestion. No chest pain no shortness of breath  The patient has no other acute complaints at this time. REVIEW OF SYSTEMS   Review of Systems   Constitutional: Positive for chills. Negative for fever. HENT: Negative for congestion and rhinorrhea. Respiratory: Negative for cough and shortness of breath. Cardiovascular: Negative for chest pain. Gastrointestinal: Positive for abdominal pain, diarrhea, nausea and vomiting. Genitourinary: Negative for dysuria. Skin: Negative for rash. Hematological: Negative for adenopathy. Does not bruise/bleed easily.          PAST MEDICAL AND SURGICAL HISTORY     Past Medical History:   Diagnosis Date    Closed fracture of one rib of left side with routine healing 12/17/2020    Hypertension     Liver laceration, closed, initial encounter 12/17/2020    Multiple closed fractures of pelvis with stable disruption of pelvic ring with routine healing 12/17/2020    Pneumothorax, traumatic     Seizures (HCC)      Past Surgical History:   Procedure Laterality Date    CT GUIDED CHEST TUBE  12/14/2020    CT GUIDED CHEST TUBE 12/14/2020 [facility] CT SCAN    WISDOM TOOTH EXTRACTION           MEDICATIONS     Current Facility-Administered Medications:     lactated ringers infusion 1,000 mL, 1,000 mL, Intravenous, Once, Claire Bergeron DO, Last Rate: 1,000 mL/hr at 02/11/21 2321, 1,000 mL at 02/11/21 2321    Current Outpatient Medications:     gabapentin (NEURONTIN) 600 MG tablet, Take 1 tablet by mouth 3 times daily for 180 days. , Disp: 270 tablet, Rfl: 1    carBAMazepine (TEGRETOL) 200 MG tablet, Take 1 tablet by mouth 2 times daily, Disp: 60 tablet, Rfl: 1    acetaminophen (TYLENOL) 325 MG tablet, Take 2 tablets by mouth every 4 hours as needed for Pain, Disp: , Rfl:     ibuprofen (ADVIL;MOTRIN) 400 MG tablet, Take 1 tablet by mouth every 8 hours as needed for Pain, Disp: , Rfl:     docusate sodium (COLACE, DULCOLAX) 100 MG CAPS, Take 100 mg by mouth 2 times daily, Disp: 60 capsule, Rfl: 0    senna (SENOKOT) 8.6 MG tablet, Take 2 tablets by mouth nightly, Disp: 60 tablet, Rfl: 0    nicotine (NICODERM CQ) 14 MG/24HR, Place 1 patch onto the skin every 24 hours, Disp: 30 patch, Rfl: 0    omeprazole (PRILOSEC) 20 MG delayed release capsule, Take 1 capsule by mouth every morning (before breakfast), Disp: 30 capsule, Rfl: 0    sucralfate (CARAFATE) 1 GM tablet, Take 1 tablet by mouth 2 times daily, Disp: 120 tablet, Rfl: 3      SOCIAL HISTORY     Social History     Social History Narrative    Not on file     Social History     Tobacco Use    Smoking status: Current Every Day Smoker     Packs/day: 0.50     Years: 10.00     Pack years: 5.00     Types: Cigarettes     Start date: 12/29/2010    Smokeless tobacco: Never Used   Substance Use Topics    Alcohol use:  Yes     Alcohol/week: 1.0 standard drinks     Types: 1 Cans of beer per week    Drug use: Not Currently     Types: Opiates          ALLERGIES     Allergies   Allergen Reactions    Dilantin [Phenytoin Sodium Extended] Hives         FAMILY HISTORY     Family History   Problem Relation Age of Onset    No Known Problems Mother     Other Father         liver dx PHYSICAL EXAM     ED Triage Vitals   BP Temp Temp src Pulse Resp SpO2 Height Weight   02/11/21 2133 02/11/21 2132 -- 02/11/21 2132 02/11/21 2132 02/11/21 2132 02/11/21 2132 02/11/21 2132   136/86 98 °F (36.7 °C)  98 19 98 % 5' 7\" (1.702 m) 130 lb (59 kg)         Additional Vital Signs:  Vitals:    02/11/21 2239   BP: (!) 140/85   Pulse: 99   Resp: 19   Temp:    SpO2: 98%       Physical Exam  Vitals signs and nursing note reviewed. Constitutional:       General: He is not in acute distress. Appearance: He is well-developed. He is not diaphoretic. HENT:      Head: Normocephalic. Eyes:      Pupils: Pupils are equal, round, and reactive to light. Neck:      Musculoskeletal: Normal range of motion and neck supple. Vascular: No JVD. Cardiovascular:      Rate and Rhythm: Normal rate and regular rhythm. Heart sounds: Normal heart sounds. Pulmonary:      Effort: Pulmonary effort is normal.      Breath sounds: Normal breath sounds. Abdominal:      General: Bowel sounds are normal. There is no distension. Palpations: Abdomen is soft. Tenderness: There is no abdominal tenderness. There is no guarding or rebound. Musculoskeletal: Normal range of motion. General: No tenderness or deformity. Skin:     General: Skin is warm and dry. Capillary Refill: Capillary refill takes less than 2 seconds. Neurological:      General: No focal deficit present. Mental Status: He is alert and oriented to person, place, and time. Comments: Non-focal. Moves all extremities. Initial vital signs and nursing assessment reviewed and normal.   Pulsoximetry is normal per my interpretation.     MEDICAL DECISION MAKING   Initial Assessment: Given the patient's above chief complaint and findings on history and physical examination, I thought it was appropriate to consider the following emergency medical conditions: Dyspepsia, gastritis, biliary colic, cholecystitis, appendicitis, gastroenteritis although some of these diagnoses are unlikely they were considered in my medical decision making. Plan: CBC, BMP, lipase, LFTs, acute abdominal series symptomatic treatment with Toradol Zofran IV hydration and reassess         ED RESULTS   Laboratory results:  Labs Reviewed   CBC WITH AUTO DIFFERENTIAL - Abnormal; Notable for the following components:       Result Value    WBC 18.7 (*)     Hematocrit 52.1 (*)     MCV 95.1 (*)     RDW-SD 48.1 (*)     MPV 8.7 (*)     Segs Absolute 16.8 (*)     Immature Grans (Abs) 0.08 (*)     All other components within normal limits   BASIC METABOLIC PANEL - Abnormal; Notable for the following components:    Glucose 130 (*)     BUN 6 (*)     All other components within normal limits   HEPATIC FUNCTION PANEL - Abnormal; Notable for the following components:    Alkaline Phosphatase 236 (*)     AST 89 (*)     ALT 80 (*)     All other components within normal limits   LIPASE   COVID-19   ANION GAP   GLOMERULAR FILTRATION RATE, ESTIMATED   OSMOLALITY   URINE RT REFLEX TO CULTURE       Radiologic studies results:  XR ACUTE ABD SERIES CHEST 1 VW   Final Result   1. Moderate stool quantity with probable ileus. No obstruction. 2. Normal chest.      This document has been electronically signed by: Governor MD Cleopatra on 02/11/2021 11:59 PM          ED Medications administered this visit:   Medications   lactated ringers infusion 1,000 mL (1,000 mLs Intravenous New Bag 2/11/21 2321)   metoclopramide (REGLAN) injection 10 mg (has no administration in time range)   diphenhydrAMINE (BENADRYL) injection 25 mg (has no administration in time range)   ondansetron (ZOFRAN) injection 4 mg (4 mg Intravenous Given 2/11/21 2321)   ketorolac (TORADOL) injection 15 mg (15 mg Intravenous Given 2/11/21 2321)         ED COURSE     ED Course as of Feb 12 0104   Thu Feb 11, 2021   2252 Hemo-concentrated.  Likely dehdyration   WBC(!): 18.7 [DD]   Fri Feb 12, 2021 0003 Stool possible ileus. Added Reglan   XR ACUTE ABD SERIES CHEST 1 VW [DD]   0014 Bedside ultrasound does not show any pericholecystic fluid or gallbladder wall thickening. Alk Phos(!): 236 [DD]   0016 Patient feeling much better after ED medications. Stable for discharge. [DD]      ED Course User Index  [DD] Kasi Jason, DO     The patient presents with abdominal pain. The patient is feeling better with a benign repeat examination. There is no evidence of an acute abdomen at this time. Based on history, physical exam, risk factors, and tests,  my suspicion for bowel obstruction, acute pancreatitis, abscess, perforated viscous, diverticulitis, cholecystis, appendicitis is very low and I feel the patient can be managed as an outpatient with follow up. I also see no evidence of aortic dissection, AAA, or Acute Coronary Syndrome. Instructions have been given for the patient to return to the ED for worsening of the pain, high fevers, intractable vomiting, or bleeding. Strict follow up and return precautions have been discussed. MEDICATION CHANGES     DISCHARGE MEDICATIONS:  New Prescriptions    No medications on file            FINAL DISPOSITION     Final diagnoses:   Nausea vomiting and diarrhea   Pain of upper abdomen     Condition: condition: good  Dispo: Discharge to home    PATIENT REFERRED TO:  Brandon Hopper, APRN - CNP  [address] [place] [address]   [phone]    Schedule an appointment as soon as possible for a visit in 3 days        Critical Care Time   None      This transcription was electronically signed. Parts of this transcriptions may have been dictated by use of voice recognition software and electronically transcribed, and parts may have been transcribed with the assistance of an ED scribe. The transcription may contain errors not detected in proofreading.     Electronically Signed: Kasi Jason, 02/11/21, 11:58 PM     Kasi Jason, DO  02/12/21 9881

## 2021-02-12 NOTE — ED NOTES
Upon first contact with patient this RN receives bedside shift report from [employer]. Pt resting on cot with unlabored respirations.         Ciarra STOVER RN  02/11/21 2677

## 2021-02-12 NOTE — ED NOTES
Pt medicated per MAR. Pt tolerated well. Pt respirations unlabored.       Alban NIÑO RN  02/11/21 9533

## 2021-02-12 NOTE — ED NOTES
IV access maintained via US. No significant changes with pt.  Dr Khari Vital in for assessment      Miguel Brennan RN  02/11/21 5583

## 2021-02-13 ENCOUNTER — HOSPITAL ENCOUNTER (EMERGENCY)
Age: 31
Discharge: HOME | End: 2021-02-13
Attending: FAMILY MEDICINE
Payer: MEDICAID

## 2021-02-13 ENCOUNTER — APPOINTMENT (OUTPATIENT)
Dept: GENERAL RADIOLOGY | Age: 31
End: 2021-02-13
Payer: MEDICAID

## 2021-02-13 VITALS
DIASTOLIC BLOOD PRESSURE: 82 MMHG | TEMPERATURE: 99 F | OXYGEN SATURATION: 97 % | HEART RATE: 74 BPM | HEIGHT: 67 IN | WEIGHT: 130 LBS | SYSTOLIC BLOOD PRESSURE: 115 MMHG | RESPIRATION RATE: 18 BRPM | BODY MASS INDEX: 20.4 KG/M2

## 2021-02-13 DIAGNOSIS — R11.2 NON-INTRACTABLE VOMITING WITH NAUSEA, UNSPECIFIED VOMITING TYPE: Primary | ICD-10-CM

## 2021-02-13 LAB
BASOPHILS # BLD: 0.3 %
BASOPHILS ABSOLUTE: 0 THOU/MM3 (ref 0–0.1)
EKG ATRIAL RATE: 73 BPM
EKG P AXIS: 58 DEGREES
EKG P-R INTERVAL: 112 MS
EKG Q-T INTERVAL: 410 MS
EKG QRS DURATION: 86 MS
EKG QTC CALCULATION (BAZETT): 451 MS
EKG R AXIS: 110 DEGREES
EKG T AXIS: 34 DEGREES
EKG VENTRICULAR RATE: 73 BPM
EOSINOPHIL # BLD: 0 %
EOSINOPHILS ABSOLUTE: 0 THOU/MM3 (ref 0–0.4)
ERYTHROCYTE [DISTWIDTH] IN BLOOD BY AUTOMATED COUNT: 13.4 % (ref 11.5–14.5)
ERYTHROCYTE [DISTWIDTH] IN BLOOD BY AUTOMATED COUNT: 47.8 FL (ref 35–45)
HCT VFR BLD CALC: 49.1 % (ref 42–52)
HEMOGLOBIN: 16.5 GM/DL (ref 14–18)
IMMATURE GRANS (ABS): 0.06 THOU/MM3 (ref 0–0.07)
IMMATURE GRANULOCYTES: 0.7 %
LYMPHOCYTES # BLD: 16.5 %
LYMPHOCYTES ABSOLUTE: 1.5 THOU/MM3 (ref 1–4.8)
MCH RBC QN AUTO: 32.1 PG (ref 26–33)
MCHC RBC AUTO-ENTMCNC: 33.6 GM/DL (ref 32.2–35.5)
MCV RBC AUTO: 95.5 FL (ref 80–94)
MONOCYTES # BLD: 6.2 %
MONOCYTES ABSOLUTE: 0.6 THOU/MM3 (ref 0.4–1.3)
NUCLEATED RED BLOOD CELLS: 0 /100 WBC
PLATELET # BLD: 301 THOU/MM3 (ref 130–400)
PMV BLD AUTO: 9 FL (ref 9.4–12.4)
RBC # BLD: 5.14 MILL/MM3 (ref 4.7–6.1)
SEG NEUTROPHILS: 76.3 %
SEGMENTED NEUTROPHILS ABSOLUTE COUNT: 7 THOU/MM3 (ref 1.8–7.7)
WBC # BLD: 9.2 THOU/MM3 (ref 4.8–10.8)

## 2021-02-13 PROCEDURE — 6360000002 HC RX W HCPCS: Performed by: STUDENT IN AN ORGANIZED HEALTH CARE EDUCATION/TRAINING PROGRAM

## 2021-02-13 PROCEDURE — 99284 EMERGENCY DEPT VISIT MOD MDM: CPT

## 2021-02-13 PROCEDURE — 85025 COMPLETE CBC W/AUTO DIFF WBC: CPT

## 2021-02-13 PROCEDURE — 74022 RADEX COMPL AQT ABD SERIES: CPT

## 2021-02-13 PROCEDURE — 93005 ELECTROCARDIOGRAM TRACING: CPT | Performed by: STUDENT IN AN ORGANIZED HEALTH CARE EDUCATION/TRAINING PROGRAM

## 2021-02-13 PROCEDURE — 96372 THER/PROPH/DIAG INJ SC/IM: CPT

## 2021-02-13 PROCEDURE — 36415 COLL VENOUS BLD VENIPUNCTURE: CPT

## 2021-02-13 PROCEDURE — 6370000000 HC RX 637 (ALT 250 FOR IP)

## 2021-02-13 RX ORDER — 0.9 % SODIUM CHLORIDE 0.9 %
1000 INTRAVENOUS SOLUTION INTRAVENOUS ONCE
Status: DISCONTINUED | OUTPATIENT
Start: 2021-02-13 | End: 2021-02-13 | Stop reason: HOSPADM

## 2021-02-13 RX ORDER — KETOROLAC TROMETHAMINE 30 MG/ML
15 INJECTION, SOLUTION INTRAMUSCULAR; INTRAVENOUS ONCE
Status: COMPLETED | OUTPATIENT
Start: 2021-02-13 | End: 2021-02-13

## 2021-02-13 RX ORDER — ONDANSETRON 4 MG/1
TABLET, ORALLY DISINTEGRATING ORAL
Status: COMPLETED
Start: 2021-02-13 | End: 2021-02-13

## 2021-02-13 RX ORDER — KETOROLAC TROMETHAMINE 30 MG/ML
15 INJECTION, SOLUTION INTRAMUSCULAR; INTRAVENOUS ONCE
Status: DISCONTINUED | OUTPATIENT
Start: 2021-02-13 | End: 2021-02-13

## 2021-02-13 RX ORDER — ONDANSETRON 4 MG/1
4 TABLET, ORALLY DISINTEGRATING ORAL ONCE
Status: COMPLETED | OUTPATIENT
Start: 2021-02-13 | End: 2021-02-13

## 2021-02-13 RX ORDER — ONDANSETRON 4 MG/1
4 TABLET, FILM COATED ORAL 3 TIMES DAILY PRN
Qty: 15 TABLET | Refills: 0 | Status: SHIPPED | OUTPATIENT
Start: 2021-02-13 | End: 2021-04-01 | Stop reason: SDUPTHER

## 2021-02-13 RX ORDER — ONDANSETRON 2 MG/ML
4 INJECTION INTRAMUSCULAR; INTRAVENOUS ONCE
Status: DISCONTINUED | OUTPATIENT
Start: 2021-02-13 | End: 2021-02-13

## 2021-02-13 RX ADMIN — ONDANSETRON 4 MG: 4 TABLET, ORALLY DISINTEGRATING ORAL at 08:37

## 2021-02-13 RX ADMIN — KETOROLAC TROMETHAMINE 15 MG: 30 INJECTION, SOLUTION INTRAMUSCULAR; INTRAVENOUS at 08:37

## 2021-02-13 ASSESSMENT — ENCOUNTER SYMPTOMS
ABDOMINAL PAIN: 1
CONSTIPATION: 0
VOMITING: 1
NAUSEA: 1
SINUS PAIN: 0
DIARRHEA: 0
COUGH: 0
BACK PAIN: 0
EYE PAIN: 0
SHORTNESS OF BREATH: 0

## 2021-02-13 ASSESSMENT — PAIN SCALES - GENERAL: PAINLEVEL_OUTOF10: 8

## 2021-02-13 ASSESSMENT — PAIN DESCRIPTION - DESCRIPTORS: DESCRIPTORS: ACHING

## 2021-02-13 NOTE — ED TRIAGE NOTES
Pt presents to the ED c/o vomiting over the last couple of days. Pt states that he has vomited about 6-8 times last night and is dry heaving only. Pt states that he possibly has an infected tooth in the back right upper side of his mouth. Pt states he believes that is possibly what is causing his nausea. Pt denies any blood or discoloration in his emesis, pt is alert and oriented, respirations are equal and unlabored.

## 2021-02-13 NOTE — ED NOTES
Dr iFsh Emery agreed to let lab attempt a foot draw due to inability to get labs or an [address], RN  02/13/21 3351

## 2021-02-13 NOTE — ED PROVIDER NOTES
[facility] ENCOUNTER          Pt Name: Dexter Maya  MRN: [id number]  [place] 1990  Date of evaluation: 2/13/2021  Treating Resident Physician: Gina Maya MD  Supervising Physician: Dr. Peggy Casillas MD    [address] [place] [address]       Chief Complaint   Patient presents with    Emesis     History obtained from the patient. HISTORY OF PRESENT ILLNESS    HPI  Dexter Maya is a 26 y.o. male who presents to the emergency department for evaluation of nausea and vomiting. Patient states he began having nausea and nonbilious nonbloody vomiting last night without any inciting event. Patient also mentions diffuse abdominal cramping pain when he vomits. Patient denies any radiation. Patient denies any alleviating or exacerbating factors. Patient was seen on February 11, 2021 for similar complaints in the ED and was treated symptomatically which he states made him feel significantly better. Patient denies any headache fever chills cough chest pain shortness of breath diarrhea constipation leg swelling. Denies any significant past medical history  Patient denies any medication  Patient admits to tobacco smoking but denies marijuana    Patient denies any new headache fever chills cough chest pain shortness of breath diarrhea constipation leg swelling. The patient has no other acute complaints at this time. REVIEW OF SYSTEMS   Review of Systems   Constitutional: Negative for chills and fever. HENT: Negative for ear pain and sinus pain. Eyes: Negative for pain. Respiratory: Negative for cough and shortness of breath. Cardiovascular: Negative for chest pain and leg swelling. Gastrointestinal: Positive for abdominal pain, nausea and vomiting. Negative for constipation and diarrhea. Genitourinary: Negative for dysuria. Musculoskeletal: Negative for back pain and neck pain. Skin: Negative for wound.    Neurological: Negative for headaches. Psychiatric/Behavioral: Negative for confusion. PAST MEDICAL AND SURGICAL HISTORY     Past Medical History:   Diagnosis Date    Closed fracture of one rib of left side with routine healing 12/17/2020    Hypertension     Liver laceration, closed, initial encounter 12/17/2020    Multiple closed fractures of pelvis with stable disruption of pelvic ring with routine healing 12/17/2020    Pneumothorax, traumatic     Seizures ([facility] [address]      Past Surgical History:   Procedure Laterality Date    CT GUIDED CHEST TUBE  12/14/2020    CT GUIDED CHEST TUBE 12/14/2020 [facility] CT SCAN    WISDOM TOOTH EXTRACTION           MEDICATIONS   No current facility-administered medications for this encounter. Current Outpatient Medications:     ondansetron (ZOFRAN) 4 MG tablet, Take 1 tablet by mouth 3 times daily as needed for Nausea or Vomiting, Disp: 15 tablet, Rfl: 0    ondansetron (ZOFRAN ODT) 4 MG disintegrating tablet, Take 1 tablet by mouth every 8 hours as needed for Nausea, Disp: 8 tablet, Rfl: 0    gabapentin (NEURONTIN) 600 MG tablet, Take 1 tablet by mouth 3 times daily for 180 days. , Disp: 270 tablet, Rfl: 1    carBAMazepine (TEGRETOL) 200 MG tablet, Take 1 tablet by mouth 2 times daily, Disp: 60 tablet, Rfl: 1    acetaminophen (TYLENOL) 325 MG tablet, Take 2 tablets by mouth every 4 hours as needed for Pain, Disp: , Rfl:     ibuprofen (ADVIL;MOTRIN) 400 MG tablet, Take 1 tablet by mouth every 8 hours as needed for Pain, Disp: , Rfl:     docusate sodium (COLACE, DULCOLAX) 100 MG CAPS, Take 100 mg by mouth 2 times daily, Disp: 60 capsule, Rfl: 0    senna (SENOKOT) 8.6 MG tablet, Take 2 tablets by mouth nightly, Disp: 60 tablet, Rfl: 0    nicotine (NICODERM CQ) 14 MG/24HR, Place 1 patch onto the skin every 24 hours, Disp: 30 patch, Rfl: 0    omeprazole (PRILOSEC) 20 MG delayed release capsule, Take 1 capsule by mouth every morning (before breakfast), Disp: 30 capsule, Rfl: 0    sucralfate (CARAFATE) 1 GM tablet, Take 1 tablet by mouth 2 times daily, Disp: 120 tablet, Rfl: 3      SOCIAL HISTORY     Social History     Social History Narrative    Not on file     Social History     Tobacco Use    Smoking status: Current Every Day Smoker     Packs/day: 0.50     Years: 10.00     Pack years: 5.00     Types: Cigarettes     Start date: 12/29/2010    Smokeless tobacco: Never Used   Substance Use Topics    Alcohol use: Yes     Alcohol/week: 1.0 standard drinks     Types: 1 Cans of beer per week    Drug use: Not Currently     Types: Opiates          ALLERGIES     Allergies   Allergen Reactions    Dilantin [Phenytoin Sodium Extended] Hives         FAMILY HISTORY     Family History   Problem Relation Age of Onset    No Known Problems Mother    [employer] Other Father         liver dx          PREVIOUS RECORDS   Previous records reviewed: Patient was seen on February 11, 2021 for similar complaints. PHYSICAL EXAM     ED Triage Vitals   BP Temp Temp src Pulse Resp SpO2 Height Weight   -- -- -- -- -- -- -- --     Initial vital signs and nursing assessment reviewed and vitals are/show: normal. Pulsoximetry is normal per my interpretation. Additional Vital Signs:  Vitals:    02/13/21 1020   BP: 115/82   Pulse: 74   Resp: 18   Temp:    SpO2: 97%       Physical Exam  Constitutional:       General: He is not in acute distress. Appearance: Normal appearance. He is normal weight. He is not ill-appearing, toxic-appearing or diaphoretic. HENT:      Head: Normocephalic and atraumatic. Right Ear: External ear normal.      Left Ear: External ear normal.   Eyes:      General: No scleral icterus. Right eye: No discharge. Left eye: No discharge. Neck:      Musculoskeletal: Normal range of motion and neck supple. Cardiovascular:      Rate and Rhythm: Normal rate and regular rhythm. Pulses: Normal pulses. Heart sounds: Normal heart sounds. No murmur. No friction rub. No gallop. Pulmonary:      Effort: Pulmonary effort is normal. No respiratory distress. Breath sounds: Normal breath sounds. No stridor. No wheezing, rhonchi or rales. Chest:      Chest wall: No tenderness. Abdominal:      General: Abdomen is flat. There is no distension. Palpations: Abdomen is soft. Tenderness: There is abdominal tenderness. There is no guarding or rebound. Comments: Diffuse tenderness to palpation   Musculoskeletal: Normal range of motion. Right lower leg: No edema. Left lower leg: No edema. Skin:     General: Skin is warm and dry. Coloration: Skin is not jaundiced. Neurological:      Mental Status: He is alert and oriented to person, place, and time. Mental status is at baseline. Psychiatric:         Mood and Affect: Mood normal.         Behavior: Behavior normal.         Thought Content: Thought content normal.         Judgment: Judgment normal.             MEDICAL DECISION MAKING   Initial Assessment:   Nausea and vomiting. Considerations include pancreatitis, dyspepsia, gastritis, appendicitis gastroenteritis cholecystitis biliary colic alcohol abuse cannabinoid hyperemesis syndrome      Plan:     Labs. Imaging: abd series  IV fluids  Antiemetic  Analgesia    Patient fused additional lab draws. Patient states he felt better after symptomatic treatment. Patient discharged with PCP follow-up and Zofran. ED RESULTS   Laboratory results:  Labs Reviewed   CBC WITH AUTO DIFFERENTIAL - Abnormal; Notable for the following components:       Result Value    MCV 95.5 (*)     RDW-SD 47.8 (*)     MPV 9.0 (*)     All other components within normal limits   COMPREHENSIVE METABOLIC PANEL W/ REFLEX TO MG FOR LOW K   LIPASE   URINE RT REFLEX TO CULTURE       Radiologic studies results:  XR ACUTE ABD SERIES CHEST 1 VW   Final Result   1. No acute cardiopulmonary process. 2. Nonspecific, nonobstructive bowel gas pattern.                 **This report has been

## 2021-02-14 PROCEDURE — 93010 ELECTROCARDIOGRAM REPORT: CPT | Performed by: INTERNAL MEDICINE

## 2021-02-17 ENCOUNTER — APPOINTMENT (OUTPATIENT)
Dept: GENERAL RADIOLOGY | Age: 31
End: 2021-02-17
Payer: MEDICAID

## 2021-02-17 ENCOUNTER — HOSPITAL ENCOUNTER (EMERGENCY)
Age: 31
Discharge: HOME | End: 2021-02-17
Payer: MEDICAID

## 2021-02-17 VITALS
OXYGEN SATURATION: 96 % | SYSTOLIC BLOOD PRESSURE: 119 MMHG | TEMPERATURE: 98.3 F | DIASTOLIC BLOOD PRESSURE: 86 MMHG | HEART RATE: 90 BPM | RESPIRATION RATE: 16 BRPM

## 2021-02-17 DIAGNOSIS — S61.219A LACERATION OF FINGER OF RIGHT HAND WITHOUT FOREIGN BODY WITHOUT DAMAGE TO NAIL, UNSPECIFIED FINGER, INITIAL ENCOUNTER: Primary | ICD-10-CM

## 2021-02-17 PROCEDURE — 12001 RPR S/N/AX/GEN/TRNK 2.5CM/<: CPT

## 2021-02-17 PROCEDURE — 99283 EMERGENCY DEPT VISIT LOW MDM: CPT

## 2021-02-17 PROCEDURE — 73130 X-RAY EXAM OF HAND: CPT

## 2021-02-17 RX ORDER — CEPHALEXIN 500 MG/1
500 CAPSULE ORAL 4 TIMES DAILY
Qty: 28 CAPSULE | Refills: 0 | Status: SHIPPED | OUTPATIENT
Start: 2021-02-17 | End: 2021-02-24

## 2021-02-17 RX ORDER — LIDOCAINE HYDROCHLORIDE 10 MG/ML
INJECTION, SOLUTION INFILTRATION; PERINEURAL
Status: DISCONTINUED
Start: 2021-02-17 | End: 2021-02-17 | Stop reason: HOSPADM

## 2021-02-17 ASSESSMENT — PAIN DESCRIPTION - ORIENTATION: ORIENTATION: RIGHT

## 2021-02-17 NOTE — ED TRIAGE NOTES
Pt presents to the ED with c/o RT hand injury approximately an hour ago. Pt states he dropped shingles on his RT hand. Laceration noted to RT 3rd finger. Minimal bleeding noted. Bilateral hands cleansed. Able to bend finger.

## 2021-02-18 ASSESSMENT — ENCOUNTER SYMPTOMS
CHEST TIGHTNESS: 0
RHINORRHEA: 0
SHORTNESS OF BREATH: 0
COUGH: 0
BACK PAIN: 0
NAUSEA: 0

## 2021-02-18 NOTE — ED PROVIDER NOTES
[facility] Emergency Department    CHIEF COMPLAINT       Chief Complaint   Patient presents with    Laceration       Nurses Notes reviewed and I agree except as noted in the HPI. HISTORY OF PRESENT ILLNESS    Grayson Lowe adelita 26 y.o. male who presents to the ED for evaluation of injury to the right hand. Patient dropped a thing of shingles on his arm. Causing lacerations to the second and third finger. Full range of motion. Sensation is intact. HPI was provided by the patient. REVIEW OF SYSTEMS     Review of Systems   Constitutional: Negative for chills, fatigue and fever. HENT: Negative for congestion, ear discharge, ear pain, postnasal drip and rhinorrhea. Respiratory: Negative for cough, chest tightness and shortness of breath. Cardiovascular: Negative for chest pain. Gastrointestinal: Negative for nausea. Genitourinary: Negative for difficulty urinating, dysuria, enuresis, flank pain and hematuria. Musculoskeletal: Negative for back pain and joint swelling. Skin: Positive for wound. Neurological: Negative for dizziness, light-headedness, numbness and headaches. Psychiatric/Behavioral: Negative for agitation, behavioral problems and confusion. All other systems negative except as noted. PAST MEDICAL HISTORY     Past Medical History:   Diagnosis Date    Closed fracture of one rib of left side with routine healing 12/17/2020    Hypertension     Liver laceration, closed, initial encounter 12/17/2020    Multiple closed fractures of pelvis with stable disruption of pelvic ring with routine healing 12/17/2020    Pneumothorax, traumatic     Seizures (HCC)        SURGICALHISTORY      has a past surgical history that includes Weed tooth extraction and CT GUIDED CHEST TUBE (12/14/2020).     CURRENT MEDICATIONS       Discharge Medication List as of 2/17/2021  8:26 PM      CONTINUE these medications which have NOT CHANGED    Details ondansetron (ZOFRAN) 4 MG tablet Take 1 tablet by mouth 3 times daily as needed for Nausea or Vomiting, Disp-15 tablet, R-0Normal      ondansetron (ZOFRAN ODT) 4 MG disintegrating tablet Take 1 tablet by mouth every 8 hours as needed for Nausea, Disp-8 tablet, R-0Normal      gabapentin (NEURONTIN) 600 MG tablet Take 1 tablet by mouth 3 times daily for 180 days. , Disp-270 tablet, R-1Normal      carBAMazepine (TEGRETOL) 200 MG tablet Take 1 tablet by mouth 2 times daily, Disp-60 tablet, R-1Normal      acetaminophen (TYLENOL) 325 MG tablet Take 2 tablets by mouth every 4 hours as needed for PainOTC      ibuprofen (ADVIL;MOTRIN) 400 MG tablet Take 1 tablet by mouth every 8 hours as needed for PainOTC      docusate sodium (COLACE, DULCOLAX) 100 MG CAPS Take 100 mg by mouth 2 times daily, Disp-60 capsule, R-0Normal      senna (SENOKOT) 8.6 MG tablet Take 2 tablets by mouth nightly, Disp-60 tablet, R-0Normal      nicotine (NICODERM CQ) 14 MG/24HR Place 1 patch onto the skin every 24 hours, Disp-30 patch, R-0Normal      omeprazole (PRILOSEC) 20 MG delayed release capsule Take 1 capsule by mouth every morning (before breakfast), Disp-30 capsule,R-0Print      sucralfate (CARAFATE) 1 GM tablet Take 1 tablet by mouth 2 times daily, Disp-120 tablet,R-3Print             ALLERGIES     is allergic to dilantin [phenytoin sodium extended]. FAMILY HISTORY     He indicated that his mother is . He indicated that his father is . family history includes No Known Problems in his mother; Other in his father.     SOCIAL HISTORY       Social History     Socioeconomic History    Marital status: Single     Spouse name: Not on file    Number of children: Not on file    Years of education: Not on file    Highest education level: Not on file   Occupational History    Not on file   Social Needs    Financial resource strain: Not on file    Food insecurity     Worry: Not on file     Inability: Not on file  Transportation needs     Medical: Not on file     Non-medical: Not on file   Tobacco Use    Smoking status: Current Every Day Smoker     Packs/day: 0.50     Years: 10.00     Pack years: 5.00     Types: Cigarettes     Start date: 12/29/2010    Smokeless tobacco: Never Used   Substance and Sexual Activity    Alcohol use: Yes     Alcohol/week: 1.0 standard drinks     Types: 1 Cans of beer per week    Drug use: Not Currently     Types: Opiates     Sexual activity: Yes   Lifestyle    Physical activity     Days per week: Not on file     Minutes per session: Not on file    Stress: Not on file   Relationships    Social connections     Talks on phone: Not on file     Gets together: Not on file     Attends Buddhism service: Not on file     Active member of club or organization: Not on file     Attends meetings of clubs or organizations: Not on file     Relationship status: Not on file    Intimate partner violence     Fear of current or ex partner: Not on file     Emotionally abused: Not on file     Physically abused: Not on file     Forced sexual activity: Not on file   Other Topics Concern    Not on file   Social History Narrative    Not on file       PHYSICAL EXAM     INITIAL VITALS:  oral temperature is 98.3 °F (36.8 °C). His blood pressure is 119/86 and his pulse is 90. His respiration is 16 and oxygen saturation is 96%. Physical Exam  Constitutional:       Appearance: He is well-developed. HENT:      Head: Normocephalic and atraumatic. Eyes:      Conjunctiva/sclera: Conjunctivae normal.   Cardiovascular:      Rate and Rhythm: Normal rate. Pulmonary:      Effort: Pulmonary effort is normal.   Abdominal:      Palpations: Abdomen is soft. Musculoskeletal: Normal range of motion. General: Signs of injury present. Skin:     Capillary Refill: Capillary refill takes less than 2 seconds. Coloration: Skin is jaundiced.    Neurological: Mental Status: He is alert and oriented to person, place, and time. Psychiatric:         Behavior: Behavior normal.                 DIFFERENTIAL DIAGNOSIS:   Laceration, fracture, foreign body    DIAGNOSTIC RESULTS     EKG: All EKG's are interpreted by the Emergency Department Physician who eithersigns or Co-signs this chart in the absence of a cardiologist.        RADIOLOGY: non-plainfilm images(s) such as CT, Ultrasound and MRI are read by the radiologist.  Plain radiographic images are visualized and preliminarily interpreted by the emergency physician unless otherwise stated below. XR HAND RIGHT (MIN 3 VIEWS)   Final Result         1. No radiopaque foreign body. 2. No fracture. 3. Soft tissue swelling over the dorsum of the hand. **This report has been created using voice recognition software. It may contain minor errors which are inherent in voice recognition technology. **      Final report electronically signed by DR Anna Zacarias on 2/17/2021 7:37 PM            LABS:   Labs Reviewed - No data to display    EMERGENCY DEPARTMENT COURSE:   Vitals:    Vitals:    02/17/21 1849 02/17/21 2008   BP: (!) 131/96 119/86   Pulse: 94 90   Resp: 16 16   Temp: 98.3 °F (36.8 °C)    TempSrc: Oral    SpO2: 99% 96%           [facility]                         MDM    Patient was seen evaluate the emergency room for laceration to the right hand. Appropriate labs and imaging are ordered reviewed. Photos of the wounds were uploaded. Patient is sutured per the procedure note. Tolerated well. Wound care is reviewed. Discharged home stable condition. Medications - No data to display      Patient was seen independently by myself. The patient's final impression and disposition and plan was determined by myself. Strict return precautions and follow up instructions were discussed with the patient prior to discharge, with which the patient agrees. Physical assessment findings, diagnostic testing(s) if applicable, and vital signs reviewed with patient/patient representative. Questions answered. Medications asdirected, including OTC medications for supportive care. Education provided on medications. Differential diagnosis(s) discussed with patient/patient representative. Home care/self care instructions reviewed withpatient/patient representative. Patient is to follow-up with family care provider in 2-3 days if no improvement. Patient is to go to the emergency department if symptoms worsen. Patient/patient representative isaware of care plan, questions answered, verbalizes understanding and is in agreement. CRITICAL CARE:   None    CONSULTS:  none    PROCEDURES:  Lac Repair    Date/Time: 2/18/2021 7:07 AM  Performed by: HALI Palm CNP  Authorized by: HALI Palm CNP     Consent:     Consent obtained:  Verbal    Consent given by:  Patient    Risks discussed:  Infection, pain, retained foreign body, tendon damage, vascular damage, poor wound healing, poor cosmetic result, need for additional repair and nerve damage    Alternatives discussed:  No treatment, delayed treatment, observation and referral  Anesthesia (see MAR for exact dosages):      Anesthesia method:  Local infiltration    Local anesthetic:  Lidocaine 1% w/o epi  Laceration details:     Location:  Hand    Hand location:  R palm    Length (cm):  2  Repair type:     Repair type:  Simple  Pre-procedure details:     Preparation:  Patient was prepped and draped in usual sterile fashion and imaging obtained to evaluate for foreign bodies  Exploration:     Hemostasis achieved with:  Direct pressure    Wound exploration: wound explored through full range of motion and entire depth of wound probed and visualized Wound extent: areolar tissue violated, fascia violated, foreign bodies/material, muscle damage, nerve damage, tendon damage, underlying fracture and vascular damage    Treatment:     Area cleansed with:  Hibiclens    Amount of cleaning:  Standard    Irrigation solution:  Sterile saline  Skin repair:     Repair method:  Sutures    Suture size:  6-0    Suture material:  Fast-absorbing gut    Suture technique:  Simple interrupted    Number of sutures:  4  Approximation:     Approximation:  Close  Post-procedure details:     Dressing:  Antibiotic ointment and splint for protection    Patient tolerance of procedure: Tolerated well, no immediate complications          FINAL IMPRESSION     1.  Laceration of finger of right hand without foreign body without damage to nail, unspecified finger, initial encounter          DISPOSITION/PLAN   DISPOSITION Decision To Discharge 02/17/2021 08:25:15 PM      PATIENT REFERREDTO:  HALI Crockett CNP  [address] [place]  [phone]    Schedule an appointment as soon as possible for a visit in 2 days  For follow up      DISCHARGE MEDICATIONS:  Discharge Medication List as of 2/17/2021  8:26 PM      START taking these medications    Details   cephALEXin (KEFLEX) 500 MG capsule Take 1 capsule by mouth 4 times daily for 7 days, Disp-28 capsule, R-0Normal             (Please note that portions of this note were completed with a voice recognition program.  Efforts were made to edit the dictations but occasionally words are mis-transcribed.)         HALI Bello CNP, APRN - CNP  02/18/21 5503

## 2021-02-18 NOTE — ED NOTES
Pt resting in bed with eye closed. Respirations even and unlabored. Fingers jaye taped together.       Yakov Esparza RN  02/17/21 2024

## 2021-03-10 ENCOUNTER — VIRTUAL VISIT (OUTPATIENT)
Dept: GASTROENTEROLOGY | Facility: CLINIC | Age: 31
End: 2021-03-10
Payer: COMMERCIAL

## 2021-03-10 DIAGNOSIS — K92.1 HEMATOCHEZIA: ICD-10-CM

## 2021-03-10 DIAGNOSIS — R19.7 DIARRHEA, UNSPECIFIED TYPE: ICD-10-CM

## 2021-03-10 DIAGNOSIS — R13.19 ESOPHAGEAL DYSPHAGIA: ICD-10-CM

## 2021-03-10 DIAGNOSIS — K58.9 IRRITABLE BOWEL SYNDROME, UNSPECIFIED TYPE: Primary | ICD-10-CM

## 2021-03-10 PROCEDURE — 99203 OFFICE O/P NEW LOW 30 MIN: CPT | Mod: 95 | Performed by: INTERNAL MEDICINE

## 2021-03-10 RX ORDER — DICYCLOMINE HYDROCHLORIDE 10 MG/1
10 CAPSULE ORAL
Qty: 60 CAPSULE | Refills: 3 | Status: SHIPPED | OUTPATIENT
Start: 2021-03-10 | End: 2021-04-27

## 2021-03-10 NOTE — PATIENT INSTRUCTIONS
Please have blood work done whenever is convenient for you.    Schedule an upper endoscopy and colonoscopy.  You will need a covid test prior to the procedure.

## 2021-03-10 NOTE — PROGRESS NOTES
HPI:    Chance  presents today for a video visit to discuss longstanding diarrhea and dysphagia.  Symptoms began when he was about 18 years old primarily with diarrhea and abdominal pain. Will have multiple BMs in the morning associated with abdominal cramping.  Also has problems with dysphagia to solids - only occurs with dry meats and vegetables. No nausea or vomiting.  Very rare heartburn.    Does have blood in the stool which he attributes to hemorrhoids but has never had this evaluated.  Will have episodes of a few weeks with persistent bleeding that then go away.    No family history of autoimmune disease or celiac disease that he is aware of.  Patient does report seasonal allergies.  Occasional NSAID use.    Occasional alcohol.  No tobacco.  Formerly used marijuana but quit a few years ago.    Past Medical History:   Diagnosis Date     Marijuana use 1/4/2019       Past Surgical History:   Procedure Laterality Date     ABDOMEN SURGERY  2013/2016    Hernia repair     ARTHROSCOPY SHOULDER SUPERIOR LABRUM ANTERIOR TO POSTERIOR REPAIR Left 2012     HERNIA REPAIR Left 2013    open     HERNIA REPAIR Right 2016    laparoscopic       Family History   Problem Relation Age of Onset     Mental Illness Brother      Mental Illness Brother      Cerebrovascular Disease Paternal Grandmother      Thyroid Disease Mother      Unknown/Adopted Mother        Social History     Tobacco Use     Smoking status: Never Smoker     Smokeless tobacco: Former User   Substance Use Topics     Alcohol use: Yes     Comment: rarely        O:    Gen: no acute distress  HEENT: NCAT  Neck: normal ROM  Resp: nonlabored breathing  Neuro: no gross deficits  Psych: appropriate mood and affect    Assessment and Plan:    # diarrhea - longstanding, likely functional but other etiologies should be evaluated for as well - will check blood work including CBC, CMP and CRP.  Will also check celiac serologies.  Given persistent bleeding, will also plan  for colonoscopy for further evaluation    # hematochezia - colonoscopy as above    # dysphagia - chronic - only to solids.  Previous EGD in 2016 unremarkable, however, no biopsies of the esophagus obtained.  Will plan for EGD for further evaluation    RTC after testing    Geneva Patel DO     Video-Visit Details     Type of service:  Video Visit     Video Start Time: 4:00 PM  Video End Time (time video stopped): 4:23 PM    Originating Location (pt. Location): Home     Distant Location (provider location):  [facility]      Mode of Communication:  Video Conference via [employer]

## 2021-03-10 NOTE — PROGRESS NOTES
Chance is a 30 year old who is being evaluated via a billable video visit.      How would you like to obtain your AVS? MyChart  If the video visit is dropped, the invitation should be resent by: Text to cell phone: [phone]  Will anyone else be joining your video visit? No      Video Start Time:   Video-Visit Details    Type of service:  Video Visit    Video End Time:    Originating Location (pt. Location):     Distant Location (provider location):  [facility]     Platform used for Video Visit:   Fredy Delacruz CMA

## 2021-03-12 ENCOUNTER — TELEPHONE (OUTPATIENT)
Dept: GASTROENTEROLOGY | Facility: CLINIC | Age: 31
End: 2021-03-12

## 2021-03-12 DIAGNOSIS — K92.1 HEMATOCHEZIA: Primary | ICD-10-CM

## 2021-03-12 DIAGNOSIS — K58.9 IRRITABLE BOWEL SYNDROME, UNSPECIFIED TYPE: ICD-10-CM

## 2021-03-12 NOTE — TELEPHONE ENCOUNTER
"Writer called patient to discuss the patient declining the EGD, per Dr Thornton.  Patient admits that he has had an EGD in the past and nothing was found. Writer asked the patient why he declined the recently ordered EGD as it could rule out EOS.    Patient stated \"That if there was a difference I would have felt symptoms and nothing has changed. I feel the same and am not coughing anything up nor have I had trouble swallowing or any types of discharge. I discussed it with my wife and she agreed that  there have been no noticeable changes with the patient, and we are concerned about finances and trying to stay within our budget.\"   Patient is willing to schedule  Colonoscopy.  Writer understood and will forward to Dr Thornton at her request.  Ferdy Delacruz, CMA    "

## 2021-03-17 ENCOUNTER — MYC MEDICAL ADVICE (OUTPATIENT)
Dept: FAMILY MEDICINE | Facility: OTHER | Age: 31
End: 2021-03-17

## 2021-03-17 DIAGNOSIS — F90.0 ADHD (ATTENTION DEFICIT HYPERACTIVITY DISORDER), INATTENTIVE TYPE: Primary | ICD-10-CM

## 2021-03-17 RX ORDER — DEXTROAMPHETAMINE SACCHARATE, AMPHETAMINE ASPARTATE MONOHYDRATE, DEXTROAMPHETAMINE SULFATE AND AMPHETAMINE SULFATE 5; 5; 5; 5 MG/1; MG/1; MG/1; MG/1
20 CAPSULE, EXTENDED RELEASE ORAL DAILY
Qty: 30 CAPSULE | Refills: 0 | Status: SHIPPED | OUTPATIENT
Start: 2021-05-18 | End: 2021-06-22

## 2021-03-17 RX ORDER — DEXTROAMPHETAMINE SACCHARATE, AMPHETAMINE ASPARTATE MONOHYDRATE, DEXTROAMPHETAMINE SULFATE AND AMPHETAMINE SULFATE 5; 5; 5; 5 MG/1; MG/1; MG/1; MG/1
20 CAPSULE, EXTENDED RELEASE ORAL DAILY
Qty: 30 CAPSULE | Refills: 0 | Status: SHIPPED | OUTPATIENT
Start: 2021-03-17 | End: 2021-11-01

## 2021-03-17 RX ORDER — DEXTROAMPHETAMINE SACCHARATE, AMPHETAMINE ASPARTATE MONOHYDRATE, DEXTROAMPHETAMINE SULFATE AND AMPHETAMINE SULFATE 5; 5; 5; 5 MG/1; MG/1; MG/1; MG/1
20 CAPSULE, EXTENDED RELEASE ORAL DAILY
Qty: 30 CAPSULE | Refills: 0 | Status: SHIPPED | OUTPATIENT
Start: 2021-04-17 | End: 2021-05-17

## 2021-04-01 ENCOUNTER — OFFICE VISIT (OUTPATIENT)
Dept: FAMILY MEDICINE CLINIC | Age: 31
End: 2021-04-01
Payer: MEDICAID

## 2021-04-01 VITALS
TEMPERATURE: 98.2 F | HEIGHT: 65 IN | SYSTOLIC BLOOD PRESSURE: 120 MMHG | OXYGEN SATURATION: 95 % | WEIGHT: 145.2 LBS | RESPIRATION RATE: 10 BRPM | HEART RATE: 96 BPM | DIASTOLIC BLOOD PRESSURE: 88 MMHG | BODY MASS INDEX: 24.19 KG/M2

## 2021-04-01 DIAGNOSIS — G89.29 CHRONIC BILATERAL LOW BACK PAIN, UNSPECIFIED WHETHER SCIATICA PRESENT: ICD-10-CM

## 2021-04-01 DIAGNOSIS — K21.9 GASTROESOPHAGEAL REFLUX DISEASE, UNSPECIFIED WHETHER ESOPHAGITIS PRESENT: Primary | ICD-10-CM

## 2021-04-01 DIAGNOSIS — M54.50 CHRONIC BILATERAL LOW BACK PAIN, UNSPECIFIED WHETHER SCIATICA PRESENT: ICD-10-CM

## 2021-04-01 DIAGNOSIS — G40.909 SEIZURE DISORDER (HCC): ICD-10-CM

## 2021-04-01 PROBLEM — S22.32XD CLOSED FRACTURE OF ONE RIB OF LEFT SIDE WITH ROUTINE HEALING: Status: RESOLVED | Noted: 2020-12-17 | Resolved: 2021-04-01

## 2021-04-01 PROBLEM — S37.019A: Status: RESOLVED | Noted: 2020-12-17 | Resolved: 2021-04-01

## 2021-04-01 PROBLEM — S36.113A LIVER LACERATION, CLOSED, INITIAL ENCOUNTER: Status: RESOLVED | Noted: 2020-12-17 | Resolved: 2021-04-01

## 2021-04-01 PROBLEM — S32.810D MULTIPLE CLOSED FRACTURES OF PELVIS WITH STABLE DISRUPTION OF PELVIC RING WITH ROUTINE HEALING: Status: RESOLVED | Noted: 2020-12-17 | Resolved: 2021-04-01

## 2021-04-01 PROCEDURE — 99214 OFFICE O/P EST MOD 30 MIN: CPT | Performed by: NURSE PRACTITIONER

## 2021-04-01 PROCEDURE — G8427 DOCREV CUR MEDS BY ELIG CLIN: HCPCS | Performed by: NURSE PRACTITIONER

## 2021-04-01 PROCEDURE — G8420 CALC BMI NORM PARAMETERS: HCPCS | Performed by: NURSE PRACTITIONER

## 2021-04-01 PROCEDURE — 4004F PT TOBACCO SCREEN RCVD TLK: CPT | Performed by: NURSE PRACTITIONER

## 2021-04-01 RX ORDER — NALOXONE HYDROCHLORIDE 4 MG/.1ML
SPRAY NASAL
COMMUNITY
Start: 2021-03-03

## 2021-04-01 RX ORDER — BUPRENORPHINE HYDROCHLORIDE AND NALOXONE HYDROCHLORIDE DIHYDRATE 2; .5 MG/1; MG/1
TABLET SUBLINGUAL
COMMUNITY
Start: 2021-03-24

## 2021-04-01 RX ORDER — OMEPRAZOLE 40 MG/1
40 CAPSULE, DELAYED RELEASE ORAL
Qty: 90 CAPSULE | Refills: 0 | Status: SHIPPED | OUTPATIENT
Start: 2021-04-01 | End: 2021-06-03 | Stop reason: SDUPTHER

## 2021-04-01 RX ORDER — SUCRALFATE 1 G/1
1 TABLET ORAL 4 TIMES DAILY
Qty: 360 TABLET | Refills: 0 | Status: SHIPPED | OUTPATIENT
Start: 2021-04-01 | End: 2021-10-12 | Stop reason: SDUPTHER

## 2021-04-01 RX ORDER — ACETAMINOPHEN AND CODEINE PHOSPHATE 300; 15 MG/1; MG/1
TABLET ORAL
COMMUNITY
Start: 2021-02-12 | End: 2021-06-03 | Stop reason: ALTCHOICE

## 2021-04-01 RX ORDER — GABAPENTIN 600 MG/1
600 TABLET ORAL 3 TIMES DAILY
Qty: 270 TABLET | Refills: 1 | Status: SHIPPED | OUTPATIENT
Start: 2021-04-01 | End: 2021-10-12 | Stop reason: SDUPTHER

## 2021-04-01 RX ORDER — ONDANSETRON 4 MG/1
4 TABLET, FILM COATED ORAL 3 TIMES DAILY PRN
Qty: 15 TABLET | Refills: 0 | Status: SHIPPED | OUTPATIENT
Start: 2021-04-01 | End: 2022-09-12 | Stop reason: SDUPTHER

## 2021-04-01 RX ORDER — CARBAMAZEPINE 200 MG/1
200 TABLET ORAL 2 TIMES DAILY
Qty: 60 TABLET | Refills: 2 | Status: SHIPPED | OUTPATIENT
Start: 2021-04-01 | End: 2021-06-03 | Stop reason: SDUPTHER

## 2021-04-01 RX ORDER — HYDROXYZINE 50 MG/1
TABLET, FILM COATED ORAL
COMMUNITY
Start: 2021-03-16 | End: 2022-07-22 | Stop reason: ALTCHOICE

## 2021-04-01 NOTE — PROGRESS NOTES
[facility] at / [address] [facility] [address] [place]  Chief Complaint   Patient presents with    Follow-up     3 month f/u chronic conditiond    Gastroesophageal Reflux     x 1 month        History obtained from chart review and the patient. SUBJECTIVE:  Luis Pugh is a 26 y.o. male that presents today for follow up chronic conditions    GERD  He is taking the Omeprazole 20 mg daily and he also reports that he has been taking the Carafate bid. He is having a lot of acid reflux, occasional vomiting of stomach. He is not using any NSAIDS. Seizure Disorder  He is taking the Tegretol for seizures, but he is not taking it consistently. He is taking it about 3 times/week. He has not received any phone call for appt for neurology. He hasn't had any seizures. He is doing well. Back Pain  Still taking Gabapentin for chronic back pain. He reports that his back pain is doing ok, hip pain is also doing well.     Needs refill of medications    Age/Gender Health Maintenance    Lipid - 40  DM Screen - 40  Colon Cancer Screening - 48  Lung Cancer Screening (Age 54 to [de-identified] with 30 pack year hx, current smoker or quit within past 15 years) - 54    Tetanus - needs  Influenza Vaccine - yearly  Pneumonia Vaccine - 65  Zostavax - 50   HPV Vaccine - n/a    PSA testing discussion - 52  AAA Screening - 63    Falls screening - n/a    Current Outpatient Medications   Medication Sig Dispense Refill    buprenorphine-naloxone (SUBOXONE) 2-0.5 MG SUBL place 1 tablet under the tongue and dissolve once daily      hydrOXYzine (ATARAX) 50 MG tablet TAKE 1 TABLET BY MOUTH TWICE DAILY AS NEEDED FOR ANXIETY      NARCAN 4 MG/0.1ML LIQD nasal spray       Acetaminophen-Codeine 300-15 MG TABS take 1 tablet by mouth every 6 hours if needed for pain      carBAMazepine (TEGRETOL) 200 MG tablet Take 1 tablet by mouth 2 times daily 60 tablet 2    omeprazole (PRILOSEC) 40 MG delayed release capsule Take 1 capsule by mouth every morning (before breakfast) 90 capsule 0    sucralfate (CARAFATE) 1 GM tablet Take 1 tablet by mouth 4 times daily 360 tablet 0    ondansetron (ZOFRAN) 4 MG tablet Take 1 tablet by mouth 3 times daily as needed for Nausea or Vomiting 15 tablet 0    gabapentin (NEURONTIN) 600 MG tablet Take 1 tablet by mouth 3 times daily for 180 days. 270 tablet 1    acetaminophen (TYLENOL) 325 MG tablet Take 2 tablets by mouth every 4 hours as needed for Pain      docusate sodium (COLACE, DULCOLAX) 100 MG CAPS Take 100 mg by mouth 2 times daily 60 capsule 0     No current facility-administered medications for this visit. Orders Placed This Encounter   Medications    carBAMazepine (TEGRETOL) 200 MG tablet     Sig: Take 1 tablet by mouth 2 times daily     Dispense:  60 tablet     Refill:  2    omeprazole (PRILOSEC) 40 MG delayed release capsule     Sig: Take 1 capsule by mouth every morning (before breakfast)     Dispense:  90 capsule     Refill:  0    sucralfate (CARAFATE) 1 GM tablet     Sig: Take 1 tablet by mouth 4 times daily     Dispense:  360 tablet     Refill:  0    ondansetron (ZOFRAN) 4 MG tablet     Sig: Take 1 tablet by mouth 3 times daily as needed for Nausea or Vomiting     Dispense:  15 tablet     Refill:  0    gabapentin (NEURONTIN) 600 MG tablet     Sig: Take 1 tablet by mouth 3 times daily for 180 days. Dispense:  270 tablet     Refill:  1         All medications reviewed and reconciled, including OTC and herbal medications. Updated list given to patient.        Patient Active Problem List   Diagnosis    Chronic bilateral low back pain    Seizure disorder ([facility] [address]    Cigarette nicotine dependence without complication    Gastroesophageal reflux disease       Past Medical History:   Diagnosis Date    Closed fracture of one rib of left side with routine healing 12/17/2020    Hypertension     Liver laceration, closed, initial encounter 12/17/2020    Multiple closed fractures of pelvis with stable disruption of pelvic ring with routine healing 12/17/2020    Pneumothorax, traumatic     Seizures (HCC)        Past Surgical History:   Procedure Laterality Date    CT GUIDED CHEST TUBE  12/14/2020    CT GUIDED CHEST TUBE 12/14/2020 [facility] CT SCAN    WISDOM TOOTH EXTRACTION         Allergies   Allergen Reactions    Dilantin [Phenytoin Sodium Extended] Hives       Social History     Socioeconomic History    Marital status: Single     Spouse name: Not on file    Number of children: Not on file    Years of education: Not on file    Highest education level: Not on file   Occupational History    Not on file   Social Needs    Financial resource strain: Not on file    Food insecurity     Worry: Not on file     Inability: Not on file    Transportation needs     Medical: Not on file     Non-medical: Not on file   Tobacco Use    Smoking status: Current Every Day Smoker     Packs/day: 0.50     Years: 10.00     Pack years: 5.00     Types: Cigarettes     Start date: 12/29/2010    Smokeless tobacco: Never Used   Substance and Sexual Activity    Alcohol use:  Yes     Alcohol/week: 1.0 standard drinks     Types: 1 Cans of beer per week    Drug use: Not Currently     Types: Opiates     Sexual activity: Yes   Lifestyle    Physical activity     Days per week: Not on file     Minutes per session: Not on file    Stress: Not on file   Relationships    Social connections     Talks on phone: Not on file     Gets together: Not on file     Attends Voodoo service: Not on file     Active member of club or organization: Not on file     Attends meetings of clubs or organizations: Not on file     Relationship status: Not on file    Intimate partner violence     Fear of current or ex partner: Not on file     Emotionally abused: Not on file     Physically abused: Not on file     Forced sexual activity: Not on file   Other Topics Concern    Not on file   Social History Narrative    Not on file       Family History Problem Relation Age of Onset    No Known Problems Mother     Other Father         liver dx          I have reviewed the patient's past medical history, past surgical history, allergies, medications, social and family history and I have made updates where appropriate. Review of Systems  Positive responses are highlighted in bold    Constitutional:  Fever, Chills, Night Sweats, Fatigue, Unexpected changes in weight  Eyes:  Eye discharge, Eye pain, Eye redness, Visual disturbances   HENT:  Ear pain, Tinnitus, Nosebleeds, Trouble swallowing, Hearing loss, Sore throat  Cardiovascular:  Chest Pain, Palpitations, Orthopnea, Paroxysmal Nocturnal Dyspnea  Respiratory:  Cough, Wheezing, Shortness of breath, Chest tightness, Apnea  Gastrointestinal:  Nausea, Vomiting, Diarrhea, Constipation, Heartburn, Blood in stool  Genitourinary:  Difficulty or painful urination, Flank pain, Change in frequency, Urgency  Skin:  Color change, Rash, Itching, Wound  Psychiatric:  Hallucinations, Anxiety, Depression, Suicidal ideation  Hematological:  Enlarged glands, Easy bleeding, Easily bruising  Musculoskeletal:  Joint pain, Back pain, Gait problems, Joint swelling, Myalgias  Neurological:  Dizziness, Headaches, Presyncope, Numbness, Seizures, Tremors  Allergy:  Environmental allergies, Food allergies  Endocrine:  Heat Intolerance, Cold Intolerance, Polydipsia, Polyphagia, Polyuria      PHYSICAL EXAM:  Vitals:    04/01/21 0951   BP: 120/88   Pulse: 96   Resp: 10   Temp: 98.2 °F (36.8 °C)   TempSrc: Oral   SpO2: 95%   Weight: 145 lb 3.2 oz (65.9 kg)   Height: 5' 5.35\" (1.66 m)     Body mass index is 23.9 kg/m².          VS Reviewed  General Appearance: A&O x 3, No acute distress,well developed and well- nourished  Head: normocephalic and atraumatic  Eyes: pupils equal, round, and reactive to light, extraocular eye movements intact, conjunctivae and eye lids without erythema  Neck: supple and non-tender without mass, no thyromegaly or thyroid nodules, no cervical lymphadenopathy  Pulmonary/Chest: clear to auscultation bilaterally- no wheezes, rales or rhonchi, normal air movement, no respiratory distress or retractions  Cardiovascular: S1 and S2 auscultated w/ RRR. No murmurs, rubs, clicks, or gallops, distal pulses intact. Abdomen: soft, non-tender, non-distended, bowl sounds physiologic,  no rebound or guarding, no masses or hernias noted. Liver and spleen without enlargement. Extremities: no cyanosis, clubbing or edema of the lower extremities  Musculoskeletal: No joint swelling or gross deformity   Neuro:  Alert, 5/5 strength globally and symmetrically  Psych: Affect appropriate. Mood normal. Thought process is normal without evidence of depression or psychosis. Good insight and appropriate interaction. Cognition and memory appear to be intact. Skin: warm and dry, no rash or erythema  Lymph:  No cervical, auricular or supraclavicular lymph nodes palpated    ASSESSMENT & PLAN  Kristy Velasquez was seen today for follow-up and gastroesophageal reflux. Diagnoses and all orders for this visit:    Gastroesophageal reflux disease, unspecified whether esophagitis present  -     omeprazole (PRILOSEC) 40 MG delayed release capsule; Take 1 capsule by mouth every morning (before breakfast)  -     sucralfate (CARAFATE) 1 GM tablet; Take 1 tablet by mouth 4 times daily  -     [facility]) - Rickey Duncan MD, Gastroenterology, [facility] [address]  -     ondansetron (ZOFRAN) 4 MG tablet; Take 1 tablet by mouth 3 times daily as needed for Nausea or Vomiting    Seizure disorder (HCC)  -     carBAMazepine (TEGRETOL) 200 MG tablet; Take 1 tablet by mouth 2 times daily  -     Marzette Boast, MD, Neurology, [facility] [address]    Chronic bilateral low back pain, unspecified whether sciatica present  -     gabapentin (NEURONTIN) 600 MG tablet; Take 1 tablet by mouth 3 times daily for 180 days.       - compliance is definitely in question  - stressed importance of compliance of Tegretol  - never followed up with neurology, will refer again back to neuro for further management of seizures  - increase Omeprazole to 40 mg and increase carafate to QID  - refer to GI, will likely need EGD  - con't Gabapentin    Controlled Substance Monitoring:    Acute and Chronic Pain Monitoring:   RX Monitoring 4/1/2021   Periodic Controlled Substance Monitoring No signs of potential drug abuse or diversion identified. DISPOSITION    Return in about 6 months (around 10/1/2021) for chronic conditions. Bailey Newby released without restrictions. PATIENT COUNSELING    Counseling was provided today regarding the following topics: Healthy eating habits, Regular exercise, substance abuse and healthy sleep habits. Bailey Newby received counseling on the following healthy behaviors: medication adherence and tobacco cessation    Patient given educational materials on: See Attached    I have instructed Bailey Newby to complete a self tracking handout onnone  and instructed them to bring it with them to his next appointment. Barriers to learning and self management: none    Discussed use, benefit, and side effects of prescribed medications. Barriers to medication compliance addressed. All patient questions answered. Pt voiced understanding.        Electronically signed by HALI Mcbride Junior, CNP on 4/1/2021 at 10:06 AM

## 2021-04-02 ENCOUNTER — HOSPITAL ENCOUNTER (OUTPATIENT)
Facility: AMBULATORY SURGERY CENTER | Age: 31
End: 2021-04-02
Attending: INTERNAL MEDICINE
Payer: COMMERCIAL

## 2021-04-02 DIAGNOSIS — Z12.11 SCREENING FOR COLON CANCER: Primary | ICD-10-CM

## 2021-04-13 ENCOUNTER — OFFICE VISIT (OUTPATIENT)
Dept: FAMILY MEDICINE CLINIC | Age: 31
End: 2021-04-13
Payer: MEDICAID

## 2021-04-13 VITALS
OXYGEN SATURATION: 100 % | HEART RATE: 106 BPM | SYSTOLIC BLOOD PRESSURE: 128 MMHG | WEIGHT: 153 LBS | BODY MASS INDEX: 24.01 KG/M2 | DIASTOLIC BLOOD PRESSURE: 78 MMHG | RESPIRATION RATE: 20 BRPM | HEIGHT: 67 IN | TEMPERATURE: 98.4 F

## 2021-04-13 DIAGNOSIS — R60.1 GENERALIZED EDEMA: Primary | ICD-10-CM

## 2021-04-13 DIAGNOSIS — R00.0 TACHYCARDIA: ICD-10-CM

## 2021-04-13 DIAGNOSIS — R74.8 ELEVATED LIVER ENZYMES: ICD-10-CM

## 2021-04-13 PROCEDURE — 99214 OFFICE O/P EST MOD 30 MIN: CPT | Performed by: NURSE PRACTITIONER

## 2021-04-13 PROCEDURE — G8420 CALC BMI NORM PARAMETERS: HCPCS | Performed by: NURSE PRACTITIONER

## 2021-04-13 PROCEDURE — 4004F PT TOBACCO SCREEN RCVD TLK: CPT | Performed by: NURSE PRACTITIONER

## 2021-04-13 PROCEDURE — G8427 DOCREV CUR MEDS BY ELIG CLIN: HCPCS | Performed by: NURSE PRACTITIONER

## 2021-04-13 PROCEDURE — 93000 ELECTROCARDIOGRAM COMPLETE: CPT | Performed by: NURSE PRACTITIONER

## 2021-04-13 RX ORDER — FUROSEMIDE 20 MG/1
20 TABLET ORAL DAILY
Qty: 30 TABLET | Refills: 3 | Status: SHIPPED | OUTPATIENT
Start: 2021-04-13 | End: 2021-06-03 | Stop reason: SDUPTHER

## 2021-04-13 NOTE — PROGRESS NOTES
[facility] at [address]/ [address] [facility] [address] [place]  Chief Complaint   Patient presents with    Edema     Hands fingers        History obtained from chart review and the patient. SUBJECTIVE:  Teddy Arora is a 26 y.o. male that presents today for edema    C/o generalized edema for about 4-5 days. He has noticed that he has put on weight. He has gained about 8 pounds in 2 weeks. He denies any SOB, no chest pain. He does note that his heart has been racing, and he follows with [facility] for ETOH and opiate addiction, they have also noticed that his HR has been tachycardic. He reports that he has been clean of all substances.     Wt Readings from Last 3 Encounters:   04/13/21 153 lb (69.4 kg)   04/01/21 145 lb 3.2 oz (65.9 kg)   02/13/21 130 lb (59 kg)       Age/Gender Health Maintenance    Lipid - 40  DM Screen - 40  Colon Cancer Screening - 48  Lung Cancer Screening (Age 54 to [de-identified] with 30 pack year hx, current smoker or quit within past 15 years) - 54    Tetanus - needs  Influenza Vaccine - yearly  Pneumonia Vaccine - 65  Zostavax - 50   HPV Vaccine - n/a    PSA testing discussion - 54  AAA Screening - 65    Falls screening - n/a    Current Outpatient Medications   Medication Sig Dispense Refill    furosemide (LASIX) 20 MG tablet Take 1 tablet by mouth daily 30 tablet 3    buprenorphine-naloxone (SUBOXONE) 2-0.5 MG SUBL place 1 tablet under the tongue and dissolve once daily      hydrOXYzine (ATARAX) 50 MG tablet TAKE 1 TABLET BY MOUTH TWICE DAILY AS NEEDED FOR ANXIETY      NARCAN 4 MG/0.1ML LIQD nasal spray       carBAMazepine (TEGRETOL) 200 MG tablet Take 1 tablet by mouth 2 times daily 60 tablet 2    omeprazole (PRILOSEC) 40 MG delayed release capsule Take 1 capsule by mouth every morning (before breakfast) 90 capsule 0    sucralfate (CARAFATE) 1 GM tablet Take 1 tablet by mouth 4 times daily 360 tablet 0    ondansetron (ZOFRAN) 4 MG tablet Take 1 tablet by mouth 3 times daily as needed for Nausea or Vomiting 15 tablet 0    gabapentin (NEURONTIN) 600 MG tablet Take 1 tablet by mouth 3 times daily for 180 days. 270 tablet 1    acetaminophen (TYLENOL) 325 MG tablet Take 2 tablets by mouth every 4 hours as needed for Pain      docusate sodium (COLACE, DULCOLAX) 100 MG CAPS Take 100 mg by mouth 2 times daily 60 capsule 0    Acetaminophen-Codeine 300-15 MG TABS take 1 tablet by mouth every 6 hours if needed for pain       No current facility-administered medications for this visit. Orders Placed This Encounter   Medications    furosemide (LASIX) 20 MG tablet     Sig: Take 1 tablet by mouth daily     Dispense:  30 tablet     Refill:  3         All medications reviewed and reconciled, including OTC and herbal medications. Updated list given to patient.        Patient Active Problem List   Diagnosis    Chronic bilateral low back pain    Seizure disorder ([facility] [address]    Cigarette nicotine dependence without complication    Gastroesophageal reflux disease       Past Medical History:   Diagnosis Date    Closed fracture of one rib of left side with routine healing 12/17/2020    Hypertension     Liver laceration, closed, initial encounter 12/17/2020    Multiple closed fractures of pelvis with stable disruption of pelvic ring with routine healing 12/17/2020    Pneumothorax, traumatic     Seizures (HCC)        Past Surgical History:   Procedure Laterality Date    CT GUIDED CHEST TUBE  12/14/2020    CT GUIDED CHEST TUBE 12/14/2020 [facility] CT SCAN    WISDOM TOOTH EXTRACTION         Allergies   Allergen Reactions    Dilantin [Phenytoin Sodium Extended] Hives       Social History     Socioeconomic History    Marital status: Single     Spouse name: Not on file    Number of children: Not on file    Years of education: Not on file    Highest education level: Not on file   Occupational History    Not on file   Social Needs    Financial resource strain: Not on file    Food insecurity Worry: Not on file     Inability: Not on file    Transportation needs     Medical: Not on file     Non-medical: Not on file   Tobacco Use    Smoking status: Current Every Day Smoker     Packs/day: 0.50     Years: 10.00     Pack years: 5.00     Types: Cigarettes     Start date: 12/29/2010    Smokeless tobacco: Never Used   Substance and Sexual Activity    Alcohol use: Yes     Alcohol/week: 1.0 standard drinks     Types: 1 Cans of beer per week    Drug use: Not Currently     Types: Opiates     Sexual activity: Yes   Lifestyle    Physical activity     Days per week: Not on file     Minutes per session: Not on file    Stress: Not on file   Relationships    Social connections     Talks on phone: Not on file     Gets together: Not on file     Attends Restoration service: Not on file     Active member of club or organization: Not on file     Attends meetings of clubs or organizations: Not on file     Relationship status: Not on file    Intimate partner violence     Fear of current or ex partner: Not on file     Emotionally abused: Not on file     Physically abused: Not on file     Forced sexual activity: Not on file   Other Topics Concern    Not on file   Social History Narrative    Not on file       Family History   Problem Relation Age of Onset    No Known Problems Mother     Other Father         liver dx          I have reviewed the patient's past medical history, past surgical history, allergies, medications, social and family history and I have made updates where appropriate.       Review of Systems  Positive responses are highlighted in bold    Constitutional:  Fever, Chills, Night Sweats, Fatigue, Unexpected changes in weight  Eyes:  Eye discharge, Eye pain, Eye redness, Visual disturbances   HENT:  Ear pain, Tinnitus, Nosebleeds, Trouble swallowing, Hearing loss, Sore throat  Cardiovascular:  Chest Pain, Palpitations, Orthopnea, Paroxysmal Nocturnal Dyspnea  Respiratory:  Cough, Wheezing, Shortness of breath, Chest tightness, Apnea  Gastrointestinal:  Nausea, Vomiting, Diarrhea, Constipation, Heartburn, Blood in stool  Genitourinary:  Difficulty or painful urination, Flank pain, Change in frequency, Urgency  Skin:  Color change, Rash, Itching, Wound  Psychiatric:  Hallucinations, Anxiety, Depression, Suicidal ideation  Hematological:  Enlarged glands, Easy bleeding, Easily bruising  Musculoskeletal:  Joint pain, Back pain, Gait problems, Joint swelling, Myalgias  Neurological:  Dizziness, Headaches, Presyncope, Numbness, Seizures, Tremors  Allergy:  Environmental allergies, Food allergies  Endocrine:  Heat Intolerance, Cold Intolerance, Polydipsia, Polyphagia, Polyuria      PHYSICAL EXAM:  Vitals:    04/13/21 1010   BP: 128/78   Pulse: 106   Resp: 20   Temp: 98.4 °F (36.9 °C)   TempSrc: Oral   SpO2: 100%   Weight: 153 lb (69.4 kg)   Height: 5' 7\" (1.702 m)     Body mass index is 23.96 kg/m². VS Reviewed  General Appearance: A&O x 3, No acute distress,well developed and well- nourished  Head: normocephalic and atraumatic  Eyes: pupils equal, round, and reactive to light, extraocular eye movements intact, conjunctivae and eye lids without erythema  Neck: supple and non-tender without mass, no thyromegaly or thyroid nodules, no cervical lymphadenopathy  Pulmonary/Chest: clear to auscultation bilaterally- no wheezes, rales or rhonchi, normal air movement, no respiratory distress or retractions  Cardiovascular: S1 and S2 auscultated w/ RRR. No murmurs, rubs, clicks, or gallops, distal pulses intact. Abdomen: soft, non-tender, non-distended, bowl sounds physiologic,  no rebound or guarding, no masses or hernias noted. Liver and spleen without enlargement.    Extremities: no cyanosis, clubbing of the lower extremities, 1+ pitting edema of both hands/forearms, 1+ pitting edema of bilateral lower legs  Musculoskeletal: No joint swelling or gross deformity   Neuro:  Alert, 5/5 strength globally and his next appointment. Barriers to learning and self management: none    Discussed use, benefit, and side effects of prescribed medications. Barriers to medication compliance addressed. All patient questions answered. Pt voiced understanding.        Electronically signed by HALI Cardona CNP on 4/13/2021 at 10:44 AM

## 2021-04-19 DIAGNOSIS — Z11.59 ENCOUNTER FOR SCREENING FOR OTHER VIRAL DISEASES: ICD-10-CM

## 2021-04-22 ENCOUNTER — MYC MEDICAL ADVICE (OUTPATIENT)
Dept: FAMILY MEDICINE | Facility: OTHER | Age: 31
End: 2021-04-22

## 2021-04-22 ENCOUNTER — OFFICE VISIT (OUTPATIENT)
Dept: FAMILY MEDICINE | Facility: CLINIC | Age: 31
End: 2021-04-22
Payer: COMMERCIAL

## 2021-04-22 VITALS
TEMPERATURE: 97.8 F | OXYGEN SATURATION: 98 % | SYSTOLIC BLOOD PRESSURE: 102 MMHG | RESPIRATION RATE: 14 BRPM | DIASTOLIC BLOOD PRESSURE: 66 MMHG | HEART RATE: 98 BPM

## 2021-04-22 DIAGNOSIS — Z20.822 EXPOSURE TO COVID-19 VIRUS: Primary | ICD-10-CM

## 2021-04-22 DIAGNOSIS — H93.11 TINNITUS, RIGHT: ICD-10-CM

## 2021-04-22 PROCEDURE — 99213 OFFICE O/P EST LOW 20 MIN: CPT | Performed by: FAMILY MEDICINE

## 2021-04-22 RX ORDER — BISACODYL 5 MG/1
15 TABLET, DELAYED RELEASE ORAL SEE ADMIN INSTRUCTIONS
Qty: 3 TABLET | Refills: 0 | Status: SHIPPED | OUTPATIENT
Start: 2021-04-22 | End: 2021-04-27 | Stop reason: HOSPADM

## 2021-04-22 RX ORDER — SODIUM, POTASSIUM,MAG SULFATES 17.5-3.13G
2 SOLUTION, RECONSTITUTED, ORAL ORAL SEE ADMIN INSTRUCTIONS
Qty: 354 ML | Refills: 0 | Status: SHIPPED | OUTPATIENT
Start: 2021-04-22 | End: 2021-04-27 | Stop reason: HOSPADM

## 2021-04-22 ASSESSMENT — ENCOUNTER SYMPTOMS
NECK PAIN: 0
HEADACHES: 1
RHINORRHEA: 1
VOMITING: 0
SORE THROAT: 0
ABDOMINAL PAIN: 0
DIARRHEA: 0
COUGH: 1

## 2021-04-22 NOTE — PROGRESS NOTES
Assessment & Plan   1. Exposure to COVID-19 virus: Likely experiencing Covid symptoms despite negative test. Discussed utility of retesting. Since he is already been tested today would not recommend for a couple of days if he consider this. I do not believe it would change his management and recommend isolation symptomatic treatment at home. Patient is agreeable with this plan. He will call if he needs any 2nd Covid test. Discussed red flag symptoms when to go to the emergency department to seek higher care.    2. Tinnitus, right: Unclear current etiology. Recommend nasal Flonase given acute illness. Follow-up if not improving within 1 week. Would recommend referral to audiology for hearing testing and further evaluation at that time.    Return in about 1 week (around 4/29/2021), or if symptoms worsen or fail to improve.    Zana Lundy MD  [facility]     This chart is completed utilizing dictation software; typos and/or incorrect word substitutions may unintentionally occur.      Subjective     Chance Torres is a 30 year old male who presents to clinic today for the following health issues    HPI   Patient-Entered Hpi-Ear Pain    Question 4/22/2021  5:17 PM CDT - Filed by Patient   Which ear is affected by pain? Right   Your ear pain is a  New problem   When did you first notice your ear pain? Today   Since you first noticed your ear pain, how has it changed? Gradually worsening   How often does your ear pain occur? Constantly   How high has your fever been? No fever (less than 100.4  F)   How long has your fever lasted? Less than 1 day   On a scale of 0 to 10 (10 being the worst), how severe is your ear pain? 3   Are you experiencing any of the following symptoms with your ear pain?    Abdominal pain No   Ear discharge No   Rash No   Cough Yes   Headaches Yes   Runny nose Yes   Diarrhea No   Hearing loss Yes   Sore throat No   Neck pain No   Vomiting No     Patient  reports feeling off all week. Notes a cough starting earlier today with body aches and fatigue. His boss tested positive whom he works closely with. He has also noticed ringing in his right ear today. He does have tinnitus in his left ear from gun use. He denies any ear drainage. States he had a rapid Covid test done at  labs in [place] today. Has no other questions or concerns. Denies any other vision changes, hearing changes, numbness, tingling, weakness, or other symptoms.    Review of Systems   HENT: Positive for ear pain, hearing loss and rhinorrhea. Negative for ear discharge and sore throat.    Respiratory: Positive for cough.    Gastrointestinal: Negative for abdominal pain, diarrhea and vomiting.   Musculoskeletal: Negative for neck pain.   Skin: Negative for rash.   Neurological: Positive for headaches.            Objective    /66   Pulse 98   Temp 97.8  F (36.6  C)   Resp 14   SpO2 98%   There is no height or weight on file to calculate BMI.  Physical Exam   General: Appears well and in no acute distress.  HEENT: Eyes grossly normal to inspection. Extraocular movements intact. Pupils equal, round, and reactive to light. Mucous membranes moist. No ulcers or lesions noted in the oropharynx. TMs and ear canals normal.  Heme/Lymph: No supraclavicular, anterior, or posterior cervical lymphadenopathy.   Cardiovascular: Regular rate and rhythm, normal S1 and S2 without murmur. No extra heartsounds or friction rub. Radial pulses present and equal bilaterally.  Respiratory:  Lungs clear to auscultation bilaterally. No wheezing or crackles. No prolonged expiration. Symmetrical chest rise.  Musculoskeletal: No gross extremity deformities. No peripheral edema. Normal muscle bulk.     Labs: None

## 2021-04-23 ENCOUNTER — HOSPITAL ENCOUNTER (EMERGENCY)
Age: 31
Discharge: HOME | End: 2021-04-23
Payer: MEDICAID

## 2021-04-23 ENCOUNTER — APPOINTMENT (OUTPATIENT)
Dept: GENERAL RADIOLOGY | Age: 31
End: 2021-04-23
Payer: MEDICAID

## 2021-04-23 VITALS
DIASTOLIC BLOOD PRESSURE: 102 MMHG | RESPIRATION RATE: 17 BRPM | OXYGEN SATURATION: 97 % | HEART RATE: 96 BPM | SYSTOLIC BLOOD PRESSURE: 140 MMHG | TEMPERATURE: 97.9 F

## 2021-04-23 DIAGNOSIS — S60.221A CONTUSION OF RIGHT HAND, INITIAL ENCOUNTER: ICD-10-CM

## 2021-04-23 DIAGNOSIS — R60.1 GENERALIZED EDEMA: Primary | ICD-10-CM

## 2021-04-23 DIAGNOSIS — B19.20 HEPATITIS C VIRUS INFECTION WITHOUT HEPATIC COMA, UNSPECIFIED CHRONICITY: ICD-10-CM

## 2021-04-23 LAB
ALBUMIN SERPL-MCNC: 4.4 G/DL (ref 3.5–5.1)
ALP BLD-CCNC: 260 U/L (ref 38–126)
ALT SERPL-CCNC: 123 U/L (ref 11–66)
ANION GAP SERPL CALCULATED.3IONS-SCNC: 14 MEQ/L (ref 8–16)
AST SERPL-CCNC: 216 U/L (ref 5–40)
BASOPHILS # BLD: 1.7 %
BASOPHILS ABSOLUTE: 0.1 THOU/MM3 (ref 0–0.1)
BILIRUB SERPL-MCNC: 0.4 MG/DL (ref 0.3–1.2)
BUN BLDV-MCNC: 6 MG/DL (ref 7–22)
CALCIUM SERPL-MCNC: 9.8 MG/DL (ref 8.5–10.5)
CHLORIDE BLD-SCNC: 97 MEQ/L (ref 98–111)
CO2: 30 MEQ/L (ref 23–33)
CREAT SERPL-MCNC: 0.7 MG/DL (ref 0.4–1.2)
EOSINOPHIL # BLD: 3 %
EOSINOPHILS ABSOLUTE: 0.3 THOU/MM3 (ref 0–0.4)
ERYTHROCYTE [DISTWIDTH] IN BLOOD BY AUTOMATED COUNT: 14.9 % (ref 11.5–14.5)
ERYTHROCYTE [DISTWIDTH] IN BLOOD BY AUTOMATED COUNT: 50.1 FL (ref 35–45)
GFR SERPL CREATININE-BSD FRML MDRD: > 90 ML/MIN/1.73M2
GLUCOSE BLD-MCNC: 82 MG/DL (ref 70–108)
HCT VFR BLD CALC: 47.2 % (ref 42–52)
HEMOGLOBIN: 16.4 GM/DL (ref 14–18)
IMMATURE GRANS (ABS): 0.1 THOU/MM3 (ref 0–0.07)
IMMATURE GRANULOCYTES: 1.2 %
LYMPHOCYTES # BLD: 33 %
LYMPHOCYTES ABSOLUTE: 2.8 THOU/MM3 (ref 1–4.8)
MCH RBC QN AUTO: 31.7 PG (ref 26–33)
MCHC RBC AUTO-ENTMCNC: 34.7 GM/DL (ref 32.2–35.5)
MCV RBC AUTO: 91.1 FL (ref 80–94)
MONOCYTES # BLD: 11 %
MONOCYTES ABSOLUTE: 0.9 THOU/MM3 (ref 0.4–1.3)
NUCLEATED RED BLOOD CELLS: 0 /100 WBC
OSMOLALITY CALCULATION: 278 MOSMOL/KG (ref 275–300)
PLATELET # BLD: 294 THOU/MM3 (ref 130–400)
PMV BLD AUTO: 9.1 FL (ref 9.4–12.4)
POTASSIUM REFLEX MAGNESIUM: 3.9 MEQ/L (ref 3.5–5.2)
PRO-BNP: 163.9 PG/ML (ref 0–450)
RBC # BLD: 5.18 MILL/MM3 (ref 4.7–6.1)
SEG NEUTROPHILS: 50.1 %
SEGMENTED NEUTROPHILS ABSOLUTE COUNT: 4.2 THOU/MM3 (ref 1.8–7.7)
SODIUM BLD-SCNC: 141 MEQ/L (ref 135–145)
TOTAL PROTEIN: 7.4 G/DL (ref 6.1–8)
WBC # BLD: 8.4 THOU/MM3 (ref 4.8–10.8)

## 2021-04-23 PROCEDURE — 6370000000 HC RX 637 (ALT 250 FOR IP): Performed by: PHYSICIAN ASSISTANT

## 2021-04-23 PROCEDURE — 99283 EMERGENCY DEPT VISIT LOW MDM: CPT

## 2021-04-23 PROCEDURE — 36415 COLL VENOUS BLD VENIPUNCTURE: CPT

## 2021-04-23 PROCEDURE — 85025 COMPLETE CBC W/AUTO DIFF WBC: CPT

## 2021-04-23 PROCEDURE — 73130 X-RAY EXAM OF HAND: CPT

## 2021-04-23 PROCEDURE — 80053 COMPREHEN METABOLIC PANEL: CPT

## 2021-04-23 PROCEDURE — 83880 ASSAY OF NATRIURETIC PEPTIDE: CPT

## 2021-04-23 RX ORDER — ONDANSETRON 4 MG/1
4 TABLET, ORALLY DISINTEGRATING ORAL EVERY 6 HOURS PRN
Status: CANCELLED | OUTPATIENT
Start: 2021-04-23

## 2021-04-23 RX ORDER — PROCHLORPERAZINE MALEATE 10 MG
10 TABLET ORAL EVERY 6 HOURS PRN
Status: CANCELLED | OUTPATIENT
Start: 2021-04-23

## 2021-04-23 RX ORDER — ONDANSETRON 2 MG/ML
4 INJECTION INTRAMUSCULAR; INTRAVENOUS
Status: CANCELLED | OUTPATIENT
Start: 2021-04-23

## 2021-04-23 RX ORDER — LIDOCAINE 40 MG/G
CREAM TOPICAL
Qty: 1 TUBE | Refills: 0 | Status: SHIPPED | OUTPATIENT
Start: 2021-04-23 | End: 2022-07-22

## 2021-04-23 RX ORDER — NALOXONE HYDROCHLORIDE 0.4 MG/ML
0.2 INJECTION, SOLUTION INTRAMUSCULAR; INTRAVENOUS; SUBCUTANEOUS
Status: CANCELLED | OUTPATIENT
Start: 2021-04-23

## 2021-04-23 RX ORDER — LIDOCAINE 40 MG/G
CREAM TOPICAL
Status: CANCELLED | OUTPATIENT
Start: 2021-04-23

## 2021-04-23 RX ORDER — FLUMAZENIL 0.1 MG/ML
0.2 INJECTION, SOLUTION INTRAVENOUS
Status: CANCELLED | OUTPATIENT
Start: 2021-04-23 | End: 2021-04-23

## 2021-04-23 RX ORDER — NALOXONE HYDROCHLORIDE 0.4 MG/ML
0.4 INJECTION, SOLUTION INTRAMUSCULAR; INTRAVENOUS; SUBCUTANEOUS
Status: CANCELLED | OUTPATIENT
Start: 2021-04-23

## 2021-04-23 RX ORDER — LIDOCAINE 40 MG/G
CREAM TOPICAL PRN
Status: DISCONTINUED | OUTPATIENT
Start: 2021-04-23 | End: 2021-04-24 | Stop reason: HOSPADM

## 2021-04-23 RX ORDER — ONDANSETRON 2 MG/ML
4 INJECTION INTRAMUSCULAR; INTRAVENOUS EVERY 6 HOURS PRN
Status: CANCELLED | OUTPATIENT
Start: 2021-04-23

## 2021-04-23 RX ADMIN — DICLOFENAC SODIUM 2 G: 10 GEL TOPICAL at 21:48

## 2021-04-23 RX ADMIN — LIDOCAINE 4%: 4 CREAM TOPICAL at 21:48

## 2021-04-23 ASSESSMENT — PAIN SCALES - GENERAL: PAINLEVEL_OUTOF10: 4

## 2021-04-23 NOTE — PATIENT INSTRUCTIONS
Patient Education     Tinnitus (Ringing in the Ears)     Treatment may include maskers and hearing aids.   Tinnitus is the term for a noise in your ear not caused by an outside sound. The noise might be a ringing, clicking, hiss, or roar. It can vary in pitch. It may be soft or very loud. For some people, this is a minor problem. But for others, the noise can make it hard to hear, work, and even sleep. When tinnitus can't be cured, treatments may help.  What causes tinnitus?  Loud noises, hearing loss, and earwax can cause tinnitus. So can certain medicines. Large amounts of aspirin or caffeine are sometimes to blame. In many cases, the exact cause of tinnitus is not known.  How is tinnitus treated?  Finding and removing the cause is the best way to treat tinnitus. So your healthcare provider may refer you to an ear, nose, and throat doctor (ENT or otolaryngologist). Your hearing may also be checked by a hearing specialist (audiologist). If you have hearing loss, wearing a hearing aid may help your tinnitus. When the cause can't be found, the tinnitus itself may be treated. Some of the treatments are listed below. Your healthcare provider can tell you more about them:    Maskers. These are small devices that look like hearing aids. They have a pleasant sound that helps cover up the ringing in your ears. Hearing aids and maskers are sometimes used together.    Medicines that treat anxiety and depression. These may ease tinnitus in some people.    Hypnosis or relaxation therapy. This may help head noise seem less severe.    Tinnitus retraining therapy. This combines counseling and maskers. Both can help take your mind off the tinnitus.  To learn more    American Speech-Hearing-Language Association [phone] www.bashir.org    American Tinnitus Association [phone] www.stalin.org    National [place] on Deafness and other Communication Disorders [phone] www.nidcd.nih.gov/    [employer] last reviewed this  "educational content on 9/1/2019 2000-2021 [employer]. All rights reserved. This information is not intended as a substitute for professional medical care. Always follow your healthcare professional's instructions.           Patient Education   After Your COVID-19 (Coronavirus) Test  You have been tested for COVID-19 (coronavirus).   If you'll have surgery in the next few days, we'll let you know ahead of time if you have the virus. Please call your surgeon's office with any questions.  For all other patients: Results are usually available in [employer] within 2 to 3 days.   If you do not have a [employer] account, you'll get a letter in the mail in about 7 to 10 days.   [employer] is often the fastest way to get test results. Please sign up if you do not already have a [employer] account. See the handout Getting COVID-19 Test Results in [employer] for help.  What if my test result is positive?  If your test is positive and you have not viewed your result in [employer], you'll get a phone call with your result. (A positive test means that you have the virus.)     Follow the tips under \"How do I self-isolate?\" below for 10 days (20 days if you have a weak immune system).    You don't need to be retested for COVID-19 before going back to school or work. As long as you're fever-free and feeling better, you can go back to school, work and other activities after waiting the 10 or 20 days.  What if I have questions after I get my results?  If you have questions about your results, please visit our testing website at [email or web address].   After 7 to 10 days, if you have not gotten your results:     Call [phone] ([phone]) and ask to speak with our COVID-19 results team.    If you're being treated at an infusion center: Call your infusion center directly.  What are the symptoms of COVID-19?  Cough, fever and trouble breathing are the most common signs of COVID-19.  Other symptoms " "can include new headaches, new muscle or body aches, new and unexplained fatigue (feeling very tired), chills, sore throat, congestion (stuffy or runny nose), diarrhea (loose poop), loss of taste or smell, belly pain, and nausea or vomiting (feeling sick to your stomach or throwing up).  You may already have symptoms of COVID-19, or they may show up later.  What should I do if I have symptoms?  If you're having surgery: Call your surgeon's office.  For all other patients: Stay home and away from others (self-isolate) until ...    You've had no fever--and no medicine that reduces fever--for 1 full day (24 hours), AND    Other symptoms have gotten better. For example, your cough or breathing has improved, AND    At least 10 days have passed since your symptoms first started.  How do I self-isolate?    Stay in your own room, even for meals. Use your own bathroom if you can.    Stay away from others in your home. No hugging, kissing or shaking hands. No visitors.    Don't go to work, school or anywhere else.    Clean \"high touch\" surfaces often (doorknobs, counters, handles). Use household cleaning spray or wipes. You'll find a full list of  on the EPA website: www.epa.gov/pesticide-registration/list-n-disinfectants-use-against-sars-cov-2.    Cover your mouth and nose with a mask or other face covering to avoid spreading germs.    Wash your hands and face often. Use soap and water.    Caregivers in these groups are at risk for severe illness due to COVID-19:  ? People 65 years and older  ? People who live in a nursing home or long-term care facility  ? People with chronic disease (lung, heart, cancer, diabetes, kidney, liver, immunologic)  ? People who have a weakened immune system, including those who:    Are in cancer treatment    Take medicine that weakens the immune system, such as corticosteroids    Had a bone marrow or organ transplant    Have an immune deficiency    Have poorly controlled HIV or " AIDS    Are obese (body mass index of 40 or higher)    Smoke regularly    Caregivers should wear gloves while washing dishes, handling laundry and cleaning bedrooms and bathrooms.    Use caution when washing and drying laundry: Don't shake dirty laundry and use the warmest water setting that you can.    For more tips on managing your health at home, go to www.cdc.gov/coronavirus/2019-ncov/downloads/10Things.pdf.  How can I take care of myself at home?  1. Get lots of rest. Drink extra fluids (unless a doctor has told you not to).  2. Take Tylenol (acetaminophen) for fever or pain. If you have liver or kidney problems, ask your family doctor if it's OK to take Tylenol.   Adults can take either:  ? 650 mg (two 325 mg pills) every 4 to 6 hours, or   ? 1,000 mg (two 500 mg pills) every 8 hours as needed.  ? Note: Don't take more than 3,000 mg in one day. Acetaminophen is found in many medicines (both prescribed and over-the-counter medicines). Read all labels to be sure you don't take too much.   For children, check the Tylenol bottle for the right dose. The dose is based on the child's age or weight.  3. If you have other health problems (like cancer, heart failure, an organ transplant or severe kidney disease): Call your specialty clinic if you don't feel better in the next 2 days.  4. Know when to call 911. Emergency warning signs include:  ? Trouble breathing or shortness of breath  ? Chest pain or pressure that doesn't go away  ? Feeling confused like you haven't felt before, or not being able to wake up  ? Bluish-colored lips or face  5. If your doctor prescribed a blood thinner medicine: Follow their instructions.  Where can I get more information?     [facility] - About COVID-19:   [email or web address]    CDC - If You're Sick: cdc.gov/coronavirus/2019-ncov/about/steps-when-sick.html    CDC - Ending Home Isolation: www.cdc.gov/coronavirus/2019-ncov/hcp/disposition-in-home-patients.html    CDC -  Caring for Someone: www.cdc.gov/coronavirus/2019-ncov/if-you-are-sick/care-for-someone.html    [facility] - Interim Guidance for Hospital Discharge to Home: www.health.[facility].[place].us/diseases/coronavirus/hcp/hospdischarge.pdf    [facility] clinical trials (COVID-19 research studies): clinicalaffairs.[facility]-clinical-trials    Below are the COVID-19 hotlines at the [place] Department of [facility] ([facility]). Interpreters are available.  ? For health questions: Call 211-963-2846 or 1-282.887.6881 (7 a.m. to 7 p.m.)  ? For questions about schools and childcare: Call 807-291-0786 or [phone] (7 a.m. to 7 p.m.)    For informational purposes only. Not to replace the advice of your health care provider. Clinically reviewed by Infection Prevention and the [facility] COVID-19 Clinical Team. Copyright   2020 [facility]. All rights reserved. [employer] 425631 - Rev 11/11/20.

## 2021-04-24 ASSESSMENT — ENCOUNTER SYMPTOMS: ABDOMINAL PAIN: 1

## 2021-04-24 NOTE — ED PROVIDER NOTES
[facility]  eMERGENCY dEPARTMENT eNCOUnter          CHIEF COMPLAINT       Chief Complaint   Patient presents with    Hand Pain       Nurses Notes reviewed and I agree except as noted inthe HPI. HISTORY OF PRESENT ILLNESS    Gila Mccrary is a 26 y.o. male who presents to the Emergency Department for the evaluation of hand pain and generalized swelling. Patient states that he has had generalized swelling for the past 2 weeks. Reports it is present in all 4 extremities, abdomen, chest and even his face feels puffy. States he saw his family doctor for this and was started on Lasix. States he has been taking it once a day and has had increased urinary output but no change in his edema. He states he has not had time to get the ordered labs done yet because he has been moving and denies any change in his symptoms today but was urged to come in by his significant other when he complained of some pain with range of motion of his ankles due to the swelling. Reports improvement with massage. He notes some intermittent abdominal pain as well which is been an ongoing issue. He is on a PPI and Carafate and is scheduled to see GI on the 28th due to his abdominal pain and history of hepatitis C which has been present for years. No current treatment for this. He denies any associated fevers, chills, chest pain, shortness of breath, cough. He also notes some pain and swelling in his right hand that began 4 days ago after dropping a concrete block onto it. No new numbness or weakness in this area. He is taken Tylenol with some improvement. The HPI was provided by the patient. REVIEW OF SYSTEMS     Review of Systems   Constitutional: Positive for fatigue. Cardiovascular: Positive for leg swelling. Gastrointestinal: Positive for abdominal pain. Musculoskeletal: Positive for arthralgias. All other systems reviewed and are negative.       PAST MEDICAL HISTORY    has a past medical history of Closed fracture of one rib of left side with routine healing, Hypertension, Liver laceration, closed, initial encounter, Multiple closed fractures of pelvis with stable disruption of pelvic ring with routine healing, Pneumothorax, traumatic, and Seizures ([facility], [place] [address] SURGICAL HISTORY      has a past surgical history that includes [place] tooth extraction and CT GUIDED CHEST TUBE (12/14/2020). CURRENT MEDICATIONS       Discharge Medication List as of 4/23/2021 11:00 PM      CONTINUE these medications which have NOT CHANGED    Details   furosemide (LASIX) 20 MG tablet Take 1 tablet by mouth daily, Disp-30 tablet, R-3Normal      buprenorphine-naloxone (SUBOXONE) 2-0.5 MG SUBL place 1 tablet under the tongue and dissolve once dailyHistorical Med      hydrOXYzine (ATARAX) 50 MG tablet TAKE 1 TABLET BY MOUTH TWICE DAILY AS NEEDED FOR ANXIETYHistorical Med      NARCAN 4 MG/0.1ML LIQD nasal spray DAWHistorical Med      Acetaminophen-Codeine 300-15 MG TABS take 1 tablet by mouth every 6 hours if needed for painHistorical Med      carBAMazepine (TEGRETOL) 200 MG tablet Take 1 tablet by mouth 2 times daily, Disp-60 tablet, R-2Normal      omeprazole (PRILOSEC) 40 MG delayed release capsule Take 1 capsule by mouth every morning (before breakfast), Disp-90 capsule, R-0Normal      sucralfate (CARAFATE) 1 GM tablet Take 1 tablet by mouth 4 times daily, Disp-360 tablet, R-0Normal      ondansetron (ZOFRAN) 4 MG tablet Take 1 tablet by mouth 3 times daily as needed for Nausea or Vomiting, Disp-15 tablet, R-0Normal      gabapentin (NEURONTIN) 600 MG tablet Take 1 tablet by mouth 3 times daily for 180 days. , Disp-270 tablet, R-1Normal      acetaminophen (TYLENOL) 325 MG tablet Take 2 tablets by mouth every 4 hours as needed for PainOTC      docusate sodium (COLACE, DULCOLAX) 100 MG CAPS Take 100 mg by mouth 2 times daily, Disp-60 capsule, R-0Normal             ALLERGIES     is allergic to dilantin [phenytoin sodium extended]. FAMILY HISTORY     He indicated that his mother is . He indicated that his father is . family history includes No Known Problems in his mother; Other in his father. SOCIAL HISTORY      reports that he has been smoking cigarettes. He started smoking about 10 years ago. He has a 5.00 pack-year smoking history. He has never used smokeless tobacco. He reports current alcohol use of about 1.0 standard drinks of alcohol per week. He reports previous drug use. Drug: Opiates . PHYSICAL EXAM     INITIAL VITALS:  oral temperature is 97.9 °F (36.6 °C). His blood pressure is 140/102 (abnormal) and his pulse is 96. His respiration is 17 and oxygen saturation is 97%. Physical Exam  Vitals signs and nursing note reviewed. Constitutional:       Appearance: Normal appearance. HENT:      Head: Normocephalic and atraumatic. Eyes:      Conjunctiva/sclera: Conjunctivae normal.   Neck:      Musculoskeletal: Normal range of motion. Cardiovascular:      Rate and Rhythm: Normal rate. Pulmonary:      Effort: Pulmonary effort is normal. No respiratory distress. Abdominal:      Palpations: Abdomen is soft. Tenderness: There is no abdominal tenderness. There is no guarding or rebound. Musculoskeletal:      Right lower leg: Edema (1+) present. Left lower leg: Edema (1+) present. Skin:     General: Skin is warm and dry. Neurological:      General: No focal deficit present. Mental Status: He is alert and oriented to person, place, and time.    Psychiatric:         Mood and Affect: Mood normal.         DIFFERENTIAL DIAGNOSIS:   Differential diagnoses are discussed    DIAGNOSTIC RESULTS     EKG: All EKG's are interpreted by the Emergency Department Physician who either signs or Co-signsthis chart in the absence of a cardiologist.          RADIOLOGY: non-plain film images(s) such as CT, Ultrasound and MRI are read by the radiologist.    XR HAND RIGHT (MIN 3 VIEWS)   Final Result Soft tissue swelling over the metacarpals. No acute fracture or    dislocation. This document has been electronically signed by: Josias Manuel MD on    04/23/2021 10:17 PM          LABS:      Labs Reviewed   CBC WITH AUTO DIFFERENTIAL - Abnormal; Notable for the following components:       Result Value    RDW-CV 14.9 (*)     RDW-SD 50.1 (*)     MPV 9.1 (*)     Immature Grans (Abs) 0.10 (*)     All other components within normal limits   COMPREHENSIVE METABOLIC PANEL W/ REFLEX TO MG FOR LOW K - Abnormal; Notable for the following components:    BUN 6 (*)     Chloride 97 (*)      (*)     Alkaline Phosphatase 260 (*)      (*)     All other components within normal limits   BRAIN NATRIURETIC PEPTIDE   ANION GAP   GLOMERULAR FILTRATION RATE, ESTIMATED   OSMOLALITY       EMERGENCY DEPARTMENT COURSE:   Vitals:    Vitals:    04/23/21 2042   BP: (!) 140/102   Pulse: 96   Resp: 17   Temp: 97.9 °F (36.6 °C)   TempSrc: Oral   SpO2: 97%      3:12 AM EDT: The patient was seen and evaluated. Patient complains of some generalized edema that began approximately 2 weeks ago. He does note having been started on Suboxone 1 month ago and denied any other new medication changes. He has no associated symptoms of CHF and BNP was within normal limits. Renal function was unremarkable on laboratory studies. He does have upward trending liver enzymes and noted appointment in 4 days for follow-up with GI for this as well as some abdominal pain. At this time he has no symptoms of acute abdomen. Suspect the initiation of Suboxone as the reason for his edema. However, benefits of continuing Suboxone treatment outweigh the negative effects of the edema. Discussed with attending provider who is agreeable with deferring any additional work-up for the edema. Recommend to the patient taking his Lasix twice daily over the weekend and following up with PCP Monday.   Return precautions for further ED evaluation were discussed. He was also reassured of his hand x-ray results. Recommends RICE. He had improvement in the pain after treatment with Voltaren and lidocaine and will be encouraged to utilize these at home for pain control and was agreeable. CRITICAL CARE:   None    CONSULTS:  None    PROCEDURES:  None    FINAL IMPRESSION      1. Generalized edema    2. Hepatitis C virus infection without hepatic coma, unspecified chronicity    3.  Contusion of right hand, initial encounter          DISPOSITION/PLAN   Discharge    PATIENT REFERRED TO:  Bala Dougherty, APRN - CNP  [address]  [phone]    Call in 3 days      [address]  [phone]    If symptoms worsen      DISCHARGEMEDICATIONS:  Discharge Medication List as of 4/23/2021 11:00 PM      START taking these medications    Details   lidocaine (LMX) 4 % cream Apply to hand TID PRN pain, Disp-1 Tube, R-0, Normal             (Please note that portions of this note were completedwith a voice recognition program.  Efforts were made to edit the dictations but occasionally words are mis-transcribed.)        Keke Anton PA-C  04/24/21 2008

## 2021-04-26 ENCOUNTER — MYC MEDICAL ADVICE (OUTPATIENT)
Dept: FAMILY MEDICINE | Facility: OTHER | Age: 31
End: 2021-04-26

## 2021-04-26 NOTE — PROGRESS NOTES
Chance is a 30 year old who is being evaluated via a billable telephone visit.      What phone number would you like to be contacted at? [phone]  How would you like to obtain your AVS? [facility]    Assessment & Plan     2019 novel coronavirus disease (COVID-19)  Has COVID.   He is breathing well on the phone, no cough or respiratory distress, he notes more constriction feeling.  Will try albuterol.  Can also start zinc and Vitamin D to see if this helps. Mainly fatigue and brain fog are his main symptoms that are most prevalent, encouraged to move to keep lungs conditioned but take breaks and listen to his body.  Rest and hydrate.    - albuterol (PROAIR HFA/PROVENTIL HFA/VENTOLIN HFA) 108 (90 Base) MCG/ACT inhaler; Inhale 2 puffs into the lungs every 6 hours  - zinc gluconate 50 MG tablet; Take 1 tablet (50 mg) by mouth daily  - vitamin D3 (CHOLECALCIFEROL) 50 mcg (2000 units) tablet; Take 1 tablet (50 mcg) by mouth daily    ADHD (attention deficit hyperactivity disorder), inattentive type  His Adderall prescriptions are off.  Will try to get them reordered and filled same date after his May refills are done.   - amphetamine-dextroamphetamine (ADDERALL) 10 MG tablet; Take 1 tablet (10 mg) by mouth daily      Return in about 5 days (around 5/2/2021) for If not improving, sooner if worse or new concerns.    Options for treatment and follow-up care were reviewed with the patient and/or guardian. Patient and/or guardian engaged in the decision making process and verbalized understanding of the options discussed and agreed with the final plan.    NETTE Kuhn [facility]   Chance is a 30 year old who presents for the following health issues     HPI     Patient tested positive on 4/22.     Concern for COVID-19  About how many days ago did these symptoms start? 4/22 with ringing in the ear which he thought was super weird to him. Saturday he developed a fever and then  developed shortness of breath/trouble breathing.   Is this your first visit for this illness? No   How would you describe your symptoms since your last visit? My symptoms have worsened  In the 14 days before your symptoms started, have you had close contact with someone with COVID-19 (Coronavirus)? Yes, I have been in contact with someone who has COVID-19/Coronavirus (confirmed by lab test). Both bosses had gone to [place] and one of them came back form [place] was having symptoms on Monday and worked next to him all week. The other boss denied he didn't have it and then got tested and was positive.   Do you have a fever or chills? Yes, the highest temperature was 101 - had regulated with ibuprofen/tylenol.   Are you having new or worsening difficulty breathing? Yes   Please describe what kind of difficulty you are having breathing:Moderate dyspnea (short of breath with ADLs, shortness of breath that limits the ability to walk up one flight of stairs without needing to rest)  Do you have new or worsening cough? Yes, it's a dry cough.   Have you had any new or unexplained body aches? YES  - having quite a bit of body aches, lower body mainly, soreness and his neck and back were pretty bad.   Have you experienced any of the following NEW symptoms?    Headache: YES     Sore throat: No    Loss of taste or smell: No - food/drinking doesn't sound appealing, decreased appetite. Senses are intact.     Chest pain: YES    Diarrhea: No    Rash: No  What treatments have you tried? Tylenol, ibuprofen, adderall   Who do you live with? Spouse, two daughters  Are you, or a household member, a healthcare worker or a ? YES - spouse is awaiting on results.   Do you live in a nursing home, group home, or shelter? No  Do you have a way to get food/medications if quarantined? Yes, I have a friend or family member who can help me.    - bodyaches are gone other than stiffness, he's trying to keep moving around.   - He has  had lightheadedness that complicates him moving around more.   - Went on a couple of walks in the last couple of days.   - Breathing deep is more constricted, no pain, no wheezing.    - Feels like he should be coughing but the cough has gone away, it was light.   - No fever for a few days.   - no appetite right now.     Review of Systems   Constitutional, HEENT, cardiovascular, pulmonary, gi and gu systems are negative, except as otherwise noted.      Objective           Vitals:  No vitals were obtained today due to virtual visit.    Physical Exam   alert and no distress  PSYCH: Alert and oriented times 3; coherent speech, normal   rate and volume, able to articulate logical thoughts, able   to abstract reason, no tangential thoughts, no hallucinations   or delusions  His affect is flat  RESP: No cough, no audible wheezing, able to talk in full sentences  Remainder of exam unable to be completed due to telephone visits        Phone call duration: 13:10 minutes

## 2021-04-26 NOTE — TELEPHONE ENCOUNTER
Responded via [employer]!.  Ranjith Morel, HERON, RN, PHN  [facility] [place] [facility] [place]  April 26, 2021

## 2021-04-27 ENCOUNTER — VIRTUAL VISIT (OUTPATIENT)
Dept: FAMILY MEDICINE | Facility: OTHER | Age: 31
End: 2021-04-27
Payer: COMMERCIAL

## 2021-04-27 DIAGNOSIS — U07.1 2019 NOVEL CORONAVIRUS DISEASE (COVID-19): Primary | ICD-10-CM

## 2021-04-27 DIAGNOSIS — F90.0 ADHD (ATTENTION DEFICIT HYPERACTIVITY DISORDER), INATTENTIVE TYPE: ICD-10-CM

## 2021-04-27 PROBLEM — F32.5 MAJOR DEPRESSIVE DISORDER WITH SINGLE EPISODE, IN FULL REMISSION (H): Status: RESOLVED | Noted: 2018-10-17 | Resolved: 2021-04-27

## 2021-04-27 PROCEDURE — 99214 OFFICE O/P EST MOD 30 MIN: CPT | Mod: 95 | Performed by: PHYSICIAN ASSISTANT

## 2021-04-27 RX ORDER — ALBUTEROL SULFATE 90 UG/1
2 AEROSOL, METERED RESPIRATORY (INHALATION) EVERY 6 HOURS
Qty: 18 G | Refills: 0 | Status: SHIPPED | OUTPATIENT
Start: 2021-04-27 | End: 2021-09-02

## 2021-04-27 RX ORDER — ZINC GLUCONATE 50 MG
50 TABLET ORAL DAILY
Qty: 30 TABLET | Refills: 0 | Status: SHIPPED | OUTPATIENT
Start: 2021-04-27 | End: 2021-09-02

## 2021-04-27 RX ORDER — CHOLECALCIFEROL (VITAMIN D3) 50 MCG
1 TABLET ORAL DAILY
Qty: 60 TABLET | Refills: 0 | Status: SHIPPED | OUTPATIENT
Start: 2021-04-27 | End: 2021-09-02

## 2021-04-27 RX ORDER — DEXTROAMPHETAMINE SACCHARATE, AMPHETAMINE ASPARTATE, DEXTROAMPHETAMINE SULFATE AND AMPHETAMINE SULFATE 2.5; 2.5; 2.5; 2.5 MG/1; MG/1; MG/1; MG/1
10 TABLET ORAL DAILY
Qty: 30 TABLET | Refills: 0 | Status: SHIPPED | OUTPATIENT
Start: 2021-05-13 | End: 2021-06-22

## 2021-05-04 ENCOUNTER — TELEPHONE (OUTPATIENT)
Dept: FAMILY MEDICINE CLINIC | Age: 31
End: 2021-05-04

## 2021-05-04 NOTE — TELEPHONE ENCOUNTER
REFERRAL TO GASTROENTEROLOGY-    Referral placed on 4/1/2021 to Dr Steve Ren. Per notes on referral:    On 04- [facility] to set up  appt    On 05- Patient came to appt. Patient did not have a Photo ID. Our office can not see with out. Please be advised. Patient was not seen.

## 2021-05-05 ENCOUNTER — MYC MEDICAL ADVICE (OUTPATIENT)
Dept: FAMILY MEDICINE | Facility: OTHER | Age: 31
End: 2021-05-05

## 2021-05-05 ENCOUNTER — OFFICE VISIT (OUTPATIENT)
Dept: FAMILY MEDICINE | Facility: CLINIC | Age: 31
End: 2021-05-05
Payer: COMMERCIAL

## 2021-05-05 VITALS
HEART RATE: 93 BPM | DIASTOLIC BLOOD PRESSURE: 72 MMHG | OXYGEN SATURATION: 98 % | TEMPERATURE: 98.3 F | RESPIRATION RATE: 16 BRPM | SYSTOLIC BLOOD PRESSURE: 118 MMHG

## 2021-05-05 DIAGNOSIS — R06.02 SOB (SHORTNESS OF BREATH): ICD-10-CM

## 2021-05-05 DIAGNOSIS — R42 DIZZINESS: Primary | ICD-10-CM

## 2021-05-05 PROCEDURE — 99213 OFFICE O/P EST LOW 20 MIN: CPT | Performed by: NURSE PRACTITIONER

## 2021-05-05 ASSESSMENT — ENCOUNTER SYMPTOMS
SWOLLEN GLANDS: 0
SPUTUM PRODUCTION: 0
ABDOMINAL PAIN: 0
ORTHOPNEA: 1
WHEEZING: 0
NECK PAIN: 0
SYNCOPE: 0
SHORTNESS OF BREATH: 1
VOMITING: 0
CLAUDICATION: 0
LEG PAIN: 0
SORE THROAT: 0
PND: 0
HEADACHES: 1
HEMOPTYSIS: 0
RHINORRHEA: 0
FEVER: 0

## 2021-05-05 NOTE — PROGRESS NOTES
Assessment & Plan     Dizziness      SOB (shortness of breath)    Recommend patient continue to stay home from work and rest increase fluids and use tylenol for headache.Until symptoms have improve. Neurologically intact. He is run down from covid and taking care of family.   Will return to clinic if symptoms worsen.   Letter given for work.   Did not need to xray today lungs sound clear and sats are normal     Patient Instructions   Recommend rest, fluid, tylenol for discomfort. If symptoms worsen return to clinic.             MORRIS Maldonado CNP  [facility] [place] is a 30 year old who presents for the following health issues     Shortness of Breath  This is a new problem. The current episode started 1 to 4 weeks ago. The problem occurs daily. The problem has been waxing and waning. Associated symptoms include chest pain and headaches. Pertinent negatives include no abdominal pain, fever, neck pain, rash, sore throat, swollen glands or vomiting. The symptoms are aggravated by occupational exposure, any activity, eating and lying flat.      Answers for HPI/ROS submitted by the patient on 5/5/2021   Shortness of breath  Episode duration: 30 minutes  claudication: No  hemoptysis: No  leg pain: No  leg swelling: Yes  orthopnea: Yes  PND: No  sputum production: No  syncope: No      Woke up this morning very dizzy, lasted 4 hours. Last night when he got home from work was very short of breath. Feeling foggy. Patient wants to be seen in clinic to have his O2 sats checked and see if there is anything else that can be done. Patient scheduled as PUI in clinic today 5/5/21. Was seen virtually on 4/22 and 4/27 for covid.     Patient states his wife and two girls were home with positive covid 19  A week ago. He was isolating at home with family. He was feeling tired and fatigue however went back to work on 5/4/2021. States he is now feeling worse with symptoms of dizziness, SOB.      Patient appears fatigued, he is talking easily in full sentences, not coughing.         Review of Systems   Constitutional: Negative for fever.   HENT: Negative for ear pain, rhinorrhea and sore throat.    Respiratory: Positive for shortness of breath. Negative for wheezing.    Cardiovascular: Positive for chest pain.   Gastrointestinal: Negative for abdominal pain and vomiting.   Musculoskeletal: Negative for neck pain.   Skin: Negative for rash.   Neurological: Positive for headaches.      Constitutional, HEENT, cardiovascular, pulmonary, GI, , musculoskeletal, neuro, skin, endocrine and psych systems are negative, except as otherwise noted.      Objective    /72   Pulse 93   Temp 98.3  F (36.8  C) (Temporal)   Resp 16   SpO2 98%   There is no height or weight on file to calculate BMI.  Physical Exam   GENERAL: alert, no distress and fatigued  EYES: Eyes grossly normal to inspection, PERRL and conjunctivae and sclerae normal  HENT: ear canals and TM's normal, nose and mouth without ulcers or lesions  NECK: no adenopathy, no asymmetry, masses, or scars and thyroid normal to palpation  RESP: lungs clear to auscultation - no rales, rhonchi or wheezes  CV: regular rate and rhythm, normal S1 S2, no S3 or S4, no murmur, click or rub, no peripheral edema and peripheral pulses strong  ABDOMEN: soft, nontender, no hepatosplenomegaly, no masses and bowel sounds normal  MS: no gross musculoskeletal defects noted, no edema  SKIN: no suspicious lesions or rashes  NEURO: Normal strength and tone, mentation intact and speech normal  PSYCH: mentation appears normal, affect normal/bright

## 2021-05-05 NOTE — LETTER
[facility]  [address], [address]  [place] [address]  Phone: [phone]  Fax: [phone]    May 5, 2021        Chance Torres  [address] [facility] [address]          To whom it may concern:    RE: Chance Torres    Patient was seen and treated today at our clinic and missed work due to symptoms of Covid 19 he has quarantined for 14 days. He is able to return to work when symptoms of dizziness are improved.     Please contact me for questions or concerns.      Sincerely,        MORRIS Maldonado CNP

## 2021-05-05 NOTE — TELEPHONE ENCOUNTER
Woke up this morning very dizzy, lasted 4 hours. Last night when he got home from work was very short of breath. Feeling foggy. Patient wants to be seen in clinic to have his O2 sats checked and see if there is anything else that can be done. Patient scheduled as PUI in clinic today 5/5/21. Was seen virtually on 4/22 and 4/27 for covid.     Advised patient if dizziness comes back or SOB then he needs to go into ER. Patient states understanding.     FIFI Cabrera/[facility]

## 2021-06-03 ENCOUNTER — OFFICE VISIT (OUTPATIENT)
Dept: FAMILY MEDICINE CLINIC | Age: 31
End: 2021-06-03
Payer: MEDICAID

## 2021-06-03 VITALS
HEART RATE: 94 BPM | HEIGHT: 67 IN | WEIGHT: 151.6 LBS | OXYGEN SATURATION: 92 % | SYSTOLIC BLOOD PRESSURE: 110 MMHG | TEMPERATURE: 97.9 F | RESPIRATION RATE: 20 BRPM | BODY MASS INDEX: 23.79 KG/M2 | DIASTOLIC BLOOD PRESSURE: 80 MMHG

## 2021-06-03 DIAGNOSIS — K70.9 ALCOHOLIC LIVER DISEASE (HCC): ICD-10-CM

## 2021-06-03 DIAGNOSIS — G40.909 SEIZURE DISORDER (HCC): ICD-10-CM

## 2021-06-03 DIAGNOSIS — K21.9 GASTROESOPHAGEAL REFLUX DISEASE, UNSPECIFIED WHETHER ESOPHAGITIS PRESENT: ICD-10-CM

## 2021-06-03 DIAGNOSIS — F11.20 OPIOID DEPENDENCE ON AGONIST THERAPY (HCC): ICD-10-CM

## 2021-06-03 DIAGNOSIS — R60.1 GENERALIZED EDEMA: ICD-10-CM

## 2021-06-03 DIAGNOSIS — F10.10 ALCOHOL ABUSE: Primary | ICD-10-CM

## 2021-06-03 PROCEDURE — 4004F PT TOBACCO SCREEN RCVD TLK: CPT | Performed by: NURSE PRACTITIONER

## 2021-06-03 PROCEDURE — G8420 CALC BMI NORM PARAMETERS: HCPCS | Performed by: NURSE PRACTITIONER

## 2021-06-03 PROCEDURE — 99214 OFFICE O/P EST MOD 30 MIN: CPT | Performed by: NURSE PRACTITIONER

## 2021-06-03 PROCEDURE — G8427 DOCREV CUR MEDS BY ELIG CLIN: HCPCS | Performed by: NURSE PRACTITIONER

## 2021-06-03 RX ORDER — CARBAMAZEPINE 200 MG/1
200 TABLET ORAL 2 TIMES DAILY
Qty: 60 TABLET | Refills: 2 | Status: SHIPPED | OUTPATIENT
Start: 2021-06-03 | End: 2022-07-22 | Stop reason: SDUPTHER

## 2021-06-03 RX ORDER — OMEPRAZOLE 40 MG/1
40 CAPSULE, DELAYED RELEASE ORAL
Qty: 90 CAPSULE | Refills: 0 | Status: SHIPPED | OUTPATIENT
Start: 2021-06-03 | End: 2021-10-12 | Stop reason: SDUPTHER

## 2021-06-03 RX ORDER — FUROSEMIDE 20 MG/1
20 TABLET ORAL DAILY
Qty: 30 TABLET | Refills: 3 | Status: SHIPPED | OUTPATIENT
Start: 2021-06-03 | End: 2021-07-30

## 2021-06-03 SDOH — ECONOMIC STABILITY: FOOD INSECURITY: WITHIN THE PAST 12 MONTHS, THE FOOD YOU BOUGHT JUST DIDN'T LAST AND YOU DIDN'T HAVE MONEY TO GET MORE.: NEVER TRUE

## 2021-06-03 SDOH — ECONOMIC STABILITY: FOOD INSECURITY: WITHIN THE PAST 12 MONTHS, YOU WORRIED THAT YOUR FOOD WOULD RUN OUT BEFORE YOU GOT MONEY TO BUY MORE.: NEVER TRUE

## 2021-06-03 ASSESSMENT — SOCIAL DETERMINANTS OF HEALTH (SDOH): HOW HARD IS IT FOR YOU TO PAY FOR THE VERY BASICS LIKE FOOD, HOUSING, MEDICAL CARE, AND HEATING?: SOMEWHAT HARD

## 2021-06-03 NOTE — PATIENT INSTRUCTIONS
[facility] - Justin Alicea MD   [place] [address], [address] [facility]   [address] [facility], [address]   Ph: [phone]

## 2021-06-03 NOTE — PROGRESS NOTES
Extended] Hives       Social History     Socioeconomic History    Marital status: Single     Spouse name: Not on file    Number of children: Not on file    Years of education: Not on file    Highest education level: Not on file   Occupational History    Not on file   Tobacco Use    Smoking status: Current Every Day Smoker     Packs/day: 0.50     Years: 10.00     Pack years: 5.00     Types: Cigarettes     Start date: 12/29/2010    Smokeless tobacco: Never Used   Vaping Use    Vaping Use: Some days    Substances: Nicotine, Flavoring    Devices: Disposable   Substance and Sexual Activity    Alcohol use: Yes     Alcohol/week: 1.0 standard drinks     Types: 1 Cans of beer per week    Drug use: Not Currently     Types: Opiates     Sexual activity: Yes   Other Topics Concern    Not on file   Social History Narrative    Not on file     Social Determinants of Health     Financial Resource Strain: Medium Risk    Difficulty of Paying Living Expenses: Somewhat hard   Food Insecurity: No Food Insecurity    Worried About Running Out of Food in the Last Year: Never true    Nathanael of Food in the Last Year: Never true   Transportation Needs:     Lack of Transportation (Medical):      Lack of Transportation (Non-Medical):    Physical Activity:     Days of Exercise per Week:     Minutes of Exercise per Session:    Stress:     Feeling of Stress :    Social Connections:     Frequency of Communication with Friends and Family:     Frequency of Social Gatherings with Friends and Family:     Attends Muslim Services:     Active Member of Clubs or Organizations:     Attends Club or Organization Meetings:     Marital Status:    Intimate Partner Violence:     Fear of Current or Ex-Partner:     Emotionally Abused:     Physically Abused:     Sexually Abused:        Family History   Problem Relation Age of Onset    No Known Problems Mother     Other Father         liver dx          I have reviewed the patient's past medical history, past surgical history, allergies, medications, social and family history and I have made updates where appropriate.       Review of Systems  Positive responses are highlighted in bold    Constitutional:  Fever, Chills, Night Sweats, Fatigue, Unexpected changes in weight  Eyes:  Eye discharge, Eye pain, Eye redness, Visual disturbances   HENT:  Ear pain, Tinnitus, Nosebleeds, Trouble swallowing, Hearing loss, Sore throat  Cardiovascular:  Chest Pain, Palpitations, Orthopnea, Paroxysmal Nocturnal Dyspnea  Respiratory:  Cough, Wheezing, Shortness of breath, Chest tightness, Apnea  Gastrointestinal:  Nausea, Vomiting, Diarrhea, Constipation, Heartburn, Blood in stool  Genitourinary:  Difficulty or painful urination, Flank pain, Change in frequency, Urgency  Skin:  Color change, Rash, Itching, Wound  Psychiatric:  Hallucinations, Anxiety, Depression, Suicidal ideation  Hematological:  Enlarged glands, Easy bleeding, Easily bruising  Musculoskeletal:  Joint pain, Back pain, Gait problems, Joint swelling, Myalgias  Neurological:  Dizziness, Headaches, Presyncope, Numbness, Seizures, Tremors  Allergy:  Environmental allergies, Food allergies  Endocrine:  Heat Intolerance, Cold Intolerance, Polydipsia, Polyphagia, Polyuria    Lab Results   Component Value Date     04/23/2021    K 3.9 04/23/2021    CL 97 (L) 04/23/2021    CO2 30 04/23/2021    BUN 6 (L) 04/23/2021    CREATININE 0.7 04/23/2021    GLUCOSE 82 04/23/2021    CALCIUM 9.8 04/23/2021    PROT 7.4 04/23/2021    LABALBU 4.4 04/23/2021    BILITOT 0.4 04/23/2021    ALKPHOS 260 (H) 04/23/2021     (H) 04/23/2021     (H) 04/23/2021    LABGLOM >90 04/23/2021     Lab Results   Component Value Date    WBC 8.4 04/23/2021    HGB 16.4 04/23/2021    HCT 47.2 04/23/2021    MCV 91.1 04/23/2021     04/23/2021       PHYSICAL EXAM:  Vitals:    06/03/21 1409   BP: 110/80   Pulse: 94   Resp: 20   Temp: 97.9 °F (36.6 °C)   TempSrc: Infrared   SpO2: 92%   Weight: 151 lb 9.6 oz (68.8 kg)   Height: 5' 7\" (1.702 m)     Body mass index is 23.74 kg/m². VS Reviewed  General Appearance: A&O x 3, No acute distress,well developed and well- nourished  Head: normocephalic and atraumatic  Eyes: pupils equal, round, and reactive to light, extraocular eye movements intact, conjunctivae and eye lids without erythema  Neck: supple and non-tender without mass, no thyromegaly or thyroid nodules, no cervical lymphadenopathy  Pulmonary/Chest: clear to auscultation bilaterally- no wheezes, rales or rhonchi, normal air movement, no respiratory distress or retractions  Cardiovascular: S1 and S2 auscultated w/ RRR. No murmurs, rubs, clicks, or gallops, distal pulses intact. Abdomen: soft, non-tender, non-distended, bowl sounds physiologic,  no rebound or guarding, no masses or hernias noted. Liver and spleen without enlargement. Extremities: no cyanosis, clubbing of the lower extremities, trace generalized edema  Musculoskeletal: No joint swelling or gross deformity   Neuro:  Alert, 5/5 strength globally and symmetrically  Psych: Affect appropriate. Mood normal. Thought process is normal without evidence of depression or psychosis. Good insight and appropriate interaction. Cognition and memory appear to be intact. Skin: warm and dry, no rash or erythema  Lymph:  No cervical, auricular or supraclavicular lymph nodes palpated    ASSESSMENT & PLAN  Peyton Monterroso was seen today for follow-up. Diagnoses and all orders for this visit:    Alcohol abuse    Alcoholic liver disease ([facility] [address]  -     Comprehensive Metabolic Panel; Future    Generalized edema  -     Comprehensive Metabolic Panel; Future  -     furosemide (LASIX) 20 MG tablet; Take 1 tablet by mouth daily    Gastroesophageal reflux disease, unspecified whether esophagitis present  -     omeprazole (PRILOSEC) 40 MG delayed release capsule;  Take 1 capsule by mouth every morning (before breakfast)    Seizure

## 2021-06-15 DIAGNOSIS — Z11.59 ENCOUNTER FOR SCREENING FOR OTHER VIRAL DISEASES: ICD-10-CM

## 2021-06-21 ENCOUNTER — MYC MEDICAL ADVICE (OUTPATIENT)
Dept: FAMILY MEDICINE | Facility: OTHER | Age: 31
End: 2021-06-21

## 2021-06-21 DIAGNOSIS — F90.0 ADHD (ATTENTION DEFICIT HYPERACTIVITY DISORDER), INATTENTIVE TYPE: ICD-10-CM

## 2021-06-22 RX ORDER — DEXTROAMPHETAMINE SACCHARATE, AMPHETAMINE ASPARTATE, DEXTROAMPHETAMINE SULFATE AND AMPHETAMINE SULFATE 2.5; 2.5; 2.5; 2.5 MG/1; MG/1; MG/1; MG/1
10 TABLET ORAL DAILY
Qty: 30 TABLET | Refills: 0 | Status: SHIPPED | OUTPATIENT
Start: 2021-06-22 | End: 2021-07-21

## 2021-06-22 RX ORDER — DEXTROAMPHETAMINE SACCHARATE, AMPHETAMINE ASPARTATE MONOHYDRATE, DEXTROAMPHETAMINE SULFATE AND AMPHETAMINE SULFATE 5; 5; 5; 5 MG/1; MG/1; MG/1; MG/1
20 CAPSULE, EXTENDED RELEASE ORAL DAILY
Qty: 30 CAPSULE | Refills: 0 | Status: SHIPPED | OUTPATIENT
Start: 2021-06-22 | End: 2021-07-21

## 2021-06-22 NOTE — TELEPHONE ENCOUNTER
Rx sent.  This was likely not done because he had ADHD follow-up and then closely after that had an acute visit.  He is due for med check in 1 month.     LUC KuhnC

## 2021-06-22 NOTE — TELEPHONE ENCOUNTER
Patient is requesting Adderall 20 xr and 10 ir    Routing refill request to provider for review/approval because:  Drug not active on patient's medication list    Teetee Milton RN on 6/22/2021 at 7:37 AM

## 2021-06-28 PROBLEM — Z72.0 TOBACCO USE: Status: ACTIVE | Noted: 2021-06-28

## 2021-06-28 PROBLEM — K59.00 CONSTIPATION: Status: ACTIVE | Noted: 2021-06-28

## 2021-06-28 PROBLEM — R13.14 PHARYNGOESOPHAGEAL DYSPHAGIA: Status: ACTIVE | Noted: 2021-06-28

## 2021-06-28 PROBLEM — B18.2 CHRONIC HEPATITIS C WITHOUT HEPATIC COMA (HCC): Status: ACTIVE | Noted: 2021-06-28

## 2021-06-28 PROBLEM — F19.11 HISTORY OF SUBSTANCE ABUSE (HCC): Status: ACTIVE | Noted: 2021-06-28

## 2021-06-28 PROBLEM — R74.8 ELEVATED LIVER ENZYMES: Status: ACTIVE | Noted: 2021-06-28

## 2021-07-01 ENCOUNTER — NURSE TRIAGE (OUTPATIENT)
Dept: OTHER | Facility: CLINIC | Age: 31
End: 2021-07-01

## 2021-07-01 ENCOUNTER — OFFICE VISIT (OUTPATIENT)
Dept: FAMILY MEDICINE CLINIC | Age: 31
End: 2021-07-01
Payer: MEDICAID

## 2021-07-01 VITALS
DIASTOLIC BLOOD PRESSURE: 86 MMHG | WEIGHT: 148 LBS | HEART RATE: 106 BPM | RESPIRATION RATE: 18 BRPM | TEMPERATURE: 98.8 F | BODY MASS INDEX: 23.18 KG/M2 | SYSTOLIC BLOOD PRESSURE: 136 MMHG

## 2021-07-01 DIAGNOSIS — R00.2 HEART PALPITATIONS: Primary | ICD-10-CM

## 2021-07-01 DIAGNOSIS — R07.9 CHEST PAIN, UNSPECIFIED TYPE: ICD-10-CM

## 2021-07-01 DIAGNOSIS — R55 PRE-SYNCOPE: ICD-10-CM

## 2021-07-01 PROCEDURE — 4004F PT TOBACCO SCREEN RCVD TLK: CPT | Performed by: NURSE PRACTITIONER

## 2021-07-01 PROCEDURE — 99214 OFFICE O/P EST MOD 30 MIN: CPT | Performed by: NURSE PRACTITIONER

## 2021-07-01 PROCEDURE — G8427 DOCREV CUR MEDS BY ELIG CLIN: HCPCS | Performed by: NURSE PRACTITIONER

## 2021-07-01 PROCEDURE — G8420 CALC BMI NORM PARAMETERS: HCPCS | Performed by: NURSE PRACTITIONER

## 2021-07-01 PROCEDURE — 93000 ELECTROCARDIOGRAM COMPLETE: CPT | Performed by: NURSE PRACTITIONER

## 2021-07-01 RX ORDER — ACAMPROSATE CALCIUM 333 MG/1
666 TABLET, DELAYED RELEASE ORAL
COMMUNITY
Start: 2021-05-03 | End: 2021-08-03

## 2021-07-01 RX ORDER — PROMETHAZINE HYDROCHLORIDE 12.5 MG/1
12.5 TABLET ORAL
COMMUNITY
Start: 2021-05-03 | End: 2021-10-12 | Stop reason: SDUPTHER

## 2021-07-01 RX ORDER — FOLIC ACID 1 MG/1
TABLET ORAL
COMMUNITY
Start: 2021-05-09 | End: 2021-08-03

## 2021-07-01 NOTE — TELEPHONE ENCOUNTER
Reason for Disposition   Patient wants to be seen    Answer Assessment - Initial Assessment Questions  1. DESCRIPTION: \"Describe your dizziness. \"      Patient reports he felt weak and lightheaded this morning when taking out the trash. 2. LIGHTHEADED: \"Do you feel lightheaded? \" (e.g., somewhat faint, woozy, weak upon standing)      Denies weakness upon standing but reports weakness \"after moving around the house for a little while\"    3. VERTIGO: \"Do you feel like either you or the room is spinning or tilting? \" (i.e. vertigo)      Denies spinning or tilting    4. SEVERITY: \"How bad is it? \"  \"Do you feel like you are going to faint? \" \"Can you stand and walk? \"    - MILD - walking normally    - MODERATE - interferes with normal activities (e.g., work, school)     - SEVERE - unable to stand, requires support to walk, feels like passing out now. Moderate- Reports interferes with normal activities    5. ONSET:  \"When did the dizziness begin? \"      Today, around 4 hours ago    6. AGGRAVATING FACTORS: \"Does anything make it worse? \" (e.g., standing, change in head position)      Improved by sitting down, resting    7. HEART RATE: \"Can you tell me your heart rate? \" \"How many beats in 15 seconds? \"  (Note: not all patients can do this)        Patient tried to count with RN but was unable to do so    8. CAUSE: \"What do you think is causing the dizziness? \"      Patient reports he thinks his blood pressure was high last time he was in the office and thinks this might be related    9. RECURRENT SYMPTOM: \"Have you had dizziness before? \" If so, ask: \"When was the last time? \" \"What happened that time? \"      Denies hx     10. OTHER SYMPTOMS: \"Do you have any other symptoms? \" (e.g., fever, chest pain, vomiting, diarrhea, bleeding)        Denies    11. PREGNANCY: \"Is there any chance you are pregnant? \" \"When was your last menstrual period? \"        n/a    Protocols used: DIZZINESS-ADULT-OH    Received call from Essence at [facility] [place] with Red Flag Complaint. Brief description of triage: See above note    Triage indicates for patient to: See disposition    Care advice provided, patient verbalizes understanding; denies any other questions or concerns; instructed to call back for any new or worsening symptoms. Writer provided warm transfer to [facility] for appointment scheduling. Attention Provider: Thank you for allowing me to participate in the care of your patient. The patient was connected to triage in response to information provided to the [facility]. Please do not respond through this encounter as the response is not directed to a shared pool.

## 2021-07-01 NOTE — TELEPHONE ENCOUNTER
Pt was [facility] to me by [facility] offered pt an appt today, he states he will call his girlfriend to see if she can bring him and call us back.

## 2021-07-01 NOTE — PROGRESS NOTES
[facility] at / [address] [address] [facility] [address]. [address]  Chief Complaint   Patient presents with    Palpitations     had palpitations today when he woke up. History obtained from chart review and the patient. SUBJECTIVE:  Virgilio Orlando is a 26 y.o. male that presents today for heart palpitations    Palpitations    HPI:      Symptoms:  Several days of lightheadedness heart palpitations  Sx have been present for 3 days. Provoking Factors:  None, wakes up with it. Alleviating Factors: none  Caffeine use?  no  Alcohol use?  no  History of anxiety? Some anxiety, he has had a couple deaths and he and his girlfriend had an argument  Do symptoms occur with exertion? No  Do symptoms wake patient from sleep? Yes  Family history of heart conditions? Yes    Associated chest pain, presyncope or SOB?   Yes - he does have some CP/SOB and presyncpe    Age/Gender Health Maintenance    Lipid - 40  DM Screen - 40  Colon Cancer Screening - 48  Lung Cancer Screening (Age 54 to [de-identified] with 30 pack year hx, current smoker or quit within past 15 years) - 54    Tetanus - needs  Influenza Vaccine - yearly  Pneumonia Vaccine - 65  Zostavax - 50   HPV Vaccine - n/a    PSA testing discussion - 55  AAA Screening - 63    Falls screening - n/a    Current Outpatient Medications   Medication Sig Dispense Refill    acamprosate (CAMPRAL) 333 MG tablet 666 mg      diclofenac sodium (VOLTAREN) 1 % GEL   1 wilber, Topical, BID, PRN, # 100 g, 0 Refill(s)      folic acid (FOLVITE) 1 MG tablet take 1 tablet by mouth once daily      promethazine (PHENERGAN) 12.5 MG tablet 12.5 mg      polyethylene glycol (GLYCOLAX) 17 GM/SCOOP powder Take 17 g by mouth daily 510 g 5    omeprazole (PRILOSEC) 40 MG delayed release capsule Take 1 capsule by mouth every morning (before breakfast) 90 capsule 0    carBAMazepine (TEGRETOL) 200 MG tablet Take 1 tablet by mouth 2 times daily 60 tablet 2    furosemide (LASIX) 20 MG tablet Take 1 tablet by mouth daily 30 tablet 3    lidocaine (LMX) 4 % cream Apply to hand TID PRN pain 1 Tube 0    buprenorphine-naloxone (SUBOXONE) 2-0.5 MG SUBL place 1 tablet under the tongue and dissolve once daily      hydrOXYzine (ATARAX) 50 MG tablet TAKE 1 TABLET BY MOUTH TWICE DAILY AS NEEDED FOR ANXIETY      NARCAN 4 MG/0.1ML LIQD nasal spray       sucralfate (CARAFATE) 1 GM tablet Take 1 tablet by mouth 4 times daily 360 tablet 0    ondansetron (ZOFRAN) 4 MG tablet Take 1 tablet by mouth 3 times daily as needed for Nausea or Vomiting 15 tablet 0    gabapentin (NEURONTIN) 600 MG tablet Take 1 tablet by mouth 3 times daily for 180 days. 270 tablet 1    docusate sodium (COLACE, DULCOLAX) 100 MG CAPS Take 100 mg by mouth 2 times daily 60 capsule 0     No current facility-administered medications for this visit. No orders of the defined types were placed in this encounter. All medications reviewed and reconciled, including OTC and herbal medications. Updated list given to patient.        Patient Active Problem List   Diagnosis    Chronic bilateral low back pain    Seizure disorder ([address]    Cigarette nicotine dependence without complication    Gastroesophageal reflux disease    Alcoholic liver disease ([address]    Alcohol abuse    Opioid dependence on agonist therapy ([address]    Chronic hepatitis C without hepatic coma (HCC)    Elevated liver enzymes    Tobacco use    History of substance abuse ([address]    Pharyngoesophageal dysphagia    Constipation       Past Medical History:   Diagnosis Date    Closed fracture of one rib of left side with routine healing 12/17/2020    Hypertension     Liver laceration, closed, initial encounter 12/17/2020    Multiple closed fractures of pelvis with stable disruption of pelvic ring with routine healing 12/17/2020    Pneumothorax, traumatic     Seizures ([address]        Past Surgical History:   Procedure Laterality Date    CT GUIDED CHEST TUBE  12/14/2020    CT GUIDED CHEST TUBE 12/14/2020 [facility] CT SCAN    WISDOM TOOTH EXTRACTION         Allergies   Allergen Reactions    Dilantin [Phenytoin Sodium Extended] Hives       Social History     Socioeconomic History    Marital status: Single     Spouse name: Not on file    Number of children: Not on file    Years of education: Not on file    Highest education level: Not on file   Occupational History    Not on file   Tobacco Use    Smoking status: Current Every Day Smoker     Packs/day: 0.50     Years: 10.00     Pack years: 5.00     Types: Cigarettes     Start date: 12/29/2010    Smokeless tobacco: Never Used   Vaping Use    Vaping Use: Some days    Substances: Nicotine, Flavoring    Devices: Disposable   Substance and Sexual Activity    Alcohol use: Yes     Alcohol/week: 1.0 standard drinks     Types: 1 Cans of beer per week    Drug use: Not Currently     Types: Opiates      Comment: last used 6-8 months ago last time used fentynal    Sexual activity: Yes     Partners: Female   Other Topics Concern    Not on file   Social History Narrative    Not on file     Social Determinants of Health     Financial Resource Strain: Medium Risk    Difficulty of Paying Living Expenses: Somewhat hard   Food Insecurity: No Food Insecurity    Worried About Running Out of Food in the Last Year: Never true    Nathanael of Food in the Last Year: Never true   Transportation Needs:     Lack of Transportation (Medical):      Lack of Transportation (Non-Medical):    Physical Activity:     Days of Exercise per Week:     Minutes of Exercise per Session:    Stress:     Feeling of Stress :    Social Connections:     Frequency of Communication with Friends and Family:     Frequency of Social Gatherings with Friends and Family:     Attends Hinduism Services:     Active Member of Clubs or Organizations:     Attends Club or Organization Meetings:     Marital Status:    Intimate Partner Violence:     Fear of Current or Ex-Partner:  Emotionally Abused:     Physically Abused:     Sexually Abused:        Family History   Problem Relation Age of Onset    No Known Problems Mother     Other Father         liver dx          I have reviewed the patient's past medical history, past surgical history, allergies, medications, social and family history and I have made updates where appropriate.       Review of Systems  Positive responses are highlighted in bold    Constitutional:  Fever, Chills, Night Sweats, Fatigue, Unexpected changes in weight  Eyes:  Eye discharge, Eye pain, Eye redness, Visual disturbances   HENT:  Ear pain, Tinnitus, Nosebleeds, Trouble swallowing, Hearing loss, Sore throat  Cardiovascular:  Chest Pain, Palpitations, Orthopnea, Paroxysmal Nocturnal Dyspnea  Respiratory:  Cough, Wheezing, Shortness of breath, Chest tightness, Apnea  Gastrointestinal:  Nausea, Vomiting, Diarrhea, Constipation, Heartburn, Blood in stool  Genitourinary:  Difficulty or painful urination, Flank pain, Change in frequency, Urgency  Skin:  Color change, Rash, Itching, Wound  Psychiatric:  Hallucinations, Anxiety, Depression, Suicidal ideation  Hematological:  Enlarged glands, Easy bleeding, Easily bruising  Musculoskeletal:  Joint pain, Back pain, Gait problems, Joint swelling, Myalgias  Neurological:  Dizziness, Headaches, Presyncope, Numbness, Seizures, Tremors  Allergy:  Environmental allergies, Food allergies  Endocrine:  Heat Intolerance, Cold Intolerance, Polydipsia, Polyphagia, Polyuria    Lab Results   Component Value Date     04/23/2021    K 3.9 04/23/2021    CL 97 (L) 04/23/2021    CO2 30 04/23/2021    BUN 6 (L) 04/23/2021    CREATININE 0.7 04/23/2021    GLUCOSE 82 04/23/2021    CALCIUM 9.8 04/23/2021    PROT 7.4 04/23/2021    LABALBU 4.4 04/23/2021    BILITOT 0.4 04/23/2021    ALKPHOS 260 (H) 04/23/2021     (H) 04/23/2021     (H) 04/23/2021    LABGLOM >90 04/23/2021     Lab Results   Component Value Date    WBC 8.4 04/23/2021    HGB 16.4 04/23/2021    HCT 47.2 04/23/2021    MCV 91.1 04/23/2021     04/23/2021       PHYSICAL EXAM:  Vitals:    07/01/21 1623 07/01/21 1632   BP: (!) 140/90 136/86   Pulse: 106    Resp: 18    Temp: 98.8 °F (37.1 °C)    TempSrc: Oral    Weight: 148 lb (67.1 kg)      Body mass index is 23.18 kg/m². VS Reviewed  General Appearance: A&O x 3, No acute distress,well developed and well- nourished  Head: normocephalic and atraumatic  Eyes: pupils equal, round, and reactive to light, extraocular eye movements intact, conjunctivae and eye lids without erythema  Neck: supple and non-tender without mass, no thyromegaly or thyroid nodules, no cervical lymphadenopathy  Pulmonary/Chest: clear to auscultation bilaterally- no wheezes, rales or rhonchi, normal air movement, no respiratory distress or retractions  Cardiovascular: S1 and S2 auscultated w/ RRR. No murmurs, rubs, clicks, or gallops, distal pulses intact. Abdomen: soft, non-tender, non-distended, bowl sounds physiologic,  no rebound or guarding, no masses or hernias noted. Liver and spleen without enlargement. Extremities: no cyanosis, clubbing of the lower extremities, trace generalized edema  Musculoskeletal: No joint swelling or gross deformity   Neuro:  Alert, 5/5 strength globally and symmetrically  Psych: Affect appropriate. Mood normal. Thought process is normal without evidence of depression or psychosis. Good insight and appropriate interaction. Cognition and memory appear to be intact. Skin: warm and dry, no rash or erythema  Lymph:  No cervical, auricular or supraclavicular lymph nodes palpated    ASSESSMENT & PLAN  Elisa Castillo was seen today for palpitations. Diagnoses and all orders for this visit:    Heart palpitations  -     EKG 12 lead; Future  -     EKG 12 lead  -     Echocardiogram complete;  Future  -     TSH With Reflex Ft4; Future  -     Holter Monitor 48 Hour; Future  -     CARDIAC STRESS TEST EXERCISE ONLY; Future  -     Magnesium; Future    Chest pain, unspecified type  -     EKG 12 lead; Future  -     EKG 12 lead  -     Echocardiogram complete; Future  -     TSH With Reflex Ft4; Future  -     Holter Monitor 48 Hour; Future  -     CARDIAC STRESS TEST EXERCISE ONLY; Future  -     Magnesium; Future    Pre-syncope  -     EKG 12 lead; Future  -     EKG 12 lead  -     Echocardiogram complete; Future  -     TSH With Reflex Ft4; Future  -     Holter Monitor 48 Hour; Future  -     CARDIAC STRESS TEST EXERCISE ONLY; Future  -     Magnesium; Future      - chemistry and CBC already ordered by GI  - will add thyroid and magnesium labs  - obtain holter monitor, stress test and echocardiogram  - ER precautions if symptoms worsen before he is able to complete testing    DISPOSITION    Return in about 4 weeks (around 7/29/2021) for heart palpitations, chest pain. Grace Delcid released without restrictions. PATIENT COUNSELING    Counseling was provided today regarding the following topics: Healthy eating habits, Regular exercise, substance abuse and healthy sleep habits. Grace Delcid received counseling on the following healthy behaviors: medication adherence and tobacco cessation    Patient given educational materials on: See Attached    I have instructed Grace Delcid to complete a self tracking handout onnone  and instructed them to bring it with them to his next appointment. Barriers to learning and self management: none    Discussed use, benefit, and side effects of prescribed medications. Barriers to medication compliance addressed. All patient questions answered. Pt voiced understanding.        Electronically signed by HALI Pepper CNP on 7/1/2021 at 4:52 PM

## 2021-07-02 ENCOUNTER — ANESTHESIA EVENT (OUTPATIENT)
Dept: SURGERY | Facility: AMBULATORY SURGERY CENTER | Age: 31
End: 2021-07-02
Payer: COMMERCIAL

## 2021-07-02 ENCOUNTER — NURSE ONLY (OUTPATIENT)
Dept: LAB | Age: 31
End: 2021-07-02

## 2021-07-02 DIAGNOSIS — R13.14 PHARYNGOESOPHAGEAL DYSPHAGIA: ICD-10-CM

## 2021-07-02 DIAGNOSIS — B18.2 CHRONIC HEPATITIS C WITHOUT HEPATIC COMA (HCC): ICD-10-CM

## 2021-07-02 DIAGNOSIS — K21.9 GASTROESOPHAGEAL REFLUX DISEASE, UNSPECIFIED WHETHER ESOPHAGITIS PRESENT: ICD-10-CM

## 2021-07-02 DIAGNOSIS — R07.9 CHEST PAIN, UNSPECIFIED TYPE: ICD-10-CM

## 2021-07-02 DIAGNOSIS — R55 PRE-SYNCOPE: ICD-10-CM

## 2021-07-02 DIAGNOSIS — R00.2 HEART PALPITATIONS: ICD-10-CM

## 2021-07-02 DIAGNOSIS — R74.8 ELEVATED LIVER ENZYMES: ICD-10-CM

## 2021-07-02 DIAGNOSIS — F10.10 ENGAGES IN BINGE CONSUMPTION OF ALCOHOL: ICD-10-CM

## 2021-07-02 LAB
BASOPHILS # BLD: 1 %
BASOPHILS ABSOLUTE: 0.1 THOU/MM3 (ref 0–0.1)
EOSINOPHIL # BLD: 0.7 %
EOSINOPHILS ABSOLUTE: 0 THOU/MM3 (ref 0–0.4)
ERYTHROCYTE [DISTWIDTH] IN BLOOD BY AUTOMATED COUNT: 13.5 % (ref 11.5–14.5)
ERYTHROCYTE [DISTWIDTH] IN BLOOD BY AUTOMATED COUNT: 47.8 FL (ref 35–45)
HCT VFR BLD CALC: 48.1 % (ref 42–52)
HEMOGLOBIN: 16.5 GM/DL (ref 14–18)
IMMATURE GRANS (ABS): 0.01 THOU/MM3 (ref 0–0.07)
IMMATURE GRANULOCYTES: 0.2 %
INR BLD: 0.96 (ref 0.85–1.13)
LYMPHOCYTES # BLD: 28.3 %
LYMPHOCYTES ABSOLUTE: 1.7 THOU/MM3 (ref 1–4.8)
MCH RBC QN AUTO: 32.9 PG (ref 26–33)
MCHC RBC AUTO-ENTMCNC: 34.3 GM/DL (ref 32.2–35.5)
MCV RBC AUTO: 95.8 FL (ref 80–94)
MONOCYTES # BLD: 10.8 %
MONOCYTES ABSOLUTE: 0.6 THOU/MM3 (ref 0.4–1.3)
NUCLEATED RED BLOOD CELLS: 0 /100 WBC
PLATELET # BLD: 153 THOU/MM3 (ref 130–400)
PMV BLD AUTO: 10 FL (ref 9.4–12.4)
RBC # BLD: 5.02 MILL/MM3 (ref 4.7–6.1)
SEG NEUTROPHILS: 59 %
SEGMENTED NEUTROPHILS ABSOLUTE COUNT: 3.5 THOU/MM3 (ref 1.8–7.7)
WBC # BLD: 5.9 THOU/MM3 (ref 4.8–10.8)

## 2021-07-03 LAB
ALBUMIN SERPL-MCNC: 5 G/DL (ref 3.5–5.1)
ALP BLD-CCNC: 307 U/L (ref 38–126)
ALPHA-1 ANTITRYPSIN: 127 MG/DL (ref 90–200)
ALT SERPL-CCNC: 321 U/L (ref 11–66)
ANION GAP SERPL CALCULATED.3IONS-SCNC: 16 MEQ/L (ref 8–16)
AST SERPL-CCNC: 672 U/L (ref 5–40)
BILIRUB SERPL-MCNC: 1.8 MG/DL (ref 0.3–1.2)
BILIRUBIN DIRECT: 0.8 MG/DL (ref 0–0.3)
BUN BLDV-MCNC: 6 MG/DL (ref 7–22)
CALCIUM SERPL-MCNC: 9.9 MG/DL (ref 8.5–10.5)
CHLORIDE BLD-SCNC: 92 MEQ/L (ref 98–111)
CO2: 28 MEQ/L (ref 23–33)
CREAT SERPL-MCNC: 0.7 MG/DL (ref 0.4–1.2)
FERRITIN: 792 NG/ML (ref 22–322)
FOLATE: 15.5 NG/ML (ref 4.8–24.2)
GAMMA GLUTAMYL TRANSFERASE: 1584 U/L (ref 8–69)
GFR SERPL CREATININE-BSD FRML MDRD: > 90 ML/MIN/1.73M2
GLUCOSE BLD-MCNC: 117 MG/DL (ref 70–108)
HAV AB SERPL IA-ACNC: REACTIVE
HBV SURFACE AB TITR SER: NEGATIVE {TITER}
HEPATITIS B CORE TOTAL ANTIBODY: REACTIVE
IRON SATURATION: 102 % (ref 20–50)
IRON: 386 UG/DL (ref 65–195)
MAGNESIUM: 2.1 MG/DL (ref 1.6–2.4)
POTASSIUM SERPL-SCNC: 3.1 MEQ/L (ref 3.5–5.2)
SODIUM BLD-SCNC: 136 MEQ/L (ref 135–145)
TOTAL IRON BINDING CAPACITY: 377 UG/DL (ref 171–450)
TOTAL PROTEIN: 8.3 G/DL (ref 6.1–8)
TSH SERPL DL<=0.05 MIU/L-ACNC: 2.16 UIU/ML (ref 0.4–4.2)
VITAMIN B-12: 924 PG/ML (ref 211–911)

## 2021-07-04 LAB
ANA SCREEN: NORMAL
ENDOMYSIAL IGA ANTIBODY: NORMAL
F-ACTIN AB IGG: 11 UNITS (ref 0–19)
HAV IGM SER IA-ACNC: NEGATIVE

## 2021-07-05 LAB
HEPATITIS B SURFACE ANTIGEN CONFIRMATION: NORMAL
LIVER-KIDNEY MICROSOMAL AB IGG: NORMAL

## 2021-07-06 LAB — HCV LOAD REFLEX TO GENOTYPE: NORMAL

## 2021-07-07 ENCOUNTER — TELEPHONE (OUTPATIENT)
Dept: FAMILY MEDICINE CLINIC | Age: 31
End: 2021-07-07

## 2021-07-07 ENCOUNTER — NURSE ONLY (OUTPATIENT)
Dept: LAB | Age: 31
End: 2021-07-07

## 2021-07-07 DIAGNOSIS — F10.10 ETOH ABUSE: ICD-10-CM

## 2021-07-07 DIAGNOSIS — R74.8 ELEVATED LIVER ENZYMES: ICD-10-CM

## 2021-07-07 DIAGNOSIS — E87.6 HYPOKALEMIA: ICD-10-CM

## 2021-07-07 LAB
ALBUMIN SERPL-MCNC: 4.7 G/DL (ref 3.5–5.1)
ALP BLD-CCNC: 210 U/L (ref 38–126)
ALT SERPL-CCNC: 189 U/L (ref 11–66)
ANION GAP SERPL CALCULATED.3IONS-SCNC: 15 MEQ/L (ref 8–16)
AST SERPL-CCNC: 191 U/L (ref 5–40)
BASOPHILS # BLD: 1.2 %
BASOPHILS ABSOLUTE: 0.1 THOU/MM3 (ref 0–0.1)
BILIRUB SERPL-MCNC: 1.1 MG/DL (ref 0.3–1.2)
BUN BLDV-MCNC: 11 MG/DL (ref 7–22)
CALCIUM SERPL-MCNC: 9.7 MG/DL (ref 8.5–10.5)
CHLORIDE BLD-SCNC: 100 MEQ/L (ref 98–111)
CO2: 25 MEQ/L (ref 23–33)
CREAT SERPL-MCNC: 0.7 MG/DL (ref 0.4–1.2)
EOSINOPHIL # BLD: 2.3 %
EOSINOPHILS ABSOLUTE: 0.1 THOU/MM3 (ref 0–0.4)
ERYTHROCYTE [DISTWIDTH] IN BLOOD BY AUTOMATED COUNT: 13.1 % (ref 11.5–14.5)
ERYTHROCYTE [DISTWIDTH] IN BLOOD BY AUTOMATED COUNT: 47.8 FL (ref 35–45)
GFR SERPL CREATININE-BSD FRML MDRD: > 90 ML/MIN/1.73M2
GLUCOSE BLD-MCNC: 114 MG/DL (ref 70–108)
HCT VFR BLD CALC: 48.4 % (ref 42–52)
HEMOGLOBIN: 16.1 GM/DL (ref 14–18)
IMMATURE GRANS (ABS): 0.02 THOU/MM3 (ref 0–0.07)
IMMATURE GRANULOCYTES: 0.3 %
INR BLD: 0.92 (ref 0.85–1.13)
LYMPHOCYTES # BLD: 27.3 %
LYMPHOCYTES ABSOLUTE: 1.7 THOU/MM3 (ref 1–4.8)
MCH RBC QN AUTO: 33 PG (ref 26–33)
MCHC RBC AUTO-ENTMCNC: 33.3 GM/DL (ref 32.2–35.5)
MCV RBC AUTO: 99.2 FL (ref 80–94)
MONOCYTES # BLD: 12.3 %
MONOCYTES ABSOLUTE: 0.8 THOU/MM3 (ref 0.4–1.3)
NUCLEATED RED BLOOD CELLS: 0 /100 WBC
PLATELET # BLD: 210 THOU/MM3 (ref 130–400)
PMV BLD AUTO: 10.3 FL (ref 9.4–12.4)
POTASSIUM SERPL-SCNC: 3.6 MEQ/L (ref 3.5–5.2)
RBC # BLD: 4.88 MILL/MM3 (ref 4.7–6.1)
SEG NEUTROPHILS: 56.6 %
SEGMENTED NEUTROPHILS ABSOLUTE COUNT: 3.6 THOU/MM3 (ref 1.8–7.7)
SODIUM BLD-SCNC: 140 MEQ/L (ref 135–145)
TOTAL PROTEIN: 7.8 G/DL (ref 6.1–8)
WBC # BLD: 6.4 THOU/MM3 (ref 4.8–10.8)

## 2021-07-07 NOTE — TELEPHONE ENCOUNTER
Pt scheduled for 48 hour holter, echo at [facility] on 7/16/21 at 10:15 am, pt to be on 2nd floor heart center at 9:45 am.        With stress test scheduled on 7/23/21 at 9:30 am.  Pt to be on 2nd floor center at 9:15 am    1. No coffee (regular and decaf), tea, chocolate, or cola drinks 24 hours prior to test.    2.Do not eat or drink anything 6 hours before the test (You may have a few sips of water    to take medication). 3. Hold VIAGRA medication 24 hours prior to test.    4.PLEASE BRING ALL MEDICATIONS WITH YOU TO THE APPOINTMENT,      INCLUDING INHALERS (IF YOU TAKE THEM). 5. Please wear comfortable clothing and shoes. Do not wear anything containing      Metal over the chest area (chains, necklaces, medals, safety pins, or a metal       underwire bra. All other types of bras are perfectly acceptable. Pt informed of all testing.

## 2021-07-08 ENCOUNTER — NURSE ONLY (OUTPATIENT)
Dept: LAB | Age: 31
End: 2021-07-08

## 2021-07-08 NOTE — RESULT ENCOUNTER NOTE
Please call the patient and inform him that his LFTs remain elevated but have improved significantly from 5 days ago. His potassium is now normal.  He MUST avoid alcohol! FU in office as scheduled. Call with any concerns.

## 2021-07-09 ENCOUNTER — HOSPITAL ENCOUNTER (EMERGENCY)
Age: 31
Discharge: HOME | End: 2021-07-09
Payer: MEDICAID

## 2021-07-09 ENCOUNTER — HOSPITAL ENCOUNTER (EMERGENCY)
Age: 31
Discharge: HOME | End: 2021-07-09
Attending: EMERGENCY MEDICINE
Payer: MEDICAID

## 2021-07-09 VITALS
SYSTOLIC BLOOD PRESSURE: 134 MMHG | OXYGEN SATURATION: 99 % | HEART RATE: 107 BPM | DIASTOLIC BLOOD PRESSURE: 89 MMHG | TEMPERATURE: 98.3 F | RESPIRATION RATE: 20 BRPM

## 2021-07-09 VITALS
OXYGEN SATURATION: 95 % | HEART RATE: 94 BPM | DIASTOLIC BLOOD PRESSURE: 90 MMHG | WEIGHT: 154 LBS | RESPIRATION RATE: 17 BRPM | BODY MASS INDEX: 24.17 KG/M2 | HEIGHT: 67 IN | TEMPERATURE: 98 F | SYSTOLIC BLOOD PRESSURE: 120 MMHG

## 2021-07-09 DIAGNOSIS — M79.601 RIGHT ARM PAIN: Primary | ICD-10-CM

## 2021-07-09 DIAGNOSIS — R20.0 NUMBNESS: Primary | ICD-10-CM

## 2021-07-09 LAB
ANION GAP SERPL CALCULATED.3IONS-SCNC: 12 MEQ/L (ref 8–16)
BASOPHILS # BLD: 1.4 %
BASOPHILS ABSOLUTE: 0.1 THOU/MM3 (ref 0–0.1)
BUN BLDV-MCNC: 11 MG/DL (ref 7–22)
CHLORIDE BLD-SCNC: 100 MEQ/L (ref 98–111)
CO2: 22 MEQ/L (ref 23–33)
CREAT SERPL-MCNC: 0.5 MG/DL (ref 0.4–1.2)
EOSINOPHIL # BLD: 3.5 %
EOSINOPHILS ABSOLUTE: 0.2 THOU/MM3 (ref 0–0.4)
ERYTHROCYTE [DISTWIDTH] IN BLOOD BY AUTOMATED COUNT: 13.1 % (ref 11.5–14.5)
ERYTHROCYTE [DISTWIDTH] IN BLOOD BY AUTOMATED COUNT: 47.6 FL (ref 35–45)
GFR SERPL CREATININE-BSD FRML MDRD: > 90 ML/MIN/1.73M2
GLUCOSE BLD-MCNC: 101 MG/DL (ref 70–108)
HCT VFR BLD CALC: 43.5 % (ref 42–52)
HEMOGLOBIN: 14.6 GM/DL (ref 14–18)
IMMATURE GRANS (ABS): 0.01 THOU/MM3 (ref 0–0.07)
IMMATURE GRANULOCYTES: 0.1 %
LYMPHOCYTES # BLD: 37 %
LYMPHOCYTES ABSOLUTE: 2.6 THOU/MM3 (ref 1–4.8)
MCH RBC QN AUTO: 33.3 PG (ref 26–33)
MCHC RBC AUTO-ENTMCNC: 33.6 GM/DL (ref 32.2–35.5)
MCV RBC AUTO: 99.3 FL (ref 80–94)
MONOCYTES # BLD: 12.3 %
MONOCYTES ABSOLUTE: 0.8 THOU/MM3 (ref 0.4–1.3)
NUCLEATED RED BLOOD CELLS: 0 /100 WBC
OSMOLALITY CALCULATION: 267.8 MOSMOL/KG (ref 275–300)
PLATELET # BLD: 259 THOU/MM3 (ref 130–400)
PMV BLD AUTO: 9.4 FL (ref 9.4–12.4)
POTASSIUM SERPL-SCNC: 3.9 MEQ/L (ref 3.5–5.2)
RBC # BLD: 4.38 MILL/MM3 (ref 4.7–6.1)
REASON FOR REJECTION: NORMAL
REASON FOR REJECTION: NORMAL
REJECTED TEST: NORMAL
REJECTED TEST: NORMAL
SEG NEUTROPHILS: 45.7 %
SEGMENTED NEUTROPHILS ABSOLUTE COUNT: 3.2 THOU/MM3 (ref 1.8–7.7)
SODIUM BLD-SCNC: 134 MEQ/L (ref 135–145)
WBC # BLD: 6.9 THOU/MM3 (ref 4.8–10.8)

## 2021-07-09 PROCEDURE — 6370000000 HC RX 637 (ALT 250 FOR IP): Performed by: NURSE PRACTITIONER

## 2021-07-09 PROCEDURE — 80051 ELECTROLYTE PANEL: CPT

## 2021-07-09 PROCEDURE — 82947 ASSAY GLUCOSE BLOOD QUANT: CPT

## 2021-07-09 PROCEDURE — 99282 EMERGENCY DEPT VISIT SF MDM: CPT

## 2021-07-09 PROCEDURE — 82565 ASSAY OF CREATININE: CPT

## 2021-07-09 PROCEDURE — 36415 COLL VENOUS BLD VENIPUNCTURE: CPT

## 2021-07-09 PROCEDURE — 85025 COMPLETE CBC W/AUTO DIFF WBC: CPT

## 2021-07-09 PROCEDURE — 84520 ASSAY OF UREA NITROGEN: CPT

## 2021-07-09 RX ORDER — PREDNISONE 20 MG/1
20 TABLET ORAL DAILY
Qty: 5 TABLET | Refills: 0 | Status: SHIPPED | OUTPATIENT
Start: 2021-07-09 | End: 2021-07-14

## 2021-07-09 RX ORDER — PREDNISONE 20 MG/1
40 TABLET ORAL ONCE
Status: COMPLETED | OUTPATIENT
Start: 2021-07-09 | End: 2021-07-09

## 2021-07-09 RX ADMIN — PREDNISONE 40 MG: 20 TABLET ORAL at 23:06

## 2021-07-09 ASSESSMENT — ENCOUNTER SYMPTOMS
BACK PAIN: 0
ABDOMINAL PAIN: 0
TROUBLE SWALLOWING: 0
SHORTNESS OF BREATH: 0
EYE REDNESS: 0
VOMITING: 0
NAUSEA: 0
COUGH: 0

## 2021-07-09 ASSESSMENT — PAIN SCALES - GENERAL: PAINLEVEL_OUTOF10: 5

## 2021-07-09 ASSESSMENT — PAIN DESCRIPTION - ORIENTATION: ORIENTATION: RIGHT

## 2021-07-09 ASSESSMENT — PAIN DESCRIPTION - FREQUENCY: FREQUENCY: CONTINUOUS

## 2021-07-09 ASSESSMENT — PAIN DESCRIPTION - LOCATION: LOCATION: HAND;LEG

## 2021-07-09 ASSESSMENT — PAIN DESCRIPTION - DESCRIPTORS: DESCRIPTORS: NUMBNESS

## 2021-07-09 NOTE — ED PROVIDER NOTES
acamprosate (CAMPRAL) 333 MG tablet 666 mgHistorical Med      diclofenac sodium (VOLTAREN) 1 % GEL   1 wilber, Topical, BID, PRN, # 100 g, 0 Refill(s), Historical Med      folic acid (FOLVITE) 1 MG tablet take 1 tablet by mouth once dailyHistorical Med      promethazine (PHENERGAN) 12.5 MG tablet 12.5 mgHistorical Med      polyethylene glycol (GLYCOLAX) 17 GM/SCOOP powder Take 17 g by mouth daily, Disp-510 g, R-5Normal      omeprazole (PRILOSEC) 40 MG delayed release capsule Take 1 capsule by mouth every morning (before breakfast), Disp-90 capsule, R-0Normal      carBAMazepine (TEGRETOL) 200 MG tablet Take 1 tablet by mouth 2 times daily, Disp-60 tablet, R-2Normal      furosemide (LASIX) 20 MG tablet Take 1 tablet by mouth daily, Disp-30 tablet, R-3Normal      lidocaine (LMX) 4 % cream Apply to hand TID PRN pain, Disp-1 Tube, R-0, Normal      buprenorphine-naloxone (SUBOXONE) 2-0.5 MG SUBL place 1 tablet under the tongue and dissolve once dailyHistorical Med      hydrOXYzine (ATARAX) 50 MG tablet TAKE 1 TABLET BY MOUTH TWICE DAILY AS NEEDED FOR ANXIETYHistorical Med      NARCAN 4 MG/0.1ML LIQD nasal spray DAWHistorical Med      sucralfate (CARAFATE) 1 GM tablet Take 1 tablet by mouth 4 times daily, Disp-360 tablet, R-0Normal      ondansetron (ZOFRAN) 4 MG tablet Take 1 tablet by mouth 3 times daily as needed for Nausea or Vomiting, Disp-15 tablet, R-0Normal      gabapentin (NEURONTIN) 600 MG tablet Take 1 tablet by mouth 3 times daily for 180 days. , Disp-270 tablet, R-1Normal      docusate sodium (COLACE, DULCOLAX) 100 MG CAPS Take 100 mg by mouth 2 times daily, Disp-60 capsule, R-0Normal             ALLERGIES     Dilantin [phenytoin sodium extended]    FAMILY HISTORY       Family History   Problem Relation Age of Onset    No Known Problems Mother     Other Father         liver dx         SOCIAL HISTORY       Social History     Socioeconomic History    Marital status: Single     Spouse name: None    Number of children: None    Years of education: None    Highest education level: None   Occupational History    None   Tobacco Use    Smoking status: Current Every Day Smoker     Packs/day: 0.50     Years: 10.00     Pack years: 5.00     Types: Cigarettes     Start date: 12/29/2010    Smokeless tobacco: Never Used   Vaping Use    Vaping Use: Some days    Substances: Nicotine, Flavoring    Devices: Disposable   Substance and Sexual Activity    Alcohol use: Yes     Alcohol/week: 1.0 standard drinks     Types: 1 Cans of beer per week    Drug use: Not Currently     Types: Opiates      Comment: last used 6-8 months ago last time used fentynal    Sexual activity: Yes     Partners: Female   Other Topics Concern    None   Social History Narrative    None     Social Determinants of Health     Financial Resource Strain: Medium Risk    Difficulty of Paying Living Expenses: Somewhat hard   Food Insecurity: No Food Insecurity    Worried About Running Out of Food in the Last Year: Never true    Nathanael of Food in the Last Year: Never true   Transportation Needs:     Lack of Transportation (Medical):  Lack of Transportation (Non-Medical):    Physical Activity:     Days of Exercise per Week:     Minutes of Exercise per Session:    Stress:     Feeling of Stress :    Social Connections:     Frequency of Communication with Friends and Family:     Frequency of Social Gatherings with Friends and Family:     Attends Mosque Services:     Active Member of Clubs or Organizations:     Attends Club or Organization Meetings:     Marital Status:    Intimate Partner Violence:     Fear of Current or Ex-Partner:     Emotionally Abused:     Physically Abused:     Sexually Abused:        REVIEW OF SYSTEMS     Review of Systems   Constitutional: Negative for fatigue and fever. HENT: Negative for congestion and trouble swallowing. Eyes: Negative for redness. Respiratory: Negative for cough and shortness of breath. Cardiovascular: Negative for chest pain. Gastrointestinal: Negative for abdominal pain, nausea and vomiting. Genitourinary: Negative for difficulty urinating. Musculoskeletal: Negative for back pain. Skin: Negative for rash. Allergic/Immunologic: Negative for immunocompromised state. Neurological: Positive for numbness. Negative for light-headedness and headaches. Hematological: Does not bruise/bleed easily. Except as noted above the remainder of the review of systems was reviewed and is. PHYSICAL EXAM    (up to 7 for level 4, 8 or more for level 5)     ED Triage Vitals [07/09/21 0148]   BP Temp Temp src Pulse Resp SpO2 Height Weight   (!) 120/90 98 °F (36.7 °C) -- 94 17 95 % 5' 7\" (1.702 m) 154 lb (69.9 kg)       Physical Exam  Vitals and nursing note reviewed. Constitutional:       General: He is not in acute distress. Appearance: He is normal weight. He is not ill-appearing. HENT:      Head: Normocephalic and atraumatic. Mouth/Throat:      Mouth: Mucous membranes are moist.      Pharynx: Oropharynx is clear. Eyes:      Extraocular Movements: Extraocular movements intact. Pupils: Pupils are equal, round, and reactive to light. Cardiovascular:      Rate and Rhythm: Normal rate and regular rhythm. Pulses:           Radial pulses are 2+ on the right side and 2+ on the left side. Heart sounds: Normal heart sounds, S1 normal and S2 normal. No murmur heard. Pulmonary:      Effort: Pulmonary effort is normal. No respiratory distress. Breath sounds: Normal breath sounds. No decreased breath sounds. Abdominal:      General: Bowel sounds are normal.      Palpations: Abdomen is soft. Tenderness: There is no abdominal tenderness. There is no guarding or rebound. Musculoskeletal:      Right upper arm: No swelling, edema, deformity, lacerations, tenderness or bony tenderness. Cervical back: Neck supple. Right lower leg: No edema.       Left lower game on his cell phone without any difficulty. Arms are equally warm. Will discharge home.    [DD]      ED Course User Index  [DD] Tree Goldstein MD         The patient was evaluated during the global COVID-19 pandemic, and that diagnosis was considered upon their initial presentation. Their evaluation, treatment and testing was consistent with current guidelines for patients who present with complaints or symptoms that may be related to COVID-19. Strict returnprecautions and follow up instructions were discussed with the patient with which the patient agrees        ED Medications administered this visit:  Medications - No data to display      Procedures: (None if blank)       CLINICAL       1.  Right arm pain          DISPOSITION/PLAN   DISPOSITION Decision To Discharge 07/09/2021 05:33:25 AM      PATIENT REFERRED TO:  [facility]  [address] [place]  [phone]    Schedule an appointment as soon as possible for a visit   If symptoms worsen      DISCHARGE MEDICATIONS:  Discharge Medication List as of 7/9/2021  5:34 AM                 (Please note that portions of this note were completed with a voice recognition program.  Efforts were made to edit the dictations but occasionallywords are mis-transcribed.)      Electronically signed by Ney Kilpatrick MD on 7/9/21 at 4:22 AM EDT    Attending Physician, Emergency Department       Tree Goldstein MD  07/09/21 5600

## 2021-07-09 NOTE — ED NOTES
Pt to ED via intake with c/o right arm numbness. Pt reports since today their arm has felt numb. Pt reports their arm feels cold to touch. Pt arm does not appear to be cool to touch. Pt pulses present in bilateral upper extremities. VSS. Pt does not appear to be in any distress.       Ammy Garcia RN  07/09/21 1961

## 2021-07-10 LAB — HCV GENOTYPE: NORMAL

## 2021-07-10 NOTE — ED PROVIDER NOTES
Edgardo Griffith 13 COMPLAINT       Chief Complaint   Patient presents with    Numbness     leg and arm       Nurses Notes reviewed and I agree except as noted in the HPI. HISTORY OF PRESENT ILLNESS    Linda Matos is a 26 y.o. male who presents to the Emergency Department for the evaluation of pain and numbness in all 4 extremities. States that this is been going on for 2 days, was seen in the emergency department last night, states that they tried to draw blood however they were unable to. States that last night he only had pain in his arms, today he also has some pain, numbness, tingling in both lower legs. Denies history of blood clots, denies injury. The HPI was provided by the patient. REVIEW OF SYSTEMS     Review of Systems   Constitutional: Negative for chills, diaphoresis, fatigue and fever. HENT: Negative for congestion, ear pain, facial swelling, rhinorrhea, sinus pressure, sinus pain, sneezing, sore throat and trouble swallowing. Eyes: Negative for photophobia. Respiratory: Negative for apnea, cough, chest tightness, shortness of breath and wheezing. Cardiovascular: Negative for chest pain and palpitations. Gastrointestinal: Negative for abdominal distention, abdominal pain, constipation, diarrhea, nausea and vomiting. Endocrine: Negative for polydipsia, polyphagia and polyuria. Genitourinary: Negative for decreased urine volume, dysuria, flank pain, frequency and urgency. Musculoskeletal: Positive for myalgias (extremities). Negative for arthralgias, back pain, joint swelling, neck pain and neck stiffness. Skin: Negative for color change and wound. Neurological: Positive for numbness. Negative for dizziness, tremors, weakness, light-headedness and headaches.        PAST MEDICAL HISTORY    has a past medical history of Closed fracture of one rib of left side with routine healing, Hypertension, Liver laceration, closed, initial encounter, Multiple closed fractures of pelvis with stable disruption of pelvic ring with routine healing, Pneumothorax, traumatic, and Seizures ([facility] [address] SURGICAL HISTORY      has a past surgical history that includes [place] tooth extraction and CT GUIDED CHEST TUBE (12/14/2020). CURRENT MEDICATIONS       Discharge Medication List as of 7/9/2021 11:07 PM      CONTINUE these medications which have NOT CHANGED    Details   potassium chloride (KLOR-CON M) 20 MEQ extended release tablet Take 2 tablets po daily x 3 days. Take with food. , Disp-6 tablet, R-0Normal      acamprosate (CAMPRAL) 333 MG tablet 666 mgHistorical Med      diclofenac sodium (VOLTAREN) 1 % GEL   1 wilber, Topical, BID, PRN, # 100 g, 0 Refill(s), Historical Med      folic acid (FOLVITE) 1 MG tablet take 1 tablet by mouth once dailyHistorical Med      promethazine (PHENERGAN) 12.5 MG tablet 12.5 mgHistorical Med      polyethylene glycol (GLYCOLAX) 17 GM/SCOOP powder Take 17 g by mouth daily, Disp-510 g, R-5Normal      omeprazole (PRILOSEC) 40 MG delayed release capsule Take 1 capsule by mouth every morning (before breakfast), Disp-90 capsule, R-0Normal      carBAMazepine (TEGRETOL) 200 MG tablet Take 1 tablet by mouth 2 times daily, Disp-60 tablet, R-2Normal      furosemide (LASIX) 20 MG tablet Take 1 tablet by mouth daily, Disp-30 tablet, R-3Normal      lidocaine (LMX) 4 % cream Apply to hand TID PRN pain, Disp-1 Tube, R-0, Normal      buprenorphine-naloxone (SUBOXONE) 2-0.5 MG SUBL place 1 tablet under the tongue and dissolve once dailyHistorical Med      hydrOXYzine (ATARAX) 50 MG tablet TAKE 1 TABLET BY MOUTH TWICE DAILY AS NEEDED FOR ANXIETYHistorical Med      NARCAN 4 MG/0.1ML LIQD nasal spray DAWHistorical Med      sucralfate (CARAFATE) 1 GM tablet Take 1 tablet by mouth 4 times daily, Disp-360 tablet, R-0Normal      ondansetron (ZOFRAN) 4 MG tablet Take 1 tablet by mouth 3 times daily as needed for Nausea or Vomiting, Disp-15 tablet, R-0Normal      gabapentin (NEURONTIN) 600 MG tablet Take 1 tablet by mouth 3 times daily for 180 days. , Disp-270 tablet, R-1Normal      docusate sodium (COLACE, DULCOLAX) 100 MG CAPS Take 100 mg by mouth 2 times daily, Disp-60 capsule, R-0Normal             ALLERGIES     is allergic to dilantin [phenytoin sodium extended]. FAMILY HISTORY     He indicated that his mother is . He indicated that his father is . family history includes No Known Problems in his mother; Other in his father. SOCIAL HISTORY      reports that he has been smoking cigarettes. He started smoking about 10 years ago. He has a 5.00 pack-year smoking history. He has never used smokeless tobacco. He reports current alcohol use of about 1.0 standard drinks of alcohol per week. He reports previous drug use. Drug: Opiates . PHYSICAL EXAM     INITIAL VITALS:  oral temperature is 98.3 °F (36.8 °C). His blood pressure is 134/89 and his pulse is 107. His respiration is 20 and oxygen saturation is 99%. Physical Exam  Vitals and nursing note reviewed. Constitutional:       General: He is awake. He is not in acute distress. Appearance: Normal appearance. He is well-developed and normal weight. He is not ill-appearing, toxic-appearing or diaphoretic. HENT:      Head: Normocephalic and atraumatic. Nose: Nose normal.      Mouth/Throat:      Mouth: Mucous membranes are moist.      Pharynx: Oropharynx is clear. Eyes:      Extraocular Movements: Extraocular movements intact. Pupils: Pupils are equal, round, and reactive to light. Cardiovascular:      Rate and Rhythm: Normal rate and regular rhythm. No extrasystoles are present. Pulses: Normal pulses. Heart sounds: Normal heart sounds, S1 normal and S2 normal. Heart sounds not distant. No murmur heard. No friction rub. No gallop.     Pulmonary:      Effort: Pulmonary effort is normal. No tachypnea, bradypnea, accessory muscle usage, prolonged expiration, respiratory distress or retractions. Breath sounds: Normal breath sounds. No stridor. No wheezing, rhonchi or rales. Chest:      Chest wall: No tenderness. Abdominal:      General: Abdomen is flat. Bowel sounds are normal. There is no distension. Palpations: Abdomen is soft. There is no shifting dullness, hepatomegaly, splenomegaly or mass. Tenderness: There is no abdominal tenderness. Hernia: No hernia is present. Musculoskeletal:         General: No swelling, tenderness, deformity or signs of injury. Normal range of motion. Cervical back: Normal range of motion and neck supple. No rigidity. No muscular tenderness. Right lower leg: No edema. Left lower leg: No edema. Skin:     General: Skin is warm and dry. Capillary Refill: Capillary refill takes less than 2 seconds. Coloration: Skin is not jaundiced or pale. Neurological:      General: No focal deficit present. Mental Status: He is alert and oriented to person, place, and time. Mental status is at baseline. GCS: GCS eye subscore is 4. GCS verbal subscore is 5. GCS motor subscore is 6. Cranial Nerves: Cranial nerves are intact. Sensory: Sensation is intact. Psychiatric:         Mood and Affect: Mood normal.         Behavior: Behavior normal. Behavior is cooperative.           DIFFERENTIAL DIAGNOSIS:   Neuropathy, polysubstance abuse, anxiety, electrolyte abnormality    DIAGNOSTIC RESULTS     EKG: All EKG's are interpreted by the Emergency Department Physician who either signs or Co-signs this chart in the absence of a cardiologist.    None    RADIOLOGY: non-plainfilm images(s) such as CT, Ultrasound and MRI are read by the radiologist.    No orders to display       LABS:     Labs Reviewed - No data to display    EMERGENCY DEPARTMENT COURSE:   Vitals:    Vitals:    07/09/21 2123   BP: 134/89   Pulse: 107   Resp: 20   Temp: 98.3 °F (36.8 °C)   TempSrc: Oral SpO2: 99%       10:31 PM EDT: The patient was seen and evaluated. MDM:  Patient seen evaluated in timely manner for pain and numbness in all 4 extremities. Very low suspicion for spontaneous DVT to 4 extremities. Patient has full range of motion and sensation, has a few small welts where it appears that he has been itching. He will be treated with steroids, recommended follow-up with PCP for reevaluation, has history of IV drug use. No evidence of swelling, redness, erythema, phlebitis. He was amenable to trying steroids to see if he has improvement and will return to the emergency department for new or worsening symptoms. CRITICAL CARE:   None    CONSULTS:  None    PROCEDURES:  None    FINAL IMPRESSION      1. Numbness          DISPOSITION/PLAN   Discharge    PATIENT REFERRED TO:  HALI Joe CNP  [facility]  [phone]    Schedule an appointment as soon as possible for a visit in 1 week  For re-evaluation      DISCHARGE MEDICATIONS:  Discharge Medication List as of 7/9/2021 11:07 PM      START taking these medications    Details   predniSONE (DELTASONE) 20 MG tablet Take 1 tablet by mouth daily for 5 days, Disp-5 tablet, R-0Print             (Please note that portions of this note were completed with a voice recognition program.  Efforts were made to edit the dictations but occasionally words are mis-transcribed.)    The patient was given an opportunity to see the Emergency Attending. The patient voiced understanding that I was a Mid-LevelProvider and was in agreement with being seen independently by myself. Signed by: Lance Jester, APRN - CNP, Scribe, 08/01/21 10:42 PM    Provider:  I personally performed the services described in the documentation, reviewed and edited the documentation which was dictated to the scribe in my presence, and it accurately records my words and actions.     HALI Dillon CNP, 7/9/21, 10:42 PM       HALI Dillon CNP  07/14/21 0812       HALI Santacruz - CNP  08/01/21 [id number]

## 2021-07-10 NOTE — ED NOTES
Patient returns back to the ER for right leg and hand numbness.  Patient was seen yesterday but was tired of waiting and left      Maddy Hollis RN  07/09/21 8763

## 2021-07-11 LAB — CERULOPLASMIN: 33 MG/DL (ref 17–54)

## 2021-07-12 LAB
HCV QNT BY NAAT INTERPRETATION: NORMAL
HCV QNT BY NAAT IU/ML: NORMAL
HCV QNT BY NAAT LOG IU/ML: NORMAL

## 2021-07-16 ENCOUNTER — HOSPITAL ENCOUNTER (OUTPATIENT)
Dept: NON INVASIVE DIAGNOSTICS | Age: 31
Discharge: HOME | End: 2021-07-16
Payer: MEDICAID

## 2021-07-16 DIAGNOSIS — R07.9 CHEST PAIN, UNSPECIFIED TYPE: ICD-10-CM

## 2021-07-16 DIAGNOSIS — R55 PRE-SYNCOPE: ICD-10-CM

## 2021-07-16 DIAGNOSIS — R00.2 HEART PALPITATIONS: ICD-10-CM

## 2021-07-16 LAB
LV EF: 55 %
LVEF MODALITY: NORMAL

## 2021-07-16 PROCEDURE — 93225 XTRNL ECG REC<48 HRS REC: CPT

## 2021-07-16 PROCEDURE — 93226 XTRNL ECG REC<48 HR SCAN A/R: CPT

## 2021-07-16 PROCEDURE — 93306 TTE W/DOPPLER COMPLETE: CPT

## 2021-07-16 RX ORDER — BISACODYL 5 MG/1
15 TABLET, DELAYED RELEASE ORAL SEE ADMIN INSTRUCTIONS
Qty: 3 TABLET | Refills: 0 | Status: SHIPPED | OUTPATIENT
Start: 2021-07-16 | End: 2021-09-02

## 2021-07-16 RX ORDER — SODIUM, POTASSIUM,MAG SULFATES 17.5-3.13G
1 SOLUTION, RECONSTITUTED, ORAL ORAL SEE ADMIN INSTRUCTIONS
Qty: 1 ML | Refills: 0 | Status: SHIPPED | OUTPATIENT
Start: 2021-07-16 | End: 2021-09-02

## 2021-07-16 NOTE — PATIENT INSTRUCTIONS

## 2021-07-16 NOTE — PROGRESS NOTES
[address]  [facility] [address]  Phone: [phone]  Primary Provider: Nikki Benitez  Pre-op Performing Provider: NIKKI BENITEZ    PREOPERATIVE EVALUATION:  Today's date: 7/21/2021    Chance Torres is a 30 year old male who presents for a preoperative evaluation.    Surgical Information:  Surgery/Procedure: Colonoscopy  Surgery Location: [facility]  Surgeon: Dr. Patel  Surgery Date: 07/23/2021  Time of Surgery: 09:30 AM  Where patient plans to recover: At home with family  Fax number for surgical facility: Note does not need to be faxed, will be available electronically in Epic.    Type of Anesthesia Anticipated: MAC    Assessment & Plan     The proposed surgical procedure is considered LOW risk.    Preop general physical exam  Irritable bowel syndrome with both constipation and diarrhea    ADHD (attention deficit hyperactivity disorder), inattentive type  - amphetamine-dextroamphetamine (ADDERALL) 10 MG tablet; Take 1 tablet (10 mg) by mouth daily  - amphetamine-dextroamphetamine (ADDERALL XR) 20 MG 24 hr capsule; Take 1 capsule (20 mg) by mouth daily    Plan to refill prescription at this time. Patient would like to trial discontinuing medication due to possible side affects. He will reach out to let us know how the trial goes and if he decides to  prescription and continue with the medication. Advised to reach out if he has any concerns.  Consider switching to different stimulant.     Shortness of Breath  History of COVID 19  Plan for pulmonary function test to further evaluate persistent shortness of breath with activity. Consider pulmonary rehab if symptoms do not improve. Has been stable and unchanged, no concerns on exam today in regards to preop.    Advanced care planning/counseling discussion  Discussed advanced care documentation. States he has started the paperwork and still needs to finish it. Plan to add documentation to his chart  once completed in the future.    Risks and Recommendations:  The patient has the following additional risks and recommendations for perioperative complications:   - No identified additional risk factors other than previously addressed    Medication Instructions:  Hold adderall day of procedure    RECOMMENDATION:  APPROVAL GIVEN to proceed with proposed procedure, without further diagnostic evaluation.        Subjective     HPI related to upcoming procedure: Patient has been having long standing diarrhea and dysphagia. Symptoms began around the age of 18 with primarily diarrhea and abdominal pain. Dysphagia occurs with solid food such as meats and vegetables. Has noticed blood in his stools in the past. He has no known family history of autoimmune disease or celiac disease. He has been evaluated by GI and has a colonoscopy planned.      Preop Questions 7/21/2021   1. Have you ever had a heart attack or stroke? No   2. Have you ever had surgery on your heart or blood vessels, such as a stent placement, a coronary artery bypass, or surgery on an artery in your head, neck, heart, or legs? No   3. Do you have chest pain with activity? No   4. Do you have a history of  heart failure? No   5. Do you currently have a cold, bronchitis or symptoms of other infection? No   6. Do you have a cough, shortness of breath, or wheezing? No   7. Do you or anyone in your family have previous history of blood clots? UNKNOWN, no personal history.    8. Do you or does anyone in your family have a serious bleeding problem such as prolonged bleeding following surgeries or cuts? No   9. Have you ever had problems with anemia or been told to take iron pills? No   10. Have you had any abnormal blood loss such as black, tarry or bloody stools? No   11. Have you ever had a blood transfusion? No   12. Are you willing to have a blood transfusion if it is medically needed before, during, or after your surgery? Yes   13. Have you or any of your  relatives ever had problems with anesthesia? No   14. Do you have sleep apnea, excessive snoring or daytime drowsiness? No   15. Do you have any artifical heart valves or other implanted medical devices like a pacemaker, defibrillator, or continuous glucose monitor? No   16. Do you have artificial joints? No   17. Are you allergic to latex? No       Health Care Directive:  Patient does not have a Health Care Directive or Living Will: Discussed advance care planning with patient; information given to patient to review.    Preoperative Review of PM2P:   reviewed - controlled substances reflected in medication list.      Status of Chronic Conditions:  See problem list for active medical problems.  Problems all longstanding and stable, except as noted/documented.  See ROS for pertinent symptoms related to these conditions.    ADHD:   SUBJECTIVE:  Chance is here today to recheck ADHD/ADD. Feels he has good and bad days on his current adderall dose. Has feelings of mental fogginess and decreased appetitie. His mood as been up and down, but feels stable recently. He has put in his 2 weeks notice for work and will be staying home with kids. He is hopeful this will help with his stress levels. States he is ok with his current dose of adderall, but would like to trial discontinuing the medication.      Updates since last visit:     Routine for taking medicine, including time: 10mg 630 AM and 20mg 1130 AM  Time medicine wears off: unknown  Issues at school: NA  Issues at home: no  Control of symptoms: yes and no depends on the day    Side effects:  Headaches: No  Stomach aches: Yes dificulties eating   Irritability/mood swings: Yes due to stomach issues  Difficulties with sleep: Yes   Social withdrawal: No  Unusual movements/tics: No  Decreased appetite: Yes     Shortness of Breath:  SUBJECTIVE: History of COVID infection in April. Feels he is still experiencing shortness of breath primarily with activity. Has difficulty  participating in activities he enjoys such as golfing. Denies chest pain, fever, chills, cough.     Patient Active Problem List   Diagnosis     Irritable bowel syndrome with both constipation and diarrhea     Labral tear of shoulder     ADHD (attention deficit hyperactivity disorder), inattentive type       Past Medical History:   Diagnosis Date     Depressive disorder 2016     Marijuana use 1/4/2019       Past Surgical History:   Procedure Laterality Date     ABDOMEN SURGERY  2013/2016    Hernia repair     ARTHROSCOPY SHOULDER SUPERIOR LABRUM ANTERIOR TO POSTERIOR REPAIR Left 2012     HERNIA REPAIR Left 2013    open     HERNIA REPAIR Right 2016    laparoscopic       Current Outpatient Medications   Medication     amphetamine-dextroamphetamine (ADDERALL XR) 20 MG 24 hr capsule     amphetamine-dextroamphetamine (ADDERALL) 10 MG tablet     Simethicone 125 MG TABS     Simethicone 125 MG TABS     vitamin D3 (CHOLECALCIFEROL) 50 mcg (2000 units) tablet     zinc gluconate 50 MG tablet     albuterol (PROAIR HFA/PROVENTIL HFA/VENTOLIN HFA) 108 (90 Base) MCG/ACT inhaler     bisacodyl (DULCOLAX) 5 MG EC tablet     Na Sulfate-K Sulfate-Mg Sulf (SUPREP BOWEL PREP KIT) solution     polyethylene glycol (GOLYTELY) 236 g suspension     No current facility-administered medications for this visit.       Review of Systems  CONSTITUTIONAL: NEGATIVE for fever, chills, change in weight.   INTEGUMENTARY/SKIN: NEGATIVE for worrisome rashes, moles or lesions  EYES: NEGATIVE for vision changes or irritation  ENT/MOUTH: NEGATIVE for ear, mouth and throat problems  RESP: NEGATIVE for significant cough. Experiencing shortness of breath with activity   CV: NEGATIVE for chest pain, palpitations or peripheral edema  GI: NEGATIVE for nausea, abdominal pain, heartburn, or change in bowel habits  : NEGATIVE for frequency, dysuria, or hematuria  MUSCULOSKELETAL: NEGATIVE for significant arthralgias or myalgia  NEURO: NEGATIVE for weakness, dizziness  or paresthesias  HEME: NEGATIVE for bleeding problems  PSYCHIATRIC: Mood has been stable     Patient Active Problem List    Diagnosis Date Noted     ADHD (attention deficit hyperactivity disorder), inattentive type 02/26/2020     Priority: Medium     Irritable bowel syndrome with both constipation and diarrhea 07/12/2017     Priority: Medium     Labral tear of shoulder 07/24/2012     Priority: Medium      Past Medical History:   Diagnosis Date     Depressive disorder 2016     Marijuana use 1/4/2019     Past Surgical History:   Procedure Laterality Date     ABDOMEN SURGERY  2013/2016    Hernia repair     ARTHROSCOPY SHOULDER SUPERIOR LABRUM ANTERIOR TO POSTERIOR REPAIR Left 2012     HERNIA REPAIR Left 2013    open     HERNIA REPAIR Right 2016    laparoscopic     Current Outpatient Medications   Medication Sig Dispense Refill     amphetamine-dextroamphetamine (ADDERALL XR) 20 MG 24 hr capsule Take 1 capsule (20 mg) by mouth daily 30 capsule 0     amphetamine-dextroamphetamine (ADDERALL) 10 MG tablet Take 1 tablet (10 mg) by mouth daily 30 tablet 0     Simethicone 125 MG TABS Take 125 mg by mouth See Admin Instructions --Take tablet after finishing second half of Golytely with half a glass of water. 1 tablet 0     Simethicone 125 MG TABS Take 125 mg by mouth See Admin Instructions --Take tablet after finishing second half of Suprep with half a glass of water. 1 tablet 0     vitamin D3 (CHOLECALCIFEROL) 50 mcg (2000 units) tablet Take 1 tablet (50 mcg) by mouth daily 60 tablet 0     zinc gluconate 50 MG tablet Take 1 tablet (50 mg) by mouth daily 30 tablet 0     albuterol (PROAIR HFA/PROVENTIL HFA/VENTOLIN HFA) 108 (90 Base) MCG/ACT inhaler Inhale 2 puffs into the lungs every 6 hours (Patient not taking: Reported on 7/21/2021) 18 g 0     bisacodyl (DULCOLAX) 5 MG EC tablet Take 3 tablets (15 mg) by mouth See Admin Instructions --Take at 5 PM day prior to procedure (Patient not taking: Reported on 7/21/2021) 3 tablet 0  "    Na Sulfate-K Sulfate-Mg Sulf (SUPREP BOWEL PREP KIT) solution Take 177 mLs (1 Bottle) by mouth See Admin Instructions -Split dose 2 day Regimen: The evening before your procedure: dilute one bottle with water to a total volume of 16oz (up to the fill line).  At 6pm,  drink the entire amount.  Drink 32 oz of water over the next hour. The morning of your procedure repeat both steps above using the second bottle.  Start 5 hours before your procedure and complete the prep at least 3 hours before you arrive. (Patient not taking: Reported on 7/21/2021) 1 mL 0     polyethylene glycol (GOLYTELY) 236 g suspension Take 4,000 mLs by mouth See Admin Instructions --Drink half gallon (64oz) at 6pm on evening prior to procedure and second half on the morning of procedure (4 hours prior to procedure time) (Patient not taking: Reported on 7/21/2021) 1 mL 0       No Known Allergies     Social History     Tobacco Use     Smoking status: Never Smoker     Smokeless tobacco: Former User   Substance Use Topics     Alcohol use: Not Currently     Comment: rarely     Family History   Problem Relation Age of Onset     Mental Illness Brother         Not diagnosed by doctor or therapist.  Refuses to contact them     Mental Illness Brother      Cerebrovascular Disease Paternal Grandmother      Thyroid Disease Mother      Unknown/Adopted Mother      History   Drug Use Unknown     Comment: has been over a year         Objective     /82   Pulse 74   Temp 97.4  F (36.3  C) (Temporal)   Resp 14   Ht 1.832 m (6' 0.13\")   Wt 74.7 kg (164 lb 9.6 oz)   SpO2 96%   BMI 22.25 kg/m      Physical Exam    GENERAL APPEARANCE: healthy, alert and no distress     EYES: EOMI,  PERRL     HENT: ear canals and TM's normal and nose and mouth without ulcers or lesions     NECK: no adenopathy, no asymmetry, masses, or scars and thyroid normal to palpation     RESP: lungs clear to auscultation - no rales, rhonchi or wheezes     CV: regular rates and " rhythm, normal S1 S2, no S3 or S4 and no murmur, click or rub     ABDOMEN:  soft, nontender, no HSM or masses and bowel sounds normal     MS: extremities normal- no gross deformities noted, no evidence of inflammation in joints, FROM in all extremities.     SKIN: no suspicious lesions or rashes     NEURO: Normal strength and tone, sensory exam grossly normal, mentation intact and speech normal     PSYCH: mentation appears normal. and affect normal     LYMPHATICS: No cervical adenopathy    Recent Labs   Lab Test 10/06/20  0846   HGB 15.4         POTASSIUM 3.9   CR 0.94        Revised Cardiac Risk Index (RCRI):  The patient has the following serious cardiovascular risks for perioperative complications:   - No serious cardiac risks = 0 points     RCRI Interpretation: 0 points: Class I (very low risk - 0.4% complication rate)          I, Nikki Benitez PA-C, was present with the Physician Assistant student who participated in the service and in the documentation of the note.  I have verified the history and personally performed the physical exam and medical decision making.  I agree with the assessment and plan of care as documented in the note.     ISAC Carroll-S2    Signed Electronically by: Nikki Benitez PA-C  Copy of this evaluation report is provided to requesting physician.

## 2021-07-19 ENCOUNTER — TELEPHONE (OUTPATIENT)
Dept: FAMILY MEDICINE CLINIC | Age: 31
End: 2021-07-19

## 2021-07-19 LAB
ACQUISITION DURATION: NORMAL S
AVERAGE HEART RATE: 73 BPM
HOOKUP DATE: NORMAL
HOOKUP TIME: NORMAL
MAX HEART RATE TIME/DATE: NORMAL
MAX HEART RATE: 136 BPM
MIN HEART RATE TIME/DATE: NORMAL
MIN HEART RATE: 44 BPM
NUMBER OF QRS COMPLEXES: NORMAL
NUMBER OF SUPRAVENTRICULAR COUPLETS: 0
NUMBER OF SUPRAVENTRICULAR ECTOPICS: 666
NUMBER OF SUPRAVENTRICULAR ISOLATED BEATS: 218
NUMBER OF VENTRICULAR BIGEMINAL CYCLES: 0
NUMBER OF VENTRICULAR COUPLETS: 0
NUMBER OF VENTRICULAR ECTOPICS: 2

## 2021-07-20 ENCOUNTER — LAB (OUTPATIENT)
Dept: LAB | Facility: CLINIC | Age: 31
End: 2021-07-20
Attending: INTERNAL MEDICINE
Payer: COMMERCIAL

## 2021-07-20 DIAGNOSIS — Z11.59 ENCOUNTER FOR SCREENING FOR OTHER VIRAL DISEASES: ICD-10-CM

## 2021-07-20 PROCEDURE — U0005 INFEC AGEN DETEC AMPLI PROBE: HCPCS

## 2021-07-20 PROCEDURE — U0003 INFECTIOUS AGENT DETECTION BY NUCLEIC ACID (DNA OR RNA); SEVERE ACUTE RESPIRATORY SYNDROME CORONAVIRUS 2 (SARS-COV-2) (CORONAVIRUS DISEASE [COVID-19]), AMPLIFIED PROBE TECHNIQUE, MAKING USE OF HIGH THROUGHPUT TECHNOLOGIES AS DESCRIBED BY CMS-2020-01-R: HCPCS

## 2021-07-20 ASSESSMENT — MIFFLIN-ST. JEOR: SCORE: 1730.77

## 2021-07-21 ENCOUNTER — OFFICE VISIT (OUTPATIENT)
Dept: FAMILY MEDICINE | Facility: OTHER | Age: 31
End: 2021-07-21
Payer: COMMERCIAL

## 2021-07-21 VITALS
DIASTOLIC BLOOD PRESSURE: 82 MMHG | TEMPERATURE: 97.4 F | BODY MASS INDEX: 22.29 KG/M2 | HEART RATE: 74 BPM | SYSTOLIC BLOOD PRESSURE: 108 MMHG | RESPIRATION RATE: 14 BRPM | WEIGHT: 164.6 LBS | HEIGHT: 72 IN | OXYGEN SATURATION: 96 %

## 2021-07-21 DIAGNOSIS — Z86.16 HISTORY OF COVID-19: ICD-10-CM

## 2021-07-21 DIAGNOSIS — F90.0 ADHD (ATTENTION DEFICIT HYPERACTIVITY DISORDER), INATTENTIVE TYPE: ICD-10-CM

## 2021-07-21 DIAGNOSIS — K58.2 IRRITABLE BOWEL SYNDROME WITH BOTH CONSTIPATION AND DIARRHEA: ICD-10-CM

## 2021-07-21 DIAGNOSIS — R06.02 SHORTNESS OF BREATH: ICD-10-CM

## 2021-07-21 DIAGNOSIS — Z01.818 PREOP GENERAL PHYSICAL EXAM: Primary | ICD-10-CM

## 2021-07-21 DIAGNOSIS — Z71.89 ADVANCED CARE PLANNING/COUNSELING DISCUSSION: ICD-10-CM

## 2021-07-21 LAB — SARS-COV-2 RNA RESP QL NAA+PROBE: NEGATIVE

## 2021-07-21 PROCEDURE — 99214 OFFICE O/P EST MOD 30 MIN: CPT | Performed by: PHYSICIAN ASSISTANT

## 2021-07-21 RX ORDER — DEXTROAMPHETAMINE SACCHARATE, AMPHETAMINE ASPARTATE MONOHYDRATE, DEXTROAMPHETAMINE SULFATE AND AMPHETAMINE SULFATE 5; 5; 5; 5 MG/1; MG/1; MG/1; MG/1
20 CAPSULE, EXTENDED RELEASE ORAL DAILY
Qty: 30 CAPSULE | Refills: 0 | Status: SHIPPED | OUTPATIENT
Start: 2021-07-21 | End: 2021-08-10 | Stop reason: DRUGHIGH

## 2021-07-21 RX ORDER — DEXTROAMPHETAMINE SACCHARATE, AMPHETAMINE ASPARTATE, DEXTROAMPHETAMINE SULFATE AND AMPHETAMINE SULFATE 2.5; 2.5; 2.5; 2.5 MG/1; MG/1; MG/1; MG/1
10 TABLET ORAL DAILY
Qty: 30 TABLET | Refills: 0 | Status: SHIPPED | OUTPATIENT
Start: 2021-07-21 | End: 2021-08-10

## 2021-07-21 ASSESSMENT — MIFFLIN-ST. JEOR: SCORE: 1736.62

## 2021-07-21 ASSESSMENT — PAIN SCALES - GENERAL: PAINLEVEL: NO PAIN (0)

## 2021-07-22 LAB — MISC. #1 REFERENCE GROUP TEST: NORMAL

## 2021-07-23 ENCOUNTER — ANESTHESIA (OUTPATIENT)
Dept: SURGERY | Facility: AMBULATORY SURGERY CENTER | Age: 31
End: 2021-07-23
Payer: COMMERCIAL

## 2021-07-23 ENCOUNTER — HOSPITAL ENCOUNTER (OUTPATIENT)
Facility: AMBULATORY SURGERY CENTER | Age: 31
End: 2021-07-23
Attending: INTERNAL MEDICINE
Payer: COMMERCIAL

## 2021-07-23 VITALS
OXYGEN SATURATION: 100 % | SYSTOLIC BLOOD PRESSURE: 108 MMHG | TEMPERATURE: 97.4 F | BODY MASS INDEX: 22.21 KG/M2 | HEIGHT: 72 IN | WEIGHT: 164 LBS | RESPIRATION RATE: 16 BRPM | DIASTOLIC BLOOD PRESSURE: 73 MMHG

## 2021-07-23 VITALS — HEART RATE: 71 BPM

## 2021-07-23 DIAGNOSIS — Z12.11 SPECIAL SCREENING FOR MALIGNANT NEOPLASMS, COLON: Primary | ICD-10-CM

## 2021-07-23 LAB — COLONOSCOPY: NORMAL

## 2021-07-23 PROCEDURE — 45380 COLONOSCOPY AND BIOPSY: CPT

## 2021-07-23 PROCEDURE — G8907 PT DOC NO EVENTS ON DISCHARG: HCPCS

## 2021-07-23 PROCEDURE — 88305 TISSUE EXAM BY PATHOLOGIST: CPT | Performed by: PATHOLOGY

## 2021-07-23 PROCEDURE — G8918 PT W/O PREOP ORDER IV AB PRO: HCPCS

## 2021-07-23 RX ORDER — NALOXONE HYDROCHLORIDE 0.4 MG/ML
0.2 INJECTION, SOLUTION INTRAMUSCULAR; INTRAVENOUS; SUBCUTANEOUS
Status: DISCONTINUED | OUTPATIENT
Start: 2021-07-23 | End: 2021-07-24 | Stop reason: HOSPADM

## 2021-07-23 RX ORDER — ONDANSETRON 2 MG/ML
4 INJECTION INTRAMUSCULAR; INTRAVENOUS EVERY 6 HOURS PRN
Status: DISCONTINUED | OUTPATIENT
Start: 2021-07-23 | End: 2021-07-24 | Stop reason: HOSPADM

## 2021-07-23 RX ORDER — PROCHLORPERAZINE MALEATE 10 MG
10 TABLET ORAL EVERY 6 HOURS PRN
Status: DISCONTINUED | OUTPATIENT
Start: 2021-07-23 | End: 2021-07-24 | Stop reason: HOSPADM

## 2021-07-23 RX ORDER — NALOXONE HYDROCHLORIDE 0.4 MG/ML
0.4 INJECTION, SOLUTION INTRAMUSCULAR; INTRAVENOUS; SUBCUTANEOUS
Status: DISCONTINUED | OUTPATIENT
Start: 2021-07-23 | End: 2021-07-24 | Stop reason: HOSPADM

## 2021-07-23 RX ORDER — ONDANSETRON 4 MG/1
4 TABLET, ORALLY DISINTEGRATING ORAL EVERY 6 HOURS PRN
Status: DISCONTINUED | OUTPATIENT
Start: 2021-07-23 | End: 2021-07-24 | Stop reason: HOSPADM

## 2021-07-23 RX ORDER — ONDANSETRON 2 MG/ML
4 INJECTION INTRAMUSCULAR; INTRAVENOUS EVERY 30 MIN PRN
Status: DISCONTINUED | OUTPATIENT
Start: 2021-07-23 | End: 2021-07-24 | Stop reason: HOSPADM

## 2021-07-23 RX ORDER — SODIUM CHLORIDE, SODIUM LACTATE, POTASSIUM CHLORIDE, CALCIUM CHLORIDE 600; 310; 30; 20 MG/100ML; MG/100ML; MG/100ML; MG/100ML
INJECTION, SOLUTION INTRAVENOUS CONTINUOUS PRN
Status: DISCONTINUED | OUTPATIENT
Start: 2021-07-23 | End: 2021-07-23

## 2021-07-23 RX ORDER — FLUMAZENIL 0.1 MG/ML
0.2 INJECTION, SOLUTION INTRAVENOUS
Status: ACTIVE | OUTPATIENT
Start: 2021-07-23 | End: 2021-07-23

## 2021-07-23 RX ORDER — LIDOCAINE 40 MG/G
CREAM TOPICAL
Status: DISCONTINUED | OUTPATIENT
Start: 2021-07-23 | End: 2021-07-24 | Stop reason: HOSPADM

## 2021-07-23 RX ORDER — ONDANSETRON 2 MG/ML
4 INJECTION INTRAMUSCULAR; INTRAVENOUS
Status: DISCONTINUED | OUTPATIENT
Start: 2021-07-23 | End: 2021-07-24 | Stop reason: HOSPADM

## 2021-07-23 RX ORDER — ONDANSETRON 4 MG/1
4 TABLET, ORALLY DISINTEGRATING ORAL EVERY 30 MIN PRN
Status: DISCONTINUED | OUTPATIENT
Start: 2021-07-23 | End: 2021-07-24 | Stop reason: HOSPADM

## 2021-07-23 RX ORDER — PROPOFOL 10 MG/ML
INJECTION, EMULSION INTRAVENOUS CONTINUOUS PRN
Status: DISCONTINUED | OUTPATIENT
Start: 2021-07-23 | End: 2021-07-23

## 2021-07-23 RX ORDER — PROPOFOL 10 MG/ML
INJECTION, EMULSION INTRAVENOUS PRN
Status: DISCONTINUED | OUTPATIENT
Start: 2021-07-23 | End: 2021-07-23

## 2021-07-23 RX ORDER — LIDOCAINE HYDROCHLORIDE 20 MG/ML
INJECTION, SOLUTION INFILTRATION; PERINEURAL PRN
Status: DISCONTINUED | OUTPATIENT
Start: 2021-07-23 | End: 2021-07-23

## 2021-07-23 RX ORDER — SODIUM CHLORIDE, SODIUM LACTATE, POTASSIUM CHLORIDE, CALCIUM CHLORIDE 600; 310; 30; 20 MG/100ML; MG/100ML; MG/100ML; MG/100ML
INJECTION, SOLUTION INTRAVENOUS CONTINUOUS
Status: DISCONTINUED | OUTPATIENT
Start: 2021-07-23 | End: 2021-07-24 | Stop reason: HOSPADM

## 2021-07-23 RX ORDER — MEPERIDINE HYDROCHLORIDE 25 MG/ML
12.5 INJECTION INTRAMUSCULAR; INTRAVENOUS; SUBCUTANEOUS
Status: DISCONTINUED | OUTPATIENT
Start: 2021-07-23 | End: 2021-07-24 | Stop reason: HOSPADM

## 2021-07-23 RX ADMIN — SODIUM CHLORIDE, SODIUM LACTATE, POTASSIUM CHLORIDE, CALCIUM CHLORIDE: 600; 310; 30; 20 INJECTION, SOLUTION INTRAVENOUS at 09:10

## 2021-07-23 RX ADMIN — LIDOCAINE HYDROCHLORIDE 60 MG: 20 INJECTION, SOLUTION INFILTRATION; PERINEURAL at 09:10

## 2021-07-23 RX ADMIN — PROPOFOL 150 MCG/KG/MIN: 10 INJECTION, EMULSION INTRAVENOUS at 09:10

## 2021-07-23 RX ADMIN — PROPOFOL 100 MG: 10 INJECTION, EMULSION INTRAVENOUS at 09:10

## 2021-07-23 ASSESSMENT — LIFESTYLE VARIABLES: TOBACCO_USE: 0

## 2021-07-23 NOTE — ANESTHESIA POSTPROCEDURE EVALUATION
Patient: Chance Torres    Procedure(s):  COLONOSCOPY, WITH CO2 INSUFFLATION  Colonoscopy, With Polypectomy And Biopsy    Diagnosis:Irritable bowel syndrome, unspecified type [K58.9]  Hematochezia [K92.1]  Diagnosis Additional Information: No value filed.    Anesthesia Type:  MAC    Note:  Disposition: Outpatient   Postop Pain Control: Uneventful            Sign Out: Well controlled pain   PONV: No   Neuro/Psych: Uneventful            Sign Out: Acceptable/Baseline neuro status   Airway/Respiratory: Uneventful            Sign Out: Acceptable/Baseline resp. status   CV/Hemodynamics: Uneventful            Sign Out: Acceptable CV status; No obvious hypovolemia; No obvious fluid overload   Other NRE: NONE   DID A NON-ROUTINE EVENT OCCUR? No           Last vitals:  Vitals Value Taken Time   /73 07/23/21 1014   Temp 97.4  F (36.3  C) 07/23/21 0940   Pulse     Resp 16 07/23/21 1014   SpO2 100 % 07/23/21 1014       Electronically Signed By: Baron Cristina DO  July 23, 2021  11:41 AM

## 2021-07-23 NOTE — ANESTHESIA PREPROCEDURE EVALUATION
Anesthesia Pre-Procedure Evaluation    Patient: Chance Torres   MRN: [id number] : 1990        Preoperative Diagnosis: Irritable bowel syndrome, unspecified type [K58.9]  Hematochezia [K92.1]   Procedure : Procedure(s):  COLONOSCOPY, WITH CO2 INSUFFLATION     Past Medical History:   Diagnosis Date     Depressive disorder 2016     Marijuana use 2019      Past Surgical History:   Procedure Laterality Date     ABDOMEN SURGERY      Hernia repair     ARTHROSCOPY SHOULDER SUPERIOR LABRUM ANTERIOR TO POSTERIOR REPAIR Left      HERNIA REPAIR Left 2013    open     HERNIA REPAIR Right 2016    laparoscopic      No Known Allergies   Social History     Tobacco Use     Smoking status: Never Smoker     Smokeless tobacco: Former User   Substance Use Topics     Alcohol use: Not Currently     Comment: rarely      Wt Readings from Last 1 Encounters:   21 74.4 kg (164 lb)        Anesthesia Evaluation   Pt has had prior anesthetic.     No history of anesthetic complications       ROS/MED HX  ENT/Pulmonary:  - neg pulmonary ROS  (-) tobacco use   Neurologic:  - neg neurologic ROS     Cardiovascular:  - neg cardiovascular ROS     METS/Exercise Tolerance: >4 METS    Hematologic:  - neg hematologic  ROS     Musculoskeletal:  - neg musculoskeletal ROS     GI/Hepatic:     (+) Inflammatory bowel disease,     Renal/Genitourinary:  - neg Renal ROS     Endo:  - neg endo ROS     Psychiatric/Substance Use:     (+) psychiatric history depression Recreational drug usage: Cannabis.    Infectious Disease:  - neg infectious disease ROS     Malignancy:  - neg malignancy ROS     Other:  - neg other ROS          Physical Exam    Airway  airway exam normal      Mallampati: II   TM distance: > 3 FB   Neck ROM: full   Mouth opening: > 3 cm    Respiratory Devices and Support         Dental  no notable dental history         Cardiovascular   cardiovascular exam normal       Rhythm and rate: regular and normal     Pulmonary    pulmonary exam normal        breath sounds clear to auscultation           OUTSIDE LABS:  CBC:   Lab Results   Component Value Date    WBC 6.0 10/06/2020    WBC 8.2 10/17/2018    HGB 15.4 10/06/2020    HGB 15.3 10/17/2018    HCT 45.8 10/06/2020    HCT 45.3 10/17/2018     10/06/2020     10/17/2018     BMP:   Lab Results   Component Value Date     10/06/2020     10/17/2018    POTASSIUM 3.9 10/06/2020    POTASSIUM 4.3 10/17/2018    CHLORIDE 106 10/06/2020    CHLORIDE 105 10/17/2018    CO2 29 10/06/2020    CO2 30 10/17/2018    BUN 19 10/06/2020    BUN 14 10/17/2018    CR 0.94 10/06/2020    CR 1.02 10/17/2018    GLC 81 10/06/2020    GLC 87 10/17/2018     COAGS: No results found for: PTT, INR, FIBR  POC: No results found for: BGM, HCG, HCGS  HEPATIC:   Lab Results   Component Value Date    ALBUMIN 4.2 10/06/2020    PROTTOTAL 7.7 10/06/2020    ALT 28 10/06/2020    AST 21 10/06/2020    ALKPHOS 56 10/06/2020    BILITOTAL 1.1 10/06/2020     OTHER:   Lab Results   Component Value Date    ZENA 9.0 10/06/2020    TSH 0.86 10/06/2020    T4 1.21 10/17/2018       Anesthesia Plan    ASA Status:  1   NPO Status:  NPO Appropriate    Anesthesia Type: MAC.     - Reason for MAC: Deep or markedly invasive procedure (G8)   Induction: Propofol.   Maintenance: N/A.        Consents    Anesthesia Plan(s) and associated risks, benefits, and realistic alternatives discussed. Questions answered and patient/representative(s) expressed understanding.     - Discussed with:  Patient    Use of blood products discussed: No .     Postoperative Care            Comments:         H&P reviewed: Unable to attach H&P to encounter due to EHR limitations. H&P Update: appropriate H&P reviewed, patient examined. No interval changes since H&P (within 30 days).         Baron Cristina,

## 2021-07-23 NOTE — ANESTHESIA CARE TRANSFER NOTE
Patient: Chance Torres    Procedure(s):  COLONOSCOPY, WITH CO2 INSUFFLATION  Colonoscopy, With Polypectomy And Biopsy    Diagnosis: Irritable bowel syndrome, unspecified type [K58.9]  Hematochezia [K92.1]  Diagnosis Additional Information: No value filed.    Anesthesia Type:   MAC     Note:    Oropharynx: oropharynx clear of all foreign objects  Level of Consciousness: drowsy  Oxygen Supplementation: room air    Independent Airway: airway patency satisfactory and stable  Dentition: dentition unchanged  Vital Signs Stable: post-procedure vital signs reviewed and stable  Report to RN Given: handoff report given  Patient transferred to: Phase II    Handoff Report: Identifed the Patient, Identified the Reponsible Provider, Reviewed the pertinent medical history, Discussed the surgical course, Reviewed Intra-OP anesthesia mangement and issues during anesthesia, Set expectations for post-procedure period and Allowed opportunity for questions and acknowledgement of understanding      Vitals:  Vitals Value Taken Time   BP     Temp     Pulse     Resp     SpO2         Electronically Signed By: MORRIS La CRNA  July 23, 2021  9:38 AM

## 2021-07-23 NOTE — DISCHARGE INSTRUCTIONS
[facility]  Same-Day Surgery   Adult Discharge Orders & Instructions   For 24 hours after surgery  1. Get plenty of rest.  A responsible adult must stay with you for at least 24 hours after you leave the hospital.   2. Do not drive or use heavy equipment.  If you have weakness or tingling, don't drive or use heavy equipment until this feeling goes away.  3. Do not drink alcohol.  4. Avoid strenuous or risky activities.  Ask for help when climbing stairs.   5. You may feel lightheaded.  IF so, sit for a few minutes before standing.  Have someone help you get up.   6. If you have nausea (feel sick to your stomach): Drink only clear liquids such as apple juice, ginger ale, broth or 7-Up.  Rest may also help.  Be sure to drink enough fluids.  Move to a regular diet as you feel able.  7. You may have a slight fever. Call the doctor if your fever is over 100 F (37.7 C) (taken under the tongue) or lasts longer than 24 hours.  8. You may have a dry mouth, a sore throat, muscle aches or trouble sleeping.  These should go away after 24 hours.  9. Do not make important or legal decisions.   Call your doctor for any of the followin.  Signs of infection (fever, growing tenderness at the surgery site, a large amount of drainage or bleeding, severe pain, foul-smelling drainage, redness, swelling).    2. It has been over 8 to 10 hours since surgery and you are still not able to urinate (pass water).

## 2021-07-27 LAB
PATH REPORT.COMMENTS IMP SPEC: NORMAL
PATH REPORT.COMMENTS IMP SPEC: NORMAL
PATH REPORT.FINAL DX SPEC: NORMAL
PATH REPORT.GROSS SPEC: NORMAL
PATH REPORT.MICROSCOPIC SPEC OTHER STN: NORMAL
PATH REPORT.RELEVANT HX SPEC: NORMAL
PHOTO IMAGE: NORMAL

## 2021-07-30 DIAGNOSIS — R60.1 GENERALIZED EDEMA: ICD-10-CM

## 2021-07-30 RX ORDER — FUROSEMIDE 20 MG/1
TABLET ORAL
Qty: 30 TABLET | Refills: 3 | Status: SHIPPED | OUTPATIENT
Start: 2021-07-30 | End: 2021-08-09 | Stop reason: ALTCHOICE

## 2021-08-01 ASSESSMENT — ENCOUNTER SYMPTOMS
RHINORRHEA: 0
FACIAL SWELLING: 0
CONSTIPATION: 0
ABDOMINAL DISTENTION: 0
WHEEZING: 0
NAUSEA: 0
DIARRHEA: 0
TROUBLE SWALLOWING: 0
BACK PAIN: 0
COUGH: 0
PHOTOPHOBIA: 0
APNEA: 0
CHEST TIGHTNESS: 0
SINUS PAIN: 0
SORE THROAT: 0
SHORTNESS OF BREATH: 0
VOMITING: 0
COLOR CHANGE: 0
ABDOMINAL PAIN: 0
SINUS PRESSURE: 0

## 2021-08-04 ENCOUNTER — LAB (OUTPATIENT)
Dept: LAB | Facility: OTHER | Age: 31
End: 2021-08-04
Payer: COMMERCIAL

## 2021-08-04 DIAGNOSIS — R13.19 ESOPHAGEAL DYSPHAGIA: ICD-10-CM

## 2021-08-04 DIAGNOSIS — R19.7 DIARRHEA, UNSPECIFIED TYPE: ICD-10-CM

## 2021-08-04 DIAGNOSIS — K58.9 IRRITABLE BOWEL SYNDROME, UNSPECIFIED TYPE: ICD-10-CM

## 2021-08-04 DIAGNOSIS — K92.1 HEMATOCHEZIA: ICD-10-CM

## 2021-08-04 LAB
ALBUMIN SERPL-MCNC: 4.2 G/DL (ref 3.4–5)
ALP SERPL-CCNC: 50 U/L (ref 40–150)
ALT SERPL W P-5'-P-CCNC: 31 U/L (ref 0–70)
ANION GAP SERPL CALCULATED.3IONS-SCNC: 4 MMOL/L (ref 3–14)
AST SERPL W P-5'-P-CCNC: 20 U/L (ref 0–45)
BILIRUB SERPL-MCNC: 0.9 MG/DL (ref 0.2–1.3)
BUN SERPL-MCNC: 23 MG/DL (ref 7–30)
CALCIUM SERPL-MCNC: 8.9 MG/DL (ref 8.5–10.1)
CHLORIDE BLD-SCNC: 103 MMOL/L (ref 94–109)
CO2 SERPL-SCNC: 32 MMOL/L (ref 20–32)
CREAT SERPL-MCNC: 0.99 MG/DL (ref 0.66–1.25)
CRP SERPL-MCNC: <2.9 MG/L (ref 0–8)
ERYTHROCYTE [DISTWIDTH] IN BLOOD BY AUTOMATED COUNT: 14.2 % (ref 10–15)
GFR SERPL CREATININE-BSD FRML MDRD: >90 ML/MIN/1.73M2
GLUCOSE BLD-MCNC: 75 MG/DL (ref 70–99)
HCT VFR BLD AUTO: 44 % (ref 40–53)
HGB BLD-MCNC: 14.8 G/DL (ref 13.3–17.7)
MCH RBC QN AUTO: 28.2 PG (ref 26.5–33)
MCHC RBC AUTO-ENTMCNC: 33.6 G/DL (ref 31.5–36.5)
MCV RBC AUTO: 84 FL (ref 78–100)
PLATELET # BLD AUTO: 213 10E3/UL (ref 150–450)
POTASSIUM BLD-SCNC: 3.7 MMOL/L (ref 3.4–5.3)
PROT SERPL-MCNC: 7.4 G/DL (ref 6.8–8.8)
RBC # BLD AUTO: 5.25 10E6/UL (ref 4.4–5.9)
SODIUM SERPL-SCNC: 139 MMOL/L (ref 133–144)
WBC # BLD AUTO: 5.5 10E3/UL (ref 4–11)

## 2021-08-04 PROCEDURE — 82784 ASSAY IGA/IGD/IGG/IGM EACH: CPT

## 2021-08-04 PROCEDURE — 86140 C-REACTIVE PROTEIN: CPT

## 2021-08-04 PROCEDURE — 83516 IMMUNOASSAY NONANTIBODY: CPT

## 2021-08-04 PROCEDURE — 80053 COMPREHEN METABOLIC PANEL: CPT

## 2021-08-04 PROCEDURE — 85027 COMPLETE CBC AUTOMATED: CPT

## 2021-08-04 PROCEDURE — 36415 COLL VENOUS BLD VENIPUNCTURE: CPT

## 2021-08-05 LAB
IGA SERPL-MCNC: 340 MG/DL (ref 84–499)
TTG IGA SER-ACNC: 0.6 U/ML
TTG IGG SER-ACNC: <0.6 U/ML

## 2021-08-09 ENCOUNTER — OFFICE VISIT (OUTPATIENT)
Dept: FAMILY MEDICINE CLINIC | Age: 31
End: 2021-08-09
Payer: MEDICAID

## 2021-08-09 ENCOUNTER — MYC MEDICAL ADVICE (OUTPATIENT)
Dept: FAMILY MEDICINE | Facility: OTHER | Age: 31
End: 2021-08-09

## 2021-08-09 VITALS
WEIGHT: 141 LBS | SYSTOLIC BLOOD PRESSURE: 102 MMHG | DIASTOLIC BLOOD PRESSURE: 56 MMHG | HEART RATE: 84 BPM | TEMPERATURE: 98.2 F | RESPIRATION RATE: 10 BRPM | OXYGEN SATURATION: 99 % | HEIGHT: 65 IN | BODY MASS INDEX: 23.49 KG/M2

## 2021-08-09 DIAGNOSIS — F90.0 ADHD (ATTENTION DEFICIT HYPERACTIVITY DISORDER), INATTENTIVE TYPE: ICD-10-CM

## 2021-08-09 DIAGNOSIS — F11.20 OPIOID DEPENDENCE ON AGONIST THERAPY (HCC): ICD-10-CM

## 2021-08-09 DIAGNOSIS — B18.2 CHRONIC HEPATITIS C WITHOUT HEPATIC COMA (HCC): ICD-10-CM

## 2021-08-09 DIAGNOSIS — F10.10 ALCOHOL ABUSE: ICD-10-CM

## 2021-08-09 DIAGNOSIS — R07.9 CHEST PAIN, UNSPECIFIED TYPE: ICD-10-CM

## 2021-08-09 DIAGNOSIS — E87.1 HYPONATREMIA: ICD-10-CM

## 2021-08-09 DIAGNOSIS — R60.1 GENERALIZED EDEMA: Primary | ICD-10-CM

## 2021-08-09 PROCEDURE — 4004F PT TOBACCO SCREEN RCVD TLK: CPT | Performed by: NURSE PRACTITIONER

## 2021-08-09 PROCEDURE — G8420 CALC BMI NORM PARAMETERS: HCPCS | Performed by: NURSE PRACTITIONER

## 2021-08-09 PROCEDURE — G8427 DOCREV CUR MEDS BY ELIG CLIN: HCPCS | Performed by: NURSE PRACTITIONER

## 2021-08-09 PROCEDURE — 99213 OFFICE O/P EST LOW 20 MIN: CPT | Performed by: NURSE PRACTITIONER

## 2021-08-09 NOTE — PROGRESS NOTES
[facility] Medicine at / [address] [facility]  [address] [place] [address]. [address]  Chief Complaint   Patient presents with    Follow-up     CHEST PAIN - BETTER        History obtained from chart review and the patient. SUBJECTIVE:  Joshua P. Lazarus Plenty is a 26 y.o. male that presents today for heart palpitations    He was having episodes of heart palpitations, chest pains, SOB, but this was also occurring around the same time he was coming off ETOH. He hasn't had any more symptoms in 3 weeks. He is working, staying busy. Hasn't used ETOH in 4 weeks. Denies any other substances. He is still following with [facility] for polysubstance abuse, ETOH. He is compliant with suboxone. He has been following with GI for chronic Hep C. He was referred to Dr Maame Bravo for the Hep C. He is still taking the Lasix. Swelling has been good.     Age/Gender Health Maintenance    Lipid - 40  DM Screen - 40  Colon Cancer Screening - 48  Lung Cancer Screening (Age 54 to [de-identified] with 30 pack year hx, current smoker or quit within past 15 years) - 54    Tetanus - needs  Influenza Vaccine - yearly  Pneumonia Vaccine - 65  Zostavax - 50   HPV Vaccine - n/a    PSA testing discussion - 52  AAA Screening - 63    Falls screening - n/a    Current Outpatient Medications   Medication Sig Dispense Refill    diclofenac sodium (VOLTAREN) 1 % GEL   1 wilber, Topical, BID, PRN, # 100 g, 0 Refill(s)      promethazine (PHENERGAN) 12.5 MG tablet 12.5 mg      omeprazole (PRILOSEC) 40 MG delayed release capsule Take 1 capsule by mouth every morning (before breakfast) 90 capsule 0    carBAMazepine (TEGRETOL) 200 MG tablet Take 1 tablet by mouth 2 times daily 60 tablet 2    lidocaine (LMX) 4 % cream Apply to hand TID PRN pain 1 Tube 0    buprenorphine-naloxone (SUBOXONE) 2-0.5 MG SUBL place 1 tablet under the tongue and dissolve once daily      hydrOXYzine (ATARAX) 50 MG tablet TAKE 1 TABLET BY MOUTH TWICE DAILY AS NEEDED FOR ANXIETY      NARCAN 4 MG/0.1ML LIQD nasal spray       sucralfate (CARAFATE) 1 GM tablet Take 1 tablet by mouth 4 times daily 360 tablet 0    ondansetron (ZOFRAN) 4 MG tablet Take 1 tablet by mouth 3 times daily as needed for Nausea or Vomiting 15 tablet 0    gabapentin (NEURONTIN) 600 MG tablet Take 1 tablet by mouth 3 times daily for 180 days. 270 tablet 1    docusate sodium (COLACE, DULCOLAX) 100 MG CAPS Take 100 mg by mouth 2 times daily 60 capsule 0     No current facility-administered medications for this visit. No orders of the defined types were placed in this encounter. All medications reviewed and reconciled, including OTC and herbal medications. Updated list given to patient.        Patient Active Problem List   Diagnosis    Chronic bilateral low back pain    Se[facility]ure disorder ([facility] [address]    Cigarette nicotine dependence without complication    Gastroesophageal reflux disease    Alcoholic liver disease ([facility] [address]    Alcohol abuse    Opioid dependence on agonist therapy ([facility] [address]    Chronic hepatitis C without hepatic coma (HCC)    Elevated liver enzymes    Tobacco use    History of substance abuse ([facility] [address]    Pharyngoesophageal dysphagia    Constipation       Past Medical History:   Diagnosis Date    Closed fracture of one rib of left side with routine healing 12/17/2020    Hypertension     Liver laceration, closed, initial encounter 12/17/2020    Multiple closed fractures of pelvis with stable disruption of pelvic ring with routine healing 12/17/2020    Pneumothorax, traumatic     Seizures (HCC)        Past Surgical History:   Procedure Laterality Date    CT GUIDED CHEST TUBE  12/14/2020    CT GUIDED CHEST TUBE 12/14/2020 [facility] CT SCAN    WISDOM TOOTH EXTRACTION         Allergies   Allergen Reactions    Dilantin [Phenytoin Sodium Extended] Hives       Social History     Socioeconomic History    Marital status: Single     Spouse name: Not on file    Number of children: Not on file    Years of education: Not on file    Highest education level: Not on file   Occupational History    Not on file   Tobacco Use    Smoking status: Current Every Day Smoker     Packs/day: 0.50     Years: 10.00     Pack years: 5.00     Types: Cigarettes     Start date: 12/29/2010    Smokeless tobacco: Never Used   Vaping Use    Vaping Use: Some days    Substances: Nicotine, Flavoring    Devices: Disposable   Substance and Sexual Activity    Alcohol use: Yes     Alcohol/week: 1.0 standard drinks     Types: 1 Cans of beer per week    Drug use: Not Currently     Types: Opiates      Comment: last used 6-8 months ago last time used fentynal    Sexual activity: Yes     Partners: Female   Other Topics Concern    Not on file   Social History Narrative    Not on file     Social Determinants of Health     Financial Resource Strain: Medium Risk    Difficulty of Paying Living Expenses: Somewhat hard   Food Insecurity: No Food Insecurity    Worried About Running Out of Food in the Last Year: Never true    Nathanael of Food in the Last Year: Never true   Transportation Needs:     Lack of Transportation (Medical):      Lack of Transportation (Non-Medical):    Physical Activity:     Days of Exercise per Week:     Minutes of Exercise per Session:    Stress:     Feeling of Stress :    Social Connections:     Frequency of Communication with Friends and Family:     Frequency of Social Gatherings with Friends and Family:     Attends [employer] Services:     Active Member of Clubs or Organizations:     Attends Club or Organization Meetings:     Marital Status:    Intimate Partner Violence:     Fear of Current or Ex-Partner:     Emotionally Abused:     Physically Abused:     Sexually Abused:        Family History   Problem Relation Age of Onset    No Known Problems Mother     Other Father         liver dx          I have reviewed the patient's past medical history, past surgical history, allergies, medications, social and family history and I have made updates arrhythmia noted.       PHYSICAL EXAM:  Vitals:    08/09/21 1630   BP: (!) 102/56   Pulse: 84   Resp: 10   Temp: 98.2 °F (36.8 °C)   TempSrc: Oral   SpO2: 99%   Weight: 141 lb (64 kg)   Height: 5' 5\" (1.651 m)     Body mass index is 23.46 kg/m². VS Reviewed  General Appearance: A&O x 3, No acute distress,well developed and well- nourished  Head: normocephalic and atraumatic  Eyes: pupils equal, round, and reactive to light, extraocular eye movements intact, conjunctivae and eye lids without erythema  Neck: supple and non-tender without mass, no thyromegaly or thyroid nodules, no cervical lymphadenopathy  Pulmonary/Chest: clear to auscultation bilaterally- no wheezes, rales or rhonchi, normal air movement, no respiratory distress or retractions  Cardiovascular: S1 and S2 auscultated w/ RRR. No murmurs, rubs, clicks, or gallops, distal pulses intact. Abdomen: soft, non-tender, non-distended, bowl sounds physiologic,  no rebound or guarding, no masses or hernias noted. Liver and spleen without enlargement. Extremities: no cyanosis, clubbing of the lower extremities, trace generalized edema  Musculoskeletal: No joint swelling or gross deformity   Neuro:  Alert, 5/5 strength globally and symmetrically  Psych: Affect appropriate. Mood normal. Thought process is normal without evidence of depression or psychosis. Good insight and appropriate interaction. Cognition and memory appear to be intact. Skin: warm and dry, no rash or erythema  Lymph:  No cervical, auricular or supraclavicular lymph nodes palpated    ASSESSMENT & PLAN  Neda Rizo was seen today for follow-up. Diagnoses and all orders for this visit:    Generalized edema  -     Cancel: Basic Metabolic Panel; Future    Chest pain, unspecified type    Alcohol abuse    Opioid dependence on agonist therapy ([facility] [address]    Chronic hepatitis C without hepatic coma (HCC)    Hyponatremia  -     Cancel: Basic Metabolic Panel;  Future      - proceed with stress test, as long as normal, will not pursue any further testing  - con't following with [facility], con't Suboxone  - referred to ID for treatment of Hep C  - stop the Lasix, suspect his fluid retention secondary to ETOH which he has stopped  - had mild hyponatremia which is likely due to the Lasix, will stop    DISPOSITION    Return in about 6 months (around 2/9/2022) for chronic conditions. Da Aguilar released without restrictions. PATIENT COUNSELING    Counseling was provided today regarding the following topics: Healthy eating habits, Regular exercise, substance abuse and healthy sleep habits. Da Aguilar received counseling on the following healthy behaviors: medication adherence and tobacco cessation    Patient given educational materials on: See Attached    I have instructed Da Aguilar to complete a self tracking handout onnone  and instructed them to bring it with them to his next appointment. Barriers to learning and self management: none    Discussed use, benefit, and side effects of prescribed medications. Barriers to medication compliance addressed. All patient questions answered. Pt voiced understanding.        Electronically signed by HALI Snowden CNP on 8/9/2021 at 4:42 PM

## 2021-08-10 RX ORDER — DEXTROAMPHETAMINE SACCHARATE, AMPHETAMINE ASPARTATE, DEXTROAMPHETAMINE SULFATE AND AMPHETAMINE SULFATE 2.5; 2.5; 2.5; 2.5 MG/1; MG/1; MG/1; MG/1
10 TABLET ORAL 2 TIMES DAILY
Qty: 60 TABLET | Refills: 0 | Status: SHIPPED | OUTPATIENT
Start: 2021-08-10 | End: 2022-04-13

## 2021-09-01 PROBLEM — K29.70 GASTRITIS: Status: ACTIVE | Noted: 2021-09-01

## 2021-09-02 ENCOUNTER — ANCILLARY PROCEDURE (OUTPATIENT)
Dept: GENERAL RADIOLOGY | Facility: OTHER | Age: 31
End: 2021-09-02
Attending: PHYSICIAN ASSISTANT
Payer: COMMERCIAL

## 2021-09-02 ENCOUNTER — OFFICE VISIT (OUTPATIENT)
Dept: FAMILY MEDICINE | Facility: OTHER | Age: 31
End: 2021-09-02
Payer: COMMERCIAL

## 2021-09-02 VITALS
DIASTOLIC BLOOD PRESSURE: 70 MMHG | RESPIRATION RATE: 12 BRPM | TEMPERATURE: 97.3 F | HEIGHT: 71 IN | OXYGEN SATURATION: 98 % | SYSTOLIC BLOOD PRESSURE: 100 MMHG | HEART RATE: 78 BPM | BODY MASS INDEX: 23.52 KG/M2 | WEIGHT: 168 LBS

## 2021-09-02 DIAGNOSIS — M25.561 ACUTE PAIN OF RIGHT KNEE: ICD-10-CM

## 2021-09-02 DIAGNOSIS — Z87.19 HX OF BILATERAL INGUINAL HERNIA REPAIR: ICD-10-CM

## 2021-09-02 DIAGNOSIS — Z98.890 HX OF BILATERAL INGUINAL HERNIA REPAIR: ICD-10-CM

## 2021-09-02 DIAGNOSIS — F90.0 ADHD (ATTENTION DEFICIT HYPERACTIVITY DISORDER), INATTENTIVE TYPE: ICD-10-CM

## 2021-09-02 DIAGNOSIS — R10.31 INGUINAL PAIN, RIGHT: ICD-10-CM

## 2021-09-02 DIAGNOSIS — M25.561 ACUTE PAIN OF RIGHT KNEE: Primary | ICD-10-CM

## 2021-09-02 PROCEDURE — 99214 OFFICE O/P EST MOD 30 MIN: CPT | Performed by: PHYSICIAN ASSISTANT

## 2021-09-02 PROCEDURE — 73562 X-RAY EXAM OF KNEE 3: CPT | Mod: RT | Performed by: RADIOLOGY

## 2021-09-02 ASSESSMENT — PAIN SCALES - GENERAL: PAINLEVEL: MILD PAIN (3)

## 2021-09-02 ASSESSMENT — MIFFLIN-ST. JEOR: SCORE: 1730.78

## 2021-09-02 NOTE — PROGRESS NOTES
Assessment & Plan       ICD-10-CM    1. Acute pain of right knee  M25.561 XR Knee Right 3 Views   2. Inguinal pain, right  R10.31 CT Abdomen Pelvis w/o Contrast   3. Hx of bilateral inguinal hernia repair  Z98.890 CT Abdomen Pelvis w/o Contrast    Z87.19    4. ADHD (attention deficit hyperactivity disorder), inattentive type  F90.0        1. Right knee pain that has been fairly persistent for the past 6 months. His exam is fairly benign although there is slightly increased pain with valgus stress indicating a possible LCL injury. Will start with an x-ray today and if normal, will order an MRI as he has met his deductible and would like this checked which I feel is a good idea given the length of symptoms. I recommend he be more diligent with his home PT exercises along with more frequent Tylenol or ibuprofen. If symptoms worsen/change, he will contact the clinic.    2-3. No obvious hernia on exam today but given his symptoms and history of bilateral inguinal hernia repairs, I recommend a CT scan for further evaluation. Depending on results, may refer him to general surgery.    4. He would like me to take over his Adderall and I am happy to do this. He was recently switched to Adderall 10 mg 1-2 times daily and he discontinued the Adderall XR 20 mg. He states this has been a great change as he is only needing one Adderall 10 mg per day which controls his symptoms well. PDMP reviewed today and new CSA completed. Will refill monthly as needed and plan on recheck in 4-5 months when I return from leave.         NETTE Moreno [facility]   Chance is a 30 year old who presents for the following health issues     History of Present Illness       He eats 2-3 servings of fruits and vegetables daily.He consumes 0 sweetened beverage(s) daily.He exercises with enough effort to increase his heart rate 20 to 29 minutes per day.  He exercises with enough effort to increase his heart  rate 3 or less days per week.   He is taking medications regularly.       He would like to establish care with me today and discuss a few ongoing issues.     Musculoskeletal problem/pain- does not recall any real injury but could be due to repetition and overuse  Onset/Duration: 6 mos ago  Description  Location: knee - right  Joint Swelling: no  Redness: no  Pain: YES  Warmth: no  Intensity:  moderate  Progression of Symptoms:  Same, constant but based on what he is doing  Accompanying signs and symptoms:   Fevers: not applicable  Numbness/tingling/weakness: no  History  Trauma to the area: no  Recent illness:  no  Previous similar problem: no  Previous evaluation:  no  Precipitating or alleviating factors:  Aggravating factors include: overuse and sitting cross legged  Therapies tried and outcome: PT exercises, intermittent ibuprofen, not much benefit      He quit his job 1 month ago as he was pivoting a lot on his right leg so is no longer doing this repetitive motion. He does have a history of [employer] training and athletics so he may have injured the knee in the past. The pain is not worsening but is also not improving. He has done a few physical therapy exercises as his wife is a physical therapist but he is not doing exercises routinely. Pain is mostly over the lateral knee.       Concern - right groin pain, questionable recurrent hernia  Onset: 3 mos   Description: Laparoscopic right inguinal hernia 2017. Pt developed pain and bulge with no know injury  Intensity: mild  Progression of Symptoms:  same  Accompanying Signs & Symptoms: none  Previous history of similar problem: yes 2017  Precipitating factors:        Worsened by: standing for long periods  Alleviating factors:        Improved by: resting  Therapies tried and outcome: None    Three months ago, he was eating and had an episode of acid reflux/vomiting and noticed an immediate severe pain in the right groin. He thinks his hernia has returned as he  "has a fairly persistent dull ache and a bulge in that area, similar to symptoms before his surgery in 2017.       Review of Systems   Constitutional, cardiovascular, pulmonary, musculoskeletal, neuro, psych, gi and gu systems are negative, except as otherwise noted.      Objective    /70   Pulse 78   Temp 97.3  F (36.3  C) (Temporal)   Resp 12   Ht 1.798 m (5' 10.79\")   Wt 76.2 kg (168 lb)   SpO2 98%   BMI 23.57 kg/m    Body mass index is 23.57 kg/m .  Physical Exam   GENERAL: healthy, alert and no distress  RESP: lungs clear to auscultation - no rales, rhonchi or wheezes  CV: regular rate and rhythm, normal S1 S2, no S3 or S4, no murmur, click or rub  MS: no gross musculoskeletal defects noted, no edema. No right knee tenderness. Slightly increased lateral knee pain with valgus stress. Negative anterior/posterior drawer, varus stress and medial/lateral Deanne's.  : No obvious hernia or tenderness.   NEURO: Normal strength and tone, mentation intact and speech normal. Cranial nerves II-XII are grossly intact. DTRs are 2+/4 throughout and symmetric. Gait is stable.   PSYCH: mentation appears normal, affect normal/bright          "

## 2021-09-02 NOTE — LETTER
[facility] ELDESTIN Lyons  09/02/21  Patient: Chance Torres  YOB: 1990  Medical Record Number: [id number]                                                                                  Non-Opioid Controlled Substance Agreement    This is an agreement between you and your provider regarding safe and appropriate use of controlled substances prescribed by your care team. Controlled substances are?medicines that can cause physical and mental dependence (abuse).     There are strict laws about having and using these medicines. We here at [facility] are  committed to working with you in your efforts to get better. To support you in this work, we'll help you schedule regular office appointments for medicine refills. If we must cancel or change your appointment for any reason, we'll make sure you have enough medicine to last until your next appointment.     As a Provider, I will:     Listen carefully to your concerns while treating you with respect.     Recommend a treatment plan that I believe is in your best interest and may involve therapies other than medicine.      Talk with you often about the possible benefits and the risk of harm of any medicine that we prescribe for you.    Assess the safety of this medicine and check how well it works.      Provide a plan on how to taper (discontinue or go off) using this medicine if the decision is made to stop its use.      ::  As a Patient, I understand controlled substances:       Are prescribed by my care provider to help me function or work and manage my condition(s).?    Are strong medicines and can cause serious side effects.       Need to be taken exactly as prescribed.?Combining controlled substances with certain medicines or chemicals (such as illegal drugs, alcohol, sedatives, sleeping pills, and benzodiazepines) can be dangerous or even fatal.? If I stop taking my medicines suddenly, I may have severe withdrawal symptoms.     The  risks, benefits, and side effects of these medicine(s) were explained to me. I agree that:    1. I will take part in other treatments as advised by my care team. This may be psychiatry or counseling, physical therapy, behavioral therapy, group treatment or a referral to specialist.    2. I will keep all my appointments and understand this is part of the monitoring of controlled substances.?My care team may require an office visit for EVERY controlled substance refill. If I miss appointments or don t follow instructions, my care team may stop my medicine    3. I will take my medicines as prescribed. I will not change the dose or schedule unless my care team tells me to. There will be no refills if I run out early.      4. I may be asked to come to the clinic and complete a urine drug test or complete a pill count. If I don t give a urine sample or participate in a pill count, the care team may stop my medicine.    5. I will only receive controlled substance prescriptions from this clinic. If I am treated by another provider, I will tell them that I am taking controlled substances and that I have a treatment agreement with this provider. I will inform my [facility] care team within one business day if I am given a prescription for any controlled substance by another healthcare provider. My [facility] care team can contact other providers and pharmacists about my use of any medicines.    6. It is up to me to make sure that I don't run out of my medicines on weekends or holidays.?If my care team is willing to refill my prescription without a visit, I must request refills only during office hours. Refills may take up to 3 business days to process. I will use one pharmacy to fill all my controlled substance prescriptions. I will notify the clinic about any changes to my insurance or medicine availability.    7. I am responsible for my prescriptions. If the medicine/prescription is lost, stolen or destroyed,  it will not be replaced.?I also agree not to share controlled substance medicines with anyone.     8. I am aware I should not use any illegal or recreational drugs. I agree not to drink alcohol unless my care team says I can.     9. If I enroll in the [place] Medical Cannabis program, I will tell my care team before my next refill.    10. I will tell my care team right away if I become pregnant, have a new medical problem treated outside of my regular clinic, or have a change in my medicines.     11. I understand that this medicine can affect my thinking, judgment and reaction time.? Alcohol and drugs affect the brain and body, which can affect the safety of my driving. Being under the influence of alcohol or drugs can affect my decision-making, behaviors, personal safety and the safety of others. Driving while impaired (DWI) can occur if a person is driving, operating or in physical control of a car, motorcycle, boat, snowmobile, ATV, motorbike, off-road vehicle or any other motor vehicle ([place] Statute 169A.20). I understand the risk if I choose to drive or operate any vehicle or machinery.    I understand that if I do not follow any of the conditions above, my prescriptions or treatment may be stopped or changed.   I agree that my provider, clinic care team and pharmacy may work with any city, state or federal law enforcement agency that investigates the misuse, sale or other diversion of my controlled medicine. I will allow my provider to discuss my care with, or share a copy of, this agreement with any other treating provider, pharmacy or emergency room where I receive care.     I have read this agreement and have asked questions about anything I did not understand.    ________________________________________________________  Patient Signature - Chance Torres     ___________________                   Date     ________________________________________________________  Provider Signature - Prashant Escobar  PA-C       ___________________                   Date     ________________________________________________________  Witness Signature (required if provider not present while patient signing)          ___________________                   Date

## 2021-09-02 NOTE — PATIENT INSTRUCTIONS
Will order a right knee x-ray for further evaluation.  Continue with home PT exercises and as needed ibuprofen.  Depending on x-ray results, will likely order an MRI.    Will order a CT scan to determine if you have another hernia.  No obvious hernia on exam.     Will refill Adderall when needed.

## 2021-09-03 DIAGNOSIS — M25.561 ACUTE PAIN OF RIGHT KNEE: Primary | ICD-10-CM

## 2021-09-07 ENCOUNTER — HOSPITAL ENCOUNTER (EMERGENCY)
Age: 31
Discharge: LEFT BEFORE DISCHARGE | End: 2021-09-07
Payer: MEDICAID

## 2021-09-07 VITALS
HEART RATE: 77 BPM | DIASTOLIC BLOOD PRESSURE: 82 MMHG | WEIGHT: 140 LBS | TEMPERATURE: 97.9 F | SYSTOLIC BLOOD PRESSURE: 130 MMHG | RESPIRATION RATE: 16 BRPM | BODY MASS INDEX: 21.97 KG/M2 | OXYGEN SATURATION: 96 % | HEIGHT: 67 IN

## 2021-09-07 ASSESSMENT — PAIN SCALES - GENERAL: PAINLEVEL_OUTOF10: 5

## 2021-09-07 ASSESSMENT — PAIN DESCRIPTION - ORIENTATION: ORIENTATION: RIGHT

## 2021-09-07 ASSESSMENT — PAIN DESCRIPTION - LOCATION: LOCATION: LEG

## 2021-09-07 ASSESSMENT — PAIN DESCRIPTION - PAIN TYPE: TYPE: ACUTE PAIN

## 2021-09-09 ENCOUNTER — NURSE ONLY (OUTPATIENT)
Dept: LAB | Age: 31
End: 2021-09-09

## 2021-09-13 LAB
ETHYL ALCOHOL, SERUM: 0.07 %
HAV IGM SER IA-ACNC: NEGATIVE
HEPATITIS B SURFACE ANTIGEN: NEGATIVE

## 2021-09-14 LAB — HIV AG/AB: NONREACTIVE

## 2021-09-15 ENCOUNTER — HOSPITAL ENCOUNTER (OUTPATIENT)
Dept: RESPIRATORY THERAPY | Facility: CLINIC | Age: 31
Discharge: HOME | End: 2021-09-15
Attending: PHYSICIAN ASSISTANT | Admitting: PHYSICIAN ASSISTANT
Payer: COMMERCIAL

## 2021-09-15 DIAGNOSIS — R06.02 SHORTNESS OF BREATH: ICD-10-CM

## 2021-09-15 LAB
DLCOCOR-%PRED-PRE: 103 %
DLCOCOR-PRE: 34.54 ML/MIN/MMHG
DLCOUNC-%PRED-PRE: 104 %
DLCOUNC-PRE: 34.73 ML/MIN/MMHG
DLCOUNC-PRED: 33.39 ML/MIN/MMHG
ERV-%PRED-PRE: 102 %
ERV-PRE: 1.97 L
ERV-PRED: 1.93 L
EXPTIME-PRE: 6.58 SEC
FEF2575-%PRED-POST: 112 %
FEF2575-%PRED-PRE: 108 %
FEF2575-POST: 5.16 L/SEC
FEF2575-PRE: 4.96 L/SEC
FEF2575-PRED: 4.58 L/SEC
FEFMAX-%PRED-PRE: 95 %
FEFMAX-PRE: 10.01 L/SEC
FEFMAX-PRED: 10.49 L/SEC
FEV1-%PRED-PRE: 107 %
FEV1-PRE: 4.93 L
FEV1FEV6-PRE: 82 %
FEV1FEV6-PRED: 83 %
FEV1FVC-PRE: 82 %
FEV1FVC-PRED: 82 %
FEV1SVC-PRE: 84 %
FEV1SVC-PRED: 79 %
FIFMAX-PRE: 3.06 L/SEC
FRCPLETH-%PRED-PRE: 104 %
FRCPLETH-PRE: 3.56 L
FRCPLETH-PRED: 3.42 L
FVC-%PRED-PRE: 107 %
FVC-PRE: 6.03 L
FVC-PRED: 5.6 L
GAW-%PRED-PRE: 110 %
GAW-PRE: 1.13 L/S/CMH2O
GAW-PRED: 1.03 L/S/CMH2O
IC-%PRED-PRE: 100 %
IC-PRE: 3.9 L
IC-PRED: 3.89 L
RVPLETH-%PRED-PRE: 86 %
RVPLETH-PRE: 1.6 L
RVPLETH-PRED: 1.84 L
SGAW-%PRED-PRE: 375 %
SGAW-PRE: 0.31 1/CMH2O*S
SGAW-PRED: 0.08 1/CMH2O*S
SRAW-%PRED-PRE: 73 %
SRAW-PRE: 3.51 CMH2O*S
SRAW-PRED: 4.76 CMH2O*S
TLCPLETH-%PRED-PRE: 101 %
TLCPLETH-PRE: 7.46 L
TLCPLETH-PRED: 7.33 L
VA-%PRED-PRE: 104 %
VA-PRE: 7.19 L
VC-%PRED-PRE: 100 %
VC-PRE: 5.87 L
VC-PRED: 5.81 L

## 2021-09-15 PROCEDURE — 94060 EVALUATION OF WHEEZING: CPT

## 2021-09-15 PROCEDURE — 94726 PLETHYSMOGRAPHY LUNG VOLUMES: CPT

## 2021-09-15 PROCEDURE — 999N000157 HC STATISTIC RCP TIME EA 10 MIN

## 2021-09-15 PROCEDURE — 999N000156 HC STATISTIC RCP CONSULT EA 30 MIN

## 2021-09-15 PROCEDURE — 250N000009 HC RX 250: Performed by: PHYSICIAN ASSISTANT

## 2021-09-15 PROCEDURE — 94729 DIFFUSING CAPACITY: CPT

## 2021-09-15 RX ORDER — ALBUTEROL SULFATE 0.83 MG/ML
2.5 SOLUTION RESPIRATORY (INHALATION)
Status: COMPLETED | OUTPATIENT
Start: 2021-09-15 | End: 2021-09-15

## 2021-09-15 RX ADMIN — ALBUTEROL SULFATE 2.5 MG: 2.5 SOLUTION RESPIRATORY (INHALATION) at 09:51

## 2021-09-15 NOTE — DISCHARGE INSTRUCTIONS
Thank you for completing pulmonary function testing today.  All results will be scanned into your epic results for your doctor to review.  Please resume taking all your current prescribed medications and diet as directed by your provider.   If you have not heard from your provider about your testing within two weeks and do not have a follow-up appointment scheduled with them please contact your provider about any questions you have concerning your testing.   Thank you  [facility] Pulmonary Function Lab

## 2021-09-15 NOTE — PROGRESS NOTES
Pre-Bronch                                Post Bronch        Actual Pred LLN %Pred   Actual %Pred %Chng     ---- SPIROMETRY ----                          FVC (L) 6.03 5.60 4.50 107     5.98 106 +0        FEV1 (L) 4.93 4.58 3.64 107     4.94 107 +0        FEV1/FVC (%) 82 82 71       83   +1        FEV1/SVC (%) 84 79                      FEF 25-75% (L/sec) 4.96 4.58 2.81 108     5.16 112 +3        FEF Max (L/sec) 10.01 10.49 8.10 95     9.21 87 -7        FIF Max (L/sec) 3.06 8.98 6.31 34     2.98 33 -2        MVV (L/min)   179 131                    MIP (cmH2O)                          MEP (cmH2O)                          ---- LUNG VOLUMES ----                          SVC (L) 5.87 5.81 4.83 100                  IC (L) 3.90 3.89   100                  ERV (L) 1.97 1.93   102                  FRC (N2) (L)   3.42 2.22                    RV (N2) (L)   1.84 1.02                    TLC (N2) (L)   7.33 5.93                    Clearance Index (L)                          FRC(Pleth) (L) 3.56 3.42 2.22 104                  RV (Pleth) (L) 1.60 1.84 1.02 86                  TLC (Pleth) (L) 7.46 7.33 5.93 101                  RV/TLC (Pleth) (%) 21 26 16                    ---- DIFFUSION ----                          DLCOunc (ml/min/mmHg) 34.73 33.39 24.85 104                  DLCOcor (ml/min/mmHg) 34.54 33.39 24.85 103                  DL/VA (ml/min/mmHg/L) 4.80 4.84                      VA (L) 7.19 6.90 5.66 104                  ---- BLOOD GASES ----                          FIO2 (%)                          pH   7.40                      PaCO2 (mmHg)   38-42                      PaO2 (mmHg)   95.2                      P(a-v)O2 (mmHg)                          Hgb (gm/dL) 14.8 12-18                      SaO2 (%)

## 2021-09-16 ENCOUNTER — HOSPITAL ENCOUNTER (OUTPATIENT)
Dept: MRI IMAGING | Facility: CLINIC | Age: 31
End: 2021-09-16
Attending: PHYSICIAN ASSISTANT
Payer: COMMERCIAL

## 2021-09-16 ENCOUNTER — HOSPITAL ENCOUNTER (OUTPATIENT)
Dept: CT IMAGING | Facility: CLINIC | Age: 31
End: 2021-09-16
Attending: PHYSICIAN ASSISTANT
Payer: COMMERCIAL

## 2021-09-16 DIAGNOSIS — M25.561 ACUTE PAIN OF RIGHT KNEE: ICD-10-CM

## 2021-09-16 DIAGNOSIS — R10.31 INGUINAL PAIN, RIGHT: ICD-10-CM

## 2021-09-16 DIAGNOSIS — Z98.890 HX OF BILATERAL INGUINAL HERNIA REPAIR: ICD-10-CM

## 2021-09-16 DIAGNOSIS — Z87.19 HX OF BILATERAL INGUINAL HERNIA REPAIR: ICD-10-CM

## 2021-09-16 PROCEDURE — 73721 MRI JNT OF LWR EXTRE W/O DYE: CPT | Mod: RT

## 2021-09-16 PROCEDURE — 250N000009 HC RX 250: Performed by: RADIOLOGY

## 2021-09-16 PROCEDURE — 74177 CT ABD & PELVIS W/CONTRAST: CPT

## 2021-09-16 PROCEDURE — 250N000011 HC RX IP 250 OP 636: Performed by: RADIOLOGY

## 2021-09-16 PROCEDURE — 73721 MRI JNT OF LWR EXTRE W/O DYE: CPT | Mod: 26 | Performed by: RADIOLOGY

## 2021-09-16 RX ORDER — IOPAMIDOL 755 MG/ML
500 INJECTION, SOLUTION INTRAVASCULAR ONCE
Status: COMPLETED | OUTPATIENT
Start: 2021-09-16 | End: 2021-09-16

## 2021-09-16 RX ADMIN — IOPAMIDOL 82 ML: 755 INJECTION, SOLUTION INTRAVENOUS at 10:26

## 2021-09-16 RX ADMIN — SODIUM CHLORIDE 60 ML: 9 INJECTION, SOLUTION INTRAVENOUS at 10:26

## 2021-09-17 LAB
ALANINE AMINOTRANSFERASE, FIBROMETER: 107 U/L (ref 5–50)
ALPHA-2-MACROGLOBULIN, FIBROMETER: 422 MG/DL (ref 131–293)
ASPARTATE AMINOTRANSFERASE, FIBROMETER: 184 U/L (ref 9–50)
CIRRHOMETER PATIENT SCORE: 0.03
EER FIBROMETER REPORT: ABNORMAL
FIBROMETER INTERPRETATION: ABNORMAL
FIBROMETER PATIENT SCORE: 0.93
FIBROMETER PLATELET COUNT: 255 K/UL
FIBROMETER PROTHROMBIN INDEX: 94 % (ref 90–120)
FIBROSIS METAVIR CLASSIFICATION: ABNORMAL
GAMMA GLUTAMYL TRANSFERASE, FIBROMETER: 143 U/L (ref 7–51)
INFLAMETER METAVIR CLASSIFICATION: ABNORMAL
INFLAMETER PATIENT SCORE: 0.71
UREA NITROGEN, FIBROMETER: 13 MG/DL (ref 7–20)

## 2021-09-26 ENCOUNTER — HEALTH MAINTENANCE LETTER (OUTPATIENT)
Age: 31
End: 2021-09-26

## 2021-10-12 ENCOUNTER — HOSPITAL ENCOUNTER (OUTPATIENT)
Age: 31
Discharge: HOME | End: 2021-10-12
Payer: MEDICAID

## 2021-10-12 ENCOUNTER — HOSPITAL ENCOUNTER (OUTPATIENT)
Dept: GENERAL RADIOLOGY | Age: 31
Discharge: HOME | End: 2021-10-12
Payer: MEDICAID

## 2021-10-12 ENCOUNTER — OFFICE VISIT (OUTPATIENT)
Dept: FAMILY MEDICINE CLINIC | Age: 31
End: 2021-10-12
Payer: MEDICAID

## 2021-10-12 VITALS
RESPIRATION RATE: 16 BRPM | OXYGEN SATURATION: 96 % | BODY MASS INDEX: 20.95 KG/M2 | WEIGHT: 133.5 LBS | SYSTOLIC BLOOD PRESSURE: 110 MMHG | DIASTOLIC BLOOD PRESSURE: 88 MMHG | HEIGHT: 67 IN | HEART RATE: 99 BPM | TEMPERATURE: 97.8 F

## 2021-10-12 DIAGNOSIS — S60.452A FOREIGN BODY OF RIGHT MIDDLE FINGER WITH INFECTION: Primary | ICD-10-CM

## 2021-10-12 DIAGNOSIS — K21.9 GASTROESOPHAGEAL REFLUX DISEASE, UNSPECIFIED WHETHER ESOPHAGITIS PRESENT: ICD-10-CM

## 2021-10-12 DIAGNOSIS — L08.9 FOREIGN BODY OF RIGHT MIDDLE FINGER WITH INFECTION: Primary | ICD-10-CM

## 2021-10-12 DIAGNOSIS — M54.50 CHRONIC BILATERAL LOW BACK PAIN, UNSPECIFIED WHETHER SCIATICA PRESENT: ICD-10-CM

## 2021-10-12 DIAGNOSIS — G89.29 CHRONIC BILATERAL LOW BACK PAIN, UNSPECIFIED WHETHER SCIATICA PRESENT: ICD-10-CM

## 2021-10-12 DIAGNOSIS — L08.9 FOREIGN BODY OF RIGHT MIDDLE FINGER WITH INFECTION: ICD-10-CM

## 2021-10-12 DIAGNOSIS — S60.452A FOREIGN BODY OF RIGHT MIDDLE FINGER WITH INFECTION: ICD-10-CM

## 2021-10-12 PROCEDURE — G8420 CALC BMI NORM PARAMETERS: HCPCS | Performed by: NURSE PRACTITIONER

## 2021-10-12 PROCEDURE — 4004F PT TOBACCO SCREEN RCVD TLK: CPT | Performed by: NURSE PRACTITIONER

## 2021-10-12 PROCEDURE — G8484 FLU IMMUNIZE NO ADMIN: HCPCS | Performed by: NURSE PRACTITIONER

## 2021-10-12 PROCEDURE — 73140 X-RAY EXAM OF FINGER(S): CPT

## 2021-10-12 PROCEDURE — 99213 OFFICE O/P EST LOW 20 MIN: CPT | Performed by: NURSE PRACTITIONER

## 2021-10-12 PROCEDURE — G8427 DOCREV CUR MEDS BY ELIG CLIN: HCPCS | Performed by: NURSE PRACTITIONER

## 2021-10-12 RX ORDER — SUCRALFATE 1 G/1
1 TABLET ORAL 4 TIMES DAILY
Qty: 360 TABLET | Refills: 0 | Status: SHIPPED | OUTPATIENT
Start: 2021-10-12 | End: 2022-07-22 | Stop reason: SDUPTHER

## 2021-10-12 RX ORDER — GABAPENTIN 600 MG/1
600 TABLET ORAL 3 TIMES DAILY
Qty: 270 TABLET | Refills: 1 | Status: SHIPPED | OUTPATIENT
Start: 2021-10-12 | End: 2022-07-22 | Stop reason: SDUPTHER

## 2021-10-12 RX ORDER — OMEPRAZOLE 40 MG/1
40 CAPSULE, DELAYED RELEASE ORAL
Qty: 90 CAPSULE | Refills: 0 | Status: SHIPPED | OUTPATIENT
Start: 2021-10-12 | End: 2022-07-22 | Stop reason: SDUPTHER

## 2021-10-12 RX ORDER — SULFAMETHOXAZOLE AND TRIMETHOPRIM 800; 160 MG/1; MG/1
1 TABLET ORAL 2 TIMES DAILY
Qty: 20 TABLET | Refills: 0 | Status: SHIPPED | OUTPATIENT
Start: 2021-10-12 | End: 2021-10-22

## 2021-10-12 RX ORDER — PROMETHAZINE HYDROCHLORIDE 12.5 MG/1
12.5 TABLET ORAL EVERY 6 HOURS PRN
Qty: 30 TABLET | Refills: 0 | Status: SHIPPED | OUTPATIENT
Start: 2021-10-12 | End: 2022-07-22 | Stop reason: ALTCHOICE

## 2021-10-12 NOTE — PROGRESS NOTES
MG CAPS Take 100 mg by mouth 2 times daily 60 capsule 0    promethazine (PHENERGAN) 12.5 MG tablet Take 1 tablet by mouth every 6 hours as needed for Nausea 30 tablet 0    omeprazole (PRILOSEC) 40 MG delayed release capsule Take 1 capsule by mouth every morning (before breakfast) 90 capsule 0    gabapentin (NEURONTIN) 600 MG tablet Take 1 tablet by mouth 3 times daily for 180 days. 270 tablet 1    sucralfate (CARAFATE) 1 GM tablet Take 1 tablet by mouth 4 times daily 360 tablet 0     No current facility-administered medications for this visit. Orders Placed This Encounter   Medications    sulfamethoxazole-trimethoprim (BACTRIM DS;SEPTRA DS) 800-160 MG per tablet     Sig: Take 1 tablet by mouth 2 times daily for 10 days     Dispense:  20 tablet     Refill:  0    mupirocin (BACTROBAN) 2 % ointment     Sig: Apply 3 times daily. Dispense:  30 g     Refill:  0         All medications reviewed and reconciled, including OTC and herbal medications. Updated list given to patient.        Patient Active Problem List   Diagnosis    Chronic bilateral low back pain    Seizure disorder ([address]    Cigarette nicotine dependence without complication    Gastroesophageal reflux disease    Alcoholic liver disease ([address]    Alcohol abuse    Opioid dependence on agonist therapy ([address]    Chronic hepatitis C without hepatic coma (HCC)    Elevated liver enzymes    Tobacco use    History of substance abuse ([address]    Pharyngoesophageal dysphagia    Constipation    Gastritis       Past Medical History:   Diagnosis Date    Closed fracture of one rib of left side with routine healing 12/17/2020    Hypertension     Liver laceration, closed, initial encounter 12/17/2020    Multiple closed fractures of pelvis with stable disruption of pelvic ring with routine healing 12/17/2020    Pneumothorax, traumatic     Seizures ([address]        Past Surgical History:   Procedure Laterality Date    CT GUIDED CHEST TUBE  12/14/2020 CT GUIDED CHEST TUBE 12/14/2020 [facility] CT SCAN    WISDOM TOOTH EXTRACTION         Allergies   Allergen Reactions    Dilantin [Phenytoin Sodium Extended] Hives       Social History     Socioeconomic History    Marital status: Single     Spouse name: Not on file    Number of children: Not on file    Years of education: Not on file    Highest education level: Not on file   Occupational History    Not on file   Tobacco Use    Smoking status: Current Every Day Smoker     Packs/day: 0.50     Years: 10.00     Pack years: 5.00     Types: Cigarettes     Start date: 12/29/2010    Smokeless tobacco: Never Used   Vaping Use    Vaping Use: Some days    Substances: Nicotine, Flavoring    Devices: Disposable   Substance and Sexual Activity    Alcohol use: Yes     Alcohol/week: 1.0 standard drinks     Types: 1 Cans of beer per week    Drug use: Not Currently     Types: Opiates      Comment: last used 6-8 months ago last time used fentynal    Sexual activity: Yes     Partners: Female   Other Topics Concern    Not on file   Social History Narrative    Not on file     Social Determinants of Health     Financial Resource Strain: Medium Risk    Difficulty of Paying Living Expenses: Somewhat hard   Food Insecurity: No Food Insecurity    Worried About Running Out of Food in the Last Year: Never true    Nathanael of Food in the Last Year: Never true   Transportation Needs:     Lack of Transportation (Medical):      Lack of Transportation (Non-Medical):    Physical Activity:     Days of Exercise per Week:     Minutes of Exercise per Session:    Stress:     Feeling of Stress :    Social Connections:     Frequency of Communication with Friends and Family:     Frequency of Social Gatherings with Friends and Family:     Attends Caoda[employer] Services:     Active Member of Clubs or Organizations:     Attends Club or Organization Meetings:     Marital Status:    Intimate Partner Violence:     Fear of Current or Ex-Partner: 07/09/2021    HGB 14.6 07/09/2021    HCT 43.5 07/09/2021    MCV 99.3 (H) 07/09/2021     07/09/2021     PHYSICAL EXAM:  Vitals:    10/12/21 1556   BP: 110/88   Pulse: 99   Resp: 16   Temp: 97.8 °F (36.6 °C)   TempSrc: Oral   SpO2: 96%   Weight: 133 lb 8 oz (60.6 kg)   Height: 5' 7\" (1.702 m)     Body mass index is 20.91 kg/m². VS Reviewed  General Appearance: A&O x 3, No acute distress,well developed and well- nourished  Head: normocephalic and atraumatic  Eyes: pupils equal, round, and reactive to light, extraocular eye movements intact, conjunctivae and eye lids without erythema  Neck: supple and non-tender without mass, no thyromegaly or thyroid nodules, no cervical lymphadenopathy  Pulmonary/Chest: clear to auscultation bilaterally- no wheezes, rales or rhonchi, normal air movement, no respiratory distress or retractions  Cardiovascular: S1 and S2 auscultated w/ RRR. No murmurs, rubs, clicks, or gallops, distal pulses intact. Abdomen: soft, non-tender, non-distended, bowl sounds physiologic,  no rebound or guarding, no masses or hernias noted. Liver and spleen without enlargement. Extremities: no cyanosis, clubbing of the lower extremities, trace generalized edema  Musculoskeletal: No joint swelling or gross deformity   Neuro:  Alert, 5/5 strength globally and symmetrically  Psych: Affect appropriate. Mood normal. Thought process is normal without evidence of depression or psychosis. Good insight and appropriate interaction. Cognition and memory appear to be intact. Skin: warm and dry, no rash or erythema, right middle finger puncture wound with swelling, yellow drainage  Lymph:  No cervical, auricular or supraclavicular lymph nodes palpated    ASSESSMENT & PLAN  Reyes Casper was seen today for medication refill and other.     Diagnoses and all orders for this visit:    Foreign body of right middle finger with infection  -     sulfamethoxazole-trimethoprim (BACTRIM DS;SEPTRA DS) 800-160 MG per

## 2021-10-13 ENCOUNTER — TELEPHONE (OUTPATIENT)
Dept: FAMILY MEDICINE CLINIC | Age: 31
End: 2021-10-13

## 2021-10-13 NOTE — TELEPHONE ENCOUNTER
----- Message from HALI Bernardo CNP sent at 10/13/2021  9:45 AM EDT -----  Let Jay Galdamez know his finger xray shows some soft tissue swelling, but no fracture and no foreign body.

## 2021-10-14 ENCOUNTER — TELEPHONE (OUTPATIENT)
Dept: FAMILY MEDICINE CLINIC | Age: 31
End: 2021-10-14

## 2021-10-14 LAB
AEROBIC CULTURE: ABNORMAL
GRAM STAIN RESULT: ABNORMAL
ORGANISM: ABNORMAL

## 2021-10-14 NOTE — TELEPHONE ENCOUNTER
----- Message from Fabian China, APRN - CNP sent at 10/14/2021  8:42 AM EDT -----  Let Mis Sample know the culture of his finger came back growing staph. The bactrim Abx should take care of the infection though. F/u if no improvement or if worsening.

## 2021-10-27 ENCOUNTER — HOSPITAL ENCOUNTER (EMERGENCY)
Facility: CLINIC | Age: 31
Discharge: HOME | End: 2021-10-27
Attending: FAMILY MEDICINE | Admitting: FAMILY MEDICINE
Payer: COMMERCIAL

## 2021-10-27 ENCOUNTER — NURSE TRIAGE (OUTPATIENT)
Dept: FAMILY MEDICINE | Facility: OTHER | Age: 31
End: 2021-10-27

## 2021-10-27 VITALS
BODY MASS INDEX: 22.08 KG/M2 | OXYGEN SATURATION: 100 % | TEMPERATURE: 97.7 F | HEIGHT: 72 IN | RESPIRATION RATE: 18 BRPM | DIASTOLIC BLOOD PRESSURE: 75 MMHG | WEIGHT: 163 LBS | SYSTOLIC BLOOD PRESSURE: 126 MMHG | HEART RATE: 69 BPM

## 2021-10-27 DIAGNOSIS — M79.671 RIGHT FOOT PAIN: ICD-10-CM

## 2021-10-27 LAB
BASOPHILS # BLD AUTO: 0.1 10E3/UL (ref 0–0.2)
BASOPHILS NFR BLD AUTO: 0 %
CRP SERPL-MCNC: <2.9 MG/L (ref 0–8)
D DIMER PPP FEU-MCNC: 0.27 UG/ML FEU (ref 0–0.5)
EOSINOPHIL # BLD AUTO: 0.6 10E3/UL (ref 0–0.7)
EOSINOPHIL NFR BLD AUTO: 5 %
ERYTHROCYTE [DISTWIDTH] IN BLOOD BY AUTOMATED COUNT: 13.8 % (ref 10–15)
HCT VFR BLD AUTO: 43.6 % (ref 40–53)
HGB BLD-MCNC: 14.4 G/DL (ref 13.3–17.7)
IMM GRANULOCYTES # BLD: 0 10E3/UL
IMM GRANULOCYTES NFR BLD: 0 %
LYMPHOCYTES # BLD AUTO: 1.8 10E3/UL (ref 0.8–5.3)
LYMPHOCYTES NFR BLD AUTO: 15 %
MCH RBC QN AUTO: 28.2 PG (ref 26.5–33)
MCHC RBC AUTO-ENTMCNC: 33 G/DL (ref 31.5–36.5)
MCV RBC AUTO: 86 FL (ref 78–100)
MONOCYTES # BLD AUTO: 0.7 10E3/UL (ref 0–1.3)
MONOCYTES NFR BLD AUTO: 6 %
NEUTROPHILS # BLD AUTO: 8.3 10E3/UL (ref 1.6–8.3)
NEUTROPHILS NFR BLD AUTO: 74 %
NRBC # BLD AUTO: 0 10E3/UL
NRBC BLD AUTO-RTO: 0 /100
PLATELET # BLD AUTO: 186 10E3/UL (ref 150–450)
RBC # BLD AUTO: 5.1 10E6/UL (ref 4.4–5.9)
WBC # BLD AUTO: 11.5 10E3/UL (ref 4–11)

## 2021-10-27 PROCEDURE — 99284 EMERGENCY DEPT VISIT MOD MDM: CPT | Mod: 25 | Performed by: FAMILY MEDICINE

## 2021-10-27 PROCEDURE — 85025 COMPLETE CBC W/AUTO DIFF WBC: CPT | Performed by: FAMILY MEDICINE

## 2021-10-27 PROCEDURE — 36415 COLL VENOUS BLD VENIPUNCTURE: CPT | Performed by: FAMILY MEDICINE

## 2021-10-27 PROCEDURE — 29515 APPLICATION SHORT LEG SPLINT: CPT | Mod: RT | Performed by: FAMILY MEDICINE

## 2021-10-27 PROCEDURE — 99283 EMERGENCY DEPT VISIT LOW MDM: CPT | Performed by: FAMILY MEDICINE

## 2021-10-27 PROCEDURE — 85379 FIBRIN DEGRADATION QUANT: CPT | Performed by: FAMILY MEDICINE

## 2021-10-27 PROCEDURE — 86140 C-REACTIVE PROTEIN: CPT | Performed by: FAMILY MEDICINE

## 2021-10-27 RX ORDER — PREDNISONE 20 MG/1
TABLET ORAL
Qty: 10 TABLET | Refills: 0 | Status: SHIPPED | OUTPATIENT
Start: 2021-10-27 | End: 2022-04-13

## 2021-10-27 ASSESSMENT — MIFFLIN-ST. JEOR: SCORE: 1723.39

## 2021-10-27 NOTE — DISCHARGE INSTRUCTIONS
If you develop a rash or fever, I want you to stop the steroids and call your doctor to consider getting started on antibiotics right away.

## 2021-10-27 NOTE — ED PROVIDER NOTES
History     Chief Complaint   Patient presents with     Foot Pain     HPI  Chance Torres is a 30 year old male who presents with sudden onset right foot pain.  This started a few hours ago.  Patient was just at home just walking when he got the severe pain.  He describes the pain over the top part and lateral aspect of the foot.  Patient states he was not doing anything out of the ordinary when this pain started.  He did do yoga about 45 minutes before the pain started but did not do anything at that time.  Patient denied any trauma, denies feeling a pop or snap in the foot.  Patient tried to put a shoe on but just any shoe with arch support made the pain worse and shot more pressure up to the top part of his foot.  Ice has helped with the pain.  Patient denies any discoloration of the toes or rashes.  They called the nurse triage line and they were worried about blood clots and told him to immediately go to the emergency department.  Patient denies any chest pain or palpitations.  Denies any calf pain or leg swelling.    Allergies:  No Known Allergies    Problem List:    Patient Active Problem List    Diagnosis Date Noted     ADHD (attention deficit hyperactivity disorder), inattentive type 02/26/2020     Priority: Medium     Irritable bowel syndrome with both constipation and diarrhea 07/12/2017     Priority: Medium     Labral tear of shoulder 07/24/2012     Priority: Medium        Past Medical History:    Past Medical History:   Diagnosis Date     Depressive disorder 2016     Marijuana use 1/4/2019       Past Surgical History:    Past Surgical History:   Procedure Laterality Date     ABDOMEN SURGERY  2013/2016    Hernia repair     ARTHROSCOPY SHOULDER SUPERIOR LABRUM ANTERIOR TO POSTERIOR REPAIR Left 2012     COLONOSCOPY N/A 7/23/2021    Procedure: Colonoscopy, With Polypectomy And Biopsy;  Surgeon: Geneav Patel DO;  Location: [facility] OR     COLONOSCOPY WITH CO2 INSUFFLATION N/A 7/23/2021    Procedure:  "COLONOSCOPY, WITH CO2 INSUFFLATION;  Surgeon: Geneva Patel DO;  Location: [facility] OR     HERNIA REPAIR Left 2013    open     HERNIA REPAIR Right 2016    laparoscopic       Family History:    Family History   Problem Relation Age of Onset     Mental Illness Brother         Not diagnosed by doctor or therapist.  Refuses to contact them     Mental Illness Brother      Cerebrovascular Disease Paternal Grandmother      Thyroid Disease Mother      Unknown/Adopted Mother        Social History:  Marital Status:   [2]  Social History     Tobacco Use     Smoking status: Never Smoker     Smokeless tobacco: Former User   Vaping Use     Vaping Use: Never used   Substance Use Topics     Alcohol use: Not Currently     Comment: rarely     Drug use: Not Currently     Types: Marijuana     Comment: has been over a year        Medications:    No current outpatient medications on file.        Review of Systems   All other systems reviewed and are negative.      Physical Exam   BP: 126/75  Pulse: 69  Temp: 97.7  F (36.5  C)  Resp: 18  Height: 182.2 cm (5' 11.75\")  Weight: 73.9 kg (163 lb)  SpO2: 100 %      Physical Exam  Vitals and nursing note reviewed.   Constitutional:       General: He is not in acute distress.     Appearance: Normal appearance. He is not ill-appearing.   Musculoskeletal:      Right foot: Decreased range of motion. Normal capillary refill. Swelling (minimal), tenderness and bony tenderness present. No foot drop or crepitus. Normal pulse.        Legs:       Comments: Any type of range of motion of the toes or flexion extension at the ankle makes the pain worse.   Neurological:      Mental Status: He is alert.         ED Course        Procedures      Results for orders placed or performed during the hospital encounter of 10/27/21 (from the past 24 hour(s))   D dimer quantitative   Result Value Ref Range    D-Dimer Quantitative 0.27 0.00 - 0.50 ug/mL FEU    Narrative    This D-dimer assay is intended for use in " conjunction with a clinical pretest probability assessment model to exclude pulmonary embolism (PE) and deep venous thrombosis (DVT) in outpatients suspected of PE or DVT. The cut-off value is 0.50 ug/mL FEU.   CRP inflammation   Result Value Ref Range    CRP Inflammation <2.9 0.0 - 8.0 mg/L   CBC with platelets differential    Narrative    The following orders were created for panel order CBC with platelets differential.  Procedure                               Abnormality         Status                     ---------                               -----------         ------                     CBC with platelets and d...[[id number]]  Abnormal            Final result                 Please view results for these tests on the individual orders.   CBC with platelets and differential   Result Value Ref Range    WBC Count 11.5 (H) 4.0 - 11.0 10e3/uL    RBC Count 5.10 4.40 - 5.90 10e6/uL    Hemoglobin 14.4 13.3 - 17.7 g/dL    Hematocrit 43.6 40.0 - 53.0 %    MCV 86 78 - 100 fL    MCH 28.2 26.5 - 33.0 pg    MCHC 33.0 31.5 - 36.5 g/dL    RDW 13.8 10.0 - 15.0 %    Platelet Count 186 150 - 450 10e3/uL    % Neutrophils 74 %    % Lymphocytes 15 %    % Monocytes 6 %    % Eosinophils 5 %    % Basophils 0 %    % Immature Granulocytes 0 %    NRBCs per 100 WBC 0 <1 /100    Absolute Neutrophils 8.3 1.6 - 8.3 10e3/uL    Absolute Lymphocytes 1.8 0.8 - 5.3 10e3/uL    Absolute Monocytes 0.7 0.0 - 1.3 10e3/uL    Absolute Eosinophils 0.6 0.0 - 0.7 10e3/uL    Absolute Basophils 0.1 0.0 - 0.2 10e3/uL    Absolute Immature Granulocytes 0.0 <=0.0 10e3/uL    Absolute NRBCs 0.0 10e3/uL       Medications - No data to display     This is a 30-year-old male who presents with sudden onset of right lateral dorsum foot pain.  There was no trauma.  Patient describes the pain as a throbbing pressure-like pain.  Patient has normal pulses, good color of his toes.  Differential was some type of an acute arterial occlusion with the sudden onset of the pain,  versus some type of inflammatory condition like a tendinitis or plantar fasciitis.  Be weird for this to start suddenly though.  Infection is also in the differential with the warmth that is there but I do not see any rash and patient has no fever.  I did obtain a few labs.  Patient CRP was normal and D-dimer was negative.  Patient's white count was just barely elevated.  I still do not think this is likely infection and I am leaning more towards inflammatory still at this point.  I Shena go ahead and treat the patient with a course of steroids.  Patient will also be put in a walking boot for complete immobilization of the foot to see if this also helps with the pain.  Recommend follow-up with podiatry for recheck if things or not improving in the next 5 to 7 days.    Assessments & Plan (with Medical Decision Making)  Right foot pain     I have reviewed the nursing notes.    I have reviewed the findings, diagnosis, plan and need for follow up with the patient.              10/27/2021   [facility] EMERGENCY DEPT     Asaf Rivas MD  10/27/21 1845

## 2021-10-27 NOTE — ED TRIAGE NOTES
Pt presents with concerns of right ankle/ foot pain.  Pt states that his pain started at 1400 today.  Pt denies injury or fall.  Pt has not had anything prior to arrival.  History of varicose veins.  Pt states that he has increased leg pain recently.

## 2021-10-27 NOTE — TELEPHONE ENCOUNTER
Patient has had right foot pain that started 1 hour ago,    Foot is hot to the touch and painful.    Mild swelling.    No redness.    No injury.    He is having trouble walking now.    Per protocol patient should go to the ER.  Teetee Milton RN on 10/27/2021 at 4:03 PM      Reason for Disposition    SEVERE pain (e.g., excruciating, unable to do any normal activities)    Additional Information    Negative: Followed an ankle or foot injury    Negative: Ankle pain is the main symptom    Negative: Entire foot is cool or blue in comparison to other foot    Negative: Purple or black skin on foot or toe    Negative: Red area or streak and fever    Negative: Swollen foot and fever    Negative: Patient sounds very sick or weak to the triager    Protocols used: FOOT PAIN-A-[place]

## 2021-10-28 ENCOUNTER — PATIENT OUTREACH (OUTPATIENT)
Dept: FAMILY MEDICINE | Facility: OTHER | Age: 31
End: 2021-10-28

## 2021-10-28 NOTE — TELEPHONE ENCOUNTER
Reason for follow up call: Chance Torres appeared on our list for being seen in and recenlty discharge from the Emergency Room/In Patient Hospital Admission.    Chief Complaint   Patient presents with     Hospital F/U     6% green       Foot Pain    Encounter routed for Clinic Triage RN to call for follow up

## 2021-10-28 NOTE — TELEPHONE ENCOUNTER
ED / Discharge Outreach Protocol    Patient Contact    Attempt # 1    Was call answered?  No.  Left message on voicemail with information to call me back.    HERO PimentelN, RN, PHN  [facility]  October 28, 2021

## 2021-10-29 NOTE — TELEPHONE ENCOUNTER
ED / Discharge Outreach Protocol    Patient Contact    Attempt # 2    Was call answered?  No.  Left message on voicemail with information to call me back.    Second attempt to reach patient. Closing encounter.    FIFI Cabrera/[facility]

## 2021-10-31 ENCOUNTER — MYC MEDICAL ADVICE (OUTPATIENT)
Dept: FAMILY MEDICINE | Facility: OTHER | Age: 31
End: 2021-10-31

## 2021-10-31 DIAGNOSIS — F90.0 ADHD (ATTENTION DEFICIT HYPERACTIVITY DISORDER), INATTENTIVE TYPE: ICD-10-CM

## 2021-11-01 RX ORDER — DEXTROAMPHETAMINE SACCHARATE, AMPHETAMINE ASPARTATE MONOHYDRATE, DEXTROAMPHETAMINE SULFATE AND AMPHETAMINE SULFATE 5; 5; 5; 5 MG/1; MG/1; MG/1; MG/1
20 CAPSULE, EXTENDED RELEASE ORAL DAILY
Qty: 30 CAPSULE | Refills: 0 | Status: SHIPPED | OUTPATIENT
Start: 2021-11-01 | End: 2022-08-09

## 2021-11-02 RX ORDER — DEXTROAMPHETAMINE SACCHARATE, AMPHETAMINE ASPARTATE, DEXTROAMPHETAMINE SULFATE AND AMPHETAMINE SULFATE 2.5; 2.5; 2.5; 2.5 MG/1; MG/1; MG/1; MG/1
10 TABLET ORAL 2 TIMES DAILY
Qty: 60 TABLET | Refills: 0 | Status: SHIPPED | OUTPATIENT
Start: 2021-11-02 | End: 2021-12-02

## 2022-03-12 ENCOUNTER — HEALTH MAINTENANCE LETTER (OUTPATIENT)
Age: 32
End: 2022-03-12

## 2022-04-13 ENCOUNTER — VIRTUAL VISIT (OUTPATIENT)
Dept: FAMILY MEDICINE | Facility: OTHER | Age: 32
End: 2022-04-13
Payer: COMMERCIAL

## 2022-04-13 DIAGNOSIS — F90.0 ADHD (ATTENTION DEFICIT HYPERACTIVITY DISORDER), INATTENTIVE TYPE: ICD-10-CM

## 2022-04-13 PROCEDURE — 99213 OFFICE O/P EST LOW 20 MIN: CPT | Mod: 95 | Performed by: PHYSICIAN ASSISTANT

## 2022-04-13 RX ORDER — DEXTROAMPHETAMINE SACCHARATE, AMPHETAMINE ASPARTATE, DEXTROAMPHETAMINE SULFATE AND AMPHETAMINE SULFATE 2.5; 2.5; 2.5; 2.5 MG/1; MG/1; MG/1; MG/1
10 TABLET ORAL 2 TIMES DAILY
Qty: 60 TABLET | Refills: 0 | Status: SHIPPED | OUTPATIENT
Start: 2022-04-13 | End: 2022-06-06

## 2022-04-13 NOTE — PROGRESS NOTES
Chance is a 31 year old who is being evaluated via a billable video visit.      How would you like to obtain your AVS? MyChart  If the video visit is dropped, the invitation should be resent by: Text to cell phone: [phone]  Will anyone else be joining your video visit? No    Video Start Time: 5:33    Assessment & Plan       ICD-10-CM    1. ADHD (attention deficit hyperactivity disorder), inattentive type  F90.0 amphetamine-dextroamphetamine (ADDERALL) 10 MG tablet       1. Will try the Adderall 10 mg again to use as needed. He does not need this every day but there are times where he may need this 1-2 times per day. PDMP reviewed without any worrisome findings. Will refill when needed and plan on follow up in 6 months.       NETTE Moreno [facility]   Chance is a 31 year old who presents for the following health issues     History of Present Illness       Reason for visit:  Refill prescription and talk through it.    He eats 2-3 servings of fruits and vegetables daily.He consumes 0 sweetened beverage(s) daily.He exercises with enough effort to increase his heart rate 30 to 60 minutes per day.  He exercises with enough effort to increase his heart rate 3 or less days per week.   He is taking medications regularly.       He stopped his Adderall a few months ago as he wanted to see how things would go off of it. 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 However, he would still like to have it on hand as there are days where he has difficulty focusing. He has tried both the Adderall XR 20 mg and the short acting 10 mg and they both work fairly well. He would like to try the 10 mg dose again.       Review of Systems   Constitutional, cardiovascular, pulmonary, neuro, psych, gi and gu systems are negative, except as otherwise noted.      Objective        Vitals:  No vitals were obtained today due to virtual visit.    Physical Exam   GENERAL: Healthy, alert and no distress  EYES: Eyes grossly normal to inspection.  No discharge or erythema, or obvious scleral/conjunctival abnormalities.  RESP: No audible wheeze, cough, or visible cyanosis.  No visible retractions or increased work of breathing.    SKIN: Visible skin clear. No significant rash, abnormal pigmentation or lesions.  NEURO: Cranial nerves grossly intact.  Mentation and speech appropriate for age.  PSYCH: Mentation appears normal, affect normal/bright, judgement and insight intact, normal speech and appearance well-groomed.          Video-Visit Details    Type of service:  Video Visit    Video End Time:  5:44    Originating Location (pt. Location): Home    Distant Location (provider location):  [facility]     Platform used for Video Visit: Who What W[employer]

## 2022-06-06 ENCOUNTER — MYC REFILL (OUTPATIENT)
Dept: FAMILY MEDICINE | Facility: OTHER | Age: 32
End: 2022-06-06
Payer: COMMERCIAL

## 2022-06-06 DIAGNOSIS — F90.0 ADHD (ATTENTION DEFICIT HYPERACTIVITY DISORDER), INATTENTIVE TYPE: ICD-10-CM

## 2022-06-09 RX ORDER — DEXTROAMPHETAMINE SACCHARATE, AMPHETAMINE ASPARTATE, DEXTROAMPHETAMINE SULFATE AND AMPHETAMINE SULFATE 2.5; 2.5; 2.5; 2.5 MG/1; MG/1; MG/1; MG/1
10 TABLET ORAL 2 TIMES DAILY
Qty: 60 TABLET | Refills: 0 | Status: SHIPPED | OUTPATIENT
Start: 2022-06-09 | End: 2022-08-09

## 2022-06-09 NOTE — TELEPHONE ENCOUNTER
Pending Prescriptions:                       Disp   Refills    amphetamine-dextroamphetamine (ADDERALL) 1*60 tab*0        Sig: Take 1 tablet (10 mg) by mouth 2 times daily    Routing refill request to provider for review/approval because:  Drug not on [place] [facility] [place]   amphetamine-dextroamphetamine (ADDERALL) 10 MG tablet 60 tablet 0 4/13/2022  No   Sig - Route: Take 1 tablet (10 mg) by mouth 2 times daily - Oral   Sent to pharmacy as: Amphetamine-Dextroamphetamine 10 MG Oral Tablet (Adderall)   Class: E-Prescribe   Earliest Fill Date: 4/13/2022   Order: [id number]   E-Prescribing Status: Receipt confirmed by pharmacy (4/13/2022  5:42 PM CDT)       Requested Prescriptions   Pending Prescriptions Disp Refills    amphetamine-dextroamphetamine (ADDERALL) 10 MG tablet 60 tablet 0     Sig: Take 1 tablet (10 mg) by mouth 2 times daily        There is no refill protocol information for this order

## 2022-07-22 ENCOUNTER — OFFICE VISIT (OUTPATIENT)
Dept: FAMILY MEDICINE CLINIC | Age: 32
End: 2022-07-22
Payer: MEDICAID

## 2022-07-22 VITALS
DIASTOLIC BLOOD PRESSURE: 72 MMHG | BODY MASS INDEX: 23.32 KG/M2 | RESPIRATION RATE: 14 BRPM | TEMPERATURE: 98 F | OXYGEN SATURATION: 97 % | WEIGHT: 148.6 LBS | HEIGHT: 67 IN | HEART RATE: 81 BPM | SYSTOLIC BLOOD PRESSURE: 122 MMHG

## 2022-07-22 DIAGNOSIS — B18.2 CHRONIC HEPATITIS C WITHOUT HEPATIC COMA (HCC): ICD-10-CM

## 2022-07-22 DIAGNOSIS — G89.29 CHRONIC BILATERAL LOW BACK PAIN, UNSPECIFIED WHETHER SCIATICA PRESENT: ICD-10-CM

## 2022-07-22 DIAGNOSIS — F19.10 POLYSUBSTANCE ABUSE (HCC): Primary | ICD-10-CM

## 2022-07-22 DIAGNOSIS — K59.00 CONSTIPATION, UNSPECIFIED CONSTIPATION TYPE: ICD-10-CM

## 2022-07-22 DIAGNOSIS — K21.9 GASTROESOPHAGEAL REFLUX DISEASE, UNSPECIFIED WHETHER ESOPHAGITIS PRESENT: ICD-10-CM

## 2022-07-22 DIAGNOSIS — G40.909 SEIZURE DISORDER (HCC): ICD-10-CM

## 2022-07-22 DIAGNOSIS — M54.50 CHRONIC BILATERAL LOW BACK PAIN, UNSPECIFIED WHETHER SCIATICA PRESENT: ICD-10-CM

## 2022-07-22 PROCEDURE — 4004F PT TOBACCO SCREEN RCVD TLK: CPT | Performed by: NURSE PRACTITIONER

## 2022-07-22 PROCEDURE — G8420 CALC BMI NORM PARAMETERS: HCPCS | Performed by: NURSE PRACTITIONER

## 2022-07-22 PROCEDURE — G8427 DOCREV CUR MEDS BY ELIG CLIN: HCPCS | Performed by: NURSE PRACTITIONER

## 2022-07-22 PROCEDURE — 99214 OFFICE O/P EST MOD 30 MIN: CPT | Performed by: NURSE PRACTITIONER

## 2022-07-22 RX ORDER — CARBAMAZEPINE 200 MG/1
200 TABLET ORAL 2 TIMES DAILY
Qty: 60 TABLET | Refills: 2 | Status: SHIPPED | OUTPATIENT
Start: 2022-07-22 | End: 2022-09-12 | Stop reason: SDUPTHER

## 2022-07-22 RX ORDER — OMEPRAZOLE 40 MG/1
40 CAPSULE, DELAYED RELEASE ORAL
Qty: 90 CAPSULE | Refills: 1 | Status: SHIPPED | OUTPATIENT
Start: 2022-07-22 | End: 2022-09-12 | Stop reason: SDUPTHER

## 2022-07-22 RX ORDER — GABAPENTIN 600 MG/1
600 TABLET ORAL 3 TIMES DAILY
Qty: 270 TABLET | Refills: 1 | Status: SHIPPED | OUTPATIENT
Start: 2022-07-22 | End: 2022-09-12 | Stop reason: SDUPTHER

## 2022-07-22 RX ORDER — SUCRALFATE 1 G/1
1 TABLET ORAL 4 TIMES DAILY
Qty: 360 TABLET | Refills: 1 | Status: SHIPPED | OUTPATIENT
Start: 2022-07-22 | End: 2022-09-12 | Stop reason: SDUPTHER

## 2022-07-22 RX ORDER — PSEUDOEPHEDRINE HCL 30 MG
100 TABLET ORAL 2 TIMES DAILY
Qty: 60 CAPSULE | Refills: 0 | Status: SHIPPED | OUTPATIENT
Start: 2022-07-22 | End: 2022-09-12 | Stop reason: SDUPTHER

## 2022-07-22 SDOH — ECONOMIC STABILITY: FOOD INSECURITY: WITHIN THE PAST 12 MONTHS, YOU WORRIED THAT YOUR FOOD WOULD RUN OUT BEFORE YOU GOT MONEY TO BUY MORE.: NEVER TRUE

## 2022-07-22 SDOH — ECONOMIC STABILITY: FOOD INSECURITY: WITHIN THE PAST 12 MONTHS, THE FOOD YOU BOUGHT JUST DIDN'T LAST AND YOU DIDN'T HAVE MONEY TO GET MORE.: NEVER TRUE

## 2022-07-22 ASSESSMENT — PATIENT HEALTH QUESTIONNAIRE - PHQ9
SUM OF ALL RESPONSES TO PHQ QUESTIONS 1-9: 0
1. LITTLE INTEREST OR PLEASURE IN DOING THINGS: 0
SUM OF ALL RESPONSES TO PHQ QUESTIONS 1-9: 0
SUM OF ALL RESPONSES TO PHQ QUESTIONS 1-9: 0
SUM OF ALL RESPONSES TO PHQ9 QUESTIONS 1 & 2: 0
SUM OF ALL RESPONSES TO PHQ QUESTIONS 1-9: 0
2. FEELING DOWN, DEPRESSED OR HOPELESS: 0

## 2022-07-22 ASSESSMENT — SOCIAL DETERMINANTS OF HEALTH (SDOH): HOW HARD IS IT FOR YOU TO PAY FOR THE VERY BASICS LIKE FOOD, HOUSING, MEDICAL CARE, AND HEATING?: NOT HARD AT ALL

## 2022-07-22 NOTE — PROGRESS NOTES
[facility] Medicine at / [address] [facility] [address]  Chief Complaint   Patient presents with    Medication Refill    Other     Pt wants to discuss Hep C treatment       History obtained from chart review and the patient. SUBJECTIVE:  Jaden Ibarra is a 33 y.o. male that presents today for follow up chronic conditions    Patient ended up going to [facility] because he had a warrant because he was supposed to show up for drug court, but didn't show up for it. He ended up going to [facility] for 8 months. He was released about 1 month ago, and has been going to recovery and going to [address] [place] [address]. He is going to [address] [place] [address] for treatment and taking Suboxone. Last ETOH and other was 9 months ago. He is wanting treatment for the Hep C. Julia's office referred him to Dr Nitin Moreira for treatment, and he would like to get back in with Dr Nitin Moreira for treatment. He is also wanting his stomach medications refills. He has been out of them and ever since his stomach has been bothering him. He would also like the Gabapentin refilled for his back. He has not been taking the Tegretol for the seizures. He didn't like the way it made him feel. His last seizure was in 2021    Age/Gender Health Maintenance    Lipid - 40  DM Screen - 40  Colon Cancer Screening - 48  Lung Cancer Screening (Age 54 to [de-identified] with 30 pack year hx, current smoker or quit within past 15 years) - 54    Tetanus - needs  Influenza Vaccine - yearly  Pneumonia Vaccine - 65  Zostavax - 50   HPV Vaccine - n/a    PSA testing discussion - 54  AAA Screening - 65    Falls screening - n/a    Current Outpatient Medications   Medication Sig Dispense Refill    omeprazole (PRILOSEC) 40 MG delayed release capsule Take 1 capsule by mouth every morning (before breakfast) 90 capsule 1    docusate (COLACE, DULCOLAX) 100 MG CAPS Take 100 mg by mouth in the morning and 100 mg before bedtime.  60 capsule 0    gabapentin (NEURONTIN) 600 MG tablet Take 1 tablet by mouth Gastroesophageal reflux disease    Alcoholic liver disease (HCC)    Alcohol abuse    Opioid dependence on agonist therapy ([facility] [address]    Chronic hepatitis C without hepatic coma (HCC)    Elevated liver enzymes    Tobacco use    History of substance abuse ([facility] [address]    Pharyngoesophageal dysphagia    Constipation    Gastritis       Past Medical History:   Diagnosis Date    Closed fracture of one rib of left side with routine healing 12/17/2020    Hypertension     Liver laceration, closed, initial encounter 12/17/2020    Multiple closed fractures of pelvis with stable disruption of pelvic ring with routine healing 12/17/2020    Pneumothorax, traumatic     Seizures (HCC)        Past Surgical History:   Procedure Laterality Date    CT GUIDED CHEST TUBE  12/14/2020    CT GUIDED CHEST TUBE 12/14/2020 [facility] CT SCAN    WISDOM TOOTH EXTRACTION         Allergies   Allergen Reactions    Dilantin [Phenytoin Sodium Extended] Hives       Social History     Socioeconomic History    Marital status: Single     Spouse name: Not on file    Number of children: Not on file    Years of education: Not on file    Highest education level: Not on file   Occupational History    Not on file   Tobacco Use    Smoking status: Every Day     Packs/day: 0.50     Years: 10.00     Pack years: 5.00     Types: Cigarettes     Start date: 12/29/2010    Smokeless tobacco: Never   Vaping Use    Vaping Use: Some days    Substances: Nicotine, Flavoring    Devices: Disposable   Substance and Sexual Activity    Alcohol use:  Yes     Alcohol/week: 1.0 standard drink     Types: 1 Cans of beer per week    Drug use: Not Currently     Types: Opiates      Comment: last used 6-8 months ago last time used fentynal    Sexual activity: Yes     Partners: Female   Other Topics Concern    Not on file   Social History Narrative    Not on file     Social Determinants of Health     Financial Resource Strain: Low Risk     Difficulty of Paying Living Expenses: Not hard at all   Food Insecurity: No Food Insecurity    Worried About Running Out of Food in the Last Year: Never true    Ran Out of Food in the Last Year: Never true   Transportation Needs: Not on file   Physical Activity: Not on file   Stress: Not on file   Social Connections: Not on file   Intimate Partner Violence: Not on file   Housing Stability: Not on file       Family History   Problem Relation Age of Onset    No Known Problems Mother     Other Father         liver dx          I have reviewed the patient's past medical history, past surgical history, allergies, medications, social and family history and I have made updates where appropriate.       Review of Systems  Positive responses are highlighted in bold    Constitutional:  Fever, Chills, Night Sweats, Fatigue, Unexpected changes in weight  Eyes:  Eye discharge, Eye pain, Eye redness, Visual disturbances   HENT:  Ear pain, Tinnitus, Nosebleeds, Trouble swallowing, Hearing loss, Sore throat  Cardiovascular:  Chest Pain, Palpitations, Orthopnea, Paroxysmal Nocturnal Dyspnea  Respiratory:  Cough, Wheezing, Shortness of breath, Chest tightness, Apnea  Gastrointestinal:  Nausea, Vomiting, Diarrhea, Constipation, Heartburn, Blood in stool  Genitourinary:  Difficulty or painful urination, Flank pain, Change in frequency, Urgency  Skin:  Color change, Rash, Itching, Wound  Psychiatric:  Hallucinations, Anxiety, Depression, Suicidal ideation  Hematological:  Enlarged glands, Easy bleeding, Easily bruising  Musculoskeletal:  Joint pain, Back pain, Gait problems, Joint swelling, Myalgias  Neurological:  Dizziness, Headaches, Presyncope, Numbness, Seizures, Tremors  Allergy:  Environmental allergies, Food allergies  Endocrine:  Heat Intolerance, Cold Intolerance, Polydipsia, Polyphagia, Polyuria    Lab Results   Component Value Date     (L) 07/09/2021    K 3.9 07/09/2021     07/09/2021    CO2 22 (L) 07/09/2021    BUN 11 07/09/2021    CREATININE 0.5 07/09/2021    GLUCOSE 101 07/09/2021    CALCIUM 9.7 07/07/2021    PROT 7.8 07/07/2021    LABALBU 4.7 07/07/2021    BILITOT 1.1 07/07/2021    ALKPHOS 210 (H) 07/07/2021     (H) 07/07/2021     (H) 07/07/2021    LABGLOM >90 07/09/2021     Lab Results   Component Value Date    WBC 6.9 07/09/2021    HGB 14.6 07/09/2021    HCT 43.5 07/09/2021    MCV 99.3 (H) 07/09/2021     07/09/2021     PHYSICAL EXAM:  Vitals:    07/22/22 1003   BP: 122/72   Pulse: 81   Resp: 14   Temp: 98 °F (36.7 °C)   TempSrc: Oral   SpO2: 97%   Weight: 148 lb 9.6 oz (67.4 kg)   Height: 5' 7\" (1.702 m)     Body mass index is 23.27 kg/m². VS Reviewed  General Appearance: A&O x 3, No acute distress,well developed and well- nourished  Head: normocephalic and atraumatic  Eyes: pupils equal, round, and reactive to light, extraocular eye movements intact, conjunctivae and eye lids without erythema  Neck: supple and non-tender without mass, no thyromegaly or thyroid nodules, no cervical lymphadenopathy  Pulmonary/Chest: clear to auscultation bilaterally- no wheezes, rales or rhonchi, normal air movement, no respiratory distress or retractions  Cardiovascular: S1 and S2 auscultated w/ RRR. No murmurs, rubs, clicks, or gallops, distal pulses intact. Abdomen: soft, non-tender, non-distended, bowl sounds physiologic,  no rebound or guarding, no masses or hernias noted. Liver and spleen without enlargement. Extremities: no cyanosis, clubbing of the lower extremities, trace generalized edema  Musculoskeletal: No joint swelling or gross deformity   Neuro:  Alert, 5/5 strength globally and symmetrically  Psych: Affect appropriate. Mood normal. Thought process is normal without evidence of depression or psychosis. Good insight and appropriate interaction. Cognition and memory appear to be intact.   Skin: warm and dry, no rash or erythema  Lymph:  No cervical, auricular or supraclavicular lymph nodes palpated    ASSESSMENT & PLAN  Zoetermeer was seen today for medication refill and other. Diagnoses and all orders for this visit:    Polysubstance abuse ([facility] [address]    Chronic hepatitis C without hepatic coma ([facility] [address]  -     External Referral To Infectious Disease    Gastroesophageal reflux disease, unspecified whether esophagitis present  -     omeprazole (PRILOSEC) 40 MG delayed release capsule; Take 1 capsule by mouth every morning (before breakfast)  -     sucralfate (CARAFATE) 1 GM tablet; Take 1 tablet by mouth in the morning and 1 tablet at noon and 1 tablet in the evening and 1 tablet before bedtime. Chronic bilateral low back pain, unspecified whether sciatica present  -     gabapentin (NEURONTIN) 600 MG tablet; Take 1 tablet by mouth in the morning and 1 tablet at noon and 1 tablet before bedtime. Do all this for 180 days. Seizure disorder (HCC)  -     carBAMazepine (TEGRETOL) 200 MG tablet; Take 1 tablet by mouth in the morning and 1 tablet before bedtime.  -     Susanne Gotti MD, Neurology, [facility]    Constipation, unspecified constipation type  -     docusate (COLACE, DULCOLAX) 100 MG CAPS; Take 100 mg by mouth in the morning and 100 mg before bedtime. - con't Suboxone  - f/u with [facility]  - rec'd he con't with sobriety  - refill Gabapentin  - refill Omeprazole and Carafate and Colace  - refill of Tegretol and refer to Neuro-stressed importance of compliance with his seizures  - refer to ID for treatment of Hep C    DISPOSITION    Return in about 6 months (around 1/22/2023) for chronic conditions. Liliya Fox released without restrictions. PATIENT COUNSELING    Counseling was provided today regarding the following topics: Healthy eating habits, Regular exercise, substance abuse and healthy sleep habits.     Liliya Fox received counseling on the following healthy behaviors: medication adherence and tobacco cessation    Patient given educational materials on: See Attached    I have instructed Liliya Fox to complete a self tracking handout onnone  and instructed them to bring it with them to his next appointment. Barriers to learning and self management: none    Discussed use, benefit, and side effects of prescribed medications. Barriers to medication compliance addressed. All patient questions answered. Pt voiced understanding.        Electronically signed by HALI Julian CNP on 7/22/2022 at 10:25 AM

## 2022-08-09 ENCOUNTER — MYC REFILL (OUTPATIENT)
Dept: FAMILY MEDICINE | Facility: OTHER | Age: 32
End: 2022-08-09

## 2022-08-09 DIAGNOSIS — F90.0 ADHD (ATTENTION DEFICIT HYPERACTIVITY DISORDER), INATTENTIVE TYPE: ICD-10-CM

## 2022-08-09 RX ORDER — DEXTROAMPHETAMINE SACCHARATE, AMPHETAMINE ASPARTATE MONOHYDRATE, DEXTROAMPHETAMINE SULFATE AND AMPHETAMINE SULFATE 5; 5; 5; 5 MG/1; MG/1; MG/1; MG/1
20 CAPSULE, EXTENDED RELEASE ORAL DAILY
Qty: 30 CAPSULE | Refills: 0 | Status: SHIPPED | OUTPATIENT
Start: 2022-08-09 | End: 2022-10-21

## 2022-08-09 RX ORDER — DEXTROAMPHETAMINE SACCHARATE, AMPHETAMINE ASPARTATE, DEXTROAMPHETAMINE SULFATE AND AMPHETAMINE SULFATE 2.5; 2.5; 2.5; 2.5 MG/1; MG/1; MG/1; MG/1
10 TABLET ORAL 2 TIMES DAILY
Qty: 60 TABLET | Refills: 0 | Status: CANCELLED | OUTPATIENT
Start: 2022-08-09

## 2022-08-09 RX ORDER — DEXTROAMPHETAMINE SACCHARATE, AMPHETAMINE ASPARTATE, DEXTROAMPHETAMINE SULFATE AND AMPHETAMINE SULFATE 2.5; 2.5; 2.5; 2.5 MG/1; MG/1; MG/1; MG/1
10 TABLET ORAL DAILY
Qty: 30 TABLET | Refills: 0 | Status: SHIPPED | OUTPATIENT
Start: 2022-08-09 | End: 2022-08-10

## 2022-08-10 RX ORDER — DEXTROAMPHETAMINE SACCHARATE, AMPHETAMINE ASPARTATE, DEXTROAMPHETAMINE SULFATE AND AMPHETAMINE SULFATE 2.5; 2.5; 2.5; 2.5 MG/1; MG/1; MG/1; MG/1
10 TABLET ORAL DAILY
Qty: 30 TABLET | Refills: 0 | Status: SHIPPED | OUTPATIENT
Start: 2022-08-10 | End: 2022-10-21

## 2022-08-21 ENCOUNTER — OFFICE VISIT (OUTPATIENT)
Dept: URGENT CARE | Facility: URGENT CARE | Age: 32
End: 2022-08-21
Payer: COMMERCIAL

## 2022-08-21 VITALS
HEART RATE: 72 BPM | WEIGHT: 152 LBS | BODY MASS INDEX: 21.28 KG/M2 | DIASTOLIC BLOOD PRESSURE: 82 MMHG | TEMPERATURE: 97.7 F | OXYGEN SATURATION: 98 % | HEIGHT: 71 IN | RESPIRATION RATE: 16 BRPM | SYSTOLIC BLOOD PRESSURE: 119 MMHG

## 2022-08-21 DIAGNOSIS — R07.0 THROAT PAIN: Primary | ICD-10-CM

## 2022-08-21 DIAGNOSIS — J03.90 TONSILLITIS: ICD-10-CM

## 2022-08-21 LAB
DEPRECATED S PYO AG THROAT QL EIA: NEGATIVE
GROUP A STREP BY PCR: NOT DETECTED

## 2022-08-21 PROCEDURE — 99214 OFFICE O/P EST MOD 30 MIN: CPT | Performed by: FAMILY MEDICINE

## 2022-08-21 PROCEDURE — 87651 STREP A DNA AMP PROBE: CPT | Performed by: FAMILY MEDICINE

## 2022-08-21 RX ORDER — AMOXICILLIN 875 MG
875 TABLET ORAL 2 TIMES DAILY
Qty: 20 TABLET | Refills: 0 | Status: SHIPPED | OUTPATIENT
Start: 2022-08-21 | End: 2022-08-31

## 2022-08-21 RX ORDER — PREDNISONE 20 MG/1
40 TABLET ORAL DAILY
Qty: 18 TABLET | Refills: 0 | Status: SHIPPED | OUTPATIENT
Start: 2022-08-21 | End: 2022-09-12

## 2022-08-21 NOTE — PROGRESS NOTES
"Chief complaint: throat pain    4 days ago woke up felt a little off then started having a fever  That afternoon got much worse - patient took a rapid covid was negative   3 days ago fever broke mid day then dry scratchy throat  2 days ago throat pain persistent  Last night got much worse  Has been taking tylenol and ibuprofen alternating  cough  -No  ill contacts - no known covid or exposure. has had mono before  able to swallow liquids and solids -YES  other symptoms   No fevers or chills chest pain or shortness of breath   Felt tight last night   Rash: No  Has tried over the counter medications no relief  because of persistence, patient came in to be seen.    ROS:  denies any exertional chest pain or shortness of breath  denies any unusual rash or joint swelling  denies post-tussive emesis or pertussis like symptoms  Negative for constitutional, eye, ear, nose, throat, skin, respiratory, cardiac, and gastrointestinal other than those outlined in the HPI.    PMH: chart reviewed  FH: chart reviewed    SH: chart reviewed and as above   Physical Exam:   /82   Pulse 72   Temp 97.7  F (36.5  C) (Tympanic)   Resp 16   Ht 1.803 m (5' 11\")   Wt 68.9 kg (152 lb)   SpO2 98%   BMI 21.20 kg/m    General : Awake Alert not in any acute cardiorespiratory distress  Head:       Normocephalic Atraumatic  Eyes:    Pupils equally reactive to light and accomodation. Sclera not icteric.   ENT:   midline nasal septum, mild nasal congestion, sinuses non-tender  left ear: no tragal tenderness, no mastoid tenderness, normal EAC, normal TM  right ear: left ear: no tragal tenderness, no mastoid tenderness, normal EAC, normal TM  mouth moist buccal mucosa, Yes hyperemic posterior pharyngeal wall, no trismus  tonsils: left tonsil abnormal with tonsils grade 3 with scant exudate   anterior cervical nodes: Yes tender  posterior cervical nodes: No  palpable  Heart:  Regular in rate and rhythm, no murmurs rubs or gallops  Lungs: " Symmetrical Chest Expansion, no retractions, clear breath sounds  Abdomen: soft, no hepatosplenomegally  Psych: Appropriate mood and affect. Pleasant  Skin: patient undressed to level of his/her comfort. No visible concerning lesions.    Labs: Strep negative     ICD-10-CM    1. Throat pain  R07.0 Streptococcus A Rapid Screen w/Reflex to PCR - Clinic Collect     Group A Streptococcus PCR Throat Swab     amoxicillin (AMOXIL) 875 MG tablet     predniSONE (DELTASONE) 20 MG tablet   2. Tonsillitis  J03.90 amoxicillin (AMOXIL) 875 MG tablet     predniSONE (DELTASONE) 20 MG tablet     Based on appearance increased risk of developing peritonsillar abscess   Prescribed with above   Rule out covid - patient plans to do another covid test   supportive treatment: advised supportive treatment, Advised to come back in if with any worsening symptoms or if not better despite supportive measures. Especially if with any worsening sore throat, inability to eat or drink or swallow, or trismus. Symptoms of peritonsillar abscess discussed. Patient voiced understanding.  adverse reactions of medication discussed  OTC medications discussed  advised to come back in right away if with any worsening symptoms or if with no relief despite treatment plan  patient voiced understanding and had no further questions at this time.    Lily Varela M.D.

## 2022-08-22 ENCOUNTER — MYC MEDICAL ADVICE (OUTPATIENT)
Dept: FAMILY MEDICINE | Facility: OTHER | Age: 32
End: 2022-08-22

## 2022-08-30 ENCOUNTER — MYC MEDICAL ADVICE (OUTPATIENT)
Dept: FAMILY MEDICINE | Facility: OTHER | Age: 32
End: 2022-08-30

## 2022-08-31 ENCOUNTER — HOSPITAL ENCOUNTER (EMERGENCY)
Facility: CLINIC | Age: 32
Discharge: HOME | End: 2022-08-31
Attending: FAMILY MEDICINE | Admitting: FAMILY MEDICINE
Payer: COMMERCIAL

## 2022-08-31 ENCOUNTER — APPOINTMENT (OUTPATIENT)
Dept: CT IMAGING | Facility: CLINIC | Age: 32
End: 2022-08-31
Attending: FAMILY MEDICINE
Payer: COMMERCIAL

## 2022-08-31 VITALS
TEMPERATURE: 98.5 F | SYSTOLIC BLOOD PRESSURE: 92 MMHG | RESPIRATION RATE: 18 BRPM | DIASTOLIC BLOOD PRESSURE: 78 MMHG | WEIGHT: 153.6 LBS | HEART RATE: 86 BPM | OXYGEN SATURATION: 99 % | BODY MASS INDEX: 21.42 KG/M2

## 2022-08-31 DIAGNOSIS — N45.1 EPIDIDYMITIS: ICD-10-CM

## 2022-08-31 LAB
ALBUMIN SERPL-MCNC: 3.5 G/DL (ref 3.4–5)
ALBUMIN UR-MCNC: NEGATIVE MG/DL
ALP SERPL-CCNC: 48 U/L (ref 40–150)
ALT SERPL W P-5'-P-CCNC: 26 U/L (ref 0–70)
ANION GAP SERPL CALCULATED.3IONS-SCNC: 3 MMOL/L (ref 3–14)
APPEARANCE UR: CLEAR
AST SERPL W P-5'-P-CCNC: 18 U/L (ref 0–45)
BASOPHILS # BLD AUTO: 0 10E3/UL (ref 0–0.2)
BASOPHILS NFR BLD AUTO: 1 %
BILIRUB SERPL-MCNC: 0.6 MG/DL (ref 0.2–1.3)
BILIRUB UR QL STRIP: NEGATIVE
BUN SERPL-MCNC: 19 MG/DL (ref 7–30)
CALCIUM SERPL-MCNC: 8.4 MG/DL (ref 8.5–10.1)
CHLORIDE BLD-SCNC: 106 MMOL/L (ref 94–109)
CO2 SERPL-SCNC: 30 MMOL/L (ref 20–32)
COLOR UR AUTO: YELLOW
CREAT SERPL-MCNC: 0.88 MG/DL (ref 0.66–1.25)
EOSINOPHIL # BLD AUTO: 0.6 10E3/UL (ref 0–0.7)
EOSINOPHIL NFR BLD AUTO: 7 %
ERYTHROCYTE [DISTWIDTH] IN BLOOD BY AUTOMATED COUNT: 13.6 % (ref 10–15)
GFR SERPL CREATININE-BSD FRML MDRD: >90 ML/MIN/1.73M2
GLUCOSE BLD-MCNC: 90 MG/DL (ref 70–99)
GLUCOSE UR STRIP-MCNC: NEGATIVE MG/DL
HCT VFR BLD AUTO: 42 % (ref 40–53)
HGB BLD-MCNC: 14.2 G/DL (ref 13.3–17.7)
HGB UR QL STRIP: NEGATIVE
IMM GRANULOCYTES # BLD: 0 10E3/UL
IMM GRANULOCYTES NFR BLD: 0 %
KETONES UR STRIP-MCNC: NEGATIVE MG/DL
LEUKOCYTE ESTERASE UR QL STRIP: NEGATIVE
LIPASE SERPL-CCNC: 212 U/L (ref 73–393)
LYMPHOCYTES # BLD AUTO: 1.6 10E3/UL (ref 0.8–5.3)
LYMPHOCYTES NFR BLD AUTO: 18 %
MCH RBC QN AUTO: 28.4 PG (ref 26.5–33)
MCHC RBC AUTO-ENTMCNC: 33.8 G/DL (ref 31.5–36.5)
MCV RBC AUTO: 84 FL (ref 78–100)
MONOCYTES # BLD AUTO: 1 10E3/UL (ref 0–1.3)
MONOCYTES NFR BLD AUTO: 11 %
MUCOUS THREADS #/AREA URNS LPF: PRESENT /LPF
NEUTROPHILS # BLD AUTO: 5.5 10E3/UL (ref 1.6–8.3)
NEUTROPHILS NFR BLD AUTO: 63 %
NITRATE UR QL: NEGATIVE
NRBC # BLD AUTO: 0 10E3/UL
NRBC BLD AUTO-RTO: 0 /100
PH UR STRIP: 6 [PH] (ref 5–7)
PLATELET # BLD AUTO: 206 10E3/UL (ref 150–450)
POTASSIUM BLD-SCNC: 4.2 MMOL/L (ref 3.4–5.3)
PROT SERPL-MCNC: 7 G/DL (ref 6.8–8.8)
RBC # BLD AUTO: 5 10E6/UL (ref 4.4–5.9)
RBC URINE: 0 /HPF
SODIUM SERPL-SCNC: 139 MMOL/L (ref 133–144)
SP GR UR STRIP: 1.02 (ref 1–1.03)
UROBILINOGEN UR STRIP-MCNC: NORMAL MG/DL
WBC # BLD AUTO: 8.7 10E3/UL (ref 4–11)
WBC URINE: <1 /HPF

## 2022-08-31 PROCEDURE — 258N000003 HC RX IP 258 OP 636: Performed by: FAMILY MEDICINE

## 2022-08-31 PROCEDURE — 99285 EMERGENCY DEPT VISIT HI MDM: CPT | Mod: 25 | Performed by: FAMILY MEDICINE

## 2022-08-31 PROCEDURE — 99284 EMERGENCY DEPT VISIT MOD MDM: CPT | Performed by: FAMILY MEDICINE

## 2022-08-31 PROCEDURE — 250N000009 HC RX 250: Performed by: FAMILY MEDICINE

## 2022-08-31 PROCEDURE — 36415 COLL VENOUS BLD VENIPUNCTURE: CPT | Performed by: FAMILY MEDICINE

## 2022-08-31 PROCEDURE — 81001 URINALYSIS AUTO W/SCOPE: CPT | Performed by: FAMILY MEDICINE

## 2022-08-31 PROCEDURE — 74177 CT ABD & PELVIS W/CONTRAST: CPT

## 2022-08-31 PROCEDURE — 80053 COMPREHEN METABOLIC PANEL: CPT | Performed by: FAMILY MEDICINE

## 2022-08-31 PROCEDURE — 85025 COMPLETE CBC W/AUTO DIFF WBC: CPT | Performed by: FAMILY MEDICINE

## 2022-08-31 PROCEDURE — 83690 ASSAY OF LIPASE: CPT | Performed by: FAMILY MEDICINE

## 2022-08-31 PROCEDURE — 250N000011 HC RX IP 250 OP 636: Performed by: FAMILY MEDICINE

## 2022-08-31 RX ORDER — CIPROFLOXACIN 500 MG/1
500 TABLET, FILM COATED ORAL 2 TIMES DAILY
Qty: 14 TABLET | Refills: 0 | Status: SHIPPED | OUTPATIENT
Start: 2022-08-31 | End: 2022-09-12

## 2022-08-31 RX ORDER — IOPAMIDOL 755 MG/ML
500 INJECTION, SOLUTION INTRAVASCULAR ONCE
Status: COMPLETED | OUTPATIENT
Start: 2022-08-31 | End: 2022-08-31

## 2022-08-31 RX ORDER — SODIUM CHLORIDE 9 MG/ML
INJECTION, SOLUTION INTRAVENOUS CONTINUOUS
Status: DISCONTINUED | OUTPATIENT
Start: 2022-08-31 | End: 2022-09-01 | Stop reason: HOSPADM

## 2022-08-31 RX ADMIN — SODIUM CHLORIDE 61 ML: 9 INJECTION, SOLUTION INTRAVENOUS at 22:14

## 2022-08-31 RX ADMIN — SODIUM CHLORIDE 1000 ML: 9 INJECTION, SOLUTION INTRAVENOUS at 22:01

## 2022-08-31 RX ADMIN — IOPAMIDOL 75 ML: 755 INJECTION, SOLUTION INTRAVENOUS at 22:15

## 2022-08-31 ASSESSMENT — ACTIVITIES OF DAILY LIVING (ADL): ADLS_ACUITY_SCORE: 35

## 2022-08-31 NOTE — TELEPHONE ENCOUNTER
Are you able to see him sooner?    Future Appointments 8/31/2022 - 2/27/2023      Date Visit Type Length Department Provider     9/7/2022  9:30 AM [facility] OFFICE VISIT  30 min [facility] Prashant Escobar PA-C    Location Instructions:     [facility] is located at [address] [place] [address] in [place], [place].

## 2022-09-01 NOTE — ED TRIAGE NOTES
Temp up to 100 last couple days, RLQ abd pain severe today. Pt has had body aches and chills. Tylenol about an hour ago.      Triage Assessment     Row Name 08/31/22 2775       Triage Assessment (Adult)    Airway WDL WDL       Respiratory WDL    Respiratory WDL WDL       Cardiac WDL    Cardiac WDL WDL

## 2022-09-01 NOTE — ED PROVIDER NOTES
History     Chief Complaint   Patient presents with     Abdominal Pain     HPI  Chance Torres is a 31 year old male who presents with right-sided abdominal pain this been going on for the past couple of days.  Patient has had some low-grade fevers up to 100.5 at home.  Pain is constant.  Pain is worse when he stands and walks, better if he lays down.  Denies any nausea or any vomiting.  Patient had some diarrhea before all this started but bowel movements have been normal recently.  Denies any dysuria or hematuria.  Patient thought that his right testicle did seem a little warm tonight and does seem a little swollen.  Denies any chest pain or shortness of breath.  Patient states that the pain in his belly does go around through to his back.    Allergies:  No Known Allergies    Problem List:    Patient Active Problem List    Diagnosis Date Noted     ADHD (attention deficit hyperactivity disorder), inattentive type 02/26/2020     Priority: Medium     Irritable bowel syndrome with both constipation and diarrhea 07/12/2017     Priority: Medium     Labral tear of shoulder 07/24/2012     Priority: Medium        Past Medical History:    Past Medical History:   Diagnosis Date     Depressive disorder 2016     Marijuana use 1/4/2019       Past Surgical History:    Past Surgical History:   Procedure Laterality Date     ABDOMEN SURGERY  2013/2016    Hernia repair     ARTHROSCOPY SHOULDER SUPERIOR LABRUM ANTERIOR TO POSTERIOR REPAIR Left 2012     COLONOSCOPY N/A 7/23/2021    Procedure: Colonoscopy, With Polypectomy And Biopsy;  Surgeon: Geneva Patel DO;  Location: [facility] OR     COLONOSCOPY WITH CO2 INSUFFLATION N/A 7/23/2021    Procedure: COLONOSCOPY, WITH CO2 INSUFFLATION;  Surgeon: Geneva Patel DO;  Location: [facility] OR     HERNIA REPAIR Left 2013    open     HERNIA REPAIR Right 2016    laparoscopic       Family History:    Family History   Problem Relation Age of Onset     Mental Illness Brother         Not diagnosed  by doctor or therapist.  Refuses to contact them     Mental Illness Brother      Cerebrovascular Disease Paternal Grandmother      Thyroid Disease Mother      Unknown/Adopted Mother        Social History:  Marital Status:   [2]  Social History     Tobacco Use     Smoking status: Never Smoker     Smokeless tobacco: Former User     Quit date: 5/11/2016   Vaping Use     Vaping Use: Never used   Substance Use Topics     Alcohol use: Not Currently     Comment: rarely     Drug use: Not Currently     Types: Marijuana     Comment: has been over a year        Medications:    ciprofloxacin (CIPRO) 500 MG tablet  amoxicillin (AMOXIL) 875 MG tablet  amphetamine-dextroamphetamine (ADDERALL XR) 20 MG 24 hr capsule  amphetamine-dextroamphetamine (ADDERALL) 10 MG tablet  predniSONE (DELTASONE) 20 MG tablet          Review of Systems   All other systems reviewed and are negative.      Physical Exam   BP: 92/78  Pulse: 86  Temp: 98.5  F (36.9  C)  Resp: 18  Weight: 69.7 kg (153 lb 9.6 oz)  SpO2: 99 %      Physical Exam  Vitals and nursing note reviewed.   Constitutional:       General: He is not in acute distress.     Appearance: He is well-developed. He is not ill-appearing.   Abdominal:      General: Abdomen is flat. Bowel sounds are normal.      Palpations: Abdomen is soft. There is no shifting dullness.      Tenderness: There is abdominal tenderness in the right upper quadrant and right lower quadrant.   Genitourinary:     Testes:         Right: Tenderness present. Swelling not present. Cremasteric reflex is present.       Epididymis:      Right: Not inflamed. Tenderness present.   Skin:     General: Skin is warm and dry.   Neurological:      Mental Status: He is alert.         ED Course                 Procedures           Results for orders placed or performed during the hospital encounter of 08/31/22 (from the past 24 hour(s))   CBC with platelets differential    Narrative    The following orders were created for panel  order CBC with platelets differential.  Procedure                               Abnormality         Status                     ---------                               -----------         ------                     CBC with platelets and d...[[id number]]                      Final result                 Please view results for these tests on the individual orders.   Comprehensive metabolic panel   Result Value Ref Range    Sodium 139 133 - 144 mmol/L    Potassium 4.2 3.4 - 5.3 mmol/L    Chloride 106 94 - 109 mmol/L    Carbon Dioxide (CO2) 30 20 - 32 mmol/L    Anion Gap 3 3 - 14 mmol/L    Urea Nitrogen 19 7 - 30 mg/dL    Creatinine 0.88 0.66 - 1.25 mg/dL    Calcium 8.4 (L) 8.5 - 10.1 mg/dL    Glucose 90 70 - 99 mg/dL    Alkaline Phosphatase 48 40 - 150 U/L    AST 18 0 - 45 U/L    ALT 26 0 - 70 U/L    Protein Total 7.0 6.8 - 8.8 g/dL    Albumin 3.5 3.4 - 5.0 g/dL    Bilirubin Total 0.6 0.2 - 1.3 mg/dL    GFR Estimate >90 >60 mL/min/1.73m2   Lipase   Result Value Ref Range    Lipase 212 73 - 393 U/L   CBC with platelets and differential   Result Value Ref Range    WBC Count 8.7 4.0 - 11.0 10e3/uL    RBC Count 5.00 4.40 - 5.90 10e6/uL    Hemoglobin 14.2 13.3 - 17.7 g/dL    Hematocrit 42.0 40.0 - 53.0 %    MCV 84 78 - 100 fL    MCH 28.4 26.5 - 33.0 pg    MCHC 33.8 31.5 - 36.5 g/dL    RDW 13.6 10.0 - 15.0 %    Platelet Count 206 150 - 450 10e3/uL    % Neutrophils 63 %    % Lymphocytes 18 %    % Monocytes 11 %    % Eosinophils 7 %    % Basophils 1 %    % Immature Granulocytes 0 %    NRBCs per 100 WBC 0 <1 /100    Absolute Neutrophils 5.5 1.6 - 8.3 10e3/uL    Absolute Lymphocytes 1.6 0.8 - 5.3 10e3/uL    Absolute Monocytes 1.0 0.0 - 1.3 10e3/uL    Absolute Eosinophils 0.6 0.0 - 0.7 10e3/uL    Absolute Basophils 0.0 0.0 - 0.2 10e3/uL    Absolute Immature Granulocytes 0.0 <=0.4 10e3/uL    Absolute NRBCs 0.0 10e3/uL   CT Abdomen Pelvis w Contrast    Narrative    EXAM: CT ABDOMEN PELVIS W CONTRAST  LOCATION: [facility]  [facility]  DATE/TIME: 8/31/2022 10:24 PM    INDICATION: Right-sided abdominal and testicular pain.  COMPARISON: 16 2021.  TECHNIQUE: CT scan of the abdomen and pelvis was performed following injection of IV contrast. Multiplanar reformats were obtained. Dose reduction techniques were used.  CONTRAST: 75ml isovue 370    FINDINGS:      LOWER CHEST: Mild wall thickening of the distal esophagus; correlate for distal esophagitis.    HEPATOBILIARY: Liver enhances normally and is normal in size. Low-attenuation subcentimeter liver lesion(s) compatible with benign cysts or other benign lesions. No specific evaluation or follow-up is recommended in a low risk patient.      Gallbladder is normal.    No intrahepatic or intrahepatic biliary ductal dilatation.    PANCREAS: Enhances normally. No peripancreatic inflammatory fat stranding.    SPLEEN: Enhances normally. Normal size.    ADRENAL GLANDS: Normal.    KIDNEYS: Both kidneys enhance symmetrically, without hydronephrosis.    No nephroureterolithiasis.    Urinary bladder is unremarkable.    PELVIC ORGANS: There is hyperenhancement of the right testicular vein and pampiniform plexus, which may be attributable to underlying epididymitis/orchitis. Correlation with sonography is recommended.    BOWEL: No evidence of acute gastrointestinal inflammation or obstruction.     Mild diffuse colonic stool burden. Normal appendix.    No intraperitoneal free fluid or free air.    LYMPH NODES: No suspicious abdominal or pelvic lymphadenopathy.    VASCULATURE: No abdominal aortic aneurysm.    MUSCULOSKELETAL: No suspicious abnormality.    OTHER: No additionally significant abnormalities.      Impression    IMPRESSION:     Hyperenhancement of the right testicular vein and pampiniform plexus, which could be attributable to an underlying epididymitis/orchitis. Correlation with scrotal sonography is recommended.     UA with Microscopic reflex to Culture    Specimen: Urine,  Midstream   Result Value Ref Range    Color Urine Yellow Colorless, Straw, Light Yellow, Yellow    Appearance Urine Clear Clear    Glucose Urine Negative Negative mg/dL    Bilirubin Urine Negative Negative    Ketones Urine Negative Negative mg/dL    Specific Gravity Urine 1.020 1.003 - 1.035    Blood Urine Negative Negative    pH Urine 6.0 5.0 - 7.0    Protein Albumin Urine Negative Negative mg/dL    Urobilinogen Urine Normal Normal, 2.0 mg/dL    Nitrite Urine Negative Negative    Leukocyte Esterase Urine Negative Negative    Mucus Urine Present (A) None Seen /LPF    RBC Urine 0 <=2 /HPF    WBC Urine <1 <=5 /HPF    Narrative    Urine Culture not indicated       Medications   0.9% sodium chloride BOLUS (1,000 mLs Intravenous New Bag 8/31/22 2201)     Followed by   sodium chloride 0.9% infusion (has no administration in time range)   iopamidol (ISOVUE-370) solution 500 mL (75 mLs Intravenous Given 8/31/22 2215)   sodium chloride 0.9 % bag 100mL for CT scan flush use (61 mLs Intravenous Given 8/31/22 2214)     Labs were reviewed and were mostly unremarkable.  On exam patient did have tenderness over his epididymitis but does not have significant testicular swelling and normal cremasteric reflex, I do not suspect testicular torsion.  CT shows findings consistent with epididymitis and this does fit clinically.  We will go ahead and treat with a course of antibiotics will do Cipro.  Patient was told to wear supportive underwear.  Patient will follow-up if there is no improvement over the next few days.    Assessments & Plan (with Medical Decision Making)  Acute epididymitis     I have reviewed the nursing notes.    I have reviewed the findings, diagnosis, plan and need for follow up with the patient.      New Prescriptions    CIPROFLOXACIN (CIPRO) 500 MG TABLET    Take 1 tablet (500 mg) by mouth 2 times daily       Final diagnoses:   Epididymitis       8/31/2022   [facility] EMERGENCY DEPT     Rob  Asaf Lange MD  08/31/22 5908

## 2022-09-12 ENCOUNTER — OFFICE VISIT (OUTPATIENT)
Dept: FAMILY MEDICINE CLINIC | Age: 32
End: 2022-09-12
Payer: MEDICAID

## 2022-09-12 ENCOUNTER — VIRTUAL VISIT (OUTPATIENT)
Dept: FAMILY MEDICINE | Facility: OTHER | Age: 32
End: 2022-09-12
Payer: COMMERCIAL

## 2022-09-12 VITALS
DIASTOLIC BLOOD PRESSURE: 66 MMHG | BODY MASS INDEX: 22.85 KG/M2 | RESPIRATION RATE: 14 BRPM | WEIGHT: 145.6 LBS | HEART RATE: 74 BPM | HEIGHT: 67 IN | OXYGEN SATURATION: 99 % | SYSTOLIC BLOOD PRESSURE: 112 MMHG | TEMPERATURE: 98.4 F

## 2022-09-12 DIAGNOSIS — F90.0 ADHD (ATTENTION DEFICIT HYPERACTIVITY DISORDER), INATTENTIVE TYPE: ICD-10-CM

## 2022-09-12 DIAGNOSIS — G89.29 CHRONIC BILATERAL LOW BACK PAIN, UNSPECIFIED WHETHER SCIATICA PRESENT: ICD-10-CM

## 2022-09-12 DIAGNOSIS — N45.1 EPIDIDYMITIS, RIGHT: ICD-10-CM

## 2022-09-12 DIAGNOSIS — M54.50 CHRONIC BILATERAL LOW BACK PAIN, UNSPECIFIED WHETHER SCIATICA PRESENT: ICD-10-CM

## 2022-09-12 DIAGNOSIS — J35.1 ENLARGED TONSILS: Primary | ICD-10-CM

## 2022-09-12 DIAGNOSIS — B18.2 CHRONIC HEPATITIS C WITHOUT HEPATIC COMA (HCC): Primary | ICD-10-CM

## 2022-09-12 DIAGNOSIS — K21.9 GASTROESOPHAGEAL REFLUX DISEASE, UNSPECIFIED WHETHER ESOPHAGITIS PRESENT: ICD-10-CM

## 2022-09-12 DIAGNOSIS — K59.00 CONSTIPATION, UNSPECIFIED CONSTIPATION TYPE: ICD-10-CM

## 2022-09-12 DIAGNOSIS — G40.909 SEIZURE DISORDER (HCC): ICD-10-CM

## 2022-09-12 DIAGNOSIS — R04.0 EPISTAXIS: ICD-10-CM

## 2022-09-12 PROCEDURE — 4004F PT TOBACCO SCREEN RCVD TLK: CPT | Performed by: NURSE PRACTITIONER

## 2022-09-12 PROCEDURE — G8427 DOCREV CUR MEDS BY ELIG CLIN: HCPCS | Performed by: NURSE PRACTITIONER

## 2022-09-12 PROCEDURE — 99214 OFFICE O/P EST MOD 30 MIN: CPT | Mod: 95 | Performed by: PHYSICIAN ASSISTANT

## 2022-09-12 PROCEDURE — 99214 OFFICE O/P EST MOD 30 MIN: CPT | Performed by: NURSE PRACTITIONER

## 2022-09-12 PROCEDURE — G8420 CALC BMI NORM PARAMETERS: HCPCS | Performed by: NURSE PRACTITIONER

## 2022-09-12 RX ORDER — SUCRALFATE 1 G/1
1 TABLET ORAL 4 TIMES DAILY
Qty: 360 TABLET | Refills: 1 | Status: SHIPPED | OUTPATIENT
Start: 2022-09-12

## 2022-09-12 RX ORDER — ONDANSETRON 4 MG/1
4 TABLET, FILM COATED ORAL 3 TIMES DAILY PRN
Qty: 15 TABLET | Refills: 0 | Status: SHIPPED | OUTPATIENT
Start: 2022-09-12

## 2022-09-12 RX ORDER — BUPRENORPHINE HYDROCHLORIDE AND NALOXONE HYDROCHLORIDE DIHYDRATE 2; .5 MG/1; MG/1
TABLET SUBLINGUAL
Qty: 180 TABLET | Status: CANCELLED | OUTPATIENT
Start: 2022-09-12

## 2022-09-12 RX ORDER — OMEPRAZOLE 40 MG/1
40 CAPSULE, DELAYED RELEASE ORAL
Qty: 90 CAPSULE | Refills: 1 | Status: SHIPPED | OUTPATIENT
Start: 2022-09-12

## 2022-09-12 RX ORDER — PSEUDOEPHEDRINE HCL 30 MG
100 TABLET ORAL 2 TIMES DAILY
Qty: 60 CAPSULE | Refills: 0 | Status: SHIPPED | OUTPATIENT
Start: 2022-09-12

## 2022-09-12 RX ORDER — GABAPENTIN 600 MG/1
600 TABLET ORAL 3 TIMES DAILY
Qty: 270 TABLET | Refills: 1 | Status: SHIPPED | OUTPATIENT
Start: 2022-09-12 | End: 2023-03-11

## 2022-09-12 RX ORDER — CARBAMAZEPINE 200 MG/1
200 TABLET ORAL 2 TIMES DAILY
Qty: 60 TABLET | Refills: 2 | Status: SHIPPED | OUTPATIENT
Start: 2022-09-12

## 2022-09-12 NOTE — PROGRESS NOTES
Chance is a 31 year old who is being evaluated via a billable video visit.      How would you like to obtain your AVS? MyChart  If the video visit is dropped, the invitation should be resent by: Text to cell phone: [phone]  Will anyone else be joining your video visit? No      Assessment & Plan       ICD-10-CM    1. Enlarged tonsils  J35.1    2. Epistaxis  R04.0    3. ADHD (attention deficit hyperactivity disorder), inattentive type  F90.0    4. Epididymitis, right  N45.1        1-2. Will place an ENT referral for further evaluation of his enlarged tonsils, that cause different symptoms, along with chronic epistaxis.    3. Continue current doses of Adderall. Will refill when needed. PDMP with no concerning findings. Plan on recheck in 6 months.    4. His right sided epididymitis is much improved after 1 week of ciprofloxacin. If symptom recur, can extend the course but I would expect complete resolution of symptoms within the next few weeks.      Prashant Escobar PA-C  [facility]   Chance is a 31 year old male, presenting for the following health issues:  No chief complaint on file.      HPI     He was diagnosed with tonsillitis last month and then a week later he had right sided epididymitis. He is feeling much better now with virtual resolution of his symptoms. He still has mild right testicular tenderness upon palpation but it is much better than it was. He has chronically large tonsils that have caused infections like this in the past along with occasional swallowing difficulties and snoring so he would like to see an ENT specialist for further evaluation. He also has frequent right sided bloody noses, about 30 times per year, and would like this evaluated as well.    The Adderall has been working well as he likes having the option of taking the extra 10 mg in the early afternoon. He still does not take Adderall everyday so has plenty left for now.       Review of  Systems   Constitutional, HEENT, cardiovascular, pulmonary, psych, neuro, gi and gu systems are negative, except as otherwise noted.      Objective         Vitals:  No vitals were obtained today due to virtual visit.    Physical Exam   GENERAL: Healthy, alert and no distress  EYES: Eyes grossly normal to inspection.  No discharge or erythema, or obvious scleral/conjunctival abnormalities.  RESP: No audible wheeze, cough, or visible cyanosis.  No visible retractions or increased work of breathing.    SKIN: Visible skin clear. No significant rash, abnormal pigmentation or lesions.  NEURO: Cranial nerves grossly intact.  Mentation and speech appropriate for age.  PSYCH: Mentation appears normal, affect normal/bright, judgement and insight intact, normal speech and appearance well-groomed.        Video-Visit Details    Video Start Time: 1:31 pm    Type of service:  Video Visit    Video End Time: 1:44 pm    Originating Location (pt. Location): Home    Distant Location (provider location):  [facility]     Platform used for Video Visit: AMSC

## 2022-09-12 NOTE — PROGRESS NOTES
[facility] at / [address] [facility]  Chief Complaint   Patient presents with    Medication Refill    Other     Referral for GI in [place]        History obtained from chart review and the patient. SUBJECTIVE:  Jaden Hall is a 33 y.o. male that presents today for follow up chronic conditions    Patient is still on probation and going to [facility]. He has been clean for about 1 year. He is doing well. He is wanting treatment for the Hep C. Julia's office referred him to Dr Cesar Lu for treatment, and he is struggling to get to [facility] for the treatment. He is wanting someone else locally. He has been taking the Tegretol for seizure disorder. His last seizure was 12/20. He does have a follow up appt with neurology tomorrow, but not sure if he's going to be able to make it due to work schedule. Age/Gender Health Maintenance    Lipid - 40  DM Screen - 40  Colon Cancer Screening - 48  Lung Cancer Screening (Age 54 to [de-identified] with 30 pack year hx, current smoker or quit within past 15 years) - 54    Tetanus - needs  Influenza Vaccine - yearly  Pneumonia Vaccine - 65  Zostavax - 50   HPV Vaccine - n/a    PSA testing discussion - 54  AAA Screening - 65    Falls screening - n/a    Current Outpatient Medications   Medication Sig Dispense Refill    omeprazole (PRILOSEC) 40 MG delayed release capsule Take 1 capsule by mouth every morning (before breakfast) 90 capsule 1    docusate (COLACE, DULCOLAX) 100 MG CAPS Take 100 mg by mouth 2 times daily 60 capsule 0    gabapentin (NEURONTIN) 600 MG tablet Take 1 tablet by mouth 3 times daily for 180 days.  270 tablet 1    sucralfate (CARAFATE) 1 GM tablet Take 1 tablet by mouth 4 times daily 360 tablet 1    carBAMazepine (TEGRETOL) 200 MG tablet Take 1 tablet by mouth 2 times daily 60 tablet 2    ondansetron (ZOFRAN) 4 MG tablet Take 1 tablet by mouth 3 times daily as needed for Nausea or Vomiting 15 tablet 0    buprenorphine-naloxone (SUBOXONE) 2-0.5 MG SUBL place 1 tablet under the tongue and dissolve once daily      NARCAN 4 MG/0.1ML LIQD nasal spray        No current facility-administered medications for this visit. Orders Placed This Encounter   Medications    omeprazole (PRILOSEC) 40 MG delayed release capsule     Sig: Take 1 capsule by mouth every morning (before breakfast)     Dispense:  90 capsule     Refill:  1    docusate (COLACE, DULCOLAX) 100 MG CAPS     Sig: Take 100 mg by mouth 2 times daily     Dispense:  60 capsule     Refill:  0    gabapentin (NEURONTIN) 600 MG tablet     Sig: Take 1 tablet by mouth 3 times daily for 180 days. Dispense:  270 tablet     Refill:  1    sucralfate (CARAFATE) 1 GM tablet     Sig: Take 1 tablet by mouth 4 times daily     Dispense:  360 tablet     Refill:  1    carBAMazepine (TEGRETOL) 200 MG tablet     Sig: Take 1 tablet by mouth 2 times daily     Dispense:  60 tablet     Refill:  2    ondansetron (ZOFRAN) 4 MG tablet     Sig: Take 1 tablet by mouth 3 times daily as needed for Nausea or Vomiting     Dispense:  15 tablet     Refill:  0             All medications reviewed and reconciled, including OTC and herbal medications. Updated list given to patient.        Patient Active Problem List   Diagnosis    Chronic bilateral low back pain    Seizure disorder (HCC)    Cigarette nicotine dependence without complication    Gastroesophageal reflux disease    Alcoholic liver disease (HCC)    Alcohol abuse    Opioid dependence on agonist therapy ([address]    Chronic hepatitis C without hepatic coma (HCC)    Elevated liver enzymes    Tobacco use    History of substance abuse ([address]    Pharyngoesophageal dysphagia    Constipation    Gastritis    Polysubstance abuse ([address]       Past Medical History:   Diagnosis Date    Closed fracture of one rib of left side with routine healing 12/17/2020    Hypertension     Liver laceration, closed, initial encounter 12/17/2020    Multiple closed fractures of pelvis with stable disruption of pelvic ring with routine healing 12/17/2020    Pneumothorax, traumatic     Seizures (HCC)        Past Surgical History:   Procedure Laterality Date    CT GUIDED CHEST TUBE  12/14/2020    CT GUIDED CHEST TUBE 12/14/2020 [facility] CT SCAN    WISDOM TOOTH EXTRACTION         Allergies   Allergen Reactions    Dilantin [Phenytoin Sodium Extended] Hives       Social History     Socioeconomic History    Marital status: Single     Spouse name: Not on file    Number of children: Not on file    Years of education: Not on file    Highest education level: Not on file   Occupational History    Not on file   Tobacco Use    Smoking status: Every Day     Packs/day: 0.50     Years: 10.00     Pack years: 5.00     Types: Cigarettes     Start date: 12/29/2010    Smokeless tobacco: Never   Vaping Use    Vaping Use: Some days    Substances: Nicotine, Flavoring    Devices: Disposable   Substance and Sexual Activity    Alcohol use:  Yes     Alcohol/week: 1.0 standard drink     Types: 1 Cans of beer per week    Drug use: Not Currently     Types: Opiates      Comment: last used 6-8 months ago last time used fentynal    Sexual activity: Yes     Partners: Female   Other Topics Concern    Not on file   Social History Narrative    Not on file     Social Determinants of Health     Financial Resource Strain: Low Risk     Difficulty of Paying Living Expenses: Not hard at all   Food Insecurity: No Food Insecurity    Worried About Running Out of Food in the Last Year: Never true    Ran Out of Food in the Last Year: Never true   Transportation Needs: Not on file   Physical Activity: Not on file   Stress: Not on file   Social Connections: Not on file   Intimate Partner Violence: Not on file   Housing Stability: Not on file       Family History   Problem Relation Age of Onset    No Known Problems Mother     Other Father         liver dx          I have reviewed the patient's past medical history, past surgical history, allergies, medications, social and family history and I have made updates where appropriate.       Review of Systems  Positive responses are highlighted in bold    Constitutional:  Fever, Chills, Night Sweats, Fatigue, Unexpected changes in weight  Eyes:  Eye discharge, Eye pain, Eye redness, Visual disturbances   HENT:  Ear pain, Tinnitus, Nosebleeds, Trouble swallowing, Hearing loss, Sore throat  Cardiovascular:  Chest Pain, Palpitations, Orthopnea, Paroxysmal Nocturnal Dyspnea  Respiratory:  Cough, Wheezing, Shortness of breath, Chest tightness, Apnea  Gastrointestinal:  Nausea, Vomiting, Diarrhea, Constipation, Heartburn, Blood in stool  Genitourinary:  Difficulty or painful urination, Flank pain, Change in frequency, Urgency  Skin:  Color change, Rash, Itching, Wound  Psychiatric:  Hallucinations, Anxiety, Depression, Suicidal ideation  Hematological:  Enlarged glands, Easy bleeding, Easily bruising  Musculoskeletal:  Joint pain, Back pain, Gait problems, Joint swelling, Myalgias  Neurological:  Dizziness, Headaches, Presyncope, Numbness, Seizures, Tremors  Allergy:  Environmental allergies, Food allergies  Endocrine:  Heat Intolerance, Cold Intolerance, Polydipsia, Polyphagia, Polyuria    Lab Results   Component Value Date     (L) 07/09/2021    K 3.9 07/09/2021     07/09/2021    CO2 22 (L) 07/09/2021    BUN 11 07/09/2021    CREATININE 0.5 07/09/2021    GLUCOSE 101 07/09/2021    CALCIUM 9.7 07/07/2021    PROT 7.8 07/07/2021    LABALBU 4.7 07/07/2021    BILITOT 1.1 07/07/2021    ALKPHOS 210 (H) 07/07/2021     (H) 07/07/2021     (H) 07/07/2021    LABGLOM >90 07/09/2021     Lab Results   Component Value Date    WBC 6.9 07/09/2021    HGB 14.6 07/09/2021    HCT 43.5 07/09/2021    MCV 99.3 (H) 07/09/2021     07/09/2021     PHYSICAL EXAM:  Vitals:    09/12/22 1351   BP: 112/66   Pulse: 74   Resp: 14   Temp: 98.4 °F (36.9 °C)   TempSrc: Oral   SpO2: 99%   Weight: 145 lb 9.6 oz (66 kg)   Height: 5' 7\" (1.702 m)     Body mass index is 22.8 kg/m². VS Reviewed  General Appearance: A&O x 3, No acute distress,well developed and well- nourished  Head: normocephalic and atraumatic  Eyes: pupils equal, round, and reactive to light, extraocular eye movements intact, conjunctivae and eye lids without erythema  Neck: supple and non-tender without mass, no thyromegaly or thyroid nodules, no cervical lymphadenopathy  Pulmonary/Chest: clear to auscultation bilaterally- no wheezes, rales or rhonchi, normal air movement, no respiratory distress or retractions  Cardiovascular: S1 and S2 auscultated w/ RRR. No murmurs, rubs, clicks, or gallops, distal pulses intact. Abdomen: soft, non-tender, non-distended, bowl sounds physiologic,  no rebound or guarding, no masses or hernias noted. Liver and spleen without enlargement. Extremities: no cyanosis, clubbing of the lower extremities, trace generalized edema  Musculoskeletal: No joint swelling or gross deformity   Neuro:  Alert, 5/5 strength globally and symmetrically  Psych: Affect appropriate. Mood normal. Thought process is normal without evidence of depression or psychosis. Good insight and appropriate interaction. Cognition and memory appear to be intact. Skin: warm and dry, no rash or erythema  Lymph:  No cervical, auricular or supraclavicular lymph nodes palpated    ASSESSMENT & PLAN  Maira Garibay was seen today for medication refill and other. Diagnoses and all orders for this visit:    Chronic hepatitis C without hepatic coma ([facility]  -     External Referral To Gastroenterology    Gastroesophageal reflux disease, unspecified whether esophagitis present  -     omeprazole (PRILOSEC) 40 MG delayed release capsule; Take 1 capsule by mouth every morning (before breakfast)  -     sucralfate (CARAFATE) 1 GM tablet; Take 1 tablet by mouth 4 times daily  -     ondansetron (ZOFRAN) 4 MG tablet;  Take 1 tablet by mouth 3 times daily as needed for Nausea or Vomiting    Constipation, unspecified constipation type  -     docusate (COLACE, DULCOLAX) 100 MG CAPS; Take 100 mg by mouth 2 times daily    Chronic bilateral low back pain, unspecified whether sciatica present  -     gabapentin (NEURONTIN) 600 MG tablet; Take 1 tablet by mouth 3 times daily for 180 days. Seizure disorder (HCC)  -     carBAMazepine (TEGRETOL) 200 MG tablet; Take 1 tablet by mouth 2 times daily  -     Carbamazepine Level, Total; Future    - following with [facility] for polysubstance abuse  - refer to [facility] [employer] [facility] for Hep C treatment  - con't Tegretol, obtain therapeutic lab level  - stressed importance of follow up with neuro  - con't Gabapentin, Colace, Zofran, Carafate and Prilosec    DISPOSITION    Return if symptoms worsen or fail to improve. Za Mezans released without restrictions. PATIENT COUNSELING    Counseling was provided today regarding the following topics: Healthy eating habits, Regular exercise, substance abuse and healthy sleep habits. Za Simons received counseling on the following healthy behaviors: medication adherence and tobacco cessation    Patient given educational materials on: See Attached    I have instructed Za Simons to complete a self tracking handout onnone  and instructed them to bring it with them to his next appointment. Barriers to learning and self management: none    Discussed use, benefit, and side effects of prescribed medications. Barriers to medication compliance addressed. All patient questions answered. Pt voiced understanding.        Electronically signed by HALI Porter CNP on 9/12/2022 at 2:19 PM

## 2022-09-14 ENCOUNTER — INITIAL CONSULT (OUTPATIENT)
Dept: NEUROLOGY | Age: 32
End: 2022-09-14
Payer: MEDICAID

## 2022-09-14 VITALS
DIASTOLIC BLOOD PRESSURE: 62 MMHG | SYSTOLIC BLOOD PRESSURE: 98 MMHG | BODY MASS INDEX: 22.6 KG/M2 | WEIGHT: 144 LBS | OXYGEN SATURATION: 97 % | HEIGHT: 67 IN | HEART RATE: 80 BPM

## 2022-09-14 DIAGNOSIS — R56.9 SEIZURE (HCC): Primary | ICD-10-CM

## 2022-09-14 PROCEDURE — 99205 OFFICE O/P NEW HI 60 MIN: CPT | Performed by: PSYCHIATRY & NEUROLOGY

## 2022-09-14 PROCEDURE — G8420 CALC BMI NORM PARAMETERS: HCPCS | Performed by: PSYCHIATRY & NEUROLOGY

## 2022-09-14 PROCEDURE — G8427 DOCREV CUR MEDS BY ELIG CLIN: HCPCS | Performed by: PSYCHIATRY & NEUROLOGY

## 2022-09-14 PROCEDURE — 4004F PT TOBACCO SCREEN RCVD TLK: CPT | Performed by: PSYCHIATRY & NEUROLOGY

## 2022-09-14 RX ORDER — LIDOCAINE 40 MG/G
CREAM TOPICAL 3 TIMES DAILY PRN
COMMUNITY

## 2022-09-14 NOTE — PATIENT INSTRUCTIONS
MRI brain WO contrast  EEG  Continue with Tegretol at current dose  Tegretol level  CBC, HFP  No driving, swimming, operating heavy machinery or compromising heights until event free for 6 months. Report any new events. Call if any questions. You need to avoid alcohol and illicit substances as discussed  Call with any new symptoms or concerns.    Follow up in 1 month

## 2022-09-26 NOTE — PROGRESS NOTES
Chief Complaint   Patient presents with    Consultation     NP seizure       Ceferino CarboneRosibel Sheets is a 33 y.o. male who presents today for evaluation of history of seizure since age 13. He was seen by neurology in [place], but hasn't follow up with neurology in years. Typical event is he has sudden loss of consciousness with convulsion. He can sometimes bite his tongue, no incontinence of bowel or bladder. His last event was 2 year ago. He has previous history of alcoholism as well as polysubstance abuse including heroin and fentanyl. He has been clean almost 1 year. He is currently on tegretol. He reports when he takes the tegretol consistently his seizures are well controlled. He reports a lot of his events would occur when he would be high or go through withdrawal. His last EEG was years ago. He does not drive. His sleep is good, he wakes up feeling well rested. No family history of seizure. No history of head injury. No head imaging done. He denies chest pain. No shortness of breath, no neck pain. No vision changes. No dysphagia. No fever. No rash. No weight loss. History provided by patient.        Past Medical History:   Diagnosis Date    Closed fracture of one rib of left side with routine healing 12/17/2020    Hypertension     Liver laceration, closed, initial encounter 12/17/2020    Multiple closed fractures of pelvis with stable disruption of pelvic ring with routine healing 12/17/2020    Pneumothorax, traumatic     Seizures (HCC)        Patient Active Problem List   Diagnosis    Chronic bilateral low back pain    Seizure disorder (HCC)    Cigarette nicotine dependence without complication    Gastroesophageal reflux disease    Alcoholic liver disease ([address]    Alcohol abuse    Opioid dependence on agonist therapy ([address]    Chronic hepatitis C without hepatic coma (HCC)    Elevated liver enzymes    Tobacco use    History of substance abuse ([address]    Pharyngoesophageal dysphagia    Constipation    Gastritis Polysubstance abuse (HCC)       Allergies   Allergen Reactions    Dilantin [Phenytoin Sodium Extended] Hives       Current Outpatient Medications   Medication Sig Dispense Refill    omeprazole (PRILOSEC) 40 MG delayed release capsule Take 1 capsule by mouth every morning (before breakfast) 90 capsule 1    docusate (COLACE, DULCOLAX) 100 MG CAPS Take 100 mg by mouth 2 times daily 60 capsule 0    gabapentin (NEURONTIN) 600 MG tablet Take 1 tablet by mouth 3 times daily for 180 days. 270 tablet 1    sucralfate (CARAFATE) 1 GM tablet Take 1 tablet by mouth 4 times daily 360 tablet 1    carBAMazepine (TEGRETOL) 200 MG tablet Take 1 tablet by mouth 2 times daily 60 tablet 2    ondansetron (ZOFRAN) 4 MG tablet Take 1 tablet by mouth 3 times daily as needed for Nausea or Vomiting 15 tablet 0    buprenorphine-naloxone (SUBOXONE) 2-0.5 MG SUBL place 1 tablet under the tongue and dissolve once daily      NARCAN 4 MG/0.1ML LIQD nasal spray       lidocaine (LMX) 4 % cream Apply topically 3 times daily as needed       No current facility-administered medications for this visit. Social History     Socioeconomic History    Marital status: Single     Spouse name: None    Number of children: None    Years of education: None    Highest education level: None   Tobacco Use    Smoking status: Every Day     Packs/day: 0.50     Years: 10.00     Pack years: 5.00     Types: Cigarettes     Start date: 12/29/2010    Smokeless tobacco: Never   Vaping Use    Vaping Use: Some days    Substances: Nicotine, Flavoring    Devices: Disposable   Substance and Sexual Activity    Alcohol use:  Yes     Alcohol/week: 1.0 standard drink     Types: 1 Cans of beer per week    Drug use: Not Currently     Types: Opiates      Comment: last used 6-8 months ago last time used fentynal    Sexual activity: Yes     Partners: Female     Social Determinants of Health     Financial Resource Strain: Low Risk     Difficulty of Paying Living Expenses: Not hard at all   Food Insecurity: No Food Insecurity    Worried About Running Out of Food in the Last Year: Never true    Ran Out of Food in the Last Year: Never true       Family History   Problem Relation Age of Onset    No Known Problems Mother     Other Father         liver dx          I reviewed the past medical history, allergies, medications, social history and family history. Review of Systems   All systems reviewed, and are all negative, except what is mentioned in HPI      Vitals:    09/14/22 1443   BP: 98/62   Site: Left Upper Arm   Position: Sitting   Cuff Size: Medium Adult   Pulse: 80   SpO2: 97%   Weight: 144 lb (65.3 kg)   Height: 5' 7\" (1.702 m)       Physical Examination:  General appearance - alert, well appearing, and in no distress, oriented to person, place, and time and normal weight  Mental status- Level of Alertness: awake  Orientation: person, place, time  Memory: normal  Fund of Knowledge: normal  Attention/Concentration: normal  Language: normal. Mood is normal.   Neck - supple, no significant adenopathy, carotids upstroke normal bilaterally. There is no axillary lymphadenopathy. There is no carotid bruit. No neck lymphadenopathy. No thyroid enlargement   Neurological -   Cranial [id number]:.   Cranial nerve II: Normal   Cranial nerve III: Pupils: equal, round, reactive to light  Cranial nerves III, IV, VI: Extraocular Movements: intact   Cranial nerve V: Facial sensation: intact   Cranial nerve VII:Facial strength: intact   Cranial nerve VIII: Hearing: intact   Cranial nerve IX: Palate Elevation intact bilaterally  Cranial nerve XI: Shoulder shrug intact bilaterally  Cranial nerve XII: Tongue midline   neck supple without rigidity, there is no limitation of range of motion of the neck. DTR's are Intact distal and symmetric  Babinski sign negative   Motor exam is 5/5 in the upper and lower extremities. Normal muscle tone. There is no muscle atrophy.   Sensory is intact for light touch Coordination: finger to nose,  intact  Gait and station intact  Abnormal movement none, vibration normal, proprioception normal  Skin - warm, dry to touch, normal coloration, no rashes, no suspicious skin lesions  Superficial temporal artery pulses are normal.   There is no limitation of range of motion of the neck. There is no resting tremor, no pin rolling, no bradykinesia, no Hypohonia, normal blink rate. Musculoskeletal: Has no hand arthritis, no limitation of ROM in any of the four extremities,. There is no leg edema. The Heart was regular in rate and rhythm. No heart murmur  Chest Clear, with  good effort. Abdomen soft, intact bowel sounds. Results for orders placed during the hospital encounter of 12/11/20    CT HEAD WO CONTRAST    Narrative  CT head without contrast    Comparison: None    Findings:  No intracranial mass, midline shift, hydrocephalus, or acute hemorrhage. No significant atrophy-like change or white matter disease. The visualized paranasal sinuses and mastoid air cells are normal.  The orbits are unremarkable. There is no acute fracture. Impression  1. No acute intracranial findings    This document has been electronically signed by: Adina Pantoja MD on  12/11/2020 09:26 PM    All CT scans at this facility use dose modulation, iterative  reconstruction, and/or weight-based  dosing when appropriate to reduce radiation dose to as low as reasonably  achievable. We reviewed the patient records from referring provider and available information in the EHR       ASSESSMENT:      Diagnosis Orders   1. Seizure [facility])           This is a 68-year-old male who presents with history of seizure since age 13. He has been seen by neurology in the past in [place], however but has not followed up with neurologist in many years. Typical event is he has sudden loss of consciousness with convulsion. He can sometimes bite his tongue, no incontinence of bowel or bladder.  His last event was 2 years ago. He is currently on Tegretol and reports when he takes the Tegretol consistently his seizures are well controlled. I reviewed the pertinent labs and records in the EHR and from other providers. He has previous history of alcoholism as well as polysubstance abuse including heroin and fentanyl. The patient was counseled about his symptoms and work up recommended, he was also counseled about medications, and side effects. He was also counseled on continuing to refrain from ETOH and substance abuse. We will arrange for him to undergo an MRI of the brain without contrast to evaluate for organic causes of his symptoms as well as an EEG to screen for cortical irritability. We will obtain lab work checking Tegretol level, CBC, HFP. After detailed discussion with patient we agreed on the following plan. Plan    MRI brain WO contrast  EEG  Continue with Tegretol at current dose  Tegretol level  CBC, HFP  No driving, swimming, operating heavy machinery or compromising heights until event free for 6 months. Report any new events. Call if any questions. You need to avoid alcohol and illicit substances as discussed  Call with any new symptoms or concerns.    Follow up in 1 month    Total time 64 min    Mary Grace Willoughby MD

## 2022-10-21 ENCOUNTER — MYC REFILL (OUTPATIENT)
Dept: FAMILY MEDICINE | Facility: OTHER | Age: 32
End: 2022-10-21

## 2022-10-21 DIAGNOSIS — F90.0 ADHD (ATTENTION DEFICIT HYPERACTIVITY DISORDER), INATTENTIVE TYPE: ICD-10-CM

## 2022-10-21 RX ORDER — DEXTROAMPHETAMINE SACCHARATE, AMPHETAMINE ASPARTATE, DEXTROAMPHETAMINE SULFATE AND AMPHETAMINE SULFATE 2.5; 2.5; 2.5; 2.5 MG/1; MG/1; MG/1; MG/1
10 TABLET ORAL DAILY
Qty: 30 TABLET | Refills: 0 | Status: SHIPPED | OUTPATIENT
Start: 2022-10-21 | End: 2022-12-14

## 2022-10-21 RX ORDER — DEXTROAMPHETAMINE SACCHARATE, AMPHETAMINE ASPARTATE MONOHYDRATE, DEXTROAMPHETAMINE SULFATE AND AMPHETAMINE SULFATE 5; 5; 5; 5 MG/1; MG/1; MG/1; MG/1
20 CAPSULE, EXTENDED RELEASE ORAL DAILY
Qty: 30 CAPSULE | Refills: 0 | Status: SHIPPED | OUTPATIENT
Start: 2022-10-21 | End: 2022-12-14

## 2022-10-21 NOTE — TELEPHONE ENCOUNTER
Pending Prescriptions:                       Disp   Refills    amphetamine-dextroamphetamine (ADDERALL XR*30 cap*0        Sig: Take 1 capsule (20 mg) by mouth daily    amphetamine-dextroamphetamine (ADDERALL) 1*30 tab*0        Sig: Take 1 tablet (10 mg) by mouth daily In the early           afternoon    Routing refill request to provider for review/approval because:  Drug not on the G refill protocol   Requested Prescriptions   Pending Prescriptions Disp Refills    amphetamine-dextroamphetamine (ADDERALL XR) 20 MG 24 hr capsule 30 capsule 0     Sig: Take 1 capsule (20 mg) by mouth daily       There is no refill protocol information for this order       amphetamine-dextroamphetamine (ADDERALL) 10 MG tablet 30 tablet 0     Sig: Take 1 tablet (10 mg) by mouth daily In the early afternoon       There is no refill protocol information for this order

## 2022-10-27 ENCOUNTER — NURSE ONLY (OUTPATIENT)
Dept: LAB | Age: 32
End: 2022-10-27

## 2022-11-08 ENCOUNTER — HOSPITAL ENCOUNTER (OUTPATIENT)
Dept: MRI IMAGING | Age: 32
Discharge: HOME | End: 2022-11-08
Payer: MEDICAID

## 2022-11-08 ENCOUNTER — HOSPITAL ENCOUNTER (OUTPATIENT)
Dept: NEUROLOGY | Age: 32
Discharge: HOME | End: 2022-11-08
Payer: MEDICAID

## 2022-11-08 DIAGNOSIS — R56.9 SEIZURE (HCC): ICD-10-CM

## 2022-11-08 PROCEDURE — 70551 MRI BRAIN STEM W/O DYE: CPT

## 2022-11-08 PROCEDURE — 95816 EEG AWAKE AND DROWSY: CPT

## 2022-11-08 PROCEDURE — 95816 EEG AWAKE AND DROWSY: CPT | Performed by: PSYCHIATRY & NEUROLOGY

## 2022-11-08 NOTE — PROGRESS NOTES
[facility]     Neurodiagnostic Laboratory Technician worksheet       EEG Date: 2022    Name: Arloa Ahumada. Carole Krauss   : 1990   Age: 33 y.o. SEX: male    Room: op    MRN: [id number]     CSN: [id number]    Ordering Provider: Amira Perdomo  EEG Number: [id number] Time of Test:  7845    Hand: Right   Sedation: No    H.V. Done: Yes with good effort Photic: Yes    Sleep: No   Drowsy: Yes   Sleep Deprived: No    Seizures observed: no    Mentality: alert       Clinical History: patient has history if seizures since age 14,He was seen by neurology in [place], but hasn't follow up with neurology in years. Typical Episodes are loss of consciousness, convulsions, occasional tongue biting  and eye rolling. Patient has history of drug use, fentanyl and heroin, but is over a year clean. Patient taking Tegretol 200MG   MRI of the brain 2022  Impression       1. Punctate nonspecific area of increased signal intensity in the periventricular white matter adjacent to the atrium of the left lateral ventricle. 2. Otherwise negative MRI scan of the brain. No definite structural abnormality noted in the temporal lobes. 3. There is slightly increased signal intensity in ethmoid air cells bilaterally and in the right maxillary sinus consistent with mild inflammatory changes   CT of the head 2020  Impression   1. No acute intracranial findings         Past Medical History:       Diagnosis Date    Closed fracture of one rib of left side with routine healing 2020    Hypertension     Liver laceration, closed, initial encounter 2020    Multiple closed fractures of pelvis with stable disruption of pelvic ring with routine healing 2020    Pneumothorax, traumatic     Seizures (HCC)          Prior to Admission medications    Medication Sig Start Date End Date Taking?  Authorizing Provider   lidocaine (LMX) 4 % cream Apply topically 3 times daily as needed    Historical Provider, MD   omeprazole (PRILOSEC) 40 MG delayed release capsule Take 1 capsule by mouth every morning (before breakfast) 9/12/22   HALI Hoyos CNP   docusate (COLACE, DULCOLAX) 100 MG CAPS Take 100 mg by mouth 2 times daily 9/12/22   HALI Hoyos CNP   gabapentin (NEURONTIN) 600 MG tablet Take 1 tablet by mouth 3 times daily for 180 days.  9/12/22 3/11/23  HALI Hoyos CNP   sucralfate (CARAFATE) 1 GM tablet Take 1 tablet by mouth 4 times daily 9/12/22   HALI Hoyos CNP   carBAMazepine (TEGRETOL) 200 MG tablet Take 1 tablet by mouth 2 times daily 9/12/22   HALI Hoyos CNP   ondansetron [facility]) 4 MG tablet Take 1 tablet by mouth 3 times daily as needed for Nausea or Vomiting 9/12/22   HALI Hoyos CNP   buprenorphine-naloxone (SUBOXONE) 2-0.5 MG SUBL place 1 tablet under the tongue and dissolve once daily 3/24/21   Historical Provider, MD   [facility] 4 MG/0.1ML LIQD nasal spray  3/3/21   Historical Provider, MD       Technician: Catracho Burks 11/8/2022

## 2022-11-10 NOTE — PROCEDURES
[address] [place] [address]                          ELECTROENCEPHALOGRAM REPORT    PATIENT NAME: Valentina Wayne                 :        1990  MED REC NO:   [id number]                           ROOM:  ACCOUNT NO:   [de-identified]                           ADMIT DATE: 2022  PROVIDER:     Milagro Mccoy. Brina Collins MD    DATE OF EE2022    REFERRING PROVIDER:  Devora Barragan CNP    CLINICAL HISTORY:  A 22-year-old male presenting with seizures since age  13, has not followed up with Neurology in years, typical episodes of  loss of consciousness, convulsion, occasional tongue biting, and eyes  rolling. He is currently on Tegretol. The patient has a history of  drug use, fentanyl and heroin, has been clean for more than one year. Medications listed are lidocaine, Prilosec, Colace, Carafate, Tegretol,  and Suboxone. CLINICAL INTERPRETATION:  This is a routine 20-minute EEG recording  using the international 10/20 system on a [employer] workstation. Automated  spike, and seizure detection algorithms were applied. The patient is  described as alert. The background rhythm activity is noted to be 9-10 Hz in the posterior  parietal area, symmetric, well modulated, attenuates with eye opening. Hyperventilation was performed for 3 minutes with fair effort without  abnormality. The patient is noted to be drowsy during parts of  recording. The patient is noted to be asleep during parts of recording. Lead artifact and muscle artifacts were noted. Occasional right frontal  central sharp waves were noted during the recording that are of  questionable significance though they maybe epileptogenic in nature, or  indicate seizure tendency. These are consistent with the patient's  known history of seizure disorder. No clinical seizures were observed. Photic stimulation was performed with driving seen.     IMPRESSION:  This is an abnormal EEG due to the presence of occasional  right frontal sharp waves during the recording that are of questionable  significance though they maybe epileptogenic in nature, or indicate  seizure tendency in the proper clinical context. These are also  consistent with the patient's known history of seizure disorder. No  clinical seizures were observed.         Alverto Skinner MD    D: 11/10/2022 13:09:35       T: 11/10/2022 13:13:44     [email or web address]  Job#: [id number]     Doc#: [id number]    CC:
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
## 2022-11-11 ENCOUNTER — OFFICE VISIT (OUTPATIENT)
Dept: NEUROLOGY | Age: 32
End: 2022-11-11
Payer: MEDICAID

## 2022-11-11 VITALS
OXYGEN SATURATION: 97 % | HEIGHT: 64 IN | HEART RATE: 76 BPM | WEIGHT: 140 LBS | DIASTOLIC BLOOD PRESSURE: 68 MMHG | SYSTOLIC BLOOD PRESSURE: 112 MMHG | BODY MASS INDEX: 23.9 KG/M2

## 2022-11-11 DIAGNOSIS — R56.9 SEIZURE (HCC): Primary | ICD-10-CM

## 2022-11-11 PROCEDURE — G8427 DOCREV CUR MEDS BY ELIG CLIN: HCPCS | Performed by: PSYCHIATRY & NEUROLOGY

## 2022-11-11 PROCEDURE — 4004F PT TOBACCO SCREEN RCVD TLK: CPT | Performed by: PSYCHIATRY & NEUROLOGY

## 2022-11-11 PROCEDURE — G8484 FLU IMMUNIZE NO ADMIN: HCPCS | Performed by: PSYCHIATRY & NEUROLOGY

## 2022-11-11 PROCEDURE — 99214 OFFICE O/P EST MOD 30 MIN: CPT | Performed by: PSYCHIATRY & NEUROLOGY

## 2022-11-11 PROCEDURE — G8420 CALC BMI NORM PARAMETERS: HCPCS | Performed by: PSYCHIATRY & NEUROLOGY

## 2022-11-11 NOTE — PROGRESS NOTES
NEUROLOGY OUT PATIENT FOLLOW UP NOTE:  11/11/20223:35 PM    Jaden RIZVI Octaviano Chin is here for follow up for   Patient Active Problem List   Diagnosis    Chronic bilateral low back pain    Seizure disorder (HCC)    Cigarette nicotine dependence without complication    Gastroesophageal reflux disease    Alcoholic liver disease ([facility]    Alcohol abuse    Opioid dependence on agonist therapy ([facility]    Chronic hepatitis C without hepatic coma (HCC)    Elevated liver enzymes    Tobacco use    History of substance abuse ([facility]    Pharyngoesophageal dysphagia    Constipation    Gastritis    Polysubstance abuse ([facility]   Follow-up for seizure disorder. The patient reports no new events. He had testing performed, he is here to go over the results. He did not perform the Tegretol level. Allergies   Allergen Reactions    Dilantin [Phenytoin Sodium Extended] Hives       Current Outpatient Medications:     lidocaine (LMX) 4 % cream, Apply topically 3 times daily as needed, Disp: , Rfl:     omeprazole (PRILOSEC) 40 MG delayed release capsule, Take 1 capsule by mouth every morning (before breakfast), Disp: 90 capsule, Rfl: 1    docusate (COLACE, DULCOLAX) 100 MG CAPS, Take 100 mg by mouth 2 times daily, Disp: 60 capsule, Rfl: 0    gabapentin (NEURONTIN) 600 MG tablet, Take 1 tablet by mouth 3 times daily for 180 days. , Disp: 270 tablet, Rfl: 1    sucralfate (CARAFATE) 1 GM tablet, Take 1 tablet by mouth 4 times daily, Disp: 360 tablet, Rfl: 1    carBAMazepine (TEGRETOL) 200 MG tablet, Take 1 tablet by mouth 2 times daily, Disp: 60 tablet, Rfl: 2    ondansetron (ZOFRAN) 4 MG tablet, Take 1 tablet by mouth 3 times daily as needed for Nausea or Vomiting, Disp: 15 tablet, Rfl: 0    buprenorphine-naloxone (SUBOXONE) 2-0.5 MG SUBL, place 1 tablet under the tongue and dissolve once daily, Disp: , Rfl:     NARCAN 4 MG/0.1ML LIQD nasal spray, , Disp: , Rfl:     I reviewed the past medical history, allergies, medications, social history and family history. PE:   Vitals:    11/11/22 1504   BP: 112/68   Site: Left Upper Arm   Position: Sitting   Cuff Size: Medium Adult   Pulse: 76   SpO2: 97%   Weight: 140 lb (63.5 kg)   Height: 5' 4\" (1.626 m)     General Appearance:  awake, alert, oriented, in no acute distress  Gen: NAD, Language is Intact. Skin: no rash, lesion,  moist to touch. warm  Head: no rash, no icterus  Neck: There is no carotid bruits. The Neck is supple. Neuro: CN 2-12 grossly intact with no focal deficits. Power 5/5 Throughout symmetric, Reflexes are +2 symmetric. Long tracts are intact. Cerebellar exam is Intact. Sensory exam is intact to light touch. Gait is intact. Musculoskeletal:  Has no hand arthritis, no limitation of ROM in any of the four extremities.   Lower extremities no edema          DATA:      Results for orders placed or performed in visit on 10/28/22   Comprehensive Metabolic Panel   Result Value Ref Range    Glucose 104 70 - 108 mg/dL    Creatinine 0.5 0.4 - 1.2 mg/dL    BUN 8 7 - 22 mg/dL    Sodium 136 135 - 145 meq/L    Potassium 4.4 3.5 - 5.2 meq/L    Chloride 98 98 - 111 meq/L    CO2 24 23 - 33 meq/L    Calcium 9.3 8.5 - 10.5 mg/dL     (H) 5 - 40 U/L    Alkaline Phosphatase 801 (H) 38 - 126 U/L    Total Protein 6.7 6.1 - 8.0 g/dL    Albumin 4.2 3.5 - 5.1 g/dL    Total Bilirubin 0.6 0.3 - 1.2 mg/dL     (H) 11 - 66 U/L   Hepatic Function Panel   Result Value Ref Range    Bilirubin, Direct 0.3 0.0 - 0.3 mg/dL   Hepatitis B Surface Antigen   Result Value Ref Range    Hepatitis B Surface Ag Negative    Hepatitis A Antibody, IgM   Result Value Ref Range    Hep A IgM Negative    Hepatitis B Core Antibody, IgM   Result Value Ref Range    Hep B Core Ab, IgM Negative    Hepatitis C Antibody   Result Value Ref Range    Hepatitis C Ab POS (A)    HIV Screen   Result Value Ref Range    HIV Ag/Ab NONREACTIVE NR   RPR   Result Value Ref Range    RPR NONREACTIVE NONREACTIVE   Glomerular Filtration Rate, Estimated   Result Value Ref Range    Est, Glom Filt Rate >60 >60 ml/min/1.73m2   Anion Gap   Result Value Ref Range    Anion Gap 14.0 8.0 - 16.0 meq/L   HCV Ultra Quant (Viral Load)   Result Value Ref Range    HCV QNT by NAAT IU/,969 IU/mL    HCV Qnt by NAAT log IU/ml 5.78 log IU/mL    HCV QNT BY NAAT INTERPRETATION Detected (A) Not Detected            MRI BRAIN WO CONTRAST    Narrative  PROCEDURE: MRI BRAIN WO CONTRAST    CLINICAL INFORMATION Seizure ([facility] COMPARISON: CT scan of the brain dated 11 December 2020. Dominik Mering TECHNIQUE: Multiplanar and multiple spin echo MRI images were obtained of the brain without contrast.    FINDINGS:  The diffusion-weighted images are normal.  The brain volume is normal. There is a punctate nonspecific area of increased signal intensity in the periventricular white matter adjacent to the left lateral ventricle. There is no other definite structural  abnormality noted and temporal lobes. .  There are no intra-or extra-axial collections. There is no hydrocephalus, midline shift or mass effect. There are no areas of susceptibility artifact noted. The major intracranial vascular flow voids are present. The midline craniocervical junction structures are normal.  The pituitary gland and brainstem are normal.  There is slightly increased signal intensity in ethmoid air cells bilaterally and in the right maxillary sinus consistent with inflammatory changes. Impression  1. Punctate nonspecific area of increased signal intensity in the periventricular white matter adjacent to the atrium of the left lateral ventricle. 2. Otherwise negative MRI scan of the brain. No definite structural abnormality noted in the temporal lobes. 3. There is slightly increased signal intensity in ethmoid air cells bilaterally and in the right maxillary sinus consistent with mild inflammatory changes. **This report has been created using voice recognition software.  It may contain minor errors which are inherent in voice recognition technology. **    Final report electronically signed by DR Gayla Landau on 11/8/2022 8:19 AM    ** Patient reports a fall off a roof less than two years ago, with LOC, had a seizure that lead to a fall off the roof. CT HEAD WO CONTRAST    Narrative  CT head without contrast    Comparison: None    Findings:  No intracranial mass, midline shift, hydrocephalus, or acute hemorrhage. No significant atrophy-like change or white matter disease. The visualized paranasal sinuses and mastoid air cells are normal.  The orbits are unremarkable. There is no acute fracture. Impression  1. No acute intracranial findings    This document has been electronically signed by: Andrew Apodaca MD on  12/11/2020 09:26 PM    All CT scans at this facility use dose modulation, iterative  reconstruction, and/or weight-based  dosing when appropriate to reduce radiation dose to as low as reasonably  achievable. EEG : 11/2022   IMPRESSION:  This is an abnormal EEG due to the presence of occasional  right frontal sharp waves during the recording that are of questionable  significance though they maybe epileptogenic in nature, or indicate  seizure tendency in the proper clinical context. These are also  consistent with the patient's known history of seizure disorder. No  clinical seizures were observed. Georges Mosley MD     D: 11/10/2022 13:09:35       Assessment:     Diagnosis Orders   1. Seizure [facility])           This is a 40-year-old with history of seizures since age 13, seen as follow-up. He established care on last visit, he was following with a neurologist in [place]. His last event was 2 years ago. The patient denies any seizure for more than 2 years, he has been avoiding any alcohol for more than 1 year, no drugs aside from prescription medications.   MRI brain performed 11/8/2022 was reviewed, it showed punctate nonspecific area of increased signal intensity in the periventricular white matter adjacent to the atrium of the left lateral ventricle. The patient shared with me his last seizure was falling off a roof, and hitting his head, was unconscious, preceded by a seizure. This may explain the MRI brain findings, difficult to tell definitively. The EEG performed showed right frontal sharp waves. He reports he is compliant, he did not do the Tegretol level yet. The patient's exam is nonfocal he is cooperative. The patient was counseled to continue on his current medications. He states he does not miss them. The patient was counseled about his symptoms, medications, and side effects. After detailed discussion with patient we agreed on the following plan. Plan:  Continue with Tegretol 200 mg twice a day. Please proceed with blood work ordered last visit for Tegretol level (test re ordered)  No driving, swimming, operating heavy machinery or compromising heights until event free for 6 months. Report any new events. Call if any questions. Continue to avoid alcohol and illicit substances as discussed  Call with any new symptoms or concerns.    Follow up in 3 month       Total time 32 min    Paty Mendenhall MD

## 2022-11-11 NOTE — PATIENT INSTRUCTIONS
Continue with Tegretol 200 mg twice a day. Please proceed with blood work ordered last visit for Tegretol level  No driving, swimming, operating heavy machinery or compromising heights until event free for 6 months. Report any new events. Call if any questions. Continue to avoid alcohol and illicit substances as discussed  Call with any new symptoms or concerns.    Follow up in 3 month

## 2022-11-16 ENCOUNTER — OFFICE VISIT (OUTPATIENT)
Dept: OTOLARYNGOLOGY | Facility: OTHER | Age: 32
End: 2022-11-16
Attending: PHYSICIAN ASSISTANT
Payer: COMMERCIAL

## 2022-11-16 VITALS
SYSTOLIC BLOOD PRESSURE: 116 MMHG | WEIGHT: 159 LBS | HEIGHT: 71 IN | BODY MASS INDEX: 22.26 KG/M2 | DIASTOLIC BLOOD PRESSURE: 68 MMHG | TEMPERATURE: 96 F

## 2022-11-16 DIAGNOSIS — G47.19 EXCESSIVE DAYTIME SLEEPINESS: ICD-10-CM

## 2022-11-16 DIAGNOSIS — H93.12 TINNITUS, LEFT: ICD-10-CM

## 2022-11-16 DIAGNOSIS — R04.0 EPISTAXIS: ICD-10-CM

## 2022-11-16 DIAGNOSIS — R06.83 SNORING: Primary | ICD-10-CM

## 2022-11-16 DIAGNOSIS — J35.01 CHRONIC TONSILLITIS: ICD-10-CM

## 2022-11-16 PROCEDURE — 99203 OFFICE O/P NEW LOW 30 MIN: CPT | Mod: 25 | Performed by: OTOLARYNGOLOGY

## 2022-11-16 PROCEDURE — 30901 CONTROL OF NOSEBLEED: CPT | Mod: RT | Performed by: OTOLARYNGOLOGY
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
## 2022-11-19 ENCOUNTER — MEDICAL CORRESPONDENCE (OUTPATIENT)
Dept: HEALTH INFORMATION MANAGEMENT | Facility: CLINIC | Age: 32
End: 2022-11-19

## 2022-12-01 ENCOUNTER — HOSPITAL ENCOUNTER (OUTPATIENT)
Dept: ULTRASOUND IMAGING | Age: 32
Discharge: HOME | End: 2022-12-01
Payer: MEDICAID

## 2022-12-01 ENCOUNTER — NURSE ONLY (OUTPATIENT)
Dept: LAB | Age: 32
End: 2022-12-01

## 2022-12-01 DIAGNOSIS — B19.20 HEPATITIS C VIRUS INFECTION WITHOUT HEPATIC COMA, UNSPECIFIED CHRONICITY: ICD-10-CM

## 2022-12-01 DIAGNOSIS — B18.2 CHRONIC HEPATITIS C WITH HEPATIC COMA (HCC): ICD-10-CM

## 2022-12-01 LAB — INR BLD: 1 (ref 0.85–1.13)

## 2022-12-01 PROCEDURE — 76705 ECHO EXAM OF ABDOMEN: CPT

## 2022-12-01 PROCEDURE — 76981 USE PARENCHYMA: CPT

## 2022-12-07 ENCOUNTER — TRANSFERRED RECORDS (OUTPATIENT)
Dept: HEALTH INFORMATION MANAGEMENT | Facility: CLINIC | Age: 32
End: 2022-12-07

## 2022-12-07 ENCOUNTER — OFFICE VISIT (OUTPATIENT)
Dept: SLEEP MEDICINE | Facility: CLINIC | Age: 32
End: 2022-12-07
Attending: OTOLARYNGOLOGY
Payer: COMMERCIAL

## 2022-12-07 DIAGNOSIS — G47.19 EXCESSIVE DAYTIME SLEEPINESS: ICD-10-CM

## 2022-12-07 DIAGNOSIS — R06.83 SNORING: ICD-10-CM

## 2022-12-07 PROCEDURE — G0399 HOME SLEEP TEST/TYPE 3 PORTA: HCPCS | Performed by: OTOLARYNGOLOGY

## 2022-12-08 NOTE — PROGRESS NOTES
HST POST-STUDY QUESTIONNAIRE    1. What time did you go to bed?  1045  2. How long do you think it took to fall asleep?  15m  3. What time did you wake up to start the day?  0640  4. Did you get up during the night at all?  n  5. If you woke up, do you remember approximately what time(s)? n   6. Did you have any difficulty with the equipment?  No  7. Did you us any type of treatment with this study?  None  8. Was the head of the bed elevated? No  9. Did you sleep in a recliner?  No  10. Did you stop using CPAP at least 3 days before this test?  NA  11. Any other information you'd like us to know? NA

## 2022-12-09 NOTE — PROGRESS NOTES
This HSAT was performed using a Noxturnal T3 device which recorded snore, sound, movement activity, body position, nasal pressure, oronasal thermal airflow, pulse, oximetry and both chest and abdominal respiratory effort. HSAT data was restricted to the time patient states they were in bed.     HSAT was scored using 1B 4% hypopnea rule.     HST AHI (Non-PAT): 5.1  Snoring was reported as mild, moderate and intermittent.  Time with SpO2 below 89% was 0.2 minutes.   Overall signal quality was good     Pt will follow up with sleep provider to determine appropriate therapy.

## 2022-12-12 ENCOUNTER — TRANSFERRED RECORDS (OUTPATIENT)
Dept: HEALTH INFORMATION MANAGEMENT | Facility: CLINIC | Age: 32
End: 2022-12-12

## 2022-12-13 NOTE — PROGRESS NOTES
"HOME SLEEP STUDY INTERPRETATION        Patient: Chance Torres  MRN: [id number]  YOB: 1990  Study Date: 12/7/2022  PCP/Referring Provider: Prashant Escobar; Selena  Ordering Provider: Rodriguez Quintero MD.         Indications for Home Study: Chance Torres is a 32 year old male with a history of SNORING, EXCESSIVE DAYTIME SLEEPINESS who presents with symptoms suggestive of obstructive sleep apnea.    Estimated body mass index is 22.18 kg/m  as calculated from the following:    Height as of 11/16/22: 1.803 m (5' 11\").    Weight as of 11/16/22: 72.1 kg (159 lb).  [place] Sleepiness Scale: 12/24  STOP-BANG: 3/8        Data: A full night home sleep study was performed recording the standard physiologic parameters including body position, movement, sound, nasal pressure, thermal oral airflow, chest and abdominal movements with respiratory inductance plethysmography, and oxygen saturation by pulse oximetry. Pulse rate was estimated by oximetry recording. This study was considered adequate based on > 4 hours of quality oximetry and respiratory recording. As specified by the AASM Manual for the Scoring of Sleep and Associated events, version 2.3, Rule VIII.D 1B, 4% oxygen desaturation scoring for hypopneas is used as a standard of care on all home sleep apnea testing.        Analysis Time:  439 minutes        Respiration:   Sleep Associated Hypoxemia: sustained hypoxemia was not present. Baseline oxygen saturation was 99%.  Time with saturation less than or equal to 88% was 0.2 minutes. The lowest oxygen saturation was 87%.   Snoring: Snoring was present.  Respiratory events: The home study revealed a presence of 1 obstructive apneas and 14 mixed and central apneas. There were 22 hypopneas resulting in a combined apnea/hypopnea index [AHI] of 5.1 events per hour.  AHI was 6 per hour supine, 0 per hour prone, 0.7 per hour on left side, and 6.4 per hour on right side.   Pattern: Excluding events noted " above, respiratory rate and pattern was Normal.      Position: Percent of time spent: supine - 72.6%, prone - 0%, on left - 18.9%, on right - 8.5%.      Heart Rate: By pulse oximetry tachycardia was noted.       Assessment:     Mild obstructive sleep apnea.    Sleep associated hypoxemia was not present.    Recommendations:    Consider auto-CPAP at 5-10 cmH2O, oral appliance therapy or positional therapy.(left side only)    Suggest optimizing sleep hygiene and avoiding sleep deprivation.    Further w/u of hypersomnia if clinically indicated since patient has very mild ADRIANNA.        Diagnosis Code(s): Obstructive Sleep Apnea G47.33    Rodriguez Quintero MD, December 13, 2022   Diplomate, American Board of Otolaryngology, Sleep Medicine

## 2022-12-14 ENCOUNTER — MYC REFILL (OUTPATIENT)
Dept: FAMILY MEDICINE | Facility: OTHER | Age: 32
End: 2022-12-14

## 2022-12-14 ENCOUNTER — TRANSFERRED RECORDS (OUTPATIENT)
Dept: OTOLARYNGOLOGY | Facility: OTHER | Age: 32
End: 2022-12-14

## 2022-12-14 DIAGNOSIS — F90.0 ADHD (ATTENTION DEFICIT HYPERACTIVITY DISORDER), INATTENTIVE TYPE: ICD-10-CM

## 2022-12-14 RX ORDER — DEXTROAMPHETAMINE SACCHARATE, AMPHETAMINE ASPARTATE, DEXTROAMPHETAMINE SULFATE AND AMPHETAMINE SULFATE 2.5; 2.5; 2.5; 2.5 MG/1; MG/1; MG/1; MG/1
10 TABLET ORAL DAILY
Qty: 30 TABLET | Refills: 0 | Status: SHIPPED | OUTPATIENT
Start: 2022-12-14 | End: 2023-02-15

## 2022-12-14 RX ORDER — DEXTROAMPHETAMINE SACCHARATE, AMPHETAMINE ASPARTATE MONOHYDRATE, DEXTROAMPHETAMINE SULFATE AND AMPHETAMINE SULFATE 5; 5; 5; 5 MG/1; MG/1; MG/1; MG/1
20 CAPSULE, EXTENDED RELEASE ORAL DAILY
Qty: 30 CAPSULE | Refills: 0 | Status: SHIPPED | OUTPATIENT
Start: 2022-12-14 | End: 2023-02-15

## 2022-12-23 DIAGNOSIS — G40.909 SEIZURE DISORDER (HCC): ICD-10-CM

## 2022-12-23 RX ORDER — CARBAMAZEPINE 200 MG/1
TABLET ORAL
Qty: 60 TABLET | Refills: 2 | Status: SHIPPED | OUTPATIENT
Start: 2022-12-23

## 2022-12-23 NOTE — TELEPHONE ENCOUNTER
Recent Visits  Date Type Provider Dept   09/12/22 Office Visit Jaime Paul, APRN - CNP [facility]   07/22/22 Office Visit Jaime Paul, APRN - CNP [facility]   10/12/21 Office Visit Jaime Paul, APRN - CNP [facility]   08/09/21 Office Visit Jaime Paul, APRN - CNP [facility]   07/01/21 Office Visit Jaime Paul, APRN - CNP [facility]   Showing recent visits within past 540 days with a meds authorizing provider and meeting all other requirements  Future Appointments  No visits were found meeting these conditions.   Showing future appointments within next 150 days with a meds authorizing provider and meeting all other requirements    Future Appointments   Date Time Provider Myriam Antonio   2/13/2023  3:30 PM Nida Vanegas, [address] [facility] -

## 2022-12-29 NOTE — PROGRESS NOTES
History of Present Illness - Chance Torres is a 32 year old male presenting in clinic today for a recheck on Patient presents with:  Follow Up: Tinnitus, HST results    Patient comes in for evaluation of 2 issues.  First the results of his thorough and extensive testing at the [facility] for nonspecific dizziness visual issues.  Patient does have history of ocular migraine.  He is thorough testing including his hearing testing including central and peripheral vestibular testing positional testing were all normal.  He still has the same symptoms for the evaluation will probably be required.  Second issue is he is excessive daytime sleepiness with [place] score of 12 with sufficient sleep and unrefreshing naps.  He is results of home sleep study testing were as follows  Data: A full night home sleep study was performed recording the standard physiologic parameters including body position, movement, sound, nasal pressure, thermal oral airflow, chest and abdominal movements with respiratory inductance plethysmography, and oxygen saturation by pulse oximetry. Pulse rate was estimated by oximetry recording. This study was considered adequate based on > 4 hours of quality oximetry and respiratory recording. As specified by the AASM Manual for the Scoring of Sleep and Associated events, version 2.3, Rule VIII.D 1B, 4% oxygen desaturation scoring for hypopneas is used as a standard of care on all home sleep apnea testing.           Analysis Time:  439 minutes           Respiration:   Sleep Associated Hypoxemia: sustained hypoxemia was not present. Baseline oxygen saturation was 99%.  Time with saturation less than or equal to 88% was 0.2 minutes. The lowest oxygen saturation was 87%.   Snoring: Snoring was present.  Respiratory events: The home study revealed a presence of 1 obstructive apneas and 14 mixed and central apneas. There were 22 hypopneas resulting in a combined apnea/hypopnea index  [AHI] of 5.1 events per hour.  AHI was 6 per hour supine, 0 per hour prone, 0.7 per hour on left side, and 6.4 per hour on right side.   Pattern: Excluding events noted above, respiratory rate and pattern was Normal.        Position: Percent of time spent: supine - 72.6%, prone - 0%, on left - 18.9%, on right - 8.5%.        Heart Rate: By pulse oximetry tachycardia was noted.         Assessment:     Mild obstructive sleep apnea.    Sleep associated hypoxemia was not present.     Recommendations:    Consider auto-CPAP at 5-10 cmH2O, oral appliance therapy or positional therapy.(left side only)    Suggest optimizing sleep hygiene and avoiding sleep deprivation.    Further w/u of hypersomnia if clinically indicated since patient has very mild ADRIANNA.              BP Readings from Last 1 Encounters:   01/04/23 110/62       BP noted to be well controlled today in office.     Chance IS NOT a smoker/uses chewing tobacco.      Past Medical History -   Past Medical History:   Diagnosis Date     Depressive disorder 2016     Marijuana use 1/4/2019       Current Medications -   Current Outpatient Medications:      amphetamine-dextroamphetamine (ADDERALL XR) 20 MG 24 hr capsule, Take 1 capsule (20 mg) by mouth daily, Disp: 30 capsule, Rfl: 0     amphetamine-dextroamphetamine (ADDERALL) 10 MG tablet, Take 1 tablet (10 mg) by mouth daily In the early afternoon, Disp: 30 tablet, Rfl: 0    Allergies - No Known Allergies    Social History -   Social History     Socioeconomic History     Marital status:    Tobacco Use     Smoking status: Never     Smokeless tobacco: Former     Quit date: 5/11/2016   Vaping Use     Vaping Use: Never used   Substance and Sexual Activity     Alcohol use: Not Currently     Comment: rarely     Drug use: Not Currently     Types: Marijuana     Comment: has been over a year     Sexual activity: Yes     Partners: Female     Birth control/protection: Condom   Other Topics Concern     Parent/sibling w/ CABG,  "MI or angioplasty before 65F 55M? No       Family History -   Family History   Problem Relation Age of Onset     Mental Illness Brother         Not diagnosed by doctor or therapist.  Refuses to contact them     Mental Illness Brother      Cerebrovascular Disease Paternal Grandmother      Thyroid Disease Mother      Unknown/Adopted Mother        Review of Systems - As per HPI and PMHx, otherwise review of system review of the head and neck negative. Otherwise 10+ review of system is negative    Physical Exam  /62   Temp 97.7  F (36.5  C) (Temporal)   Ht 1.803 m (5' 11\")   Wt 70.8 kg (156 lb)   BMI 21.76 kg/m    BMI: Body mass index is 21.76 kg/m .    General - The patient is well nourished and well developed, and appears to have good nutritional status.  Alert and oriented to person and place, answers questions and cooperates with examination appropriately.    SKIN - No suspicious lesions or rashes.  Respiration - No respiratory distress.  Head and Face - Normocephalic and atraumatic, with no gross asymmetry noted of the contour of the facial features.  The facial nerve is intact, with strong symmetric movements.    Voice and Breathing - The patient was breathing comfortably without the use of accessory muscles. The patients voice was clear and strong, and had appropriate pitch and quality.        Eyes - Extraocular movements intact.  Sclera were not icteric or injected, conjunctiva were pink and moist.      Neuro - Nonfocal neuro exam is normal, CN 2 through 12 intact, normal gait and muscle tone.      Performed in clinic today:  No procedures preformed in clinic today      A/P - Chanec Torres is a 32 year old male Patient presents with:  Follow Up: Tinnitus, HST results    We discussed all those results of the patient.  In regard to his balance issues he will be seen by neurology and further work-up for ocular migraine.  Imaging may be ordered as needed by neurology.  In regard to his very mild " borderline obstructive sleep apnea which may not be contributing to his hypersomnia and usually positional he wants to first try more positional therapy on his left side which appears to be most favored to avoid sleep apnea.  He also wants to try this positional therapy to minimize snoring.    Chance should follow up in 4 months.            Rodriguez Quintero MD

## 2023-01-04 ENCOUNTER — OFFICE VISIT (OUTPATIENT)
Dept: OTOLARYNGOLOGY | Facility: OTHER | Age: 33
End: 2023-01-04
Payer: COMMERCIAL

## 2023-01-04 ENCOUNTER — OFFICE VISIT (OUTPATIENT)
Dept: AUDIOLOGY | Facility: OTHER | Age: 33
End: 2023-01-04
Payer: COMMERCIAL

## 2023-01-04 VITALS
WEIGHT: 156 LBS | DIASTOLIC BLOOD PRESSURE: 62 MMHG | TEMPERATURE: 97.7 F | BODY MASS INDEX: 21.84 KG/M2 | SYSTOLIC BLOOD PRESSURE: 110 MMHG | HEIGHT: 71 IN

## 2023-01-04 DIAGNOSIS — Z53.9 ERRONEOUS ENCOUNTER--DISREGARD: Primary | ICD-10-CM

## 2023-01-04 DIAGNOSIS — R42 DIZZINESS: Primary | ICD-10-CM

## 2023-01-04 DIAGNOSIS — G43.109 OCULAR MIGRAINE: ICD-10-CM

## 2023-01-04 PROCEDURE — 99213 OFFICE O/P EST LOW 20 MIN: CPT | Performed by: OTOLARYNGOLOGY

## 2023-01-04 NOTE — LETTER
1/4/2023         RE: Chance Torres  [address]  [place] [address]        Dear Colleague,    Thank you for referring your patient, Chance Torres, to the [facility]. Please see a copy of my visit note below.    History of Present Illness - Chance Torres is a 32 year old male presenting in clinic today for a recheck on Patient presents with:  Follow Up: Tinnitus, HST results    Patient comes in for evaluation of 2 issues.  First the results of his thorough and extensive testing at the [facility] for nonspecific dizziness visual issues.  Patient does have history of ocular migraine.  He is thorough testing including his hearing testing including central and peripheral vestibular testing positional testing were all normal.  He still has the same symptoms for the evaluation will probably be required.  Second issue is he is excessive daytime sleepiness with Hanna score of 12 with sufficient sleep and unrefreshing naps.  He is results of home sleep study testing were as follows  Data: A full night home sleep study was performed recording the standard physiologic parameters including body position, movement, sound, nasal pressure, thermal oral airflow, chest and abdominal movements with respiratory inductance plethysmography, and oxygen saturation by pulse oximetry. Pulse rate was estimated by oximetry recording. This study was considered adequate based on > 4 hours of quality oximetry and respiratory recording. As specified by the AASM Manual for the Scoring of Sleep and Associated events, version 2.3, Rule VIII.D 1B, 4% oxygen desaturation scoring for hypopneas is used as a standard of care on all home sleep apnea testing.           Analysis Time:  439 minutes           Respiration:   Sleep Associated Hypoxemia: sustained hypoxemia was not present. Baseline oxygen saturation was 99%.  Time with saturation less than or equal to 88% was 0.2 minutes.  The lowest oxygen saturation was 87%.   Snoring: Snoring was present.  Respiratory events: The home study revealed a presence of 1 obstructive apneas and 14 mixed and central apneas. There were 22 hypopneas resulting in a combined apnea/hypopnea index [AHI] of 5.1 events per hour.  AHI was 6 per hour supine, 0 per hour prone, 0.7 per hour on left side, and 6.4 per hour on right side.   Pattern: Excluding events noted above, respiratory rate and pattern was Normal.        Position: Percent of time spent: supine - 72.6%, prone - 0%, on left - 18.9%, on right - 8.5%.        Heart Rate: By pulse oximetry tachycardia was noted.         Assessment:     Mild obstructive sleep apnea.    Sleep associated hypoxemia was not present.     Recommendations:    Consider auto-CPAP at 5-10 cmH2O, oral appliance therapy or positional therapy.(left side only)    Suggest optimizing sleep hygiene and avoiding sleep deprivation.    Further w/u of hypersomnia if clinically indicated since patient has very mild ADRIANNA.              BP Readings from Last 1 Encounters:   01/04/23 110/62       BP noted to be well controlled today in office.     Chance IS NOT a smoker/uses chewing tobacco.      Past Medical History -   Past Medical History:   Diagnosis Date     Depressive disorder 2016     Marijuana use 1/4/2019       Current Medications -   Current Outpatient Medications:      amphetamine-dextroamphetamine (ADDERALL XR) 20 MG 24 hr capsule, Take 1 capsule (20 mg) by mouth daily, Disp: 30 capsule, Rfl: 0     amphetamine-dextroamphetamine (ADDERALL) 10 MG tablet, Take 1 tablet (10 mg) by mouth daily In the early afternoon, Disp: 30 tablet, Rfl: 0    Allergies - No Known Allergies    Social History -   Social History     Socioeconomic History     Marital status:    Tobacco Use     Smoking status: Never     Smokeless tobacco: Former     Quit date: 5/11/2016   Vaping Use     Vaping Use: Never used   Substance and Sexual Activity     Alcohol  "use: Not Currently     Comment: rarely     Drug use: Not Currently     Types: Marijuana     Comment: has been over a year     Sexual activity: Yes     Partners: Female     Birth control/protection: Condom   Other Topics Concern     Parent/sibling w/ CABG, MI or angioplasty before 65F 55M? No       Family History -   Family History   Problem Relation Age of Onset     Mental Illness Brother         Not diagnosed by doctor or therapist.  Refuses to contact them     Mental Illness Brother      Cerebrovascular Disease Paternal Grandmother      Thyroid Disease Mother      Unknown/Adopted Mother        Review of Systems - As per HPI and PMHx, otherwise review of system review of the head and neck negative. Otherwise 10+ review of system is negative    Physical Exam  /62   Temp 97.7  F (36.5  C) (Temporal)   Ht 1.803 m (5' 11\")   Wt 70.8 kg (156 lb)   BMI 21.76 kg/m    BMI: Body mass index is 21.76 kg/m .    General - The patient is well nourished and well developed, and appears to have good nutritional status.  Alert and oriented to person and place, answers questions and cooperates with examination appropriately.    SKIN - No suspicious lesions or rashes.  Respiration - No respiratory distress.  Head and Face - Normocephalic and atraumatic, with no gross asymmetry noted of the contour of the facial features.  The facial nerve is intact, with strong symmetric movements.    Voice and Breathing - The patient was breathing comfortably without the use of accessory muscles. The patients voice was clear and strong, and had appropriate pitch and quality.        Eyes - Extraocular movements intact.  Sclera were not icteric or injected, conjunctiva were pink and moist.      Neuro - Nonfocal neuro exam is normal, CN 2 through 12 intact, normal gait and muscle tone.      Performed in clinic today:  No procedures preformed in clinic today      A/P - Chance GELLER Brian is a 32 year old male Patient presents with:  Follow Up: " Tinnitus, HST results    We discussed all those results of the patient.  In regard to his balance issues he will be seen by neurology and further work-up for ocular migraine.  Imaging may be ordered as needed by neurology.  In regard to his very mild borderline obstructive sleep apnea which may not be contributing to his hypersomnia and usually positional he wants to first try more positional therapy on his left side which appears to be most favored to avoid sleep apnea.  He also wants to try this positional therapy to minimize snoring.    Chance should follow up in 4 months.            Rodriguez Quintero MD          Again, thank you for allowing me to participate in the care of your patient.        Sincerely,        Rodriguez Quintero MD, MD

## 2023-01-08 ENCOUNTER — HOSPITAL ENCOUNTER (INPATIENT)
Age: 33
LOS: 4 days | Discharge: HOME | DRG: 720 | End: 2023-01-12
Attending: EMERGENCY MEDICINE | Admitting: STUDENT IN AN ORGANIZED HEALTH CARE EDUCATION/TRAINING PROGRAM
Payer: MEDICAID

## 2023-01-08 ENCOUNTER — APPOINTMENT (OUTPATIENT)
Dept: ULTRASOUND IMAGING | Age: 33
DRG: 720 | End: 2023-01-08
Payer: MEDICAID

## 2023-01-08 DIAGNOSIS — K80.50 CHOLEDOCHOLITHIASIS: Primary | ICD-10-CM

## 2023-01-08 DIAGNOSIS — B18.2 CHRONIC HEPATITIS C WITHOUT HEPATIC COMA (HCC): ICD-10-CM

## 2023-01-08 DIAGNOSIS — K83.09 CHOLANGITIS: ICD-10-CM

## 2023-01-08 DIAGNOSIS — R74.8 ELEVATED LIVER ENZYMES: ICD-10-CM

## 2023-01-08 LAB
ALBUMIN SERPL-MCNC: 3.4 G/DL (ref 3.5–5.1)
ALP BLD-CCNC: 1451 U/L (ref 38–126)
ALT SERPL-CCNC: 57 U/L (ref 11–66)
ANION GAP SERPL CALCULATED.3IONS-SCNC: 9 MEQ/L (ref 8–16)
AST SERPL-CCNC: 67 U/L (ref 5–40)
BACTERIA: ABNORMAL
BASOPHILS # BLD: 0.3 %
BASOPHILS ABSOLUTE: 0.1 THOU/MM3 (ref 0–0.1)
BILIRUB SERPL-MCNC: 7.9 MG/DL (ref 0.3–1.2)
BILIRUBIN DIRECT: 7.6 MG/DL (ref 0–0.3)
BILIRUBIN URINE: ABNORMAL
BLOOD, URINE: NEGATIVE
BUN BLDV-MCNC: 9 MG/DL (ref 7–22)
CALCIUM SERPL-MCNC: 8.7 MG/DL (ref 8.5–10.5)
CASTS: ABNORMAL /LPF
CASTS: ABNORMAL /LPF
CHARACTER, URINE: CLEAR
CHLORIDE BLD-SCNC: 98 MEQ/L (ref 98–111)
CO2: 25 MEQ/L (ref 23–33)
COLOR: ABNORMAL
CREAT SERPL-MCNC: 0.4 MG/DL (ref 0.4–1.2)
CRYSTALS: ABNORMAL
EOSINOPHIL # BLD: 0.2 %
EOSINOPHILS ABSOLUTE: 0 THOU/MM3 (ref 0–0.4)
EPITHELIAL CELLS, UA: ABNORMAL /HPF
ERYTHROCYTE [DISTWIDTH] IN BLOOD BY AUTOMATED COUNT: 15.8 % (ref 11.5–14.5)
ERYTHROCYTE [DISTWIDTH] IN BLOOD BY AUTOMATED COUNT: 54.4 FL (ref 35–45)
GFR SERPL CREATININE-BSD FRML MDRD: > 60 ML/MIN/1.73M2
GLUCOSE BLD-MCNC: 128 MG/DL (ref 70–108)
GLUCOSE, URINE: NEGATIVE MG/DL
HAV IGM SER IA-ACNC: NEGATIVE
HCT VFR BLD CALC: 43.2 % (ref 42–52)
HEMOGLOBIN: 14.3 GM/DL (ref 14–18)
HEPATITIS B CORE IGM ANTIBODY: NEGATIVE
HEPATITIS B SURFACE ANTIGEN: NEGATIVE
HEPATITIS C ANTIBODY: POSITIVE
ICTOTEST: POSITIVE
IMMATURE GRANS (ABS): 0.54 THOU/MM3 (ref 0–0.07)
IMMATURE GRANULOCYTES: 2.7 %
INR BLD: 1.24 (ref 0.85–1.13)
KETONES, URINE: 15
LACTIC ACID, SEPSIS: 1.1 MMOL/L (ref 0.5–1.9)
LEUKOCYTE EST, POC: ABNORMAL
LIPASE: 279.8 U/L (ref 5.6–51.3)
LYMPHOCYTES # BLD: 3.6 %
LYMPHOCYTES ABSOLUTE: 0.7 THOU/MM3 (ref 1–4.8)
MCH RBC QN AUTO: 31.1 PG (ref 26–33)
MCHC RBC AUTO-ENTMCNC: 33.1 GM/DL (ref 32.2–35.5)
MCV RBC AUTO: 93.9 FL (ref 80–94)
MISCELLANEOUS LAB TEST RESULT: ABNORMAL
MONOCYTES # BLD: 3.8 %
MONOCYTES ABSOLUTE: 0.8 THOU/MM3 (ref 0.4–1.3)
NITRITE, URINE: POSITIVE
NUCLEATED RED BLOOD CELLS: 0 /100 WBC
OSMOLALITY CALCULATION: 264.8 MOSMOL/KG (ref 275–300)
PH UA: 6 (ref 5–9)
PLATELET # BLD: 310 THOU/MM3 (ref 130–400)
PMV BLD AUTO: 9.5 FL (ref 9.4–12.4)
POTASSIUM SERPL-SCNC: 3.4 MEQ/L (ref 3.5–5.2)
PROTEIN UA: 30 MG/DL
RBC # BLD: 4.6 MILL/MM3 (ref 4.7–6.1)
RBC URINE: ABNORMAL /HPF
REASON FOR REJECTION: NORMAL
REJECTED TEST: NORMAL
RENAL EPITHELIAL, UA: ABNORMAL
SCAN OF BLOOD SMEAR: NORMAL
SEG NEUTROPHILS: 89.4 %
SEGMENTED NEUTROPHILS ABSOLUTE COUNT: 17.7 THOU/MM3 (ref 1.8–7.7)
SODIUM BLD-SCNC: 132 MEQ/L (ref 135–145)
SPECIFIC GRAVITY UA: 1.02 (ref 1–1.03)
TOTAL PROTEIN: 6.9 G/DL (ref 6.1–8)
UROBILINOGEN, URINE: 1 EU/DL (ref 0–1)
WBC # BLD: 19.8 THOU/MM3 (ref 4.8–10.8)
WBC UA: ABNORMAL /HPF
YEAST: ABNORMAL

## 2023-01-08 PROCEDURE — 6370000000 HC RX 637 (ALT 250 FOR IP): Performed by: STUDENT IN AN ORGANIZED HEALTH CARE EDUCATION/TRAINING PROGRAM

## 2023-01-08 PROCEDURE — 2580000003 HC RX 258: Performed by: EMERGENCY MEDICINE

## 2023-01-08 PROCEDURE — 80074 ACUTE HEPATITIS PANEL: CPT

## 2023-01-08 PROCEDURE — 99223 1ST HOSP IP/OBS HIGH 75: CPT | Performed by: STUDENT IN AN ORGANIZED HEALTH CARE EDUCATION/TRAINING PROGRAM

## 2023-01-08 PROCEDURE — 83690 ASSAY OF LIPASE: CPT

## 2023-01-08 PROCEDURE — 2060000000 HC ICU INTERMEDIATE R&B

## 2023-01-08 PROCEDURE — 36415 COLL VENOUS BLD VENIPUNCTURE: CPT

## 2023-01-08 PROCEDURE — 6360000002 HC RX W HCPCS: Performed by: STUDENT IN AN ORGANIZED HEALTH CARE EDUCATION/TRAINING PROGRAM

## 2023-01-08 PROCEDURE — 99285 EMERGENCY DEPT VISIT HI MDM: CPT

## 2023-01-08 PROCEDURE — 85610 PROTHROMBIN TIME: CPT

## 2023-01-08 PROCEDURE — 85025 COMPLETE CBC W/AUTO DIFF WBC: CPT

## 2023-01-08 PROCEDURE — 83605 ASSAY OF LACTIC ACID: CPT

## 2023-01-08 PROCEDURE — 81001 URINALYSIS AUTO W/SCOPE: CPT

## 2023-01-08 PROCEDURE — 80053 COMPREHEN METABOLIC PANEL: CPT

## 2023-01-08 PROCEDURE — 82248 BILIRUBIN DIRECT: CPT

## 2023-01-08 PROCEDURE — 6370000000 HC RX 637 (ALT 250 FOR IP)

## 2023-01-08 PROCEDURE — 6360000002 HC RX W HCPCS: Performed by: EMERGENCY MEDICINE

## 2023-01-08 PROCEDURE — 76705 ECHO EXAM OF ABDOMEN: CPT

## 2023-01-08 PROCEDURE — 80307 DRUG TEST PRSMV CHEM ANLYZR: CPT

## 2023-01-08 PROCEDURE — 2580000003 HC RX 258: Performed by: STUDENT IN AN ORGANIZED HEALTH CARE EDUCATION/TRAINING PROGRAM

## 2023-01-08 PROCEDURE — 96361 HYDRATE IV INFUSION ADD-ON: CPT

## 2023-01-08 PROCEDURE — 93005 ELECTROCARDIOGRAM TRACING: CPT | Performed by: STUDENT IN AN ORGANIZED HEALTH CARE EDUCATION/TRAINING PROGRAM

## 2023-01-08 PROCEDURE — 96374 THER/PROPH/DIAG INJ IV PUSH: CPT

## 2023-01-08 PROCEDURE — 87040 BLOOD CULTURE FOR BACTERIA: CPT

## 2023-01-08 RX ORDER — LIDOCAINE 40 MG/G
CREAM TOPICAL 3 TIMES DAILY PRN
Status: DISCONTINUED | OUTPATIENT
Start: 2023-01-08 | End: 2023-01-12 | Stop reason: HOSPADM

## 2023-01-08 RX ORDER — POTASSIUM CHLORIDE 20 MEQ/1
40 TABLET, EXTENDED RELEASE ORAL PRN
Status: DISCONTINUED | OUTPATIENT
Start: 2023-01-08 | End: 2023-01-12 | Stop reason: HOSPADM

## 2023-01-08 RX ORDER — KETOROLAC TROMETHAMINE 30 MG/ML
30 INJECTION, SOLUTION INTRAMUSCULAR; INTRAVENOUS EVERY 6 HOURS PRN
Status: DISCONTINUED | OUTPATIENT
Start: 2023-01-08 | End: 2023-01-12 | Stop reason: HOSPADM

## 2023-01-08 RX ORDER — KETOROLAC TROMETHAMINE 30 MG/ML
15 INJECTION, SOLUTION INTRAMUSCULAR; INTRAVENOUS EVERY 6 HOURS PRN
Status: DISCONTINUED | OUTPATIENT
Start: 2023-01-08 | End: 2023-01-08

## 2023-01-08 RX ORDER — POTASSIUM CHLORIDE 7.45 MG/ML
10 INJECTION INTRAVENOUS PRN
Status: DISCONTINUED | OUTPATIENT
Start: 2023-01-08 | End: 2023-01-12 | Stop reason: HOSPADM

## 2023-01-08 RX ORDER — SUCRALFATE 1 G/1
1 TABLET ORAL 4 TIMES DAILY
Status: DISCONTINUED | OUTPATIENT
Start: 2023-01-08 | End: 2023-01-12 | Stop reason: HOSPADM

## 2023-01-08 RX ORDER — GABAPENTIN 600 MG/1
600 TABLET ORAL 3 TIMES DAILY
Status: DISCONTINUED | OUTPATIENT
Start: 2023-01-08 | End: 2023-01-12 | Stop reason: HOSPADM

## 2023-01-08 RX ORDER — SODIUM CHLORIDE, SODIUM LACTATE, POTASSIUM CHLORIDE, AND CALCIUM CHLORIDE .6; .31; .03; .02 G/100ML; G/100ML; G/100ML; G/100ML
1000 INJECTION, SOLUTION INTRAVENOUS ONCE
Status: COMPLETED | OUTPATIENT
Start: 2023-01-08 | End: 2023-01-08

## 2023-01-08 RX ORDER — BUPRENORPHINE HYDROCHLORIDE AND NALOXONE HYDROCHLORIDE DIHYDRATE 2; .5 MG/1; MG/1
2 TABLET SUBLINGUAL DAILY
Status: DISCONTINUED | OUTPATIENT
Start: 2023-01-09 | End: 2023-01-08

## 2023-01-08 RX ORDER — BUPRENORPHINE AND NALOXONE 2; .5 MG/1; MG/1
2 FILM, SOLUBLE BUCCAL; SUBLINGUAL DAILY
Status: DISCONTINUED | OUTPATIENT
Start: 2023-01-09 | End: 2023-01-12 | Stop reason: HOSPADM

## 2023-01-08 RX ORDER — CARBAMAZEPINE 200 MG/1
200 TABLET ORAL 2 TIMES DAILY
Status: DISCONTINUED | OUTPATIENT
Start: 2023-01-08 | End: 2023-01-12 | Stop reason: HOSPADM

## 2023-01-08 RX ORDER — ONDANSETRON 4 MG/1
4 TABLET, FILM COATED ORAL 3 TIMES DAILY PRN
Status: DISCONTINUED | OUTPATIENT
Start: 2023-01-08 | End: 2023-01-12 | Stop reason: HOSPADM

## 2023-01-08 RX ORDER — PANTOPRAZOLE SODIUM 40 MG/1
40 TABLET, DELAYED RELEASE ORAL
Status: DISCONTINUED | OUTPATIENT
Start: 2023-01-09 | End: 2023-01-12 | Stop reason: HOSPADM

## 2023-01-08 RX ORDER — SODIUM CHLORIDE, SODIUM LACTATE, POTASSIUM CHLORIDE, CALCIUM CHLORIDE 600; 310; 30; 20 MG/100ML; MG/100ML; MG/100ML; MG/100ML
INJECTION, SOLUTION INTRAVENOUS CONTINUOUS
Status: ACTIVE | OUTPATIENT
Start: 2023-01-08 | End: 2023-01-09

## 2023-01-08 RX ORDER — KETOROLAC TROMETHAMINE 30 MG/ML
15 INJECTION, SOLUTION INTRAMUSCULAR; INTRAVENOUS ONCE
Status: COMPLETED | OUTPATIENT
Start: 2023-01-08 | End: 2023-01-08

## 2023-01-08 RX ADMIN — GABAPENTIN 600 MG: 600 TABLET, FILM COATED ORAL at 22:30

## 2023-01-08 RX ADMIN — CARBAMAZEPINE 200 MG: 200 TABLET ORAL at 22:30

## 2023-01-08 RX ADMIN — HYDROMORPHONE HYDROCHLORIDE 0.25 MG: 1 INJECTION, SOLUTION INTRAMUSCULAR; INTRAVENOUS; SUBCUTANEOUS at 22:30

## 2023-01-08 RX ADMIN — POTASSIUM CHLORIDE 40 MEQ: 1500 TABLET, EXTENDED RELEASE ORAL at 22:30

## 2023-01-08 RX ADMIN — SODIUM CHLORIDE, POTASSIUM CHLORIDE, SODIUM LACTATE AND CALCIUM CHLORIDE 1000 ML: 600; 310; 30; 20 INJECTION, SOLUTION INTRAVENOUS at 20:13

## 2023-01-08 RX ADMIN — SUCRALFATE 1 G: 1 TABLET ORAL at 22:30

## 2023-01-08 RX ADMIN — SODIUM CHLORIDE, POTASSIUM CHLORIDE, SODIUM LACTATE AND CALCIUM CHLORIDE 1000 ML: 600; 310; 30; 20 INJECTION, SOLUTION INTRAVENOUS at 16:46

## 2023-01-08 RX ADMIN — SODIUM CHLORIDE, POTASSIUM CHLORIDE, SODIUM LACTATE AND CALCIUM CHLORIDE: 600; 310; 30; 20 INJECTION, SOLUTION INTRAVENOUS at 22:36

## 2023-01-08 RX ADMIN — PIPERACILLIN AND TAZOBACTAM 4500 MG: 4; .5 INJECTION, POWDER, FOR SOLUTION INTRAVENOUS at 19:37

## 2023-01-08 RX ADMIN — KETOROLAC TROMETHAMINE 15 MG: 30 INJECTION, SOLUTION INTRAMUSCULAR; INTRAVENOUS at 19:33

## 2023-01-08 ASSESSMENT — PAIN - FUNCTIONAL ASSESSMENT
PAIN_FUNCTIONAL_ASSESSMENT: ACTIVITIES ARE NOT PREVENTED
PAIN_FUNCTIONAL_ASSESSMENT: 0-10
PAIN_FUNCTIONAL_ASSESSMENT: 0-10
PAIN_FUNCTIONAL_ASSESSMENT: ACTIVITIES ARE NOT PREVENTED

## 2023-01-08 ASSESSMENT — PAIN DESCRIPTION - LOCATION
LOCATION: ABDOMEN

## 2023-01-08 ASSESSMENT — PAIN DESCRIPTION - DESCRIPTORS
DESCRIPTORS: SHARP
DESCRIPTORS: SHARP

## 2023-01-08 ASSESSMENT — PAIN DESCRIPTION - PAIN TYPE
TYPE: ACUTE PAIN
TYPE: ACUTE PAIN

## 2023-01-08 ASSESSMENT — PAIN SCALES - GENERAL
PAINLEVEL_OUTOF10: 7
PAINLEVEL_OUTOF10: 5
PAINLEVEL_OUTOF10: 7
PAINLEVEL_OUTOF10: 7
PAINLEVEL_OUTOF10: 0
PAINLEVEL_OUTOF10: 5
PAINLEVEL_OUTOF10: 0
PAINLEVEL_OUTOF10: 5

## 2023-01-08 ASSESSMENT — PAIN DESCRIPTION - ORIENTATION
ORIENTATION: MID
ORIENTATION: MID;UPPER;LEFT
ORIENTATION: MID

## 2023-01-08 ASSESSMENT — ENCOUNTER SYMPTOMS
VOMITING: 0
NAUSEA: 1
ABDOMINAL PAIN: 1

## 2023-01-08 ASSESSMENT — PAIN DESCRIPTION - FREQUENCY
FREQUENCY: INTERMITTENT
FREQUENCY: INTERMITTENT

## 2023-01-08 ASSESSMENT — PAIN DESCRIPTION - ONSET
ONSET: ON-GOING
ONSET: ON-GOING

## 2023-01-08 NOTE — ED NOTES
All urine and blood work is sent at this time. Pt rates that abd pain is 7/10 at this time. Call light in reach.       Ashley Field RN  01/08/23 4199

## 2023-01-08 NOTE — ED TRIAGE NOTES
PT comes to ED with c/o abd pain that has been on and off for the last few months. PT states that he has a hx of hep c and is planning on starting on treatment for it next week. Pt states that pain is 7/10 at this time. PT states that his abd pain is worse today and he could not tolerate the pain at home. Respers are regular and unlabored. Pt appears to have slight yellow skin.

## 2023-01-08 NOTE — LETTER
[facility]  [address] [place] [address]  Phone: [phone]        January 12, 2023     Patient: Katt Marie   YOB: 1990   Date of Visit: 1/8/2023       To Whom It May Concern:    Yrn Sims was admitted on [facility] from 1/8/23 to 1/12/23. Patient may return to work as soon as possible. If you have any questions or concerns, please don't hesitate to call.     Sincerely,        WILLIAMS Lilly RN

## 2023-01-08 NOTE — LETTER
[facility]  [address] [place] [address]  Phone: [phone]         January 12, 2023     Patient: Elex Schirmer. Felix Holy   YOB: 1990   Date of Visit: 1/8/2023       To Whom It May Concern:    Claudette Edouard was seen and treated in our emergency department on 1/8/2023. The following work duties are recommended. He {Work release (duty restriction):95192}    Treatment and work recommendations given by the emergency department are initial emergency measures only, and follow up should be arranged as soon as possible with the company's occupational health provider* or the specialist to whom the worker was referred. *If the company does not have an occupational health provider, follow up should be at No follow-up provider specified.         Sincerely,        Jerrod Diamond RN        Signature:__________________________________

## 2023-01-08 NOTE — ED NOTES
Pt is laying in bed at this time and is awaiting test results. PT states that he is still having 7/10 abd pain at this time. Call light in reach.       Desiree Kaminski RN  01/08/23 6520

## 2023-01-09 PROBLEM — K80.50 CHOLEDOCHOLITHIASIS: Status: ACTIVE | Noted: 2023-01-09

## 2023-01-09 LAB
ALBUMIN SERPL-MCNC: 3.1 G/DL (ref 3.5–5.1)
ALP BLD-CCNC: 1176 U/L (ref 38–126)
ALT SERPL-CCNC: 45 U/L (ref 11–66)
AMPHETAMINE+METHAMPHETAMINE URINE SCREEN: NEGATIVE
ANION GAP SERPL CALCULATED.3IONS-SCNC: 10 MEQ/L (ref 8–16)
AST SERPL-CCNC: 52 U/L (ref 5–40)
BARBITURATE QUANTITATIVE URINE: NEGATIVE
BASOPHILS # BLD: 0.2 %
BASOPHILS ABSOLUTE: 0 THOU/MM3 (ref 0–0.1)
BENZODIAZEPINE QUANTITATIVE URINE: NEGATIVE
BILIRUB SERPL-MCNC: 9.7 MG/DL (ref 0.3–1.2)
BILIRUBIN DIRECT: 8.6 MG/DL (ref 0–0.3)
BUN BLDV-MCNC: 10 MG/DL (ref 7–22)
C-REACTIVE PROTEIN: 22.07 MG/DL (ref 0–1)
CALCIUM SERPL-MCNC: 8.5 MG/DL (ref 8.5–10.5)
CANNABINOID QUANTITATIVE URINE: NEGATIVE
CHLORIDE BLD-SCNC: 99 MEQ/L (ref 98–111)
CO2: 23 MEQ/L (ref 23–33)
COCAINE METABOLITE QUANTITATIVE URINE: NEGATIVE
CREAT SERPL-MCNC: 0.3 MG/DL (ref 0.4–1.2)
EKG ATRIAL RATE: 76 BPM
EKG P AXIS: 65 DEGREES
EKG P-R INTERVAL: 136 MS
EKG Q-T INTERVAL: 382 MS
EKG QRS DURATION: 88 MS
EKG QTC CALCULATION (BAZETT): 429 MS
EKG R AXIS: 18 DEGREES
EKG T AXIS: 28 DEGREES
EKG VENTRICULAR RATE: 76 BPM
EOSINOPHIL # BLD: 0.4 %
EOSINOPHILS ABSOLUTE: 0.1 THOU/MM3 (ref 0–0.4)
ERYTHROCYTE [DISTWIDTH] IN BLOOD BY AUTOMATED COUNT: 15.9 % (ref 11.5–14.5)
ERYTHROCYTE [DISTWIDTH] IN BLOOD BY AUTOMATED COUNT: 54.7 FL (ref 35–45)
FENTANYL: NEGATIVE
GAMMA GLUTAMYL TRANSFERASE: 1908 U/L (ref 8–69)
GFR SERPL CREATININE-BSD FRML MDRD: > 60 ML/MIN/1.73M2
GLUCOSE BLD-MCNC: 91 MG/DL (ref 70–108)
HCT VFR BLD CALC: 37.6 % (ref 42–52)
HEMOGLOBIN: 12.6 GM/DL (ref 14–18)
IMMATURE GRANS (ABS): 0.14 THOU/MM3 (ref 0–0.07)
IMMATURE GRANULOCYTES: 1 %
INR BLD: 1.26 (ref 0.85–1.13)
LYMPHOCYTES # BLD: 11.9 %
LYMPHOCYTES ABSOLUTE: 1.7 THOU/MM3 (ref 1–4.8)
MCH RBC QN AUTO: 31.4 PG (ref 26–33)
MCHC RBC AUTO-ENTMCNC: 33.5 GM/DL (ref 32.2–35.5)
MCV RBC AUTO: 93.8 FL (ref 80–94)
MONOCYTES # BLD: 4.8 %
MONOCYTES ABSOLUTE: 0.7 THOU/MM3 (ref 0.4–1.3)
NUCLEATED RED BLOOD CELLS: 0 /100 WBC
OPIATES, URINE: NEGATIVE
OXYCODONE: NEGATIVE
PHENCYCLIDINE QUANTITATIVE URINE: NEGATIVE
PLATELET # BLD: 267 THOU/MM3 (ref 130–400)
PMV BLD AUTO: 9.6 FL (ref 9.4–12.4)
POTASSIUM SERPL-SCNC: 3.8 MEQ/L (ref 3.5–5.2)
RBC # BLD: 4.01 MILL/MM3 (ref 4.7–6.1)
SEG NEUTROPHILS: 81.7 %
SEGMENTED NEUTROPHILS ABSOLUTE COUNT: 11.7 THOU/MM3 (ref 1.8–7.7)
SODIUM BLD-SCNC: 132 MEQ/L (ref 135–145)
TOTAL PROTEIN: 6.4 G/DL (ref 6.1–8)
WBC # BLD: 14.3 THOU/MM3 (ref 4.8–10.8)

## 2023-01-09 PROCEDURE — 6360000002 HC RX W HCPCS: Performed by: STUDENT IN AN ORGANIZED HEALTH CARE EDUCATION/TRAINING PROGRAM

## 2023-01-09 PROCEDURE — 80053 COMPREHEN METABOLIC PANEL: CPT

## 2023-01-09 PROCEDURE — 2580000003 HC RX 258: Performed by: STUDENT IN AN ORGANIZED HEALTH CARE EDUCATION/TRAINING PROGRAM

## 2023-01-09 PROCEDURE — 82248 BILIRUBIN DIRECT: CPT

## 2023-01-09 PROCEDURE — 6370000000 HC RX 637 (ALT 250 FOR IP)

## 2023-01-09 PROCEDURE — 2060000000 HC ICU INTERMEDIATE R&B

## 2023-01-09 PROCEDURE — 36415 COLL VENOUS BLD VENIPUNCTURE: CPT

## 2023-01-09 PROCEDURE — 85610 PROTHROMBIN TIME: CPT

## 2023-01-09 PROCEDURE — 82977 ASSAY OF GGT: CPT

## 2023-01-09 PROCEDURE — 99232 SBSQ HOSP IP/OBS MODERATE 35: CPT | Performed by: INTERNAL MEDICINE

## 2023-01-09 PROCEDURE — 85025 COMPLETE CBC W/AUTO DIFF WBC: CPT

## 2023-01-09 PROCEDURE — 6360000002 HC RX W HCPCS

## 2023-01-09 PROCEDURE — 93010 ELECTROCARDIOGRAM REPORT: CPT | Performed by: NUCLEAR MEDICINE

## 2023-01-09 PROCEDURE — 86140 C-REACTIVE PROTEIN: CPT

## 2023-01-09 RX ORDER — M-VIT,TX,IRON,MINS/CALC/FOLIC 27MG-0.4MG
1 TABLET ORAL DAILY
COMMUNITY

## 2023-01-09 RX ORDER — SODIUM CHLORIDE 0.9 % (FLUSH) 0.9 %
10 SYRINGE (ML) INJECTION PRN
Status: DISCONTINUED | OUTPATIENT
Start: 2023-01-09 | End: 2023-01-12 | Stop reason: HOSPADM

## 2023-01-09 RX ORDER — SODIUM CHLORIDE 0.9 % (FLUSH) 0.9 %
5-40 SYRINGE (ML) INJECTION EVERY 12 HOURS SCHEDULED
Status: DISCONTINUED | OUTPATIENT
Start: 2023-01-09 | End: 2023-01-12 | Stop reason: HOSPADM

## 2023-01-09 RX ORDER — ACETAMINOPHEN 650 MG/1
650 SUPPOSITORY RECTAL EVERY 6 HOURS PRN
Status: DISCONTINUED | OUTPATIENT
Start: 2023-01-09 | End: 2023-01-12 | Stop reason: HOSPADM

## 2023-01-09 RX ORDER — POLYETHYLENE GLYCOL 3350 17 G/17G
17 POWDER, FOR SOLUTION ORAL DAILY PRN
Status: DISCONTINUED | OUTPATIENT
Start: 2023-01-09 | End: 2023-01-12 | Stop reason: HOSPADM

## 2023-01-09 RX ORDER — SODIUM CHLORIDE 9 MG/ML
INJECTION, SOLUTION INTRAVENOUS PRN
Status: DISCONTINUED | OUTPATIENT
Start: 2023-01-09 | End: 2023-01-12 | Stop reason: HOSPADM

## 2023-01-09 RX ORDER — ACETAMINOPHEN 325 MG/1
650 TABLET ORAL EVERY 6 HOURS PRN
Status: DISCONTINUED | OUTPATIENT
Start: 2023-01-09 | End: 2023-01-12 | Stop reason: HOSPADM

## 2023-01-09 RX ADMIN — SUCRALFATE 1 G: 1 TABLET ORAL at 08:23

## 2023-01-09 RX ADMIN — PIPERACILLIN AND TAZOBACTAM 3375 MG: 3; .375 INJECTION, POWDER, FOR SOLUTION INTRAVENOUS at 08:30

## 2023-01-09 RX ADMIN — SUCRALFATE 1 G: 1 TABLET ORAL at 19:40

## 2023-01-09 RX ADMIN — GABAPENTIN 600 MG: 600 TABLET, FILM COATED ORAL at 19:40

## 2023-01-09 RX ADMIN — CARBAMAZEPINE 200 MG: 200 TABLET ORAL at 08:21

## 2023-01-09 RX ADMIN — CARBAMAZEPINE 200 MG: 200 TABLET ORAL at 19:40

## 2023-01-09 RX ADMIN — BUPRENORPHINE AND NALOXONE 2 FILM: 2; .5 FILM BUCCAL; SUBLINGUAL at 08:17

## 2023-01-09 RX ADMIN — PIPERACILLIN AND TAZOBACTAM 3375 MG: 3; .375 INJECTION, POWDER, FOR SOLUTION INTRAVENOUS at 17:43

## 2023-01-09 RX ADMIN — SUCRALFATE 1 G: 1 TABLET ORAL at 17:42

## 2023-01-09 RX ADMIN — GABAPENTIN 600 MG: 600 TABLET, FILM COATED ORAL at 14:47

## 2023-01-09 RX ADMIN — PIPERACILLIN AND TAZOBACTAM 3375 MG: 3; .375 INJECTION, POWDER, FOR SOLUTION INTRAVENOUS at 01:46

## 2023-01-09 RX ADMIN — KETOROLAC TROMETHAMINE 30 MG: 30 INJECTION, SOLUTION INTRAMUSCULAR; INTRAVENOUS at 23:38

## 2023-01-09 RX ADMIN — KETOROLAC TROMETHAMINE 30 MG: 30 INJECTION, SOLUTION INTRAMUSCULAR; INTRAVENOUS at 03:56

## 2023-01-09 RX ADMIN — SUCRALFATE 1 G: 1 TABLET ORAL at 13:04

## 2023-01-09 RX ADMIN — SODIUM CHLORIDE, PRESERVATIVE FREE 10 ML: 5 INJECTION INTRAVENOUS at 19:29

## 2023-01-09 RX ADMIN — GABAPENTIN 600 MG: 600 TABLET, FILM COATED ORAL at 08:21

## 2023-01-09 RX ADMIN — PANTOPRAZOLE SODIUM 40 MG: 40 TABLET, DELAYED RELEASE ORAL at 04:42

## 2023-01-09 RX ADMIN — PIPERACILLIN AND TAZOBACTAM 3375 MG: 3; .375 INJECTION, POWDER, FOR SOLUTION INTRAVENOUS at 23:40

## 2023-01-09 RX ADMIN — SODIUM CHLORIDE, POTASSIUM CHLORIDE, SODIUM LACTATE AND CALCIUM CHLORIDE: 600; 310; 30; 20 INJECTION, SOLUTION INTRAVENOUS at 03:58

## 2023-01-09 ASSESSMENT — PAIN DESCRIPTION - PAIN TYPE
TYPE: ACUTE PAIN
TYPE: ACUTE PAIN

## 2023-01-09 ASSESSMENT — PAIN - FUNCTIONAL ASSESSMENT
PAIN_FUNCTIONAL_ASSESSMENT: ACTIVITIES ARE NOT PREVENTED
PAIN_FUNCTIONAL_ASSESSMENT: ACTIVITIES ARE NOT PREVENTED

## 2023-01-09 ASSESSMENT — PAIN DESCRIPTION - LOCATION
LOCATION: ABDOMEN
LOCATION: ABDOMEN

## 2023-01-09 ASSESSMENT — PAIN DESCRIPTION - ONSET
ONSET: ON-GOING
ONSET: ON-GOING

## 2023-01-09 ASSESSMENT — PAIN DESCRIPTION - DESCRIPTORS
DESCRIPTORS: SHARP
DESCRIPTORS: SHARP

## 2023-01-09 ASSESSMENT — PAIN DESCRIPTION - ORIENTATION
ORIENTATION: MID
ORIENTATION: MID

## 2023-01-09 ASSESSMENT — PAIN SCALES - GENERAL
PAINLEVEL_OUTOF10: 0
PAINLEVEL_OUTOF10: 4
PAINLEVEL_OUTOF10: 0
PAINLEVEL_OUTOF10: 0
PAINLEVEL_OUTOF10: 8
PAINLEVEL_OUTOF10: 0

## 2023-01-09 ASSESSMENT — PAIN DESCRIPTION - FREQUENCY
FREQUENCY: INTERMITTENT
FREQUENCY: INTERMITTENT

## 2023-01-09 NOTE — CARE COORDINATION
DISCHARGE PLANNING EVALUATION  1/9/23, 2:34 PM EST    Reason for Referral: From [facility]  Mental Status: Alert and oriented to person, place and time. Decision Making: Independent with decision making. Family/Social/Home Environment: From [facility] He has been estranged from his family. He has been off drugs for a year and a half, but has not reconnected to family in that time. He would like his friend Lowry Olszewski to be his primary contact. Did discuss the need for healthcare power of  if he does not want family making decisions if he could not make his own. He voiced understanding. Will add Lowry Olszewski to contacts. Current Services including food security, transportation and housekeeping:From the [facility] He plans to return to facility. Spoke with  Jesse Gibson and he can come back whenever he is medically ready. He is independent at the house. Friends provide transport. Current Equipment:None  Payment Source:Eber Gamboa  Concerns or Barriers to Discharge: No family involvement. Post-acute [facility]) provider list was provided to patient. Patient was informed of their freedom to choose [facility] provider. Discussed and offered to show the patient the relevant [facility] Providers quality and resource use measures on Medicare Compare web site via computer based on patient's goals of care and treatment preferences. Questions regarding selection process were answered. Teach Back Method used with Zheng regarding care plan and discharge planning. Patient verbalized understanding of the plan of care and contribute to goal setting. Patient goals, treatment preferences and discharge plan: Plans to return to the [facility]. May need transport. Denies other needs at this time. Electronically signed by JORGE Mancuso on 1/9/2023 at 2:34 PM

## 2023-01-09 NOTE — PLAN OF CARE
Problem: Discharge Planning  Goal: Discharge to home or other facility with appropriate resources  Outcome: Progressing  Flowsheets (Taken 1/8/2023 2250)  Discharge to home or other facility with appropriate resources:   Identify barriers to discharge with patient and caregiver   Arrange for needed discharge resources and transportation as appropriate   Identify discharge learning needs (meds, wound care, etc)   Refer to discharge planning if patient needs post-hospital services based on physician order or complex needs related to functional status, cognitive ability or social support system     Problem: Pain  Goal: Verbalizes/displays adequate comfort level or baseline comfort level  Outcome: Progressing  Flowsheets (Taken 1/8/2023 2250)  Verbalizes/displays adequate comfort level or baseline comfort level:   Encourage patient to monitor pain and request assistance   Assess pain using appropriate pain scale   Administer analgesics based on type and severity of pain and evaluate response   Implement non-pharmacological measures as appropriate and evaluate response   Consider cultural and social influences on pain and pain management     Problem: Neurosensory - Adult  Goal: Absence of seizures  Outcome: Progressing  Flowsheets (Taken 1/8/2023 2250)  Absence of seizures:   Monitor for seizure activity.   If seizure occurs, document type and location of movements and any associated apnea   If seizure occurs, turn head to side and suction secretions as needed   Administer anticonvulsants as ordered   Support airway/breathing, administer oxygen as needed   Diagnostic studies as ordered  Goal: Remains free of injury related to seizures activity  Outcome: Progressing  Flowsheets (Taken 1/8/2023 2250)  Remains free of injury related to seizure activity:   Maintain airway, patient safety  and administer oxygen as ordered   Monitor patient for seizure activity, document and report duration and description of seizure to Licensed Independent Practitioner   If seizure occurs, turn patient to side and suction secretions as needed   Reorient patient post seizure   Instruct patient/family to notify RN of any seizure activity   Seizure pads on all 4 side rails   Instruct patient/family to call for assistance with activity based on assessment     Problem: Skin/Tissue Integrity - Adult  Goal: Skin integrity remains intact  Outcome: Progressing  Flowsheets (Taken 1/8/2023 2250)  Skin Integrity Remains Intact: Monitor for areas of redness and/or skin breakdown     Problem: Gastrointestinal - Adult  Goal: Minimal or absence of nausea and vomiting  Outcome: Progressing  Flowsheets (Taken 1/8/2023 2250)  Minimal or absence of nausea and vomiting:   Administer IV fluids as ordered to ensure adequate hydration   Maintain NPO status until nausea and vomiting are resolved   Administer ordered antiemetic medications as needed   Provide nonpharmacologic comfort measures as appropriate   Advance diet as tolerated, if ordered   Nutrition consult to assist patient with adequate nutrition and appropriate food choices  Goal: Maintains or returns to baseline bowel function  Outcome: Progressing  Flowsheets (Taken 1/8/2023 2250)  Maintains or returns to baseline bowel function:   Assess bowel function   Encourage oral fluids to ensure adequate hydration   Administer IV fluids as ordered to ensure adequate hydration   Administer ordered medications as needed   Encourage mobilization and activity   Nutrition consult to assist patient with appropriate food choices  Goal: Maintains adequate nutritional intake  Outcome: Progressing  Flowsheets (Taken 1/8/2023 2250)  Maintains adequate nutritional intake:   Monitor percentage of each meal consumed   Identify factors contributing to decreased intake, treat as appropriate   Monitor intake and output, weight and lab values   Obtain nutritional consult as needed     Problem: Metabolic/Fluid and Electrolytes - Adult  Goal: Electrolytes maintained within normal limits  Outcome: Progressing  Flowsheets (Taken 1/8/2023 9517)  Electrolytes maintained within normal limits:   Monitor labs and assess patient for signs and symptoms of electrolyte imbalances   Administer electrolyte replacement as ordered   Monitor response to electrolyte replacements, including repeat lab results as appropriate     Care plan reviewed with patient and family. Patient and family verbalize understanding of the plan of care and contribute to goal setting.

## 2023-01-09 NOTE — PROCEDURES
ROUTINE 12-LEAD EKG completed and [facility]

## 2023-01-09 NOTE — CARE COORDINATION
Case Management Assessment  Initial Evaluation    Date/Time of Evaluation: 1/9/2023 12:15 PM  Assessment Completed by: Sinan Perez RN    If patient is discharged prior to next notation, then this note serves as note for discharge by case management. Patient Name: Sami French                   YOB: 1990  Diagnosis: Cholangitis [K83.09]  Choledocholithiasis [K80.50]                   Date / Time: 1/8/2023  3:21 PM  Location: [facility]09/009     Patient Admission Status: Inpatient   Readmission Risk (Low < 19, Mod (19-27), High > 27): Readmission Risk Score: 8.1    Current PCP: HALI Lugo CNP  PCP verified by CM? Yes    Chart Reviewed: Yes      History Provided by: Patient  Patient Orientation: Alert and Oriented    Patient Cognition: Alert    Hospitalization in the last 30 days (Readmission):  No    If yes, Readmission Assessment in CM Navigator will be completed. Advance Directives:      Code Status: Full Code   Patient's Primary Decision Maker is: Legal Next of Kin      Discharge Planning:    Patient lives with: Other (Comment) [facility].) Type of Home: Group Home  Primary Care Giver: Self  Patient Support Systems include: Other (Comment) (From [facility])   Current Financial resources: Medicaid  Current community resources:    Current services prior to admission: None            Current DME:              Type of Home Care services:  None    ADLS  Prior functional level: Independent in ADLs/IADLs  Current functional level: Independent in ADLs/IADLs    Family can provide assistance at DC: No  Would you like Case Management to discuss the discharge plan with any other family members/significant others, and if so, who?     Plans to Return to Present Housing: Yes  Other Identified Issues/Barriers to RETURNING to current housing: medical complications  Potential Assistance needed at discharge: N/A            Potential DME:    Patient expects to discharge to: Group home  Plan for transportation at discharge: Self    Financial    Payor: Faith Beaulieu / Plan: [employer] [address] / Product Type: *No Product type* /     Does insurance require precert for SNF: Yes    Potential assistance Purchasing Medications: No  Meds-to-Beds request: Yes      [id number] - [place], [address]  Phone: [phone] Fax: [phone]      Notes:    Factors facilitating achievement of predicted outcomes: Cooperative and Pleasant    Barriers to discharge: Medical complications    Additional Case Management Notes: From ED, WBC 19.8, K+ 3.4, Hepatitis C (+), electrolyte replacement protocols, seizure precautions, GI consult. Suboxone, Protonix, IV Zosyn, Carafate. Procedure:   1/8 US Gallbladder RUQ Markedly distended gallbladder which contains some stones. There is marked dilatation of the common bile duct and there is some mild intrahepatic biliary ductal dilatation. Sonographic Hartley's sign is positive per the technologist.     The Plan for Transition of Care is related to the following treatment goals of Cholangitis [K83.09]  Choledocholithiasis [K80.50]    Patient Goals/Plan/Treatment Preferences: Met with Rayne Drew. He currently resides at [facility]. He is independent. Does not drive but has transportation. Plan is to return to [facility] at discharge. SW consult in place. Will follow. Transportation/Food Security/Housekeeping Addressed: No issues identified.      Dada Villegas RN  Case Management Department

## 2023-01-09 NOTE — ED NOTES
Pt states that his pain is now more tolerable at 5/10. Pt does not appear to be in distress at this time. Call light in reach and will continue to monitor pt.       Radha Briggs RN  01/08/23 2007

## 2023-01-09 NOTE — PROGRESS NOTES
Patient admitted to [facility] Room 09 in a wheelchair and from ED  No complaints upon arrival to the room. IV site free of s/s of infection or infiltration. Vital signs obtained. Assessment and data collection initiated. Oriented to room. Policies and procedures for 4A explained All questions answered with no further questions at this time. Fall prevention and safety brochure discussed with patient. 2 person skin check completed with [place]. Patient declines PCP notification. Patient declines family notification.

## 2023-01-09 NOTE — ED NOTES
Pt is laying in bed at this time and is updated on POC. Pt does not appear to be in any distress at this time and Pt rates pain 7/10 at this time in his abd. LR infusion will be started following the completion of zosyn infusion. Call light is within reach and inpatient provider is in at bedside at this time. 2 sets of blood cultures have been sent.       Steff Levi RN  01/08/23 7408

## 2023-01-09 NOTE — PROGRESS NOTES
Pharmacy Note - Extended Infusion Beta-Lactam Adjustment    Piperacillin/Tazobactam 4500 mg IV x 1 followed by Piperacillin/Tazobactam 3375mg Q8h for treatment of Intra-abdominal Infection. Per [facility] Extended Infusion Beta-Lactam Policy, piperacillin/tazobactam will be changed to 3375mg Q8h extended infusion    Estimated Creatinine Clearance: Estimated Creatinine Clearance: 225 mL/min (based on SCr of 0.4 mg/dL). BMI: Body mass index is 21.11 kg/m². Please call with any questions.     Thank you,    Amanda Grant, [facility]

## 2023-01-09 NOTE — ED NOTES
Pt transported to [place] [address]. Pt in stable condition. Floor contacted before transport.       Jasmyn Patel  01/08/23 2052

## 2023-01-09 NOTE — PROGRESS NOTES
Pharmacy Medication History Note      List of current medications patient is taking is complete. Source of information: Patient, [facility], [employer]    Changes made to medication list:  Medications removed (include reason, ex. therapy complete or physician discontinued): Added Multivitamins once daily per patient    Medications added/doses adjusted:  Adjusted Suboxone 2-0.5 mg 2 tablets under the tongue once daily to 3 tablets under the tongue once daily per [facility] and patient self report. Changed sucralfate 1 gm tablets QID to reflect patient only takes usually once a day    Other notes (ex. Recent course of antibiotics, Coumadin dosing):  Patient states they havent been taking recently the carbamzepine due to his stomach hurting. Patient had some leftover zofran that he has been taking for his stomach  Denies use of other OTC or herbal medications. Allergies reviewed    Fritzi Cushing  Pharm D.  Candidate 2023  [facility]      Electronically signed by Fritzi Cushing on 1/9/2023 at 11:58 AM

## 2023-01-09 NOTE — ED NOTES
ED to inpatient nurses report    Chief Complaint   Patient presents with    Abdominal Pain      Present to ED from home  LOC: alert and orientated to name, place, date  Vital signs   Vitals:    01/08/23 1646 01/08/23 1749 01/08/23 1856 01/08/23 1938   BP: 105/72 112/68 111/75 111/75   Pulse: (!) 104 95 (!) 102 97   Resp: 18 16 14 16   Temp:       TempSrc:       SpO2: 97% 99% 99% 96%   Weight:       Height:          Oxygen Baseline room air    Current needs required monitor IV infusion   LDAs:   Peripheral IV 01/08/23 Left Antecubital (Active)   Site Assessment Clean, dry & intact 01/08/23 2008   Line Status Infusing 01/08/23 2008   Phlebitis Assessment No symptoms 01/08/23 2008   Infiltration Assessment 0 01/08/23 2008   Dressing Status Clean, dry & intact 01/08/23 1631     Mobility: Independent  Pending ED orders: LR infusion that is running at this time.    Present condition: stable      C-SSRS Risk of Suicide: No Risk  Swallow Screening    Preferred Language: English     Electronically signed by Kan Narvaez RN on 1/8/2023 at 8:26 PM       Kan Narvaez RN  01/08/23 2037

## 2023-01-09 NOTE — PROGRESS NOTES
Hospitalist Progress Note       Patient:  Teresita Donis. Lilibeth Walhonding      Unit/Bed:4A-09/009-A    YOB: 1990    MRN: [id number]       Acct: [de-identified]     PCP: HALI Merritt CNP    Date of Admission: 1/8/2023    Assessment/Plan:     Acute Ascending Cholangitis, Grade II-Moderate, ? Sepsis: SIRS 2/4 (tachycardia and leukocytosis). Jaundice, RUQ tenderness, and positive Hartley's sign on exam. Afebrile. RUQ US showed distended gallbladder w/stones. Common bile duct was dilated at 1.54 cm w/sonographic Hartley's sign. Started on Zosyn 4.5g. S/p 2L NaCl bolus given in ED. Blood cultures x2 ordered. LR at 100 mL/hr for 12 hours. GI (Dr. Jazmine Shaffer) consulted from ED. Does not have all 3 findings in the typical Charcot triad, but has Jaundice and RUQ pain (subjective fevers at home, but no objective fevers). Lactic acid wnl. Pt fits diagnostic criteria of Acute cholangitis via: >1 sign of inflammation (chills/rigors, ?fever, Leukocytosis, CRP ordered and pending) + >1 sign of Cholestasis (Jaundice, direct hyperbilirubinemia, elevated ALP) + Imaging (showing biliary dilatation and Choledocholithiasis)   INR mildly elevated and no signs of LYRIC - moderate severity of cholangitis   Should have biliary drainage w/in 24-48 hrs of admission  ERCP and stone removal +/- CCY (generally w/in 6-weeks after resolution of acute symptoms) - Keep pt NPO  Blood cultures x 2 taken prior to Abx initiation   C/w Broad spectrum Abx at this time   UA shows pt likely dehydrated, Ketones 15, (+) nitrites, trace LE, no bacteria seen, 0-2 WBCs, objectively does not look like UTI; (+) Ictotest indicating Bilirubinuria   GI consulted for assistance   Chronic Hepatitis C, ? Hep-B co-infection: On 7/2/21 the Hepatitis A ab was reactive, indicating immunity. The serum Fe was elevated at 386 with a ferritin of 792. GGT was 1584, , , TB 1.8, DB 0.8, and Hepatitis BsAG not confirmed.  Due to the concern for possible Hepatitis B co-infection, the pt was referred to Dr. Darryle Common (I.D. specialist). Genotype 3a, last viral load 7/2021 was 1,079,043. Current objective findings - AST 67-52, ALT 57-45, Hepatitis panel (+) for Hep C Ab, otherwise negative for others  Daily CMP, INR  GI consulted as stated above   Hx of Polysubstance abuse / Opioid dependence: Hx of IV drug use - Heroin / Fentanyl; EtOH use, ~ 1year clean and currently receiving Suboxone maintenance therapy at [facility]. UDS (-). Pt currently lives at [place] [address]. EtOH levels unremarkable. C/w Suboxone 2-film SLG daily - pharmacy checking on accuracy of dosing (please see their note from 1/9/23 in regard to [place] vs. Pt stated dosing)  Seizure disorder: Seizures since 11-yo. Last event was ~2 years ago. MRI brain performed 11/8/2022 was reviewed, it showed punctate nonspecific area of increased signal intensity in the periventricular white matter adjacent to the atrium of the left lateral ventricle. See Dr. Eliel Segundo OP and on Tegretol 200mg BID. Seizure precautions  C/w Tegretol 200mg BID   GERD w/o Esophagitis: Has been having issues for very long time. Has seen OP GI. Home medications include Prilosec 40mg daily /Sucralfate 1g 4 times daily.    C/w Protonix 40mg daily + Carafate 1g 4x daily    Expected discharge date:  TBD    Disposition:    [] Home       [] TCU       [] Rehab       [] Psych       [] SNF       [] [place]       [x] Other- [place] [address]    Chief Complaint: Abdominal Pain    Hospital Course:     32-y.o. M w/significant PMHx of Chronic Hepatitis C (follows OP w/Dr. Yong Abreu; Genotype 3a, last viral load 7/2021 was 1,079,043; was referred to Dr. 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 Described pain as sharp, diffuse throughout abdomen, but worse in RUQ, and radiates to back. Pain has been on/off for a few months, but has become constant two days ago. Associated w/nausea, diarrhea, chills, and Jaundice. Pt has hx of polysubstance abuse, alcoholism, and opioid abuse but says he has been clean for the past 1 year. Lives at [place] [address]. Pt was admitted to stepdown unit for cholangitis. ED course: Afebrile, RR 16 w/SpO2 99% on RA, /80, . Labs remarkable for mildly low NA and K, Osm 264.8, Albumin 3.4, ALP 1,451, AST/ALT 67/57, Direct hyperbilirubinemia w/Tbili 7.9 and Dbili 7.6, Lipase 279.8. UDS (-). Leukocytosis w/WBC 19.8 w/left shift, INR 1.24. Acute hep panel (+) Hep C Ab, UA discussed in A/P, but unremarkable for UTI. U/S GB Markedly distended gallbladder which contains some stones. There is marked dilatation of the common bile duct and there is some mild intrahepatic biliary ductal dilatation. Sonographic Hartley's sign is positive. EKG NSR. Pt given LR boluses, broad spectrum Abx, and sent for admission.      Subjective (past 24 hours):        Medications:  Reviewed    Infusion Medications    lactated ringers 100 mL/hr at 01/09/23 2705     Scheduled Medications    carBAMazepine  200 mg Oral BID    gabapentin  600 mg Oral TID    pantoprazole  40 mg Oral QAM AC    sucralfate  1 g Oral 4x Daily    piperacillin-tazobactam  3,375 mg IntraVENous Q8H    buprenorphine-naloxone  2 Film SubLINGual Daily     PRN Meds: lidocaine, ondansetron, potassium chloride **OR** potassium alternative oral replacement **OR** potassium chloride, ketorolac      Intake/Output Summary (Last 24 hours) at 1/9/2023 0718  Last data filed at 1/9/2023 2253  Gross per 24 hour   Intake 1842.86 ml   Output 275 ml   Net 1567.86 ml       Diet:  Diet NPO Exceptions are: Sips of Water with Meds    Exam:  /74   Pulse 90   Temp 98.2 °F (36.8 °C) (Oral)   Resp 16   Ht 5' 7\" (1.702 m)   Wt 134 lb 12.8 oz (61.1 kg)   SpO2 98%   BMI 21.11 kg/m²     General appearance: No apparent distress, appears stated age. Eyes:  Scleral icterus, eyes reactive to light   HENT: Head normal in appearance. External nares normal.  Oral mucosa moist without lesions. Hearing grossly intact. Neck: Supple, with full range of motion. Trachea midline. No gross JVD appreciated. Respiratory:  Normal respiratory effort. Clear to auscultation, bilaterally without rales or wheezes or rhonchi. Cardiovascular: Normal rate, regular rhythm with normal S1/S2 without murmurs. No lower extremity edema. Abdomen: RUQ pain w/ rebound. Hartley's sign positive. No guarding. No masses. Musculoskeletal: No joint swelling or tenderness. Normal tone. No abnormal movements. Skin: Warm and dry. No rashes or lesions. Neurologic:  No focal sensory/motor deficits in the upper and lower extremities. Cranial nerves:  grossly non-focal 2-12. Psychiatric: Alert and oriented, normal insight and thought content. Capillary Refill: Brisk,< 3 seconds. Peripheral Pulses: +2 palpable, equal bilaterally      Labs:   Recent Labs     01/08/23  1620 01/09/23  0558   WBC 19.8* 14.3*   HGB 14.3 12.6*   HCT 43.2 37.6*    267     Recent Labs     01/08/23  1645 01/09/23  0558   * 132*   K 3.4* 3.8   CL 98 99   CO2 25 23   BUN 9 10   CREATININE 0.4 0.3*   CALCIUM 8.7 8.5     Recent Labs     01/08/23  1645 01/09/23  0558   AST 67* 52*   ALT 57 45   BILIDIR 7.6* 8.6*   BILITOT 7.9* 9.7*   ALKPHOS 1,451* 1,176*     Recent Labs     01/08/23  1645 01/09/23  0558   INR 1.24* 1.26*     No results for input(s): Aleene Sauger in the last 72 hours. No results for input(s): PROCAL in the last 72 hours.     Microbiology:      Urinalysis:      Lab Results   Component Value Date/Time    NITRU POSITIVE 01/08/2023 04:30 PM    WBCUA 0-2 01/08/2023 04:30 PM    BACTERIA NONE SEEN 01/08/2023 04:30 PM    RBCUA 0-2 01/08/2023 04:30 PM    BLOODU NEGATIVE 01/08/2023 04:30 PM    SPECGRAV 1.025 01/08/2023 04:30 PM    GLUCOSEU NEGATIVE 11/29/2020 03:45 PM       Radiology:  Maryam BUSTILLO 23. RUQ   Final Result   Markedly distended gallbladder which contains some stones. There is marked dilatation of the common bile duct and there is some mild intrahepatic biliary ductal dilatation. Sonographic Hartley's sign is positive per the technologist.         **This report has been created using voice recognition software. It may contain minor errors which are inherent in voice recognition technology. **      Final report electronically signed by Dr Sedonia Nyhan on 1/8/2023 5:59 PM          DVT prophylaxis: [] Lovenox                                 [x] SCDs                                 [] SQ Heparin                                 [x] Encourage ambulation         [] Already on Anticoagulation     Code Status: Full Code    Tele:   [] yes             [x] no    Active Hospital Problems    Diagnosis Date Noted    Cholangitis [K83.09] 01/08/2023     Priority: Medium       Electronically signed by Jordin Barrett MD on 1/9/2023 at 7:18 AM

## 2023-01-09 NOTE — H&P
Internal Medicine Resident History and Physical          Patient: Raleigh Sun  : 1990  MRN: [id number]     Acct: [de-identified]    PCP: HALI Ramirez CNP  Date of Admission: 2023  Date of Service: Pt seen/examined on 23  and Admitted to Inpatient with expected LOS greater than two midnights due to medical therapy. Hospital Problems             Last Modified POA    * (Principal) Cholangitis 2023 Yes       Assessment and Plan:  Sepsis 2/2 cholangitis: SIRS 2/4 (tachycardia and leukocytosis). Jaundice, RUQ tenderness, and positive Hartley's sign on exam. Afebrile. RUQ US showed distended gallbladder w/ stones. Common bile duct was dilated at 1.54 cm w/ sonographic Hartley's sign. Started on Zosyn 4.5 g    S/p 2L NaCl bolus given in ED  Blood cultures x2 ordered  LR at 100 mL/hr for 12 hours  GI (Dr. Sherice Jones) consulted from ED  Consider cholecystectomy before discharge    NPO since 2200 on 23  Hepatitis C: POA Alk Phos 1,451, AST 67, ALT 57, bilirubin 7.6, albumin 3.4   Daily hepatitis panel  Daily Protime-INR  Daily BMP  Seizure disorder  Resumed home Carbamazepine   Seizure precautions   Hx of polysubstance abuse and opioid dependence: pt says he stopped drinking alcohol and taking illegal drugs one year ago  Resumed home Suboxone  UDS ordered  GERD: resumed home Pantoprazole and Carafate        =======================================================================      Chief Complaint:  abdominal pain    History Of Present Illness:  Jaden Sun is a 27 y.o. male with PMHx of Hepatitis C, seizures, GERD, and polysubstance abuse and opioid dependence who presents to [facility] with abdominal pain. Described pain as sharp. Pain diffuse throughout abdomen but worst in right upper quadrant area. Radiates to back. Pain has been on/off for a few months but has become constant two days ago. Associated w/ nausea, diarrhea, chills, and jaundice.  Pt has hx of polysubstance abuse, alcoholism, and opioid abuse but says he has been clean for the past 1 year. Lives at [place] [address]. Pt was admitted to stepdown unit for cholangitis. ED course: Afebrile, tachycardic, leukocytosis seen on CBC. Found to be septic. Given 2L NaCl bolus and started on Zosyn. RUQ US showed distended common bile duct w/ stones. GI was consulted. Past Medical History:        Diagnosis Date    Closed fracture of one rib of left side with routine healing 12/17/2020    Hypertension     Liver laceration, closed, initial encounter 12/17/2020    Multiple closed fractures of pelvis with stable disruption of pelvic ring with routine healing 12/17/2020    Pneumothorax, traumatic     Seizures (HCC)        Past Surgical History:        Procedure Laterality Date    CT GUIDED CHEST TUBE  12/14/2020    CT GUIDED CHEST TUBE 12/14/2020 [facility] CT SCAN    WISDOM TOOTH EXTRACTION         Medications Prior to Admission:   Prior to Admission medications    Medication Sig Start Date End Date Taking? Authorizing Provider   carBAMazepine (TEGRETOL) 200 MG tablet take 1 tablet by mouth twice a day 12/23/22   Nida Vanegas MD   lidocaine (LMX) 4 % cream Apply topically 3 times daily as needed    Historical Provider, MD   omeprazole (PRILOSEC) 40 MG delayed release capsule Take 1 capsule by mouth every morning (before breakfast) 9/12/22   HALI Rich CNP   docusate (COLACE, DULCOLAX) 100 MG CAPS Take 100 mg by mouth 2 times daily 9/12/22   HALI Rich CNP   gabapentin (NEURONTIN) 600 MG tablet Take 1 tablet by mouth 3 times daily for 180 days.  9/12/22 3/11/23  HALI Rich CNP   sucralfate (CARAFATE) 1 GM tablet Take 1 tablet by mouth 4 times daily 9/12/22   HALI Rich CNP   ondansetron [facility]) 4 MG tablet Take 1 tablet by mouth 3 times daily as needed for Nausea or Vomiting 9/12/22   HALI Rich CNP   buprenorphine-naloxone (SUBOXONE) 2-0.5 MG SUBL place 1 tablet under the tongue and dissolve once daily 3/24/21   Historical Provider, MD   NARCAN 4 MG/0.1ML LIQD nasal spray  3/3/21   Historical Provider, MD       Allergies:  Dilantin [phenytoin sodium extended]    Social History:    The patient currently lives at [place] [address]. .   Tobacco use:   reports that he has been smoking cigarettes. He started smoking about 12 years ago. He has a 5.00 pack-year smoking history. He has never used smokeless tobacco.  Alcohol use:   reports current alcohol use of about 1.0 standard drink per week. Drug use:  reports that he does not currently use drugs after having used the following drugs: Opiates . Family History:        Problem Relation Age of Onset    No Known Problems Mother     Other Father         liver dx        Review of Systems:   Pertinent positives and negatives as noted in the HPI. Otherwise complete ROS negative. Physical Exam:    /71   Pulse 85   Temp 98.8 °F (37.1 °C) (Oral)   Resp 16   Ht 5' 7\" (1.702 m)   Wt 134 lb 12.8 oz (61.1 kg)   SpO2 100%   BMI 21.11 kg/m²       General appearance: No apparent distress, appears stated age. Eyes:  Scleral icterus, eyes reactive to light   HENT: Head normal in appearance. External nares normal.  Oral mucosa moist without lesions. Hearing grossly intact. Neck: Supple, with full range of motion. Trachea midline. No gross JVD appreciated. Respiratory:  Normal respiratory effort. Clear to auscultation, bilaterally without rales or wheezes or rhonchi. Cardiovascular: Normal rate, regular rhythm with normal S1/S2 without murmurs. No lower extremity edema. Abdomen: RUQ pain w/ rebound. Hartley's sign positive. No guarding. No masses. Musculoskeletal: No joint swelling or tenderness. Normal tone. No abnormal movements. Skin: Warm and dry. No rashes or lesions. Neurologic:  No focal sensory/motor deficits in the upper and lower extremities. Cranial nerves:  grossly non-focal 2-12.      Psychiatric: Alert and oriented, normal insight and thought content. Capillary Refill: Brisk,< 3 seconds. Peripheral Pulses: +2 palpable, equal bilaterally. Labs:     Recent Labs     01/08/23  1620   WBC 19.8*   HGB 14.3   HCT 43.2        Recent Labs     01/08/23  1645   *   K 3.4*   CL 98   CO2 25   BUN 9   CREATININE 0.4   CALCIUM 8.7     Recent Labs     01/08/23  1645   AST 67*   ALT 57   BILIDIR 7.6*   BILITOT 7.9*   ALKPHOS 1,451*     Recent Labs     01/08/23  1645   INR 1.24*     No results for input(s): TROPONINT in the last 72 hours. No results for input(s): PROCAL in the last 72 hours. Lab Results   Component Value Date/Time    NITRU POSITIVE 01/08/2023 04:30 PM    WBCUA 0-2 01/08/2023 04:30 PM    BACTERIA NONE SEEN 01/08/2023 04:30 PM    RBCUA 0-2 01/08/2023 04:30 PM    BLOODU NEGATIVE 01/08/2023 04:30 PM    SPECGRAV 1.025 01/08/2023 04:30 PM    GLUCOSEU NEGATIVE 11/29/2020 03:45 PM         Radiology:     [address] 23. RUQ   Final Result   Markedly distended gallbladder which contains some stones. There is marked dilatation of the common bile duct and there is some mild intrahepatic biliary ductal dilatation. Sonographic Hartley's sign is positive per the technologist.         **This report has been created using voice recognition software. It may contain minor errors which are inherent in voice recognition technology. **      Final report electronically signed by Dr Tico Edwards on 1/8/2023 5:59 PM             EKG:  I have reviewed the EKG with the following interpretation: NSR      PT/OT Eval Status: N/a  Diet: Diet NPO Exceptions are: Sips of Water with Meds  DVT prophylaxis: None (potential lap kenneth tmrw)  Code Status: Full Code  Disposition: admit to stepdown     Thank you Pili Owens, APRN - CNP for the opportunity to be involved in this patient's care.     Electronically signed by Pool Torres DO PGY-1 on 1/9/2023 at 1:12 AM.     Case discussed with Attending, Dr. Eliel Rangel Pineda Hayward MD.

## 2023-01-09 NOTE — ED PROVIDER NOTES
[facility]          Pt Name: Vic Caceres. Luzmaria Latham  MRN: [id number]  [place] 1990  Date of evaluation: 1/8/2023  Emergency Physician: Rosendo Dos Santos DO    CHIEF COMPLAINT       Chief Complaint   Patient presents with    Abdominal Pain     History obtained from the patient. HISTORY OF PRESENT ILLNESS    HPI  Vic Caceres. Luzmaria Latham is a 27 y.o. male who presents to the emergency department for evaluation of abdominal pain. Patient with ho chronic liver disease, EtOH liver disease, and Hep C. He follows with Joselito Richardson and Dr. Ashley Morrow. He states over the last 24 hours he started developing right upper quadrant abdominal pain. And then chills as well as noticing his skin and eyes were turning yellow. Reports nausea no vomiting. No fevers. Denies new medications. States he does not take Tylenol on a daily basis. Denies current alcohol use. The patient has no other acute complaints at this time. REVIEW OF SYSTEMS   Review of Systems   Constitutional:  Positive for chills. Negative for fever. Gastrointestinal:  Positive for abdominal pain and nausea. Negative for vomiting. Genitourinary:  Negative for decreased urine volume, dysuria, frequency and hematuria. Skin:  Negative for rash. Hematological:  Does not bruise/bleed easily.        PAST MEDICAL AND SURGICAL HISTORY     Past Medical History:   Diagnosis Date    Closed fracture of one rib of left side with routine healing 12/17/2020    Hypertension     Liver laceration, closed, initial encounter 12/17/2020    Multiple closed fractures of pelvis with stable disruption of pelvic ring with routine healing 12/17/2020    Pneumothorax, traumatic     Seizures (HCC)      Past Surgical History:   Procedure Laterality Date    CT GUIDED CHEST TUBE  12/14/2020    CT GUIDED CHEST TUBE 12/14/2020 [facility] CT SCAN    WISDOM TOOTH EXTRACTION           MEDICATIONS     Current Facility-Administered Medications:     piperacillin-tazobactam (ZOSYN) 4,500 mg in dextrose 5 % 100 mL IVPB (mini-bag), 4,500 mg, IntraVENous, Once, Sarah Sero, DO, Last Rate: 200 mL/hr at 01/08/23 1937, 4,500 mg at 01/08/23 1937    lactated ringers bolus, 1,000 mL, IntraVENous, Once, Sarah Sero, DO    Current Outpatient Medications:     carBAMazepine (TEGRETOL) 200 MG tablet, take 1 tablet by mouth twice a day, Disp: 60 tablet, Rfl: 2    lidocaine (LMX) 4 % cream, Apply topically 3 times daily as needed, Disp: , Rfl:     omeprazole (PRILOSEC) 40 MG delayed release capsule, Take 1 capsule by mouth every morning (before breakfast), Disp: 90 capsule, Rfl: 1    docusate (COLACE, DULCOLAX) 100 MG CAPS, Take 100 mg by mouth 2 times daily, Disp: 60 capsule, Rfl: 0    gabapentin (NEURONTIN) 600 MG tablet, Take 1 tablet by mouth 3 times daily for 180 days. , Disp: 270 tablet, Rfl: 1    sucralfate (CARAFATE) 1 GM tablet, Take 1 tablet by mouth 4 times daily, Disp: 360 tablet, Rfl: 1    ondansetron (ZOFRAN) 4 MG tablet, Take 1 tablet by mouth 3 times daily as needed for Nausea or Vomiting, Disp: 15 tablet, Rfl: 0    buprenorphine-naloxone (SUBOXONE) 2-0.5 MG SUBL, place 1 tablet under the tongue and dissolve once daily, Disp: , Rfl:     NARCAN 4 MG/0.1ML LIQD nasal spray, , Disp: , Rfl:       SOCIAL HISTORY     Social History     Social History Narrative    Not on file     Social History     Tobacco Use    Smoking status: Every Day     Packs/day: 0.50     Years: 10.00     Pack years: 5.00     Types: Cigarettes     Start date: 12/29/2010    Smokeless tobacco: Never   Vaping Use    Vaping Use: Some days    Substances: Nicotine, Flavoring    Devices: Disposable   Substance Use Topics    Alcohol use:  Yes     Alcohol/week: 1.0 standard drink     Types: 1 Cans of beer per week    Drug use: Not Currently     Types: Opiates      Comment: last used 6-8 months ago last time used fentynal         ALLERGIES     Allergies   Allergen Reactions    Dilantin [Phenytoin Sodium Extended] Hives         FAMILY HISTORY     Family History   Problem Relation Age of Onset    No Known Problems Mother     Other Father         liver dx          PHYSICAL EXAM     ED Triage Vitals [01/08/23 1525]   BP Temp Temp Source Heart Rate Resp SpO2 Height Weight   132/80 98.4 °F (36.9 °C) Oral (!) 115 16 99 % 5' 7\" (1.702 m) 135 lb (61.2 kg)         Additional Vital Signs:  Vitals:    01/08/23 1938   BP: 111/75   Pulse: 97   Resp: 16   Temp:    SpO2: 96%       Physical Exam  Vitals and nursing note reviewed. Constitutional:       General: He is not in acute distress. Appearance: He is ill-appearing. He is not toxic-appearing or diaphoretic. HENT:      Head: Normocephalic. Eyes:      General: Scleral icterus present. Pupils: Pupils are equal, round, and reactive to light. Neck:      Vascular: No JVD. Cardiovascular:      Rate and Rhythm: Normal rate and regular rhythm. Heart sounds: Normal heart sounds. Pulmonary:      Effort: Pulmonary effort is normal.      Breath sounds: Normal breath sounds. Abdominal:      General: Bowel sounds are normal. There is no distension. Palpations: Abdomen is soft. Tenderness: There is abdominal tenderness in the right upper quadrant. There is no right CVA tenderness, left CVA tenderness, guarding or rebound. Musculoskeletal:         General: No tenderness or deformity. Normal range of motion. Cervical back: Normal range of motion and neck supple. Skin:     General: Skin is warm and dry. Capillary Refill: Capillary refill takes less than 2 seconds. Findings: No rash. Neurological:      General: No focal deficit present. Mental Status: He is alert and oriented to person, place, and time.          INITIAL IMPRESSION AND DIFFERENTIALS   Initial Assessment: Given the patient's above chief complaint and findings on history and physical examination, I thought it was appropriate to consider the following emergency medical conditions: Hepatitis, obstructive jaundice, cholecystitis, cholangitis, TTP, alcohol hepatitis, although some of these diagnoses are unlikely they were considered in my medical decision making.     Plan: CBC, BMP, ECG, LFTs, right upper quadrant ultrasound, symptomatic treatment with IV fluids, Toradol, and reassess     Chronic Conditions considered:   Patient Active Problem List   Diagnosis    Chronic bilateral low back pain    Seizure disorder (HCC)    Cigarette nicotine dependence without complication    Gastroesophageal reflux disease    Alcoholic liver disease (HCC)    Alcohol abuse    Opioid dependence on agonist therapy ([facility]    Chronic hepatitis C without hepatic coma (HCC)    Elevated liver enzymes    Tobacco use    History of substance abuse ([facility]    Pharyngoesophageal dysphagia    Constipation    Gastritis    Polysubstance abuse ([facility]    Cholangitis         ED RESULTS   Laboratory results:  Labs Reviewed   CBC WITH AUTO DIFFERENTIAL - Abnormal; Notable for the following components:       Result Value    WBC 19.8 (*)     RBC 4.60 (*)     RDW-CV 15.8 (*)     RDW-SD 54.4 (*)     Segs Absolute 17.7 (*)     Lymphocytes Absolute 0.7 (*)     Immature Grans (Abs) 0.54 (*)     All other components within normal limits   BASIC METABOLIC PANEL - Abnormal; Notable for the following components:    Sodium 132 (*)     Potassium 3.4 (*)     Glucose 128 (*)     All other components within normal limits   HEPATIC FUNCTION PANEL - Abnormal; Notable for the following components:    Albumin 3.4 (*)     Total Bilirubin 7.9 (*)     Bilirubin, Direct 7.6 (*)     Alkaline Phosphatase 1,451 (*)     AST 67 (*)     All other components within normal limits   PROTIME-INR - Abnormal; Notable for the following components:    INR 1.24 (*)     All other components within normal limits   HEPATITIS PANEL, ACUTE - Abnormal; Notable for the following components:    Hepatitis C Ab POSITIVE (*)     All other components within normal limits   LIPASE - Abnormal; Notable for the following components:    Lipase 279.8 (*)     All other components within normal limits   MICROSCOPIC URINALYSIS - Abnormal; Notable for the following components:    Bilirubin Urine LARGE (*)     Ketones, Urine 15 (*)     Protein, UA 30 (*)     Nitrite, Urine POSITIVE (*)     Leukocytes, UA TRACE (*)     Color, UA DK YELLOW (*)     All other components within normal limits   ICTOTEST, URINE - Abnormal; Notable for the following components:    Ictotest POSITIVE (*)     All other components within normal limits   OSMOLALITY - Abnormal; Notable for the following components:    Osmolality Calc 264.8 (*)     All other components within normal limits   CULTURE, BLOOD 1   CULTURE, BLOOD 1   SPECIMEN REJECTION   SCAN OF BLOOD SMEAR   ANION GAP   GLOMERULAR FILTRATION RATE, ESTIMATED   LACTATE, SEPSIS   LACTATE, SEPSIS       Radiologic studies results:  US GALLBLADDER RUQ   Final Result   Markedly distended gallbladder which contains some stones. There is marked dilatation of the common bile duct and there is some mild intrahepatic biliary ductal dilatation. Sonographic Hartley's sign is positive per the technologist.         **This report has been created using voice recognition software. It may contain minor errors which are inherent in voice recognition technology. **      Final report electronically signed by Dr Jamil Rosales on 1/8/2023 5:59 PM          ED Medications administered this visit:   Medications   piperacillin-tazobactam (ZOSYN) 4,500 mg in dextrose 5 % 100 mL IVPB (mini-bag) (4,500 mg IntraVENous New Bag 1/8/23 1937)   lactated ringers bolus (has no administration in time range)   lactated ringers bolus (0 mLs IntraVENous Stopped 1/8/23 1938)   ketorolac (TORADOL) injection 15 mg (15 mg IntraVENous Given 1/8/23 1933)         [facility]     ED Course as of 01/08/23 2005   [place] Jan 08, 2023   1820 US GALLBLADDER RUQ  Sepsis at this time.  Source of infection identified. Plan to start antibiotics, blood cultures, lactate, consult GI, and admit to the hospital service. [DD]   1826 Discussed case with Dr. Sherice Jones [DD]   1904 Discussed case with hospitalist team [DD]   2004 INR(!): 1.24  Normal liver function [DD]   2004 Hepatic Function Panel(!):    Albumin 3.4(!)   Bilirubin 7.9(!)   Bilirubin, Direct 7.6(!)   Alk Phos 1,451(!)   AST 67(!)   ALT 57   Total Protein 6.9  Obstructive pattern with elevated alk phos and direct bilirubin [DD]   2005 WBC(!): 19.8  Antibiotics Zosyn given. [DD]   2005 Heart Rate: 97  Heart rate improved after IV fluid bolus. [DD]      ED Course User Index  [DD] Ernestina Mccartney DO     Available laboratory and imaging results were independently reviewed and clinically correlated. Decision Rules/Clinical Scores utilized:   none . none    Code Status:  Not addressed during this ED visit    Social determinants of health impacting treatment or disposition:   History of alcohol abuse, and IV drug abuse/ IVDA    Medical Commodities impacting treatment or disposition:   Chronic liver disease, hepatitis C, alcohol hepatitis    Past Medical History:   Diagnosis Date    Closed fracture of one rib of left side with routine healing 12/17/2020    Hypertension     Liver laceration, closed, initial encounter 12/17/2020    Multiple closed fractures of pelvis with stable disruption of pelvic ring with routine healing 12/17/2020    Pneumothorax, traumatic     Seizures ([facility]        Consultants:  Hospitalist team and GI Dr. Sherice Jones    Final Assessment and Plan:   Patient with cholangitis. Elevated white count, tachycardia, elevated alk phos, elevated bilirubin and distended common bile duct on ultrasound. Antibiotics and fluid resuscitation-blood cultures and lactate ordered  Minor electrolyte changes      The diagnosis, extensive differential diagnosis, plan of care, laboratory, and imaging findings were discussed at the bedside.  All questions and concerns were addressed at the time of the encounter. MEDICATION CHANGES     DISCHARGE MEDICATIONS:  New Prescriptions    No medications on file            FINAL DISPOSITION     Final diagnoses:   Choledocholithiasis   Cholangitis     Condition: condition: good  Dispo: Admit to telemetry    PATIENT REFERRED TO:  No follow-up provider specified. Critical Care Time   CRITICAL CARE:  None    PROCEDURES: (None if blank)  Procedures:   MDM      This transcription was electronically signed. Parts of this transcriptions may have been dictated by use of voice recognition software and electronically transcribed, and parts may have been transcribed with the assistance of an ED scribe. The transcription may contain errors not detected in proofreading.     Electronically Signed: Thu Nash DO, 01/08/23, 7:57 PM         Thu Nash DO  01/08/23 2009

## 2023-01-10 ENCOUNTER — ANESTHESIA EVENT (OUTPATIENT)
Dept: ENDOSCOPY | Age: 33
DRG: 720 | End: 2023-01-10
Payer: MEDICAID

## 2023-01-10 ENCOUNTER — ANESTHESIA (OUTPATIENT)
Dept: ENDOSCOPY | Age: 33
DRG: 720 | End: 2023-01-10
Payer: MEDICAID

## 2023-01-10 ENCOUNTER — APPOINTMENT (OUTPATIENT)
Dept: GENERAL RADIOLOGY | Age: 33
DRG: 720 | End: 2023-01-10
Payer: MEDICAID

## 2023-01-10 LAB
ALBUMIN SERPL-MCNC: 2.8 G/DL (ref 3.5–5.1)
ALP BLD-CCNC: 969 U/L (ref 38–126)
ALT SERPL-CCNC: 32 U/L (ref 11–66)
ANION GAP SERPL CALCULATED.3IONS-SCNC: 11 MEQ/L (ref 8–16)
AST SERPL-CCNC: 37 U/L (ref 5–40)
BASOPHILS # BLD: 0.3 %
BASOPHILS ABSOLUTE: 0 THOU/MM3 (ref 0–0.1)
BILIRUB SERPL-MCNC: 2.7 MG/DL (ref 0.3–1.2)
BILIRUBIN DIRECT: 1.6 MG/DL (ref 0–0.3)
BUN BLDV-MCNC: 9 MG/DL (ref 7–22)
CALCIUM SERPL-MCNC: 8.1 MG/DL (ref 8.5–10.5)
CHLORIDE BLD-SCNC: 101 MEQ/L (ref 98–111)
CO2: 25 MEQ/L (ref 23–33)
CREAT SERPL-MCNC: 0.5 MG/DL (ref 0.4–1.2)
EOSINOPHIL # BLD: 6 %
EOSINOPHILS ABSOLUTE: 0.6 THOU/MM3 (ref 0–0.4)
ERYTHROCYTE [DISTWIDTH] IN BLOOD BY AUTOMATED COUNT: 16 % (ref 11.5–14.5)
ERYTHROCYTE [DISTWIDTH] IN BLOOD BY AUTOMATED COUNT: 56.4 FL (ref 35–45)
GFR SERPL CREATININE-BSD FRML MDRD: > 60 ML/MIN/1.73M2
GLUCOSE BLD-MCNC: 93 MG/DL (ref 70–108)
HCT VFR BLD CALC: 34.7 % (ref 42–52)
HEMOGLOBIN: 11.2 GM/DL (ref 14–18)
IMMATURE GRANS (ABS): 0.1 THOU/MM3 (ref 0–0.07)
IMMATURE GRANULOCYTES: 0.9 %
INR BLD: 1.1 (ref 0.85–1.13)
LYMPHOCYTES # BLD: 23 %
LYMPHOCYTES ABSOLUTE: 2.5 THOU/MM3 (ref 1–4.8)
MCH RBC QN AUTO: 30.7 PG (ref 26–33)
MCHC RBC AUTO-ENTMCNC: 32.3 GM/DL (ref 32.2–35.5)
MCV RBC AUTO: 95.1 FL (ref 80–94)
MONOCYTES # BLD: 5.9 %
MONOCYTES ABSOLUTE: 0.6 THOU/MM3 (ref 0.4–1.3)
NUCLEATED RED BLOOD CELLS: 0 /100 WBC
PLATELET # BLD: 291 THOU/MM3 (ref 130–400)
PMV BLD AUTO: 9.8 FL (ref 9.4–12.4)
POTASSIUM SERPL-SCNC: 4.2 MEQ/L (ref 3.5–5.2)
RBC # BLD: 3.65 MILL/MM3 (ref 4.7–6.1)
SEG NEUTROPHILS: 63.9 %
SEGMENTED NEUTROPHILS ABSOLUTE COUNT: 6.9 THOU/MM3 (ref 1.8–7.7)
SODIUM BLD-SCNC: 137 MEQ/L (ref 135–145)
TOTAL PROTEIN: 5.7 G/DL (ref 6.1–8)
WBC # BLD: 10.8 THOU/MM3 (ref 4.8–10.8)

## 2023-01-10 PROCEDURE — 7100000001 HC PACU RECOVERY - ADDTL 15 MIN: Performed by: INTERNAL MEDICINE

## 2023-01-10 PROCEDURE — 2580000003 HC RX 258: Performed by: STUDENT IN AN ORGANIZED HEALTH CARE EDUCATION/TRAINING PROGRAM

## 2023-01-10 PROCEDURE — C1769 GUIDE WIRE: HCPCS | Performed by: INTERNAL MEDICINE

## 2023-01-10 PROCEDURE — 6360000002 HC RX W HCPCS

## 2023-01-10 PROCEDURE — 6360000002 HC RX W HCPCS: Performed by: REGISTERED NURSE

## 2023-01-10 PROCEDURE — 3609014900 HC ERCP W/SPHINCTEROTOMY &/OR PAPILLOTOMY: Performed by: INTERNAL MEDICINE

## 2023-01-10 PROCEDURE — 2720000010 HC SURG SUPPLY STERILE: Performed by: INTERNAL MEDICINE

## 2023-01-10 PROCEDURE — 85025 COMPLETE CBC W/AUTO DIFF WBC: CPT

## 2023-01-10 PROCEDURE — 6370000000 HC RX 637 (ALT 250 FOR IP)

## 2023-01-10 PROCEDURE — 7100000000 HC PACU RECOVERY - FIRST 15 MIN: Performed by: INTERNAL MEDICINE

## 2023-01-10 PROCEDURE — 6360000002 HC RX W HCPCS: Performed by: STUDENT IN AN ORGANIZED HEALTH CARE EDUCATION/TRAINING PROGRAM

## 2023-01-10 PROCEDURE — 2060000000 HC ICU INTERMEDIATE R&B

## 2023-01-10 PROCEDURE — 74328 X-RAY BILE DUCT ENDOSCOPY: CPT

## 2023-01-10 PROCEDURE — 80053 COMPREHEN METABOLIC PANEL: CPT

## 2023-01-10 PROCEDURE — 2709999900 HC NON-CHARGEABLE SUPPLY: Performed by: INTERNAL MEDICINE

## 2023-01-10 PROCEDURE — 0FC98ZZ EXTIRPATION OF MATTER FROM COMMON BILE DUCT, VIA NATURAL OR ARTIFICIAL OPENING ENDOSCOPIC: ICD-10-PCS | Performed by: INTERNAL MEDICINE

## 2023-01-10 PROCEDURE — 3700000001 HC ADD 15 MINUTES (ANESTHESIA): Performed by: INTERNAL MEDICINE

## 2023-01-10 PROCEDURE — 36415 COLL VENOUS BLD VENIPUNCTURE: CPT

## 2023-01-10 PROCEDURE — 3609015200 HC ERCP REMOVE CALCULI/DEBRIS BILIARY/PANCREAS DUCT: Performed by: INTERNAL MEDICINE

## 2023-01-10 PROCEDURE — 85610 PROTHROMBIN TIME: CPT

## 2023-01-10 PROCEDURE — 82248 BILIRUBIN DIRECT: CPT

## 2023-01-10 PROCEDURE — 0F798ZZ DILATION OF COMMON BILE DUCT, VIA NATURAL OR ARTIFICIAL OPENING ENDOSCOPIC: ICD-10-PCS | Performed by: INTERNAL MEDICINE

## 2023-01-10 PROCEDURE — 99232 SBSQ HOSP IP/OBS MODERATE 35: CPT | Performed by: INTERNAL MEDICINE

## 2023-01-10 PROCEDURE — 3700000000 HC ANESTHESIA ATTENDED CARE: Performed by: INTERNAL MEDICINE

## 2023-01-10 RX ORDER — PROPOFOL 10 MG/ML
INJECTION, EMULSION INTRAVENOUS PRN
Status: DISCONTINUED | OUTPATIENT
Start: 2023-01-10 | End: 2023-01-10 | Stop reason: SDUPTHER

## 2023-01-10 RX ORDER — SUCCINYLCHOLINE/SOD CL,ISO/PF 100 MG/5ML
SYRINGE (ML) INTRAVENOUS PRN
Status: DISCONTINUED | OUTPATIENT
Start: 2023-01-10 | End: 2023-01-10 | Stop reason: SDUPTHER

## 2023-01-10 RX ADMIN — SODIUM CHLORIDE, PRESERVATIVE FREE 10 ML: 5 INJECTION INTRAVENOUS at 08:55

## 2023-01-10 RX ADMIN — SUCRALFATE 1 G: 1 TABLET ORAL at 08:54

## 2023-01-10 RX ADMIN — SUCRALFATE 1 G: 1 TABLET ORAL at 20:22

## 2023-01-10 RX ADMIN — GABAPENTIN 600 MG: 600 TABLET, FILM COATED ORAL at 20:22

## 2023-01-10 RX ADMIN — KETOROLAC TROMETHAMINE 30 MG: 30 INJECTION, SOLUTION INTRAMUSCULAR; INTRAVENOUS at 18:17

## 2023-01-10 RX ADMIN — GABAPENTIN 600 MG: 600 TABLET, FILM COATED ORAL at 13:25

## 2023-01-10 RX ADMIN — BUPRENORPHINE AND NALOXONE 2 FILM: 2; .5 FILM BUCCAL; SUBLINGUAL at 08:54

## 2023-01-10 RX ADMIN — SODIUM CHLORIDE: 9 INJECTION, SOLUTION INTRAVENOUS at 16:23

## 2023-01-10 RX ADMIN — Medication 150 MG: at 16:27

## 2023-01-10 RX ADMIN — GABAPENTIN 600 MG: 600 TABLET, FILM COATED ORAL at 08:54

## 2023-01-10 RX ADMIN — PANTOPRAZOLE SODIUM 40 MG: 40 TABLET, DELAYED RELEASE ORAL at 05:17

## 2023-01-10 RX ADMIN — PIPERACILLIN AND TAZOBACTAM 3375 MG: 3; .375 INJECTION, POWDER, FOR SOLUTION INTRAVENOUS at 09:06

## 2023-01-10 RX ADMIN — CARBAMAZEPINE 200 MG: 200 TABLET ORAL at 08:55

## 2023-01-10 RX ADMIN — CARBAMAZEPINE 200 MG: 200 TABLET ORAL at 20:22

## 2023-01-10 RX ADMIN — SUCRALFATE 1 G: 1 TABLET ORAL at 13:26

## 2023-01-10 RX ADMIN — PIPERACILLIN AND TAZOBACTAM 3375 MG: 3; .375 INJECTION, POWDER, FOR SOLUTION INTRAVENOUS at 18:59

## 2023-01-10 RX ADMIN — PROPOFOL 200 MG: 10 INJECTION, EMULSION INTRAVENOUS at 16:27

## 2023-01-10 ASSESSMENT — PAIN DESCRIPTION - ORIENTATION
ORIENTATION: MID
ORIENTATION: MID

## 2023-01-10 ASSESSMENT — PAIN SCALES - GENERAL
PAINLEVEL_OUTOF10: 5
PAINLEVEL_OUTOF10: 0
PAINLEVEL_OUTOF10: 0
PAINLEVEL_OUTOF10: 2
PAINLEVEL_OUTOF10: 3
PAINLEVEL_OUTOF10: 4
PAINLEVEL_OUTOF10: 3
PAINLEVEL_OUTOF10: 0

## 2023-01-10 ASSESSMENT — PAIN - FUNCTIONAL ASSESSMENT
PAIN_FUNCTIONAL_ASSESSMENT: ACTIVITIES ARE NOT PREVENTED
PAIN_FUNCTIONAL_ASSESSMENT: ACTIVITIES ARE NOT PREVENTED
PAIN_FUNCTIONAL_ASSESSMENT: 0-10
PAIN_FUNCTIONAL_ASSESSMENT: ACTIVITIES ARE NOT PREVENTED

## 2023-01-10 ASSESSMENT — PAIN DESCRIPTION - DESCRIPTORS
DESCRIPTORS: SHARP
DESCRIPTORS: DISCOMFORT
DESCRIPTORS: ACHING;DISCOMFORT;SHARP
DESCRIPTORS: DISCOMFORT

## 2023-01-10 ASSESSMENT — PAIN DESCRIPTION - LOCATION
LOCATION: ABDOMEN
LOCATION: THROAT;ABDOMEN
LOCATION: ABDOMEN

## 2023-01-10 NOTE — BRIEF OP NOTE
Brief Postoperative Note      Patient: Armando Dejesus  YOB: 1990  MRN: [id number]    Date of Procedure: 1/10/2023    Pre-Op Diagnosis: Cholangitis [K83.09]    Post-Op Diagnosis: Choledocholithiasis, biliary dilations, sphincterotomy and stone extractions. Procedure(s):  ERCP SPHINCTER/PAPILLOTOMY  ERCP STONE REMOVAL    Surgeon(s):  Massiel Tyler MD    Assistant:  * No surgical staff found *    Anesthesia: General    Estimated Blood Loss (mL): none    Complications: None    Specimens:   * No specimens in log *    Implants:  * No implants in log *      Drains: * No LDAs found *    Findings:  Choledocholithiasis, biliary dilations, sphincterotomy and stone extractions.     Electronically signed by Massiel Tyler MD on 1/10/2023 at 5:07 PM

## 2023-01-10 NOTE — CARE COORDINATION
1/10/23, 1:28 PM EST    DISCHARGE ON GOING EVALUATION    Jaden RIZVI PATRIA [facility] day: 2  Location: -09/009-A Reason for admit: Cholangitis [K83.09]  Choledocholithiasis [K80.50]   Procedure:   1/8 US Gallbladder RUQ Markedly distended gallbladder which contains some stones. There is marked dilatation of the common bile duct and there is some mild intrahepatic biliary ductal dilatation. Sonographic Hartley's sign is positive per the technologist.   1/10 ERCP pending with Dr. Eneida Stiles  Barriers to Discharge: pain and nausea control, GI following, Suboxone, Protonix, IV Zosyn, Carafate, electrolyte replacement protocols. PCP: Zaki Lance APRN - CNP  Readmission Risk Score: 8.6%  Patient Goals/Plan/Treatment Preferences: Plan is to return to [facility]. Denies needs. SW following.

## 2023-01-10 NOTE — PRE SEDATION
[facility]  Sedation/Analgesia History & Physical    Patient: Gaylyn Krebs. Lei Cranker : 1990  Med Rec#: [id number] Acc#: [id number]   Provider Performing Procedure: Ronaldo Martinez MD  Primary Care Physician: HALI Barajas CNP    PRE-PROCEDURE   Full CODE [x]Yes  DNR-CCA/DNR-CC []Yes   Brief History/Pre-Procedure Diagnosis:obstructive jaundice          MEDICAL HISTORY  []CAD/Valve  []Liver Disease  []Lung Disease []Diabetes  []Hypertension []Renal Disease  []Additional information:       has a past medical history of Closed fracture of one rib of left side with routine healing, Hypertension, Liver laceration, closed, initial encounter, Multiple closed fractures of pelvis with stable disruption of pelvic ring with routine healing, Pneumothorax, traumatic, and Seizures ([facility] SURGICAL HISTORY   has a past surgical history that includes [place] tooth extraction and CT GUIDED CHEST TUBE (2020).   Additional information:       ALLERGIES   Allergies as of 2023 - Fully Reviewed 2023   Allergen Reaction Noted    Dilantin [phenytoin sodium extended] Hives 2020     Additional information:       MEDICATIONS   Coumadin Use Last 7 Days [x]No []Yes  Antiplatelet drug therapy use last 7 days  [x]No []Yes  Other anticoagulant use last 7 days  [x]No []Yes    Current Facility-Administered Medications:     indomethacin (INDOCIN) 50 MG suppository 100 mg, 100 mg, Rectal, Once, Ronaldo Martinez MD    sodium chloride flush 0.9 % injection 5-40 mL, 5-40 mL, IntraVENous, 2 times per day, Jory Toledo MD, 10 mL at 01/10/23 0855    sodium chloride flush 0.9 % injection 10 mL, 10 mL, IntraVENous, PRN, Jory Toledo MD    0.9 % sodium chloride infusion, , IntraVENous, PRN, Jory Toledo MD    polyethylene glycol (GLYCOLAX) packet 17 g, 17 g, Oral, Daily PRN, Jory Toledo MD    acetaminophen (TYLENOL) tablet 650 mg, 650 mg, Oral, Q6H PRN **OR** acetaminophen (TYLENOL) suppository 650 mg, 650 mg, Rectal, Q6H PRN, Lovely Justice MD    carBAMazepine (TEGRETOL) tablet 200 mg, 200 mg, Oral, BID, Fausto Rosich, DO, 200 mg at 01/10/23 0855    gabapentin (NEURONTIN) tablet 600 mg, 600 mg, Oral, TID, Fausto Rosich, DO, 600 mg at 01/10/23 1325    lidocaine (LMX) 4 % cream, , Topical, TID PRN, Fausto Rosich, DO    pantoprazole (PROTONIX) tablet 40 mg, 40 mg, Oral, QAM AC, Fausto Rosich, DO, 40 mg at 01/10/23 0517    ondansetron (ZOFRAN) tablet 4 mg, 4 mg, Oral, TID PRN, Fausto Rosich, DO    sucralfate (CARAFATE) tablet 1 g, 1 g, Oral, 4x Daily, Fausto Rosich, DO, 1 g at 01/10/23 1326    potassium chloride (KLOR-CON M) extended release tablet 40 mEq, 40 mEq, Oral, PRN, 40 mEq at 01/08/23 2230 **OR** potassium bicarb-citric acid (EFFER-K) effervescent tablet 40 mEq, 40 mEq, Oral, PRN **OR** potassium chloride 10 mEq/100 mL IVPB (Peripheral Line), 10 mEq, IntraVENous, PRN, Ashita Behl, MD    piperacillin-tazobactam (ZOSYN) 3,375 mg in dextrose 5 % 50 mL IVPB extended infusion (mini-bag), 3,375 mg, IntraVENous, Q8H, Ashita Behl, MD, Stopped at 01/10/23 1249    ketorolac (TORADOL) injection 30 mg, 30 mg, IntraVENous, Q6H PRN, Ashita Behl, MD, 30 mg at 01/09/23 2338    buprenorphine-naloxone (SUBOXONE) 2-0.5 MG SL film 2 Film, 2 Film, SubLINGual, Daily, Fausto Rosich, DO, 2 Film at 01/10/23 0854  Prior to Admission medications    Medication Sig Start Date End Date Taking?  Authorizing Provider   Multiple Vitamins-Minerals (THERAPEUTIC MULTIVITAMIN-MINERALS) tablet Take 1 tablet by mouth daily   Yes Historical Provider, MD   carBAMazepine (TEGRETOL) 200 MG tablet take 1 tablet by mouth twice a day 12/23/22   Edu Tamayo MD   lidocaine (LMX) 4 % cream Apply topically 3 times daily as needed    Historical Provider, MD   omeprazole (PRILOSEC) 40 MG delayed release capsule Take 1 capsule by mouth every morning (before breakfast) 9/12/22   Henry Felisha, APRN - CNP   docusate (COLACE, DULCOLAX) 100 MG CAPS Take 100 mg by mouth 2 times daily  Patient taking differently: Take 100 mg by mouth 2 times daily Pt taking PRN 9/12/22   Henryuriel Baeza, APRN - CNP   gabapentin (NEURONTIN) 600 MG tablet Take 1 tablet by mouth 3 times daily for 180 days. 9/12/22 3/11/23  Henry Felisha APRN - CNP   sucralfate (CARAFATE) 1 GM tablet Take 1 tablet by mouth 4 times daily  Patient taking differently: Take 1 g by mouth 4 times daily Patient says he usually only takes once daily 9/12/22   Henryuriel Baeza APRN - CNP   ondansetron (ZOFRAN) 4 MG tablet Take 1 tablet by mouth 3 times daily as needed for Nausea or Vomiting 9/12/22   Henry Felisha, APRN - CNP   buprenorphine-naloxone (SUBOXONE) 2-0.5 MG SUBL Place under the tongue. Indications: Opioid Dependence Take 3 tablets under the tounge and allow to dissolve once daily. Per [facility].  3/24/21      NARCAN 4 MG/0.1ML LIQD nasal spray  3/3/21   Historical Provider, MD     Additional information:       PHYSICAL:   Heart:  [x]Regular rate and rhythm  []Other:    Lungs:  [x]Clear    []Other:    Abdomen: [x]Soft    []Other:    Mental Status: [x]Alert & Oriented  []Other:      VITAL SIGNS   Patient Vitals for the past 24 hrs:   BP Temp Temp src Pulse Resp SpO2 Weight   01/10/23 1551 110/68 (!) 96.6 °F (35.9 °C) Temporal 70 18 100 % --   01/10/23 1115 (!) 91/57 97.6 °F (36.4 °C) Oral 65 18 100 % --   01/10/23 0924 -- -- -- -- 18 -- --   01/10/23 0829 99/62 97.6 °F (36.4 °C) Axillary 68 17 100 % --   01/10/23 0351 (!) 91/57 97.5 °F (36.4 °C) Oral 58 16 100 % 155 lb 11.2 oz (70.6 kg)   01/09/23 2337 98/60 98.1 °F (36.7 °C) Oral 71 16 100 % --   01/09/23 1935 92/60 98.1 °F (36.7 °C) Oral 71 16 99 % --       PLANNED PROCEDURE   []EGD  []Colonoscopy []Flex Sigmoid  [x]ERCP []EUS   []Cystoscopy  [] CATH [] BRONCH   Consent: I have discussed with the patient and/or the patient representative the indication, alternatives, and the possible risks and/or complications of the planned procedure and the anesthesia methods. The patient and/or patient representative appear to understand and agree to proceed. SEDATION  Planned agent:[x]Midazolam []Meperidine [x]Sublimaze []Morphine  []Diazepam  []Other:     ASA Classification: see anesthesia note    Airway Assessment: Mallampati Class II - (soft palate, fauces & uvula are visible)    Monitoring and Safety: The patient will be placed on a cardiac monitor and vital signs, pulse oximetry and level of consciousness will be continuously evaluated throughout the procedure. The patient will be closely monitored until recovery from the medications is complete and the patient has returned to baseline status. Respiratory therapy will be on standby during the procedure. [x]Pre-procedure diagnostic studies complete and results available. Comment:    [x]Previous sedation/anesthesia experiences assessed. Comment:    [x]The patient is an appropriate candidate to undergo the planned procedure sedation and anesthesia. (Refer to nursing sedation/analgesia documentation record)  [x]Formulation and discussion of sedation/procedure plan, risks, and expectations with patient and/or responsible adult completed. [x]Patient examined immediately prior to the procedure.  (Refer to nursing sedation/analgesia documentation record)    Toi Rosa MD, MD   Electronically signed 1/10/2023 at 4:30 PM

## 2023-01-10 NOTE — PROGRESS NOTES
1700 - pt arrives to pacu, respirations easy and unlabored on room air, pt denies pain at this time, VSS    1715 - pt denies pain at this time    1720 - report called to [address] [place] [address] Way    2246 - pt meets criteria for discharge from pacu at this time    1750 - pt transported to  in stable condition

## 2023-01-10 NOTE — PROGRESS NOTES
Hospitalist Progress Note       [facility]      Unit/Bed:4A-09/009-A    YOB: 1990    MRN: [id number]       Acct: [de-identified]     PCP: HALI Moreno CNP    Date of Admission: 1/8/2023    Assessment/Plan:     Acute Ascending Cholangitis, Grade II-Moderate, ? Sepsis: SIRS 2/4 (tachycardia and leukocytosis). Jaundice, RUQ tenderness, and positive Hartley's sign on exam. Afebrile. RUQ US showed distended gallbladder w/stones. Common bile duct was dilated at 1.54 cm w/sonographic Hartley's sign. Started on Zosyn 4.5g. S/p 2L NaCl bolus given in ED. Blood cultures x2 ordered. LR at 100 mL/hr for 12 hours. GI (Dr. Mustapha Rose) consulted from ED. Does not have all 3 findings in the typical Charcot triad, but has Jaundice and RUQ pain (subjective fevers at home, but no objective fevers). Lactic acid wnl. Pt fits diagnostic criteria of Acute cholangitis via: >1 sign of inflammation (chills/rigors, ?fever, Leukocytosis, CRP ordered and pending) + >1 sign of Cholestasis (Jaundice, direct hyperbilirubinemia, elevated ALP) + Imaging (showing biliary dilatation and Choledocholithiasis), GGT 1908 and CRP 22.07  Tbili and Dbili have dropped down significantly - 2.7 / 1.6 respectively  INR mildly elevated and no signs of LYRIC - moderate severity of cholangitis   Should have biliary drainage w/in 24-48 hrs of admission  ERCP and stone removal +/- CCY (generally w/in 6-weeks after resolution of acute symptoms) - Keep pt NPO  Blood cultures x 2 taken prior to Abx initiation   C/w Broad spectrum Abx at this time   UA shows pt likely dehydrated, Ketones 15, (+) nitrites, trace LE, no bacteria seen, 0-2 WBCs, objectively does not look like UTI; (+) Ictotest indicating Bilirubinuria   GI consulted for assistance - Will be taking pt to ERCP today  Chronic Hepatitis C, ? Hep-B co-infection: On 7/2/21 the Hepatitis A ab was reactive, indicating immunity.  The serum Fe was elevated at 386 with a ferritin of 792. GGT was 1584, , , TB 1.8, DB 0.8, and Hepatitis BsAG not confirmed. Due to the concern for possible Hepatitis B co-infection, the pt was referred to Dr. Bismark Holden (I.D. specialist). Genotype 3a, last viral load 7/2021 was 1,079,043. Current objective findings - AST 67-52, ALT 57-45, Hepatitis panel (+) for Hep C Ab, otherwise negative for others  Daily CMP, INR  GI consulted as stated above   Hx of Polysubstance abuse / Opioid dependence: Hx of IV drug use - Heroin / Fentanyl; EtOH use, ~ 1year clean and [facility] UDS (-). Pt currently lives at [place] [address]. EtOH levels unremarkable. C/w Suboxone 2-film SLG daily - pharmacy checking on accuracy of dosing (please see their note from 1/9/23 in regard to [facility] vs. Pt stated dosing)  Seizure disorder: Seizures since 11-yo. Last event was ~2 years ago. MRI brain performed 11/8/2022 was reviewed, it showed punctate nonspecific area of increased signal intensity in the periventricular white matter adjacent to the atrium of the left lateral ventricle. See Dr. Helen Cheney OP and on Tegretol 200mg BID. Seizure precautions  C/w Tegretol 200mg BID   GERD w/o Esophagitis: Has been having issues for very long time. Has seen OP GI. Home medications include Prilosec 40mg daily /Sucralfate 1g 4 times daily.    C/w Protonix 40mg daily + Carafate 1g 4x daily    Expected discharge date:  TBD    Disposition:    [] Home       [] TCU       [] Rehab       [] Psych       [] SNF       [] [facility]       [x] Other- [place] [address]    Chief Complaint: Abdominal Pain    Hospital Course:     32-y.o. M w/significant PMHx of Chronic Hepatitis C (follows OP w/Dr. Chelsea Troncoso; Genotype 3a, last viral load 7/2021 was 1,079,043; was referred to Dr. 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 Described pain as sharp, diffuse throughout abdomen, but worse in RUQ, and radiates to back. Pain has been on/off for a few months, but has become constant two days ago. Associated w/nausea, diarrhea, chills, and Jaundice. Pt has hx of polysubstance abuse, alcoholism, and opioid abuse but says he has been clean for the past 1 year. Lives at [place] [address]. Pt was admitted to stepdown unit for cholangitis. ED course: Afebrile, RR 16 w/SpO2 99% on RA, /80, . Labs remarkable for mildly low NA and K, Osm 264.8, Albumin 3.4, ALP 1,451, AST/ALT 67/57, Direct hyperbilirubinemia w/Tbili 7.9 and Dbili 7.6, Lipase 279.8. UDS (-). Leukocytosis w/WBC 19.8 w/left shift, INR 1.24. Acute hep panel (+) Hep C Ab, UA discussed in A/P, but unremarkable for UTI. U/S GB Markedly distended gallbladder which contains some stones. There is marked dilatation of the common bile duct and there is some mild intrahepatic biliary ductal dilatation. Sonographic Hartley's sign is positive. EKG NSR. Pt given LR boluses, broad spectrum Abx, and sent for admission. Subjective (past 24 hours):      Pt seen and examined at bedside in NAD. Tbili and D bili have come down significantly, called IR and cancelled procedure for biliary stent. Awaiting GI evaluation. GI planning to take pt to ERCP today.      Medications:  Reviewed    Infusion Medications    sodium chloride       Scheduled Medications    sodium chloride flush  5-40 mL IntraVENous 2 times per day    carBAMazepine  200 mg Oral BID    gabapentin  600 mg Oral TID    pantoprazole  40 mg Oral QAM AC    sucralfate  1 g Oral 4x Daily    piperacillin-tazobactam  3,375 mg IntraVENous Q8H    buprenorphine-naloxone  2 Film SubLINGual Daily     PRN Meds: sodium chloride flush, sodium chloride, polyethylene glycol, acetaminophen **OR** acetaminophen, lidocaine, ondansetron, potassium chloride **OR** potassium alternative oral replacement **OR** potassium chloride, ketorolac      Intake/Output Summary (Last 24 hours) at 1/10/2023 0757  Last data filed at 1/10/2023 0418  Gross per 24 hour   Intake 1229.36 ml   Output --   Net 1229.36 ml       Diet:  Diet NPO Exceptions are: Sips of Water with Meds    Exam:  BP (!) 91/57   Pulse 58   Temp 97.5 °F (36.4 °C) (Oral)   Resp 16   Ht 5' 7\" (1.702 m)   Wt 155 lb 11.2 oz (70.6 kg)   SpO2 100%   BMI 24.39 kg/m²     General appearance: No apparent distress, appears stated age. Eyes:  Scleral icterus, eyes reactive to light   HENT: Head normal in appearance. External nares normal.  Oral mucosa moist without lesions. Hearing grossly intact. Neck: Supple, with full range of motion. Trachea midline. No gross JVD appreciated. Respiratory:  Normal respiratory effort. Clear to auscultation, bilaterally without rales or wheezes or rhonchi. Cardiovascular: Normal rate, regular rhythm with normal S1/S2 without murmurs. No lower extremity edema. Abdomen: RUQ pain w/ rebound. Hartley's sign positive. No guarding. No masses. Musculoskeletal: No joint swelling or tenderness. Normal tone. No abnormal movements. Skin: Warm and dry. No rashes or lesions. Neurologic:  No focal sensory/motor deficits in the upper and lower extremities. Cranial nerves:  grossly non-focal 2-12. Psychiatric: Alert and oriented, normal insight and thought content. Capillary Refill: Brisk,< 3 seconds.   Peripheral Pulses: +2 palpable, equal bilaterally      Labs:   Recent Labs     01/08/23  1620 01/09/23  0558 01/10/23  0504   WBC 19.8* 14.3* 10.8   HGB 14.3 12.6* 11.2*   HCT 43.2 37.6* 34.7*    267 291     Recent Labs     01/08/23  1645 01/09/23  0558 01/10/23  0504   * 132* 137   K 3.4* 3.8 4.2   CL 98 99 101   CO2 25 23 25   BUN 9 10 9   CREATININE 0.4 0.3* 0.5   CALCIUM 8.7 8.5 8.1*     Recent Labs     01/08/23  1645 01/09/23  0558 01/10/23  0504   AST 67* 52* 37   ALT 57 45 32   BILIDIR 7.6* 8.6* 1.6*   BILITOT 7.9* 9.7* 2.7*   ALKPHOS 1,451* 1,176* 969*     Recent Labs     01/08/23  1645 01/09/23  0558 01/10/23  0504   INR 1.24* 1.26* 1.10     No results for input(s): Char Sandoval in the last 72 hours. No results for input(s): PROCAL in the last 72 hours. Microbiology:      Urinalysis:      Lab Results   Component Value Date/Time    NITRU POSITIVE 01/08/2023 04:30 PM    WBCUA 0-2 01/08/2023 04:30 PM    BACTERIA NONE SEEN 01/08/2023 04:30 PM    RBCUA 0-2 01/08/2023 04:30 PM    BLOODU NEGATIVE 01/08/2023 04:30 PM    SPECGRAV 1.025 01/08/2023 04:30 PM    GLUCOSEU NEGATIVE 11/29/2020 03:45 PM       Radiology:  [address] RUQ   Final Result   Markedly distended gallbladder which contains some stones. There is marked dilatation of the common bile duct and there is some mild intrahepatic biliary ductal dilatation. Sonographic Hartley's sign is positive per the technologist.         **This report has been created using voice recognition software. It may contain minor errors which are inherent in voice recognition technology. **      Final report electronically signed by Dr Paul Aly on 1/8/2023 5:59 PM          DVT prophylaxis: [] Lovenox                                 [x] SCDs                                 [] SQ Heparin                                 [x] Encourage ambulation         [] Already on Anticoagulation     Code Status: Full Code    Tele:   [] yes             [x] no    Active Hospital Problems    Diagnosis Date Noted    Choledocholithiasis [K80.50] 01/09/2023     Priority: Medium    Cholangitis [K83.09] 01/08/2023     Priority: Medium       Electronically signed by Amee Slater MD on 1/10/2023 at 7:54 AM

## 2023-01-10 NOTE — PROGRESS NOTES
ERCP completed, pt tolerated well. Isovue-370 dye lot #[id number]. Expiration date 04/2025. Instilled 15 mL. Wasted 15 mL. Sphincterotomy completed. Balloon sweep completed with stone removal. 12-15mm retrival balloon used. Grounding pad removed. Skin intact. Fluro pictures taken. Scope  used.

## 2023-01-10 NOTE — ANESTHESIA PRE PROCEDURE
Department of Anesthesiology  Preprocedure Note       Name:  Hazel Keys   Age:  27 y.o.  :  1990                                          MRN:  [id number]         Date:  1/10/2023      Surgeon: Edilberto House):  Makayla Kulkarni MD    Procedure: Procedure(s):  ERCP DIAGNOSTIC    Medications prior to admission:   Prior to Admission medications    Medication Sig Start Date End Date Taking? Authorizing Provider   Multiple Vitamins-Minerals (THERAPEUTIC MULTIVITAMIN-MINERALS) tablet Take 1 tablet by mouth daily   Yes Historical Provider, MD   carBAMazepine (TEGRETOL) 200 MG tablet take 1 tablet by mouth twice a day 22   Domonique Myrick MD   lidocaine (LMX) 4 % cream Apply topically 3 times daily as needed    Historical Provider, MD   omeprazole (PRILOSEC) 40 MG delayed release capsule Take 1 capsule by mouth every morning (before breakfast) 22   Van Clonts, APRN - CNP   docusate (COLACE, DULCOLAX) 100 MG CAPS Take 100 mg by mouth 2 times daily  Patient taking differently: Take 100 mg by mouth 2 times daily Pt taking PRN 22   Van Clonts, APRN - CNP   gabapentin (NEURONTIN) 600 MG tablet Take 1 tablet by mouth 3 times daily for 180 days. 9/12/22 3/11/23  Van Clonts, APRN - CNP   sucralfate (CARAFATE) 1 GM tablet Take 1 tablet by mouth 4 times daily  Patient taking differently: Take 1 g by mouth 4 times daily Patient says he usually only takes once daily 22   Van Clonts, APRN - CNP   ondansetron (ZOFRAN) 4 MG tablet Take 1 tablet by mouth 3 times daily as needed for Nausea or Vomiting 22   Van Clonts, APRN - CNP   buprenorphine-naloxone (SUBOXONE) 2-0.5 MG SUBL Place under the tongue. Indications: Opioid Dependence Take 3 tablets under the tounge and allow to dissolve once daily. Per Shannan.  3/24/21      NARCAN 4 MG/0.1ML LIQD nasal spray  3/3/21   Historical Provider, MD       Current medications:    Current Facility-Administered Medications   Medication Dose Route Frequency Provider Last Rate Last Admin    indomethacin (INDOCIN) 50 MG suppository 100 mg  100 mg Rectal Once Ronaldo Martinez MD        sodium chloride flush 0.9 % injection 5-40 mL  5-40 mL IntraVENous 2 times per day Jory Toledo MD   10 mL at 01/10/23 0855    sodium chloride flush 0.9 % injection 10 mL  10 mL IntraVENous PRN Jory Toledo MD        0.9 % sodium chloride infusion   IntraVENous PRN Jory Toledo MD   New Bag at 01/10/23 1623    polyethylene glycol (GLYCOLAX) packet 17 g  17 g Oral Daily PRN Jory Toledo MD        acetaminophen (TYLENOL) tablet 650 mg  650 mg Oral Q6H PRN Jory Toledo MD        Or    acetaminophen (TYLENOL) suppository 650 mg  650 mg Rectal Q6H PRN Jory Toledo MD        carBAMazepine (TEGRETOL) tablet 200 mg  200 mg Oral BID Fausto Rosich, DO   200 mg at 01/10/23 0855    gabapentin (NEURONTIN) tablet 600 mg  600 mg Oral TID Fausto Lou, DO   600 mg at 01/10/23 1325    lidocaine (LMX) 4 % cream   Topical TID PRN Fausto Lou, DO        pantoprazole (PROTONIX) tablet 40 mg  40 mg Oral QAM AC Fausto Lou, DO   40 mg at 01/10/23 0517    ondansetron (ZOFRAN) tablet 4 mg  4 mg Oral TID PRN Al Galarza, DO        sucralfate (CARAFATE) tablet 1 g  1 g Oral 4x Daily Fausto Rosich, DO   1 g at 01/10/23 1326    potassium chloride (KLOR-CON M) extended release tablet 40 mEq  40 mEq Oral PRN Chito Preciado MD   40 mEq at 01/08/23 2230    Or    potassium bicarb-citric acid (EFFER-K) effervescent tablet 40 mEq  40 mEq Oral PRN Chito Preciado MD        Or    potassium chloride 10 mEq/100 mL IVPB (Peripheral Line)  10 mEq IntraVENous PRN Chito Preciado MD        piperacillin-tazobactam (ZOSYN) 3,375 mg in dextrose 5 % 50 mL IVPB extended infusion (mini-bag)  3,375 mg IntraVENous Valdez Zhao MD   Stopped at 01/10/23 1249    ketorolac (TORADOL) injection 30 mg  30 mg IntraVENous Q6H PRN Arielle Hernandez MD   30 mg at 01/09/23 2338    buprenorphine-naloxone (SUBOXONE) 2-0.5 MG SL film 2 Film  2 Film SubLINGual Daily Fausto Carmine, DO   2 Film at 01/10/23 3463     Facility-Administered Medications Ordered in Other Encounters   Medication Dose Route Frequency Provider Last Rate Last Admin    succinylcholine chloride (ANECTINE) injection   IntraVENous PRN Ileene Crate, APRN - CRNA   150 mg at 01/10/23 1627    propofol injection   IntraVENous PRN Ileene Crate, APRN - CRNA   200 mg at 01/10/23 1627       Allergies:     Allergies   Allergen Reactions    Dilantin [Phenytoin Sodium Extended] Hives       Problem List:    Patient Active Problem List   Diagnosis Code    Chronic bilateral low back pain M54.50, G89.29    Seizure disorder (HCC) G40.909    Cigarette nicotine dependence without complication A32.236    Gastroesophageal reflux disease C62.0    Alcoholic liver disease ([address] K70.9    Alcohol abuse F10.10    Opioid dependence on agonist therapy ([address] F11.20    Chronic hepatitis C without hepatic coma ([address] B18.2    Elevated liver enzymes R74.8    Tobacco use Z72.0    History of substance abuse ([address] F19.11    Pharyngoesophageal dysphagia R13.14    Constipation K59.00    Gastritis K29.70    Polysubstance abuse ([address] F19.10    Cholangitis K83.09    Choledocholithiasis K80.50       Past Medical History:        Diagnosis Date    Closed fracture of one rib of left side with routine healing 12/17/2020    Hypertension     Liver laceration, closed, initial encounter 12/17/2020    Multiple closed fractures of pelvis with stable disruption of pelvic ring with routine healing 12/17/2020    Pneumothorax, traumatic     Seizures (HCC)        Past Surgical History:        Procedure Laterality Date    CT GUIDED CHEST TUBE  12/14/2020    CT GUIDED CHEST TUBE 12/14/2020 [facility] CT SCAN    WISDOM TOOTH EXTRACTION         Social History:    Social History     Tobacco Use    Smoking status: Every Day     Packs/day: 0.50     Years: 10.00     Pack years: 5.00     Types: Cigarettes     Start date: 12/29/2010    Smokeless tobacco: Never   Substance Use Topics    Alcohol use: Yes     Alcohol/week: 1.0 standard drink     Types: 1 Cans of beer per week                                Ready to quit: Not Answered  Counseling given: Not Answered      Vital Signs (Current):   Vitals:    01/10/23 0829 01/10/23 0924 01/10/23 1115 01/10/23 1551   BP: 99/62  (!) 91/57 110/68   Pulse: 68  65 70   Resp: 17 18 18 18   Temp: 36.4 °C (97.6 °F)  36.4 °C (97.6 °F) (!) 35.9 °C (96.6 °F)   TempSrc: Axillary  Oral Temporal   SpO2: 100%  100% 100%   Weight:       Height:                                                  BP Readings from Last 3 Encounters:   01/10/23 110/68   11/11/22 112/68   09/14/22 98/62       NPO Status: Time of last liquid consumption: 1330                        Time of last solid consumption: 1200                        Date of last liquid consumption: 01/10/23                        Date of last solid food consumption: 01/08/23    BMI:   Wt Readings from Last 3 Encounters:   01/10/23 155 lb 11.2 oz (70.6 kg)   11/11/22 140 lb (63.5 kg)   11/08/22 140 lb (63.5 kg)     Body mass index is 24.39 kg/m².     CBC:   Lab Results   Component Value Date/Time    WBC 10.8 01/10/2023 05:04 AM    RBC 3.65 01/10/2023 05:04 AM    HGB 11.2 01/10/2023 05:04 AM    HCT 34.7 01/10/2023 05:04 AM    MCV 95.1 01/10/2023 05:04 AM     01/10/2023 05:04 AM       CMP:   Lab Results   Component Value Date/Time     01/10/2023 05:04 AM    K 4.2 01/10/2023 05:04 AM    K 3.9 04/23/2021 09:32 PM     01/10/2023 05:04 AM    CO2 25 01/10/2023 05:04 AM    BUN 9 01/10/2023 05:04 AM    CREATININE 0.5 01/10/2023 05:04 AM    LABGLOM >60 01/10/2023 05:04 AM    GLUCOSE 93 01/10/2023 05:04 AM    PROT 5.7 01/10/2023 05:04 AM    CALCIUM 8.1 01/10/2023 05:04 AM    BILITOT 2.7 01/10/2023 05:04 AM    ALKPHOS 969 01/10/2023 05:04 AM AST 37 01/10/2023 05:04 AM    ALT 32 01/10/2023 05:04 AM       POC Tests: No results for input(s): POCGLU, POCNA, POCK, POCCL, POCBUN, POCHEMO, POCHCT in the last 72 hours. Coags:   Lab Results   Component Value Date/Time    INR 1.10 01/10/2023 05:04 AM       HCG (If Applicable): No results found for: PREGTESTUR, PREGSERUM, HCG, HCGQUANT     ABGs: No results found for: PHART, PO2ART, HAP1ENK, UOY2CRR, BEART, H8RTDCHL     Type & Screen (If Applicable):  Lab Results   Component Value Date    LABRH NEG 12/11/2020       Drug/Infectious Status (If Applicable):  Lab Results   Component Value Date/Time    HEPCAB POSITIVE 01/08/2023 04:45 PM       COVID-19 Screening (If Applicable):   Lab Results   Component Value Date/Time    COVID19 NOT DETECTED 02/11/2021 11:15 PM           Anesthesia Evaluation  Patient summary reviewed and Nursing notes reviewed  Airway: Mallampati: II  TM distance: <3 FB   Neck ROM: full  Mouth opening: > = 3 FB   Dental: normal exam         Pulmonary:Negative Pulmonary ROS and normal exam                               Cardiovascular:    (+) hypertension: no interval change,       ECG reviewed      Echocardiogram reviewed                  Neuro/Psych:   (+) seizures: well controlled,              ROS comment: H/o TBI d/t seizure 2020  GI/Hepatic/Renal:   (+) GERD:, hepatitis: C and B, liver disease:,          ROS comment: Hep C p(possibly B as well: h/o liver laceration. Endo/Other:                     Abdominal:             Vascular: negative vascular ROS. Other Findings:           Anesthesia Plan      general     ASA 2       Induction: intravenous. Anesthetic plan and risks discussed with patient. Use of blood products discussed with patient whom.    Plan discussed with attending.            h/o of polysubstance abuse: clean for 1 year       [place], APRN - CRNA   1/10/2023

## 2023-01-10 NOTE — PLAN OF CARE
Problem: Discharge Planning  Goal: Discharge to home or other facility with appropriate resources  Outcome: Progressing  Discharge to home or other facility with appropriate resources:   Identify barriers to discharge with patient and caregiver   Arrange for needed discharge resources and transportation as appropriate   Identify discharge learning needs (meds, wound care, etc)   Refer to discharge planning if patient needs post-hospital services based on physician order or complex needs related to functional status, cognitive ability or social support system     Problem: Pain  Goal: Verbalizes/displays adequate comfort level or baseline comfort level  Outcome: Progressing  Verbalizes/displays adequate comfort level or baseline comfort level:   Encourage patient to monitor pain and request assistance   Assess pain using appropriate pain scale   Administer analgesics based on type and severity of pain and evaluate response   Implement non-pharmacological measures as appropriate and evaluate response   Consider cultural and social influences on pain and pain management     Problem: Neurosensory - Adult  Goal: Absence of seizures  Outcome: Progressing  Absence of seizures:   Monitor for seizure activity.   If seizure occurs, document type and location of movements and any associated apnea   If seizure occurs, turn head to side and suction secretions as needed   Administer anticonvulsants as ordered   Support airway/breathing, administer oxygen as needed   Diagnostic studies as ordered  Goal: Remains free of injury related to seizures activity  Outcome: Progressing  Remains free of injury related to seizure activity:   Maintain airway, patient safety  and administer oxygen as ordered   Monitor patient for seizure activity, document and report duration and description of seizure to Licensed Independent Practitioner   If seizure occurs, turn patient to side and suction secretions as needed   Reorient patient post seizure   Instruct patient/family to notify RN of any seizure activity   Seizure pads on all 4 side rails   Instruct patient/family to call for assistance with activity based on assessment     Problem: Skin/Tissue Integrity - Adult  Goal: Skin integrity remains intact  Outcome: Progressing  Skin Integrity Remains Intact: Monitor for areas of redness and/or skin breakdown     Problem: Gastrointestinal - Adult  Goal: Minimal or absence of nausea and vomiting  Outcome: Progressing  Minimal or absence of nausea and vomiting:   Administer IV fluids as ordered to ensure adequate hydration   Maintain NPO status until nausea and vomiting are resolved   Administer ordered antiemetic medications as needed   Provide nonpharmacologic comfort measures as appropriate   Advance diet as tolerated, if ordered   Nutrition consult to assist patient with adequate nutrition and appropriate food choices  Goal: Maintains or returns to baseline bowel function  Outcome: Progressing  Maintains or returns to baseline bowel function:   Assess bowel function   Encourage oral fluids to ensure adequate hydration   Administer IV fluids as ordered to ensure adequate hydration   Administer ordered medications as needed   Encourage mobilization and activity   Nutrition consult to assist patient with appropriate food choices  Goal: Maintains adequate nutritional intake  Outcome: Progressing  Maintains adequate nutritional intake:   Monitor percentage of each meal consumed   Identify factors contributing to decreased intake, treat as appropriate   Monitor intake and output, weight and lab values   Obtain nutritional consult as needed     Problem: Metabolic/Fluid and Electrolytes - Adult  Goal: Electrolytes maintained within normal limits  Outcome: Progressing  Electrolytes maintained within normal limits:   Monitor labs and assess patient for signs and symptoms of electrolyte imbalances   Administer electrolyte replacement as ordered   Monitor response to electrolyte replacements, including repeat lab results as appropriate     Care plan reviewed with patient and family. Patient and family verbalize understanding of the plan of care and contribute to goal setting.

## 2023-01-10 NOTE — PROGRESS NOTES
Patient  admitted to endoscopy for a ERCP with Dr. Elena Rodriguez. Procedure verified and consent signed.

## 2023-01-11 LAB
ALBUMIN SERPL-MCNC: 2.7 G/DL (ref 3.5–5.1)
ALP BLD-CCNC: 986 U/L (ref 38–126)
ALT SERPL-CCNC: 26 U/L (ref 11–66)
ANION GAP SERPL CALCULATED.3IONS-SCNC: 16 MEQ/L (ref 8–16)
AST SERPL-CCNC: 25 U/L (ref 5–40)
BILIRUB SERPL-MCNC: 1.8 MG/DL (ref 0.3–1.2)
BILIRUBIN DIRECT: 1.1 MG/DL (ref 0–0.3)
BUN BLDV-MCNC: 6 MG/DL (ref 7–22)
CALCIUM SERPL-MCNC: 8.5 MG/DL (ref 8.5–10.5)
CHLORIDE BLD-SCNC: 104 MEQ/L (ref 98–111)
CO2: 21 MEQ/L (ref 23–33)
CREAT SERPL-MCNC: 0.5 MG/DL (ref 0.4–1.2)
GFR SERPL CREATININE-BSD FRML MDRD: > 60 ML/MIN/1.73M2
GLUCOSE BLD-MCNC: 83 MG/DL (ref 70–108)
POTASSIUM SERPL-SCNC: 3.6 MEQ/L (ref 3.5–5.2)
SODIUM BLD-SCNC: 141 MEQ/L (ref 135–145)
TOTAL PROTEIN: 6.3 G/DL (ref 6.1–8)

## 2023-01-11 PROCEDURE — 6360000002 HC RX W HCPCS: Performed by: STUDENT IN AN ORGANIZED HEALTH CARE EDUCATION/TRAINING PROGRAM

## 2023-01-11 PROCEDURE — 99233 SBSQ HOSP IP/OBS HIGH 50: CPT | Performed by: INTERNAL MEDICINE

## 2023-01-11 PROCEDURE — 6370000000 HC RX 637 (ALT 250 FOR IP): Performed by: STUDENT IN AN ORGANIZED HEALTH CARE EDUCATION/TRAINING PROGRAM

## 2023-01-11 PROCEDURE — 80053 COMPREHEN METABOLIC PANEL: CPT

## 2023-01-11 PROCEDURE — 6370000000 HC RX 637 (ALT 250 FOR IP): Performed by: INTERNAL MEDICINE

## 2023-01-11 PROCEDURE — 82248 BILIRUBIN DIRECT: CPT

## 2023-01-11 PROCEDURE — 6370000000 HC RX 637 (ALT 250 FOR IP)

## 2023-01-11 PROCEDURE — 2580000003 HC RX 258: Performed by: STUDENT IN AN ORGANIZED HEALTH CARE EDUCATION/TRAINING PROGRAM

## 2023-01-11 PROCEDURE — 2060000000 HC ICU INTERMEDIATE R&B

## 2023-01-11 PROCEDURE — 6360000002 HC RX W HCPCS

## 2023-01-11 PROCEDURE — 36415 COLL VENOUS BLD VENIPUNCTURE: CPT

## 2023-01-11 RX ORDER — AMOXICILLIN AND CLAVULANATE POTASSIUM 875; 125 MG/1; MG/1
1 TABLET, FILM COATED ORAL EVERY 12 HOURS SCHEDULED
Status: DISCONTINUED | OUTPATIENT
Start: 2023-01-11 | End: 2023-01-12 | Stop reason: HOSPADM

## 2023-01-11 RX ORDER — LACTOBACILLUS RHAMNOSUS GG 10B CELL
1 CAPSULE ORAL 2 TIMES DAILY WITH MEALS
Status: DISCONTINUED | OUTPATIENT
Start: 2023-01-11 | End: 2023-01-12 | Stop reason: HOSPADM

## 2023-01-11 RX ADMIN — Medication 1 CAPSULE: at 17:15

## 2023-01-11 RX ADMIN — GABAPENTIN 600 MG: 600 TABLET, FILM COATED ORAL at 19:49

## 2023-01-11 RX ADMIN — SODIUM CHLORIDE, PRESERVATIVE FREE 10 ML: 5 INJECTION INTRAVENOUS at 09:21

## 2023-01-11 RX ADMIN — SUCRALFATE 1 G: 1 TABLET ORAL at 17:15

## 2023-01-11 RX ADMIN — CARBAMAZEPINE 200 MG: 200 TABLET ORAL at 09:21

## 2023-01-11 RX ADMIN — PIPERACILLIN AND TAZOBACTAM 3375 MG: 3; .375 INJECTION, POWDER, FOR SOLUTION INTRAVENOUS at 09:25

## 2023-01-11 RX ADMIN — GABAPENTIN 600 MG: 600 TABLET, FILM COATED ORAL at 09:21

## 2023-01-11 RX ADMIN — BUPRENORPHINE AND NALOXONE 2 FILM: 2; .5 FILM BUCCAL; SUBLINGUAL at 09:21

## 2023-01-11 RX ADMIN — AMOXICILLIN AND CLAVULANATE POTASSIUM 1 TABLET: 875; 125 TABLET, FILM COATED ORAL at 21:46

## 2023-01-11 RX ADMIN — CARBAMAZEPINE 200 MG: 200 TABLET ORAL at 19:49

## 2023-01-11 RX ADMIN — SUCRALFATE 1 G: 1 TABLET ORAL at 19:49

## 2023-01-11 RX ADMIN — SUCRALFATE 1 G: 1 TABLET ORAL at 13:22

## 2023-01-11 RX ADMIN — PIPERACILLIN AND TAZOBACTAM 3375 MG: 3; .375 INJECTION, POWDER, FOR SOLUTION INTRAVENOUS at 17:16

## 2023-01-11 RX ADMIN — PANTOPRAZOLE SODIUM 40 MG: 40 TABLET, DELAYED RELEASE ORAL at 06:33

## 2023-01-11 RX ADMIN — SUCRALFATE 1 G: 1 TABLET ORAL at 09:21

## 2023-01-11 RX ADMIN — GABAPENTIN 600 MG: 600 TABLET, FILM COATED ORAL at 13:22

## 2023-01-11 RX ADMIN — PIPERACILLIN AND TAZOBACTAM 3375 MG: 3; .375 INJECTION, POWDER, FOR SOLUTION INTRAVENOUS at 03:46

## 2023-01-11 ASSESSMENT — PAIN SCALES - GENERAL
PAINLEVEL_OUTOF10: 0
PAINLEVEL_OUTOF10: 0
PAINLEVEL_OUTOF10: 3

## 2023-01-11 NOTE — PROGRESS NOTES
Abhinavdarren Debra spoke of his stones and what process the medical community used to help him be relieved of these. He is in good spirits and welcomed prayer. 01/11/23 1425   Encounter Summary   Encounter Overview/Reason  Initial Encounter   Service Provided For: Patient   Referral/Consult From: [facility]   Last Encounter  01/11/23   Complexity of Encounter Low   Encounter    Type Initial Screen/Assessment   Spiritual/Emotional needs   Type Spiritual Support   Assessment/Intervention/Outcome   Assessment Calm;Coping   Intervention Prayer (assurance of)/[place];Nurtured Hope   Outcome Encouraged;Engaged in conversation   Care Plan:  Continue spiritual and emotional care for patient and family. Including prayers.

## 2023-01-11 NOTE — CARE COORDINATION
1/11/23, 1:36 PM EST    DISCHARGE ON GOING EVALUATION    Jaden RIZVI PATRIA [facility] day: 3  Location: -09/009-A Reason for admit: Cholangitis [K83.09]  Choledocholithiasis [K80.50]   Procedure:   Procedure:   1/8 US Gallbladder RUQ Markedly distended gallbladder which contains some stones. There is marked dilatation of the common bile duct and there is some mild intrahepatic biliary ductal dilatation. Sonographic Hartley's sign is positive per the technologist.   1/10 ERCP with Dr. Shaheed Wilkerson  Barriers to Discharge: Pain and nausea control, regular diet, GI following, Suboxone, Protonix, IV Zosyn, Carafate, electrolyte replacement protocols. PCP: HALI Copeland CNP  Readmission Risk Score: 8.2%  Patient Goals/Plan/Treatment Preferences: Plan is to return to [facility], denies needs.

## 2023-01-11 NOTE — PROGRESS NOTES
Gastroenterology  Progress Note    1/11/2023 2:56 PM  Subjective:   Admit Date: 1/8/2023    Interval History:   The patient presented with cholangitis, obstructive jaundice, high bilirubin, alk phosphatase, ALT, AST, abdominal pain,  fever, chills.  1/11/23: Patient had ERCP with sphincterotomy and stone removal yesterday. Patient LFT's are trending down today. Patient tolerating a regular diet. Patient denies abdominal pain. Plan is home tomorrow and OP follow-up with general surgery for cholecystectomy.   Diet: ADULT DIET; Regular    Patient Active Problem List:     Chronic bilateral low back pain     Seizure disorder (HCC)     Cigarette nicotine dependence without complication     Gastroesophageal reflux disease     Alcoholic liver disease (HCC)     Alcohol abuse     Opioid dependence on agonist therapy (HCC)     Chronic hepatitis C without hepatic coma (HCC)     Elevated liver enzymes     Tobacco use     History of substance abuse (HCC)     Pharyngoesophageal dysphagia     Constipation     Gastritis     Polysubstance abuse (HCC)     Cholangitis     Choledocholithiasis        Medications:   Scheduled Meds:   lactobacillus  1 capsule Oral BID WC    indomethacin  100 mg Rectal Once    sodium chloride flush  5-40 mL IntraVENous 2 times per day    carBAMazepine  200 mg Oral BID    gabapentin  600 mg Oral TID    pantoprazole  40 mg Oral QAM AC    sucralfate  1 g Oral 4x Daily    piperacillin-tazobactam  3,375 mg IntraVENous Q8H    buprenorphine-naloxone  2 Film SubLINGual Daily     Continuous Infusions:   sodium chloride       CBC:   Recent Labs     01/08/23  1620 01/09/23  0558 01/10/23  0504   WBC 19.8* 14.3* 10.8   HGB 14.3 12.6* 11.2*    267 291     BMP:    Recent Labs     01/09/23  0558 01/10/23  0504 01/11/23  0637   * 137 141   K 3.8 4.2 3.6   CL 99 101 104   CO2 23 25 21*   BUN 10 9 6*   CREATININE 0.3* 0.5 0.5   GLUCOSE 91 93 83     Hepatic:   Recent Labs     01/09/23  0558 01/10/23  0504  23  0637   AST 52* 37 25   ALT 45 32 26   BILITOT 9.7* 2.7* 1.8*   ALKPHOS 1,176* 969* 986*     INR:   Recent Labs     23  1645 23  0558 01/10/23  0504   INR 1.24* 1.26* 1.10     Xray:   PROCEDURE: US GALLBLADDER RUQ       CLINICAL INFORMATION: 68-year-old male with right upper quadrant abdominal pain and nausea. COMPARISON: Ultrasound 2022       TECHNIQUE: Multiplanar sonographic images were obtained of the structures in the right upper quadrant. FINDINGS:   Gallbladder - 13.27 x 3.87 x 3.10 cm   Gallbladder Wall - 0.21 cm   Common Duct - 1.54 cm   Hartley's Sign: Positive       The liver is of normal echogenicity. No masses are noted. The pancreatic head and body are within normal limits. The tail is not well seen due to overlying bowel gas. The gallbladder is markedly distended. There are some echogenic foci within its lumen which are thought to represent stones. Sonographic Hartley's sign is positive per the technologist. The common bile duct is dilated measuring 1.5 cm. There is mild    intrahepatic biliary ductal dilatation. There is no hydronephrosis in the visualized aspects of the right kidney. Impression   Markedly distended gallbladder which contains some stones. There is marked dilatation of the common bile duct and there is some mild intrahepatic biliary ductal dilatation. Sonographic Hartley's sign is positive per the technologist.       [place] [address]                                 OPERATIVE REPORT     PATIENT NAME: Yas Damon.                 :        1990  MED REC NO:   [id number]                           ROOM:       0009  ACCOUNT NO:   [de-identified]                           ADMIT DATE: 2023  PROVIDER:     AMI Fiore OF PROCEDURE:  01/10/2023     SURGEON:  Giselle Arthur MD     INDICATIONS:  The patient presented with cholangitis, obstructive  jaundice, high bilirubin, alk phosphatase, ALT, AST, abdominal pain,  fever, chills. Plan today for ERCP to evaluate. Currently, was on  broad-spectrum antibiotics. ASA CLASSIFICATION:  Please see Anesthesia note. ESTIMATED BLOOD LOSS:  None. DESCRIPTION OF PROCEDURE:  The patient was brought to the GI lab. Consent was obtained. Risks involved with the procedure were explained  to the patient. Informed consent was obtained. The procedure  performed, an endoscopy in room #14 with guidance of fluoroscopy. Sedation was done by endotracheal intubation by the Anesthesia Service. For medication given during the procedure, for intubation, see  Anesthesia note. After endotracheal intubation, the patient was positioned in prone  position, right side slightly up. A mouthpiece was to put the oral  cavity open. ERCP scope was advanced with no difficulties up to the  duodenum. Papilla identified, appeared to be enlarged. Selective  cannulation of the bile duct was achieved with no difficulty. Contrast  injected. No filling defect was seen. Sphincterotomy was performed in  usual fashion up to  9 mm. The biliary duct had been clearly dilated. Biliary tree was not very well seen and due to the fact that it required   too much filling to fill the bile duct due to significant biliary dilation. After  sphincterectomy, the guidewire introduced in the biliary tree. The  sphincterotome was removed, and balloon with maximum dimension of 15 mm  introduced to the biliary tree so the common bile duct multiple time  seen, the first time small stone coming out. It appeared to be biliary  in nature. Balloon with the guidewire withdrawn, a lot of washing done. Scope was withdrawn with no immediate complications. IMPRESSION:  1. Sphincterotomy. 2.  Stone extraction. 3.  Significant biliary dilatation.   4.  Swept the common bile duct multiple times, small stone was seen and  extracted. PLAN:  1. Keep the patient n.p.o. for few hours. If he is tolerating that  with no discomfort, no fever, no complications, I think diet can be  advanced as tolerated. Repeat lab CBC, CMP tomorrow. 2.  Follow with GI Clinic after discharge for evaluation.           Objective:   Vitals: /65   Pulse 62   Temp 97.9 °F (36.6 °C) (Oral)   Resp 16   Ht 5' 7\" (1.702 m)   Wt 155 lb 11.2 oz (70.6 kg)   SpO2 100%   BMI 24.39 kg/m²     Intake/Output Summary (Last 24 hours) at 1/11/2023 1456  Last data filed at 1/11/2023 3183  Gross per 24 hour   Intake 325.15 ml   Output --   Net 325.15 ml     Weight:  Wt Readings from Last 3 Encounters:   01/10/23 155 lb 11.2 oz (70.6 kg)   11/11/22 140 lb (63.5 kg)   11/08/22 140 lb (63.5 kg)     General appearance: alert and cooperative with exam  Lungs: clear to auscultation bilaterally  Heart: regular rate and rhythm, S1, S2 normal, no murmur, click, rub or gallop  Abdomen: soft, non-tender; bowel sounds normal; no masses,  no organomegaly  Extremities: extremities normal, atraumatic, no cyanosis or edema    Assessment and Plan:   Acute Cholangitis- ERCP with stone removal will F/U as OP with general surgery  Chronic Hepatitis C-stable follows as OP  GERD- history of esophagitis- Continue PPI      Follow up in GI Clinic after discharge in 2 week(s)        HALI Molina - CNP  1/11/2023  2:56 PM     Follow up with Nathanael Aguayo CNP for treatment of hepatitis C    Patient is seen independently from the nurse practitioner and all  the pertinent data along with physical examination and assessment and plans are all obtained by my self and  Laboratory data, Radiology results, medications all are reviewed by my self and care is discussed extensively with the patient  and the patients nurse and all agree with plan and in addition see orders and plans    Electronically signed by Rashid Varma MD on 1/11/2023 at 4:19 PM

## 2023-01-11 NOTE — PROGRESS NOTES
Hospitalist Progress Note       Patient:  Jaden Ma      Unit/Bed:4A-09/009-A    YOB: 1990    MRN: [id number]       Acct: [id number]     PCP: HALI Parra CNP    Date of Admission: 1/8/2023    Assessment/Plan:     Acute Ascending Cholangitis, Grade II-Moderate, ?Sepsis: SIRS 2/4 (tachycardia and leukocytosis). Jaundice, RUQ tenderness, and positive Hartley's sign on exam. Afebrile. RUQ US showed distended gallbladder w/stones. Common bile duct was dilated at 1.54 cm w/sonographic Hartley's sign. Started on Zosyn 4.5g. S/p 2L NaCl bolus given in ED. Blood cultures x2 ordered. LR at 100 mL/hr for 12 hours. GI (Dr. Dumont) consulted from ED. Does not have all 3 findings in the typical Charcot triad, but has Jaundice and RUQ pain (subjective fevers at home, but no objective fevers). Lactic acid wnl.   Pt fits diagnostic criteria of Acute cholangitis via: >1 sign of inflammation (chills/rigors, ?fever, Leukocytosis, CRP ordered and pending) + >1 sign of Cholestasis (Jaundice, direct hyperbilirubinemia, elevated ALP) + Imaging (showing biliary dilatation and Choledocholithiasis), GGT 1908 and CRP 22.07  Tbili and Dbili have dropped down significantly - 2.7 / 1.6 respectively (1/10)  INR mildly elevated and no signs of LYRIC - moderate severity of cholangitis   Should have biliary drainage w/in 24-48 hrs of admission  ERCP and stone removal +/- CCY (generally w/in 6-weeks after resolution of acute symptoms)  Will defer CCY decision to GI in consort w/Gen Surg - will send referral to Gen Surg as OP  Blood cultures x 2 taken prior to Abx initiation   C/w Broad spectrum Abx at this time   UA shows pt likely dehydrated, Ketones 15, (+) nitrites, trace LE, no bacteria seen, 0-2 WBCs, objectively does not look like UTI; (+) Ictotest indicating Bilirubinuria   GI - ERCP on 1/10/23 - POD #1 s/p ERCP w/sphincterotomy, stone extraction and CBD sweep  Pt tolerated the procedure well - held Diet 4-6  hours after procedure and diet has been advanced (1/11/23) - will assess how pt tolerates   Denies any pain a/w the procedure   Will need OP f/u closely w/GI  Chronic Hepatitis C, ? Hep-B co-infection: On 7/2/21 the Hepatitis A ab was reactive, indicating immunity. The serum Fe was elevated at 386 with a ferritin of 792. GGT was 1584, , , TB 1.8, DB 0.8, and Hepatitis BsAG not confirmed. Due to the concern for possible Hepatitis B co-infection, the pt was referred to Dr. Darryle Common (I.D. specialist). Genotype 3a, last viral load 7/2021 was 1,079,043. Current objective findings - AST 67-52, ALT 57-45, Hepatitis panel (+) for Hep C Ab, otherwise negative for others - Daily CMP, INR  GI consulted as stated above   Hx of Polysubstance abuse / Opioid dependence: Hx of IV drug use - Heroin / Fentanyl; EtOH use, ~ 1year clean and currently receiving Suboxone maintenance therapy at [facility]. UDS (-). Pt currently lives at [place] [address]. EtOH levels unremarkable. C/w Suboxone 2-film SLG daily - pharmacy checking on accuracy of dosing (please see their note from 1/9/23 in regard to [place] vs. Pt stated dosing)  Seizure disorder: Seizures since 11-yo. Last event was ~2 years ago. MRI brain performed 11/8/2022 was reviewed, it showed punctate nonspecific area of increased signal intensity in the periventricular white matter adjacent to the atrium of the left lateral ventricle. See Dr. Eliel Segundo OP and on Tegretol 200mg BID. Seizure precautions  C/w Tegretol 200mg BID   GERD w/o Esophagitis: Has been having issues for very long time. Has seen OP GI. Home medications include Prilosec 40mg daily /Sucralfate 1g 4 times daily.    C/w Protonix 40mg daily + Carafate 1g 4x daily    Expected discharge date: 1/12/23    Disposition:    [] Home       [] TCU       [] Rehab       [] Psych       [] SNF       [] [place]       [x] Other- [place] [address]    Chief Complaint: Abdominal Pain    EDY CAMARENA MANNIE - HUMACAO Course:     32-y.o. M w/significant PMHx of Chronic Hepatitis C (follows OP w/Dr. Andrew Nelson; Genotype 3a, last viral load 7/2021 was 3,855,864; was referred to Dr. 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 Described pain as sharp, diffuse throughout abdomen, but worse in RUQ, and radiates to back. Pain has been on/off for a few months, but has become constant two days ago. Associated w/nausea, diarrhea, chills, and Jaundice. Pt has hx of polysubstance abuse, alcoholism, and opioid abuse but says he has been clean for the past 1 year. Lives at [place] [address]. Pt was admitted to stepdown unit for cholangitis. ED course: Afebrile, RR 16 w/SpO2 99% on RA, /80, . Labs remarkable for mildly low NA and K, Osm 264.8, Albumin 3.4, ALP 1,451, AST/ALT 67/57, Direct hyperbilirubinemia w/Tbili 7.9 and Dbili 7.6, Lipase 279.8. UDS (-). Leukocytosis w/WBC 19.8 w/left shift, INR 1.24. Acute hep panel (+) Hep C Ab, UA discussed in A/P, but unremarkable for UTI. U/S GB Markedly distended gallbladder which contains some stones. There is marked dilatation of the common bile duct and there is some mild intrahepatic biliary ductal dilatation. Sonographic Hartley's sign is positive. EKG NSR. Pt given LR boluses, broad spectrum Abx, and sent for admission. Subjective (past 24 hours):      Pt seen and examined at bedside in NAD. Pt has minimal-to-no pain and is tolerating diet well this AM w/no issues. Tbili/Dbili trending down (1.8/1.1). Will advance diet today and make sure he is doing well and pain free.      Medications:  Reviewed    Infusion Medications    sodium chloride       Scheduled Medications indomethacin  100 mg Rectal Once    sodium chloride flush  5-40 mL IntraVENous 2 times per day    carBAMazepine  200 mg Oral BID    gabapentin  600 mg Oral TID    pantoprazole  40 mg Oral QAM AC    sucralfate  1 g Oral 4x Daily    piperacillin-tazobactam  3,375 mg IntraVENous Q8H    buprenorphine-naloxone  2 Film SubLINGual Daily     PRN Meds: sodium chloride flush, sodium chloride, polyethylene glycol, acetaminophen **OR** acetaminophen, lidocaine, ondansetron, potassium chloride **OR** potassium alternative oral replacement **OR** potassium chloride, ketorolac      Intake/Output Summary (Last 24 hours) at 1/11/2023 0746  Last data filed at 1/10/2023 2236  Gross per 24 hour   Intake 85.15 ml   Output --   Net 85.15 ml       Diet:  ADULT DIET; Regular    Exam:  BP 98/70   Pulse 70   Temp 97.7 °F (36.5 °C) (Oral)   Resp 14   Ht 5' 7\" (1.702 m)   Wt 155 lb 11.2 oz (70.6 kg)   SpO2 100%   BMI 24.39 kg/m²     General appearance: No apparent distress, appears stated age. Eyes:  No scleral icterus eyes reactive to light   HENT: Head normal in appearance. External nares normal.  Oral mucosa moist without lesions. Hearing grossly intact. Neck: Supple, with full range of motion. Trachea midline. No gross JVD appreciated. Respiratory:  Normal respiratory effort. Clear to auscultation, bilaterally without rales or wheezes or rhonchi. Cardiovascular: Normal rate, regular rhythm with normal S1/S2 without murmurs. No lower extremity edema. Abdomen: Minimal TTP in RUQ No guarding. No masses. Musculoskeletal: No joint swelling or tenderness. Normal tone. No abnormal movements. Skin: Warm and dry. No rashes or lesions. Neurologic:  No focal sensory/motor deficits in the upper and lower extremities. Cranial nerves:  grossly non-focal 2-12. Psychiatric: Alert and oriented, normal insight and thought content. Capillary Refill: Brisk,< 3 seconds.   Peripheral Pulses: +2 palpable, equal bilaterally      Labs:   Recent Labs     01/08/23  1620 01/09/23  0558 01/10/23  0504   WBC 19.8* 14.3* 10.8   HGB 14.3 12.6* 11.2*   HCT 43.2 37.6* 34.7*    267 291     Recent Labs     01/09/23  0558 01/10/23  0504 01/11/23  0637   * 137 141   K 3.8 4.2 3.6   CL 99 101 104   CO2 23 25 21*   BUN 10 9 6*   CREATININE 0.3* 0.5 0.5   CALCIUM 8.5 8.1* 8.5     Recent Labs     01/09/23  0558 01/10/23  0504 01/11/23  0637   AST 52* 37 25   ALT 45 32 26   BILIDIR 8.6* 1.6* 1.1*   BILITOT 9.7* 2.7* 1.8*   ALKPHOS 1,176* 969* 986*     Recent Labs     01/08/23  1645 01/09/23  0558 01/10/23  0504   INR 1.24* 1.26* 1.10     No results for input(s): Cristela Hollow in the last 72 hours. No results for input(s): PROCAL in the last 72 hours. Microbiology:      Urinalysis:      Lab Results   Component Value Date/Time    NITRU POSITIVE 01/08/2023 04:30 PM    WBCUA 0-2 01/08/2023 04:30 PM    BACTERIA NONE SEEN 01/08/2023 04:30 PM    RBCUA 0-2 01/08/2023 04:30 PM    BLOODU NEGATIVE 01/08/2023 04:30 PM    SPECGRAV 1.025 01/08/2023 04:30 PM    GLUCOSEU NEGATIVE 11/29/2020 03:45 PM       Radiology:  [address] RUQ   Final Result   Markedly distended gallbladder which contains some stones. There is marked dilatation of the common bile duct and there is some mild intrahepatic biliary ductal dilatation. Sonographic Hartley's sign is positive per the technologist.         **This report has been created using voice recognition software. It may contain minor errors which are inherent in voice recognition technology. **      Final report electronically signed by Dr Anel Maria on 1/8/2023 5:59 PM      FL ERCP BILIARY S&I    (Results Pending)       DVT prophylaxis: [] Lovenox                                 [x] SCDs                                 [] SQ Heparin                                 [x] Encourage ambulation         [] Already on Anticoagulation     Code Status: Full Code    Tele:   [] yes             [x] no    Active Hospital Problems    Diagnosis Date Noted    Choledocholithiasis [K80.50] 01/09/2023     Priority: Medium    Cholangitis [K83.09] 01/08/2023     Priority: Medium       Electronically signed by Gualberto Fan MD on 1/11/2023 at 7:46 AM

## 2023-01-11 NOTE — PLAN OF CARE
Problem: Discharge Planning  Goal: Discharge to home or other facility with appropriate resources  1/11/2023 1747 by Juanjo Moore RN  Outcome: Progressing  Flowsheets (Taken 1/11/2023 1747)  Discharge to home or other facility with appropriate resources:   Identify barriers to discharge with patient and caregiver   Arrange for needed discharge resources and transportation as appropriate   Identify discharge learning needs (meds, wound care, etc)   Arrange for interpreters to assist at discharge as needed   Refer to discharge planning if patient needs post-hospital services based on physician order or complex needs related to functional status, cognitive ability or social support system       Problem: Pain  Goal: Verbalizes/displays adequate comfort level or baseline comfort level  1/11/2023 1747 by Juanjo Moore RN  Outcome: Progressing  Flowsheets (Taken 1/11/2023 1747)  Verbalizes/displays adequate comfort level or baseline comfort level:   Encourage patient to monitor pain and request assistance   Assess pain using appropriate pain scale   Administer analgesics based on type and severity of pain and evaluate response   Implement non-pharmacological measures as appropriate and evaluate response   Consider cultural and social influences on pain and pain management   Notify Licensed Independent Practitioner if interventions unsuccessful or patient reports new pain    Problem: Neurosensory - Adult  Goal: Absence of seizures  1/11/2023 1747 by Juanjo Moore RN  Outcome: Progressing  Flowsheets (Taken 1/11/2023 1747)  Absence of seizures:   Monitor for seizure activity.   If seizure occurs, document type and location of movements and any associated apnea   If seizure occurs, turn head to side and suction secretions as needed   Administer anticonvulsants as ordered   Support airway/breathing, administer oxygen as needed   Diagnostic studies as ordered    Goal: Remains free of injury related to seizures activity  1/11/2023 1747 by Valeri Bolanos RN  Outcome: Progressing  Flowsheets (Taken 1/11/2023 1747)  Remains free of injury related to seizure activity:   Maintain airway, patient safety  and administer oxygen as ordered   Monitor patient for seizure activity, document and report duration and description of seizure to Licensed Independent Practitioner   If seizure occurs, turn patient to side and suction secretions as needed   Reorient patient post seizure   Seizure pads on all 4 side rails   Instruct patient/family to notify RN of any seizure activity       Problem: Skin/Tissue Integrity - Adult  Goal: Skin integrity remains intact  1/11/2023 1747 by Valeri Bolanos RN  Outcome: Progressing  Flowsheets (Taken 1/11/2023 1747)  Skin Integrity Remains Intact: Monitor for areas of redness and/or skin breakdown       Problem: Gastrointestinal - Adult  Goal: Minimal or absence of nausea and vomiting  1/11/2023 1747 by Valeri Bolanos RN  Outcome: Progressing  Flowsheets (Taken 1/11/2023 1747)  Minimal or absence of nausea and vomiting:   Administer IV fluids as ordered to ensure adequate hydration   Provide nonpharmacologic comfort measures as appropriate   Nutrition consult to assist patient with adequate nutrition and appropriate food choices   Administer ordered antiemetic medications as needed   Advance diet as tolerated, if ordered    Goal: Maintains or returns to baseline bowel function  1/11/2023 1747 by Valeri Bolanos RN  Outcome: Progressing  Flowsheets (Taken 1/11/2023 1747)  Maintains or returns to baseline bowel function:   Assess bowel function   Administer IV fluids as ordered to ensure adequate hydration   Encourage mobilization and activity   Encourage oral fluids to ensure adequate hydration   Administer ordered medications as needed   Nutrition consult to assist patient with appropriate food choices    Goal: Maintains adequate nutritional intake  1/11/2023 1747 by Valeri Bolanos RN  Outcome: Progressing  Flowsheets (Taken 1/11/2023 1747)  Maintains adequate nutritional intake:   Monitor percentage of each meal consumed   Assist with meals as needed   Monitor intake and output, weight and lab values   Obtain nutritional consult as needed   Identify factors contributing to decreased intake, treat as appropriate       Problem: Metabolic/Fluid and Electrolytes - Adult  Goal: Electrolytes maintained within normal limits  1/11/2023 1747 by Joseph Barlow RN  Outcome: Progressing  Flowsheets (Taken 1/11/2023 1747)  Electrolytes maintained within normal limits:   Monitor labs and assess patient for signs and symptoms of electrolyte imbalances   Administer electrolyte replacement as ordered   Monitor response to electrolyte replacements, including repeat lab results as appropriate   Instruct patient on fluid and nutrition restrictions as appropriate    Care plan reviewed with patient. Patient verbalizes understanding of the plan of care and contributes to goal setting.

## 2023-01-11 NOTE — OP NOTE
[address] [place] [address]                                OPERATIVE REPORT    PATIENT NAME: Maurice Nathan.                 :        1990  MED REC NO:   [id number]                           ROOM:       0009  ACCOUNT NO:   [de-identified]                           ADMIT DATE: 2023  PROVIDER:     AMI Solis Lacy OF PROCEDURE:  01/10/2023    SURGEON:  Kacey Haywood MD    INDICATIONS:  The patient presented with cholangitis, obstructive  jaundice, high bilirubin, alk phosphatase, ALT, AST, abdominal pain,  fever, chills. Plan today for ERCP to evaluate. Currently, was on  broad-spectrum antibiotics. ASA CLASSIFICATION:  Please see Anesthesia note. ESTIMATED BLOOD LOSS:  None. DESCRIPTION OF PROCEDURE:  The patient was brought to the GI lab. Consent was obtained. Risks involved with the procedure were explained  to the patient. Informed consent was obtained. The procedure  performed, an endoscopy in room #14 with guidance of fluoroscopy. Sedation was done by endotracheal intubation by the Anesthesia Service. For medication given during the procedure, for intubation, see  Anesthesia note. After endotracheal intubation, the patient was positioned in prone  position, right side slightly up. A mouthpiece was to put the oral  cavity open. ERCP scope was advanced with no difficulties up to the  duodenum. Papilla identified, appeared to be enlarged. Selective  cannulation of the bile duct was achieved with no difficulty. Contrast  injected. No filling defect was seen. Sphincterotomy was performed in  usual fashion dure to documentation of obstructive jaundice up to 9 mm. The biliary duct had been clearly dilated. Biliary tree was not very  well seen and due to the fact that it required too much filling to fill  the bile duct due to significant biliary dilation.   After  sphincterectomy, the guidewire introduced in the biliary tree. The  sphincterotome was removed, and balloon with maximum dimension of 15 mm  introduced to the biliary tree so the common bile duct multiple time  seen, the first time small stone coming out. It appeared to be biliary  in nature. Balloon with the guidewire withdrawn, a lot of washing done. Scope was withdrawn with no immediate complications. IMPRESSION:  1. Sphincterotomy. 2.  Stone extraction. 3.  Significant biliary dilatation. 4.  Swept the common bile duct multiple times, small stone was seen and  extracted. PLAN:  1. Keep the patient n.p.o. for few hours. If he is tolerating that  with no discomfort, no fever, no complications, I think diet can be  advanced as tolerated. Repeat lab CBC, CMP tomorrow. 2.  Follow with GI Clinic after discharge for evaluation.         Robin Hernandez M.D.    D: 01/10/2023 17:23:52       T: 01/11/2023 0:18:28     [email or web address]  Job#: [id number]     Doc#: [id number]    CC:  Nas Trivedi CNP

## 2023-01-11 NOTE — ANESTHESIA POSTPROCEDURE EVALUATION
Department of Anesthesiology  Postprocedure Note    Patient: Hazel Keys  MRN: [id number]  YOB: 1990  Date of evaluation: 1/10/2023      Procedure Summary     Date: 01/10/23 Room / Location: [facility] / [address] [place] [address]    Anesthesia Start: 1623 Anesthesia Stop: 2281    Procedures:       ERCP SPHINCTER/PAPILLOTOMY      ERCP STONE REMOVAL Diagnosis:       Cholangitis      (Cholangitis [K83.09])    Surgeons: Makayla Kulkarni MD Responsible Provider: Tu Jones MD    Anesthesia Type: general ASA Status: 2          Anesthesia Type: No value filed.     Celia Phase I: Celia Score: 10    Celia Phase II:        Anesthesia Post Evaluation    Patient location during evaluation: PACU  Patient participation: complete - patient participated  Level of consciousness: awake and alert  Airway patency: patent  Nausea & Vomiting: no nausea  Complications: no  Cardiovascular status: blood pressure returned to baseline and hemodynamically stable  Respiratory status: acceptable and spontaneous ventilation  Hydration status: euvolemic

## 2023-01-11 NOTE — CARE COORDINATION
1/11/23, 2:47 PM EST    DISCHARGE PLANNING EVALUATION    Spoke with Juju Skaggs about discharge plan. He continues to plan to return to the [facility]. He asked that SW contact parol officer regarding stay. Called and spoke with Karen Hernandez [phone] [facility] Officer.

## 2023-01-12 VITALS
HEART RATE: 85 BPM | OXYGEN SATURATION: 100 % | BODY MASS INDEX: 24.44 KG/M2 | WEIGHT: 155.7 LBS | HEIGHT: 67 IN | DIASTOLIC BLOOD PRESSURE: 87 MMHG | RESPIRATION RATE: 16 BRPM | TEMPERATURE: 97.7 F | SYSTOLIC BLOOD PRESSURE: 124 MMHG

## 2023-01-12 PROCEDURE — 99239 HOSP IP/OBS DSCHRG MGMT >30: CPT | Performed by: INTERNAL MEDICINE

## 2023-01-12 PROCEDURE — 6370000000 HC RX 637 (ALT 250 FOR IP): Performed by: INTERNAL MEDICINE

## 2023-01-12 PROCEDURE — 6370000000 HC RX 637 (ALT 250 FOR IP): Performed by: STUDENT IN AN ORGANIZED HEALTH CARE EDUCATION/TRAINING PROGRAM

## 2023-01-12 PROCEDURE — 6370000000 HC RX 637 (ALT 250 FOR IP)

## 2023-01-12 PROCEDURE — 6360000002 HC RX W HCPCS

## 2023-01-12 RX ORDER — LACTOBACILLUS RHAMNOSUS GG 10B CELL
1 CAPSULE ORAL 2 TIMES DAILY WITH MEALS
Qty: 20 CAPSULE | Refills: 0 | Status: SHIPPED | OUTPATIENT
Start: 2023-01-12

## 2023-01-12 RX ORDER — AMOXICILLIN AND CLAVULANATE POTASSIUM 875; 125 MG/1; MG/1
1 TABLET, FILM COATED ORAL EVERY 12 HOURS SCHEDULED
Qty: 19 TABLET | Refills: 0 | Status: SHIPPED | OUTPATIENT
Start: 2023-01-12 | End: 2023-01-22

## 2023-01-12 RX ADMIN — SUCRALFATE 1 G: 1 TABLET ORAL at 08:45

## 2023-01-12 RX ADMIN — PANTOPRAZOLE SODIUM 40 MG: 40 TABLET, DELAYED RELEASE ORAL at 07:20

## 2023-01-12 RX ADMIN — BUPRENORPHINE AND NALOXONE 2 FILM: 2; .5 FILM BUCCAL; SUBLINGUAL at 08:41

## 2023-01-12 RX ADMIN — GABAPENTIN 600 MG: 600 TABLET, FILM COATED ORAL at 08:45

## 2023-01-12 RX ADMIN — CARBAMAZEPINE 200 MG: 200 TABLET ORAL at 08:45

## 2023-01-12 RX ADMIN — POTASSIUM BICARBONATE 20 MEQ: 782 TABLET, EFFERVESCENT ORAL at 08:41

## 2023-01-12 RX ADMIN — Medication 1 CAPSULE: at 08:40

## 2023-01-12 RX ADMIN — AMOXICILLIN AND CLAVULANATE POTASSIUM 1 TABLET: 875; 125 TABLET, FILM COATED ORAL at 08:45

## 2023-01-12 ASSESSMENT — PAIN SCALES - GENERAL: PAINLEVEL_OUTOF10: 0

## 2023-01-12 NOTE — PLAN OF CARE
Problem: Discharge Planning  Goal: Discharge to home or other facility with appropriate resources  1/11/2023 2122 by Chapo Mahmood RN  Outcome: Catie Huffman (Taken 1/11/2023 1947)  Discharge to home or other facility with appropriate resources: Identify barriers to discharge with patient and caregiver     Problem: Pain  Goal: Verbalizes/displays adequate comfort level or baseline comfort level  1/11/2023 2122 by Chapo Mahmood RN  Outcome: Adequate for Discharge     Problem: Neurosensory - Adult  Goal: Absence of seizures  1/11/2023 2122 by Chapo Mahmood RN  Outcome: Progressing  Flowsheets (Taken 1/11/2023 1947)  Absence of seizures:   Monitor for seizure activity.   If seizure occurs, document type and location of movements and any associated apnea   If seizure occurs, turn head to side and suction secretions as needed     Problem: Neurosensory - Adult  Goal: Remains free of injury related to seizures activity  1/11/2023 2122 by Chapo Mahmood RN  Outcome: Catie Huffman (Taken 1/11/2023 1947)  Remains free of injury related to seizure activity:   Maintain airway, patient safety  and administer oxygen as ordered   Monitor patient for seizure activity, document and report duration and description of seizure to Licensed Independent Practitioner     Problem: Skin/Tissue Integrity - Adult  Goal: Skin integrity remains intact  1/11/2023 2122 by Chapo Mahmood RN  Outcome: Progressing     Problem: Gastrointestinal - Adult  Goal: Minimal or absence of nausea and vomiting  1/11/2023 2122 by Chapo Mahmood RN  Outcome: Progressing     Problem: Gastrointestinal - Adult  Goal: Maintains adequate nutritional intake  1/11/2023 2122 by Chapo Mahmood RN  Outcome: Progressing  Flowsheets (Taken 1/11/2023 1947)  Maintains adequate nutritional intake: Monitor percentage of each meal consumed     Problem: Metabolic/Fluid and Electrolytes - Adult  Goal: Electrolytes maintained within normal limits  1/11/2023 2122 by Sudeep Coughlin RN  Outcome: Progressing  Flowsheets (Taken 1/11/2023 1947)  Electrolytes maintained within normal limits: Monitor labs and assess patient for signs and symptoms of electrolyte imbalances    Care plan reviewed with patient. Patient verbalizes understanding of the plan of care and contributed to goal setting.

## 2023-01-12 NOTE — DISCHARGE SUMMARY
Discharge Summary     Patient Identification:  Dee Bang  : 1990  MRN: [id number]   Account: [de-identified]     Admit date: 2023  Discharge date: 23   Attending provider: No att. providers found        Primary care provider: Fadi Tabares APRN - CNP     Discharge Diagnoses:   Principal Problem:    Cholangitis  Active Problems:    Choledocholithiasis  Resolved Problems:    * No resolved hospital problems. *    Acute Ascending Cholangitis, Grade II-Moderate, ? Sepsis: SIRS 2/4 (tachycardia and leukocytosis). Jaundice, RUQ tenderness, and positive Hartley's sign on exam. Afebrile. RUQ US showed distended gallbladder w/stones. Common bile duct was dilated at 1.54 cm w/sonographic Hartley's sign. Started on Zosyn 4.5g. S/p 2L NaCl bolus given in ED. Blood cultures x2 ordered. LR at 100 mL/hr for 12 hours. GI (Dr. Dorothy Black) consulted from ED. Does not have all 3 findings in the typical Charcot triad, but has Jaundice and RUQ pain (subjective fevers at home, but no objective fevers). Lactic acid wnl. Pt fits diagnostic criteria of Acute cholangitis via: >1 sign of inflammation (chills/rigors, ?fever, Leukocytosis, CRP ordered and pending) + >1 sign of Cholestasis (Jaundice, direct hyperbilirubinemia, elevated ALP) + Imaging (showing biliary dilatation and Choledocholithiasis), GGT 1908 and CRP 22.07. Tbili and Dbili have dropped down significantly - 2.7 / 1.6 respectively (1/10). INR mildly elevated and no signs of LYRIC - moderate severity of cholangitis. Should have biliary drainage w/in 24-48 hrs of admission. ERCP and stone removal +/- CCY (generally w/in 6-weeks after resolution of acute symptoms).    Will defer CCY decision to GI in consort w/Gen Surg - will send referral to Gen Surg as OP  Blood cultures x 2 taken prior to Abx initiation - NGTD  UA shows pt likely dehydrated, Ketones 15, (+) nitrites, trace LE, no bacteria seen, 0-2 WBCs, objectively does not look like UTI; (+) Ictotest indicating Bilirubinuria   GI - ERCP on 1/10/23 - POD #2 s/p ERCP w/sphincterotomy, stone extraction and CBD sweep - no pain and tolerating regular low fat diet  Due to presenting symptoms and need for Abx coverage, pt will be given Augmentin PO for a total of 10-days   Will give adequate coverage of intra-abdominal organisms   CBC and CMP w/in 1 week of discharge   Chronic Hepatitis C, ? Hep-B co-infection: On 7/2/21 the Hepatitis A ab was reactive, indicating immunity. The serum Fe was elevated at 386 with a ferritin of 792. GGT was 1584, , , TB 1.8, DB 0.8, and Hepatitis BsAG not confirmed. Due to the concern for possible Hepatitis B co-infection, the pt was referred to Dr. Martín Shankar (I.D. specialist). Genotype 3a, last viral load 7/2021 was 1,079,043. Current objective findings - AST 67-52, ALT 57-45, Hepatitis panel (+) for Hep C Ab, otherwise negative for others - Daily CMP, INR  Hx of Polysubstance abuse / Opioid dependence: Hx of IV drug use - Heroin / Fentanyl; EtOH use, ~ 1year clean and currently receiving Suboxone maintenance therapy at [address] [place] [address]. UDS (-). Pt currently lives at [place] [address]. EtOH levels unremarkable. C/w Suboxone 2-film SLG daily - pharmacy checking on accuracy of dosing (please see their note from 1/9/23 in regard to [facility] vs. Pt stated dosing)  Seizure disorder: Seizures since 11-yo. Last event was ~2 years ago. MRI brain performed 11/8/2022 was reviewed, it showed punctate nonspecific area of increased signal intensity in the periventricular white matter adjacent to the atrium of the left lateral ventricle. See Dr. Cecilio Lockhart OP and on Tegretol 200mg BID. Seizure precautions  C/w Tegretol 200mg BID   GERD w/o Esophagitis: Has been having issues for very long time. Has seen OP GI. Home medications include Prilosec 40mg daily /Sucralfate 1g 4 times daily.       Hospital Course:   32-y.o. M w/significant PMHx of Chronic Hepatitis C (follows OP w/Dr. Caesar Mendes; Genotype 3a, last viral load 7/2021 was 1,079,043; was referred to Dr. 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 Described pain as sharp, diffuse throughout abdomen, but worse in RUQ, and radiates to back. Pain has been on/off for a few months, but has become constant two days ago. Associated w/nausea, diarrhea, chills, and Jaundice. Pt has hx of polysubstance abuse, alcoholism, and opioid abuse but says he has been clean for the past 1 year. Lives at [place] [address]. Pt was admitted to stepdown unit for cholangitis. ED course: Afebrile, RR 16 w/SpO2 99% on RA, /80, . Labs remarkable for mildly low NA and K, Osm 264.8, Albumin 3.4, ALP 1,451, AST/ALT 67/57, Direct hyperbilirubinemia w/Tbili 7.9 and Dbili 7.6, Lipase 279.8. UDS (-). Leukocytosis w/WBC 19.8 w/left shift, INR 1.24. Acute hep panel (+) Hep C Ab, UA discussed in A/P, but unremarkable for UTI. U/S GB Markedly distended gallbladder which contains some stones. There is marked dilatation of the common bile duct and there is some mild intrahepatic biliary ductal dilatation. Sonographic Hartley's sign is positive. EKG NSR. Pt given LR boluses, broad spectrum Abx, and sent for admission.     Discharge Medications:     Medication List        START taking these medications      amoxicillin-clavulanate 875-125 MG per tablet  Commonly known as: AUGMENTIN  Take 1 tablet by mouth every 12 hours for 19 doses     lactobacillus capsule  Take 1 capsule by mouth 2 times daily (with meals)            CONTINUE taking these medications      buprenorphine-naloxone 2-0.5 MG Subl  Commonly known as: SUBOXONE     carBAMazepine 200 MG tablet  Commonly known as: TEGRETOL  take 1 tablet by mouth twice a day     gabapentin 600 MG tablet  Commonly known as: Neurontin  Take 1 tablet by mouth 3 times daily for 180 days. lidocaine 4 % cream  Commonly known as: LMX     Narcan 4 MG/0.1ML Liqd nasal spray  Generic drug: naloxone     omeprazole 40 MG delayed release capsule  Commonly known as: PRILOSEC  Take 1 capsule by mouth every morning (before breakfast)     ondansetron 4 MG tablet  Commonly known as: ZOFRAN  Take 1 tablet by mouth 3 times daily as needed for Nausea or Vomiting     therapeutic multivitamin-minerals tablet            ASK your doctor about these medications      docusate 100 MG Caps  Commonly known as: COLACE, DULCOLAX  Take 100 mg by mouth 2 times daily     sucralfate 1 GM tablet  Commonly known as: Carafate  Take 1 tablet by mouth 4 times daily               Where to Get Your Medications        These medications were sent to [facility] [address], [address]  [address] [place] [address], [address] [address]      Phone: [phone]   amoxicillin-clavulanate 875-125 MG per tablet  lactobacillus capsule         Patient Instructions:    Discharge lab work: CBC and CMP  Diet: ADULT DIET; Regular    Code Status: Full Code    Follow-up visits:   No follow-up provider specified.      Examination:  Vitals:  Vitals:    01/11/23 1524 01/11/23 1947 01/12/23 0312 01/12/23 0839   BP: 103/82 104/71 106/72 124/87   Pulse: 65 68 55 85   Resp: 16 14  16   Temp: 98.4 °F (36.9 °C) 97.9 °F (36.6 °C)  97.7 °F (36.5 °C)   TempSrc: Oral Oral  Oral   SpO2: 99% 99% 99% 100%   Weight:       Height:         Weight: Weight: 155 lb 11.2 oz (70.6 kg)     24 hour intake/output:  Intake/Output Summary (Last 24 hours) at 1/12/2023 1106  Last data filed at 1/11/2023 1714  Gross per 24 hour   Intake 80.03 ml   Output --   Net 80.03 ml       General appearance - alert, well appearing, and in no distress  Chest - clear to auscultation, no wheezes, rales or rhonchi, symmetric air entry  Heart - normal rate, regular rhythm, normal S1, S2, no murmurs, rubs, clicks or gallops  Abdomen - soft, nontender, nondistended, no masses or organomegaly  Obese: No; Protuberant: No   Neurological - alert, oriented, normal speech, no focal findings or movement disorder noted  Extremities - peripheral pulses normal, no pedal edema, no clubbing or cyanosis  Skin - normal coloration and turgor, no rashes, no suspicious skin lesions noted    Significant Diagnostics:   Radiology: US GALLBLADDER RUQ    Result Date: 1/8/2023  PROCEDURE: US GALLBLADDER RUQ CLINICAL INFORMATION: 64-year-old male with right upper quadrant abdominal pain and nausea. COMPARISON: Ultrasound 12/1/2022 TECHNIQUE: Multiplanar sonographic images were obtained of the structures in the right upper quadrant. FINDINGS: Gallbladder - 13.27 x 3.87 x 3.10 cm Gallbladder Wall - 0.21 cm Common Duct - 1.54 cm Hartley's Sign: Positive The liver is of normal echogenicity. No masses are noted. The pancreatic head and body are within normal limits. The tail is not well seen due to overlying bowel gas. The gallbladder is markedly distended. There are some echogenic foci within its lumen which are thought to represent stones. Sonographic Hartley's sign is positive per the technologist. The common bile duct is dilated measuring 1.5 cm. There is mild intrahepatic biliary ductal dilatation. There is no hydronephrosis in the visualized aspects of the right kidney. Markedly distended gallbladder which contains some stones. There is marked dilatation of the common bile duct and there is some mild intrahepatic biliary ductal dilatation. Sonographic Hartley's sign is positive per the technologist. **This report has been created using voice recognition software. It may contain minor errors which are inherent in voice recognition technology. ** Final report electronically signed by Dr Paolo Benito on 1/8/2023 5:59 PM      Labs:   Recent Results (from the past 72 hour(s))   CBC with Auto Differential    Collection Time: 01/10/23  5:04 AM   Result Value Ref Range    WBC 10.8 4.8 - 10.8 thou/mm3    RBC 3.65 (L) 4.70 - 6.10 mill/mm3    Hemoglobin 11.2 (L) 14.0 - 18.0 gm/dl    Hematocrit 34.7 (L) 42.0 - 52.0 %    MCV 95.1 (H) 80.0 - 94.0 fL    MCH 30.7 26.0 - 33.0 pg    MCHC 32.3 32.2 - 35.5 gm/dl    RDW-CV 16.0 (H) 11.5 - 14.5 %    RDW-SD 56.4 (H) 35.0 - 45.0 fL    Platelets 528 411 - 556 thou/mm3    MPV 9.8 9.4 - 12.4 fL    Seg Neutrophils 63.9 %    Lymphocytes 23.0 %    Monocytes 5.9 %    Eosinophils 6.0 %    Basophils 0.3 %    Immature Granulocytes 0.9 %    Segs Absolute 6.9 1.8 - 7.7 thou/mm3    Lymphocytes Absolute 2.5 1.0 - 4.8 thou/mm3    Monocytes Absolute 0.6 0.4 - 1.3 thou/mm3    Eosinophils Absolute 0.6 (H) 0.0 - 0.4 thou/mm3    Basophils Absolute 0.0 0.0 - 0.1 thou/mm3    Immature Grans (Abs) 0.10 (H) 0.00 - 0.07 thou/mm3    nRBC 0 /100 wbc   Basic Metabolic Panel    Collection Time: 01/10/23  5:04 AM   Result Value Ref Range    Sodium 137 135 - 145 meq/L    Potassium 4.2 3.5 - 5.2 meq/L    Chloride 101 98 - 111 meq/L    CO2 25 23 - 33 meq/L    Glucose 93 70 - 108 mg/dL    BUN 9 7 - 22 mg/dL    Creatinine 0.5 0.4 - 1.2 mg/dL    Calcium 8.1 (L) 8.5 - 10.5 mg/dL   Hepatic Function Panel    Collection Time: 01/10/23  5:04 AM   Result Value Ref Range    Albumin 2.8 (L) 3.5 - 5.1 g/dL    Total Bilirubin 2.7 (H) 0.3 - 1.2 mg/dL    Bilirubin, Direct 1.6 (H) 0.0 - 0.3 mg/dL    Alkaline Phosphatase 969 (H) 38 - 126 U/L    AST 37 5 - 40 U/L    ALT 32 11 - 66 U/L    Total Protein 5.7 (L) 6.1 - 8.0 g/dL   Protime-INR    Collection Time: 01/10/23  5:04 AM   Result Value Ref Range    INR 1.10 0.85 - 1.13   Anion Gap    Collection Time: 01/10/23  5:04 AM   Result Value Ref Range    Anion Gap 11.0 8.0 - 16.0 meq/L   Glomerular Filtration Rate, Estimated    Collection Time: 01/10/23  5:04 AM   Result Value Ref Range Est, Glom Filt Rate >60 >60 ml/min/1.73m2   Hepatic Function Panel    Collection Time: 01/11/23  6:37 AM   Result Value Ref Range    Albumin 2.7 (L) 3.5 - 5.1 g/dL    Total Bilirubin 1.8 (H) 0.3 - 1.2 mg/dL    Bilirubin, Direct 1.1 (H) 0.0 - 0.3 mg/dL    Alkaline Phosphatase 986 (H) 38 - 126 U/L    AST 25 5 - 40 U/L    ALT 26 11 - 66 U/L    Total Protein 6.3 6.1 - 8.0 g/dL   Comprehensive Metabolic Panel    Collection Time: 01/11/23  6:37 AM   Result Value Ref Range    Glucose 83 70 - 108 mg/dL    Creatinine 0.5 0.4 - 1.2 mg/dL    BUN 6 (L) 7 - 22 mg/dL    Sodium 141 135 - 145 meq/L    Potassium 3.6 3.5 - 5.2 meq/L    Chloride 104 98 - 111 meq/L    CO2 21 (L) 23 - 33 meq/L    Calcium 8.5 8.5 - 10.5 mg/dL   Anion Gap    Collection Time: 01/11/23  6:37 AM   Result Value Ref Range    Anion Gap 16.0 8.0 - 16.0 meq/L   Glomerular Filtration Rate, Estimated    Collection Time: 01/11/23  6:37 AM   Result Value Ref Range    Est, Glom Filt Rate >60 >60 ml/min/1.73m2       Discharge condition: good  Disposition: Home  Time spent on discharge: Greater than 50 minutes     Electronically signed by Yasmine Abel MD on 1/12/2023 at 11:06 AM

## 2023-01-12 NOTE — PROGRESS NOTES
Discharge teaching and instructions for diagnosis/procedure of Cholangitis completed with patient using teachback method. AVS reviewed. Printed prescriptions given to patient. Patient voiced understanding regarding prescriptions, follow up appointments, and care of self at home. Discharged ambulatory to  home with support per cab service.

## 2023-01-12 NOTE — CARE COORDINATION
1/12/23, 9:33 AM EST    Patient goals/plan/ treatment preferences discussed by  and . Patient goals/plan/ treatment preferences reviewed with patient/ family. Patient/ family verbalize understanding of discharge plan and are in agreement with goal/plan/treatment preferences. Understanding was demonstrated using the teach back method. AVS provided by RN at time of discharge, which includes all necessary medical information pertaining to the patients current course of illness, treatment, post-discharge goals of care, and treatment preferences. Services At/After Discharge: None             Poly Jesus will be discharged today back to the [facility]. Spoke with him this morning and he denies needs. He will get his own cab back home.

## 2023-01-13 ENCOUNTER — TELEPHONE (OUTPATIENT)
Dept: FAMILY MEDICINE CLINIC | Age: 33
End: 2023-01-13

## 2023-01-13 LAB
BLOOD CULTURE, ROUTINE: NORMAL
BLOOD CULTURE, ROUTINE: NORMAL

## 2023-01-13 NOTE — TELEPHONE ENCOUNTER
Care Transitions Initial Follow Up Call    Outreach made within 2 business days of discharge: Yes    Patient: Sara Kolb. Chris Rodriguez Patient : 1990   MRN: [id number]  Reason for Admission: There are no discharge diagnoses documented for the most recent discharge. Discharge Date: 23       Spoke with: Fredy Ramos     Discharge department/facility: [facility] TCM Interactive Patient Contact:  Was patient able to fill all prescriptions: Yes  Was patient instructed to bring all medications to the follow-up visit: Yes  Is patient taking all medications as directed in the discharge summary?  Yes  Does patient understand their discharge instructions: Yes  Does patient have questions or concerns that need addressed prior to 7-14 day follow up office visit: no    Scheduled appointment with PCP within 7-14 days    Follow Up  Future Appointments   Date Time Provider Myriam Antonio   2023  9:45 AM DO CRYSTAL Kee Adv Surg [address] [facility]   2023  9:40 AM Shahram Morelos, APRN - [address] [place] [address] [address] [facility]   2023  3:30 PM Micheal Madrid MD [facility] -       Mac Trinh LPN

## 2023-01-13 NOTE — CONSULTS
[address] [place] [address]                                  CONSULTATION    PATIENT NAME: Mary Anne Hdz.                 :        1990  MED REC NO:   [id number]                           ROOM:       [id number]  ACCOUNT NO:   [de-identified]                           ADMIT DATE: 2023  PROVIDER:     AMI Reyes DATE:  2023    HISTORY OF PRESENT ILLNESS:  The patient is a 66-year-old male admitted  with ascending cholangitis as well as jaundice with obstructive jaundice. He presented with abdominal discomfort. Described the pain as sharp. Pretty diffuse and radiating into the upper quadrant, as well as  epigastric area. It radiates to the back. Associated with nausea and  vomit. Some chills. Some fever. It happened for two days. He has  yellow eyes. He has dark urine. His stool color is light. He is  having no chest pain. He is having fever. He is having no dysuria. His urine is dark. He is not short of breath. He has some history of polysubstance abuse. He said he has not been  using that for a while. He has history of hepatitis C, as well as  hepatitis B. He was seen in my office in the past.  He was not very  compliant with followup. Planned for him in the past for ID Service or  Hepatology Service. He was to receive combination therapy for hepatitis  B and C. He has not followed up regarding that. He is seeing lately [facility]. [facility] office. He has seen Jonny Warner, however he has not followed up  with her office. PAST MEDICAL HISTORY:  Significant for alcohol abuse, substance  abuse, CT-guided chest tube placement in the past.    PAST SURGICAL HISTORY:  Significant for CT guided chest tube placement  in the past, wisdom tooth extraction. SOCIAL HISTORY:  He has been smoking. Smoking started when he was 13 years old. No smokeless tobacco.  He consumes alcohol.   He does not  drink heavily according to him. He has substance abuse. Polysubstance  abuse. He states he is not actively using with that at this time. FAMILY HISTORY:  Significant for GI bleed. ALLERGIES:  DILANTIN. MEDICATIONS:  Currently, taking no medication. While admitted, he was  on _____ antibiotic. Omeprazole. Zofran as needed. Neurontin. Suboxone. PHYSICAL EXAMINATION:  GENERAL:  Pleasant, alert, comfortable. He appeared to be jaundiced. He is not short of breath, not using accessory muscle. VITAL SIGNS:  His weight was 155, heart rate 80, respirations 16,  temperature of 100. HEENT:  His head is atraumatic. Sclerae anicteric. Pupils are round,  reactive to light and accommodation. Oral cavity normal with no lesion  seen. NECK:  Supple. CHEST:  Normal.  CARDIOVASCULAR:  Normal.  ABDOMEN:  Epigastric tenderness. Slight upper quadrant tenderness. No  organomegaly. No stigmata of chronic disease. EXTREMITIES:  No clubbing or cyanosis. WORKUP:  On 1/09/23, Sodium 132, potassium 3.8. His BUN and creatinine  are normal.  His albumin is 3.1. His alkaline phosphatase is 1176. His  ALT 45, AST is 52. His total bilirubin is 9.7. His GGT is 1908. His  white blood cell 14.3. His H and H is 12.6 and 37.6. MCV is normal.   Platelet in the normal range. His INR 1.26. IMAGING STUDY:  Ultrasound on 01/08/2023 show mildly distended  gallbladder, which contained some stone, mild dilation of the common  bile duct. There is some mild intrahepatic biliary dilation that would  suggest false Hartley sign. Stone could not be confirmed by the imaging  study. IMPRESSION:  Ascending cholangitis, likely biliary dilations; stones in  the gallbladder, likely obstructive jaundice; ascending cholangitis,  substance abuse in the past.    PLAN:  Continue antibiotics. ERCP will be scheduled for tomorrow  to evaluate. Likely the patient is going to need sphincterotomy and  stone extraction.   If the case is complicated with difficult anatomy, we  might place a stent at the end of the procedure. Thank you for allowing me to participate in this patient's care.         Cielo Moore M.D.    D: 01/13/2023 4:24:27       T: 01/13/2023 8:15:42     [id number]  Job#: [id number]     Doc#: [id number]    CC:

## 2023-01-15 PROBLEM — E80.6 HYPERBILIRUBINEMIA: Status: ACTIVE | Noted: 2023-01-15

## 2023-01-15 PROBLEM — R74.01 TRANSAMINITIS: Status: ACTIVE | Noted: 2023-01-15

## 2023-01-15 PROBLEM — A41.9 SEPSIS WITHOUT ACUTE ORGAN DYSFUNCTION (HCC): Status: ACTIVE | Noted: 2023-01-15

## 2023-01-17 ENCOUNTER — OFFICE VISIT (OUTPATIENT)
Dept: SURGERY | Age: 33
End: 2023-01-17
Payer: MEDICAID

## 2023-01-17 VITALS
BODY MASS INDEX: 21.2 KG/M2 | RESPIRATION RATE: 18 BRPM | DIASTOLIC BLOOD PRESSURE: 62 MMHG | WEIGHT: 135.1 LBS | OXYGEN SATURATION: 98 % | HEIGHT: 67 IN | TEMPERATURE: 97.2 F | HEART RATE: 72 BPM | SYSTOLIC BLOOD PRESSURE: 128 MMHG

## 2023-01-17 DIAGNOSIS — K81.1 CHRONIC CHOLECYSTITIS WITHOUT CALCULUS: Primary | ICD-10-CM

## 2023-01-17 PROCEDURE — 99244 OFF/OP CNSLTJ NEW/EST MOD 40: CPT | Performed by: SURGERY

## 2023-01-17 PROCEDURE — G8427 DOCREV CUR MEDS BY ELIG CLIN: HCPCS | Performed by: SURGERY

## 2023-01-17 PROCEDURE — G8484 FLU IMMUNIZE NO ADMIN: HCPCS | Performed by: SURGERY

## 2023-01-17 PROCEDURE — G8420 CALC BMI NORM PARAMETERS: HCPCS | Performed by: SURGERY

## 2023-01-17 ASSESSMENT — ENCOUNTER SYMPTOMS
RECTAL PAIN: 0
ABDOMINAL PAIN: 1
CHOKING: 0
DIARRHEA: 0
EYE ITCHING: 0
STRIDOR: 0
COLOR CHANGE: 0
FACIAL SWELLING: 0
VOICE CHANGE: 0
CONSTIPATION: 0
NAUSEA: 1
TROUBLE SWALLOWING: 0
RHINORRHEA: 0
SHORTNESS OF BREATH: 0
BACK PAIN: 0
APNEA: 0
PHOTOPHOBIA: 0
CHEST TIGHTNESS: 0
VOMITING: 1
EYE PAIN: 0
ANAL BLEEDING: 0
SORE THROAT: 0
EYE REDNESS: 0
BLOOD IN STOOL: 0
EYE DISCHARGE: 0
COUGH: 0
WHEEZING: 0
SINUS PRESSURE: 0
ABDOMINAL DISTENTION: 1

## 2023-01-17 NOTE — PROGRESS NOTES
Quang Carrasco. [facility], [address] [place] [address]. [address] [place] [id number]  [phone]  New Patient Evaluation in Office    Pt Name: Dinesh Foss  Date of Birth 1990   Today's Date: 1/17/2023  Medical Record Number: [id number]  Referring Provider: Quinn Medina  Primary Care Provider: HALI Romero - CNP  Chief Complaint   Patient presents with    Surgical Consult     New patient- referred by Dr. Justus Henry - Markedly distended gallbladder with stones- ERCP 1/10/23     ASSESSMENT       Diagnosis Orders   1. Chronic cholecystitis without calculus      S/P ERCP and stone extraction from CBD 1/10/23        Past Medical History:   Diagnosis Date    Closed fracture of one rib of left side with routine healing 12/17/2020    Encounter for monitoring Suboxone maintenance therapy     on Suboxone-managed [facility]    Hypertension     Liver laceration, closed, initial encounter 12/17/2020    Multiple closed fractures of pelvis with stable disruption of pelvic ring with routine healing 12/17/2020    Pneumothorax, traumatic     Seizures ([address]           PLANS      Schedule Mary Lou Noss for L/S cholecystectomy possible open  The risks, options and alternatives were discussed at length with the patient. The risks including bleeding, infection, reoperation, bile duct injury and possible conversion to an open procedure were covered as well as nonresolution of pain. The possibility of other unforeseen complications including bile leak were also mentioned. Patient was made aware of intraoperative complications such as other organ injury or injury to surrounding structures. We also discussed the conduct of the operation, probable outpatient stay postoperatively and the typical post operative recovery and restrictions. The patients questions were answered. After this discussion, the patient would like to proceed with cholecystectomy.   Status: outpatient  Planned anesthesia: general  He will undergo pre-operative clearance per anesthesia guidelines with risk factors listed under the past medical history diagnosis & problem list.  Perioperative discontinuation of Suboxone     SUBJECTIVE      Army Pham is a 27 y.o. male seen in the consultation for evaluation regarding gallbladder. Onset of symptoms was gradual 1 year ago with worsened, beginning 1 month ago since that time. The symptoms have included right upper quadrant pain, pain radiating to the back, and nausea and/or vomiting after meals. Army Pham describes the pain as aching and pressure-like, recurrent and moderate in severity. His symptoms are worse with fatty foods and improved with avoiding these foods. He denies any history of pancreatitis, jaundice, pancreatitis or ulcer disease. Symptoms have been unrelieved by antacids. I have personally reviewed the following films:  US performed on 12/1/22 which showed gallstones.  He underwent ERCP and stone extraction from the CBD on 1/10/23  Past Medical History  Past Medical History:   Diagnosis Date    Closed fracture of one rib of left side with routine healing 12/17/2020    Encounter for monitoring Suboxone maintenance therapy     on Suboxone-managed [facility]    Hypertension     Liver laceration, closed, initial encounter 12/17/2020    Multiple closed fractures of pelvis with stable disruption of pelvic ring with routine healing 12/17/2020    Pneumothorax, traumatic     Seizures (HCC)      Past Surgical History  Past Surgical History:   Procedure Laterality Date    CT GUIDED CHEST TUBE  12/14/2020    CT GUIDED CHEST TUBE 12/14/2020 [facility] CT SCAN    ERCP N/A 1/10/2023    ERCP SPHINCTER/PAPILLOTOMY performed by Savita Chavez MD at [facility]    ERCP  1/10/2023    ERCP STONE REMOVAL performed by Savita Chavez MD at [address] [place] [address] EXTRACTION       Medications  Current Outpatient Medications   Medication Sig Dispense Refill    amoxicillin-clavulanate (AUGMENTIN) 875-125 MG per tablet Take 1 tablet by mouth every 12 hours for 19 doses 19 tablet 0    lactobacillus (CULTURELLE) capsule Take 1 capsule by mouth 2 times daily (with meals) 20 capsule 0    Multiple Vitamins-Minerals (THERAPEUTIC MULTIVITAMIN-MINERALS) tablet Take 1 tablet by mouth daily      carBAMazepine (TEGRETOL) 200 MG tablet take 1 tablet by mouth twice a day 60 tablet 2    lidocaine (LMX) 4 % cream Apply topically 3 times daily as needed      omeprazole (PRILOSEC) 40 MG delayed release capsule Take 1 capsule by mouth every morning (before breakfast) 90 capsule 1    docusate (COLACE, DULCOLAX) 100 MG CAPS Take 100 mg by mouth 2 times daily (Patient taking differently: Take 100 mg by mouth 2 times daily Pt taking PRN) 60 capsule 0    gabapentin (NEURONTIN) 600 MG tablet Take 1 tablet by mouth 3 times daily for 180 days. 270 tablet 1    sucralfate (CARAFATE) 1 GM tablet Take 1 tablet by mouth 4 times daily (Patient taking differently: Take 1 g by mouth 4 times daily Patient says he usually only takes once daily) 360 tablet 1    ondansetron (ZOFRAN) 4 MG tablet Take 1 tablet by mouth 3 times daily as needed for Nausea or Vomiting 15 tablet 0    buprenorphine-naloxone (SUBOXONE) 2-0.5 MG SUBL Place under the tongue. Indications: Opioid Dependence Take 3 tablets under the tounge and allow to dissolve once daily. Per Shannan. NARCAN 4 MG/0.1ML LIQD nasal spray        No current facility-administered medications for this visit. Allergies  is allergic to dilantin [phenytoin sodium extended]. Family History  family history includes No Known Problems in his mother; Other in his father. Social History   reports that he has been smoking cigarettes. He started smoking about 12 years ago. He has a 5.00 pack-year smoking history. He has never used smokeless tobacco. He reports current alcohol use of about 1.0 standard drink per week.  He reports that he does not currently use drugs after having used the following drugs: Opiates . Health Screening Exams  Health Maintenance   Topic Date Due    Hepatitis A vaccine (1 of 2 - Risk 2-dose series) Never done    Varicella vaccine (1 of 2 - 2-dose childhood series) Never done    Pneumococcal 0-64 years Vaccine (1 - PCV) Never done    Hepatitis B vaccine (1 of 3 - Risk 3-dose series) Never done    DTaP/Tdap/Td vaccine (1 - Tdap) Never done    COVID-19 Vaccine (3 - Booster for Moderna series) 10/21/2021    Flu vaccine (1) Never done    Depression Screen  07/22/2023    HIV screen  Completed    Hib vaccine  Aged Out    Meningococcal (ACWY) vaccine  Aged Out     Review of Systems  Constitutional:  Negative for activity change, appetite change, chills, diaphoresis, fatigue, fever and unexpected weight change. HENT:  Negative for congestion, dental problem, drooling, ear discharge, ear pain, facial swelling, hearing loss, mouth sores, nosebleeds, postnasal drip, rhinorrhea, sinus pressure, sneezing, sore throat, tinnitus, trouble swallowing and voice change. Eyes:  Negative for photophobia, pain, discharge, redness, itching and visual disturbance. Respiratory:  Negative for apnea, cough, choking, chest tightness, shortness of breath, wheezing and stridor. Cardiovascular:  Negative for chest pain, palpitations and leg swelling. Gastrointestinal:  Positive for abdominal distention, abdominal pain, nausea and vomiting. Negative for anal bleeding, blood in stool, constipation, diarrhea and rectal pain. Endocrine: Negative for cold intolerance, heat intolerance, polydipsia, polyphagia and polyuria. Genitourinary:  Negative for decreased urine volume, difficulty urinating, dysuria, enuresis, flank pain, frequency, genital sores, hematuria and urgency. Musculoskeletal:  Negative for arthralgias, back pain, gait problem, joint swelling, myalgias, neck pain and neck stiffness. Skin:  Negative for color change, pallor, rash and wound.    Allergic/Immunologic: Negative for environmental allergies, food allergies and immunocompromised state. Neurological:  Negative for dizziness, tremors, seizures, syncope, facial asymmetry, speech difficulty, weakness, light-headedness, numbness and headaches. Hematological:  Negative for adenopathy. Does not bruise/bleed easily. Psychiatric/Behavioral:  Negative for agitation, behavioral problems, confusion, decreased concentration, dysphoric mood, hallucinations, self-injury, sleep disturbance and suicidal ideas. The patient is not nervous/anxious and is not hyperactive. OBJECTIVE      VITALS:  height is 5' 7\" (1.702 m) and weight is 135 lb 1.6 oz (61.3 kg). His temporal temperature is 97.2 °F (36.2 °C). His blood pressure is 128/62 and his pulse is 72. His respiration is 18 and oxygen saturation is 98%. Pain Score:   4 Body mass index is 21.16 kg/m². CONSTITUTIONAL: Alert and oriented times 3, no acute distress and cooperative to examination with proper mood and affect. SKIN: Skin color, texture, turgor normal. No rashes or lesions. LYMPH: no cervical nodes, no inguinal nodes  HEENT: Head is normocephalic, atraumatic. EOMI, PERRLA. NECK: Supple, symmetrical, trachea midline, no adenopathy, thyroid symmetric, not enlarged and no tenderness, skin normal.  CHEST/LUNGS: chest symmetric with normal A/P diameter, normal respiratory rate and rhythm, lungs clear to auscultation without wheezes, rales or rhonchi. No accessory muscle use. Scars None   CARDIOVASCULAR: Heart sounds are normal.  Regular rate and rhythm without murmur, gallop or rub. Normal S1 and S2. Carotid and femoral pulses 2+/4 and equal bilaterally. ABDOMEN: Normal shape. No scar(s) present. Normal bowel sounds. No bruits. Soft, nontender, nondistended, no masses or organomegaly. no evidence of hernia. Percussion: Normal without hepatosplenomegally. Gallbladder is nonpalpable and non tender.   RECTAL: deferred, not clinically indicated  NEUROLOGIC: There are no focalizing motor or sensory deficits. CN II-XII are grossly intact. Sherron Torres EXTREMITIES: no cyanosis, no clubbing, and no edema. Thank you for the interesting evaluation. Further recommendations as listed above.        Electronically signed by Orlin Bourgeois DO on 1/17/2023 at 12:12 PM

## 2023-01-17 NOTE — PROGRESS NOTES
Subjective:      Patient ID: Ankita Ahumada. Kristie Salgado is a 27 y.o. male. Chief Complaint   Patient presents with    Surgical Consult     New patient- referred by Dr. Celia Latham - Markedly distended gallbladder with stones- ERCP 1/10/23       HPI    Review of Systems   Constitutional:  Negative for activity change, appetite change, chills, diaphoresis, fatigue, fever and unexpected weight change. HENT:  Negative for congestion, dental problem, drooling, ear discharge, ear pain, facial swelling, hearing loss, mouth sores, nosebleeds, postnasal drip, rhinorrhea, sinus pressure, sneezing, sore throat, tinnitus, trouble swallowing and voice change. Eyes:  Negative for photophobia, pain, discharge, redness, itching and visual disturbance. Respiratory:  Negative for apnea, cough, choking, chest tightness, shortness of breath, wheezing and stridor. Cardiovascular:  Negative for chest pain, palpitations and leg swelling. Gastrointestinal:  Positive for abdominal distention, abdominal pain, nausea and vomiting. Negative for anal bleeding, blood in stool, constipation, diarrhea and rectal pain. Endocrine: Negative for cold intolerance, heat intolerance, polydipsia, polyphagia and polyuria. Genitourinary:  Negative for decreased urine volume, difficulty urinating, dysuria, enuresis, flank pain, frequency, genital sores, hematuria and urgency. Musculoskeletal:  Negative for arthralgias, back pain, gait problem, joint swelling, myalgias, neck pain and neck stiffness. Skin:  Negative for color change, pallor, rash and wound. Allergic/Immunologic: Negative for environmental allergies, food allergies and immunocompromised state. Neurological:  Negative for dizziness, tremors, seizures, syncope, facial asymmetry, speech difficulty, weakness, light-headedness, numbness and headaches. Hematological:  Negative for adenopathy. Does not bruise/bleed easily.    Psychiatric/Behavioral:  Negative for agitation, behavioral problems, confusion, decreased concentration, dysphoric mood, hallucinations, self-injury, sleep disturbance and suicidal ideas. The patient is not nervous/anxious and is not hyperactive.     Resp 18   Ht 5' 7\" (1.702 m)   Wt 135 lb 1.6 oz (61.3 kg)   BMI 21.16 kg/m²     Objective:   Physical Exam    Assessment:            Plan:              Herlinda Morillo LPN

## 2023-01-17 NOTE — [place]  [address] [facility]. [address]  [place] [address]  [phone]     Pt Name: Mahesh Quezada [address] Record Number: [id number]  Date of Birth 1990   Today's Date: 1/17/2023    Kaity Farah was seen in consultation in the office today. My assessment and plans are listed below. ASSESSMENT      Diagnosis Orders   1. Chronic cholecystitis without calculus      S/P ERCP and stone extraction from CBD 1/10/23        Past Medical History:   Diagnosis Date    Closed fracture of one rib of left side with routine healing 12/17/2020    Encounter for monitoring Suboxone maintenance therapy     on Suboxone-managed [facility]    Hypertension     Liver laceration, closed, initial encounter 12/17/2020    Multiple closed fractures of pelvis with stable disruption of pelvic ring with routine healing 12/17/2020    Pneumothorax, traumatic     Seizures (HCC)          PLANS:   1. Schedule Neda Rizo for L/S cholecystectomy possible open  2. The risks, options and alternatives were discussed at length with the patient. The risks including bleeding, infection, reoperation, bile duct injury and possible conversion to an open procedure were covered as well as nonresolution of pain. The possibility of other unforeseen complications including bile leak were also mentioned. Patient was made aware of intraoperative complications such as other organ injury or injury to surrounding structures. We also discussed the conduct of the operation, probable outpatient stay postoperatively and the typical post operative recovery and restrictions. The patients questions were answered. After this discussion, the patient would like to proceed with cholecystectomy. 3. Status: outpatient  4. Planned anesthesia: general  5.  He will undergo pre-operative clearance per anesthesia guidelines with risk factors listed under the past medical history diagnosis & problem list.  6. Perioperative discontinuation of Suboxone    If I can provide any additional assistance or you have any concerns, please feel free to contact me. Thank you for allowing to participate in the care of your patients. Sincerely,      Zoie Winter.  Deondre Olivares

## 2023-01-19 ENCOUNTER — OFFICE VISIT (OUTPATIENT)
Dept: FAMILY MEDICINE CLINIC | Age: 33
End: 2023-01-19
Payer: MEDICAID

## 2023-01-19 VITALS
RESPIRATION RATE: 14 BRPM | WEIGHT: 139 LBS | OXYGEN SATURATION: 99 % | BODY MASS INDEX: 21.82 KG/M2 | HEART RATE: 80 BPM | SYSTOLIC BLOOD PRESSURE: 116 MMHG | HEIGHT: 67 IN | TEMPERATURE: 98.4 F | DIASTOLIC BLOOD PRESSURE: 80 MMHG

## 2023-01-19 DIAGNOSIS — K81.1 CHRONIC CHOLECYSTITIS WITHOUT CALCULUS: ICD-10-CM

## 2023-01-19 DIAGNOSIS — F11.20 OPIOID DEPENDENCE ON AGONIST THERAPY (HCC): Primary | ICD-10-CM

## 2023-01-19 DIAGNOSIS — Z09 HOSPITAL DISCHARGE FOLLOW-UP: ICD-10-CM

## 2023-01-19 DIAGNOSIS — B18.2 CHRONIC HEPATITIS C WITHOUT HEPATIC COMA (HCC): ICD-10-CM

## 2023-01-19 PROCEDURE — 99214 OFFICE O/P EST MOD 30 MIN: CPT | Performed by: NURSE PRACTITIONER

## 2023-01-19 PROCEDURE — 1111F DSCHRG MED/CURRENT MED MERGE: CPT | Performed by: NURSE PRACTITIONER

## 2023-01-19 ASSESSMENT — PATIENT HEALTH QUESTIONNAIRE - PHQ9
SUM OF ALL RESPONSES TO PHQ QUESTIONS 1-9: 0
SUM OF ALL RESPONSES TO PHQ QUESTIONS 1-9: 0
1. LITTLE INTEREST OR PLEASURE IN DOING THINGS: 0
SUM OF ALL RESPONSES TO PHQ QUESTIONS 1-9: 0
SUM OF ALL RESPONSES TO PHQ QUESTIONS 1-9: 0
2. FEELING DOWN, DEPRESSED OR HOPELESS: 0
SUM OF ALL RESPONSES TO PHQ9 QUESTIONS 1 & 2: 0

## 2023-01-19 NOTE — PROGRESS NOTES
Transition of Care Visit/Hospital Follow Up:      Neela IrelandRosibel Garcia is a 27 y.o. male that presents for Follow-Up from Hospital (Gallbladder )    Date of Discharge:   1/12/23  Was patient contacted within 2 business days of discharge (see chart for documentation):  yes - 1/13/23      Patient presents for hospital follow up. Patient recently hospitalized at [facility] for treatment of cholangitis, sepsis. Symptoms prior to admission:  abdominal pain     Hospital Course per DC Summary:    Hospital Course:   32-y.o. M w/significant PMHx of Chronic Hepatitis C (follows OP w/Dr. Mal Tabares; Genotype 3a, last viral load 7/2021 was 1,597,025; was referred to Dr. 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 Described pain as sharp, diffuse throughout abdomen, but worse in RUQ, and radiates to back. Pain has been on/off for a few months, but has become constant two days ago. Associated w/nausea, diarrhea, chills, and Jaundice. Pt has hx of polysubstance abuse, alcoholism, and opioid abuse but says he has been clean for the past 1 year. Lives at [place] [address]. Pt was admitted to stepdown unit for cholangitis. ED course: Afebrile, RR 16 w/SpO2 99% on RA, /80, . Labs remarkable for mildly low NA and K, Osm 264.8, Albumin 3.4, ALP 1,451, AST/ALT 67/57, Direct hyperbilirubinemia w/Tbili 7.9 and Dbili 7.6, Lipase 279.8. UDS (-). Leukocytosis w/WBC 19.8 w/left shift, INR 1.24. Acute hep panel (+) Hep C Ab, UA discussed in A/P, but unremarkable for UTI. U/S GB Markedly distended gallbladder which contains some stones.  There is marked dilatation of the common bile duct and there is some mild intrahepatic biliary ductal dilatation. Sonographic Hartley's sign is positive. EKG NSR. Pt given LR boluses, broad spectrum Abx, and sent for admission. Medication changes at discharge:  Med list reconciled today    Clinical course since discharge:      Patient is back to the [place] [address]. Doing well. He is still drug and ETOH clean. He is also still going to [facility]. He is having minimal pain currently. Plans to get his gallbladder out soon with Dr Elliott Roldan. He is concerned about being on Suboxone and having surgery and dealing with post op pain. Questions if he should take pain meds. He has an appt with [facility] Monday for treatment of Hep C. He hasn't started yet, but is planning on starting it soon. Current Outpatient Medications   Medication Sig Dispense Refill    amoxicillin-clavulanate (AUGMENTIN) 875-125 MG per tablet Take 1 tablet by mouth every 12 hours for 19 doses 19 tablet 0    lactobacillus (CULTURELLE) capsule Take 1 capsule by mouth 2 times daily (with meals) 20 capsule 0    Multiple Vitamins-Minerals (THERAPEUTIC MULTIVITAMIN-MINERALS) tablet Take 1 tablet by mouth daily      carBAMazepine (TEGRETOL) 200 MG tablet take 1 tablet by mouth twice a day 60 tablet 2    lidocaine (LMX) 4 % cream Apply topically 3 times daily as needed      omeprazole (PRILOSEC) 40 MG delayed release capsule Take 1 capsule by mouth every morning (before breakfast) 90 capsule 1    docusate (COLACE, DULCOLAX) 100 MG CAPS Take 100 mg by mouth 2 times daily (Patient taking differently: Take 100 mg by mouth 2 times daily Pt taking PRN) 60 capsule 0    gabapentin (NEURONTIN) 600 MG tablet Take 1 tablet by mouth 3 times daily for 180 days.  270 tablet 1    sucralfate (CARAFATE) 1 GM tablet Take 1 tablet by mouth 4 times daily (Patient taking differently: Take 1 g by mouth 4 times daily Patient says he usually only takes once daily) 360 tablet 1    ondansetron (ZOFRAN) 4 MG tablet Take 1 tablet by mouth 3 times daily as needed for Nausea or Vomiting 15 tablet 0    buprenorphine-naloxone (SUBOXONE) 2-0.5 MG SUBL Place under the tongue. Indications: Opioid Dependence Take 3 tablets under the tounge and allow to dissolve once daily. Per [facility]. NARCAN 4 MG/0.1ML LIQD nasal spray        No current facility-administered medications for this visit. Past Medical History:   Diagnosis Date    Closed fracture of one rib of left side with routine healing 12/17/2020    Encounter for monitoring Suboxone maintenance therapy     on Suboxone-managed [facility]    Hypertension     Liver laceration, closed, initial encounter 12/17/2020    Multiple closed fractures of pelvis with stable disruption of pelvic ring with routine healing 12/17/2020    Pneumothorax, traumatic     Seizures (HCC)        Past Surgical History:   Procedure Laterality Date    CT GUIDED CHEST TUBE  12/14/2020    CT GUIDED CHEST TUBE 12/14/2020 [facility] CT SCAN    ERCP N/A 1/10/2023    ERCP SPHINCTER/PAPILLOTOMY performed by Laisha Cedeno MD at [facility]    ERCP  1/10/2023    ERCP STONE REMOVAL performed by Laisha Cedeno MD at [address] [place] [address]         Social History     Tobacco Use    Smoking status: Every Day     Packs/day: 0.25     Years: 10.00     Pack years: 2.50     Types: Cigarettes     Start date: 12/29/2010    Smokeless tobacco: Never   Vaping Use    Vaping Use: Some days    Substances: Nicotine, Flavoring    Devices: Disposable   Substance Use Topics    Alcohol use:  Yes     Alcohol/week: 1.0 standard drink     Types: 1 Cans of beer per week    Drug use: Not Currently     Types: Opiates      Comment: last used 6-8 months ago last time used fentynal       Family History   Problem Relation Age of Onset    No Known Problems Mother     Other Father         liver dx          I have reviewed the patient's past medical history, past surgical history, allergies, medications, social and family history and I have made updates where appropriate.        PHYSICAL EXAM:  /80   Pulse 80   Temp 98.4 °F (36.9 °C) (Oral)   Resp 14   Ht 5' 7\" (1.702 m)   Wt 139 lb (63 kg)   SpO2 99%   BMI 21.77 kg/m²   General Appearance: well developed and well- nourished, in no acute distress  Head: normocephalic and atraumatic  ENT: external ear and ear canal normal bilaterally, nose without deformity, nasal mucosa and turbinates normal without polyps, oropharynx normal, dentition is normal for age, no lip or gum lesions noted  Neck: supple and non-tender without mass, no thyromegaly or thyroid nodules, no cervical lymphadenopathy  Pulmonary/Chest: clear to auscultation bilaterally- no wheezes, rales or rhonchi, normal air movement, no respiratory distress or retractions  Cardiovascular: normal rate, regular rhythm, normal S1 and S2, no murmurs, rubs, clicks, or gallops, distal pulses intact  Abdomen: soft, non-tender, non-distended, no rebound or guarding, no masses or hernias noted, no hepatospleenomegaly  Extremities: no cyanosis, clubbing or edema of the lower extremities  Psych:  Normal affect without evidence of depression or anxiety, insight and judgement are appropriate, memory appears intact  Skin: warm and dry, no rash or erythema      Diagnostic test results reviewed: inpatient labs and US-gallbladder    Patient risk of morbidity and mortality: moderate      ASSESSMENT & PLAN  Jaden was seen today for follow-up from hospital.    Diagnoses and all orders for this visit:    Opioid dependence on agonist therapy (HCC)    Chronic hepatitis C without hepatic coma (HCC)    Chronic cholecystitis without calculus    Hospital discharge follow-up  -     SC DISCHARGE MEDS RECONCILED W/ CURRENT OUTPATIENT MED LIST    - con't Suboxone  - rec'd he discuss with Shannan regarding taking post-op pain meds  - f/u GI for treatment of Hep C  - f/u with general surgery for chronic cholecystitis, is planning on cholecystectomy    Return if symptoms worsen or  fail to improve. Level of medical complexity:  moderate    -Overall, patient is improving  -Continue current meds  -Advised to continue to closely monitor her symptoms and if any worsening to seek treatment    All copied or forwarded information in the progress note was verified by me to be accurate at the time of visit  Patient's past medical, surgical, social and family history were reviewed and updated      All patient questions answered. Patient voiced understanding.

## 2023-02-03 ENCOUNTER — PREP FOR PROCEDURE (OUTPATIENT)
Dept: SURGERY | Age: 33
End: 2023-02-03

## 2023-02-03 RX ORDER — SODIUM CHLORIDE 0.9 % (FLUSH) 0.9 %
5-40 SYRINGE (ML) INJECTION EVERY 12 HOURS SCHEDULED
Status: CANCELLED | OUTPATIENT
Start: 2023-02-03

## 2023-02-03 RX ORDER — SODIUM CHLORIDE 0.9 % (FLUSH) 0.9 %
5-40 SYRINGE (ML) INJECTION PRN
Status: CANCELLED | OUTPATIENT
Start: 2023-02-03

## 2023-02-03 RX ORDER — SODIUM CHLORIDE 9 MG/ML
INJECTION, SOLUTION INTRAVENOUS PRN
Status: CANCELLED | OUTPATIENT
Start: 2023-02-03

## 2023-02-03 RX ORDER — INDOCYANINE GREEN AND WATER 25 MG
2.5 KIT INJECTION ONCE
Status: CANCELLED | OUTPATIENT
Start: 2023-02-03 | End: 2023-02-03

## 2023-02-06 ENCOUNTER — HOSPITAL ENCOUNTER (OUTPATIENT)
Age: 33
Setting detail: OUTPATIENT SURGERY
Discharge: HOME | End: 2023-02-06
Attending: SURGERY | Admitting: SURGERY
Payer: MEDICAID

## 2023-02-06 ENCOUNTER — ANESTHESIA EVENT (OUTPATIENT)
Dept: OPERATING ROOM | Age: 33
End: 2023-02-06
Payer: MEDICAID

## 2023-02-06 ENCOUNTER — ANESTHESIA (OUTPATIENT)
Dept: OPERATING ROOM | Age: 33
End: 2023-02-06
Payer: MEDICAID

## 2023-02-06 VITALS
TEMPERATURE: 97.9 F | OXYGEN SATURATION: 96 % | SYSTOLIC BLOOD PRESSURE: 119 MMHG | BODY MASS INDEX: 20.34 KG/M2 | WEIGHT: 129.6 LBS | HEART RATE: 79 BPM | HEIGHT: 67 IN | DIASTOLIC BLOOD PRESSURE: 82 MMHG | RESPIRATION RATE: 18 BRPM

## 2023-02-06 DIAGNOSIS — G89.18 ACUTE POSTOPERATIVE PAIN: Primary | ICD-10-CM

## 2023-02-06 DIAGNOSIS — K80.10 CHRONIC CHOLECYSTITIS WITH CALCULUS: ICD-10-CM

## 2023-02-06 PROCEDURE — 6360000002 HC RX W HCPCS

## 2023-02-06 PROCEDURE — 2500000003 HC RX 250 WO HCPCS: Performed by: SURGERY

## 2023-02-06 PROCEDURE — 7100000000 HC PACU RECOVERY - FIRST 15 MIN: Performed by: SURGERY

## 2023-02-06 PROCEDURE — 6370000000 HC RX 637 (ALT 250 FOR IP): Performed by: SURGERY

## 2023-02-06 PROCEDURE — 6360000002 HC RX W HCPCS: Performed by: SURGERY

## 2023-02-06 PROCEDURE — 2500000003 HC RX 250 WO HCPCS

## 2023-02-06 PROCEDURE — 7100000001 HC PACU RECOVERY - ADDTL 15 MIN: Performed by: SURGERY

## 2023-02-06 PROCEDURE — 3600000013 HC SURGERY LEVEL 3 ADDTL 15MIN: Performed by: SURGERY

## 2023-02-06 PROCEDURE — 3700000000 HC ANESTHESIA ATTENDED CARE: Performed by: SURGERY

## 2023-02-06 PROCEDURE — 88304 TISSUE EXAM BY PATHOLOGIST: CPT

## 2023-02-06 PROCEDURE — 47562 LAPAROSCOPIC CHOLECYSTECTOMY: CPT | Performed by: SURGERY

## 2023-02-06 PROCEDURE — 7100000010 HC PHASE II RECOVERY - FIRST 15 MIN: Performed by: SURGERY

## 2023-02-06 PROCEDURE — 3600000003 HC SURGERY LEVEL 3 BASE: Performed by: SURGERY

## 2023-02-06 PROCEDURE — 2580000003 HC RX 258: Performed by: SURGERY

## 2023-02-06 PROCEDURE — 2709999900 HC NON-CHARGEABLE SUPPLY: Performed by: SURGERY

## 2023-02-06 PROCEDURE — 7100000011 HC PHASE II RECOVERY - ADDTL 15 MIN: Performed by: SURGERY

## 2023-02-06 PROCEDURE — 3700000001 HC ADD 15 MINUTES (ANESTHESIA): Performed by: SURGERY

## 2023-02-06 RX ORDER — ONDANSETRON 2 MG/ML
INJECTION INTRAMUSCULAR; INTRAVENOUS PRN
Status: DISCONTINUED | OUTPATIENT
Start: 2023-02-06 | End: 2023-02-06 | Stop reason: SDUPTHER

## 2023-02-06 RX ORDER — ONDANSETRON 2 MG/ML
4 INJECTION INTRAMUSCULAR; INTRAVENOUS EVERY 6 HOURS PRN
Status: DISCONTINUED | OUTPATIENT
Start: 2023-02-06 | End: 2023-02-06 | Stop reason: HOSPADM

## 2023-02-06 RX ORDER — SODIUM CHLORIDE 0.9 % (FLUSH) 0.9 %
5-40 SYRINGE (ML) INJECTION EVERY 12 HOURS SCHEDULED
Status: DISCONTINUED | OUTPATIENT
Start: 2023-02-06 | End: 2023-02-06 | Stop reason: HOSPADM

## 2023-02-06 RX ORDER — INDOCYANINE GREEN AND WATER 25 MG
2.5 KIT INJECTION ONCE
Status: COMPLETED | OUTPATIENT
Start: 2023-02-06 | End: 2023-02-06

## 2023-02-06 RX ORDER — SODIUM CHLORIDE 9 MG/ML
INJECTION, SOLUTION INTRAVENOUS PRN
Status: DISCONTINUED | OUTPATIENT
Start: 2023-02-06 | End: 2023-02-06 | Stop reason: HOSPADM

## 2023-02-06 RX ORDER — FENTANYL CITRATE 50 UG/ML
50 INJECTION, SOLUTION INTRAMUSCULAR; INTRAVENOUS EVERY 5 MIN PRN
Status: DISCONTINUED | OUTPATIENT
Start: 2023-02-06 | End: 2023-02-06 | Stop reason: HOSPADM

## 2023-02-06 RX ORDER — HYDROMORPHONE HCL 110MG/55ML
PATIENT CONTROLLED ANALGESIA SYRINGE INTRAVENOUS PRN
Status: DISCONTINUED | OUTPATIENT
Start: 2023-02-06 | End: 2023-02-06 | Stop reason: SDUPTHER

## 2023-02-06 RX ORDER — KETOROLAC TROMETHAMINE 30 MG/ML
INJECTION, SOLUTION INTRAMUSCULAR; INTRAVENOUS
Status: COMPLETED
Start: 2023-02-06 | End: 2023-02-06

## 2023-02-06 RX ORDER — SODIUM CHLORIDE 0.9 % (FLUSH) 0.9 %
5-40 SYRINGE (ML) INJECTION PRN
Status: DISCONTINUED | OUTPATIENT
Start: 2023-02-06 | End: 2023-02-06 | Stop reason: HOSPADM

## 2023-02-06 RX ORDER — NALOXONE HYDROCHLORIDE 0.4 MG/ML
INJECTION, SOLUTION INTRAMUSCULAR; INTRAVENOUS; SUBCUTANEOUS PRN
Status: DISCONTINUED | OUTPATIENT
Start: 2023-02-06 | End: 2023-02-06 | Stop reason: SDUPTHER

## 2023-02-06 RX ORDER — PROPOFOL 10 MG/ML
INJECTION, EMULSION INTRAVENOUS PRN
Status: DISCONTINUED | OUTPATIENT
Start: 2023-02-06 | End: 2023-02-06 | Stop reason: SDUPTHER

## 2023-02-06 RX ORDER — HYDROCODONE BITARTRATE AND ACETAMINOPHEN 5; 325 MG/1; MG/1
1 TABLET ORAL EVERY 4 HOURS PRN
Status: DISCONTINUED | OUTPATIENT
Start: 2023-02-06 | End: 2023-02-06 | Stop reason: HOSPADM

## 2023-02-06 RX ORDER — KETOROLAC TROMETHAMINE 30 MG/ML
30 INJECTION, SOLUTION INTRAMUSCULAR; INTRAVENOUS ONCE
Status: COMPLETED | OUTPATIENT
Start: 2023-02-06 | End: 2023-02-06

## 2023-02-06 RX ORDER — FENTANYL CITRATE 50 UG/ML
25 INJECTION, SOLUTION INTRAMUSCULAR; INTRAVENOUS EVERY 5 MIN PRN
Status: DISCONTINUED | OUTPATIENT
Start: 2023-02-06 | End: 2023-02-06 | Stop reason: HOSPADM

## 2023-02-06 RX ORDER — FLUMAZENIL 0.1 MG/ML
INJECTION INTRAVENOUS PRN
Status: DISCONTINUED | OUTPATIENT
Start: 2023-02-06 | End: 2023-02-06 | Stop reason: SDUPTHER

## 2023-02-06 RX ORDER — PHENYLEPHRINE HYDROCHLORIDE 10 MG/ML
INJECTION INTRAVENOUS PRN
Status: DISCONTINUED | OUTPATIENT
Start: 2023-02-06 | End: 2023-02-06 | Stop reason: SDUPTHER

## 2023-02-06 RX ORDER — FENTANYL CITRATE 50 UG/ML
INJECTION, SOLUTION INTRAMUSCULAR; INTRAVENOUS PRN
Status: DISCONTINUED | OUTPATIENT
Start: 2023-02-06 | End: 2023-02-06 | Stop reason: SDUPTHER

## 2023-02-06 RX ORDER — SODIUM CHLORIDE 9 MG/ML
INJECTION, SOLUTION INTRAVENOUS CONTINUOUS
Status: DISCONTINUED | OUTPATIENT
Start: 2023-02-06 | End: 2023-02-06 | Stop reason: HOSPADM

## 2023-02-06 RX ORDER — HYDROCODONE BITARTRATE AND ACETAMINOPHEN 5; 325 MG/1; MG/1
1 TABLET ORAL EVERY 4 HOURS PRN
Qty: 30 TABLET | Refills: 0 | Status: SHIPPED | OUTPATIENT
Start: 2023-02-06 | End: 2023-02-11

## 2023-02-06 RX ORDER — METOCLOPRAMIDE HYDROCHLORIDE 5 MG/ML
10 INJECTION INTRAMUSCULAR; INTRAVENOUS
Status: DISCONTINUED | OUTPATIENT
Start: 2023-02-06 | End: 2023-02-06 | Stop reason: HOSPADM

## 2023-02-06 RX ORDER — BUPIVACAINE HYDROCHLORIDE 5 MG/ML
INJECTION, SOLUTION EPIDURAL; INTRACAUDAL PRN
Status: DISCONTINUED | OUTPATIENT
Start: 2023-02-06 | End: 2023-02-06 | Stop reason: HOSPADM

## 2023-02-06 RX ORDER — ROCURONIUM BROMIDE 10 MG/ML
INJECTION, SOLUTION INTRAVENOUS PRN
Status: DISCONTINUED | OUTPATIENT
Start: 2023-02-06 | End: 2023-02-06 | Stop reason: SDUPTHER

## 2023-02-06 RX ORDER — DEXAMETHASONE SODIUM PHOSPHATE 10 MG/ML
INJECTION, EMULSION INTRAMUSCULAR; INTRAVENOUS PRN
Status: DISCONTINUED | OUTPATIENT
Start: 2023-02-06 | End: 2023-02-06 | Stop reason: SDUPTHER

## 2023-02-06 RX ORDER — MIDAZOLAM HYDROCHLORIDE 1 MG/ML
INJECTION INTRAMUSCULAR; INTRAVENOUS PRN
Status: DISCONTINUED | OUTPATIENT
Start: 2023-02-06 | End: 2023-02-06 | Stop reason: SDUPTHER

## 2023-02-06 RX ORDER — MEPERIDINE HYDROCHLORIDE 25 MG/ML
12.5 INJECTION INTRAMUSCULAR; INTRAVENOUS; SUBCUTANEOUS EVERY 4 HOURS PRN
Status: DISCONTINUED | OUTPATIENT
Start: 2023-02-06 | End: 2023-02-06 | Stop reason: HOSPADM

## 2023-02-06 RX ORDER — DIPHENHYDRAMINE HYDROCHLORIDE 50 MG/ML
12.5 INJECTION INTRAMUSCULAR; INTRAVENOUS
Status: DISCONTINUED | OUTPATIENT
Start: 2023-02-06 | End: 2023-02-06 | Stop reason: HOSPADM

## 2023-02-06 RX ADMIN — ROCURONIUM BROMIDE 40 MG: 50 INJECTION INTRAVENOUS at 07:33

## 2023-02-06 RX ADMIN — FENTANYL CITRATE 100 MCG: 50 INJECTION, SOLUTION INTRAMUSCULAR; INTRAVENOUS at 07:33

## 2023-02-06 RX ADMIN — DEXAMETHASONE SODIUM PHOSPHATE 4 MG: 10 INJECTION, EMULSION INTRAMUSCULAR; INTRAVENOUS at 07:37

## 2023-02-06 RX ADMIN — PROPOFOL 140 MG: 10 INJECTION, EMULSION INTRAVENOUS at 07:33

## 2023-02-06 RX ADMIN — HYDROMORPHONE HYDROCHLORIDE 0.8 MG: 2 INJECTION INTRAMUSCULAR; INTRAVENOUS; SUBCUTANEOUS at 07:51

## 2023-02-06 RX ADMIN — PHENYLEPHRINE HYDROCHLORIDE 100 MCG: 10 INJECTION INTRAVENOUS at 07:45

## 2023-02-06 RX ADMIN — PHENYLEPHRINE HYDROCHLORIDE 100 MCG: 10 INJECTION INTRAVENOUS at 07:39

## 2023-02-06 RX ADMIN — ONDANSETRON 4 MG: 2 INJECTION INTRAMUSCULAR; INTRAVENOUS at 07:56

## 2023-02-06 RX ADMIN — KETOROLAC TROMETHAMINE 30 MG: 30 INJECTION, SOLUTION INTRAMUSCULAR; INTRAVENOUS at 09:41

## 2023-02-06 RX ADMIN — Medication 50 MG: at 07:33

## 2023-02-06 RX ADMIN — CEFOXITIN 2000 MG: 2 INJECTION, POWDER, FOR SOLUTION INTRAVENOUS at 07:36

## 2023-02-06 RX ADMIN — HYDROMORPHONE HYDROCHLORIDE 0.4 MG: 2 INJECTION INTRAMUSCULAR; INTRAVENOUS; SUBCUTANEOUS at 07:53

## 2023-02-06 RX ADMIN — NALOXONE HYDROCHLORIDE 0.04 MG: 0.4 INJECTION, SOLUTION INTRAMUSCULAR; INTRAVENOUS; SUBCUTANEOUS at 08:31

## 2023-02-06 RX ADMIN — Medication 2.5 MG: at 07:13

## 2023-02-06 RX ADMIN — NALOXONE HYDROCHLORIDE 0.04 MG: 0.4 INJECTION, SOLUTION INTRAMUSCULAR; INTRAVENOUS; SUBCUTANEOUS at 08:35

## 2023-02-06 RX ADMIN — MIDAZOLAM 2 MG: 1 INJECTION INTRAMUSCULAR; INTRAVENOUS at 07:29

## 2023-02-06 RX ADMIN — SODIUM CHLORIDE: 9 INJECTION, SOLUTION INTRAVENOUS at 07:12

## 2023-02-06 RX ADMIN — SUGAMMADEX 200 MG: 100 INJECTION, SOLUTION INTRAVENOUS at 08:05

## 2023-02-06 RX ADMIN — PHENYLEPHRINE HYDROCHLORIDE 100 MCG: 10 INJECTION INTRAVENOUS at 07:43

## 2023-02-06 RX ADMIN — HYDROCODONE BITARTRATE AND ACETAMINOPHEN 1 TABLET: 5; 325 TABLET ORAL at 09:17

## 2023-02-06 RX ADMIN — FLUMAZENIL 0.2 MG: 0.1 INJECTION INTRAVENOUS at 08:37

## 2023-02-06 ASSESSMENT — PAIN DESCRIPTION - LOCATION
LOCATION: ABDOMEN
LOCATION: ABDOMEN

## 2023-02-06 ASSESSMENT — PAIN - FUNCTIONAL ASSESSMENT: PAIN_FUNCTIONAL_ASSESSMENT: 0-10

## 2023-02-06 ASSESSMENT — PAIN SCALES - GENERAL
PAINLEVEL_OUTOF10: 5
PAINLEVEL_OUTOF10: 5
PAINLEVEL_OUTOF10: 8
PAINLEVEL_OUTOF10: 8
PAINLEVEL_OUTOF10: 0
PAINLEVEL_OUTOF10: 3

## 2023-02-06 NOTE — DISCHARGE INSTRUCTIONS
DR. Moran Shells DISCHARGE INSTRUCTIONS    Pt Name: Tammy Trotter. [address] Record Number: [id number]  Today's Date: 2/6/2023  GENERAL ANESTHESIA OR SEDATION   1. Do not drive or operate hazardous machinery for 24 hours. 2. Do not make important business or personal decisions for 24 hours. 3. Do not drink alcoholic beverages or use tobacco for 24 hours. ACTIVITY INSTRUCTIONS   Do not drive for 3-5 days and avoid heavy lifting, tugging, pullings greater than 10-20 lbs until seen in the office. DIET INSTRUCTIONS   Begin with clear liquids. If not nauseated, may increase to a low-fat diet when you desire. Greasy and spicy foods are not advised. MEDICATIONS   Prescription written for Norco. Take as directed. Do not drink alcohol or drive while taking pain medications. You may experience dizziness or drowsiness with these medications. You may also experience constipation which can be relieved with stool softners or laxatives. You may resume your daily prescription medication schedule unless otherwise specified. Do not take 325mg Aspirin or other blood thinners such as Coumadin or Plavix for 5 days. WOUND & DRESSING INSTRUCTIONS   Always ensure you and your care giver clean hands before and after caring for the wound. Keep dressing clean and dry for 48 hours. Change when soiled or wet. Allow steri-strips to fall off on their own. Ice operative site for 20 minutes 4 times a day. May wash over incision in shower in 48 hours, but do not soak in a bath. ABDOMINAL & LAPAROSCOPIC PROCEDURES   1. You are encouraged to get up and move around as this helps with the circulation and speeds up the healing process. 2. Breath deeply and cough from time to time. This helps to clear your lungs and helps prevent pneumonia. 3. Supporting your incision with a pillow or your hand helps to minimize discomfort and pain.   4. Laparoscopic patients may develop shoulder pain in the first 48 hours from the gas used during the procedure. FOLLOW UP CARE, SPECIFICALLY WATCH FOR:    Fever over 101 degrees by mouth   Increased redness, warmth, hardness at operative site. Blood soaked dressing (small amounts of oozing may be normal.)   Increased or progressive drainage from the surgical area   Inability to urinate or blood in the urine   Pain not relieved by the medications ordered   Persistent nausea and/or vomiting, unable to retain fluids. FOLLOW-UP APPOINTMENT in 2 weeks    Call my office if you have any problem that concerns you, [phone]. After hours, you can reach the answering service via the office phone number. IF YOU NEED IMMEDIATE ATTENTION, GO TO THE EMERGENCY ROOM AND YOUR DOCTOR WILL BE CONTACTED.    [address]  [address]  [place], [facility]  Electronically signed by Pj Willoughby DO on 2/6/2023 at 8:30 AM

## 2023-02-06 NOTE — ANESTHESIA PRE PROCEDURE
Department of Anesthesiology  Preprocedure Note       Name:  Jaden Ma   Age:  32 y.o.  :  1990                                          MRN:  [id number]         Date:  2023      Surgeon: Surgeon(s):  Macario Olivares DO    Procedure: Procedure(s):  LAPAROSCOPIC CHOLECYSTECTOMY, POSS OPEN    Medications prior to admission:   Prior to Admission medications    Medication Sig Start Date End Date Taking? Authorizing Provider   lactobacillus (CULTURELLE) capsule Take 1 capsule by mouth 2 times daily (with meals) 23   Cleve Suarez MD   Multiple Vitamins-Minerals (THERAPEUTIC MULTIVITAMIN-MINERALS) tablet Take 1 tablet by mouth daily    Historical Provider, MD   carBAMazepine (TEGRETOL) 200 MG tablet take 1 tablet by mouth twice a day 22   Rohit Jay MD   lidocaine (LMX) 4 % cream Apply topically 3 times daily as needed    Historical Provider, MD   omeprazole (PRILOSEC) 40 MG delayed release capsule Take 1 capsule by mouth every morning (before breakfast) 22   HALI Parra CNP   docusate (COLACE, DULCOLAX) 100 MG CAPS Take 100 mg by mouth 2 times daily  Patient taking differently: Take 100 mg by mouth 2 times daily Pt taking PRN 22   HALI Parra CNP   gabapentin (NEURONTIN) 600 MG tablet Take 1 tablet by mouth 3 times daily for 180 days. 9/12/22 3/11/23  HALI Parra CNP   sucralfate (CARAFATE) 1 GM tablet Take 1 tablet by mouth 4 times daily  Patient taking differently: Take 1 g by mouth 4 times daily Patient says he usually only takes once daily 22   HALI Parra CNP   ondansetron (ZOFRAN) 4 MG tablet Take 1 tablet by mouth 3 times daily as needed for Nausea or Vomiting 22   HALI Parra CNP   buprenorphine-naloxone (SUBOXONE) 2-0.5 MG SUBL Place under the tongue. Indications: Opioid Dependence Take 3 tablets under the tounge and allow to dissolve once daily. Per Shannan. 3/24/21      NARCAN 4  MG/0.1ML LIQD nasal spray  3/3/21   Historical Provider, MD       Current medications:    No current facility-administered medications for this visit. No current outpatient medications on file. Facility-Administered Medications Ordered in Other Visits   Medication Dose Route Frequency Provider Last Rate Last Admin    sodium chloride flush 0.9 % injection 5-40 mL  5-40 mL IntraVENous 2 times per day Maretta Poke Wisser, DO        sodium chloride flush 0.9 % injection 5-40 mL  5-40 mL IntraVENous PRN Maretta Poke Wisser, DO        0.9 % sodium chloride infusion   IntraVENous PRN Maretta Poke Wisser, DO        cefOXitin (MEFOXIN) 2,000 mg in sodium chloride 0.9 % 50 mL IVPB (mini-bag)  2,000 mg IntraVENous On Call to [facility], DO        indocyanine green (IC-GREEN) syringe 2.5 mg  2.5 mg IntraVENous Once Maretta Poke Wisser, DO        0.9 % sodium chloride infusion   IntraVENous Continuous Maretta Poke Wisser, DO           Allergies:     Allergies   Allergen Reactions    Dilantin [Phenytoin Sodium Extended] Hives       Problem List:    Patient Active Problem List   Diagnosis Code    Chronic bilateral low back pain M54.50, G89.29    Seizure disorder (HCC) G40.909    Cigarette nicotine dependence without complication L77.921    Gastroesophageal reflux disease L52.8    Alcoholic liver disease ([facility] K70.9    Alcohol abuse F10.10    Opioid dependence on agonist therapy ([facility] F11.20    Chronic hepatitis C without hepatic coma ([facility] B18.2    Elevated liver enzymes R74.8    Tobacco use Z72.0    History of substance abuse ([facility] F19.11    Pharyngoesophageal dysphagia R13.14    Constipation K59.00    Gastritis K29.70    Polysubstance abuse ([facility] F19.10    Cholangitis K83.09    Choledocholithiasis K80.50    Sepsis without acute organ dysfunction (HCC) A41.9    Transaminitis R74.01    Hyperbilirubinemia E80.6       Past Medical History:        Diagnosis Date    Closed fracture of one rib of left side with routine healing 12/17/2020    Encounter for monitoring Suboxone maintenance therapy     on Suboxone[facility]managed [facility]    Hypertension     Liver laceration, closed, initial encounter 12/17/2020    Multiple closed fractures of pelvis with stable disruption of pelvic ring with routine healing 12/17/2020    Pneumothorax, traumatic     Seizures (HCC)        Past Surgical History:        Procedure Laterality Date    CT GUIDED CHEST TUBE  12/14/2020    CT GUIDED CHEST TUBE 12/14/2020 [facility] CT SCAN    ERCP N/A 1/10/2023    ERCP SPHINCTER/PAPILLOTOMY performed by Gokul Ambrose MD at [facility]    ERCP  1/10/2023    ERCP STONE REMOVAL performed by Gokul Ambrose MD at [facility]         Social History:    Social History     Tobacco Use    Smoking status: Every Day     Packs/day: 0.25     Years: 10.00     Pack years: 2.50     Types: Cigarettes     Start date: 12/29/2010    Smokeless tobacco: Never   Substance Use Topics    Alcohol use: Yes     Alcohol/week: 1.0 standard drink     Types: 1 Cans of beer per week     Comment: occasionally                                Ready to quit: Not Answered  Counseling given: Not Answered      Vital Signs (Current): There were no vitals filed for this visit.                                            BP Readings from Last 3 Encounters:   02/06/23 (!) 145/76   01/19/23 116/80   01/17/23 128/62       NPO Status:                                                                                 BMI:   Wt Readings from Last 3 Encounters:   02/06/23 129 lb 9.6 oz (58.8 kg)   01/19/23 139 lb (63 kg)   01/17/23 135 lb 1.6 oz (61.3 kg)     There is no height or weight on file to calculate BMI.    CBC:   Lab Results   Component Value Date/Time    WBC 10.8 01/10/2023 05:04 AM    RBC 3.65 01/10/2023 05:04 AM    HGB 11.2 01/10/2023 05:04 AM    HCT 34.7 01/10/2023 05:04 AM    MCV 95.1 01/10/2023 05:04 AM     01/10/2023 05:04 AM       CMP:   Lab Results   Component Value Date/Time     01/11/2023 06:37 AM    K 3.6 01/11/2023 06:37 AM    K 3.9 04/23/2021 09:32 PM     01/11/2023 06:37 AM    CO2 21 01/11/2023 06:37 AM    BUN 6 01/11/2023 06:37 AM    CREATININE 0.5 01/11/2023 06:37 AM    LABGLOM >60 01/11/2023 06:37 AM    GLUCOSE 83 01/11/2023 06:37 AM    PROT 6.3 01/11/2023 06:37 AM    CALCIUM 8.5 01/11/2023 06:37 AM    BILITOT 1.8 01/11/2023 06:37 AM    ALKPHOS 986 01/11/2023 06:37 AM    AST 25 01/11/2023 06:37 AM    ALT 26 01/11/2023 06:37 AM       POC Tests: No results for input(s): POCGLU, POCNA, POCK, POCCL, POCBUN, POCHEMO, POCHCT in the last 72 hours. Coags:   Lab Results   Component Value Date/Time    INR 0.97 01/13/2023 11:28 AM       HCG (If Applicable): No results found for: PREGTESTUR, PREGSERUM, HCG, HCGQUANT     ABGs: No results found for: PHART, PO2ART, THG9BPD, GFE6LLD, BEART, G0OGRIFZ     Type & Screen (If Applicable):  Lab Results   Component Value Date    LABRH NEG 12/11/2020       Drug/Infectious Status (If Applicable):  Lab Results   Component Value Date/Time    HEPCAB POSITIVE 01/08/2023 04:45 PM       COVID-19 Screening (If Applicable):   Lab Results   Component Value Date/Time    COVID19 NOT DETECTED 02/11/2021 11:15 PM           Anesthesia Evaluation  Patient summary reviewed and Nursing notes reviewed  Airway: Mallampati: II  TM distance: >3 FB   Neck ROM: full  Mouth opening: > = 3 FB   Dental: normal exam         Pulmonary:Negative Pulmonary ROS and normal exam                               Cardiovascular:    (+) hypertension: no interval change,       ECG reviewed      Echocardiogram reviewed                  Neuro/Psych:   (+) seizures: well controlled,              ROS comment: H/o TBI d/t seizure 2020  GI/Hepatic/Renal:   (+) GERD:, hepatitis: C and B, liver disease:,          ROS comment: Hep C p(possibly B as well: h/o liver laceration.    Endo/Other:                     Abdominal:             Vascular: negative vascular ROS. Other Findings:             Anesthesia Plan      general     ASA 2       Induction: intravenous. MIPS: Postoperative opioids intended and Prophylactic antiemetics administered. Anesthetic plan and risks discussed with patient.       Plan discussed with CRNA and surgical team.            h/o of polysubstance abuse: clean for 1 year       Tanner Bobo MD   2/6/2023

## 2023-02-06 NOTE — H&P
Patrick Calvo. [facility], [address]  [facility] [address]  [phone]  New Patient Evaluation in Office    Pt Name: Aurelio Roberto  Date of Birth 1990   Today's Date: 1/17/2023  Medical Record Number: [id number]  Referring Provider: Reyna Ballard  Primary Care Provider: HALI Valenzuela - CNP  Chief Complaint   Patient presents with    Surgical Consult     New patient- referred by Dr. Jerry Osman - Markedly distended gallbladder with stones- ERCP 1/10/23     ASSESSMENT       Diagnosis Orders   1. Chronic cholecystitis without calculus      S/P ERCP and stone extraction from CBD 1/10/23        Past Medical History:   Diagnosis Date    Closed fracture of one rib of left side with routine healing 12/17/2020    Encounter for monitoring Suboxone maintenance therapy     on Suboxone-managed [facility]    Hypertension     Liver laceration, closed, initial encounter 12/17/2020    Multiple closed fractures of pelvis with stable disruption of pelvic ring with routine healing 12/17/2020    Pneumothorax, traumatic     Seizures ([address]           PLANS      Schedule Shayne Bowels for L/S cholecystectomy possible open  The risks, options and alternatives were discussed at length with the patient. The risks including bleeding, infection, reoperation, bile duct injury and possible conversion to an open procedure were covered as well as nonresolution of pain. The possibility of other unforeseen complications including bile leak were also mentioned. Patient was made aware of intraoperative complications such as other organ injury or injury to surrounding structures. We also discussed the conduct of the operation, probable outpatient stay postoperatively and the typical post operative recovery and restrictions. The patients questions were answered. After this discussion, the patient would like to proceed with cholecystectomy.   Status: outpatient  Planned anesthesia: general  He will undergo pre-operative clearance per anesthesia guidelines with risk factors listed under the past medical history diagnosis & problem list.  Perioperative discontinuation of Suboxone     SUBJECTIVE      Kacey Uribe is a 27 y.o. male seen in the consultation for evaluation regarding gallbladder. Onset of symptoms was gradual 1 year ago with worsened, beginning 1 month ago since that time. The symptoms have included right upper quadrant pain, pain radiating to the back, and nausea and/or vomiting after meals. Kacey Uribe describes the pain as aching and pressure-like, recurrent and moderate in severity. His symptoms are worse with fatty foods and improved with avoiding these foods. He denies any history of pancreatitis, jaundice, pancreatitis or ulcer disease. Symptoms have been unrelieved by antacids. I have personally reviewed the following films:  US performed on 12/1/22 which showed gallstones.  He underwent ERCP and stone extraction from the CBD on 1/10/23  Past Medical History  Past Medical History:   Diagnosis Date    Closed fracture of one rib of left side with routine healing 12/17/2020    Encounter for monitoring Suboxone maintenance therapy     on Suboxone-managed [facility]    Hypertension     Liver laceration, closed, initial encounter 12/17/2020    Multiple closed fractures of pelvis with stable disruption of pelvic ring with routine healing 12/17/2020    Pneumothorax, traumatic     Seizures (HCC)      Past Surgical History  Past Surgical History:   Procedure Laterality Date    CT GUIDED CHEST TUBE  12/14/2020    CT GUIDED CHEST TUBE 12/14/2020 [facility] CT SCAN    ERCP N/A 1/10/2023    ERCP SPHINCTER/PAPILLOTOMY performed by Nadira Pardo MD at [facility]    ERCP  1/10/2023    ERCP STONE REMOVAL performed by Nadira Pardo MD at [address] [place] [address] EXTRACTION       Medications  Current Outpatient Medications   Medication Sig Dispense Refill    amoxicillin-clavulanate (AUGMENTIN) 875-125 MG per tablet Take 1 tablet by mouth every 12 hours for 19 doses 19 tablet 0    lactobacillus (CULTURELLE) capsule Take 1 capsule by mouth 2 times daily (with meals) 20 capsule 0    Multiple Vitamins-Minerals (THERAPEUTIC MULTIVITAMIN-MINERALS) tablet Take 1 tablet by mouth daily      carBAMazepine (TEGRETOL) 200 MG tablet take 1 tablet by mouth twice a day 60 tablet 2    lidocaine (LMX) 4 % cream Apply topically 3 times daily as needed      omeprazole (PRILOSEC) 40 MG delayed release capsule Take 1 capsule by mouth every morning (before breakfast) 90 capsule 1    docusate (COLACE, DULCOLAX) 100 MG CAPS Take 100 mg by mouth 2 times daily (Patient taking differently: Take 100 mg by mouth 2 times daily Pt taking PRN) 60 capsule 0    gabapentin (NEURONTIN) 600 MG tablet Take 1 tablet by mouth 3 times daily for 180 days. 270 tablet 1    sucralfate (CARAFATE) 1 GM tablet Take 1 tablet by mouth 4 times daily (Patient taking differently: Take 1 g by mouth 4 times daily Patient says he usually only takes once daily) 360 tablet 1    ondansetron (ZOFRAN) 4 MG tablet Take 1 tablet by mouth 3 times daily as needed for Nausea or Vomiting 15 tablet 0    buprenorphine-naloxone (SUBOXONE) 2-0.5 MG SUBL Place under the tongue. Indications: Opioid Dependence Take 3 tablets under the tounge and allow to dissolve once daily. Per Shannan. NARCAN 4 MG/0.1ML LIQD nasal spray        No current facility-administered medications for this visit. Allergies  is allergic to dilantin [phenytoin sodium extended]. Family History  family history includes No Known Problems in his mother; Other in his father. Social History   reports that he has been smoking cigarettes. He started smoking about 12 years ago. He has a 5.00 pack-year smoking history. He has never used smokeless tobacco. He reports current alcohol use of about 1.0 standard drink per week.  He reports that he does not currently use drugs after having used the following drugs: Opiates . Health Screening Exams  Health Maintenance   Topic Date Due    Hepatitis A vaccine (1 of 2 - Risk 2-dose series) Never done    Varicella vaccine (1 of 2 - 2-dose childhood series) Never done    Pneumococcal 0-64 years Vaccine (1 - PCV) Never done    Hepatitis B vaccine (1 of 3 - Risk 3-dose series) Never done    DTaP/Tdap/Td vaccine (1 - Tdap) Never done    COVID-19 Vaccine (3 - Booster for Moderna series) 10/21/2021    Flu vaccine (1) Never done    Depression Screen  07/22/2023    HIV screen  Completed    Hib vaccine  Aged Out    Meningococcal (ACWY) vaccine  Aged Out     Review of Systems  Constitutional:  Negative for activity change, appetite change, chills, diaphoresis, fatigue, fever and unexpected weight change. HENT:  Negative for congestion, dental problem, drooling, ear discharge, ear pain, facial swelling, hearing loss, mouth sores, nosebleeds, postnasal drip, rhinorrhea, sinus pressure, sneezing, sore throat, tinnitus, trouble swallowing and voice change. Eyes:  Negative for photophobia, pain, discharge, redness, itching and visual disturbance. Respiratory:  Negative for apnea, cough, choking, chest tightness, shortness of breath, wheezing and stridor. Cardiovascular:  Negative for chest pain, palpitations and leg swelling. Gastrointestinal:  Positive for abdominal distention, abdominal pain, nausea and vomiting. Negative for anal bleeding, blood in stool, constipation, diarrhea and rectal pain. Endocrine: Negative for cold intolerance, heat intolerance, polydipsia, polyphagia and polyuria. Genitourinary:  Negative for decreased urine volume, difficulty urinating, dysuria, enuresis, flank pain, frequency, genital sores, hematuria and urgency. Musculoskeletal:  Negative for arthralgias, back pain, gait problem, joint swelling, myalgias, neck pain and neck stiffness. Skin:  Negative for color change, pallor, rash and wound.    Allergic/Immunologic: Negative for environmental allergies, food allergies and immunocompromised state. Neurological:  Negative for dizziness, tremors, seizures, syncope, facial asymmetry, speech difficulty, weakness, light-headedness, numbness and headaches. Hematological:  Negative for adenopathy. Does not bruise/bleed easily. Psychiatric/Behavioral:  Negative for agitation, behavioral problems, confusion, decreased concentration, dysphoric mood, hallucinations, self-injury, sleep disturbance and suicidal ideas. The patient is not nervous/anxious and is not hyperactive. OBJECTIVE      VITALS:  height is 5' 7\" (1.702 m) and weight is 135 lb 1.6 oz (61.3 kg). His temporal temperature is 97.2 °F (36.2 °C). His blood pressure is 128/62 and his pulse is 72. His respiration is 18 and oxygen saturation is 98%. Pain Score:   4 Body mass index is 21.16 kg/m². CONSTITUTIONAL: Alert and oriented times 3, no acute distress and cooperative to examination with proper mood and affect. SKIN: Skin color, texture, turgor normal. No rashes or lesions. LYMPH: no cervical nodes, no inguinal nodes  HEENT: Head is normocephalic, atraumatic. EOMI, PERRLA. NECK: Supple, symmetrical, trachea midline, no adenopathy, thyroid symmetric, not enlarged and no tenderness, skin normal.  CHEST/LUNGS: chest symmetric with normal A/P diameter, normal respiratory rate and rhythm, lungs clear to auscultation without wheezes, rales or rhonchi. No accessory muscle use. Scars None   CARDIOVASCULAR: Heart sounds are normal.  Regular rate and rhythm without murmur, gallop or rub. Normal S1 and S2. Carotid and femoral pulses 2+/4 and equal bilaterally. ABDOMEN: Normal shape. No scar(s) present. Normal bowel sounds. No bruits. Soft, nontender, nondistended, no masses or organomegaly. no evidence of hernia. Percussion: Normal without hepatosplenomegally. Gallbladder is nonpalpable and non tender.   RECTAL: deferred, not clinically indicated  NEUROLOGIC: There are no focalizing motor or sensory deficits. CN II-XII are grossly intact. Laurel Tadeo EXTREMITIES: no cyanosis, no clubbing, and no edema. Thank you for the interesting evaluation. Further recommendations as listed above. Electronically signed by Palmira Worley DO on 1/17/2023 at 12:12 PM      DO RIC Do DR GENERAL SURGERY  History and Physical Update    Pt Name: Kemi Holloway Anne Cones  MRN: [id number]  YOB: 1990  Date of evaluation: 2/6/2023    I have examined the patient and reviewed the H&P/Consult and there are no changes to the patient or plans.          Electronically signed by Palmira Worley DO on 2/6/2023 at 6:35 AM

## 2023-02-06 NOTE — PROGRESS NOTES
Pt returned to [facility] room 5. Vitals and assessment as charted. 0.9 infusing, @800ml to count from PACU. Pt has muffin and cranberry juice. Pt verbalized understanding of discharge criteria and call light use. Call light in reach.

## 2023-02-06 NOTE — OP NOTE
[facility]  RECORD OF [facility]PERATION  PATIENT NAME: Bita Mayer. [address] RECORD NO. [id number]  SURGEON: Deondre Buck  Primary Care Physician: HALI Luther CNP     PROCEDURE PERFORMED:  2/6/2023  PREOPERATIVE DIAGNOSIS: Chronic cholecystitis with calculus [K80.10]  POSTOPERATIVE DIAGNOSIS: Same, path pending  PROCEDURE PERFORMED:  Laparoscopic cholecystectomy. SURGEON:  Dr. Power Olivares. ANESTHESIA:  General with local.  ESTIMATED BLOOD LOSS:  10 ml  SPECIMEN:  Gallbladder sent to pathology for analysis. COMPLICATIONS:  None immediately appreciated. DISCUSSION: Mirna Brandt is a 27y.o. year old male who was seen in evaluation at request of HALI Luther CNP regarding signs and symptoms, gallbladder disease, radiographic findings of cholelithiasis. After history and physical examination was performed potential diagnostic and therapeutic modalities discussed with the patient. Operative and non operative management was discussed. Laparoscopic and open approaches reviewed. He was given opportunity to ask questions. Once answered informed consent was obtained. He was brought to operating room on 2/6/2023 for procedure. OPERATIVE FINDINGS:  At time of laparoscopy liver contour was within normal limits. The gallbladder did have gallstones. Common bile duct was well visualized. Ductal structures well visualized. The remainder of abdominal viscera was unremarkable. Gallbladder removed as described below. PROCEDURE:  The patient was brought to the operating room and placed in supine position. Placed under continuous cardiac telemetry, blood pressure, pulse oximetry monitoring and placed under general anesthesia by anesthesia department. The anterior abdominal wall was prepped and draped in sterile fashion. 1 cm periumbilical incision made with #11 scalpel blade. 10 mm Optiview port inserted into the abdomen under direct visualization.   Pneumoperitoneum was created to total of 17 mm of Mercury. Visual inspection of the abdomen carried out. Please see operative findings above for specifics. An additional 10 mm port was placed to right lateral falciform ligament. Two 5 mm ports placed to right lateral abdominal wall under direct visualization with no injury to underlying viscera. The gallbladder then grasped, retracted up towards right shoulder. Pericholecystic adhesions lysed using blunt dissection. Blunt dissection through the triangle of Calot allowed adequate visualization of cystic duct and cystic artery. The common bile duct was seen and free of harm. The cystic duct and cystic artery were clipped using Endo clips, and incised using Endo rosana. The gallbladder then retracted laterally and dissected free off the gallbladder fossa using combination of blunt dissection and electrocautery. The gallbladder was then placed in a specimen bag and removed out the superior 10 mm port site. The fossa was reinspected. Adequate hemostasis appreciated. Clips noted to be in proper position. Common bile duct free of harm. No evidence of bile leak was evident. The right upper quadrant was copiously irrigated. Excess irrigation and fluid removed via suction. All ports and instruments removed from the patient's abdomen. Pneumoperitoneum  was reduced. Fascial defects approximated using 0 Vicryl suture. Skin edges was infiltrated with local anesthetic. Skin closed using 4-0 Vicryl suture in combination of single interrupted running subcuticular fashion. The wound was then cleansed prepared with Mastisol, Steri-Strips, sterile dressings were applied. Patient brought out of anesthesia, transferred to PACU in stable and satisfactory condition. No immediate complications evident. All sponge, instrument and needle counts were correct at the completion of procedure. Postoperative findings were discussed with the patient's family.   He was given discharge instructions, prescriptions for analgesics and antiemetics. He will follow up in my office in a 2-week period of time for reevaluation.       Electronically signed by Pj Willoughby DO on 2/6/2023 at 8:28 AM

## 2023-02-06 NOTE — PROGRESS NOTES
Pt admitted to [facility] room 5 and oriented to unit. SCD sleeves applied. Nares swabbed. Pt verbalized permission for first name, last initial and physicians name on white board. SDS board and discharge criteria explained, pt verbalized understanding. Pt denies thoughts of harming self or others. Call light in reach. Patient stated someone from [facility] will be transporting home after surgery.

## 2023-02-06 NOTE — PROGRESS NOTES
0387  pt. Awake and oriented on arrival to pacu. Pt. Denies pain. Abdominal sites x 4 with steristrips and bandaids intact and dry. Ice applied to abdomen. 6997  pt. Beginning to feel uncomfortable but states \"I can handle it\".  0901  pt. Cold. Body warmer applied. 7472  pt. Resting quietly with eyes closed. O2 discontinued. 1115  pt. Resting quietly with eyes closed. 1000  pt. Stable. Pacu criteria met. Early discharge per Dr. Judy Peguero.

## 2023-02-06 NOTE — ANESTHESIA POSTPROCEDURE EVALUATION
Department of Anesthesiology  Postprocedure Note    Patient: Uli Douglas  MRN: [id number]  YOB: 1990  Date of evaluation: 2/6/2023      Procedure Summary     Date: 02/06/23 Room / Location: [address] [facility] [place]    Anesthesia Start: 0730 Anesthesia Stop: 0240    Procedure: LAPAROSCOPIC CHOLECYSTECTOMY, POSS OPEN (Abdomen) Diagnosis:       Chronic cholecystitis with calculus      (Chronic cholecystitis with calculus [K80.10])    Surgeons: Tyronne Essex, DO Responsible Provider: Jose A Arreola MD    Anesthesia Type: general ASA Status: 2          Anesthesia Type: No value filed. Celia Phase I: Celia Score: 10    Celia Phase II: Celia Score: 10      Anesthesia Post Evaluation    Patient location during evaluation: PACU  Patient participation: complete - patient participated  Level of consciousness: awake and alert  Airway patency: patent  Nausea & Vomiting: no nausea and no vomiting  Complications: no  Cardiovascular status: hemodynamically stable  Respiratory status: acceptable  Hydration status: euvolemic      [address]  POST-ANESTHESIA NOTE       Name:  Uli Douglas                                         Age:  27 y.o.   MRN:  [id number]      Last Vitals:  /75   Pulse 77   Temp 97.9 °F (36.6 °C) (Temporal)   Resp 18   Ht 5' 7\" (1.702 m)   Wt 129 lb 9.6 oz (58.8 kg)   SpO2 96%   BMI 20.30 kg/m²   Patient Vitals for the past 4 hrs:   BP Temp Temp src Pulse Resp SpO2   02/06/23 1035 121/75 -- -- 77 18 96 %   02/06/23 1007 129/72 97.9 °F (36.6 °C) Temporal 77 16 95 %   02/06/23 0945 122/80 -- -- 73 14 96 %   02/06/23 0941 128/84 -- -- 72 13 97 %   02/06/23 0940 128/84 -- -- 71 14 95 %   02/06/23 0935 121/82 -- -- 73 17 92 %   02/06/23 0930 122/87 -- -- 71 16 95 %   02/06/23 0925 123/85 -- -- 70 (!) 9 98 %   02/06/23 0920 126/86 -- -- 73 24 98 %   02/06/23 0917 -- -- -- -- 11 --   02/06/23 0905 (!) 131/95 -- -- 72 14 100 %   02/06/23 0900 (!) 128/90 -- -- 78 14 100 %   02/06/23 0855 (!) 130/91 -- -- 70 13 100 %   02/06/23 0850 132/86 -- -- 69 (!) 9 100 %   02/06/23 0845 (!) 132/92 -- -- 73 (!) 9 100 %   02/06/23 0841 (!) 132/92 97.8 °F (36.6 °C) Temporal 81 12 100 %       Level of Consciousness:  Awake    Respiratory:  Stable    Oxygen Saturation:  Stable    Cardiovascular:  Stable    Hydration:  Adequate    PONV:  Stable    Post-op Pain:  Adequate analgesia    Post-op Assessment:  No apparent anesthetic complications    Additional Follow-Up / Treatment / Comment:  None    Stevenson Velazquez MD  February 6, 2023   10:40 AM

## 2023-02-06 NOTE — PROGRESS NOTES
Pt has met discharge criteria and states he is ready for discharge to home. IV removed, gauze and tape applied. Dressed in own clothes and personal belongings gathered. Discharge instructions (with opioid medication education information) given to pt and family; pt and family verbalized understanding of discharge instructions, prescriptions and follow up appointments. Pt transported to discharge lobby by [facility] staff.

## 2023-02-07 ENCOUNTER — TELEPHONE (OUTPATIENT)
Dept: SURGERY | Age: 33
End: 2023-02-07

## 2023-02-07 NOTE — TELEPHONE ENCOUNTER
S/p lap kenneth-2/6/2023 Patient doing well, no nausea or vomiting. Urinating fine. Incisions look good. Up moving around. Eating and drinking. Instructed patient to call with any issues or concerns.

## 2023-02-15 ENCOUNTER — MYC REFILL (OUTPATIENT)
Dept: FAMILY MEDICINE | Facility: OTHER | Age: 33
End: 2023-02-15
Payer: COMMERCIAL

## 2023-02-15 DIAGNOSIS — F90.0 ADHD (ATTENTION DEFICIT HYPERACTIVITY DISORDER), INATTENTIVE TYPE: ICD-10-CM

## 2023-02-15 RX ORDER — DEXTROAMPHETAMINE SACCHARATE, AMPHETAMINE ASPARTATE MONOHYDRATE, DEXTROAMPHETAMINE SULFATE AND AMPHETAMINE SULFATE 5; 5; 5; 5 MG/1; MG/1; MG/1; MG/1
20 CAPSULE, EXTENDED RELEASE ORAL DAILY
Qty: 30 CAPSULE | Refills: 0 | Status: SHIPPED | OUTPATIENT
Start: 2023-02-15 | End: 2023-04-20

## 2023-02-15 RX ORDER — DEXTROAMPHETAMINE SACCHARATE, AMPHETAMINE ASPARTATE, DEXTROAMPHETAMINE SULFATE AND AMPHETAMINE SULFATE 2.5; 2.5; 2.5; 2.5 MG/1; MG/1; MG/1; MG/1
10 TABLET ORAL DAILY
Qty: 30 TABLET | Refills: 0 | Status: SHIPPED | OUTPATIENT
Start: 2023-02-15 | End: 2023-04-24

## 2023-02-15 NOTE — TELEPHONE ENCOUNTER
Pending Prescriptions:                       Disp   Refills    amphetamine-dextroamphetamine (ADDERALL XR*30 cap*0        Sig: Take 1 capsule (20 mg) by mouth daily    amphetamine-dextroamphetamine (ADDERALL) 1*30 tab*0        Sig: Take 1 tablet (10 mg) by mouth daily In the early           afternoon        Routing refill request to provider for review/approval because:  Drug not on the [facility] refill protocol

## 2023-03-10 NOTE — TELEPHONE ENCOUNTER
RECORDS RECEIVED FROM:    REASON FOR VISIT: Migraines and Dizziness    Date of Appt: 5/25/2023   NOTES (FOR ALL VISITS) STATUS DETAILS   OFFICE NOTE from referring provider Kumar Quintero-1/4/2023   OFFICE NOTE from other specialist Scanned to Chart/Media Tab [facility]    DISCHARGE SUMMARY from hospital     DISCHARGE REPORT from the ER     OPERATIVE REPORT     [facility] (MS ONLY)     EMG     EEG     MEDICATION LIST     IMAGING  (FOR ALL VISITS)     LUMBAR PUNCTURE     SEAMUS SCAN     ULTRASOUND (CAROTID BILAT) *VASCULAR*     MRI (HEAD, NECK, SPINE) No Images     CT (HEAD, NECK, SPINE) No Images

## 2023-03-22 DIAGNOSIS — G89.29 CHRONIC BILATERAL LOW BACK PAIN, UNSPECIFIED WHETHER SCIATICA PRESENT: ICD-10-CM

## 2023-03-22 DIAGNOSIS — G40.909 SEIZURE DISORDER (HCC): ICD-10-CM

## 2023-03-22 DIAGNOSIS — M54.50 CHRONIC BILATERAL LOW BACK PAIN, UNSPECIFIED WHETHER SCIATICA PRESENT: ICD-10-CM

## 2023-03-22 DIAGNOSIS — K21.9 GASTROESOPHAGEAL REFLUX DISEASE, UNSPECIFIED WHETHER ESOPHAGITIS PRESENT: ICD-10-CM

## 2023-03-23 RX ORDER — GABAPENTIN 600 MG/1
TABLET ORAL
Qty: 270 TABLET | Refills: 1 | Status: SHIPPED | OUTPATIENT
Start: 2023-03-23 | End: 2023-09-19

## 2023-03-23 RX ORDER — OMEPRAZOLE 40 MG/1
CAPSULE, DELAYED RELEASE ORAL
Qty: 90 CAPSULE | Refills: 1 | Status: SHIPPED | OUTPATIENT
Start: 2023-03-23

## 2023-03-23 RX ORDER — CARBAMAZEPINE 200 MG/1
TABLET ORAL
Qty: 60 TABLET | Refills: 2 | Status: SHIPPED | OUTPATIENT
Start: 2023-03-23

## 2023-04-18 ENCOUNTER — HOSPITAL ENCOUNTER (OUTPATIENT)
Dept: MRI IMAGING | Age: 33
Discharge: HOME | End: 2023-04-18

## 2023-04-18 DIAGNOSIS — B18.2 CHRONIC HEPATITIS C WITH HEPATIC COMA (HCC): ICD-10-CM

## 2023-04-18 DIAGNOSIS — K76.89 LIVER CYST: ICD-10-CM

## 2023-04-20 ENCOUNTER — MYC REFILL (OUTPATIENT)
Dept: FAMILY MEDICINE | Facility: OTHER | Age: 33
End: 2023-04-20
Payer: COMMERCIAL

## 2023-04-20 DIAGNOSIS — F90.0 ADHD (ATTENTION DEFICIT HYPERACTIVITY DISORDER), INATTENTIVE TYPE: ICD-10-CM

## 2023-04-20 RX ORDER — DEXTROAMPHETAMINE SACCHARATE, AMPHETAMINE ASPARTATE, DEXTROAMPHETAMINE SULFATE AND AMPHETAMINE SULFATE 2.5; 2.5; 2.5; 2.5 MG/1; MG/1; MG/1; MG/1
10 TABLET ORAL DAILY
Qty: 30 TABLET | Refills: 0 | Status: CANCELLED | OUTPATIENT
Start: 2023-04-20

## 2023-04-22 ENCOUNTER — HEALTH MAINTENANCE LETTER (OUTPATIENT)
Age: 33
End: 2023-04-22

## 2023-04-24 RX ORDER — DEXTROAMPHETAMINE SACCHARATE, AMPHETAMINE ASPARTATE MONOHYDRATE, DEXTROAMPHETAMINE SULFATE AND AMPHETAMINE SULFATE 5; 5; 5; 5 MG/1; MG/1; MG/1; MG/1
20 CAPSULE, EXTENDED RELEASE ORAL DAILY
Qty: 30 CAPSULE | Refills: 0 | Status: SHIPPED | OUTPATIENT
Start: 2023-04-24 | End: 2023-05-15

## 2023-04-24 RX ORDER — DEXTROAMPHETAMINE SACCHARATE, AMPHETAMINE ASPARTATE, DEXTROAMPHETAMINE SULFATE AND AMPHETAMINE SULFATE 2.5; 2.5; 2.5; 2.5 MG/1; MG/1; MG/1; MG/1
10 TABLET ORAL DAILY
Qty: 30 TABLET | Refills: 0 | Status: SHIPPED | OUTPATIENT
Start: 2023-05-03 | End: 2023-05-15

## 2023-04-24 NOTE — TELEPHONE ENCOUNTER
Pending Prescriptions:                       Disp   Refills    amphetamine-dextroamphetamine (ADDERALL XR*30 cap*0        Sig: Take 1 capsule (20 mg) by mouth daily    amphetamine-dextroamphetamine (ADDERALL) 1*30 tab*0        Sig: Take 1 tablet (10 mg) by mouth daily In the early           afternoon    Routing refill request to provider for review/approval because:  Drug not on the [facility] refill protocol   A break in medication    Ana Herron RN on 4/24/2023 at 9:57 AM

## 2023-05-12 ENCOUNTER — MYC MEDICAL ADVICE (OUTPATIENT)
Dept: FAMILY MEDICINE | Facility: OTHER | Age: 33
End: 2023-05-12

## 2023-05-15 ENCOUNTER — OFFICE VISIT (OUTPATIENT)
Dept: FAMILY MEDICINE | Facility: OTHER | Age: 33
End: 2023-05-15
Payer: COMMERCIAL

## 2023-05-15 VITALS
BODY MASS INDEX: 22.54 KG/M2 | HEIGHT: 71 IN | DIASTOLIC BLOOD PRESSURE: 64 MMHG | HEART RATE: 68 BPM | WEIGHT: 161 LBS | TEMPERATURE: 97.4 F | SYSTOLIC BLOOD PRESSURE: 102 MMHG | RESPIRATION RATE: 20 BRPM | OXYGEN SATURATION: 98 %

## 2023-05-15 DIAGNOSIS — F90.0 ADHD (ATTENTION DEFICIT HYPERACTIVITY DISORDER), INATTENTIVE TYPE: ICD-10-CM

## 2023-05-15 DIAGNOSIS — R20.9 COLD HANDS AND FEET: Primary | ICD-10-CM

## 2023-05-15 DIAGNOSIS — E16.2 HYPOGLYCEMIA: ICD-10-CM

## 2023-05-15 DIAGNOSIS — R53.83 FATIGUE, UNSPECIFIED TYPE: ICD-10-CM

## 2023-05-15 LAB
ALBUMIN SERPL BCG-MCNC: 4.3 G/DL (ref 3.5–5.2)
ALP SERPL-CCNC: 56 U/L (ref 40–129)
ALT SERPL W P-5'-P-CCNC: 26 U/L (ref 10–50)
ANION GAP SERPL CALCULATED.3IONS-SCNC: 8 MMOL/L (ref 7–15)
AST SERPL W P-5'-P-CCNC: 27 U/L (ref 10–50)
BILIRUB SERPL-MCNC: 0.8 MG/DL
BUN SERPL-MCNC: 16.9 MG/DL (ref 6–20)
CALCIUM SERPL-MCNC: 9.2 MG/DL (ref 8.6–10)
CHLORIDE SERPL-SCNC: 103 MMOL/L (ref 98–107)
CREAT SERPL-MCNC: 0.88 MG/DL (ref 0.67–1.17)
CREAT UR-MCNC: 140 MG/DL
DEPRECATED HCO3 PLAS-SCNC: 31 MMOL/L (ref 22–29)
ERYTHROCYTE [DISTWIDTH] IN BLOOD BY AUTOMATED COUNT: 15 % (ref 10–15)
GFR SERPL CREATININE-BSD FRML MDRD: >90 ML/MIN/1.73M2
GLUCOSE SERPL-MCNC: 60 MG/DL (ref 70–99)
HCT VFR BLD AUTO: 44.1 % (ref 40–53)
HGB BLD-MCNC: 14.3 G/DL (ref 13.3–17.7)
MCH RBC QN AUTO: 27.6 PG (ref 26.5–33)
MCHC RBC AUTO-ENTMCNC: 32.4 G/DL (ref 31.5–36.5)
MCV RBC AUTO: 85 FL (ref 78–100)
PLATELET # BLD AUTO: 179 10E3/UL (ref 150–450)
POTASSIUM SERPL-SCNC: 4.4 MMOL/L (ref 3.4–5.3)
PROT SERPL-MCNC: 7 G/DL (ref 6.4–8.3)
RBC # BLD AUTO: 5.18 10E6/UL (ref 4.4–5.9)
SODIUM SERPL-SCNC: 142 MMOL/L (ref 136–145)
TSH SERPL DL<=0.005 MIU/L-ACNC: 0.39 UIU/ML (ref 0.3–4.2)
WBC # BLD AUTO: 7 10E3/UL (ref 4–11)

## 2023-05-15 PROCEDURE — 83036 HEMOGLOBIN GLYCOSYLATED A1C: CPT | Performed by: PHYSICIAN ASSISTANT

## 2023-05-15 PROCEDURE — 36415 COLL VENOUS BLD VENIPUNCTURE: CPT | Performed by: PHYSICIAN ASSISTANT

## 2023-05-15 PROCEDURE — 85027 COMPLETE CBC AUTOMATED: CPT | Performed by: PHYSICIAN ASSISTANT

## 2023-05-15 PROCEDURE — 84443 ASSAY THYROID STIM HORMONE: CPT | Performed by: PHYSICIAN ASSISTANT

## 2023-05-15 PROCEDURE — 82607 VITAMIN B-12: CPT | Performed by: PHYSICIAN ASSISTANT

## 2023-05-15 PROCEDURE — 80053 COMPREHEN METABOLIC PANEL: CPT | Performed by: PHYSICIAN ASSISTANT

## 2023-05-15 PROCEDURE — 99214 OFFICE O/P EST MOD 30 MIN: CPT | Performed by: PHYSICIAN ASSISTANT

## 2023-05-15 RX ORDER — DEXTROAMPHETAMINE SACCHARATE, AMPHETAMINE ASPARTATE MONOHYDRATE, DEXTROAMPHETAMINE SULFATE AND AMPHETAMINE SULFATE 6.25; 6.25; 6.25; 6.25 MG/1; MG/1; MG/1; MG/1
25 CAPSULE, EXTENDED RELEASE ORAL EVERY MORNING
Qty: 30 CAPSULE | Refills: 0 | Status: SHIPPED | OUTPATIENT
Start: 2023-05-15 | End: 2023-06-29

## 2023-05-15 ASSESSMENT — PAIN SCALES - GENERAL: PAINLEVEL: NO PAIN (0)

## 2023-05-15 ASSESSMENT — PATIENT HEALTH QUESTIONNAIRE - PHQ9: SUM OF ALL RESPONSES TO PHQ QUESTIONS 1-9: 12

## 2023-05-15 NOTE — PROGRESS NOTES
Assessment & Plan       ICD-10-CM    1. Cold hands and feet  R20.9 TSH with free T4 reflex     CBC with platelets     Comprehensive metabolic panel (BMP + Alb, Alk Phos, ALT, AST, Total. Bili, TP)     Vitamin B12     TSH with free T4 reflex     CBC with platelets     Comprehensive metabolic panel (BMP + Alb, Alk Phos, ALT, AST, Total. Bili, TP)     Vitamin B12      2. Fatigue, unspecified type  R53.83 TSH with free T4 reflex     CBC with platelets     Comprehensive metabolic panel (BMP + Alb, Alk Phos, ALT, AST, Total. Bili, TP)     Vitamin B12     TSH with free T4 reflex     CBC with platelets     Comprehensive metabolic panel (BMP + Alb, Alk Phos, ALT, AST, Total. Bili, TP)     Vitamin B12      3. ADHD (attention deficit hyperactivity disorder), inattentive type  F90.0 amphetamine-dextroamphetamine (ADDERALL XR) 25 MG 24 hr capsule     Drug Confirmation Panel Urine with Creat - lab collect     Drug Confirmation Panel Urine with Creat - lab collect          1-2. Increased symptoms of cold hands and feet over the past few months along with some increased fatigue and seldom night sweats. His peripheral pulses are normal with good capillary refill. This could be a Raynaud's time phenomenon so I recommend he try to keep his hands and feet as warm as possible with gloves, warm socks, etc. Will also check labs today as noted above to rule out any thyroid or metabolic causes. So far, CBC has come back normal. If he has any new or worsening symptoms, he will contact the clinic.     3. Will increase Adderall XR to 25 mg daily. Updated UDS completed today and PDMP reviewed without concerning findings. He would like to remain off the 10 mg Adderall as it never did much for him. Will recheck in 3 months.       NETTE Moreno [facility]    Nadine Fletcher is a 32 year old, presenting for the following health issues:  Recheck Medication        5/15/2023    10:25 AM   Additional Questions  "  Roomed by Jodie COSME   Accompanied by self         5/15/2023    10:25 AM   Patient Reported Additional Medications   Patient reports taking the following new medications NA     History of Present Illness       Reason for visit:  Refill meds and discuss new symptoms  Symptom onset:  More than a month  Symptoms include:  Cold arms, hands, and feet. Weak and fatigued. Purple hands, poor circulation.  Symptom intensity:  Mild  Symptom progression:  Staying the same  Had these symptoms before:  No  What makes it worse:  Cold weather  What makes it better:  Hot shower or lying down under covers    He eats 2-3 servings of fruits and vegetables daily.He consumes 0 sweetened beverage(s) daily.He exercises with enough effort to increase his heart rate 30 to 60 minutes per day.  He exercises with enough effort to increase his heart rate 4 days per week. He is missing 2 dose(s) of medications per week.  He is not taking prescribed medications regularly due to remembering to take.       He feels that his hands and feet have been colder over the past 6 months. He had been taking cold showers in the morning to help \"wake up\" so thought this was causing it but then he stopped doing this and the hand and foot coldness remained. It seems worse in his hands as he noticed intermittent numbness and tingling in his finger tips as well. His hands look \"purple\" at times but he denies any whitish or purplish discoloration in the fingers themselves. He also has had intermittent cold, night sweats. He has a history of night sweats intermittently in the past but usually when he is too hot. He also feels slightly more fatigued over the past few months. He states there is a family history of thyroid disease so would like labs checked today.      SUBJECTIVE:  Chance is here today to recheck ADHD/ADD.    Updates since last visit: dosing issue    Routine for taking medicine, including time: first thing in the morning  Time medicine wears off: " mid-afternoon   Issues at school/work: none  Issues at home: none  Control of symptoms: most of the time    Side effects:  Headaches: No  Stomach aches: No  Irritability/mood swings: Yes with the 10mg tablets  Difficulties with sleep: Yes always  Social withdrawal: No  Unusual movements/tics: No  Decreased appetite: No    Other concerns: He would like to try a higher dose of the Adderall XR as the 20 mg wears off to quick.     Patient Active Problem List   Diagnosis     Irritable bowel syndrome with both constipation and diarrhea     Labral tear of shoulder     ADHD (attention deficit hyperactivity disorder), inattentive type       Past Medical History:   Diagnosis Date     Depressive disorder 2016     Marijuana use 1/4/2019       Past Surgical History:   Procedure Laterality Date     ABDOMEN SURGERY  2013/2016    Hernia repair     ARTHROSCOPY SHOULDER SUPERIOR LABRUM ANTERIOR TO POSTERIOR REPAIR Left 2012     COLONOSCOPY N/A 7/23/2021    Procedure: Colonoscopy, With Polypectomy And Biopsy;  Surgeon: Geneva Patel DO;  Location: [facility] OR     COLONOSCOPY WITH CO2 INSUFFLATION N/A 7/23/2021    Procedure: COLONOSCOPY, WITH CO2 INSUFFLATION;  Surgeon: Geneva Patel DO;  Location: [facility] OR     HERNIA REPAIR Left 2013    open     HERNIA REPAIR Right 2016    laparoscopic       Current Outpatient Medications   Medication     amphetamine-dextroamphetamine (ADDERALL XR) 20 MG 24 hr capsule     amphetamine-dextroamphetamine (ADDERALL) 10 MG tablet     No current facility-administered medications for this visit.     Results for orders placed or performed in visit on 05/15/23   CBC with platelets     Status: Normal   Result Value Ref Range    WBC Count 7.0 4.0 - 11.0 10e3/uL    RBC Count 5.18 4.40 - 5.90 10e6/uL    Hemoglobin 14.3 13.3 - 17.7 g/dL    Hematocrit 44.1 40.0 - 53.0 %    MCV 85 78 - 100 fL    MCH 27.6 26.5 - 33.0 pg    MCHC 32.4 31.5 - 36.5 g/dL    RDW 15.0 10.0 - 15.0 %    Platelet Count 179 150 - 450 10e3/uL  "  Drug Confirmation Panel Urine with Creat - lab collect     Status: None (In process)    Narrative    The following orders were created for panel order Drug Confirmation Panel Urine with Creat - lab collect.  Procedure                               Abnormality         Status                     ---------                               -----------         ------                     Urine Drug Confirmation ...[[id number]]                      In process                 Urine Creatinine for Melecio...[[id number]]                      In process                   Please view results for these tests on the individual orders.       Review of Systems   Constitutional, HEENT, cardiovascular, pulmonary, psych, gi and gu systems are negative, except as otherwise noted.      Objective    /64   Pulse 68   Temp 97.4  F (36.3  C) (Temporal)   Resp 20   Ht 1.81 m (5' 11.26\")   Wt 73 kg (161 lb)   SpO2 98%   BMI 22.29 kg/m    Body mass index is 22.29 kg/m .  Physical Exam   GENERAL: healthy, alert and no distress  RESP: lungs clear to auscultation - no rales, rhonchi or wheezes  CV: regular rate and rhythm, normal S1 S2, no S3 or S4, no murmur, click or rub, peripheral pulses are strong  NEURO: Normal strength and tone, mentation intact and speech normal. Gait is stable.  PSYCH: mentation appears normal, affect normal/bright  SKIN: Hands feels lightly cool but capillary refill is normal.             "

## 2023-05-15 NOTE — PATIENT INSTRUCTIONS
Will check some labs today to further evaluate your symptoms.    Keep hands and feet warm.    Your circulations is good but this could be Raynaud's.    Will increase Adderall XR to 25 mg.    Follow-up in 3 months.

## 2023-05-15 NOTE — LETTER
[facility] CARA Veblen  05/15/23  Patient: Chance Torres  YOB: 1990  Medical Record Number: [id number]                                                                                  Non-Opioid Controlled Substance Agreement    This is an agreement between you and your provider regarding safe and appropriate use of controlled substances prescribed by your care team. Controlled substances are?medicines that can cause physical and mental dependence (abuse).     There are strict laws about having and using these medicines. We here at [facility] are  committed to working with you in your efforts to get better. To support you in this work, we'll help you schedule regular office appointments for medicine refills. If we must cancel or change your appointment for any reason, we'll make sure you have enough medicine to last until your next appointment.     As a Provider, I will:   Listen carefully to your concerns while treating you with respect.   Recommend a treatment plan that I believe is in your best interest and may involve therapies other than medicine.    Talk with you often about the possible benefits and the risk of harm of any medicine that we prescribe for you.  Assess the safety of this medicine and check how well it works.    Provide a plan on how to taper (discontinue or go off) using this medicine if the decision is made to stop its use.      ::  As a Patient, I understand controlled substances:     Are prescribed by my care provider to help me function or work and manage my condition(s).?  Are strong medicines and can cause serious side effects.     Need to be taken exactly as prescribed.?Combining controlled substances with certain medicines or chemicals (such as illegal drugs, alcohol, sedatives, sleeping pills, and benzodiazepines) can be dangerous or even fatal.? If I stop taking my medicines suddenly, I may have severe withdrawal symptoms.     The risks, benefits,  and side effects of these medicine(s) were explained to me. I agree that:    I will take part in other treatments as advised by my care team. This may be psychiatry or counseling, physical therapy, behavioral therapy, group treatment or a referral to specialist.    I will keep all my appointments and understand this is part of the monitoring of controlled substances.?My care team may require an office visit for EVERY controlled substance refill. If I miss appointments or don t follow instructions, my care team may stop my medicine    I will take my medicines as prescribed. I will not change the dose or schedule unless my care team tells me to. There will be no refills if I run out early.      I may be asked to come to the clinic and complete a urine drug test or complete a pill count. If I don t give a urine sample or participate in a pill count, the care team may stop my medicine.    I will only receive controlled substance prescriptions from this clinic. If I am treated by another provider, I will tell them that I am taking controlled substances and that I have a treatment agreement with this provider. I will inform my [facility] care team within one business day if I am given a prescription for any controlled substance by another healthcare provider. My [facility] care team can contact other providers and pharmacists about my use of any medicines.    It is up to me to make sure that I don't run out of my medicines on weekends or holidays.?If my care team is willing to refill my prescription without a visit, I must request refills only during office hours. Refills may take up to 3 business days to process. I will use one pharmacy to fill all my controlled substance prescriptions. I will notify the clinic about any changes to my insurance or medicine availability.    I am responsible for my prescriptions. If the medicine/prescription is lost, stolen or destroyed, it will not be replaced.?I also agree  not to share controlled substance medicines with anyone.     I am aware I should not use any illegal or recreational drugs. I agree not to drink alcohol unless my care team says I can.     If I enroll in the [place] Medical Cannabis program, I will tell my care team before my next refill.    I will tell my care team right away if I become pregnant, have a new medical problem treated outside of my regular clinic, or have a change in my medicines.     I understand that this medicine can affect my thinking, judgment and reaction time.? Alcohol and drugs affect the brain and body, which can affect the safety of my driving. Being under the influence of alcohol or drugs can affect my decision-making, behaviors, personal safety and the safety of others. Driving while impaired (DWI) can occur if a person is driving, operating or in physical control of a car, motorcycle, boat, snowmobile, ATV, motorbike, off-road vehicle or any other motor vehicle ([place] Statute 169A.20). I understand the risk if I choose to drive or operate any vehicle or machinery.    I understand that if I do not follow any of the conditions above, my prescriptions or treatment may be stopped or changed.   I agree that my provider, clinic care team and pharmacy may work with any city, state or federal law enforcement agency that investigates the misuse, sale or other diversion of my controlled medicine. I will allow my provider to discuss my care with, or share a copy of, this agreement with any other treating provider, pharmacy or emergency room where I receive care.     I have read this agreement and have asked questions about anything I did not understand.    ________________________________________________________  Patient Signature - Chance Torres     ___________________                   Date     ________________________________________________________  Provider Signature - Prashant Escobar PA-C       ___________________                   Date      ________________________________________________________  Witness Signature (required if provider not present while patient signing)          ___________________                   Date

## 2023-05-16 LAB
HBA1C MFR BLD: 5.4 % (ref 0–5.6)
VIT B12 SERPL-MCNC: 682 PG/ML (ref 232–1245)

## 2023-05-17 LAB
AMPHET UR CFM-MCNC: 2540 NG/ML
AMPHET/CREAT UR: 1814 NG/MG {CREAT}

## 2023-05-25 ENCOUNTER — MYC MEDICAL ADVICE (OUTPATIENT)
Dept: FAMILY MEDICINE | Facility: OTHER | Age: 33
End: 2023-05-25

## 2023-05-25 ENCOUNTER — VIRTUAL VISIT (OUTPATIENT)
Dept: NEUROLOGY | Facility: CLINIC | Age: 33
End: 2023-05-25
Attending: OTOLARYNGOLOGY
Payer: COMMERCIAL

## 2023-05-25 ENCOUNTER — PRE VISIT (OUTPATIENT)
Dept: NEUROLOGY | Facility: CLINIC | Age: 33
End: 2023-05-25

## 2023-05-25 DIAGNOSIS — E16.2 HYPOGLYCEMIA: ICD-10-CM

## 2023-05-25 DIAGNOSIS — E05.90 SUBCLINICAL HYPERTHYROIDISM: Primary | ICD-10-CM

## 2023-05-25 DIAGNOSIS — M62.81 MUSCLE WEAKNESS (GENERALIZED): ICD-10-CM

## 2023-05-25 DIAGNOSIS — R53.83 OTHER FATIGUE: ICD-10-CM

## 2023-05-25 DIAGNOSIS — H53.8 BLURRED VISION: Primary | ICD-10-CM

## 2023-05-25 DIAGNOSIS — R42 DIZZINESS: ICD-10-CM

## 2023-05-25 DIAGNOSIS — R20.0 HAND NUMBNESS: ICD-10-CM

## 2023-05-25 DIAGNOSIS — G43.109 OCULAR MIGRAINE: ICD-10-CM

## 2023-05-25 DIAGNOSIS — Z82.0 FAMILY HX OF ALS (AMYOTROPHIC LATERAL SCLEROSIS): ICD-10-CM

## 2023-05-25 PROCEDURE — 99204 OFFICE O/P NEW MOD 45 MIN: CPT | Mod: VID | Performed by: NURSE PRACTITIONER

## 2023-05-25 ASSESSMENT — MIGRAINE DISABILITY ASSESSMENT (MIDAS)
HOW MANY DAYS DID YOU NOT DO HOUSEWORK BECAUSE OF HEADACHES: 0
ON A SCALE FROM 0-10 ON AVERAGE HOW PAINFUL WERE HEADACHES: 0
HOW MANY DAYS WAS HOUSEWORK PRODUCTIVITY CUT IN HALF DUE TO HEADACHES: 0
HOW MANY DAYS IN THE PAST 3 MONTHS HAVE YOU HAD A HEADACHE: 0
TOTAL SCORE: 0
HOW MANY DAYS WAS YOUR PRODUCTIVITY CUT IN HALF BECAUSE OF HEADACHES: 0
HOW OFTEN WERE SOCIAL ACTIVITIES MISSED DUE TO HEADACHES: 0
HOW MANY DAYS DID YOU MISS WORK OR SCHOOL BECAUSE OF HEADACHES: 0

## 2023-05-25 ASSESSMENT — HEADACHE IMPACT TEST (HIT 6)
HOW OFTEN DO HEADACHES LIMIT YOUR DAILY ACTIVITIES: SOMETIMES
HOW OFTEN HAVE YOU FELT FED UP OR IRRITATED BECAUSE OF YOUR HEADACHES: RARELY
HOW OFTEN HAVE YOU FELT TOO TIRED TO WORK BECAUSE OF YOUR HEADACHES: SOMETIMES
WHEN YOU HAVE A HEADACHE HOW OFTEN DO YOU WISH YOU COULD LIE DOWN: RARELY
WHEN YOU HAVE HEADACHES HOW OFTEN IS THE PAIN SEVERE: RARELY
HOW OFTEN DID HEADACHS LIMIT CONCENTRATION ON WORK OR DAILY ACTIVITY: SOMETIMES
HIT6 TOTAL SCORE: 54

## 2023-05-25 ASSESSMENT — PATIENT HEALTH QUESTIONNAIRE - PHQ9: SUM OF ALL RESPONSES TO PHQ QUESTIONS 1-9: 18

## 2023-05-25 NOTE — LETTER
5/25/2023       RE: Chance Torres  [address]  [facility] [address]       Dear Colleague,    Thank you for referring your patient, Chance Torres, to the [facility] [place] at [facility]. Please see a copy of my visit note below.    Chance is a 32 year old who is being evaluated via a billable video visit.      ASSESSMENT AND PLAN:  Constellation of symptoms-dizziness with standing, fatigue, chills at night -every day and freezing cold, tired and fatigued -gets worse outdoor and better when moves around and warming out hands, blurry vision persistent. Fatigue comes after being active. Used to work out a lot and felt good. Hard to reinstate work out due to fatigue in general.   No headaches -no physical feel of pain  Migraines rare -typical symptoms -light more than noise sensitivity, pain is the temples and used to get a blurry vision -last time 6 month ago. Blurry vision now comes and goes.     Plan:  Updated eye exam -ophthalmology referral   Brain MRI for visual symptoms, fatigue, dizziness  General neurology referral for muscle weakness, dizziness, fatigue, blurry vision, hands numbness   Schedule a follow up with primary to go over lab result questions and follow up   Follow up after eye exam, imaging and general neurology visit if needed       Subjective   Chance is a 32 year old, presenting for the following health issues:  Video Visit (Dizziness, ocular migraine )    HPI   Over a few years -blurry vision and cannot see on one side. Other symptoms crippling on around same time.   Daily blurry vision 1-2 hours and hard to think straight. When scheduled this visit -was dizzy, fatigue, blurry vision but does not hurt. More generalized symptoms. Dizzy with position changes for a year and almost feeling of pass out. No palpitation or chest pain. No orthostatic BP was checked. Gets dizzy very fast outside-gets fatigued.   Used to  "work in the heat -would get dizzy sometimes.    Weakness in shins and feet -feels like heavier and has been a decline.   Seen at [facility] and normal.     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 Took a blood test and saw PCP. Wonders of his results and meaning. 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  RESP: No audible wheeze, cough, or visible cyanosis.  No visible retractions or increased work of breathing.    SKIN: Visible skin clear. No significant rash, abnormal pigmentation or lesions.  NEURO: Face is symmetrical and symmetrical facial expressions, no weakness  Mentation and speech appropriate for age.  PSYCH: Mentation appears normal, affect normal, judgement and insight intact, normal speech and appearance well-groomed.    I discussed all my recommendations with Chance Torres who verbalizes understanding and comfortable with the plan.      45 minutes spent on the date of the encounter doing video access, chart  review, results review,  meds review, treatment plan, documentation and further activities as noted above                Again, thank you for allowing me to participate in the care of your patient.      Sincerely,    MORRIS Couch CNP      "

## 2023-05-25 NOTE — PROGRESS NOTES
Chance is a 32 year old who is being evaluated via a billable video visit.      ASSESSMENT AND PLAN:  Constellation of symptoms-dizziness with standing, fatigue, chills at night -every day and freezing cold, tired and fatigued -gets worse outdoor and better when moves around and warming out hands, blurry vision persistent. Fatigue comes after being active. Used to work out a lot and felt good. Hard to reinstate work out due to fatigue in general.   No headaches -no physical feel of pain  Migraines rare -typical symptoms -light more than noise sensitivity, pain is the temples and used to get a blurry vision -last time 6 month ago. Blurry vision now comes and goes.     Plan:  Updated eye exam -ophthalmology referral   Brain MRI for visual symptoms, fatigue, dizziness  General neurology referral for muscle weakness, dizziness, fatigue, blurry vision, hands numbness   Schedule a follow up with primary to go over lab result questions and follow up   Follow up after eye exam, imaging and general neurology visit if needed       Subjective   Chance is a 32 year old, presenting for the following health issues:  Video Visit (Dizziness, ocular migraine )    HPI   Over a few years -blurry vision and cannot see on one side. Other symptoms crippling on around same time.   Daily blurry vision 1-2 hours and hard to think straight. When scheduled this visit -was dizzy, fatigue, blurry vision but does not hurt. More generalized symptoms. Dizzy with position changes for a year and almost feeling of pass out. No palpitation or chest pain. No orthostatic BP was checked. Gets dizzy very fast outside-gets fatigued.   Used to work in the heat -would get dizzy sometimes.    Weakness in shins and feet -feels like heavier and has been a decline.   Seen at [facility] and normal.     Started mid winter -Every morning cold and numbness in finger tips new in the winter and never been sensitive, dizzy spells, if does any work  "-yard work -fatigued, dizzy, light sensitive, reflexion from a car light -blinds. Took a blood test and saw PCP. Wonders of his results and meaning. 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  RESP: No audible wheeze, cough, or visible cyanosis.  No visible retractions or increased work of breathing.    SKIN: Visible skin clear. No significant rash, abnormal pigmentation or lesions.  NEURO: Face is symmetrical and symmetrical facial expressions, no weakness  Mentation and speech appropriate for age.  PSYCH: Mentation appears normal, affect normal, judgement and insight intact, normal speech and appearance well-groomed.    I discussed all my recommendations with Chance Torres who verbalizes understanding and comfortable with the plan.      45 minutes spent on the date of the encounter doing video access, chart  review, results review,  meds review, treatment plan, documentation and further activities as noted above    MORRIS Dye, CNP [facility]  Headache certified  [facility]    Video-Visit Details    Type of service:  Video Visit   Originating Location (pt. Location): Home  Distant Location (provider location):  Off-site  Platform used for Video Visit: Flaquito"

## 2023-05-25 NOTE — PATIENT INSTRUCTIONS
Plan:  Updated eye exam -ophthalmology referral   Brain MRI for visual symptoms, fatigue, dizziness  General neurology referral for muscle weakness, dizziness, fatigue, blurry vision, hands numbness   Schedule a follow up with primary to go over lab results and follow up   Follow up after eye exam, imaging and general neurology visit if needed      Vascular Surgery

## 2023-05-25 NOTE — NURSING NOTE
Is the patient currently in the state of [place]? YES    Visit mode:VIDEO    If the visit is dropped, the patient can be reconnected by: TELEPHONE VISIT: Phone number: [phone]    Will anyone else be joining the visit? NO      How would you like to obtain your AVS? MyChart    Are changes needed to the allergy or medication list? NO    Reason for visit: Video Visit (Dizziness, ocular migraine )

## 2023-06-02 ENCOUNTER — TELEPHONE (OUTPATIENT)
Dept: NEUROLOGY | Facility: CLINIC | Age: 33
End: 2023-06-02
Payer: COMMERCIAL

## 2023-06-02 NOTE — TELEPHONE ENCOUNTER
Left Voicemail (1st Attempt) and Sent Inga (1st Attempt) for the patient to call back and schedule the following:    Appointment type: MRI  Provider: N/A  Return date: next available   Specialty phone number: [phone]  Additional appointment(s) needed:   -MR Brain w/o & w Contrast  Additonal Notes: LVM, sent Lennie Calvo on 6/2/2023 at 11:01 AM

## 2023-06-08 ENCOUNTER — TELEPHONE (OUTPATIENT)
Dept: NEUROLOGY | Facility: CLINIC | Age: 33
End: 2023-06-08
Payer: COMMERCIAL

## 2023-06-08 NOTE — TELEPHONE ENCOUNTER
Left Voicemail (2nd Attempt), Left Voicemail with Family Member (2nd Attempt) and Sent Inga (2nd Attempt) for the patient to call back and schedule the following:    Appointment type: General Nuerology  Provider: Any provider  Return date: next avail [facility]   Specialty phone number: [phone]  Additional appointment(s) needed: N/A  Additonal Notes: LV, sent Lennie Calvo on 6/8/2023 at 11:21 AM

## 2023-06-22 NOTE — PROGRESS NOTES
History of Present Illness - Chance Torres is a 32 year old male presenting in clinic today for a recheck on Patient presents with:  RECHECK:  Dizziness        Patient presents for follow-up to recheck on his dizziness disequilibrium.  It was felt to be migraine related possibly ocular migraine.  He has not seen neurologist yet.  However saw endocrine because of some marginal findings of borderline normal TSH.  The second time in the last 4 years her TSH is hovering slightly above borderline low.  However he does not endorse any other symptoms consistent with hyper or hypothyroidism or thyroiditis.  He has had some nonspecific numbness in his upper extremities.  He associated that more with a thyroid issue.  Vitamin B12 232 - 1,245 pg/mL 682      TSH 0.30 - 4.20 uIU/mL 0.39          Present Symptoms include: vertigo and they are   stable .  Chance denies otolgia.      Body mass index is 23.01 kg/m .        BP Readings from Last 1 Encounters:   06/28/23 116/62       BP noted to be well controlled today in office.     Chance IS NOT a smoker/uses chewing tobacco.    Past Medical History -   Past Medical History:   Diagnosis Date     Depressive disorder 2016     Marijuana use 1/4/2019       Current Medications -   Current Outpatient Medications:      amphetamine-dextroamphetamine (ADDERALL XR) 25 MG 24 hr capsule, Take 1 capsule (25 mg) by mouth every morning, Disp: 30 capsule, Rfl: 0    Allergies - No Known Allergies    Social History -   Social History     Socioeconomic History     Marital status:    Tobacco Use     Smoking status: Never     Smokeless tobacco: Former     Quit date: 5/11/2016   Vaping Use     Vaping Use: Never used   Substance and Sexual Activity     Alcohol use: Not Currently     Comment: rarely     Drug use: Not Currently     Types: Marijuana     Comment: has been over a year     Sexual activity: Yes     Partners: Female     Birth control/protection: Condom   Other Topics Concern      "Parent/sibling w/ CABG, MI or angioplasty before 65F 55M? No       Family History -   Family History   Problem Relation Age of Onset     Mental Illness Brother         Not diagnosed by doctor or therapist.  Refuses to contact them     Mental Illness Brother      Cerebrovascular Disease Paternal Grandmother      Thyroid Disease Mother      Unknown/Adopted Mother        Review of Systems - As per HPI and PMHx, otherwise review of system review of the head and neck negative. Otherwise 10+ review of system is negative    Physical Exam  /62   Temp (!) 96.7  F (35.9  C) (Temporal)   Ht 1.803 m (5' 11\")   Wt 74.8 kg (165 lb)   BMI 23.01 kg/m    BMI: Body mass index is 23.01 kg/m .    General - The patient is well nourished and well developed, and appears to have good nutritional status.  Alert and oriented to person and place, answers questions and cooperates with examination appropriately.    SKIN - No suspicious lesions or rashes.  Respiration - No respiratory distress.  Head and Face - Normocephalic and atraumatic, with no gross asymmetry noted of the contour of the facial features.  The facial nerve is intact, with strong symmetric movements.    Voice and Breathing - The patient was breathing comfortably without the use of accessory muscles. The patients voice was clear and strong, and had appropriate pitch and quality.    Ears - Bilateral pinna and EACs with normal appearing overlying skin. Tympanic membrane intact with good mobility on pneumatic otoscopy bilaterally. Bony landmarks of the ossicular chain are normal. The tympanic membranes are normal in appearance. No retraction, perforation, or masses.  No fluid or purulence was seen in the external canal or the middle ear.     Eyes - Extraocular movements intact.  Sclera were not icteric or injected, conjunctiva were pink and moist.    Mouth - Examination of the oral cavity showed pink, healthy oral mucosa. No lesions or ulcerations noted.  The tongue was " mobile and midline, and the dentition were in good condition.      Throat - The walls of the oropharynx were smooth, pink, moist, symmetric, and had no lesions or ulcerations.  The tonsillar pillars and soft palate were symmetric. The uvula was midline on elevation.    Neck - Normal midline excursion of the laryngotracheal complex during swallowing.  Full range of motion on passive movement.  Palpation of the occipital, submental, submandibular, internal jugular chain, and supraclavicular nodes did not demonstrate any abnormal lymph nodes or masses.  The carotid pulse was palpable bilaterally.  Palpation of the thyroid was soft and smooth, with no nodules or goiter appreciated.  The trachea was mobile and midline.    Nose - External contour is symmetric, no gross deflection or scars.  Nasal mucosa is pink and moist with no abnormal mucus.  The septum was midline and non-obstructive, turbinates of normal size and position.  No polyps, masses, or purulence noted on examination.    Neuro - Nonfocal neuro exam is normal, CN 2 through 12 intact, normal gait and muscle tone.      Performed in clinic today:  No procedures preformed in clinic today      A/P - Chance Torres is a 32 year old male Patient presents with:  RECHECK:  Dizziness        Patient with nonspecific dizziness also some of the peripheral neurological symptoms and history of migraine.  I strongly urged patient to see neurology.  Another  referral was made.  In the meantime he will also have endocrinology investigating started further.    Chance should follow up as needed.            Rodriguez Quintero MD

## 2023-06-28 ENCOUNTER — OFFICE VISIT (OUTPATIENT)
Dept: OTOLARYNGOLOGY | Facility: OTHER | Age: 33
End: 2023-06-28
Payer: COMMERCIAL

## 2023-06-28 VITALS
SYSTOLIC BLOOD PRESSURE: 116 MMHG | HEIGHT: 71 IN | WEIGHT: 165 LBS | BODY MASS INDEX: 23.1 KG/M2 | TEMPERATURE: 96.7 F | DIASTOLIC BLOOD PRESSURE: 62 MMHG

## 2023-06-28 DIAGNOSIS — R42 DIZZINESS: Primary | ICD-10-CM

## 2023-06-28 PROCEDURE — 99213 OFFICE O/P EST LOW 20 MIN: CPT | Performed by: OTOLARYNGOLOGY

## 2023-06-28 ASSESSMENT — PAIN SCALES - GENERAL: PAINLEVEL: NO PAIN (0)

## 2023-06-28 NOTE — LETTER
6/28/2023         RE: Chance Torres  [address]  [facility] [address]        Dear Colleague,    Thank you for referring your patient, Chance Torres, to the [facility]. Please see a copy of my visit note below.    History of Present Illness - Chance Torres is a 32 year old male presenting in clinic today for a recheck on Patient presents with:  RECHECK:  Dizziness        Patient presents for follow-up to recheck on his dizziness disequilibrium.  It was felt to be migraine related possibly ocular migraine.  He has not seen neurologist yet.  However saw endocrine because of some marginal findings of borderline normal TSH.  The second time in the last 4 years her TSH is hovering slightly above borderline low.  However he does not endorse any other symptoms consistent with hyper or hypothyroidism or thyroiditis.  He has had some nonspecific numbness in his upper extremities.  He associated that more with a thyroid issue.  Vitamin B12 232 - 1,245 pg/mL 682      TSH 0.30 - 4.20 uIU/mL 0.39          Present Symptoms include: vertigo and they are   stable .  Chance denies otolgia.      Body mass index is 23.01 kg/m .        BP Readings from Last 1 Encounters:   06/28/23 116/62       BP noted to be well controlled today in office.     Chance IS NOT a smoker/uses chewing tobacco.    Past Medical History -   Past Medical History:   Diagnosis Date     Depressive disorder 2016     Marijuana use 1/4/2019       Current Medications -   Current Outpatient Medications:      amphetamine-dextroamphetamine (ADDERALL XR) 25 MG 24 hr capsule, Take 1 capsule (25 mg) by mouth every morning, Disp: 30 capsule, Rfl: 0    Allergies - No Known Allergies    Social History -   Social History     Socioeconomic History     Marital status:    Tobacco Use     Smoking status: Never     Smokeless tobacco: Former     Quit date: 5/11/2016   Vaping Use     Vaping Use: Never used   Substance and Sexual  "Activity     Alcohol use: Not Currently     Comment: rarely     Drug use: Not Currently     Types: Marijuana     Comment: has been over a year     Sexual activity: Yes     Partners: Female     Birth control/protection: Condom   Other Topics Concern     Parent/sibling w/ CABG, MI or angioplasty before 65F 55M? No       Family History -   Family History   Problem Relation Age of Onset     Mental Illness Brother         Not diagnosed by doctor or therapist.  Refuses to contact them     Mental Illness Brother      Cerebrovascular Disease Paternal Grandmother      Thyroid Disease Mother      Unknown/Adopted Mother        Review of Systems - As per HPI and PMHx, otherwise review of system review of the head and neck negative. Otherwise 10+ review of system is negative    Physical Exam  /62   Temp (!) 96.7  F (35.9  C) (Temporal)   Ht 1.803 m (5' 11\")   Wt 74.8 kg (165 lb)   BMI 23.01 kg/m    BMI: Body mass index is 23.01 kg/m .    General - The patient is well nourished and well developed, and appears to have good nutritional status.  Alert and oriented to person and place, answers questions and cooperates with examination appropriately.    SKIN - No suspicious lesions or rashes.  Respiration - No respiratory distress.  Head and Face - Normocephalic and atraumatic, with no gross asymmetry noted of the contour of the facial features.  The facial nerve is intact, with strong symmetric movements.    Voice and Breathing - The patient was breathing comfortably without the use of accessory muscles. The patients voice was clear and strong, and had appropriate pitch and quality.    Ears - Bilateral pinna and EACs with normal appearing overlying skin. Tympanic membrane intact with good mobility on pneumatic otoscopy bilaterally. Bony landmarks of the ossicular chain are normal. The tympanic membranes are normal in appearance. No retraction, perforation, or masses.  No fluid or purulence was seen in the external canal or " the middle ear.     Eyes - Extraocular movements intact.  Sclera were not icteric or injected, conjunctiva were pink and moist.    Mouth - Examination of the oral cavity showed pink, healthy oral mucosa. No lesions or ulcerations noted.  The tongue was mobile and midline, and the dentition were in good condition.      Throat - The walls of the oropharynx were smooth, pink, moist, symmetric, and had no lesions or ulcerations.  The tonsillar pillars and soft palate were symmetric. The uvula was midline on elevation.    Neck - Normal midline excursion of the laryngotracheal complex during swallowing.  Full range of motion on passive movement.  Palpation of the occipital, submental, submandibular, internal jugular chain, and supraclavicular nodes did not demonstrate any abnormal lymph nodes or masses.  The carotid pulse was palpable bilaterally.  Palpation of the thyroid was soft and smooth, with no nodules or goiter appreciated.  The trachea was mobile and midline.    Nose - External contour is symmetric, no gross deflection or scars.  Nasal mucosa is pink and moist with no abnormal mucus.  The septum was midline and non-obstructive, turbinates of normal size and position.  No polyps, masses, or purulence noted on examination.    Neuro - Nonfocal neuro exam is normal, CN 2 through 12 intact, normal gait and muscle tone.      Performed in clinic today:  No procedures preformed in clinic today      A/P - Chance M Brian is a 32 year old male Patient presents with:  RECHECK:  Dizziness        Patient with nonspecific dizziness also some of the peripheral neurological symptoms and history of migraine.  I strongly urged patient to see neurology.  Another  referral was made.  In the meantime he will also have endocrinology investigating started further.    Chance should follow up as needed.            Rodriguez Quintero MD            Again, thank you for allowing me to participate in the care of your patient.         Sincerely,        Rodriguez Quintero MD, MD

## 2023-06-29 ENCOUNTER — MYC REFILL (OUTPATIENT)
Dept: FAMILY MEDICINE | Facility: OTHER | Age: 33
End: 2023-06-29
Payer: COMMERCIAL

## 2023-06-29 DIAGNOSIS — F90.0 ADHD (ATTENTION DEFICIT HYPERACTIVITY DISORDER), INATTENTIVE TYPE: ICD-10-CM

## 2023-06-29 RX ORDER — DEXTROAMPHETAMINE SACCHARATE, AMPHETAMINE ASPARTATE MONOHYDRATE, DEXTROAMPHETAMINE SULFATE AND AMPHETAMINE SULFATE 6.25; 6.25; 6.25; 6.25 MG/1; MG/1; MG/1; MG/1
25 CAPSULE, EXTENDED RELEASE ORAL EVERY MORNING
Qty: 30 CAPSULE | Refills: 0 | Status: SHIPPED | OUTPATIENT
Start: 2023-06-29 | End: 2023-08-05

## 2023-06-29 NOTE — TELEPHONE ENCOUNTER
Pending Prescriptions:                       Disp   Refills    amphetamine-dextroamphetamine (ADDERALL XR*30 cap*0        Sig: Take 1 capsule (25 mg) by mouth every morning    Routing refill request to provider for review/approval because:  Drug not on the [facility] refill protocol     Requested Prescriptions   Pending Prescriptions Disp Refills    amphetamine-dextroamphetamine (ADDERALL XR) 25 MG 24 hr capsule 30 capsule 0     Sig: Take 1 capsule (25 mg) by mouth every morning       There is no refill protocol information for this order

## 2023-08-05 ENCOUNTER — MYC REFILL (OUTPATIENT)
Dept: FAMILY MEDICINE | Facility: OTHER | Age: 33
End: 2023-08-05
Payer: COMMERCIAL

## 2023-08-05 DIAGNOSIS — F90.0 ADHD (ATTENTION DEFICIT HYPERACTIVITY DISORDER), INATTENTIVE TYPE: ICD-10-CM

## 2023-08-07 ENCOUNTER — MYC REFILL (OUTPATIENT)
Dept: FAMILY MEDICINE | Facility: OTHER | Age: 33
End: 2023-08-07
Payer: COMMERCIAL

## 2023-08-07 DIAGNOSIS — F90.0 ADHD (ATTENTION DEFICIT HYPERACTIVITY DISORDER), INATTENTIVE TYPE: ICD-10-CM

## 2023-08-07 RX ORDER — DEXTROAMPHETAMINE SACCHARATE, AMPHETAMINE ASPARTATE MONOHYDRATE, DEXTROAMPHETAMINE SULFATE AND AMPHETAMINE SULFATE 6.25; 6.25; 6.25; 6.25 MG/1; MG/1; MG/1; MG/1
25 CAPSULE, EXTENDED RELEASE ORAL EVERY MORNING
Qty: 30 CAPSULE | Refills: 0 | OUTPATIENT
Start: 2023-08-07

## 2023-08-07 RX ORDER — DEXTROAMPHETAMINE SACCHARATE, AMPHETAMINE ASPARTATE MONOHYDRATE, DEXTROAMPHETAMINE SULFATE AND AMPHETAMINE SULFATE 6.25; 6.25; 6.25; 6.25 MG/1; MG/1; MG/1; MG/1
25 CAPSULE, EXTENDED RELEASE ORAL EVERY MORNING
Qty: 30 CAPSULE | Refills: 0 | Status: SHIPPED | OUTPATIENT
Start: 2023-08-07 | End: 2023-11-22

## 2023-08-14 ENCOUNTER — VIRTUAL VISIT (OUTPATIENT)
Dept: FAMILY MEDICINE | Facility: OTHER | Age: 33
End: 2023-08-14
Payer: COMMERCIAL

## 2023-08-14 DIAGNOSIS — R04.0 EPISTAXIS: ICD-10-CM

## 2023-08-14 DIAGNOSIS — E05.90 SUBCLINICAL HYPERTHYROIDISM: ICD-10-CM

## 2023-08-14 DIAGNOSIS — F90.0 ADHD (ATTENTION DEFICIT HYPERACTIVITY DISORDER), INATTENTIVE TYPE: Primary | ICD-10-CM

## 2023-08-14 PROCEDURE — 99213 OFFICE O/P EST LOW 20 MIN: CPT | Mod: VID | Performed by: PHYSICIAN ASSISTANT

## 2023-08-14 RX ORDER — DEXTROAMPHETAMINE SACCHARATE, AMPHETAMINE ASPARTATE MONOHYDRATE, DEXTROAMPHETAMINE SULFATE AND AMPHETAMINE SULFATE 6.25; 6.25; 6.25; 6.25 MG/1; MG/1; MG/1; MG/1
25 CAPSULE, EXTENDED RELEASE ORAL EVERY MORNING
Qty: 30 CAPSULE | Refills: 0 | Status: SHIPPED | OUTPATIENT
Start: 2023-10-07 | End: 2023-11-22

## 2023-08-14 RX ORDER — DEXTROAMPHETAMINE SACCHARATE, AMPHETAMINE ASPARTATE MONOHYDRATE, DEXTROAMPHETAMINE SULFATE AND AMPHETAMINE SULFATE 6.25; 6.25; 6.25; 6.25 MG/1; MG/1; MG/1; MG/1
25 CAPSULE, EXTENDED RELEASE ORAL EVERY MORNING
Qty: 30 CAPSULE | Refills: 0 | Status: SHIPPED | OUTPATIENT
Start: 2023-09-07 | End: 2023-11-22

## 2023-08-14 NOTE — PATIENT INSTRUCTIONS
Continue Adderall.    Stop the Flonase.    Follow-up in 6 months, sooner if nose bleeds are not improving.

## 2023-08-14 NOTE — PROGRESS NOTES
Chance is a 32 year old who is being evaluated via a billable video visit.      How would you like to obtain your AVS? MyChart  If the video visit is dropped, the invitation should be resent by: Text to cell phone: [phone]  Will anyone else be joining your video visit? No        Assessment & Plan       ICD-10-CM    1. ADHD (attention deficit hyperactivity disorder), inattentive type  F90.0 amphetamine-dextroamphetamine (ADDERALL XR) 25 MG 24 hr capsule     amphetamine-dextroamphetamine (ADDERALL XR) 25 MG 24 hr capsule      2. Epistaxis  R04.0       3. Subclinical hyperthyroidism  E05.90           1. 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 CSA and UDS are up to date. Will refill for 3 months and then plan on follow-up in 6 months.    2. Increased right sided nose bleeds again. I recommend he stop the Flonase and use Vaseline and saline nasal spray instead. If not improving, he can schedule with myself or ENT for cauterization.    3. He has an appointment with endocrinology in November given his decreased TSH numbers.    Follow-up in 6 months.       Prashant Escobar PA-C  [facility]   Chance is a 32 year old, presenting for the following health issues:  A.D.H.D        8/14/2023    11:47 AM   Additional Questions   Roomed by jeremi   Accompanied by self       History of Present Illness       Reason for visit:  Adhd follow up          Chance is here today to recheck ADHD/ADD.    Updates since last visit: pt says very stable, some days are harder than others    Routine for taking medicine, including time: pt will take it first thing in the morning with breakfast   Time medicine wears off: pt says later afternoon  Issues at work: none  Issues at home: none  Control of symptoms: Yes    Side effects:  Headaches: Yes - not frequent  Stomach aches: Yes - not  frequent  Irritability/mood swings: Yes - not frequent  Difficulties with sleep: No - pt says he always does and isn't sure if its related  Social withdrawal: No  Unusual movements/tics: No  Decreased appetite: Yes - pt says if he takes it with food it suppresses it a little bit    Other concerns: pt says he's starting to have nose bleeds again, had one the other day and it may be due to dehydration and the heat. Used to have frequent nose bleeds before getting it cauterized. He has been using Flonase over the past 1-2 months so is wondering if this is contributing to the nose bleeds at all. It is only the right side that has been bleeding.    He states he's trying to limit caffeine and it helps a lot with the jittery feeling.    His fatigue has overall improved since starting some vitamins/supplements.      Review of Systems   Constitutional, HEENT, cardiovascular, pulmonary, gi and gu systems are negative, except as otherwise noted.      Objective    Vitals - Patient Reported  Pain Score: No Pain (0)      Vitals:  No vitals were obtained today due to virtual visit.    Physical Exam   GENERAL: Healthy, alert and no distress  EYES: Eyes grossly normal to inspection.  No discharge or erythema, or obvious scleral/conjunctival abnormalities.  RESP: No audible wheeze, cough, or visible cyanosis.  No visible retractions or increased work of breathing.    SKIN: Visible skin clear. No significant rash, abnormal pigmentation or lesions.  NEURO: Cranial nerves grossly intact.  Mentation and speech appropriate for age.  PSYCH: Mentation appears normal, affect normal/bright, judgement and insight intact, normal speech and appearance well-groomed.        Video-Visit Details    Type of service:  Video Visit     Originating Location (pt. Location): Home  Distant Location (provider location):  Off-site  Platform used for Video Visit: "[employer]"

## 2023-10-04 DIAGNOSIS — K21.9 GASTROESOPHAGEAL REFLUX DISEASE, UNSPECIFIED WHETHER ESOPHAGITIS PRESENT: ICD-10-CM

## 2023-10-04 RX ORDER — OMEPRAZOLE 40 MG/1
CAPSULE, DELAYED RELEASE ORAL
Qty: 90 CAPSULE | Refills: 0 | Status: SHIPPED | OUTPATIENT
Start: 2023-10-04

## 2023-10-04 NOTE — TELEPHONE ENCOUNTER
Recent Visits  Date Type Provider Dept   01/19/23 Office Visit HALI Glass CNP [facility]   09/12/22 Office Visit HALI Glass CNP [facility]   07/22/22 Office Visit HALI Glass CNP [facility]   Showing recent visits within past 540 days with a meds authorizing provider and meeting all other requirements  Future Appointments  No visits were found meeting these conditions.   Showing future appointments within next 150 days with a meds authorizing provider and meeting all other requirements

## 2023-10-25 ENCOUNTER — MYC MEDICAL ADVICE (OUTPATIENT)
Dept: FAMILY MEDICINE | Facility: OTHER | Age: 33
End: 2023-10-25
Payer: COMMERCIAL

## 2023-10-25 DIAGNOSIS — E16.2 HYPOGLYCEMIA: ICD-10-CM

## 2023-10-25 DIAGNOSIS — E05.90 SUBCLINICAL HYPERTHYROIDISM: Primary | ICD-10-CM

## 2023-10-25 NOTE — TELEPHONE ENCOUNTER
To provider to advise regarding request to have orders placed for updated bloodwork done for thyroid before his endocrinology appointment in November.  TSH   Date Value Ref Range Status   05/15/2023 0.39 0.30 - 4.20 uIU/mL Final   10/06/2020 0.86 0.40 - 4.00 mU/L Final     T4 Free   Date Value Ref Range Status   10/17/2018 1.21 0.76 - 1.46 ng/dL Final     Thank you.  Kary GARCIA RN   Speaking Coherently

## 2023-11-02 ENCOUNTER — LAB (OUTPATIENT)
Dept: LAB | Facility: OTHER | Age: 33
End: 2023-11-02
Payer: COMMERCIAL

## 2023-11-02 DIAGNOSIS — E05.90 SUBCLINICAL HYPERTHYROIDISM: ICD-10-CM

## 2023-11-02 DIAGNOSIS — E16.2 HYPOGLYCEMIA: ICD-10-CM

## 2023-11-02 LAB
ANION GAP SERPL CALCULATED.3IONS-SCNC: 14 MMOL/L (ref 7–15)
BUN SERPL-MCNC: 16 MG/DL (ref 6–20)
CALCIUM SERPL-MCNC: 9.4 MG/DL (ref 8.6–10)
CHLORIDE SERPL-SCNC: 100 MMOL/L (ref 98–107)
CREAT SERPL-MCNC: 0.99 MG/DL (ref 0.67–1.17)
DEPRECATED HCO3 PLAS-SCNC: 26 MMOL/L (ref 22–29)
EGFRCR SERPLBLD CKD-EPI 2021: >90 ML/MIN/1.73M2
GLUCOSE SERPL-MCNC: 92 MG/DL (ref 70–99)
POTASSIUM SERPL-SCNC: 4.1 MMOL/L (ref 3.4–5.3)
SODIUM SERPL-SCNC: 140 MMOL/L (ref 135–145)
T4 FREE SERPL-MCNC: 1.38 NG/DL (ref 0.9–1.7)
TSH SERPL DL<=0.005 MIU/L-ACNC: 0.46 UIU/ML (ref 0.3–4.2)

## 2023-11-02 PROCEDURE — 84439 ASSAY OF FREE THYROXINE: CPT

## 2023-11-02 PROCEDURE — 80048 BASIC METABOLIC PNL TOTAL CA: CPT

## 2023-11-02 PROCEDURE — 36415 COLL VENOUS BLD VENIPUNCTURE: CPT

## 2023-11-02 PROCEDURE — 84443 ASSAY THYROID STIM HORMONE: CPT

## 2023-11-02 PROCEDURE — 84481 FREE ASSAY (FT-3): CPT

## 2023-11-03 LAB — T3FREE SERPL-MCNC: 3.4 PG/ML (ref 2–4.4)

## 2023-11-07 ENCOUNTER — MYC MEDICAL ADVICE (OUTPATIENT)
Dept: FAMILY MEDICINE | Facility: OTHER | Age: 33
End: 2023-11-07

## 2023-11-07 ENCOUNTER — VIRTUAL VISIT (OUTPATIENT)
Dept: ENDOCRINOLOGY | Facility: CLINIC | Age: 33
End: 2023-11-07
Attending: PHYSICIAN ASSISTANT
Payer: COMMERCIAL

## 2023-11-07 DIAGNOSIS — E05.90 SUBCLINICAL HYPERTHYROIDISM: ICD-10-CM

## 2023-11-07 DIAGNOSIS — E16.2 HYPOGLYCEMIA: ICD-10-CM

## 2023-11-07 PROCEDURE — 99204 OFFICE O/P NEW MOD 45 MIN: CPT | Mod: VID | Performed by: INTERNAL MEDICINE

## 2023-11-07 ASSESSMENT — PAIN SCALES - GENERAL: PAINLEVEL: NO PAIN (0)

## 2023-11-07 NOTE — LETTER
11/7/2023         RE: Chance Torres  [address]  [facility] [address]        Dear Colleague,    Thank you for referring your patient, Chance Torres, to the [facility]. Please see a copy of my visit note below.    Glucose review    Virtual Visit Details    Type of service:  Video Visit     Originating Location (pt. Location): Home  Distant Location (provider location):  Off-site  Platform used for Video Visit: [employer]  Joined the call at 11/7/2023, 8:09:19 am.  Left the call at 11/7/2023, 8:41:48 am.  =======================================================  Assessment     Chance Torres is a 32 year old male with a past medical history significant for attention deficit disorder, seen for evaluation of longstanding fatigue and cold intolerance associated with numbness and tingling sensation and pain in the lower extremities, of one year duration.    1.  Low normal TSH on lab work, dating back to 2018 and associated with a normal free T4.   The patient has no neck enlargement or evidence of Graves' ophthalmopathy.  No intervention is required.  He does have a higher risk of developing overt hyperthyroidism in the future.  Counseled the patient on signs and symptoms of hyperthyroidism.  He should have the thyroid hormone levels checked if symptoms develop or as a screening test, on an annual basis.     2.  Isolated episode of hypoglycemia noted on lab work  The blood sugar was checked postprandially and the patient was asymptomatic.  No further work-up is required.    3.  Numbness and tingling sensation in hands and feet, cold intolerance, pain in the lower extremities  I suspect he has evidence of neuropathy, most likely small fiber neuropathy.  Advised to establish care with neurology.     No orders of the defined types were placed in this encounter.    =======================================================  The patient is seen in consultation at the request of Dr. Cerda  Garland Escobar.     History of Present Illness:  Chance Torres is a 32 year old male with a past medical history significant for attention deficit disorder, referred by his primary care provider for evaluation of low normal TSH values.    Chance complains of persistent fatigue over the last 5 years and a longstanding history of difficulty concentrating/brain fog.  Since last winter, he has developed significant cold intolerance.  His hands are freezing, very sensitive to cold temperatures.  He describes experiencing episodes of numbness and tingling sensation in his hands and sometimes in his feet, present even in the absence of exposure to cold temperatures, and intermittent pains in his lower legs.    Prior screening for diabetes was negative.  B12 levels were normal.  He has been taking vitamin D consistently since last summer, with no improvement of the above symptoms.    His mother underwent thyroidectomy (reason unclear).  The patient denies neck enlargement, dysphagia, voice hoarseness or shortness of breath.  There is no prior history of radiation exposure or family history of thyroid cancer.    TSH values have been trending towards the lower end of normal range since 2018.  Most recent TSH was normal at 0.46, on 11/2/2023, associated with normal free T4 of 1.38 and free T3 of 3.4.    Hypoglycemic episode of 60 on blood work done at 11 AM, on 5/15/2023.  There are no other hypoglycemic episodes documented in EPIC. According to the patient, he was asymptomatic at that time. The BG was checked postprandially, 1 hr after eating.     Other pertinent labs reviewed:  10/6/2020   FSH 5.3  LH 7.4  Total testosterone 688  Free testosterone 16.11    8/4/2021  Negative tissue transglutaminase antibodies    5/15/2023   B12 682  Hemoglobin A1c was 5.4    Past Medical History   Past Medical History:   Diagnosis Date     Depressive disorder 2016     Marijuana use 1/4/2019   Ganglion cyst left foot  ADHD dx at age 28    Ocular migraines  Mild obstructive apnea 1/2023 - not using a CPAP    Past Surgical History   Past Surgical History:   Procedure Laterality Date     ABDOMEN SURGERY  2013/2016    Hernia repair     ARTHROSCOPY SHOULDER SUPERIOR LABRUM ANTERIOR TO POSTERIOR REPAIR Left 2012     COLONOSCOPY N/A 7/23/2021    Procedure: Colonoscopy, With Polypectomy And Biopsy;  Surgeon: Geneva Patel DO;  Location: [facility] OR     COLONOSCOPY WITH CO2 INSUFFLATION N/A 7/23/2021    Procedure: COLONOSCOPY, WITH CO2 INSUFFLATION;  Surgeon: Geneva Patel DO;  Location: [facility] OR     HERNIA REPAIR Left 2013    open     HERNIA REPAIR Right 2016    laparoscopic       Current Medications    Current Outpatient Medications:      amphetamine-dextroamphetamine (ADDERALL XR) 25 MG 24 hr capsule, Take 1 capsule (25 mg) by mouth every morning, Disp: 30 capsule, Rfl: 0     amphetamine-dextroamphetamine (ADDERALL XR) 25 MG 24 hr capsule, Take 1 capsule (25 mg) by mouth every morning, Disp: 30 capsule, Rfl: 0     amphetamine-dextroamphetamine (ADDERALL XR) 25 MG 24 hr capsule, Take 1 capsule (25 mg) by mouth every morning, Disp: 30 capsule, Rfl: 0    Family History   Problem Relation Age of Onset     Mental Illness Brother         Not diagnosed by doctor or therapist.  Refuses to contact them     Mental Illness Brother      Cerebrovascular Disease Paternal Grandmother      Thyroid Disease Mother      Unknown/Adopted Mother    Mother had the thyroidectomy for thyroid nodules.     Social History  , he has 2 children, expecting the third. He denies smoking. He drinks a couple of beers a week or every other week. Denies using illicit drugs. Stopped using marihuana 5 years ago. 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      Review of Systems   Systemic:              lost a significant amount of weight last year - down to 145 lbs (unintentionally, but he might had been more physically active), now back to 160 lbs - his baseline weight    Eye:                       episodes of blurred vision - seen by ophthalmology   Marcin-Laryngeal:     No marcin-laryngeal symptoms, no dysphagia, no hoarseness, no cough     Breast:                  No breast symptoms  Cardiovascular:    No cardiovascular symptoms, no CP or palpitations   Pulmonary:           No pulmonary symptoms, no SOB or cough    Gastrointestinal:    episodes of diarrhea or constipation which he attributes to gluten intake and IBS, no nausea or vomiting   Genitourinary:       No genitourinary symptoms, no increased thirst or urination   Endocrine:            no hot flashes, no ED   Neurological:        ocular migraines no tremor, some lightheadedness noticed when he gets up, seconds duration   Musculoskeletal:  pain in his calves    Skin:                     No dry skin, hair falling out   Psychological:     anxiety and depression; with depression being more prominent               Vital Signs     Previous Weights:    Wt Readings from Last 10 Encounters:   06/28/23 74.8 kg (165 lb)   06/21/23 74.3 kg (163 lb 11.2 oz)   05/15/23 73 kg (161 lb)   01/04/23 70.8 kg (156 lb)   11/16/22 72.1 kg (159 lb)   08/31/22 69.7 kg (153 lb 9.6 oz)   08/21/22 68.9 kg (152 lb)   10/27/21 73.9 kg (163 lb)   09/02/21 76.2 kg (168 lb)   07/20/21 74.4 kg (164 lb)        There were no vitals taken for this visit.    Physical Exam  General Appearance: alert, no distress noted   Eyes: grossly normal to inspection, conjunctivae and sclerae normal, no lid lag or stare   Respiratory: no audible wheeze, cough, or visible cyanosis.  No visible retractions or increased work of breathing.  Able to speak fully in complete sentences.  Neurological: Cranial nerves grossly intact, mentation intact and speech normal; no tremor of the hands   Skin: no lesions on exposed skin   Psychological: mentation appears normal, affect normal, judgement and insight intact, normal speech and appearance well-groomed    Lab Results  I reviewed prior lab results documented in  Epic.  TSH   Date Value Ref Range Status   11/02/2023 0.46 0.30 - 4.20 uIU/mL Final   05/15/2023 0.39 0.30 - 4.20 uIU/mL Final   10/06/2020 0.86 0.40 - 4.00 mU/L Final   10/17/2018 0.32 (L) 0.40 - 4.00 mU/L Final   11/14/2016 0.731 0.30 - 4.70 uIU/ml Final     45 minutes spent on the date of the encounter doing chart review, history and exam, documentation and further activities as noted above.                          Again, thank you for allowing me to participate in the care of your patient.        Sincerely,        Aleksandra Jarrett MD

## 2023-11-07 NOTE — PROGRESS NOTES
Glucose review    Virtual Visit Details    Type of service:  Video Visit     Originating Location (pt. Location): Home  Distant Location (provider location):  Off-site  Platform used for Video Visit: [employer]  Joined the call at 11/7/2023, 8:09:19 am.  Left the call at 11/7/2023, 8:41:48 am.  =======================================================  Assessment     Chance Torres is a 32 year old male with a past medical history significant for attention deficit disorder, seen for evaluation of longstanding fatigue and cold intolerance associated with numbness and tingling sensation and pain in the lower extremities, of one year duration.    1.  Low normal TSH on lab work, dating back to 2018 and associated with a normal free T4.   The patient has no neck enlargement or evidence of Graves' ophthalmopathy.  No intervention is required.  He does have a higher risk of developing overt hyperthyroidism in the future.  Counseled the patient on signs and symptoms of hyperthyroidism.  He should have the thyroid hormone levels checked if symptoms develop or as a screening test, on an annual basis.     2.  Isolated episode of hypoglycemia noted on lab work  The blood sugar was checked postprandially and the patient was asymptomatic.  No further work-up is required.    3.  Numbness and tingling sensation in hands and feet, cold intolerance, pain in the lower extremities  I suspect he has evidence of neuropathy, most likely small fiber neuropathy.  Advised to establish care with neurology.     No orders of the defined types were placed in this encounter.    =======================================================  The patient is seen in consultation at the request of Dr. Prashant Escobar.     History of Present Illness:  Chance Torres is a 32 year old male with a past medical history significant for attention deficit disorder, referred by his primary care provider for evaluation of low normal TSH values.    Chance  complains of persistent fatigue over the last 5 years and a longstanding history of difficulty concentrating/brain fog.  Since last winter, he has developed significant cold intolerance.  His hands are freezing, very sensitive to cold temperatures.  He describes experiencing episodes of numbness and tingling sensation in his hands and sometimes in his feet, present even in the absence of exposure to cold temperatures, and intermittent pains in his lower legs.    Prior screening for diabetes was negative.  B12 levels were normal.  He has been taking vitamin D consistently since last summer, with no improvement of the above symptoms.    His mother underwent thyroidectomy (reason unclear).  The patient denies neck enlargement, dysphagia, voice hoarseness or shortness of breath.  There is no prior history of radiation exposure or family history of thyroid cancer.    TSH values have been trending towards the lower end of normal range since 2018.  Most recent TSH was normal at 0.46, on 11/2/2023, associated with normal free T4 of 1.38 and free T3 of 3.4.    Hypoglycemic episode of 60 on blood work done at 11 AM, on 5/15/2023.  There are no other hypoglycemic episodes documented in EPIC. According to the patient, he was asymptomatic at that time. The BG was checked postprandially, 1 hr after eating.     Other pertinent labs reviewed:  10/6/2020   FSH 5.3  LH 7.4  Total testosterone 688  Free testosterone 16.11    8/4/2021  Negative tissue transglutaminase antibodies    5/15/2023   B12 682  Hemoglobin A1c was 5.4    Past Medical History   Past Medical History:   Diagnosis Date    Depressive disorder 2016    Marijuana use 1/4/2019   Ganglion cyst left foot  ADHD dx at age 28   Ocular migraines  Mild obstructive apnea 1/2023 - not using a CPAP    Past Surgical History   Past Surgical History:   Procedure Laterality Date    ABDOMEN SURGERY  2013/2016    Hernia repair    ARTHROSCOPY SHOULDER SUPERIOR LABRUM ANTERIOR TO  POSTERIOR REPAIR Left 2012    COLONOSCOPY N/A 7/23/2021    Procedure: Colonoscopy, With Polypectomy And Biopsy;  Surgeon: Geneva Patel DO;  Location: [facility]    COLONOSCOPY WITH CO2 INSUFFLATION N/A 7/23/2021    Procedure: COLONOSCOPY, WITH CO2 INSUFFLATION;  Surgeon: Geneva Patel DO;  Location: [facility] OR    HERNIA REPAIR Left 2013    open    HERNIA REPAIR Right 2016    laparoscopic       Current Medications    Current Outpatient Medications:     amphetamine-dextroamphetamine (ADDERALL XR) 25 MG 24 hr capsule, Take 1 capsule (25 mg) by mouth every morning, Disp: 30 capsule, Rfl: 0    amphetamine-dextroamphetamine (ADDERALL XR) 25 MG 24 hr capsule, Take 1 capsule (25 mg) by mouth every morning, Disp: 30 capsule, Rfl: 0    amphetamine-dextroamphetamine (ADDERALL XR) 25 MG 24 hr capsule, Take 1 capsule (25 mg) by mouth every morning, Disp: 30 capsule, Rfl: 0    Family History   Problem Relation Age of Onset    Mental Illness Brother         Not diagnosed by doctor or therapist.  Refuses to contact them    Mental Illness Brother     Cerebrovascular Disease Paternal Grandmother     Thyroid Disease Mother     Unknown/Adopted Mother    Mother had the thyroidectomy for thyroid nodules.     Social History  , he has 2 children, expecting the third. He denies smoking. He drinks a couple of beers a week or every other week. Denies using illicit drugs. Stopped using marihuana 5 years ago. 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      Review of Systems   Systemic:              lost a significant amount of weight last year - down to 145 lbs (unintentionally, but he might had been more physically active), now back to 160 lbs - his baseline weight    Eye:                      episodes of blurred vision - seen by ophthalmology   Marcin-Laryngeal:     No marcin-laryngeal symptoms, no dysphagia, no hoarseness, no cough     Breast:                  No breast symptoms  Cardiovascular:    No cardiovascular symptoms, no CP or palpitations    Pulmonary:           No pulmonary symptoms, no SOB or cough    Gastrointestinal:    episodes of diarrhea or constipation which he attributes to gluten intake and IBS, no nausea or vomiting   Genitourinary:       No genitourinary symptoms, no increased thirst or urination   Endocrine:            no hot flashes, no ED   Neurological:        ocular migraines no tremor, some lightheadedness noticed when he gets up, seconds duration   Musculoskeletal:  pain in his calves    Skin:                     No dry skin, hair falling out   Psychological:     anxiety and depression; with depression being more prominent               Vital Signs     Previous Weights:    Wt Readings from Last 10 Encounters:   06/28/23 74.8 kg (165 lb)   06/21/23 74.3 kg (163 lb 11.2 oz)   05/15/23 73 kg (161 lb)   01/04/23 70.8 kg (156 lb)   11/16/22 72.1 kg (159 lb)   08/31/22 69.7 kg (153 lb 9.6 oz)   08/21/22 68.9 kg (152 lb)   10/27/21 73.9 kg (163 lb)   09/02/21 76.2 kg (168 lb)   07/20/21 74.4 kg (164 lb)        There were no vitals taken for this visit.    Physical Exam  General Appearance: alert, no distress noted   Eyes: grossly normal to inspection, conjunctivae and sclerae normal, no lid lag or stare   Respiratory: no audible wheeze, cough, or visible cyanosis.  No visible retractions or increased work of breathing.  Able to speak fully in complete sentences.  Neurological: Cranial nerves grossly intact, mentation intact and speech normal; no tremor of the hands   Skin: no lesions on exposed skin   Psychological: mentation appears normal, affect normal, judgement and insight intact, normal speech and appearance well-groomed    Lab Results  I reviewed prior lab results documented in Epic.  TSH   Date Value Ref Range Status   11/02/2023 0.46 0.30 - 4.20 uIU/mL Final   05/15/2023 0.39 0.30 - 4.20 uIU/mL Final   10/06/2020 0.86 0.40 - 4.00 mU/L Final   10/17/2018 0.32 (L) 0.40 - 4.00 mU/L Final   11/14/2016 0.731 0.30 - 4.70 uIU/ml Final      45 minutes spent on the date of the encounter doing chart review, history and exam, documentation and further activities as noted above.

## 2023-11-07 NOTE — NURSING NOTE
Is the patient currently in the state of [place]? YES    Visit mode:VIDEO    If the visit is dropped, the patient can be reconnected by: VIDEO VISIT: Text to cell phone:   Telephone Information:   Mobile [phone]       Will anyone else be joining the visit? NO  (If patient encounters technical issues they should call [phone])    How would you like to obtain your AVS? MyChart    Are changes needed to the allergy or medication list? Pt stated no changes to allergies and Pt stated no med changes    Reason for visit: Consult    Gila Yancey LPN LPN

## 2023-11-22 ENCOUNTER — MYC REFILL (OUTPATIENT)
Dept: FAMILY MEDICINE | Facility: OTHER | Age: 33
End: 2023-11-22
Payer: COMMERCIAL

## 2023-11-22 DIAGNOSIS — F90.0 ADHD (ATTENTION DEFICIT HYPERACTIVITY DISORDER), INATTENTIVE TYPE: ICD-10-CM

## 2023-11-22 RX ORDER — DEXTROAMPHETAMINE SACCHARATE, AMPHETAMINE ASPARTATE MONOHYDRATE, DEXTROAMPHETAMINE SULFATE AND AMPHETAMINE SULFATE 6.25; 6.25; 6.25; 6.25 MG/1; MG/1; MG/1; MG/1
25 CAPSULE, EXTENDED RELEASE ORAL EVERY MORNING
Qty: 30 CAPSULE | Refills: 0 | Status: SHIPPED | OUTPATIENT
Start: 2023-11-22 | End: 2024-03-20

## 2023-11-22 RX ORDER — DEXTROAMPHETAMINE SACCHARATE, AMPHETAMINE ASPARTATE MONOHYDRATE, DEXTROAMPHETAMINE SULFATE AND AMPHETAMINE SULFATE 6.25; 6.25; 6.25; 6.25 MG/1; MG/1; MG/1; MG/1
25 CAPSULE, EXTENDED RELEASE ORAL EVERY MORNING
Qty: 30 CAPSULE | Refills: 0 | Status: SHIPPED | OUTPATIENT
Start: 2023-12-22 | End: 2024-03-20

## 2023-11-22 RX ORDER — DEXTROAMPHETAMINE SACCHARATE, AMPHETAMINE ASPARTATE MONOHYDRATE, DEXTROAMPHETAMINE SULFATE AND AMPHETAMINE SULFATE 6.25; 6.25; 6.25; 6.25 MG/1; MG/1; MG/1; MG/1
25 CAPSULE, EXTENDED RELEASE ORAL EVERY MORNING
Qty: 30 CAPSULE | Refills: 0 | Status: SHIPPED | OUTPATIENT
Start: 2024-01-21 | End: 2024-02-12

## 2023-12-29 ENCOUNTER — MYC MEDICAL ADVICE (OUTPATIENT)
Dept: FAMILY MEDICINE | Facility: OTHER | Age: 33
End: 2023-12-29
Payer: COMMERCIAL

## 2023-12-29 ENCOUNTER — MYC REFILL (OUTPATIENT)
Dept: FAMILY MEDICINE | Facility: OTHER | Age: 33
End: 2023-12-29
Payer: COMMERCIAL

## 2023-12-29 DIAGNOSIS — F90.0 ADHD (ATTENTION DEFICIT HYPERACTIVITY DISORDER), INATTENTIVE TYPE: ICD-10-CM

## 2023-12-29 RX ORDER — DEXTROAMPHETAMINE SACCHARATE, AMPHETAMINE ASPARTATE MONOHYDRATE, DEXTROAMPHETAMINE SULFATE AND AMPHETAMINE SULFATE 6.25; 6.25; 6.25; 6.25 MG/1; MG/1; MG/1; MG/1
25 CAPSULE, EXTENDED RELEASE ORAL EVERY MORNING
Qty: 30 CAPSULE | Refills: 0 | OUTPATIENT
Start: 2023-12-29

## 2024-01-09 DIAGNOSIS — M54.50 CHRONIC BILATERAL LOW BACK PAIN, UNSPECIFIED WHETHER SCIATICA PRESENT: ICD-10-CM

## 2024-01-09 DIAGNOSIS — G89.29 CHRONIC BILATERAL LOW BACK PAIN, UNSPECIFIED WHETHER SCIATICA PRESENT: ICD-10-CM

## 2024-01-09 DIAGNOSIS — K21.9 GASTROESOPHAGEAL REFLUX DISEASE, UNSPECIFIED WHETHER ESOPHAGITIS PRESENT: ICD-10-CM

## 2024-01-09 RX ORDER — GABAPENTIN 600 MG/1
600 TABLET ORAL 3 TIMES DAILY
Qty: 270 TABLET | Refills: 1 | OUTPATIENT
Start: 2024-01-09 | End: 2024-07-07

## 2024-01-09 RX ORDER — OMEPRAZOLE 40 MG/1
40 CAPSULE, DELAYED RELEASE ORAL
Qty: 90 CAPSULE | Refills: 0 | OUTPATIENT
Start: 2024-01-09

## 2024-01-09 NOTE — TELEPHONE ENCOUNTER
----- Message from Eunice Hayes sent at 1/9/2024  9:56 AM EST -----  Subject: Refill Request    QUESTIONS  Name of Medication? gabapentin (NEURONTIN) 600 MG tablet  Patient-reported dosage and instructions? 600 MG 3x daily as needed  How many days do you have left? 0  Preferred Pharmacy? [id number]  Pharmacy phone number (if available)? [phone]  ---------------------------------------------------------------------------  --------------,  Name of Medication? omeprazole (PRILOSEC) 40 MG delayed release capsule  Patient-reported dosage and instructions? 40 MG once daily   How many days do you have left? 0  Preferred Pharmacy? [id number]  Pharmacy phone number (if available)? [phone]  ---------------------------------------------------------------------------  --------------  CALL BACK INFO  What is the best way for the office to contact you? OK to leave message on   voicemail  Preferred Call Back Phone Number? [phone]  ---------------------------------------------------------------------------  --------------  SCRIPT ANSWERS  Relationship to Patient? Self

## 2024-01-10 NOTE — TELEPHONE ENCOUNTER
Tried to contact pt, went straight to voicemail, left him a message.    Going to send my chart message as well.

## 2024-01-23 ENCOUNTER — HOSPITAL ENCOUNTER (OUTPATIENT)
Dept: MRI IMAGING | Facility: CLINIC | Age: 34
Discharge: HOME | End: 2024-01-23
Attending: NURSE PRACTITIONER | Admitting: NURSE PRACTITIONER
Payer: COMMERCIAL

## 2024-01-23 DIAGNOSIS — R42 DIZZINESS: ICD-10-CM

## 2024-01-23 DIAGNOSIS — R53.83 OTHER FATIGUE: ICD-10-CM

## 2024-01-23 DIAGNOSIS — H53.8 BLURRED VISION: ICD-10-CM

## 2024-01-23 PROCEDURE — A9585 GADOBUTROL INJECTION: HCPCS | Performed by: NURSE PRACTITIONER

## 2024-01-23 PROCEDURE — 70553 MRI BRAIN STEM W/O & W/DYE: CPT

## 2024-01-23 PROCEDURE — 255N000002 HC RX 255 OP 636: Performed by: NURSE PRACTITIONER

## 2024-01-23 RX ORDER — GADOBUTROL 604.72 MG/ML
7.5 INJECTION INTRAVENOUS ONCE
Status: COMPLETED | OUTPATIENT
Start: 2024-01-23 | End: 2024-01-23

## 2024-01-23 RX ADMIN — GADOBUTROL 7.5 ML: 604.72 INJECTION INTRAVENOUS at 13:58

## 2024-01-24 NOTE — RESULT ENCOUNTER NOTE
Joleen Fletcher,   Your brain MRI results reviewed and no acute abnormalities at this time. In particular-no bleed, stroke or tumor seen at this time. Please schedule a follow up visit to review your images and answer all of your questions you might have.   Take care,  Jerri

## 2024-01-25 ENCOUNTER — VIRTUAL VISIT (OUTPATIENT)
Dept: NEUROLOGY | Facility: CLINIC | Age: 34
End: 2024-01-25
Payer: COMMERCIAL

## 2024-01-25 DIAGNOSIS — G43.109 MIGRAINE WITH AURA AND WITHOUT STATUS MIGRAINOSUS, NOT INTRACTABLE: Primary | ICD-10-CM

## 2024-01-25 PROCEDURE — 99214 OFFICE O/P EST MOD 30 MIN: CPT | Mod: 95 | Performed by: NURSE PRACTITIONER

## 2024-01-25 RX ORDER — RIZATRIPTAN BENZOATE 5 MG/1
5-10 TABLET, ORALLY DISINTEGRATING ORAL
Qty: 18 TABLET | Refills: 6 | Status: SHIPPED | OUTPATIENT
Start: 2024-01-25 | End: 2024-08-12

## 2024-01-25 RX ORDER — VERAPAMIL HYDROCHLORIDE 40 MG/1
TABLET ORAL
Qty: 60 TABLET | Refills: 3 | Status: SHIPPED | OUTPATIENT
Start: 2024-01-25 | End: 2024-04-25

## 2024-01-25 ASSESSMENT — MIGRAINE DISABILITY ASSESSMENT (MIDAS)
HOW OFTEN WERE SOCIAL ACTIVITIES MISSED DUE TO HEADACHES: 0
ON A SCALE FROM 0-10 ON AVERAGE HOW PAINFUL WERE HEADACHES: 0
HOW MANY DAYS DID YOU NOT DO HOUSEWORK BECAUSE OF HEADACHES: 0
HOW MANY DAYS IN THE PAST 3 MONTHS HAVE YOU HAD A HEADACHE: 0
HOW MANY DAYS DID YOU MISS WORK OR SCHOOL BECAUSE OF HEADACHES: 0
HOW MANY DAYS WAS YOUR PRODUCTIVITY CUT IN HALF BECAUSE OF HEADACHES: 0
TOTAL SCORE: 0
HOW MANY DAYS WAS HOUSEWORK PRODUCTIVITY CUT IN HALF DUE TO HEADACHES: 0

## 2024-01-25 ASSESSMENT — HEADACHE IMPACT TEST (HIT 6): HIT6 TOTAL SCORE: 0

## 2024-01-25 NOTE — LETTER
"1/25/2024       RE: Chance Torres  [address]  [facility] [address]       Dear Colleague,    Thank you for referring your patient, Chance Torres, to the [facility]UR[facility] [place] at [facility]. Please see a copy of my visit note below.    Chance is a 33 year old who is being evaluated via a billable video visit.      Subjective  Chance is a 33 year old, presenting for the following health issues:headache   Follow Up  Initial Headache follow up 5/25/2023, see visit for details  For the last 2 years -dizzy fast and sensitivity to cold. Used to play disc golf but stopped because was not able to do anything anymore-joints, back. Visual problems and floor seems elevated. Head hurting a couple times per week and infrequent \"ocular migraines\" cannot see on one side.   Possible head injury 10-11 years ago and vertigo first time after bachelor party. No problems after wards-was very active, no problems until recently  Every week a couple times per week -flashing lights and everything goes white. Not so much pain. History of migraines and had a severe migraine a few years ago.   Has headaches holocephalic mild to moderate and takes ibuprofen and takes 3-4/week and feels foggy and needed. 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   Hard time to go to sleep and changes in sleep pattern with a new born  Was on zoloft -caused more anxious, was on prestiq -caused mind racing and than felt crashed and very depressed, took for a couple month and fought with depression on his own   Zvlevwop-2-3/week but did not drink thru wife's third trimester pregnancy, no smoking, quit marijuana use 5 years ago  Restless legs at night     Plan:  General neurology evaluation " with neurological exam as scheduled in May . Further work up based on exam findings   Headaches treatment -a trial of verapamil -side effects -dizziness, shortness of breath  Rescue treatment -a trial of rizatriptan for a severe headache. Limit use to no more than 9 days per month. Side effects -fatigue, dizziness  Follow up in 3 months or sooner if needed     DATA reviewed  Brain MRI images reviewed with the patient. Non specific findings and do not explain patient's current symptoms.      Latest Reference Range & Units 05/15/23 11:08   Vitamin B12 232 - 1,245 pg/mL 682      Latest Reference Range & Units 11/02/23 15:48   TSH 0.30 - 4.20 uIU/mL 0.46     A1C 5.4  CRP <2.9  CBC normal       Objective   Vitals - Patient Reported  Pain Score: No Pain (0)    Physical Exam   Patient is alert and no in apparent acute distress,  mentation appears normal, judgement and insight intact, normal speech, no weakness observed    I discussed all my recommendations with Chance Torres who verbalizes understanding and comfortable with the plan.     32 minutes spent on the date of the encounter doing video access, chart  review,  meds review, treatment plan, documentation and further activities as noted above    Again, thank you for allowing me to participate in the care of your patient.      Sincerely,    MORRIS Couch CNP

## 2024-01-25 NOTE — PATIENT INSTRUCTIONS
Plan:  General neurology evaluation with neurological exam as scheduled in May . Further work up based on exam findings   Headaches treatment -a trial of verapamil -side effects -dizziness, shortness of breath  Rescue treatment -a trial of rizatriptan for a severe headache. Limit use to no more than 9 days per month. Side effects -fatigue, dizziness  Follow up in 3 months or sooner if needed

## 2024-01-25 NOTE — PROGRESS NOTES
"Chance is a 33 year old who is being evaluated via a billable video visit.        Subjective   Chance is a 33 year old, presenting for the following health issues:headache   Follow Up  Initial Headache follow up 5/25/2023, see visit for details  For the last 2 years -dizzy fast and sensitivity to cold. Used to play disc golf but stopped because was not able to do anything anymore-joints, back. Visual problems and floor seems elevated. Head hurting a couple times per week and infrequent \"ocular migraines\" cannot see on one side.   Possible head injury 10-11 years ago and vertigo first time after bachelor party. No problems after wards-was very active, no problems until recently  Every week a couple times per week -flashing lights and everything goes white. Not so much pain. History of migraines and had a severe migraine a few years ago.   Has headaches holocephalic mild to moderate and takes ibuprofen and takes 3-4/week and feels foggy and needed. 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   Hard time to go to sleep and changes in sleep pattern with a new born  Was on zoloft -caused more anxious, was on prestiq -caused mind racing and than felt crashed and very depressed, took for a couple month and fought with depression on his own   Ocsavygw-8-1/week but did not drink thru wife's third trimester pregnancy, no smoking, quit marijuana use 5 years ago  Restless legs at night     Plan:  General neurology evaluation with neurological exam as scheduled in May . Further work up based on exam findings   Headaches treatment -a trial of verapamil -side effects -dizziness, shortness of breath  Rescue treatment -a trial of rizatriptan for a severe headache. Limit use to no more than 9 days per month. Side effects -fatigue, " dizziness  Follow up in 3 months or sooner if needed     DATA reviewed  Brain MRI images reviewed with the patient. Non specific findings and do not explain patient's current symptoms.      Latest Reference Range & Units 05/15/23 11:08   Vitamin B12 232 - 1,245 pg/mL 682      Latest Reference Range & Units 11/02/23 15:48   TSH 0.30 - 4.20 uIU/mL 0.46     A1C 5.4  CRP <2.9  CBC normal       Objective    Vitals - Patient Reported  Pain Score: No Pain (0)    Physical Exam   Patient is alert and no in apparent acute distress,  mentation appears normal, judgement and insight intact, normal speech, no weakness observed    I discussed all my recommendations with Chance Torres who verbalizes understanding and comfortable with the plan.     32 minutes spent on the date of the encounter doing video access, chart  review,  meds review, treatment plan, documentation and further activities as noted above    MORRIS Dye, CNP [facility]  Headache certified  [facility]    Video-Visit Details    Type of service:  Video Visit   Originating Location (pt. Location): Home  Distant Location (provider location):  Off-site  Platform used for Video Visit: Flaquito

## 2024-01-25 NOTE — NURSING NOTE
Is the patient currently in the state of [place]? YES    Visit mode:VIDEO    If the visit is dropped, the patient can be reconnected by: VIDEO VISIT: Text to cell phone:   Telephone Information:   Mobile [phone]       Will anyone else be joining the visit? NO  (If patient encounters technical issues they should call [phone]    How would you like to obtain your AVS? MyChart    Are changes needed to the allergy or medication list? No    Reason for visit: Follow Up    Ailyn RODRIGUEZF

## 2024-01-26 ENCOUNTER — MYC MEDICAL ADVICE (OUTPATIENT)
Dept: FAMILY MEDICINE | Facility: OTHER | Age: 34
End: 2024-01-26
Payer: COMMERCIAL

## 2024-01-26 ENCOUNTER — TELEPHONE (OUTPATIENT)
Dept: NEUROLOGY | Facility: CLINIC | Age: 34
End: 2024-01-26
Payer: COMMERCIAL

## 2024-01-26 DIAGNOSIS — M25.50 PAIN IN JOINT, MULTIPLE SITES: Primary | ICD-10-CM

## 2024-01-26 DIAGNOSIS — R53.83 FATIGUE, UNSPECIFIED TYPE: ICD-10-CM

## 2024-01-26 DIAGNOSIS — R20.9 COLD HANDS AND FEET: ICD-10-CM

## 2024-01-26 DIAGNOSIS — H04.129 EYE DRYNESS: ICD-10-CM

## 2024-01-26 NOTE — TELEPHONE ENCOUNTER
Patient Contacted to schedule the following:    Appointment type: Return Headache   Provider: MORRIS Dye CNP  Return date: Around 4/25/2024  Specialty phone number: [phone]  Additional appointment(s) needed: N/A  Additional Notes: N/A    Spoke with patient, scheduled on 4/25.    Vincenzo Shaikh on 1/26/2024 at 1:19 PM

## 2024-02-01 ENCOUNTER — TRANSFERRED RECORDS (OUTPATIENT)
Dept: HEALTH INFORMATION MANAGEMENT | Facility: CLINIC | Age: 34
End: 2024-02-01
Payer: COMMERCIAL

## 2024-02-01 ENCOUNTER — MEDICAL CORRESPONDENCE (OUTPATIENT)
Dept: HEALTH INFORMATION MANAGEMENT | Facility: CLINIC | Age: 34
End: 2024-02-01
Payer: COMMERCIAL

## 2024-02-01 DIAGNOSIS — R42 POSITIONAL LIGHTHEADEDNESS: Primary | ICD-10-CM

## 2024-02-01 DIAGNOSIS — H53.8 BLURRED VISION: ICD-10-CM

## 2024-02-01 DIAGNOSIS — R68.89 HEAT INTOLERANCE: ICD-10-CM

## 2024-02-04 DIAGNOSIS — G43.109 MIGRAINE WITH AURA AND WITHOUT STATUS MIGRAINOSUS, NOT INTRACTABLE: ICD-10-CM

## 2024-02-05 RX ORDER — VERAPAMIL HYDROCHLORIDE 40 MG/1
TABLET ORAL
Qty: 60 TABLET | Refills: 3 | OUTPATIENT
Start: 2024-02-05

## 2024-02-05 NOTE — TELEPHONE ENCOUNTER
RX Authorization    Medication: Verapamil (CALAN) 40 MG tablet    Date last refill ordered: 1/25/2024    Quantity ordered: 18    # refills: 3    Date of last clinic visit with ordering provider: 1/25/2024    Date of next clinic visit with ordering provider: 4/25/2024    All pertinent protocol data (lab date/result):    Include pertinent information from patients message:      Patient requesting a new prescription of 18 tablets for insurance reasons.

## 2024-02-12 ENCOUNTER — MYC REFILL (OUTPATIENT)
Dept: FAMILY MEDICINE | Facility: OTHER | Age: 34
End: 2024-02-12

## 2024-02-12 DIAGNOSIS — F90.0 ADHD (ATTENTION DEFICIT HYPERACTIVITY DISORDER), INATTENTIVE TYPE: ICD-10-CM

## 2024-02-12 RX ORDER — DEXTROAMPHETAMINE SACCHARATE, AMPHETAMINE ASPARTATE MONOHYDRATE, DEXTROAMPHETAMINE SULFATE AND AMPHETAMINE SULFATE 6.25; 6.25; 6.25; 6.25 MG/1; MG/1; MG/1; MG/1
25 CAPSULE, EXTENDED RELEASE ORAL EVERY MORNING
Qty: 30 CAPSULE | Refills: 0 | Status: SHIPPED | OUTPATIENT
Start: 2024-02-12 | End: 2024-03-20

## 2024-03-20 ENCOUNTER — OFFICE VISIT (OUTPATIENT)
Dept: FAMILY MEDICINE | Facility: OTHER | Age: 34
End: 2024-03-20
Payer: COMMERCIAL

## 2024-03-20 VITALS
WEIGHT: 166 LBS | TEMPERATURE: 97.6 F | RESPIRATION RATE: 18 BRPM | SYSTOLIC BLOOD PRESSURE: 122 MMHG | HEIGHT: 72 IN | HEART RATE: 81 BPM | BODY MASS INDEX: 22.48 KG/M2 | OXYGEN SATURATION: 99 % | DIASTOLIC BLOOD PRESSURE: 86 MMHG

## 2024-03-20 DIAGNOSIS — G43.109 MIGRAINE WITH AURA AND WITHOUT STATUS MIGRAINOSUS, NOT INTRACTABLE: ICD-10-CM

## 2024-03-20 DIAGNOSIS — R42 LIGHTHEADED: ICD-10-CM

## 2024-03-20 DIAGNOSIS — R53.83 FATIGUE, UNSPECIFIED TYPE: ICD-10-CM

## 2024-03-20 DIAGNOSIS — F90.0 ADHD (ATTENTION DEFICIT HYPERACTIVITY DISORDER), INATTENTIVE TYPE: ICD-10-CM

## 2024-03-20 DIAGNOSIS — Z13.6 CARDIOVASCULAR SCREENING; LDL GOAL LESS THAN 160: ICD-10-CM

## 2024-03-20 DIAGNOSIS — Z00.00 ENCOUNTER FOR ROUTINE ADULT HEALTH EXAMINATION WITHOUT ABNORMAL FINDINGS: Primary | ICD-10-CM

## 2024-03-20 LAB
CHOLEST SERPL-MCNC: 219 MG/DL
FASTING STATUS PATIENT QL REPORTED: NO
HDLC SERPL-MCNC: 56 MG/DL
LDLC SERPL CALC-MCNC: 141 MG/DL
NONHDLC SERPL-MCNC: 163 MG/DL
TRIGL SERPL-MCNC: 110 MG/DL

## 2024-03-20 PROCEDURE — 99395 PREV VISIT EST AGE 18-39: CPT | Mod: 25 | Performed by: PHYSICIAN ASSISTANT

## 2024-03-20 PROCEDURE — 80061 LIPID PANEL: CPT | Performed by: PHYSICIAN ASSISTANT

## 2024-03-20 PROCEDURE — 36415 COLL VENOUS BLD VENIPUNCTURE: CPT | Performed by: PHYSICIAN ASSISTANT

## 2024-03-20 PROCEDURE — 99213 OFFICE O/P EST LOW 20 MIN: CPT | Mod: 25 | Performed by: PHYSICIAN ASSISTANT

## 2024-03-20 RX ORDER — DEXTROAMPHETAMINE SACCHARATE, AMPHETAMINE ASPARTATE MONOHYDRATE, DEXTROAMPHETAMINE SULFATE AND AMPHETAMINE SULFATE 6.25; 6.25; 6.25; 6.25 MG/1; MG/1; MG/1; MG/1
25 CAPSULE, EXTENDED RELEASE ORAL EVERY MORNING
Qty: 30 CAPSULE | Refills: 0 | Status: SHIPPED | OUTPATIENT
Start: 2024-03-20 | End: 2024-06-12

## 2024-03-20 RX ORDER — DEXTROAMPHETAMINE SACCHARATE, AMPHETAMINE ASPARTATE MONOHYDRATE, DEXTROAMPHETAMINE SULFATE AND AMPHETAMINE SULFATE 6.25; 6.25; 6.25; 6.25 MG/1; MG/1; MG/1; MG/1
25 CAPSULE, EXTENDED RELEASE ORAL EVERY MORNING
Qty: 30 CAPSULE | Refills: 0 | Status: SHIPPED | OUTPATIENT
Start: 2024-05-19 | End: 2024-04-17

## 2024-03-20 RX ORDER — DEXTROAMPHETAMINE SACCHARATE, AMPHETAMINE ASPARTATE MONOHYDRATE, DEXTROAMPHETAMINE SULFATE AND AMPHETAMINE SULFATE 6.25; 6.25; 6.25; 6.25 MG/1; MG/1; MG/1; MG/1
25 CAPSULE, EXTENDED RELEASE ORAL EVERY MORNING
Qty: 30 CAPSULE | Refills: 0 | Status: SHIPPED | OUTPATIENT
Start: 2024-04-19 | End: 2024-04-17

## 2024-03-20 SDOH — HEALTH STABILITY: PHYSICAL HEALTH: ON AVERAGE, HOW MANY MINUTES DO YOU ENGAGE IN EXERCISE AT THIS LEVEL?: 30 MIN

## 2024-03-20 SDOH — HEALTH STABILITY: PHYSICAL HEALTH: ON AVERAGE, HOW MANY DAYS PER WEEK DO YOU ENGAGE IN MODERATE TO STRENUOUS EXERCISE (LIKE A BRISK WALK)?: 3 DAYS

## 2024-03-20 ASSESSMENT — SOCIAL DETERMINANTS OF HEALTH (SDOH): HOW OFTEN DO YOU GET TOGETHER WITH FRIENDS OR RELATIVES?: ONCE A WEEK

## 2024-03-20 ASSESSMENT — PAIN SCALES - GENERAL: PAINLEVEL: NO PAIN (0)

## 2024-03-20 NOTE — PROGRESS NOTES
Preventive Care Visit  [facility]  Prashant Escobar PA-C, Family Medicine  Mar 20, 2024      Assessment & Plan       ICD-10-CM    1. Encounter for routine adult health examination without abnormal findings  Z00.00       2. ADHD (attention deficit hyperactivity disorder), inattentive type  F90.0 amphetamine-dextroamphetamine (ADDERALL XR) 25 MG 24 hr capsule     amphetamine-dextroamphetamine (ADDERALL XR) 25 MG 24 hr capsule     amphetamine-dextroamphetamine (ADDERALL XR) 25 MG 24 hr capsule      3. Fatigue, unspecified type  R53.83       4. Lightheaded  R42       5. Migraine with aura and without status migrainosus, not intractable  G43.109       6. CARDIOVASCULAR SCREENING; LDL GOAL LESS THAN 160  Z13.6 Lipid panel reflex to direct LDL Non-fasting     Lipid panel reflex to direct LDL Non-fasting            2. Continue Adderall daily as needed. UDS is up to date and updated CSA completed today. PDMP reviewed. Will refill for 3 months and then can refill for another 3 months. Recheck in 6 months.      3-5. This is being further evaluated by neurology with further testing to come along with evaluation per rheumatology.    6. Screening lipid panel ordered.    Follow-up in 6 months.     Counseling  Appropriate preventive services were discussed with this patient, including applicable screening as appropriate for fall prevention, nutrition, physical activity, Tobacco-use cessation, weight loss and cognition.  Checklist reviewing preventive services available has been given to the patient.  Reviewed patient's diet, addressing concerns and/or questions.   He is at risk for lack of exercise and has been provided with information to increase physical activity for the benefit of his well-being.   He is at risk for psychosocial distress and has been provided with information to reduce risk.         Nadine Fletcher is a 33 year old, presenting for the following:  Physical        3/20/2024    10:11  "AM   Additional Questions   Roomed by tamanna PAREDES   Accompanied by Self        Health Care Directive  Patient does not have a Health Care Directive or Living Will: Discussed advance care planning with patient; information given to patient to review.    HPI    His is working with specialists, mostly recently neurology, to further evaluate and treat his lightheadedness, fatigue, vision changes and paresthesias. 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 He has also tried Maxalt once without benefit. He is going to be tested for POTS in a few months and will also be seeing a rheumatologist. He has been eating \"\" lately with some benefit.     He continues with Adderall as needed but typically does not need it daily.           3/20/2024   General Health   How would you rate your overall physical health? Good   Feel stress (tense, anxious, or unable to sleep) Only a little   (!) STRESS CONCERN      3/20/2024   Nutrition   Three or more servings of calcium each day? Yes   Diet: Gluten-free/reduced    Other   If other, please elaborate: Anti-inflammatory diet with attempts to increase water and salt intake per neurologist recommendation in the setring of possible POTS   How many servings of fruit and vegetables per day? (!) 2-3   How many sweetened beverages each day? 0-1         3/20/2024   Exercise   Days per week of moderate/strenous exercise 3 days   Average minutes spent exercising at this level 30 min         3/20/2024   Social Factors   Frequency of gathering with friends or relatives Once a week   Worry food won't last until get money to buy more No   Food not last or not have enough money for food? No   Do you have housing?  Yes   Are you worried about losing your housing? No   Lack of transportation? No   Unable to get utilities (heat,electricity)? No         3/20/2024   Dental   Dentist two times every year? Yes         " "3/20/2024   TB Screening   Were you born outside of the [place]? No         Today's PHQ-2 Score:       1/25/2024     8:52 AM   PHQ-2 ( 1999 [employer])   Q1: Little interest or pleasure in doing things 0   Q2: Feeling down, depressed or hopeless 0   PHQ-2 Score 0         3/20/2024   Substance Use   Alcohol more than 3/day or more than 7/wk No   Do you use any other substances recreationally? No     Social History     Tobacco Use    Smoking status: Never    Smokeless tobacco: Former     Quit date: 5/11/2016   Vaping Use    Vaping Use: Never used   Substance Use Topics    Alcohol use: Not Currently     Comment: rarely    Drug use: Not Currently     Types: Marijuana     Comment: has been over a year           3/20/2024   STI Screening   New sexual partner(s) since last STI/HIV test? No         3/20/2024   Contraception/Family Planning   Questions about contraception or family planning No     Reviewed and updated as needed this visit by Provider  Tobacco  Allergies  Meds  Problems  Med Hx  Surg Hx  Fam Hx          Review of Systems  Constitutional, HEENT, cardiovascular, pulmonary, GI, , musculoskeletal, neuro, skin, endocrine and psych systems are negative, except as otherwise noted.     Objective    Exam  /86   Pulse 81   Temp 97.6  F (36.4  C) (Temporal)   Resp 18   Ht 1.84 m (6' 0.44\")   Wt 75.3 kg (166 lb)   SpO2 99%   BMI 22.24 kg/m     Estimated body mass index is 22.24 kg/m  as calculated from the following:    Height as of this encounter: 1.84 m (6' 0.44\").    Weight as of this encounter: 75.3 kg (166 lb).    Physical Exam  GENERAL: healthy, alert and no distress  EYES: Eyes grossly normal to inspection, PERRL and conjunctivae and sclerae normal  HENT: ear canals and TM's normal, nose and mouth without ulcers or lesions  NECK: no adenopathy, no asymmetry, masses, or scars and thyroid normal to palpation  RESP: lungs clear to auscultation - no rales, rhonchi or wheezes  CV: regular rate and rhythm, " normal S1 S2, no S3 or S4, no murmur, click or rub, no peripheral edema and peripheral pulses strong  ABDOMEN: soft, nontender, no hepatosplenomegaly, no masses and bowel sounds normal   (male): normal male circumcised genitalia without lesions or urethral discharge, no hernia  MS: no gross musculoskeletal defects noted, no edema. FROM to all extremities. No spinal tenderness.   SKIN: no suspicious lesions or rashes  NEURO: Normal strength and tone, mentation intact and speech normal. Cranial nerves II-XII are grossly intact. DTRs are 2+/4 throughout and symmetric. Gait is stable.  PSYCH: mentation appears normal, affect normal/bright          Signed Electronically by: Prashant Escobar PA-C

## 2024-03-20 NOTE — LETTER
[facility] [place]  03/20/24  Patient: Chance Torres  YOB: 1990  Medical Record Number: [id number]                                                                                  Non-Opioid Controlled Substance Agreement    This is an agreement between you and your provider regarding safe and appropriate use of controlled substances prescribed by your care team. Controlled substances are?medicines that can cause physical and mental dependence (abuse).     There are strict laws about having and using these medicines. We here at [facility] are  committed to working with you in your efforts to get better. To support you in this work, we'll help you schedule regular office appointments for medicine refills. If we must cancel or change your appointment for any reason, we'll make sure you have enough medicine to last until your next appointment.     As a Provider, I will:   Listen carefully to your concerns while treating you with respect.   Recommend a treatment plan that I believe is in your best interest and may involve therapies other than medicine.    Talk with you often about the possible benefits and the risk of harm of any medicine that we prescribe for you.  Assess the safety of this medicine and check how well it works.    Provide a plan on how to taper (discontinue or go off) using this medicine if the decision is made to stop its use.      ::  As a Patient, I understand controlled substances:     Are prescribed by my care provider to help me function or work and manage my condition(s).?  Are strong medicines and can cause serious side effects.     Need to be taken exactly as prescribed.?Combining controlled substances with certain medicines or chemicals (such as illegal drugs, alcohol, sedatives, sleeping pills, and benzodiazepines) can be dangerous or even fatal.? If I stop taking my medicines suddenly, I may have severe withdrawal symptoms.     The risks, benefits,  and side effects of these medicine(s) were explained to me. I agree that:    I will take part in other treatments as advised by my care team. This may be psychiatry or counseling, physical therapy, behavioral therapy, group treatment or a referral to specialist.    I will keep all my appointments and understand this is part of the monitoring of controlled substances.?My care team may require an office visit for EVERY controlled substance refill. If I miss appointments or don t follow instructions, my care team may stop my medicine    I will take my medicines as prescribed. I will not change the dose or schedule unless my care team tells me to. There will be no refills if I run out early.      I may be asked to come to the clinic and complete a urine drug test or complete a pill count. If I don t give a urine sample or participate in a pill count, the care team may stop my medicine.    I will only receive controlled substance prescriptions from this clinic. If I am treated by another provider, I will tell them that I am taking controlled substances and that I have a treatment agreement with this provider. I will inform my [facility] care team within one business day if I am given a prescription for any controlled substance by another healthcare provider. My [facility] care team can contact other providers and pharmacists about my use of any medicines.    It is up to me to make sure that I don't run out of my medicines on weekends or holidays.?If my care team is willing to refill my prescription without a visit, I must request refills only during office hours. Refills may take up to 3 business days to process. I will use one pharmacy to fill all my controlled substance prescriptions. I will notify the clinic about any changes to my insurance or medicine availability.    I am responsible for my prescriptions. If the medicine/prescription is lost, stolen or destroyed, it will not be replaced.?I also agree  not to share controlled substance medicines with anyone.     I am aware I should not use any illegal or recreational drugs. I agree not to drink alcohol unless my care team says I can.     If I enroll in the [place] Medical Cannabis program, I will tell my care team before my next refill.    I will tell my care team right away if I become pregnant, have a new medical problem treated outside of my regular clinic, or have a change in my medicines.     I understand that this medicine can affect my thinking, judgment and reaction time.? Alcohol and drugs affect the brain and body, which can affect the safety of my driving. Being under the influence of alcohol or drugs can affect my decision-making, behaviors, personal safety and the safety of others. Driving while impaired (DWI) can occur if a person is driving, operating or in physical control of a car, motorcycle, boat, snowmobile, ATV, motorbike, off-road vehicle or any other motor vehicle ([place] Statute 169[place].20). I understand the risk if I choose to drive or operate any vehicle or machinery.    I understand that if I do not follow any of the conditions above, my prescriptions or treatment may be stopped or changed.   I agree that my provider, clinic care team and pharmacy may work with any city, state or federal law enforcement agency that investigates the misuse, sale or other diversion of my controlled medicine. I will allow my provider to discuss my care with, or share a copy of, this agreement with any other treating provider, pharmacy or emergency room where I receive care.     I have read this agreement and have asked questions about anything I did not understand.    ________________________________________________________  Patient Signature - Chance Torres     ___________________                   Date     ________________________________________________________  Provider Signature - Prashant Escobar PA-C       ___________________                   Date      ________________________________________________________  Witness Signature (required if provider not present while patient signing)          ___________________                   Date

## 2024-03-20 NOTE — PATIENT INSTRUCTIONS
Continue Adderall as needed.    Follow-up in 6 months.  Preventive Care Advice   This is general advice given by our system to help you stay healthy. However, your care team may have specific advice just for you. Please talk to your care team about your preventive care needs.  Nutrition  Eat 5 or more servings of fruits and vegetables each day.  Try wheat bread, brown rice and whole grain pasta (instead of white bread, rice, and pasta).  Get enough calcium and vitamin D. Check the label on foods and aim for 100% of the RDA (recommended daily allowance).  Lifestyle  Exercise at least 150 minutes each week   (30 minutes a day, 5 days a week).  Do muscle strengthening activities 2 days a week. These help control your weight and prevent disease.  No smoking.  Wear sunscreen to prevent skin cancer.  Have a dental exam and cleaning every 6 months.  Yearly exams  See your health care team every year to talk about:  Any changes in your health.  Any medicines your care team has prescribed.  Preventive care, family planning, and ways to prevent chronic diseases.  Shots (vaccines)   HPV shots (up to age 26), if you've never had them before.  Hepatitis B shots (up to age 59), if you've never had them before.  COVID-19 shot: Get this shot when it's due.  Flu shot: Get a flu shot every year.  Tetanus shot: Get a tetanus shot every 10 years.  Pneumococcal, hepatitis A, and RSV shots: Ask your care team if you need these based on your risk.  Shingles shot (for age 50 and up).  General health tests  Diabetes screening:  Starting at age 35, Get screened for diabetes at least every 3 years.  If you are younger than age 35, ask your care team if you should be screened for diabetes.  Cholesterol test: At age 39, start having a cholesterol test every 5 years, or more often if advised.  Bone density scan (DEXA): At age 50, ask your care team if you should have this scan for osteoporosis (brittle bones).  Hepatitis C: Get tested at least  once in your life.  STIs (sexually transmitted infections)  Before age 24: Ask your care team if you should be screened for STIs.  After age 24: Get screened for STIs if you're at risk. You are at risk for STIs (including HIV) if:  You are sexually active with more than one person.  You don't use condoms every time.  You or a partner was diagnosed with a sexually transmitted infection.  If you are at risk for HIV, ask about PrEP medicine to prevent HIV.  Get tested for HIV at least once in your life, whether you are at risk for HIV or not.  Cancer screening tests  Cervical cancer screening: If you have a cervix, begin getting regular cervical cancer screening tests at age 21. Most people who have regular screenings with normal results can stop after age 65. Talk about this with your provider.  Breast cancer scan (mammogram): If you've ever had breasts, begin having regular mammograms starting at age 40. This is a scan to check for breast cancer.  Colon cancer screening: It is important to start screening for colon cancer at age 45.  Have a colonoscopy test every 10 years (or more often if you're at risk) Or, ask your provider about stool tests like a FIT test every year or Cologuard test every 3 years.  To learn more about your testing options, visit: https://[email or web address]/126688.pdf.  For help making a decision, visit: https://bit.ly/uk22581.  Prostate cancer screening test: If you have a prostate and are age 55 to 69, ask your provider if you would benefit from a yearly prostate cancer screening test.  Lung cancer screening: If you are a current or former smoker age 50 to 80, ask your care team if ongoing lung cancer screenings are right for you.  For informational purposes only. Not to replace the advice of your health care provider. Copyright   2023 [facility] [employer]. All rights reserved. Clinically reviewed by the [facility]itions [facility]. [employer] [id number] - REV 01/24.    Learning About  Stress  What is stress?     Stress is your body's response to a hard situation. Your body can have a physical, emotional, or mental response. Stress is a fact of life for most people, and it affects everyone differently. What causes stress for you may not be stressful for someone else.  A lot of things can cause stress. You may feel stress when you go on a job interview, take a test, or run a race. This kind of short-term stress is normal and even useful. It can help you if you need to work hard or react quickly. For example, stress can help you finish an important job on time.  Long-term stress is caused by ongoing stressful situations or events. Examples of long-term stress include long-term health problems, ongoing problems at work, or conflicts in your family. Long-term stress can harm your health.  How does stress affect your health?  When you are stressed, your body responds as though you are in danger. It makes hormones that speed up your heart, make you breathe faster, and give you a burst of energy. This is called the fight-or-flight stress response. If the stress is over quickly, your body goes back to normal and no harm is done.  But if stress happens too often or lasts too long, it can have bad effects. Long-term stress can make you more likely to get sick, and it can make symptoms of some diseases worse. If you tense up when you are stressed, you may develop neck, shoulder, or low back pain. Stress is linked to high blood pressure and heart disease.  Stress also harms your emotional health. It can make you singh, tense, or depressed. Your relationships may suffer, and you may not do well at work or school.  What can you do to manage stress?  You can try these things to help manage stress:   Do something active. Exercise or activity can help reduce stress. Walking is a great way to get started. Even everyday activities such as housecleaning or yard work can help.  Try yoga or karon chi. These techniques  combine exercise and meditation. You may need some training at first to learn them.  Do something you enjoy. For example, listen to music or go to a movie. Practice your hobby or do volunteer work.  Meditate. This can help you relax, because you are not worrying about what happened before or what may happen in the future.  Do guided imagery. Imagine yourself in any setting that helps you feel calm. You can use online videos, books, or a teacher to guide you.  Do breathing exercises. For example:  From a standing position, bend forward from the waist with your knees slightly bent. Let your arms dangle close to the floor.  Breathe in slowly and deeply as you return to a standing position. Roll up slowly and lift your head last.  Hold your breath for just a few seconds in the standing position.  Breathe out slowly and bend forward from the waist.  Let your feelings out. Talk, laugh, cry, and express anger when you need to. Talking with supportive friends or family, a counselor, or a hussein leader about your feelings is a healthy way to relieve stress. Avoid discussing your feelings with people who make you feel worse.  Write. It may help to write about things that are bothering you. This helps you find out how much stress you feel and what is causing it. When you know this, you can find better ways to cope.  What can you do to prevent stress?  You might try some of these things to help prevent stress:  Manage your time. This helps you find time to do the things you want and need to do.  Get enough sleep. Your body recovers from the stresses of the day while you are sleeping.  Get support. Your family, friends, and community can make a difference in how you experience stress.  Limit your news feed. Avoid or limit time on social media or news that may make you feel stressed.  Do something active. Exercise or activity can help reduce stress. Walking is a great way to get started.  Where can you learn more?  Go to  "https://www.Ghostery.net/patiented  Enter N032 in the search box to learn more about \"Learning About Stress.\"  Current as of: October 24, 2023               Content Version: 14.0    3787-2028 [employer].   Care instructions adapted under license by your healthcare professional. If you have questions about a medical condition or this instruction, always ask your healthcare professional. [employer], [employer] disclaims any warranty or liability for your use of this information.      "

## 2024-03-26 ENCOUNTER — TRANSFERRED RECORDS (OUTPATIENT)
Dept: HEALTH INFORMATION MANAGEMENT | Facility: CLINIC | Age: 34
End: 2024-03-26

## 2024-04-17 ENCOUNTER — OFFICE VISIT (OUTPATIENT)
Dept: RHEUMATOLOGY | Facility: CLINIC | Age: 34
End: 2024-04-17
Attending: PHYSICIAN ASSISTANT
Payer: COMMERCIAL

## 2024-04-17 ENCOUNTER — LAB (OUTPATIENT)
Dept: LAB | Facility: CLINIC | Age: 34
End: 2024-04-17
Payer: COMMERCIAL

## 2024-04-17 VITALS
HEART RATE: 88 BPM | BODY MASS INDEX: 22.37 KG/M2 | OXYGEN SATURATION: 98 % | SYSTOLIC BLOOD PRESSURE: 110 MMHG | DIASTOLIC BLOOD PRESSURE: 66 MMHG | WEIGHT: 167 LBS

## 2024-04-17 DIAGNOSIS — M25.50 PAIN IN JOINT, MULTIPLE SITES: ICD-10-CM

## 2024-04-17 DIAGNOSIS — R53.83 FATIGUE, UNSPECIFIED TYPE: ICD-10-CM

## 2024-04-17 DIAGNOSIS — M25.50 PAIN IN JOINT, MULTIPLE SITES: Primary | ICD-10-CM

## 2024-04-17 LAB — ERYTHROCYTE [SEDIMENTATION RATE] IN BLOOD BY WESTERGREN METHOD: 8 MM/HR (ref 0–15)

## 2024-04-17 PROCEDURE — 85652 RBC SED RATE AUTOMATED: CPT

## 2024-04-17 PROCEDURE — 86200 CCP ANTIBODY: CPT

## 2024-04-17 PROCEDURE — 82040 ASSAY OF SERUM ALBUMIN: CPT

## 2024-04-17 PROCEDURE — 82728 ASSAY OF FERRITIN: CPT

## 2024-04-17 PROCEDURE — 99204 OFFICE O/P NEW MOD 45 MIN: CPT | Performed by: INTERNAL MEDICINE

## 2024-04-17 PROCEDURE — 86803 HEPATITIS C AB TEST: CPT

## 2024-04-17 PROCEDURE — 84550 ASSAY OF BLOOD/URIC ACID: CPT

## 2024-04-17 PROCEDURE — 36415 COLL VENOUS BLD VENIPUNCTURE: CPT

## 2024-04-17 PROCEDURE — G2211 COMPLEX E/M VISIT ADD ON: HCPCS | Performed by: INTERNAL MEDICINE

## 2024-04-17 PROCEDURE — 82565 ASSAY OF CREATININE: CPT

## 2024-04-17 PROCEDURE — 84460 ALANINE AMINO (ALT) (SGPT): CPT

## 2024-04-17 PROCEDURE — 86431 RHEUMATOID FACTOR QUANT: CPT

## 2024-04-17 RX ORDER — CETIRIZINE HYDROCHLORIDE 10 MG/1
10 TABLET ORAL DAILY PRN
COMMUNITY

## 2024-04-17 RX ORDER — MULTIVIT-MIN/IRON/FOLIC ACID/K 18-600-40
500 CAPSULE ORAL DAILY
COMMUNITY

## 2024-04-17 RX ORDER — CHOLECALCIFEROL (VITAMIN D3) 10 MCG (400 UNIT) TABLET
50
COMMUNITY

## 2024-04-17 RX ORDER — ZINC SULFATE 50(220)MG
50 CAPSULE ORAL
COMMUNITY

## 2024-04-17 NOTE — PROGRESS NOTES
This document was created using a software with less than 100% fidelity, at times resulting in unintended, even erroneous syntax and grammar.  The reader is advised to keep this under consideration while reviewing, interpreting this note.      Rheumatology Consult Note      Chance Torres     YOB: 1990 Age: 33 year old    Date of visit: 4/17/24    PCP: Prashant Escobar    Chief Complaint   Patient presents with:  Consult      Assessment and Plan     Chance was seen today for consult.    Diagnoses and all orders for this visit:    Pain in joint, multiple sites  -     Adult Rheumatology  Referral  -     Hepatitis C antibody; Future  -     Rheumatoid factor; Future  -     Uric acid; Future  -     Cyclic Citrullinated Peptide Antibody IgG; Future  -     Erythrocyte sedimentation rate auto; Future  -     Ferritin; Future  -     Albumin level; Future  -     ALT; Future  -     Creatinine; Future    Fatigue, unspecified type  -     Adult Rheumatology  Referral           This patient who presents with intermittent arthralgias, 2-3 episodes per month typically lasting a few hours each, or after he has played with his children for couple of days there after more with weather change has experienced the symptoms going on for the past nearly 5 years.  He has a variety of other symptoms noted later.  Fortunately there is no clear evidence to suggest he has inflammatory joint disease and he does not have dactylitis he does not have enthesopathy.  There are no features to suggest the usual connective tissue disease related phenomenon.  I have outlined to him that some of his symptoms could be in association with sleep apnea for which he was offered a CPAP.  Once today's data is available further course to be charted accordingly.The longitudinal plan of care for the diagnosis(es)/condition(s) as documented were addressed during this visit. Due to the added complexity in care, I will continue  to support Chance in the subsequent management and with ongoing continuity of care.          HPI   Chance Torres is a 33 year old male  is seen today for evaluation of polyarthralgias.  In the background of the polyarthralgias he describes a variety of other symptoms that he   Is experience over the recent 3 to 4 years including fatigue which she feels is out of proportion to his day-to-day activities, blurry vision, at times the left he relates spasming, feeling too cold in the winter months and feeling overall too hot and summer months.  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  Non-smoker alcohol rarely.  He was felt to have migraine when he was seen in neurology where his workup included amongst others MRI of the brain.  There is a question of whether he may have POTS.  He has upcoming appointment with cardiology.    In this background he noted painful joints in his wrists, elbows knees ankles.  These symptoms are intermittent.  There are some fairly well-defined situations where he would get pain in some of these areas.  These have troubled her for the past nearly 5 years or so.  As he was in the office he felt that there was no pain in any of the joints.  Typically the pain would come on with weather change, being active running for example with his children.  The pain would be worse the more he is active the day he would play with his daughters for example for the next couple of days he can continue to hurt in his feet, knees.  He has not observed swelling.  At 1 point there was a concern that he might have an osseous cyst in his left ankle/foot which he did undergo an attempted aspiration but was aborted due to not being able to get the fluid with this turned out to be quite a painful experience.  He noted that in the mornings he feels typically unrefreshed.  It is at nighttime and he would sometimes take ibuprofen that  "\"dulls the pain\".  He has the worst part of his pain during the mornings but also at nighttime.  He feels that there has been no photosensitive rash, he has not had pleuritic symptoms.  He gets mouth ulcers which is longstanding.  He has not had PE DVT that he is aware of seizure disorder Raynaud's or abnormal kidney function.  There is no family history of psoriasis ulcerative colitis or Crohn's disease.  There is no family history of lupus rheumatoid arthritis or ankylosing spondylitis.  He noted that his mom does have arthritis, she has difficult time opening the jars for example or doing some of the day-to-day activities.  He is not sure of the correct position of the type.  He is not a cigarette smoker alcohol occasionally.  Approximately 5 years ago when the symptoms had started in earnest he had decided to at that point give up smoking pot.    His workup in the past has included variety of images including x-rays of the knees which were normal as were the MRI.  He had an MRI of his brain when he was seen in neurology.  He understood that there were \"changes of migraine\".  He snores.  He had evaluation for sleep apnea was found to have mild sleep apnea was offered CPAP he declined.                      Narrative & Impression   MR right knee without contrast 9/16/2021 12:58 PM     Techniques: Multiplanar multisequence imaging of the right knee was  obtained without administration of intra-articular or intravenous  contrast using routing protocol.     History: possible LCL tear, negative x-ray; Acute pain of right knee     Comparison: Radiograph 9/2/2021     Findings:     MENISCI:  Medial meniscus: Intact.  Lateral meniscus: Intact.     LIGAMENTS  Cruciate ligaments: Intact.  Medial supporting structures: Intact.  Lateral supporting structures: Intact.     EXTENSOR MECHANISM  Intact.     FLUID  No joint effusion. No substantial Baker's cyst.     OSSEOUS and ARTICULAR STRUCTURES  Bones: No fracture, contusion, or " osseous lesion is seen. Hypointense  signal medial femoral condyle, presumably bone islands.     Patellofemoral compartment: No high-grade hyaline cartilage disease.     Medial compartment: No high-grade hyaline cartilage disease.     Lateral compartment: No high-grade hyaline cartilage disease.     ANCILLARY FINDINGS  None.                                                                      Impression: No internal derangement, specifically no evidence of  lateral supporting structure injury.      Narrative & Impression   MR left foot without  contrast 6/28/2019 6:45 PM     History: Has previous MRi from 2017 with a cyst noted under the medial  aspect of the navicular. Also noted some navicular and medial  cuneiform cystic changes. Continues to have pain.; Left foot pain;  Bone cyst of left foot      Comparison: Radiographs June 21, 2019     Findings:     Marker is located at the tibial/medial aspect of the foot  approximately at the level first tarsometatarsal joint.     Bones     No fracture, marrow contusion or marrow infiltrative changes.     Joints and periarticular soft tissue     Joint effusion: Small first metatarsophalangeal joint effusion.     Plantar plates: Intersesamoidal ligament and sesamoidal phalangeal  ligaments of the first metatarsophalangeal joints are intact. Plantar  plates of the second through fifth toe at metatarsophalangeal joints  are grossly intact.     Intermetatarsal spaces: No interdigital neuroma. Trace fluid in the  first through third interspaces. Small amount of fluid superficial to  the second flexor tendon at the level of metatarsophalangeal joint and  plantar fibular aspect of the third metatarsophalangeal joint capsule,  nonspecific may be representing small ganglion cysts.     Ligaments and Tendons     Lisfranc interosseous ligament: Intact.     Tendons: The visualized courses of flexor and extensor tendons are  intact. Lobulated/intramammary septated cystic-appearing  mass  measuring approximately 5 x 8 x 12 mm at the plantar aspect of the  intermediate and lateral cuneiform near the posterior tibialis.     Muscles     No atrophy or edema.     ANCILLARY FINDINGS                                                                         Impression:  1. Marker placed tibial to the first tarsometatarsal joint. No  immediately underlying ganglion or synovial cyst.  2. Lobulated/intramammary septated cystic-appearing mass measuring  approximately 5 x 8 x 12 mm at the plantar aspect of the intermediate  and lateral cuneiform near the posterior tibialis, likely ganglion  cyst.       Active Problem List     Patient Active Problem List   Diagnosis    Irritable bowel syndrome with both constipation and diarrhea    Labral tear of shoulder    ADHD (attention deficit hyperactivity disorder), inattentive type    Migraine with aura and without status migrainosus, not intractable     Past Medical History     Past Medical History:   Diagnosis Date    Depressive disorder 2016    Marijuana use 1/4/2019     Past Surgical History     Past Surgical History:   Procedure Laterality Date    ABDOMEN SURGERY  2013/2016    Hernia repair    ARTHROSCOPY SHOULDER SUPERIOR LABRUM ANTERIOR TO POSTERIOR REPAIR Left 2012    COLONOSCOPY N/A 7/23/2021    Procedure: Colonoscopy, With Polypectomy And Biopsy;  Surgeon: Geneva Patel DO;  Location: [facility] OR    COLONOSCOPY WITH CO2 INSUFFLATION N/A 7/23/2021    Procedure: COLONOSCOPY, WITH CO2 INSUFFLATION;  Surgeon: Geneva Patel DO;  Location: [facility] OR    HERNIA REPAIR Left 2013    open    HERNIA REPAIR Right 2016    laparoscopic     Allergy   No Known Allergies  Current Medication List     Current Outpatient Medications   Medication Sig Dispense Refill    amphetamine-dextroamphetamine (ADDERALL XR) 25 MG 24 hr capsule Take 1 capsule (25 mg) by mouth every morning 30 capsule 0    [START ON 4/19/2024] amphetamine-dextroamphetamine (ADDERALL XR) 25 MG 24 hr capsule Take 1  capsule (25 mg) by mouth every morning 30 capsule 0    [START ON 5/19/2024] amphetamine-dextroamphetamine (ADDERALL XR) 25 MG 24 hr capsule Take 1 capsule (25 mg) by mouth every morning 30 capsule 0    rizatriptan (MAXALT-MLT) 5 MG ODT Take 1-2 tablets (5-10 mg) by mouth at onset of headache for migraine (may repeat in 2 hours as needed. Max 30 mg in 24 hours) 18 tablet 6    verapamil (CALAN) 40 MG tablet Take one tab daily for a week than one tab twice daily if tolerated 60 tablet 3     No current facility-administered medications for this visit.       No current facility-administered medications for this visit.        Family History     Family History   Problem Relation Age of Onset    Mental Illness Brother         Not diagnosed by doctor or therapist.  Refuses to contact them    Mental Illness Brother     Cerebrovascular Disease Paternal Grandmother     Thyroid Disease Mother     Unknown/Adopted Mother          Physical Exam     COMPREHENSIVE EXAMINATION:  Vitals:    04/17/24 1332   BP: 110/66   Pulse: 88   SpO2: 98%   Weight: 75.8 kg (167 lb)     A well appearing alert oriented male. Vital data as noted above. His eyes examined for inflammation/scleromalacia. ENT examined for oral mucositis, moisture, thrush, nasal deformity, external ear redness, deformity. His neck is examined for suppleness and lymphadenopathy.  Cardiopulmonary examination without dyspnea at rest, use of accessory muscles of breathing, wheezing, edema, peripheral or central cyanosis.  Abdomen examined for softness, tenderness, obvious organomegaly.  Skin examined for heliotrope, malar area eruption, lupus pernio, periungual erythema, sclerodactyly, papules, erythema nodosum, purpura, nail pitting, onycholysis, and obvious psoriasis lesion. Neurological examination done for alertness, speech, facial symmetry,  tone and power in upper and lower extremities, and gait. The joint examination is performed for swelling, tenderness, warmth,  "erythema, and range of motion in the following joints: DIPs, PIPs, MCPs, wrists, first CMC's, elbows, shoulders, hips, knees, ankles, feet; spine for range of motion and paraspinal muscles for tenderness. The salient normal / abnormal findings are appended.  He does not have synovitis in palpable joints of upper and lower extremities.  There is no dactylitis of digits or toes.  There is no features of enthesopathy in the hands knees Achilles.  There is no rash on his face there is no stomatitis he does not have sclerodactyly there is no loss of finger pulp or pitting.  He does not have tenderness across trapezius along the paraspinal region he is not tender in the proximal distal upper or lower extremities.  There is no dactylitis of digits none of the toes he does not have enthesopathy such as a Achilles.  His occiput to wall distance is 0.  Schober's is normal.  He does not have features of hypermobility.    Labs / Imaging (select studies)     No results found for: \"PPDINDURATIO\", \"PPDREDNESS\", \"TBGOLT\", \"RHF\", \"CCPABG\", \"URIC\", \"WG14015\", \"[id number]\", \"ANTIDNA\", \"ANTIDNAINT\", \"CARIA\", \"IG15759\", \"[id number]\", \"ENASM\", \"ENASCL\", \"JO1\", \"ENASSA\", \"ENASSB\", \"CENTA\", \"I9GFWWA\", \"U9PUFMT\", \"WH3392\"   Hemoglobin   Date Value Ref Range Status   05/15/2023 14.3 13.3 - 17.7 g/dL Final   08/31/2022 14.2 13.3 - 17.7 g/dL Final   10/27/2021 14.4 13.3 - 17.7 g/dL Final   10/06/2020 15.4 13.3 - 17.7 g/dL Final   10/17/2018 15.3 13.3 - 17.7 g/dL Final     Urea Nitrogen   Date Value Ref Range Status   11/02/2023 16.0 6.0 - 20.0 mg/dL Final   05/15/2023 16.9 6.0 - 20.0 mg/dL Final   08/31/2022 19 7 - 30 mg/dL Final   08/04/2021 23 7 - 30 mg/dL Final   10/06/2020 19 7 - 30 mg/dL Final   10/17/2018 14 7 - 30 mg/dL Final     CRP Inflammation   Date Value Ref Range Status   10/27/2021 <2.9 0.0 - 8.0 mg/L Final   08/04/2021 <2.9 0.0 - 8.0 mg/L Final     AST   Date Value Ref Range Status   05/15/2023 27 10 - 50 U/L Final   08/31/2022 18 0 " - 45 U/L Final   08/04/2021 20 0 - 45 U/L Final   10/06/2020 21 0 - 45 U/L Final   10/17/2018 18 0 - 45 U/L Final   11/14/2016 25 9 - 37 U/L Final     Albumin   Date Value Ref Range Status   05/15/2023 4.3 3.5 - 5.2 g/dL Final   08/31/2022 3.5 3.4 - 5.0 g/dL Final   08/04/2021 4.2 3.4 - 5.0 g/dL Final   10/06/2020 4.2 3.4 - 5.0 g/dL Final   10/17/2018 4.1 3.4 - 5.0 g/dL Final     Alkaline Phosphatase   Date Value Ref Range Status   05/15/2023 56 40 - 129 U/L Final   08/31/2022 48 40 - 150 U/L Final   08/04/2021 50 40 - 150 U/L Final   10/06/2020 56 40 - 150 U/L Final   10/17/2018 49 40 - 150 U/L Final     ALT   Date Value Ref Range Status   05/15/2023 26 10 - 50 U/L Final   08/31/2022 26 0 - 70 U/L Final   08/04/2021 31 0 - 70 U/L Final   10/06/2020 28 0 - 70 U/L Final   10/17/2018 33 0 - 70 U/L Final   11/14/2016 20 8 - 41 U/L Final          Immunization History     Immunization History   Administered Date(s) Administered    HepB, Unspecified 12/01/1998, 01/26/1999, 05/25/1999    Hepatitis B, Peds 08/09/2004    Influenza Vaccine >6 months,quad, PF 11/24/2019, 10/02/2020    TDAP Vaccine (Adacel) 08/07/2017, 08/07/2017

## 2024-04-18 LAB
ALBUMIN SERPL BCG-MCNC: 4.8 G/DL (ref 3.5–5.2)
ALT SERPL W P-5'-P-CCNC: 34 U/L (ref 0–70)
CCP AB SER IA-ACNC: <0.4 U/ML
CREAT SERPL-MCNC: 1.01 MG/DL (ref 0.67–1.17)
EGFRCR SERPLBLD CKD-EPI 2021: >90 ML/MIN/1.73M2
FERRITIN SERPL-MCNC: 133 NG/ML (ref 31–409)
HCV AB SERPL QL IA: NONREACTIVE
RHEUMATOID FACT SERPL-ACNC: <10 IU/ML
URATE SERPL-MCNC: 4.6 MG/DL (ref 3.4–7)

## 2024-04-24 ASSESSMENT — MIGRAINE DISABILITY ASSESSMENT (MIDAS)
ON A SCALE FROM 0-10 ON AVERAGE HOW PAINFUL WERE HEADACHES: 4
HOW MANY DAYS DID YOU NOT DO HOUSEWORK BECAUSE OF HEADACHES: 2
HOW MANY DAYS WAS HOUSEWORK PRODUCTIVITY CUT IN HALF DUE TO HEADACHES: 2
HOW MANY DAYS WAS YOUR PRODUCTIVITY CUT IN HALF BECAUSE OF HEADACHES: 2
TOTAL SCORE: 6
HOW OFTEN WERE SOCIAL ACTIVITIES MISSED DUE TO HEADACHES: 0
HOW MANY DAYS DID YOU MISS WORK OR SCHOOL BECAUSE OF HEADACHES: 0
HOW MANY DAYS IN THE PAST 3 MONTHS HAVE YOU HAD A HEADACHE: 8

## 2024-04-24 ASSESSMENT — HEADACHE IMPACT TEST (HIT 6)
HOW OFTEN HAVE YOU FELT FED UP OR IRRITATED BECAUSE OF YOUR HEADACHES: SOMETIMES
HOW OFTEN DID HEADACHS LIMIT CONCENTRATION ON WORK OR DAILY ACTIVITY: SOMETIMES
WHEN YOU HAVE HEADACHES HOW OFTEN IS THE PAIN SEVERE: RARELY
HIT6 TOTAL SCORE: 56
HOW OFTEN HAVE YOU FELT TOO TIRED TO WORK BECAUSE OF YOUR HEADACHES: RARELY
WHEN YOU HAVE A HEADACHE HOW OFTEN DO YOU WISH YOU COULD LIE DOWN: SOMETIMES
HOW OFTEN DO HEADACHES LIMIT YOUR DAILY ACTIVITIES: SOMETIMES

## 2024-04-25 ENCOUNTER — VIRTUAL VISIT (OUTPATIENT)
Dept: NEUROLOGY | Facility: CLINIC | Age: 34
End: 2024-04-25
Payer: COMMERCIAL

## 2024-04-25 DIAGNOSIS — G43.109 MIGRAINE WITH AURA AND WITHOUT STATUS MIGRAINOSUS, NOT INTRACTABLE: ICD-10-CM

## 2024-04-25 DIAGNOSIS — G43.709 CHRONIC MIGRAINE WITHOUT AURA WITHOUT STATUS MIGRAINOSUS, NOT INTRACTABLE: Primary | ICD-10-CM

## 2024-04-25 PROCEDURE — 99214 OFFICE O/P EST MOD 30 MIN: CPT | Mod: 95 | Performed by: NURSE PRACTITIONER

## 2024-04-25 RX ORDER — VERAPAMIL HYDROCHLORIDE 40 MG/1
TABLET ORAL
Qty: 60 TABLET | Refills: 5 | Status: SHIPPED | OUTPATIENT
Start: 2024-04-25 | End: 2024-08-12

## 2024-04-25 NOTE — PATIENT INSTRUCTIONS
Plan:  Verapamil 40 mg twice daily more consistently for additional 3 months   If no improvement than we could try monthly injections CGRPs -Emgality or venlafaxine, topiramate for migraine prevention  Rescue treatment -rizatriptan as needed. Limit use to no more than 9 days per month.   May take benadryl as needed for nausea, migraine symptoms.   Follow up in 3 months or sooner if needed -virtual ok but in person preferred

## 2024-04-25 NOTE — PROGRESS NOTES
Chance is a 33 year old who is being evaluated via a billable video visit.      Subjective   Chance is a 33 year old, presenting for the following health issues: Headache treatment  RECHECK  Initial headache clinic visit on 2023, see note for details.  At the initial visit patient reported symptoms of dizziness with standing, fatigue chills at night, tiredness and fatigue, weakness feeling.    At the initial visit patient reported that migraines are where and typical symptoms were associated with light more than noise sensitivity, pain in the temples and occasional blurry vision-very rare with migraine headaches.  Because of the systemic and possible neurological symptoms it was suggested to see a general neurologist for a consult.  Brain MRI was ordered and completed in 2024-without evidence of acute abnormalities with nonspecific white matter changes and no stroke, bleed, tumor or any other findings to explain patient's symptoms.   Patient had general neurology evaluation by Dr Swain [facility].  Dr. Harper's note from 2024 reviewed.   Patient is then seen in our headache clinic in 2024 and reported more frequent mild to moderate holocephalic headaches and needed to use ibuprofen about 3 to 4 days/week.  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  Has been having some hard time to sleep and changes in sleep pattern because of  baby.  Patient was on Zoloft and Pristiq but did not tolerate it.  It was suggested to try verapamil for headaches and chronic migraine prevention and rescue treatment rizatriptan as needed.  Today patient reports that since last visit had better understanding of chronic migraines. Feels that light sensitivity, brain cloudiness might be symptoms of his migraines.   Started verapamil in 2024-about 3 months ago and takes only one tablet and forgets a second dose and days forgets to take any medication for  migraine prevention-busy father with 3 kids. Felt verapamil was helpful when was more consistent. No side effects  Total headaches days 10-15/months and moderate -severe headaches may be 2-7/months when its hard to concentrate. Reports that had a headache for 4 days and tried rizatriptan and it was helpful but had a hard time sleeping and night yeager. Usually sits in quiet and breath and stay still. Cannot read because vision fuzzy     We discussed headache/migraine treatment plan:  Verapamil 40 mg twice daily more consistently for additional 3 months   If no improvement than we could try monthly injections CGRPs -Emgality or venlafaxine, topiramate for migraine prevention  Rescue treatment -rizatriptan as needed. Limit use to no more than 9 days per month.   May take benadryl as needed for nausea, migraine symptoms.   Follow up in 3 months or sooner if needed -virtual ok but in person preferred    Possible POTS -still yet to see cardiology per neurology evaluation. Adjusted fluid intake and when low on hydration than can feel it and hard to focus. Takes 32ozx5/days    Objective    Vitals - Patient Reported  Pain Score: No Pain (0)        5/25/2023     7:42 AM 1/25/2024     8:46 AM 4/24/2024     7:19 AM   HIT-6   When you have headaches, how often is the pain severe 8 0 8   How often do headaches limit your ability to do usual daily activities including household work, work, school, or social activities? 10 0 10   When you have a headache, how often do you wish you could lie down? 8 0 10   In the past 4 weeks, how often have you felt too tired to do work or daily activities because of your headaches 10 0 8   In the past 4 weeks, how often have you felt fed up or irritated because of your headaches 8 0 10   In the past 4 weeks, how often did headaches limit your ability to concentrate on work or daily activities 10 0 10   HIT-6 Total Score 54 0 56           5/25/2023     7:45 AM 1/25/2024     8:46 AM 4/24/2024      7:21 AM   MIDAS - in the past three months:   On how many days did you miss work or school because of your headaches? 0 0 0   How many days was your productivity at work or school reduced by half or more because of your headaches? 0 0 2   On how many days did you not do household work because of your headaches? 0 0 2   How many days was your productivity in household work reduced by half or more because of your headaches? 0 0 2   On how many days did you miss family, social, or leisure activities because of your headaches? 0 0 0   On how many days did you have a headache? 0 0 8   On a scale of 0-10, on average how painful were these headaches? 0 0 4   MIDAS Score 0 (I - Little or No Disability) 0 (I - Little or No Disability) 6 (II - Mild Disability)         Physical Exam   GENERAL: alert and no distress  EYES: Eyes grossly normal to inspection.RESP: No audible wheeze, cough, or visible cyanosis.    SKIN: Visible skin clear. NEURO: Face is symmetrical and symmetrical facial expressions, no weakness observed mentation and speech appropriate for age.  PSYCH: Appropriate affect, tone, and pace of words     I discussed all my recommendations with Chance Torres who verbalizes understanding and comfortable with the plan.    32 minutes spent on the date of the encounter doing video access, chart  review, outside neurology report and headache question Jace results review,  meds review, treatment plan, documentation and further activities as noted above    MORRIS Dye, CNP [facility]  Headache certified  [facility]    Video-Visit Details    Type of service:  Video Visit   Originating Location (pt. Location): Home  Distant Location (provider location):  Off-site  Platform used for Video Visit: [employer]quito

## 2024-04-25 NOTE — NURSING NOTE
Is the patient currently in the state of [place]? YES    Visit mode:VIDEO    If the visit is dropped, the patient can be reconnected by: VIDEO VISIT: Text to cell phone:   Telephone Information:   Mobile [phone]       Will anyone else be joining the visit? NO  (If patient encounters technical issues they should call [phone]    How would you like to obtain your AVS? MyChart    Are changes needed to the allergy or medication list? No, Pt stated no changes to allergies, and Pt stated no med changes    Are refills needed on medications prescribed by this physician? NO    Reason for visit: WENDY ISSA

## 2024-04-25 NOTE — LETTER
2024       RE: Chance Torres  [address]  [facility] [address]       Dear Colleague,    Thank you for referring your patient, Chance Torres, to the [facility] [place] at [facility]. Please see a copy of my visit note below.    Chance is a 33 year old who is being evaluated via a billable video visit.      Subjective  Chance is a 33 year old, presenting for the following health issues: Headache treatment  RECHECK  Initial headache clinic visit on 2023, see note for details.  At the initial visit patient reported symptoms of dizziness with standing, fatigue chills at night, tiredness and fatigue, weakness feeling.    At the initial visit patient reported that migraines are where and typical symptoms were associated with light more than noise sensitivity, pain in the temples and occasional blurry vision-very rare with migraine headaches.  Because of the systemic and possible neurological symptoms it was suggested to see a general neurologist for a consult.  Brain MRI was ordered and completed in 2024-without evidence of acute abnormalities with nonspecific white matter changes and no stroke, bleed, tumor or any other findings to explain patient's symptoms.   Patient had general neurology evaluation by [facility].  Dr. Harper's note from 2024 reviewed.   Patient is then seen in our headache clinic in 2024 and reported more frequent mild to moderate holocephalic headaches and needed to use ibuprofen about 3 to 4 days/week.  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  Has been having some hard time to sleep and changes in sleep pattern because of  baby.  Patient was on Zoloft and Pristiq but did not tolerate it.  It was suggested to try verapamil for headaches and chronic migraine prevention and rescue treatment rizatriptan as  needed.  Today patient reports that since last visit had better understanding of chronic migraines. Feels that light sensitivity, brain cloudiness might be symptoms of his migraines.   Started verapamil in Feb 2024-about 3 months ago and takes only one tablet and forgets a second dose and days forgets to take any medication for migraine prevention-busy father with 3 kids. Felt verapamil was helpful when was more consistent. No side effects  Total headaches days 10-15/months and moderate -severe headaches may be 2-7/months when its hard to concentrate. Reports that had a headache for 4 days and tried rizatriptan and it was helpful but had a hard time sleeping and night yeager. Usually sits in quiet and breath and stay still. Cannot read because vision fuzzy     We discussed headache/migraine treatment plan:  Verapamil 40 mg twice daily more consistently for additional 3 months   If no improvement than we could try monthly injections CGRPs -Emgality or venlafaxine, topiramate for migraine prevention  Rescue treatment -rizatriptan as needed. Limit use to no more than 9 days per month.   May take benadryl as needed for nausea, migraine symptoms.   Follow up in 3 months or sooner if needed -virtual ok but in person preferred    Possible POTS -still yet to see cardiology per neurology evaluation. Adjusted fluid intake and when low on hydration than can feel it and hard to focus. Takes 32ozx5/days    Objective   Vitals - Patient Reported  Pain Score: No Pain (0)        5/25/2023     7:42 AM 1/25/2024     8:46 AM 4/24/2024     7:19 AM   HIT-6   When you have headaches, how often is the pain severe 8 0 8   How often do headaches limit your ability to do usual daily activities including household work, work, school, or social activities? 10 0 10   When you have a headache, how often do you wish you could lie down? 8 0 10   In the past 4 weeks, how often have you felt too tired to do work or daily activities because of your  headaches 10 0 8   In the past 4 weeks, how often have you felt fed up or irritated because of your headaches 8 0 10   In the past 4 weeks, how often did headaches limit your ability to concentrate on work or daily activities 10 0 10   HIT-6 Total Score 54 0 56           5/25/2023     7:45 AM 1/25/2024     8:46 AM 4/24/2024     7:21 AM   MIDAS - in the past three months:   On how many days did you miss work or school because of your headaches? 0 0 0   How many days was your productivity at work or school reduced by half or more because of your headaches? 0 0 2   On how many days did you not do household work because of your headaches? 0 0 2   How many days was your productivity in household work reduced by half or more because of your headaches? 0 0 2   On how many days did you miss family, social, or leisure activities because of your headaches? 0 0 0   On how many days did you have a headache? 0 0 8   On a scale of 0-10, on average how painful were these headaches? 0 0 4   MIDAS Score 0 (I - Little or No Disability) 0 (I - Little or No Disability) 6 (II - Mild Disability)         Physical Exam   GENERAL: alert and no distress  EYES: Eyes grossly normal to inspection.RESP: No audible wheeze, cough, or visible cyanosis.    SKIN: Visible skin clear. NEURO: Face is symmetrical and symmetrical facial expressions, no weakness observed mentation and speech appropriate for age.  PSYCH: Appropriate affect, tone, and pace of words     I discussed all my recommendations with Chance Torres who verbalizes understanding and comfortable with the plan.    32 minutes spent on the date of the encounter doing video access, chart  review, outside neurology report and headache question Jace results review,  meds review, treatment plan, documentation and further activities as noted above        Again, thank you for allowing me to participate in the care of your patient.      Sincerely,    MORRIS Couch CNP

## 2024-04-28 ENCOUNTER — HOSPITAL ENCOUNTER (EMERGENCY)
Age: 34
Discharge: HOME | End: 2024-04-28
Attending: EMERGENCY MEDICINE

## 2024-04-28 VITALS
OXYGEN SATURATION: 97 % | RESPIRATION RATE: 18 BRPM | BODY MASS INDEX: 20.4 KG/M2 | WEIGHT: 130 LBS | SYSTOLIC BLOOD PRESSURE: 114 MMHG | DIASTOLIC BLOOD PRESSURE: 82 MMHG | HEART RATE: 110 BPM | TEMPERATURE: 98.2 F | HEIGHT: 67 IN

## 2024-04-28 DIAGNOSIS — L01.00 IMPETIGO: Primary | ICD-10-CM

## 2024-04-28 LAB
ALBUMIN SERPL BCG-MCNC: 4.5 G/DL (ref 3.5–5.1)
ALP SERPL-CCNC: 83 U/L (ref 38–126)
ALT SERPL W/O P-5'-P-CCNC: 14 U/L (ref 11–66)
AMPHETAMINES UR QL SCN: POSITIVE
ANION GAP SERPL CALC-SCNC: 13 MEQ/L (ref 8–16)
AST SERPL-CCNC: 25 U/L (ref 5–40)
BARBITURATES UR QL SCN: NEGATIVE
BASOPHILS ABSOLUTE: 0.1 THOU/MM3 (ref 0–0.1)
BASOPHILS NFR BLD AUTO: 0.9 %
BENZODIAZ UR QL SCN: NEGATIVE
BILIRUB SERPL-MCNC: 0.3 MG/DL (ref 0.3–1.2)
BUN SERPL-MCNC: 16 MG/DL (ref 7–22)
BZE UR QL SCN: NEGATIVE
CALCIUM SERPL-MCNC: 9.7 MG/DL (ref 8.5–10.5)
CANNABINOIDS UR QL SCN: POSITIVE
CHLORIDE SERPL-SCNC: 102 MEQ/L (ref 98–111)
CO2 SERPL-SCNC: 24 MEQ/L (ref 23–33)
CREAT SERPL-MCNC: 0.9 MG/DL (ref 0.4–1.2)
CRP SERPL-MCNC: 0.85 MG/DL (ref 0–1)
DEPRECATED RDW RBC AUTO: 39.9 FL (ref 35–45)
EOSINOPHIL NFR BLD AUTO: 2 %
EOSINOPHILS ABSOLUTE: 0.2 THOU/MM3 (ref 0–0.4)
ERYTHROCYTE [DISTWIDTH] IN BLOOD BY AUTOMATED COUNT: 11.9 % (ref 11.5–14.5)
ERYTHROCYTE [SEDIMENTATION RATE] IN BLOOD BY WESTERGREN METHOD: 6 MM/HR (ref 0–10)
FENTANYL: NEGATIVE
GFR SERPL CREATININE-BSD FRML MDRD: > 90 ML/MIN/1.73M2
GLUCOSE SERPL-MCNC: 88 MG/DL (ref 70–108)
HCT VFR BLD AUTO: 47.7 % (ref 42–52)
HGB BLD-MCNC: 16.8 GM/DL (ref 14–18)
IMM GRANULOCYTES # BLD AUTO: 0.01 THOU/MM3 (ref 0–0.07)
IMM GRANULOCYTES NFR BLD AUTO: 0.1 %
LYMPHOCYTES ABSOLUTE: 3.4 THOU/MM3 (ref 1–4.8)
LYMPHOCYTES NFR BLD AUTO: 43.2 %
MCH RBC QN AUTO: 32.2 PG (ref 26–33)
MCHC RBC AUTO-ENTMCNC: 35.2 GM/DL (ref 32.2–35.5)
MCV RBC AUTO: 91.6 FL (ref 80–94)
MONOCYTES ABSOLUTE: 0.5 THOU/MM3 (ref 0.4–1.3)
MONOCYTES NFR BLD AUTO: 6.4 %
NEUTROPHILS NFR BLD AUTO: 47.4 %
NRBC BLD AUTO-RTO: 0 /100 WBC
OPIATES UR QL SCN: NEGATIVE
OSMOLALITY SERPL CALC.SUM OF ELEC: 278.1 MOSMOL/KG (ref 275–300)
OXYCODONE: NEGATIVE
PCP UR QL SCN: NEGATIVE
PLATELET # BLD AUTO: 372 THOU/MM3 (ref 130–400)
PMV BLD AUTO: 9.4 FL (ref 9.4–12.4)
POTASSIUM SERPL-SCNC: 4.8 MEQ/L (ref 3.5–5.2)
PROT SERPL-MCNC: 7.6 G/DL (ref 6.1–8)
RBC # BLD AUTO: 5.21 MILL/MM3 (ref 4.7–6.1)
REASON FOR REJECTION: NORMAL
REJECTED TEST: NORMAL
SEGMENTED NEUTROPHILS ABSOLUTE COUNT: 3.7 THOU/MM3 (ref 1.8–7.7)
SODIUM SERPL-SCNC: 139 MEQ/L (ref 135–145)
WBC # BLD AUTO: 7.9 THOU/MM3 (ref 4.8–10.8)

## 2024-04-28 PROCEDURE — 80307 DRUG TEST PRSMV CHEM ANLYZR: CPT

## 2024-04-28 PROCEDURE — 80053 COMPREHEN METABOLIC PANEL: CPT

## 2024-04-28 PROCEDURE — 85025 COMPLETE CBC W/AUTO DIFF WBC: CPT

## 2024-04-28 PROCEDURE — 86140 C-REACTIVE PROTEIN: CPT

## 2024-04-28 PROCEDURE — 96372 THER/PROPH/DIAG INJ SC/IM: CPT

## 2024-04-28 PROCEDURE — 99284 EMERGENCY DEPT VISIT MOD MDM: CPT

## 2024-04-28 PROCEDURE — 6360000002 HC RX W HCPCS: Performed by: EMERGENCY MEDICINE

## 2024-04-28 PROCEDURE — 36415 COLL VENOUS BLD VENIPUNCTURE: CPT

## 2024-04-28 PROCEDURE — 2500000003 HC RX 250 WO HCPCS: Performed by: EMERGENCY MEDICINE

## 2024-04-28 PROCEDURE — 85651 RBC SED RATE NONAUTOMATED: CPT

## 2024-04-28 RX ORDER — CEPHALEXIN 500 MG/1
500 CAPSULE ORAL 4 TIMES DAILY
Qty: 40 CAPSULE | Refills: 0 | Status: SHIPPED | OUTPATIENT
Start: 2024-04-28 | End: 2024-05-08

## 2024-04-28 RX ADMIN — LIDOCAINE HYDROCHLORIDE 1000 MG: 10 INJECTION, SOLUTION EPIDURAL; INFILTRATION; INTRACAUDAL; PERINEURAL at 16:59

## 2024-04-28 ASSESSMENT — PAIN - FUNCTIONAL ASSESSMENT: PAIN_FUNCTIONAL_ASSESSMENT: 0-10

## 2024-04-28 ASSESSMENT — PAIN DESCRIPTION - LOCATION: LOCATION: FACE

## 2024-04-28 ASSESSMENT — PAIN SCALES - GENERAL: PAINLEVEL_OUTOF10: 5

## 2024-04-28 NOTE — ED PROVIDER NOTES
[facility]  EMERGENCY DEPARTMENT ENCOUNTER        PATIENT NAME: Jaden Ma  MRN: [id number]  : 1990  [place]: 2024  PROVIDER: Silvino Cage MD    Patient was seen and evaluated at 2:58 PM EDT. Nurses Notes are reviewed and I agree except as noted in the HPI.    HISTORY OF PRESENT ILLNESS   Jaden Ma is a 33 y.o. male who presents to Emergency Department with Wound Check     A 33-year-old male with PMH of opiate addiction on Suboxone, seizure on Tegretol and gabapentin, and HTN presents for evaluation of rashes, scabs, and superficial ulcerations to face and RLE for last 4-5 weeks.  Rashes are pruritus, and painful.  Some rashes are healing with yellow and crusty scabs forming. No fever or chills. No chest pain. No SOB. No N/V. No abdominal pain. No diarrhea.  No urinary symptoms.  No joint swelling.    Rest of PMH and PSH reviewed below.     This HPI was provided by patient.     PAST MEDICAL HISTORY    has a past medical history of Closed fracture of one rib of left side with routine healing, Encounter for monitoring Suboxone maintenance therapy, Hypertension, Liver laceration, closed, initial encounter, Multiple closed fractures of pelvis with stable disruption of pelvic ring with routine healing, Pneumothorax, traumatic, and Seizures (HCC).    SURGICAL HISTORY      has a past surgical history that includes [place] tooth extraction; CT GUIDED CHEST TUBE (2020); ERCP (N/A, 1/10/2023); ERCP (1/10/2023); and Cholecystectomy, laparoscopic (N/A, 2023).    CURRENT MEDICATIONS       Previous Medications    BUPRENORPHINE-NALOXONE (SUBOXONE) 2-0.5 MG SUBL    Place under the tongue. Indications: Opioid Dependence Take 3 tablets under the tounge and allow to dissolve once daily. Per [facility].    CARBAMAZEPINE (TEGRETOL) 200 MG TABLET    take 1 tablet by mouth twice a day    DOCUSATE (COLACE, DULCOLAX) 100 MG CAPS    Take 100 mg by mouth 2 times daily    GABAPENTIN (NEURONTIN)

## 2024-04-28 NOTE — ED TRIAGE NOTES
Pt c/o multiple wounds to face x 3 weeks. Pt denies injury. Pt states he thought they were blackheads and tried to pop them, draining pus. Pt reports trying Motrin today for pain.

## 2024-05-07 NOTE — ED TRIAGE NOTES
Pt presents to the ED with c/o emesis that started today. Pt estimated approx 10-20 episodes of emesis. Pt reporting generalized abdominal aches. Work

## 2024-05-10 ENCOUNTER — TELEPHONE (OUTPATIENT)
Dept: NEUROLOGY | Facility: CLINIC | Age: 34
End: 2024-05-10
Payer: COMMERCIAL

## 2024-05-10 NOTE — TELEPHONE ENCOUNTER
Left Voicemail (1st Attempt) and Sent Mychart (1st Attempt) for the patient to call back and schedule the following:    Appointment type: Return Headache  Provider: Jerri   Return date: July/August 2024  Specialty phone number: [phone]  Additional appointment(s) needed:   Additonal Notes:     Janie Perez on 5/10/2024 at 9:11 AM

## 2024-05-13 ENCOUNTER — HOSPITAL ENCOUNTER (EMERGENCY)
Age: 34
Discharge: HOME | End: 2024-05-13

## 2024-05-13 VITALS
RESPIRATION RATE: 16 BRPM | DIASTOLIC BLOOD PRESSURE: 66 MMHG | HEART RATE: 86 BPM | BODY MASS INDEX: 18.79 KG/M2 | OXYGEN SATURATION: 98 % | TEMPERATURE: 98 F | SYSTOLIC BLOOD PRESSURE: 98 MMHG | WEIGHT: 120 LBS

## 2024-05-13 DIAGNOSIS — K04.7 DENTAL INFECTION: ICD-10-CM

## 2024-05-13 DIAGNOSIS — K02.9 DENTAL CARIES: Primary | ICD-10-CM

## 2024-05-13 PROCEDURE — 6370000000 HC RX 637 (ALT 250 FOR IP): Performed by: PHYSICIAN ASSISTANT

## 2024-05-13 PROCEDURE — 99283 EMERGENCY DEPT VISIT LOW MDM: CPT

## 2024-05-13 RX ORDER — AMOXICILLIN 250 MG/1
500 CAPSULE ORAL ONCE
Status: COMPLETED | OUTPATIENT
Start: 2024-05-13 | End: 2024-05-13

## 2024-05-13 RX ORDER — NAPROXEN 250 MG/1
500 TABLET ORAL ONCE
Status: COMPLETED | OUTPATIENT
Start: 2024-05-13 | End: 2024-05-13

## 2024-05-13 RX ORDER — AMOXICILLIN 500 MG/1
500 CAPSULE ORAL 3 TIMES DAILY
Qty: 30 CAPSULE | Refills: 0 | Status: SHIPPED | OUTPATIENT
Start: 2024-05-13 | End: 2024-05-23

## 2024-05-13 RX ORDER — NAPROXEN 500 MG/1
500 TABLET ORAL 2 TIMES DAILY
Qty: 20 TABLET | Refills: 0 | Status: SHIPPED | OUTPATIENT
Start: 2024-05-13

## 2024-05-13 RX ADMIN — AMOXICILLIN 500 MG: 250 CAPSULE ORAL at 12:50

## 2024-05-13 RX ADMIN — NAPROXEN 500 MG: 250 TABLET ORAL at 12:50

## 2024-05-13 ASSESSMENT — PAIN - FUNCTIONAL ASSESSMENT: PAIN_FUNCTIONAL_ASSESSMENT: 0-10

## 2024-05-13 ASSESSMENT — PAIN SCALES - GENERAL: PAINLEVEL_OUTOF10: 5

## 2024-05-13 NOTE — ED PROVIDER NOTES
no evidence or peritonitis, sepsis, or toxicity. The patient and/or family and I have discussed the diagnosis and risks, and we agree with discharging home to follow-up with their primary doctor. We also discussed returning to the Emergency Department immediately if new or worsening symptoms occur. We have discussed the symptoms which are most concerning (e.g., changing or worsening pain, trouble swallowing or breathing, neck stiffness or fever) that necessitate immediate return.      Vitals Reviewed:    Vitals:    05/13/24 1208   BP: 98/66   Pulse: 86   Resp: 16   Temp: 98 °F (36.7 °C)   SpO2: 98%   Weight: 54.4 kg (120 lb)       The patient was seen and examined. Appropriate diagnostic testing was performed and results reviewed with the patient.    ED Medications administered this visit:  (None if blank)  Medications   amoxicillin (AMOXIL) capsule 500 mg (500 mg Oral Given 5/13/24 1250)   naproxen (NAPROSYN) tablet 500 mg (500 mg Oral Given 5/13/24 1250)         PROCEDURES: (None if blank)  Procedures:     CRITICAL CARE: (None if blank)      DISCHARGE PRESCRIPTIONS: (None if blank)  Discharge Medication List as of 5/13/2024 12:49 PM        START taking these medications    Details   amoxicillin (AMOXIL) 500 MG capsule Take 1 capsule by mouth 3 times daily for 10 days, Disp-30 capsule, R-0Normal      naproxen (NAPROSYN) 500 MG tablet Take 1 tablet by mouth 2 times daily, Disp-20 tablet, R-0Normal             FINAL IMPRESSION      1. Dental caries    2. Dental infection          DISPOSITION/PLAN   DISPOSITION Decision To Discharge 05/13/2024 12:53:11 PM      OUTPATIENT FOLLOW UP THE PATIENT:  [facility]  [address]  [facility] [address]  [phone]  Schedule an appointment as soon as possible for a visit in 3 days        MORENITA Gunn William, PA-C  05/13/24 1634

## 2024-05-13 NOTE — ED TRIAGE NOTES
Pt to ED with dental pain for three months. States that it has been swollen for three days now. States that he cannot get in to a dentist. Has tried ice, ibuprofen and saltwater at home.

## 2024-05-14 NOTE — TELEPHONE ENCOUNTER
Left Voicemail (2nd Attempt) for the patient to call back and schedule the following:    Appointment type: Return Headache  Provider: Jasbir Bob  Return date: around 7/25/2024   Specialty phone number: [phone]  Additional appointment(s) needed:   Additonal Notes: KASSANDRA Perez on 5/14/2024 at 12:54 PM

## 2024-05-18 ENCOUNTER — APPOINTMENT (OUTPATIENT)
Dept: GENERAL RADIOLOGY | Age: 34
End: 2024-05-18
Payer: MEDICAID

## 2024-05-18 ENCOUNTER — HOSPITAL ENCOUNTER (EMERGENCY)
Age: 34
Discharge: HOME | End: 2024-05-18
Payer: MEDICAID

## 2024-05-18 VITALS
BODY MASS INDEX: 18.83 KG/M2 | SYSTOLIC BLOOD PRESSURE: 106 MMHG | RESPIRATION RATE: 14 BRPM | HEIGHT: 67 IN | WEIGHT: 120 LBS | DIASTOLIC BLOOD PRESSURE: 81 MMHG | TEMPERATURE: 98.2 F | HEART RATE: 79 BPM | OXYGEN SATURATION: 99 %

## 2024-05-18 DIAGNOSIS — F19.10 SUBSTANCE ABUSE (HCC): ICD-10-CM

## 2024-05-18 DIAGNOSIS — F10.10 ALCOHOL ABUSE: ICD-10-CM

## 2024-05-18 DIAGNOSIS — R07.9 CHEST PAIN, UNSPECIFIED TYPE: Primary | ICD-10-CM

## 2024-05-18 LAB
ALBUMIN SERPL BCG-MCNC: 4.5 G/DL (ref 3.5–5.1)
ALP SERPL-CCNC: 104 U/L (ref 38–126)
ALT SERPL W/O P-5'-P-CCNC: 13 U/L (ref 11–66)
AMPHETAMINES UR QL SCN: POSITIVE
ANION GAP SERPL CALC-SCNC: 14 MEQ/L (ref 8–16)
APAP SERPL-MCNC: < 5 UG/ML (ref 0–20)
APTT PPP: 33.8 SECONDS (ref 22–38)
AST SERPL-CCNC: 27 U/L (ref 5–40)
BARBITURATES UR QL SCN: NEGATIVE
BASOPHILS ABSOLUTE: 0.1 THOU/MM3 (ref 0–0.1)
BASOPHILS NFR BLD AUTO: 1 %
BENZODIAZ UR QL SCN: NEGATIVE
BILIRUB CONJ SERPL-MCNC: < 0.2 MG/DL (ref 0–0.3)
BILIRUB SERPL-MCNC: 0.6 MG/DL (ref 0.3–1.2)
BILIRUB UR QL STRIP.AUTO: NEGATIVE
BUN SERPL-MCNC: 22 MG/DL (ref 7–22)
BZE UR QL SCN: NEGATIVE
CALCIUM SERPL-MCNC: 9.6 MG/DL (ref 8.5–10.5)
CANNABINOIDS UR QL SCN: POSITIVE
CHARACTER UR: CLEAR
CHLORIDE SERPL-SCNC: 97 MEQ/L (ref 98–111)
CO2 SERPL-SCNC: 25 MEQ/L (ref 23–33)
COLOR: YELLOW
CREAT SERPL-MCNC: 0.7 MG/DL (ref 0.4–1.2)
DEPRECATED RDW RBC AUTO: 37.4 FL (ref 35–45)
EOSINOPHIL NFR BLD AUTO: 1 %
EOSINOPHILS ABSOLUTE: 0.1 THOU/MM3 (ref 0–0.4)
ERYTHROCYTE [DISTWIDTH] IN BLOOD BY AUTOMATED COUNT: 11.7 % (ref 11.5–14.5)
ETHANOL SERPL-MCNC: 0.08 % (ref 0–0.08)
FENTANYL: NEGATIVE
GFR SERPL CREATININE-BSD FRML MDRD: > 90 ML/MIN/1.73M2
GLUCOSE SERPL-MCNC: 82 MG/DL (ref 70–108)
GLUCOSE UR QL STRIP.AUTO: NEGATIVE MG/DL
HCT VFR BLD AUTO: 44.2 % (ref 42–52)
HGB BLD-MCNC: 15.4 GM/DL (ref 14–18)
HGB UR QL STRIP.AUTO: NEGATIVE
IMM GRANULOCYTES # BLD AUTO: 0.02 THOU/MM3 (ref 0–0.07)
IMM GRANULOCYTES NFR BLD AUTO: 0.3 %
INR PPP: 0.97 (ref 0.85–1.13)
KETONES UR QL STRIP.AUTO: NEGATIVE
LYMPHOCYTES ABSOLUTE: 3.3 THOU/MM3 (ref 1–4.8)
LYMPHOCYTES NFR BLD AUTO: 46.6 %
MAGNESIUM SERPL-MCNC: 2.2 MG/DL (ref 1.6–2.4)
MCH RBC QN AUTO: 31 PG (ref 26–33)
MCHC RBC AUTO-ENTMCNC: 34.8 GM/DL (ref 32.2–35.5)
MCV RBC AUTO: 89.1 FL (ref 80–94)
MONOCYTES ABSOLUTE: 0.5 THOU/MM3 (ref 0.4–1.3)
MONOCYTES NFR BLD AUTO: 7.6 %
NEUTROPHILS ABSOLUTE: 3 THOU/MM3 (ref 1.8–7.7)
NEUTROPHILS NFR BLD AUTO: 43.5 %
NITRITE UR QL STRIP: NEGATIVE
NRBC BLD AUTO-RTO: 0 /100 WBC
NT-PROBNP SERPL IA-MCNC: 67.1 PG/ML (ref 0–124)
OPIATES UR QL SCN: NEGATIVE
OSMOLALITY SERPL CALC.SUM OF ELEC: 274.4 MOSMOL/KG (ref 275–300)
OXYCODONE: NEGATIVE
PCP UR QL SCN: NEGATIVE
PH UR STRIP.AUTO: 5.5 [PH] (ref 5–9)
PLATELET # BLD AUTO: 327 THOU/MM3 (ref 130–400)
PMV BLD AUTO: 8.7 FL (ref 9.4–12.4)
POTASSIUM SERPL-SCNC: 3.8 MEQ/L (ref 3.5–5.2)
PROT SERPL-MCNC: 7.5 G/DL (ref 6.1–8)
PROT UR STRIP.AUTO-MCNC: NEGATIVE MG/DL
RBC # BLD AUTO: 4.96 MILL/MM3 (ref 4.7–6.1)
SALICYLATES SERPL-MCNC: < 0.3 MG/DL (ref 2–10)
SODIUM SERPL-SCNC: 136 MEQ/L (ref 135–145)
SP GR UR REFRACT.AUTO: 1.02 (ref 1–1.03)
TROPONIN, HIGH SENSITIVITY: < 6 NG/L (ref 0–12)
UROBILINOGEN, URINE: 0.2 EU/DL (ref 0–1)
WBC # BLD AUTO: 7 THOU/MM3 (ref 4.8–10.8)
WBC #/AREA URNS HPF: NEGATIVE /[HPF]

## 2024-05-18 PROCEDURE — 85730 THROMBOPLASTIN TIME PARTIAL: CPT

## 2024-05-18 PROCEDURE — 80076 HEPATIC FUNCTION PANEL: CPT

## 2024-05-18 PROCEDURE — 83735 ASSAY OF MAGNESIUM: CPT

## 2024-05-18 PROCEDURE — 85025 COMPLETE CBC W/AUTO DIFF WBC: CPT

## 2024-05-18 PROCEDURE — 83880 ASSAY OF NATRIURETIC PEPTIDE: CPT

## 2024-05-18 PROCEDURE — 82077 ASSAY SPEC XCP UR&BREATH IA: CPT

## 2024-05-18 PROCEDURE — 85610 PROTHROMBIN TIME: CPT

## 2024-05-18 PROCEDURE — 80143 DRUG ASSAY ACETAMINOPHEN: CPT

## 2024-05-18 PROCEDURE — 81003 URINALYSIS AUTO W/O SCOPE: CPT

## 2024-05-18 PROCEDURE — 80307 DRUG TEST PRSMV CHEM ANLYZR: CPT

## 2024-05-18 PROCEDURE — 71045 X-RAY EXAM CHEST 1 VIEW: CPT

## 2024-05-18 PROCEDURE — 93005 ELECTROCARDIOGRAM TRACING: CPT | Performed by: STUDENT IN AN ORGANIZED HEALTH CARE EDUCATION/TRAINING PROGRAM

## 2024-05-18 PROCEDURE — 84484 ASSAY OF TROPONIN QUANT: CPT

## 2024-05-18 PROCEDURE — 99285 EMERGENCY DEPT VISIT HI MDM: CPT

## 2024-05-18 PROCEDURE — 36415 COLL VENOUS BLD VENIPUNCTURE: CPT

## 2024-05-18 PROCEDURE — 80179 DRUG ASSAY SALICYLATE: CPT

## 2024-05-18 PROCEDURE — 80048 BASIC METABOLIC PNL TOTAL CA: CPT

## 2024-05-18 ASSESSMENT — ENCOUNTER SYMPTOMS
DIARRHEA: 1
VOMITING: 1
ABDOMINAL PAIN: 0
COUGH: 0
NAUSEA: 1
SHORTNESS OF BREATH: 0
SORE THROAT: 0
RHINORRHEA: 0

## 2024-05-18 ASSESSMENT — HEART SCORE: ECG: NORMAL

## 2024-05-18 ASSESSMENT — PAIN DESCRIPTION - ORIENTATION: ORIENTATION: MID

## 2024-05-18 ASSESSMENT — PAIN DESCRIPTION - DESCRIPTORS: DESCRIPTORS: SHARP

## 2024-05-18 ASSESSMENT — PAIN DESCRIPTION - LOCATION
LOCATION: CHEST
LOCATION: CHEST

## 2024-05-18 ASSESSMENT — PAIN SCALES - GENERAL
PAINLEVEL_OUTOF10: 5
PAINLEVEL_OUTOF10: 5

## 2024-05-18 ASSESSMENT — PAIN - FUNCTIONAL ASSESSMENT: PAIN_FUNCTIONAL_ASSESSMENT: 0-10

## 2024-05-18 NOTE — ED TRIAGE NOTES
Pt presents to ED via EMS with chief complaint of chest pain and wanting to detox. Pt states he stopped all his medications a few days ago and has been feeling weak and having intermittent chest pain. Pt states he also wants to detox; states he uses alcohol and meth, but not daily. Last used meth 2 days ago and states he had 1 beer today. Pt reports increased stress and that he lost his housing recently. Pt denies suicidal ideation. Pt calm and cooperative.

## 2024-05-18 NOTE — PROGRESS NOTES
18:33 Clinician was paged @ 16:31 for a level C. Clinician met with pt. Pt explained he would like to have detox. Clinician reviewed the referral packet with the patient. Clinician reviewed detox and sober living specific resources available in [place] and [place]. Clinician also reviewed other resources available that he may need including housing. Pt was very friendly and cooperative. Pt did ask if this meant he would not get detoxed. I explained to the pt that would be up to the doctor. I also explained that the doctor wants him to have the resources regardless of whether he is discharged or not.

## 2024-05-19 LAB
EKG ATRIAL RATE: 85 BPM
EKG P AXIS: 90 DEGREES
EKG P-R INTERVAL: 132 MS
EKG Q-T INTERVAL: 374 MS
EKG QRS DURATION: 82 MS
EKG QTC CALCULATION (BAZETT): 445 MS
EKG R AXIS: 54 DEGREES
EKG T AXIS: 89 DEGREES
EKG VENTRICULAR RATE: 85 BPM

## 2024-05-19 PROCEDURE — 93010 ELECTROCARDIOGRAM REPORT: CPT | Performed by: INTERNAL MEDICINE

## 2024-05-19 NOTE — ED NOTES
Pt resting in bed with eyes closed; wakens to voice. Denies further needs. Call light within reach.

## 2024-05-19 NOTE — ED PROVIDER NOTES
[facility] EMERGENCY DEPT      Pt Name: Jaden Ma  MRN: [id number]  Birthdate 1990  Date of evaluation: 5/18/2024  Provider: Deanna Springer PA-C    CHIEF COMPLAINT       Chief Complaint   Patient presents with    Chest Pain    Wants to detox       Nurses Notes reviewed and I agree except as noted in the HPI.      HISTORY OF PRESENT ILLNESS    Jaden Ma is a 33 y.o. male who presents via EMS for detox.  Patient tells me that he would like to detox from \"everything.\"  Patient's last use of Suboxone was 2 days ago, last use of methamphetamine was 2 days ago, and last use of alcohol was 1 beer earlier today.  Patient reports \"flu-madison\" symptoms consistent with detox.  He affirms chills, myalgias, anxiety, nausea with small amount of vomiting this morning, diarrhea, shakiness, and sweats.  Patient also endorses central chest pain which he attributes to stress and walking around a lot.  Patient \"fell out with [his] woman\" a couple of days ago.  He left the house and has not been back since.  He has just been walking around [place].  Since he came here and is laying and relaxing his chest pain is better.  Patient denies dyspnea, auditory or visual hallucinations, suicidal ideation, dizziness, change in appetite, change in urinary habits, or other complaints.  Patient recently was been drinking 3 beers and 1 pint of fireball a day.  Patient has crackers and peanut butter in the bed and is requesting a sandwich.    REVIEW OF SYSTEMS     Review of Systems   Constitutional:  Positive for chills and diaphoresis. Negative for appetite change and fever.   HENT:  Negative for congestion, rhinorrhea and sore throat.    Eyes:  Negative for visual disturbance.   Respiratory:  Negative for cough and shortness of breath.    Cardiovascular:  Positive for chest pain.   Gastrointestinal:  Positive for diarrhea, nausea and vomiting. Negative for abdominal pain.   Genitourinary:  Negative for decreased urine volume

## 2024-05-31 ENCOUNTER — TELEPHONE (OUTPATIENT)
Dept: SLEEP MEDICINE | Facility: CLINIC | Age: 34
End: 2024-05-31

## 2024-05-31 NOTE — TELEPHONE ENCOUNTER
Order Request    Who is requesting: patient     Orders being requested: new Cpap Machine & related supplies     Reason service is needed/diagnosis: patient has now decided he would like to start using Cpap for his sleep apnea.   Write did alert patient that an appt may be needed in future.     When are orders needed by: as able     Has this been discussed with Provider: not recently. Last sleep appt 12/07/22 w/mack     Does patient have a preference on a Group/Provider/Facility? [facility] medical supply     Does patient have an appointment scheduled?: No    Where to send orders: Place orders within Epic    [facility] or would you prefer to receive a phone call?:   Patient would prefer a phone call   Okay to leave a detailed message?: Yes at Cell number on file:    Telephone Information:   Mobile [phone]

## 2024-06-07 NOTE — TELEPHONE ENCOUNTER
Patients last appointment in sleep was 7/10/2019. Patient needs to be seen to discuss orders.    Mónica GÓMEZ RN  [facility]

## 2024-06-12 ENCOUNTER — MYC MEDICAL ADVICE (OUTPATIENT)
Dept: FAMILY MEDICINE | Facility: OTHER | Age: 34
End: 2024-06-12
Payer: COMMERCIAL

## 2024-06-12 DIAGNOSIS — F90.0 ADHD (ATTENTION DEFICIT HYPERACTIVITY DISORDER), INATTENTIVE TYPE: ICD-10-CM

## 2024-06-12 RX ORDER — DEXTROAMPHETAMINE SACCHARATE, AMPHETAMINE ASPARTATE MONOHYDRATE, DEXTROAMPHETAMINE SULFATE AND AMPHETAMINE SULFATE 6.25; 6.25; 6.25; 6.25 MG/1; MG/1; MG/1; MG/1
25 CAPSULE, EXTENDED RELEASE ORAL EVERY MORNING
Qty: 30 CAPSULE | Refills: 0 | Status: SHIPPED | OUTPATIENT
Start: 2024-06-12 | End: 2024-07-14

## 2024-06-18 ENCOUNTER — MYC MEDICAL ADVICE (OUTPATIENT)
Dept: FAMILY MEDICINE | Facility: OTHER | Age: 34
End: 2024-06-18
Payer: COMMERCIAL

## 2024-06-18 DIAGNOSIS — M72.2 PLANTAR FASCIITIS: Primary | ICD-10-CM

## 2024-07-14 ENCOUNTER — MYC REFILL (OUTPATIENT)
Dept: FAMILY MEDICINE | Facility: OTHER | Age: 34
End: 2024-07-14
Payer: COMMERCIAL

## 2024-07-14 DIAGNOSIS — F90.0 ADHD (ATTENTION DEFICIT HYPERACTIVITY DISORDER), INATTENTIVE TYPE: ICD-10-CM

## 2024-07-15 RX ORDER — DEXTROAMPHETAMINE SACCHARATE, AMPHETAMINE ASPARTATE MONOHYDRATE, DEXTROAMPHETAMINE SULFATE AND AMPHETAMINE SULFATE 6.25; 6.25; 6.25; 6.25 MG/1; MG/1; MG/1; MG/1
25 CAPSULE, EXTENDED RELEASE ORAL EVERY MORNING
Qty: 30 CAPSULE | Refills: 0 | Status: SHIPPED | OUTPATIENT
Start: 2024-07-15 | End: 2024-08-12

## 2024-08-01 ENCOUNTER — OFFICE VISIT (OUTPATIENT)
Dept: CARDIOLOGY | Facility: CLINIC | Age: 34
End: 2024-08-01
Attending: STUDENT IN AN ORGANIZED HEALTH CARE EDUCATION/TRAINING PROGRAM
Payer: COMMERCIAL

## 2024-08-01 ENCOUNTER — ORDERS ONLY (AUTO-RELEASED) (OUTPATIENT)
Dept: CARDIOLOGY | Facility: CLINIC | Age: 34
End: 2024-08-01

## 2024-08-01 VITALS
SYSTOLIC BLOOD PRESSURE: 126 MMHG | OXYGEN SATURATION: 98 % | WEIGHT: 169 LBS | DIASTOLIC BLOOD PRESSURE: 78 MMHG | HEART RATE: 72 BPM | BODY MASS INDEX: 22.64 KG/M2

## 2024-08-01 DIAGNOSIS — R06.09 DYSPNEA ON EXERTION: ICD-10-CM

## 2024-08-01 DIAGNOSIS — H53.8 BLURRED VISION: ICD-10-CM

## 2024-08-01 DIAGNOSIS — K22.0 ACHALASIA: ICD-10-CM

## 2024-08-01 DIAGNOSIS — R42 POSITIONAL LIGHTHEADEDNESS: ICD-10-CM

## 2024-08-01 DIAGNOSIS — I73.00 RAYNAUD'S DISEASE WITHOUT GANGRENE: ICD-10-CM

## 2024-08-01 DIAGNOSIS — R61 NIGHT SWEATS: ICD-10-CM

## 2024-08-01 DIAGNOSIS — R00.2 PALPITATIONS: ICD-10-CM

## 2024-08-01 DIAGNOSIS — R07.9 CHEST PAIN, UNSPECIFIED TYPE: ICD-10-CM

## 2024-08-01 DIAGNOSIS — R68.89 HEAT INTOLERANCE: ICD-10-CM

## 2024-08-01 DIAGNOSIS — R00.2 PALPITATIONS: Primary | ICD-10-CM

## 2024-08-01 LAB
ATRIAL RATE - MUSE: 69 BPM
BASOPHILS # BLD AUTO: 0 10E3/UL (ref 0–0.2)
BASOPHILS NFR BLD AUTO: 1 %
D DIMER PPP FEU-MCNC: <=0.27 UG/ML FEU (ref 0–0.5)
DIASTOLIC BLOOD PRESSURE - MUSE: NORMAL MMHG
EOSINOPHIL # BLD AUTO: 0.5 10E3/UL (ref 0–0.7)
EOSINOPHIL NFR BLD AUTO: 11 %
ERYTHROCYTE [DISTWIDTH] IN BLOOD BY AUTOMATED COUNT: 14.2 % (ref 10–15)
HCT VFR BLD AUTO: 46.2 % (ref 40–53)
HGB BLD-MCNC: 15.4 G/DL (ref 13.3–17.7)
IMM GRANULOCYTES # BLD: 0 10E3/UL
IMM GRANULOCYTES NFR BLD: 0 %
INTERPRETATION ECG - MUSE: NORMAL
LYMPHOCYTES # BLD AUTO: 1.8 10E3/UL (ref 0.8–5.3)
LYMPHOCYTES NFR BLD AUTO: 36 %
MCH RBC QN AUTO: 28.1 PG (ref 26.5–33)
MCHC RBC AUTO-ENTMCNC: 33.3 G/DL (ref 31.5–36.5)
MCV RBC AUTO: 84 FL (ref 78–100)
MONOCYTES # BLD AUTO: 0.4 10E3/UL (ref 0–1.3)
MONOCYTES NFR BLD AUTO: 9 %
NEUTROPHILS # BLD AUTO: 2.2 10E3/UL (ref 1.6–8.3)
NEUTROPHILS NFR BLD AUTO: 44 %
NRBC # BLD AUTO: 0 10E3/UL
NRBC BLD AUTO-RTO: 0 /100
NT-PROBNP SERPL-MCNC: <36 PG/ML (ref 0–450)
P AXIS - MUSE: 62 DEGREES
PLATELET # BLD AUTO: 193 10E3/UL (ref 150–450)
PR INTERVAL - MUSE: 144 MS
QRS DURATION - MUSE: 92 MS
QT - MUSE: 384 MS
QTC - MUSE: 411 MS
R AXIS - MUSE: 72 DEGREES
RBC # BLD AUTO: 5.48 10E6/UL (ref 4.4–5.9)
SYSTOLIC BLOOD PRESSURE - MUSE: NORMAL MMHG
T AXIS - MUSE: 58 DEGREES
TOTAL PROTEIN SERUM FOR ELP: 7.3 G/DL (ref 6.4–8.3)
TROPONIN T SERPL HS-MCNC: <6 NG/L
VENTRICULAR RATE- MUSE: 69 BPM
WBC # BLD AUTO: 5 10E3/UL (ref 4–11)

## 2024-08-01 PROCEDURE — 83880 ASSAY OF NATRIURETIC PEPTIDE: CPT | Performed by: INTERNAL MEDICINE

## 2024-08-01 PROCEDURE — 84155 ASSAY OF PROTEIN SERUM: CPT | Performed by: INTERNAL MEDICINE

## 2024-08-01 PROCEDURE — 85379 FIBRIN DEGRADATION QUANT: CPT | Performed by: INTERNAL MEDICINE

## 2024-08-01 PROCEDURE — 86141 C-REACTIVE PROTEIN HS: CPT | Performed by: INTERNAL MEDICINE

## 2024-08-01 PROCEDURE — 87468 ANAPLSMA PHGCYTOPHLM AMP PRB: CPT | Performed by: INTERNAL MEDICINE

## 2024-08-01 PROCEDURE — 36415 COLL VENOUS BLD VENIPUNCTURE: CPT | Performed by: INTERNAL MEDICINE

## 2024-08-01 PROCEDURE — 93000 ELECTROCARDIOGRAM COMPLETE: CPT | Performed by: INTERNAL MEDICINE

## 2024-08-01 PROCEDURE — 87798 DETECT AGENT NOS DNA AMP: CPT | Mod: 59 | Performed by: INTERNAL MEDICINE

## 2024-08-01 PROCEDURE — 83521 IG LIGHT CHAINS FREE EACH: CPT | Performed by: INTERNAL MEDICINE

## 2024-08-01 PROCEDURE — 99204 OFFICE O/P NEW MOD 45 MIN: CPT | Performed by: INTERNAL MEDICINE

## 2024-08-01 PROCEDURE — 84484 ASSAY OF TROPONIN QUANT: CPT | Performed by: INTERNAL MEDICINE

## 2024-08-01 PROCEDURE — 85025 COMPLETE CBC W/AUTO DIFF WBC: CPT | Performed by: INTERNAL MEDICINE

## 2024-08-01 PROCEDURE — 84165 PROTEIN E-PHORESIS SERUM: CPT | Performed by: PATHOLOGY

## 2024-08-01 PROCEDURE — 86038 ANTINUCLEAR ANTIBODIES: CPT | Performed by: INTERNAL MEDICINE

## 2024-08-01 PROCEDURE — 86335 IMMUNFIX E-PHORSIS/URINE/CSF: CPT | Performed by: INTERNAL MEDICINE

## 2024-08-01 NOTE — PROGRESS NOTES
Thank you, Dr. Swain, for asking the [facility] team to see Mr. Chance Torres to evaluate       Assessment/Recommendations   Assessment/Plan:  Symptoms combination: dyspnea on exertion, night sweats, no wt loss, fatigue, palptations, possible raynauds, dysphagia/achalasia symptoms, palpiations - plan tests to screen for ischemia (ETT given normal ECG today), echo to screen for WMA/ASD/pulm HTN (restrictive pattern/WMA for scleroderma,amyloid), ; zio patch to screen for PVC/PAC, SVT, block; blood tests today: d-dimer, tick borne dz, CBC with differential, SPEP/UPEP (if abn fat pad biopsy); troponin, HS CRP, EZE.           History of Present Illness/Subjective    Mr. 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  Dizzy spells with vertigo prior to  1 week before marriage 11 years ago, has had problems since with dizziness but now dizziness are more orthostatic past 5 years. Noted night sweats changing sheets somcetimes due to drenching sweat, no rash, no joint pain/sweeling, no PND, orthopnea but some swelling in his lower legs, some chest pressure/tightness on exertion, limtied to two flights of stairs before stopping due to dyspnea.  Walked 1/2 mile with fatigue.  No family hx of MI,CVA, PE/DVT, sudden death.   No congenital heart disease as a child.  No tick exposure.   Chewing on his food has achalasia 5-6 years no trips to [place].   3/24 , TSH 0.46, nl CBC, CMP 2023. Family with xh of autoimmune disease - uncle with parkinsons, aunt with sjrogrens/ Noted fingers turn white/purple with the cold weather and very painful.  ECG/colon in 2021 normal includign biopsies. ESR 4/24 normal.  Possible sleep apnea, no change with POTS diet high salt, lwo carbs, high watch, wt gain not wt loss.            Physical Examination Review of Systems   /78 (BP Location: Right arm, Patient Position: Sitting, Cuff Size: Adult Regular)   Pulse 72   Wt 76.7 kg (169 lb)   SpO2 98%   BMI 22.64 kg/m    Body mass index is 22.64 kg/m .  Wt Readings from Last 3 Encounters:   08/01/24 76.7 kg (169 lb)   04/17/24 75.8 kg (167 lb)   03/20/24 75.3 kg (166 lb)     [unfilled]  General Appearance:   no distress, normal body habitus   ENT/Mouth: membranes moist, no oral lesions or bleeding gums.      EYES:  no scleral icterus, normal conjunctivae   Neck: no carotid bruits or thyromegaly   Chest/Lungs:   lungs are clear to auscultation, no rales or wheezing,  sternal scar, equal chest wall expansion    Cardiovascular:   Regular. Normal first and second heart sounds with no murmurs, rubs, or gallops; the carotid, radial and posterior tibial pulses are intact, Jugular venous pressure , edema bilaterally    Abdomen:  no organomegaly, masses, bruits, or tenderness; bowel sounds are present   Extremities: no cyanosis or clubbing   Skin: no xanthelasma, warm.    Neurologic: normal  bilateral, no tremors     Psychiatric: alert and oriented x3, calm     Review of Systems - 12 points nega other than above      Medical History  Surgical History Family History Social History   Past Medical History:   Diagnosis Date    Depressive disorder 2016    Marijuana use 1/4/2019    Past Surgical History:   Procedure Laterality Date    ABDOMEN SURGERY  2013/2016    Hernia repair    ARTHROSCOPY SHOULDER SUPERIOR LABRUM ANTERIOR TO POSTERIOR REPAIR Left 2012    COLONOSCOPY N/A 7/23/2021    Procedure: Colonoscopy, With Polypectomy And Biopsy;  Surgeon: Geneva Patel DO;  Location: [facility] OR    COLONOSCOPY WITH CO2 INSUFFLATION N/A 7/23/2021    Procedure: COLONOSCOPY, WITH CO2 INSUFFLATION;  Surgeon: Geneva Patel DO;  Location: [facility] OR    HERNIA REPAIR Left 2013    open    HERNIA REPAIR Right 2016    laparoscopic    Family History   Problem Relation  Age of Onset    Mental Illness Brother         Not diagnosed by doctor or therapist.  Refuses to contact them    Mental Illness Brother     Cerebrovascular Disease Paternal Grandmother     Thyroid Disease Mother     Unknown/Adopted Mother     Social History     Socioeconomic History    Marital status:      Spouse name: Not on file    Number of children: Not on file    Years of education: Not on file    Highest education level: Not on file   Occupational History    Not on file   Tobacco Use    Smoking status: Never    Smokeless tobacco: Former     Quit date: 5/11/2016   Vaping Use    Vaping status: Never Used   Substance and Sexual Activity    Alcohol use: Not Currently     Comment: rarely    Drug use: Not Currently     Types: Marijuana     Comment: has been over a year    Sexual activity: Yes     Partners: Female     Birth control/protection: Condom   Other Topics Concern    Parent/sibling w/ CABG, MI or angioplasty before 65F 55M? No   Social History Narrative    Not on file     Social Determinants of Health     Financial Resource Strain: Low Risk  (3/20/2024)    Financial Resource Strain     Within the past 12 months, have you or your family members you live with been unable to get utilities (heat, electricity) when it was really needed?: No   Food Insecurity: Low Risk  (3/20/2024)    Food Insecurity     Within the past 12 months, did you worry that your food would run out before you got money to buy more?: No     Within the past 12 months, did the food you bought just not last and you didn t have money to get more?: No   Transportation Needs: Low Risk  (3/20/2024)    Transportation Needs     Within the past 12 months, has lack of transportation kept you from medical appointments, getting your medicines, non-medical meetings or appointments, work, or from getting things that you need?: No   Physical Activity: Insufficiently Active (3/20/2024)    Exercise Vital Sign     Days of Exercise per Week: 3 days      Minutes of Exercise per Session: 30 min   Stress: No Stress Concern Present (3/20/2024)    [place] [employer] - Occupational Stress Questionnaire     Feeling of Stress : Only a little   Social Connections: Unknown (3/20/2024)    Social Connection and Isolation Panel [NHANES]     Frequency of Communication with Friends and Family: Not on file     Frequency of Social Gatherings with Friends and Family: Once a week     Attends Latter-day Services: Not on file     Active Member of Clubs or Organizations: Not on file     Attends Club or Organization Meetings: Not on file     Marital Status: Not on file   Interpersonal Safety: Low Risk  (3/20/2024)    Interpersonal Safety     Do you feel physically and emotionally safe where you currently live?: Yes     Within the past 12 months, have you been hit, slapped, kicked or otherwise physically hurt by someone?: No     Within the past 12 months, have you been humiliated or emotionally abused in other ways by your partner or ex-partner?: No   Housing Stability: Low Risk  (3/20/2024)    Housing Stability     Do you have housing? : Yes     Are you worried about losing your housing?: No          Medications  Allergies   Scheduled Meds:  No current facility-administered medications for this visit.     Continuous Infusions:  No current facility-administered medications for this visit.     PRN Meds:.  No current facility-administered medications for this visit.    No Known Allergies      Lab Results    Chemistry/lipid CBC Cardiac Enzymes/BNP/TSH/INR   Lab Results   Component Value Date    CHOL 219 (H) 03/20/2024    HDL 56 03/20/2024    TRIG 110 03/20/2024    BUN 16.0 11/02/2023     11/02/2023    CO2 26 11/02/2023    Lab Results   Component Value Date    WBC 7.0 05/15/2023    HGB 14.3 05/15/2023    HCT 44.1 05/15/2023    MCV 85 05/15/2023     05/15/2023    Lab Results   Component Value Date    TSH 0.46 11/02/2023              Harvinder Mora  MD  Interventional Cardiology  [facility]

## 2024-08-02 DIAGNOSIS — R61 NIGHT SWEATS: ICD-10-CM

## 2024-08-02 DIAGNOSIS — R76.8 ELEVATED SERUM IMMUNOGLOBULIN FREE LIGHT CHAINS: ICD-10-CM

## 2024-08-02 DIAGNOSIS — H53.8 BLURRED VISION: ICD-10-CM

## 2024-08-02 DIAGNOSIS — R68.89 HEAT INTOLERANCE: Primary | ICD-10-CM

## 2024-08-02 DIAGNOSIS — R42 POSITIONAL LIGHTHEADEDNESS: ICD-10-CM

## 2024-08-02 LAB
A PHAGOCYTOPH DNA BLD QL NAA+PROBE: NOT DETECTED
ALBUMIN SERPL ELPH-MCNC: 4.7 G/DL (ref 3.7–5.1)
ALPHA1 GLOB SERPL ELPH-MCNC: 0.2 G/DL (ref 0.2–0.4)
ALPHA2 GLOB SERPL ELPH-MCNC: 0.6 G/DL (ref 0.5–0.9)
ANA SER QL IF: NEGATIVE
B-GLOBULIN SERPL ELPH-MCNC: 0.8 G/DL (ref 0.6–1)
BABESIA DNA BLD QL NAA+PROBE: NOT DETECTED
CRP SERPL HS-MCNC: 3.27 MG/L
EHRLICHIA DNA SPEC QL NAA+PROBE: NOT DETECTED
GAMMA GLOB SERPL ELPH-MCNC: 1 G/DL (ref 0.7–1.6)
KAPPA LC FREE SER-MCNC: 2.07 MG/DL (ref 0.33–1.94)
KAPPA LC FREE/LAMBDA FREE SER NEPH: 1.01 {RATIO} (ref 0.26–1.65)
LAMBDA LC FREE SERPL-MCNC: 2.04 MG/DL (ref 0.57–2.63)
LOCATION OF TASK: NORMAL
LOCATION OF TASK: NORMAL
M PROTEIN SERPL ELPH-MCNC: 0 G/DL
NT-PROBNP SERPL-MCNC: <36 PG/ML (ref 0–450)
PROT ELPH PNL UR ELPH: NORMAL
PROT PATTERN SERPL ELPH-IMP: NORMAL
TROPONIN T SERPL HS-MCNC: <6 NG/L

## 2024-08-05 NOTE — TELEPHONE ENCOUNTER
REFERRAL INFORMATION:  Referring Provider: Dr. Mora  Referring Clinic: [facility] - Cardiology  Reason for Visit/Diagnosis: Elevated kappa light chains, needs fat pad biopsy to r/o amyloidosis        FUTURE VISIT INFORMATION:  Appointment Date: 8/21/2024  Appointment Time: 11 AM     NOTES RECORD STATUS  DETAILS   OFFICE NOTE from Referring Provider Internal [facility]:  8/1/24 - CARDIO OV with Dr. Mora   OFFICE NOTE from Other Specialists N/A    HOSPITAL DISCHARGE SUMMARY/ ED VISITS  N/A    OPERATIVE REPORT N/A    ENDOSCOPY (EGD)  Received [facility]:  11/29/16 - EGD   PERTINENT LABS Internal    IMAGING (CT, MRI, US, XR)  Internal [facility]:  8/31/22 - CT Abd/Pelvis

## 2024-08-12 ENCOUNTER — MYC REFILL (OUTPATIENT)
Dept: NEUROLOGY | Facility: CLINIC | Age: 34
End: 2024-08-12
Payer: COMMERCIAL

## 2024-08-12 ENCOUNTER — MYC REFILL (OUTPATIENT)
Dept: FAMILY MEDICINE | Facility: OTHER | Age: 34
End: 2024-08-12
Payer: COMMERCIAL

## 2024-08-12 DIAGNOSIS — G43.109 MIGRAINE WITH AURA AND WITHOUT STATUS MIGRAINOSUS, NOT INTRACTABLE: ICD-10-CM

## 2024-08-12 DIAGNOSIS — F90.0 ADHD (ATTENTION DEFICIT HYPERACTIVITY DISORDER), INATTENTIVE TYPE: ICD-10-CM

## 2024-08-13 RX ORDER — VERAPAMIL HYDROCHLORIDE 40 MG/1
TABLET ORAL
Qty: 60 TABLET | Refills: 6 | Status: SHIPPED | OUTPATIENT
Start: 2024-08-13

## 2024-08-13 RX ORDER — RIZATRIPTAN BENZOATE 5 MG/1
5-10 TABLET, ORALLY DISINTEGRATING ORAL
Qty: 18 TABLET | Refills: 6 | Status: SHIPPED | OUTPATIENT
Start: 2024-08-13

## 2024-08-13 RX ORDER — DEXTROAMPHETAMINE SACCHARATE, AMPHETAMINE ASPARTATE MONOHYDRATE, DEXTROAMPHETAMINE SULFATE AND AMPHETAMINE SULFATE 6.25; 6.25; 6.25; 6.25 MG/1; MG/1; MG/1; MG/1
25 CAPSULE, EXTENDED RELEASE ORAL EVERY MORNING
Qty: 30 CAPSULE | Refills: 0 | Status: SHIPPED | OUTPATIENT
Start: 2024-08-13 | End: 2024-08-22

## 2024-08-13 NOTE — TELEPHONE ENCOUNTER
**Pt requesting change in pharmacy**                                          RX Authorization    Medication: rizatriptan (MAXALT-MLT) 5 MG ODT     Date last refill ordered: 1/25/24    Quantity ordered: 18    # refills: 6    Date of last clinic visit with ordering provider: 4/25/24    Date of next clinic visit with ordering provider: None scheduled                                          RX Authorization    Medication: verapamil (CALAN) 40 MG tablet     Date last refill ordered: 4/25/24    Quantity ordered: 60    # refills: 5

## 2024-08-14 ENCOUNTER — HOSPITAL ENCOUNTER (OUTPATIENT)
Dept: CARDIOLOGY | Facility: CLINIC | Age: 34
Discharge: HOME | End: 2024-08-14
Attending: INTERNAL MEDICINE
Payer: COMMERCIAL

## 2024-08-14 DIAGNOSIS — R07.9 CHEST PAIN, UNSPECIFIED TYPE: ICD-10-CM

## 2024-08-14 DIAGNOSIS — R06.09 DYSPNEA ON EXERTION: ICD-10-CM

## 2024-08-14 LAB
CV STRESS CURRENT BP HE: NORMAL
CV STRESS CURRENT HR HE: 101
CV STRESS CURRENT HR HE: 104
CV STRESS CURRENT HR HE: 105
CV STRESS CURRENT HR HE: 107
CV STRESS CURRENT HR HE: 108
CV STRESS CURRENT HR HE: 108
CV STRESS CURRENT HR HE: 111
CV STRESS CURRENT HR HE: 111
CV STRESS CURRENT HR HE: 113
CV STRESS CURRENT HR HE: 114
CV STRESS CURRENT HR HE: 114
CV STRESS CURRENT HR HE: 115
CV STRESS CURRENT HR HE: 115
CV STRESS CURRENT HR HE: 116
CV STRESS CURRENT HR HE: 121
CV STRESS CURRENT HR HE: 122
CV STRESS CURRENT HR HE: 123
CV STRESS CURRENT HR HE: 126
CV STRESS CURRENT HR HE: 130
CV STRESS CURRENT HR HE: 142
CV STRESS CURRENT HR HE: 149
CV STRESS CURRENT HR HE: 152
CV STRESS CURRENT HR HE: 152
CV STRESS CURRENT HR HE: 155
CV STRESS CURRENT HR HE: 155
CV STRESS CURRENT HR HE: 158
CV STRESS CURRENT HR HE: 171
CV STRESS CURRENT HR HE: 175
CV STRESS CURRENT HR HE: 181
CV STRESS CURRENT HR HE: 183
CV STRESS CURRENT HR HE: 72
CV STRESS CURRENT HR HE: 87
CV STRESS CURRENT HR HE: 93
CV STRESS CURRENT HR HE: 98
CV STRESS CURRENT HR HE: 98
CV STRESS DEVIATION TIME HE: NORMAL
CV STRESS ECHO PERCENT HR HE: NORMAL
CV STRESS EXERCISE STAGE HE: NORMAL
CV STRESS EXERCISE STAGE REACHED HE: NORMAL
CV STRESS FINAL RESTING BP HE: NORMAL
CV STRESS FINAL RESTING HR HE: 104
CV STRESS MAX HR HE: 183
CV STRESS MAX TREADMILL GRADE HE: 18
CV STRESS MAX TREADMILL SPEED HE: 5
CV STRESS PEAK DIA BP HE: NORMAL
CV STRESS PEAK SYS BP HE: NORMAL
CV STRESS PHASE HE: NORMAL
CV STRESS PROTOCOL HE: NORMAL
CV STRESS REASON STOPPED HE: NORMAL
CV STRESS RESTING PT POSITION HE: NORMAL
CV STRESS RESTING PT POSITION HE: NORMAL
CV STRESS ST DEVIATION AMOUNT HE: NORMAL
CV STRESS ST DEVIATION ELEVATION HE: NORMAL
CV STRESS ST EVELATION AMOUNT HE: NORMAL
CV STRESS SYMPTOMS HE: NORMAL
CV STRESS TEST TYPE HE: NORMAL
CV STRESS TOTAL STAGE TIME MIN 1 HE: NORMAL
LVEF ECHO: NORMAL
STRESS ECHO BASELINE DIASTOLIC HE: 90
STRESS ECHO BASELINE HR: 86
STRESS ECHO BASELINE SYSTOLIC BP: 126
STRESS ECHO LAST STRESS DIASTOLIC BP: 96
STRESS ECHO LAST STRESS HR: 183
STRESS ECHO LAST STRESS SYSTOLIC BP: 180
STRESS ECHO POST ESTIMATED WORKLOAD: 14.9
STRESS ECHO POST EXERCISE DUR MIN: 14
STRESS ECHO POST EXERCISE DUR SEC: 26
STRESS ECHO TARGET HR: 157

## 2024-08-14 PROCEDURE — 93306 TTE W/DOPPLER COMPLETE: CPT

## 2024-08-14 PROCEDURE — 93016 CV STRESS TEST SUPVJ ONLY: CPT | Performed by: INTERNAL MEDICINE

## 2024-08-14 PROCEDURE — 93306 TTE W/DOPPLER COMPLETE: CPT | Mod: 26 | Performed by: INTERNAL MEDICINE

## 2024-08-14 PROCEDURE — 93018 CV STRESS TEST I&R ONLY: CPT | Performed by: INTERNAL MEDICINE

## 2024-08-14 PROCEDURE — 93017 CV STRESS TEST TRACING ONLY: CPT

## 2024-08-15 DIAGNOSIS — R06.09 DYSPNEA ON EXERTION: ICD-10-CM

## 2024-08-15 DIAGNOSIS — R93.1 ABNORMAL ECHOCARDIOGRAM: Primary | ICD-10-CM

## 2024-08-15 DIAGNOSIS — R07.9 CHEST PAIN, UNSPECIFIED TYPE: ICD-10-CM

## 2024-08-16 ENCOUNTER — VIRTUAL VISIT (OUTPATIENT)
Dept: SLEEP MEDICINE | Facility: CLINIC | Age: 34
End: 2024-08-16
Payer: COMMERCIAL

## 2024-08-16 VITALS — WEIGHT: 168 LBS | BODY MASS INDEX: 23.52 KG/M2 | HEIGHT: 71 IN

## 2024-08-16 DIAGNOSIS — G47.33 OSA (OBSTRUCTIVE SLEEP APNEA): Primary | ICD-10-CM

## 2024-08-16 DIAGNOSIS — G47.10 HYPERSOMNIA: ICD-10-CM

## 2024-08-16 PROCEDURE — G2211 COMPLEX E/M VISIT ADD ON: HCPCS | Mod: 95 | Performed by: INTERNAL MEDICINE

## 2024-08-16 PROCEDURE — 99203 OFFICE O/P NEW LOW 30 MIN: CPT | Mod: 95 | Performed by: INTERNAL MEDICINE

## 2024-08-16 ASSESSMENT — SLEEP AND FATIGUE QUESTIONNAIRES
HOW LIKELY ARE YOU TO NOD OFF OR FALL ASLEEP WHILE SITTING QUIETLY AFTER LUNCH WITHOUT ALCOHOL: WOULD NEVER DOZE
HOW LIKELY ARE YOU TO NOD OFF OR FALL ASLEEP WHILE WATCHING TV: WOULD NEVER DOZE
HOW LIKELY ARE YOU TO NOD OFF OR FALL ASLEEP WHILE SITTING AND TALKING TO SOMEONE: WOULD NEVER DOZE
HOW LIKELY ARE YOU TO NOD OFF OR FALL ASLEEP WHILE LYING DOWN TO REST IN THE AFTERNOON WHEN CIRCUMSTANCES PERMIT: SLIGHT CHANCE OF DOZING
HOW LIKELY ARE YOU TO NOD OFF OR FALL ASLEEP WHEN YOU ARE A PASSENGER IN A CAR FOR AN HOUR WITHOUT A BREAK: WOULD NEVER DOZE
HOW LIKELY ARE YOU TO NOD OFF OR FALL ASLEEP WHILE SITTING AND READING: WOULD NEVER DOZE
HOW LIKELY ARE YOU TO NOD OFF OR FALL ASLEEP WHILE SITTING INACTIVE IN A PUBLIC PLACE: WOULD NEVER DOZE
HOW LIKELY ARE YOU TO NOD OFF OR FALL ASLEEP IN A CAR, WHILE STOPPED FOR A FEW MINUTES IN TRAFFIC: WOULD NEVER DOZE

## 2024-08-16 ASSESSMENT — PAIN SCALES - GENERAL: PAINLEVEL: NO PAIN (0)

## 2024-08-16 NOTE — PROGRESS NOTES
Virtual Visit Details    Type of service:  Video Visit     Originating Location (pt. Location): Home  Distant Location (provider location):  Off-site  Platform used for Video Visit: Mic Network    Additional 15 minutes on the date of service was spent performing the following:    -Preparing to see the patient  -Obtaining and/or reviewing separately obtained history   -Ordering medications, tests, or procedures   -Documenting clinical information in the electronic or other health record     Thank you for the opportunity to participate in the care of Chance Torres.     He is a 33 year old  male patient who comes to the sleep medicine clinic for review of sleep study results. The patient had a sleep study performed on 12/07/2022 (AHI=5.1).    Assessment and Plan:  In summary Chance Torres is a 33 year old year old male here for review of sleep study results.  1. Obstructive Sleep Apnea/Hypersomnia  We had an extensive conversation to review the results of his sleep study and to  him on the importance of treating sleep apnea. We discussed the options of treatment with oral appliance versus CPAP. Patient decided to proceed with CPAP. He will start using the device as soon as he receives it with the intention to use if for the entire night. We discussed some tips to increase PAP tolerance as well as the normal curve of adaptation. CPAP is going to provide improved respiratory function during the night but it can cause some sleep disruption that tends to improve with continuous usage. He should return to the clinic in 7 weeks to review compliance and efficacy monitoring. Since the patient does not have any preference, we will use [employer] as the [email or web address] medical equipment company.     Lab reviewed: Discussed with patient.    Sleep-Wake Cycle:    The patient likes to initiate sleep at around 11 PM with a sleep latency of 15-30 minutes. The patient has 2-3 nocturnal awakenings. Final wake up time is around  7 AM.    SUZANNA:  SUZANNA Total Score: 12  Total score - [place]: 1 (8/16/2024  9:51 AM)    Patient Active Problem List   Diagnosis    Irritable bowel syndrome with both constipation and diarrhea    Labral tear of shoulder    ADHD (attention deficit hyperactivity disorder), inattentive type    Migraine with aura and without status migrainosus, not intractable       Current Outpatient Medications   Medication Sig Dispense Refill    amphetamine-dextroamphetamine (ADDERALL XR) 25 MG 24 hr capsule Take 1 capsule (25 mg) by mouth every morning 30 capsule 0    Ascorbic Acid (VITAMIN C) 500 MG CAPS Take 500 mg by mouth daily      cetirizine (ZYRTEC) 10 MG tablet Take 10 mg by mouth daily 24 hour      Cholecalciferol (DELTA D3) 10 MCG (400 UNIT) TABS Take 50 mcg by mouth      Magnesium Gluconate (MAGNESIUM 27 PO) Take 840 mg by mouth      rizatriptan (MAXALT-MLT) 5 MG ODT Take 1-2 tablets (5-10 mg) by mouth at onset of headache for migraine (may repeat in 2 hours as needed. Max 30 mg in 24 hours) 18 tablet 6    verapamil (CALAN) 40 MG tablet Take one tab twice daily 60 tablet 6    zinc sulfate (ZINCATE) 220 (50 Zn) MG capsule Take 50 mg by mouth         No Known Allergies    Visual Exam:  GEN: NAD  Psych: normal mood, normal affect.       Labs/Studies:  - We reviewed the results of the overnight study as described on the HPI.       Lab Results   Component Value Date    TSH 0.46 11/02/2023    TSH 0.39 05/15/2023     Lab Results   Component Value Date    GLC 92 11/02/2023    GLC 60 (L) 05/15/2023     Lab Results   Component Value Date    HGB 15.4 08/01/2024    HGB 14.3 05/15/2023     Lab Results   Component Value Date    BUN 16.0 11/02/2023    BUN 16.9 05/15/2023    CR 1.01 04/17/2024    CR 0.99 11/02/2023     Lab Results   Component Value Date    AST 27 05/15/2023    AST 18 08/31/2022    ALT 34 04/17/2024    ALT 26 05/15/2023    ALKPHOS 56 05/15/2023    ALKPHOS 48 08/31/2022    BILITOTAL 0.8 05/15/2023    BILITOTAL 0.6 08/31/2022        Recent Labs   Lab Test 04/17/24  1430 11/02/23  1548 05/15/23  1108   NA  --  140 142   POTASSIUM  --  4.1 4.4   CHLORIDE  --  100 103   CO2  --  26 31*   ANIONGAP  --  14 8   GLC  --  92 60*   BUN  --  16.0 16.9   CR 1.01 0.99 0.88   ZENA  --  9.4 9.2       Ferritin   Date Value Ref Range Status   04/17/2024 133 31 - 409 ng/mL Final         Patient verbalized understanding of these issues, agrees with the plan and all questions were answered today. Patient was given an opportuntity to voice any other symptoms or concerns not listed above. Patient did not have any other symptoms or concerns.      Ralf rByan DO  Board Certified in Internal Medicine and Sleep Medicine      (Note created with Dragon voice recognition and unintended spelling errors and word substitutions may occur)

## 2024-08-16 NOTE — PATIENT INSTRUCTIONS
Equipment Instructions    We will process your PAP order and send it to a Durable Medical Equipment (DME) provider.    The medical equipment company should call you within 7 days.  If you have not heard from the company, please contact them to see if they received your order and are planning to call you.    Please call us at [phone] if you are unable to contact the medical equipment company or if they do not have the order.    If you are starting a new PAP machine, please call us after you use it the first night to let us know how it went. This call also helps us know that you received your equipment and that everything is ready. Please use our central phone number [phone]    Contact information for [employer] company:    [employer] [facility] Tel: [phone]

## 2024-08-16 NOTE — NURSING NOTE
Current patient location: [address] [facility] [address]    Is the patient currently in the state of [place]? YES    Visit mode:VIDEO    If the visit is dropped, the patient can be reconnected by: VIDEO VISIT: Text to cell phone:   Telephone Information:   Mobile [phone]       Will anyone else be joining the visit? NO  (If patient encounters technical issues they should call [phone]    How would you like to obtain your AVS? MyChart    Are changes needed to the allergy or medication list? No    Are refills needed on medications prescribed by this physician? NO    Rooming Documentation:  Questionnaire(s) completed      Reason for visit: Consult    Prashant ISAS

## 2024-08-19 ENCOUNTER — TELEPHONE (OUTPATIENT)
Dept: SURGERY | Facility: CLINIC | Age: 34
End: 2024-08-19
Payer: COMMERCIAL

## 2024-08-19 NOTE — TELEPHONE ENCOUNTER
Health Call Center    Phone Message    May a detailed message be left on voicemail: yes     Reason for Call: Other: Pt's spouse, Dang called in requesting a  call back from a team member to the pt directly. Pt wondering more about upcoming appt, if it will be just a consult, biopsy too and/or any restrictions before or after. Please call pt to further discuss. Thank you!     Action Taken: Message routed to:  [facility]GERY CLINIC GEN S NURSES- [[id number]]    Travel Screening: Not Applicable     Date of Service:

## 2024-08-21 ENCOUNTER — OFFICE VISIT (OUTPATIENT)
Dept: SURGERY | Facility: CLINIC | Age: 34
End: 2024-08-21
Attending: INTERNAL MEDICINE
Payer: COMMERCIAL

## 2024-08-21 ENCOUNTER — PRE VISIT (OUTPATIENT)
Dept: SURGERY | Facility: CLINIC | Age: 34
End: 2024-08-21

## 2024-08-21 VITALS
BODY MASS INDEX: 23.09 KG/M2 | WEIGHT: 170.5 LBS | HEIGHT: 72 IN | DIASTOLIC BLOOD PRESSURE: 79 MMHG | OXYGEN SATURATION: 99 % | HEART RATE: 66 BPM | SYSTOLIC BLOOD PRESSURE: 113 MMHG

## 2024-08-21 DIAGNOSIS — H53.8 BLURRED VISION: ICD-10-CM

## 2024-08-21 DIAGNOSIS — R42 POSITIONAL LIGHTHEADEDNESS: ICD-10-CM

## 2024-08-21 DIAGNOSIS — R61 NIGHT SWEATS: ICD-10-CM

## 2024-08-21 DIAGNOSIS — R68.89 HEAT INTOLERANCE: ICD-10-CM

## 2024-08-21 DIAGNOSIS — R76.8 ELEVATED SERUM IMMUNOGLOBULIN FREE LIGHT CHAINS: ICD-10-CM

## 2024-08-21 PROCEDURE — 88313 SPECIAL STAINS GROUP 2: CPT | Mod: TC | Performed by: SURGERY

## 2024-08-21 PROCEDURE — 11104 PUNCH BX SKIN SINGLE LESION: CPT | Performed by: SURGERY

## 2024-08-21 PROCEDURE — 88313 SPECIAL STAINS GROUP 2: CPT | Mod: 26 | Performed by: PATHOLOGY

## 2024-08-21 PROCEDURE — 88305 TISSUE EXAM BY PATHOLOGIST: CPT | Mod: 26 | Performed by: PATHOLOGY

## 2024-08-21 PROCEDURE — 99202 OFFICE O/P NEW SF 15 MIN: CPT | Mod: 25 | Performed by: SURGERY

## 2024-08-21 ASSESSMENT — PAIN SCALES - GENERAL: PAINLEVEL: NO PAIN (0)

## 2024-08-21 NOTE — LETTER
8/21/2024       RE: Chance Torres  [address]  [facility] [address]     Dear Colleague,    Thank you for referring your patient, Chance Torres, to the [facility] GENERAL SURGERY CLINIC [place] [facility]. Please see a copy of my visit note below.    I met with Chance Torres and his wife for a fat pad biopsy.  He is being worked up for amyloidosis.    He is not on a blood thinner  No known allergies  Not on medications currently  Prior inguinal hernia repairs    I obtained consent for the biopsy.  Left sided abdominal wall site selected.    PE:  /79 (BP Location: Left arm, Patient Position: Sitting, Cuff Size: Adult Regular)   Pulse 66   Ht 1.829 m (6')   Wt 77.3 kg (170 lb 8 oz)   SpO2 99%   BMI 23.12 kg/m    Alert, pleasant  No resp distress  Abdominal wall without infection or abnormality    A/P:  Fat pad biopsy requested    Performed now (separate note)      Again, thank you for allowing me to participate in the care of your patient.      Sincerely,    Garland Wetzel MD

## 2024-08-21 NOTE — NURSING NOTE
[facility] GENERAL SURGERY CLINIC [address] [address]  [phone]  Dept: [phone]  ______________________________________________________________________________    Patient: Chance Torres   : 1990   MRN: [id number]   2024    INVASIVE PROCEDURE SAFETY CHECKLIST    Date: 2024   Procedure: fat pad biopsy  Patient Name: Chance Torres  MRN: [id number]  YOB: 1990    Action: Complete sections as appropriate. Any discrepancy results in a HARD COPY until resolved.     PRE PROCEDURE:  Patient ID verified with 2 identifiers (name and  or MRN): Yes  Procedure and site verified with patient/designee (when able): Yes  Accurate consent documentation in medical record: Yes  H&P (or appropriate assessment) documented in medical record: NA  H&P must be up to 30 days prior to procedure and updates within 24 hours of procedure as applicable: NA  Relevant diagnostic and radiology test results appropriately labeled and displayed as applicable: NA  Blood products, implants, devices, and or special equipment available for the procedure as applicable: NA   Procedure site(s) marked with provider initials: NA  Marking not required: Yes  Procedure does not require site marking    TIMEOUT:  Time-Out performed immediately prior to starting procedure, including verbal and active participation of all team members addressing the following:Yes  * Correct patient identify  * Confirmed that the correct side and site are marked  * An accurate procedure consent form  * Agreement on the procedure to be done  * Correct patient position  * Relevant images and results are properly labeled and appropriately displayed  * The need to administer antibiotics or fluids for irrigation purposes during the procedure as applicable   * Safety precautions based on patient history or medication use    DURING PROCEDURE: Verification of correct person, site,  and procedures any time the responsibility for care of the patient is transferred to another member of the care team.

## 2024-08-21 NOTE — NURSING NOTE
Chief Complaint   Patient presents with    New Patient     Elevated kappa light chains, needs fat pad biopsy to rule out amyloidosis       Vitals:    08/21/24 1054   BP: 113/79   BP Location: Left arm   Patient Position: Sitting   Cuff Size: Adult Regular   Pulse: 66   SpO2: 99%   Weight: 77.3 kg (170 lb 8 oz)   Height: 1.829 m (6')       Body mass index is 23.12 kg/m .                          Gunner Keys, EMT

## 2024-08-21 NOTE — PROGRESS NOTES
I met with Chance Torres and his wife for a fat pad biopsy.  He is being worked up for amyloidosis.    He is not on a blood thinner  No known allergies  Not on medications currently  Prior inguinal hernia repairs    I obtained consent for the biopsy.  Left sided abdominal wall site selected.    PE:  /79 (BP Location: Left arm, Patient Position: Sitting, Cuff Size: Adult Regular)   Pulse 66   Ht 1.829 m (6')   Wt 77.3 kg (170 lb 8 oz)   SpO2 99%   BMI 23.12 kg/m    Alert, pleasant  No resp distress  Abdominal wall without infection or abnormality    A/P:  Fat pad biopsy requested    Performed now (separate note)

## 2024-08-21 NOTE — PATIENT INSTRUCTIONS
You met with Dr. Garland Wetzel.      Today's visit instructions:    We will call you on Friday for a status update.     If you have questions please contact Malina RN or Bruna RN during regular clinic hours, Monday through Friday 7:30 AM - 4:00 PM, or you can contact us via [employer] at anytime.       If you have urgent needs after-hours, weekends, or holidays please call the hospital at [phone] and ask to speak with our on-call General Surgery Team.    Appointment [phone]  Nurse Advice (Malina or Bruna): [phone]   Surgery Scheduler (Ashtyn): [phone]  Fax: [phone]    Fat Pad Biopsy    Incision care   You may take a shower the day after surgery. Carefully wash your incision with soap and water. Do not submerge yourself in water (bath, whirlpool, hot tub, pool, lake) for 14 days after surgery.   Remove the gauze bandage 1-2 days after surgery but leave the medical tape (Steri-Strips) or glue in place. These will loosen and fall off on their own 1-2 weeks after surgery.     Always wash your hands before touching your incisions or removing bandages.   It is not unusual to form a collection of fluid or blood under your incision that may feel firm or squishy- it can take several weeks to months for your body to reabsorb it.  At times, it may even drain.  If that should happen keep the area clean with soap, water,  and cover with a clean gauze dressing. You can change this daily or as needed.     Other medicines   Wait to start aspirin or blood thinners until the day after surgery. You can continue your regular medicines at your normal time the day after surgery.   Your pain medicine may cause constipation (hard, dry stools). To help with this, take the stool softener your doctor gave you or an over-the-counter stool softener or laxative. You can stop taking this when you are no longer taking pain medicine and your bowel movements are back to normal.      For pain or discomfort   Please  use over-the-counter medication, use acetaminophen (Tylenol) or ibuprofen (Advil, Motrin) as instructed on the box for discomfort. If you were prescribed narcotic pain medicine, take as needed and as instructed on the bottle (narcotics are not always prescribed for this procedure). Do not take Tylenol if it is in your narcotic pain medication.    Use an ice pack on your surgical cut (incision) for 20 minutes at a time as needed for the first 24 hours. Be sure to protect your skin by putting a cloth between the ice pack and your skin.      Activities   No driving until you feel it s safe to do so. Don t drive while taking narcotic pain medicine.      Diet   You can eat your regular meals after surgery.      Results   You should know any test results about 5 to 7 business days after surgery       When to call the doctor   Call your doctor if you have:   A fever above 101 F (38.3 C) (taken under the tongue), or a fever or chills lasting more than a day.   Redness at the incision site.   Any fluid or blood draining from the incision, especially if it smells bad.    Severe pain that doesn t improve with pain medicine.      We will call you 2 to 4 days after surgery to review this handout, answer questions and help arrange after-surgery care. If you have questions or concerns, please call [phone] during regular office hours. If you need to call after business hours, call [phone] and ask to page the surgeon on-call.

## 2024-08-21 NOTE — PROCEDURES
Left fat pad biopsy:    The left lower abdomen was prepped and draped in sterile fashion    10ml of 1% lidocaine was injected at the site.    A 6mm punch biopsy was performed sharply and carried down into the subcutanoues tissue.  It was trimmed including this fat and placed in formalin.  Pressure was held.  A 4.0 vicryl was used to close the site in buried fashion.  Steri strips applied.  Dressings applied.    EBL: 3ml  Specimen: left abdominal wall fat pad and skin  No complications

## 2024-08-22 ENCOUNTER — MYC REFILL (OUTPATIENT)
Dept: FAMILY MEDICINE | Facility: OTHER | Age: 34
End: 2024-08-22

## 2024-08-22 DIAGNOSIS — F90.0 ADHD (ATTENTION DEFICIT HYPERACTIVITY DISORDER), INATTENTIVE TYPE: ICD-10-CM

## 2024-08-22 PROCEDURE — 93248 EXT ECG>7D<15D REV&INTERPJ: CPT | Performed by: INTERNAL MEDICINE

## 2024-08-22 RX ORDER — DEXTROAMPHETAMINE SACCHARATE, AMPHETAMINE ASPARTATE MONOHYDRATE, DEXTROAMPHETAMINE SULFATE AND AMPHETAMINE SULFATE 6.25; 6.25; 6.25; 6.25 MG/1; MG/1; MG/1; MG/1
25 CAPSULE, EXTENDED RELEASE ORAL EVERY MORNING
Qty: 30 CAPSULE | Refills: 0 | Status: SHIPPED | OUTPATIENT
Start: 2024-08-22 | End: 2024-09-21

## 2024-08-22 NOTE — TELEPHONE ENCOUNTER
Consent to communicate is on file to speak with wife    Patient has been unable to get medication filled at other pharmacies. He would like this sent to the [facility].    Teetee Milton RN on 8/22/2024 at 4:44 PM

## 2024-08-23 ENCOUNTER — PATIENT OUTREACH (OUTPATIENT)
Dept: SURGERY | Facility: CLINIC | Age: 34
End: 2024-08-23
Payer: COMMERCIAL

## 2024-08-23 NOTE — PROGRESS NOTES
08/23/24    9:50 AM     Chance Torres is a patient of Dr. Garland Wetzel that underwent  a fat pad biopsy  approximately 2 days ago (8//21).  Attempted to contact patient via telephone for a status update and review post-op  teaching.  LM on VM to call office.  Await return call.      Of note:  Pathology:  Pending  Wound:  Steri-strips  Follow-up:  PRN  Restrictions:  - No activity restrictions  New medications:  None  Equipment/Supplies:  None  Diet:  Regular

## 2024-08-26 ENCOUNTER — PATIENT OUTREACH (OUTPATIENT)
Dept: SURGERY | Facility: CLINIC | Age: 34
End: 2024-08-26
Payer: COMMERCIAL

## 2024-08-26 ENCOUNTER — MYC MEDICAL ADVICE (OUTPATIENT)
Dept: CARDIOLOGY | Facility: CLINIC | Age: 34
End: 2024-08-26
Payer: COMMERCIAL

## 2024-08-26 NOTE — PROGRESS NOTES
08/26/24    9:30 AM     Attempted to contact patient x 2 for status update after fat pad biopsy.  LM  VM to call office-contact information provided. Patient will receive results and recommendations from Dr. Mora.    Pathology:  Case Report   Surgical Pathology Report                         Case: [id number]                                   Authorizing Provider:  Garland Wetzel MD   Collected:           08/21/2024 11:24 AM           Ordering Location:     [facility]  Received:            08/21/2024 11:54 AM                                  [facility]                                                   Pathologist:           Jasmin Gonzalez MD                                                           Specimen:    Abdomen                                                                                    Final Diagnosis   Skin, abdomen, biopsy:  - Benign skin with no significant pathology  - Congo red stain is negative for amyloid

## 2024-08-26 NOTE — PROGRESS NOTES
RN Post-Op/Post-Discharge Care Coordination Note    Mr. Chance Torres is a 33 year old male who underwent  fat pad biopsy  on 8/21 with  Dr. Garland Wetzel.  Spoke with Patient.    Support  Patient able to care for self independently.     Health Status  Fevers/chills: Patient denies any fever or chills.  Incisions: Patient denies any signs and symptoms of infection. Wound closure: Steri-strips  Pain: denies pain  New Medications:  None    Activity/Restrictions  No restrictions    Equipment  None    Pathology reviewed with patient:  Yes. Patient will contact referring provider for next steps.   Final Diagnosis   Skin, abdomen, biopsy:  - Benign skin with no significant pathology  - Congo red stain is negative for amyloid       Forms/Letters  No    All of his questions were answered including reviewing restrictions, pathology, and wound care.  He will call this office if he has any further questions and/or concerns.      A copy of this note was routed to the primary surgeon.    Whom and When to Call  Patient acknowledges understanding of how to manage any medication changes and when to seek medical care.     Patient advised that if after hour medical concerns arise to please call [phone] and choose option 4 to speak to the physician on call.

## 2024-09-03 ENCOUNTER — VIRTUAL VISIT (OUTPATIENT)
Dept: CARDIOLOGY | Facility: CLINIC | Age: 34
End: 2024-09-03
Payer: COMMERCIAL

## 2024-09-03 DIAGNOSIS — R55 SYNCOPE, UNSPECIFIED SYNCOPE TYPE: Primary | ICD-10-CM

## 2024-09-03 PROCEDURE — 99213 OFFICE O/P EST LOW 20 MIN: CPT | Performed by: INTERNAL MEDICINE

## 2024-09-03 NOTE — LETTER
"9/3/2024    Prashant Escobar PA-C  [address]  [place] [address]    RE: Chance Torres       Dear Colleague,     I had the pleasure of seeing Chance Torres in the [facility].  His complaints include cold sensitivity, dizzy spells, fatigue started after covid infection in 2021.  He did take acutane year ago.   Had one syncopal event and multiple near syncopal events    HS CRP 3.27    Noted elevated kappa free light chain     Biopsy fat neg for amyloidosis    ETT normal with appropriate HR/BP response    Echo EF lower end normal    14 day zio patch normal     If kneeling then rise, feels dizzy    Verapamil for migraines and held with no change in symptoms    He had dizzy spells, had brain MRI with ocular migraines and was started on verapamil    He erports \"brain fog\" he attributes to ADHD    He started adderal in 2019  Taken maxalt hand full of times no impact    OTC meds started around 2021.      Started zinc, vit D, C, Mg at that time.      Limited to no EtOH, no drug/tob    Plan  Measure BP/HR at rest, then do maneuver that will cause dizzy spell and check BP/HR  Stay hydrated  Avoid OTC meds for 2 weeks, coffee, special drinks  Track BP/HR response to maneuver in 2 weeks to see if worse or better  He will call or send message in 2 weeks to update response    If symptoms persist, then consider MRI of the heart and tilt table.            Thank you for allowing me to participate in the care of your patient.      Sincerely,     Harvinder Mora MD     [facility]  cc:   Harvinder Mora MD  [address]  [place] [address]      "

## 2024-09-03 NOTE — PROGRESS NOTES
"His complaints include cold sensitivity, dizzy spells, fatigue started after covid infection in 2021.  He did take acutane year ago.   Had one syncopal event and multiple near syncopal events    HS CRP 3.27    Noted elevated kappa free light chain     Biopsy fat neg for amyloidosis    ETT normal with appropriate HR/BP response    Echo EF lower end normal    14 day zio patch normal     If kneeling then rise, feels dizzy    Verapamil for migraines and held with no change in symptoms    He had dizzy spells, had brain MRI with ocular migraines and was started on verapamil    He erports \"brain fog\" he attributes to ADHD    He started adderal in 2019  Taken maxalt hand full of times no impact    OTC meds started around 2021.      Started zinc, vit D, C, Mg at that time.      Limited to no EtOH, no drug/tob    Plan  Measure BP/HR at rest, then do maneuver that will cause dizzy spell and check BP/HR  Stay hydrated  Avoid OTC meds for 2 weeks, coffee, special drinks  Track BP/HR response to maneuver in 2 weeks to see if worse or better  He will call or send message in 2 weeks to update response    If symptoms persist, then consider MRI of the heart and tilt table.          "

## 2024-09-04 NOTE — TELEPHONE ENCOUNTER
----- Message -----  From: Harvinder Mora MD  Sent: 8/28/2024   5:36 PM CDT  To: Esther Merida RN    Esther  I would go ahead with cardiac MRI with gadolinium to see if there is an infiltrative process that might shed light on his symptoms given the elevated CRP  Thanks  Harvinder  ----- Message -----  From: Malina Copeland RN  Sent: 8/26/2024   9:33 AM CDT  To: Harvinder Mora MD; *    ----- Message from Malina Copeland RN sent at 8/26/2024  9:33 AM CDT -----  Dr. Mora,    The patient was instructed that he would get results and recommendations from your team.    Thank you,  Malina Copeland RN, BSN, CNOR, RNFA  RN General Surgery

## 2024-09-04 NOTE — TELEPHONE ENCOUNTER
----- Message -----  From: Harvinder Mora MD  Sent: 9/4/2024   9:09 AM CDT  To: Esther Merida RN    Please let him know discussed with  of neurology at [facility] who suggested first seeing neurologist then be referred - if ok with him, I can reach out to Dr. Manuel whom I respect and value as a neurologist?  Thanks

## 2024-09-21 ENCOUNTER — MYC REFILL (OUTPATIENT)
Dept: FAMILY MEDICINE | Facility: OTHER | Age: 34
End: 2024-09-21
Payer: COMMERCIAL

## 2024-09-21 DIAGNOSIS — F90.0 ADHD (ATTENTION DEFICIT HYPERACTIVITY DISORDER), INATTENTIVE TYPE: ICD-10-CM

## 2024-09-23 RX ORDER — DEXTROAMPHETAMINE SACCHARATE, AMPHETAMINE ASPARTATE MONOHYDRATE, DEXTROAMPHETAMINE SULFATE AND AMPHETAMINE SULFATE 6.25; 6.25; 6.25; 6.25 MG/1; MG/1; MG/1; MG/1
25 CAPSULE, EXTENDED RELEASE ORAL EVERY MORNING
Qty: 30 CAPSULE | Refills: 0 | Status: SHIPPED | OUTPATIENT
Start: 2024-09-23

## 2024-10-04 ENCOUNTER — HOSPITAL ENCOUNTER (OUTPATIENT)
Dept: MRI IMAGING | Facility: HOSPITAL | Age: 34
Discharge: HOME | End: 2024-10-04
Attending: INTERNAL MEDICINE
Payer: COMMERCIAL

## 2024-10-04 DIAGNOSIS — R06.09 DYSPNEA ON EXERTION: ICD-10-CM

## 2024-10-04 DIAGNOSIS — R07.9 CHEST PAIN, UNSPECIFIED TYPE: ICD-10-CM

## 2024-10-04 DIAGNOSIS — R93.1 ABNORMAL ECHOCARDIOGRAM: ICD-10-CM

## 2024-10-04 PROCEDURE — 999N000122 MR MYOCARDIUM  OVERREAD

## 2024-10-04 PROCEDURE — 255N000002 HC RX 255 OP 636: Performed by: INTERNAL MEDICINE

## 2024-10-04 PROCEDURE — 75561 CARDIAC MRI FOR MORPH W/DYE: CPT | Mod: 26 | Performed by: GENERAL ACUTE CARE HOSPITAL

## 2024-10-04 PROCEDURE — 71555 MRI ANGIO CHEST W OR W/O DYE: CPT

## 2024-10-04 PROCEDURE — 71555 MRI ANGIO CHEST W OR W/O DYE: CPT | Mod: 26 | Performed by: GENERAL ACUTE CARE HOSPITAL

## 2024-10-04 PROCEDURE — A9585 GADOBUTROL INJECTION: HCPCS | Performed by: INTERNAL MEDICINE

## 2024-10-04 RX ORDER — GADOBUTROL 604.72 MG/ML
13 INJECTION INTRAVENOUS ONCE
Status: COMPLETED | OUTPATIENT
Start: 2024-10-04 | End: 2024-10-04

## 2024-10-04 RX ADMIN — GADOBUTROL 13 ML: 604.72 INJECTION INTRAVENOUS at 11:14

## 2024-10-23 ENCOUNTER — VIRTUAL VISIT (OUTPATIENT)
Dept: INTERNAL MEDICINE | Facility: CLINIC | Age: 34
End: 2024-10-23
Payer: COMMERCIAL

## 2024-10-23 DIAGNOSIS — E55.9 VITAMIN D DEFICIENCY: ICD-10-CM

## 2024-10-23 DIAGNOSIS — R44.8 SENSATION OF BOTH HEAT AND COLD: ICD-10-CM

## 2024-10-23 DIAGNOSIS — R68.89 HEAT INTOLERANCE: ICD-10-CM

## 2024-10-23 DIAGNOSIS — R53.83 FATIGUE, UNSPECIFIED TYPE: Primary | ICD-10-CM

## 2024-10-23 PROCEDURE — 99214 OFFICE O/P EST MOD 30 MIN: CPT | Mod: 95 | Performed by: INTERNAL MEDICINE

## 2024-10-23 NOTE — PROGRESS NOTES
Chance is a 33 year old who is being evaluated via a billable video visit.    How would you like to obtain your AVS? MyChart  If the video visit is dropped, the invitation should be resent by: Text to cell phone: [phone]  Will anyone else be joining your video visit? No      Assessment & Plan   Problem List Items Addressed This Visit    None  Visit Diagnoses       Fatigue, unspecified type    -  Primary    Relevant Orders    Adrenal corticotropin    Cortisol    Testosterone Bioavailable    Lab Blood Morphology Pathologist Review    Heat intolerance        Relevant Orders    Adrenal corticotropin    Cortisol    Testosterone Bioavailable    Lab Blood Morphology Pathologist Review    Sensation of both heat and cold        Relevant Orders    Adrenal corticotropin    Cortisol    Vitamin D Deficiency    Testosterone Bioavailable    Lab Blood Morphology Pathologist Review    Vitamin D deficiency        Relevant Orders    Vitamin D Deficiency           Patient who is new to me but has a couple years of fatigue heat intolerance feeling cold having sweats lightheadedness positional hypotension.  He had extensive workup with cardiology with stress test echo Zio patch, biopsy for amyloid which was negative.  Most of his labs have been normal without any signs of inflammation tickborne disease autoimmune disease.    He has not had a tilt table test, cortisol level, testosterone.  We will order these labs also recheck his vitamin D as that was low at 1 point.  I would agree with cardiology and doing a tilt table test for further evaluation.  After labs are done then consider other treatment options possibly would do better with some blood pressure raising medication like midodrine but I like to see him in person and do a physical exam before doing that.       FUTURE APPOINTMENTS:       - Follow-up visit in 1 month in person        Subjective   Chance is a 33 year old, presenting for the following health issues:  Establish  Care (Pt has concerns of constantly feeling like having the flu, fatigue, tingling in hands)      10/23/2024     3:03 PM   Additional Questions   Roomed by Vita     History of Present Illness       Reason for visit:  Establish care   He is taking medications regularly.     , lives in [place].     Few years ago sensitive to cold and dizzy with heat, hard to regulate body temp. Summer fatigue and over heats.    Has seen cardiology normal cardiac MRI, echo EF 50-55%, zipatch was ok,     Been to ENT, neurology, cardiology, migraine specialist and sleep specialist, tried cpap for mild nain.     Biopsy for amyloidosis as well negative biopsy.    Had a brain MRI, was pretty normal.      Winter feels like a cold, body is cold.      Tried verapamil, stopped and no difference. Gets some headaches.     Glucose and bp levels have been fine.     Does have much caffeine, 1 cup of coffee.     BP is around 113/79.      Takes Adderall daily, skips sometimes on weekends.     Tried vitamin D, C, zinc, no changes.     Fall and spring are better with more moderate temperatures.           Objective           Vitals:  No vitals were obtained today due to virtual visit.    Physical Exam   GENERAL: alert and no distress  EYES: Eyes grossly normal to inspection.  No discharge or erythema, or obvious scleral/conjunctival abnormalities.  RESP: No audible wheeze, cough, or visible cyanosis.    SKIN: Visible skin clear. No significant rash, abnormal pigmentation or lesions.  NEURO: Cranial nerves grossly intact.  Mentation and speech appropriate for age.  PSYCH: Appropriate affect, tone, and pace of words  Reviewed the patient's imaging, cardiac test and labs back to 2021            Video-Visit Details    Type of service:  Video Visit   Originating Location (pt. Location): Home    Distant Location (provider location):  On-site  Platform used for Video Visit: Flaquito  Signed Electronically by: Nikolai Ross MD

## 2024-10-24 DIAGNOSIS — R55 SYNCOPE, UNSPECIFIED SYNCOPE TYPE: Primary | ICD-10-CM

## 2024-10-26 ENCOUNTER — MYC REFILL (OUTPATIENT)
Dept: FAMILY MEDICINE | Facility: OTHER | Age: 34
End: 2024-10-26
Payer: COMMERCIAL

## 2024-10-26 DIAGNOSIS — F90.0 ADHD (ATTENTION DEFICIT HYPERACTIVITY DISORDER), INATTENTIVE TYPE: ICD-10-CM

## 2024-10-28 RX ORDER — DEXTROAMPHETAMINE SACCHARATE, AMPHETAMINE ASPARTATE MONOHYDRATE, DEXTROAMPHETAMINE SULFATE AND AMPHETAMINE SULFATE 6.25; 6.25; 6.25; 6.25 MG/1; MG/1; MG/1; MG/1
25 CAPSULE, EXTENDED RELEASE ORAL EVERY MORNING
Qty: 30 CAPSULE | Refills: 0 | Status: SHIPPED | OUTPATIENT
Start: 2024-10-28

## 2024-10-31 ENCOUNTER — LAB (OUTPATIENT)
Dept: LAB | Facility: OTHER | Age: 34
End: 2024-10-31
Payer: COMMERCIAL

## 2024-10-31 ENCOUNTER — TELEPHONE (OUTPATIENT)
Dept: CARDIOLOGY | Facility: CLINIC | Age: 34
End: 2024-10-31

## 2024-10-31 DIAGNOSIS — E55.9 VITAMIN D DEFICIENCY: ICD-10-CM

## 2024-10-31 DIAGNOSIS — R44.8 SENSATION OF BOTH HEAT AND COLD: ICD-10-CM

## 2024-10-31 DIAGNOSIS — R53.83 FATIGUE, UNSPECIFIED TYPE: ICD-10-CM

## 2024-10-31 DIAGNOSIS — R55 SYNCOPE, UNSPECIFIED SYNCOPE TYPE: Primary | ICD-10-CM

## 2024-10-31 DIAGNOSIS — R68.89 HEAT INTOLERANCE: ICD-10-CM

## 2024-10-31 LAB
BASOPHILS # BLD AUTO: 0 10E3/UL (ref 0–0.2)
BASOPHILS NFR BLD AUTO: 0 %
EOSINOPHIL # BLD AUTO: 0.8 10E3/UL (ref 0–0.7)
EOSINOPHIL NFR BLD AUTO: 11 %
ERYTHROCYTE [DISTWIDTH] IN BLOOD BY AUTOMATED COUNT: 13.7 % (ref 10–15)
HCT VFR BLD AUTO: 48.4 % (ref 40–53)
HGB BLD-MCNC: 15.8 G/DL (ref 13.3–17.7)
IMM GRANULOCYTES # BLD: 0 10E3/UL
IMM GRANULOCYTES NFR BLD: 0 %
LYMPHOCYTES # BLD AUTO: 2.6 10E3/UL (ref 0.8–5.3)
LYMPHOCYTES NFR BLD AUTO: 33 %
MCH RBC QN AUTO: 27.8 PG (ref 26.5–33)
MCHC RBC AUTO-ENTMCNC: 32.6 G/DL (ref 31.5–36.5)
MCV RBC AUTO: 85 FL (ref 78–100)
MONOCYTES # BLD AUTO: 0.7 10E3/UL (ref 0–1.3)
MONOCYTES NFR BLD AUTO: 9 %
NEUTROPHILS # BLD AUTO: 3.7 10E3/UL (ref 1.6–8.3)
NEUTROPHILS NFR BLD AUTO: 47 %
PLATELET # BLD AUTO: 223 10E3/UL (ref 150–450)
RBC # BLD AUTO: 5.68 10E6/UL (ref 4.4–5.9)
RETICS # AUTO: 0.08 10E6/UL (ref 0.03–0.1)
RETICS/RBC NFR AUTO: 1.4 % (ref 0.5–2)
WBC # BLD AUTO: 7.8 10E3/UL (ref 4–11)

## 2024-10-31 PROCEDURE — 85045 AUTOMATED RETICULOCYTE COUNT: CPT

## 2024-10-31 PROCEDURE — 84403 ASSAY OF TOTAL TESTOSTERONE: CPT

## 2024-10-31 PROCEDURE — 82533 TOTAL CORTISOL: CPT

## 2024-10-31 PROCEDURE — 85025 COMPLETE CBC W/AUTO DIFF WBC: CPT

## 2024-10-31 PROCEDURE — 82306 VITAMIN D 25 HYDROXY: CPT

## 2024-10-31 PROCEDURE — 82024 ASSAY OF ACTH: CPT

## 2024-10-31 PROCEDURE — 36415 COLL VENOUS BLD VENIPUNCTURE: CPT

## 2024-10-31 PROCEDURE — 84270 ASSAY OF SEX HORMONE GLOBUL: CPT

## 2024-10-31 PROCEDURE — 99207 BLOOD MORPHOLOGY PATHOLOGIST REVIEW: CPT | Performed by: PATHOLOGY

## 2024-10-31 NOTE — TELEPHONE ENCOUNTER
Placed Cardiology referral order for [facility] for Tilt Table to be completed.     Updated wife of pt, Dang (C2C on file) and explained multiple step process since Tilt Test is completed at [facility] and stated their team will be contacting patient to set up consult and test.     Forwarded request for consult.-john

## 2024-10-31 NOTE — TELEPHONE ENCOUNTER
----- Message -----  From: Sahra Douglas  Sent: 10/31/2024   2:10 PM CDT  To: Marti Mayfield RN    Sure - I'll call tomorrow - just getting the Jan schedule and I have 12 ahead of him. Thanks!  ----- Message -----  From: Marti Mayfield RN  Sent: 10/31/2024   1:22 PM CDT  To: Cardiology Ep Coordinator-     ----- Message from Marti Mayfield RN sent at 10/31/2024  1:22 PM CDT -----  Dr.Panetta Joleen ordered a Tilt Table for this pt 10/24/24. Pt wife hoping to schedule. I placed order for cardiology referral.  Thank you!  FIFI Kidd

## 2024-10-31 NOTE — TELEPHONE ENCOUNTER
[facility] Call Center    Phone Message    May a detailed message be left on voicemail: yes     Reason for Call: Other: patient spouse calling to schedule tilt table test, I personally dont see the order but spouse says it was put in on 10/24, please call to advise, thank you     Action Taken: Other: cardiology     Travel Screening: Not Applicable     Date of Service:

## 2024-11-01 ENCOUNTER — VIRTUAL VISIT (OUTPATIENT)
Dept: FAMILY MEDICINE | Facility: OTHER | Age: 34
End: 2024-11-01
Payer: COMMERCIAL

## 2024-11-01 DIAGNOSIS — G47.33 OSA (OBSTRUCTIVE SLEEP APNEA): ICD-10-CM

## 2024-11-01 DIAGNOSIS — F90.0 ADHD (ATTENTION DEFICIT HYPERACTIVITY DISORDER), INATTENTIVE TYPE: Primary | ICD-10-CM

## 2024-11-01 DIAGNOSIS — R53.83 FATIGUE, UNSPECIFIED TYPE: ICD-10-CM

## 2024-11-01 DIAGNOSIS — R42 LIGHTHEADED: ICD-10-CM

## 2024-11-01 LAB
ACTH PLAS-MCNC: 28 PG/ML
CORTIS SERPL-MCNC: 6.8 UG/DL
PATH REPORT.COMMENTS IMP SPEC: NORMAL
PATH REPORT.FINAL DX SPEC: NORMAL
PATH REPORT.MICROSCOPIC SPEC OTHER STN: NORMAL
PATH REPORT.MICROSCOPIC SPEC OTHER STN: NORMAL
SHBG SERPL-SCNC: 46 NMOL/L (ref 11–80)
VIT D+METAB SERPL-MCNC: 37 NG/ML (ref 20–50)

## 2024-11-01 PROCEDURE — 99213 OFFICE O/P EST LOW 20 MIN: CPT | Mod: 95 | Performed by: PHYSICIAN ASSISTANT

## 2024-11-01 PROCEDURE — G2211 COMPLEX E/M VISIT ADD ON: HCPCS | Mod: 95 | Performed by: PHYSICIAN ASSISTANT

## 2024-11-01 NOTE — PROGRESS NOTES
Chance is a 33 year old who is being evaluated via a billable video visit.    How would you like to obtain your AVS? MyChart  If the video visit is dropped, the invitation should be resent by: Text to cell phone: [phone]  Will anyone else be joining your video visit? No    Assessment & Plan       ICD-10-CM    1. ADHD (attention deficit hyperactivity disorder), inattentive type  F90.0       2. Fatigue, unspecified type  R53.83       3. ADRIANNA (obstructive sleep apnea)  G47.33       4. Lightheaded  R42           1. His Adderall was recently filled but he is taking a short break from Adderall for the month to see if any of his other symptoms improve. He will resume when needed and I am happy to continue filling or Dr. Ross can take this over if patient prefers.    2-4. He is still dealing with fatigue, lightheadedness/dizziness, and temperature intolerance. He has undergone testing with different specialists and the next test is a tilt table test but it is not scheduled for another 6+ months. He is seeing Dr. Ross in internal medicine now for another opinion which is great. He had recent lab testing and everything looks normal except for a slightly elevated eosinophil count which is non-specific. He can continue to see Dr. Ross and can see me for anything as well, whatever he prefers.     I would recommend he try his CPAP more consistently again and if still not tolerating well, he should consider an oral appliance.    The longitudinal plan of care for the diagnosis(es)/condition(s) as documented were addressed during this visit. Due to the added complexity in care, I will continue to support Chance in the subsequent management and with ongoing continuity of care.    Subjective   Chance is a 33 year old, presenting for the following health issues:      Patient would like to talk about ADHD and wants to know if he should just stick with Dr. Ross or continue to have me involved in his care. He appreciates  different perspectives and specialists and is hoping that someone can put all the pieces together of his fatigue, dizziness/lightheadedness and heat/cold intolerance. He has been off Adderall for the past few weeks as he wants to see how he feels off of it. So far, he feels more tired being off it but wants to be off it for at least 1 month.     History of Present Illness       Reason for visit:  Establish care   He is taking medications regularly.       Updates since last visit: none    Routine for taking medicine, including time: 8am   Time medicine wears off: 4pm-5pm  Issues at school/work: No  Issues at home: No  Control of symptoms: none     Side effects:  Headaches: No  Stomach aches: No  Irritability/mood swings: Yes sometimes  Difficulties with sleep: Yes once in awhile  Social withdrawal: No  Unusual movements/tics: No  Decreased appetite: No    Other concerns: No      Review of Systems  Constitutional, HEENT, cardiovascular, pulmonary, gi and gu systems are negative, except as otherwise noted.      Objective           Vitals:  No vitals were obtained today due to virtual visit.    Physical Exam   GENERAL: alert and no distress  EYES: Eyes grossly normal to inspection.  No discharge or erythema, or obvious scleral/conjunctival abnormalities.  RESP: No audible wheeze, cough, or visible cyanosis.    SKIN: Visible skin clear. No significant rash, abnormal pigmentation or lesions.  NEURO: Cranial nerves grossly intact.  Mentation and speech appropriate for age.  PSYCH: Appropriate affect, tone, and pace of words      Video-Visit Details    Type of service:  Video Visit   Originating Location (pt. Location): Home  Distant Location (provider location):  Off-site  Platform used for Video Visit: Flaquito  Signed Electronically by: Prashant Escobar PA-C

## 2024-11-01 NOTE — PATIENT INSTRUCTIONS
Continue Adderall as needed but okay to take a break from this medication.    If you have further questions or concerns for me, let me know.

## 2024-11-02 LAB
TESTOST FREE SERPL-MCNC: 11.28 NG/DL
TESTOST SERPL-MCNC: 637 NG/DL (ref 240–950)

## 2024-11-11 ENCOUNTER — VIRTUAL VISIT (OUTPATIENT)
Dept: URGENT CARE | Facility: CLINIC | Age: 34
End: 2024-11-11
Payer: COMMERCIAL

## 2024-11-11 DIAGNOSIS — R05.1 ACUTE COUGH: Primary | ICD-10-CM

## 2024-11-11 PROCEDURE — 99203 OFFICE O/P NEW LOW 30 MIN: CPT | Mod: 95

## 2024-11-11 RX ORDER — BENZONATATE 200 MG/1
200 CAPSULE ORAL 3 TIMES DAILY PRN
Qty: 21 CAPSULE | Refills: 0 | Status: SHIPPED | OUTPATIENT
Start: 2024-11-11 | End: 2024-11-18

## 2024-11-11 RX ORDER — ALBUTEROL SULFATE 90 UG/1
2 INHALANT RESPIRATORY (INHALATION) EVERY 6 HOURS PRN
Qty: 18 G | Refills: 0 | Status: SHIPPED | OUTPATIENT
Start: 2024-11-11

## 2024-11-11 NOTE — PROGRESS NOTES
Chance is a 33 year old male who presents for a billable video visit.    ASSESSMENT/PLAN:  Diagnoses and all orders for this visit:    Acute cough  -     benzonatate (TESSALON) 200 MG capsule; Take 1 capsule (200 mg) by mouth 3 times daily as needed for cough.  -     albuterol (PROAIR HFA/PROVENTIL HFA/VENTOLIN HFA) 108 (90 Base) MCG/ACT inhaler; Inhale 2 puffs into the lungs every 6 hours as needed for shortness of breath, wheezing or cough.  -     XR Chest 2 Views; Future    Pt presents with cough and increased phlem production x past 3-4 days. Ddx includes Uri, post viral cough, pneumonia among others. Pt reports symptoms are improving so do not feel it is probably pneumonia but since his daughter recently had similar symptoms and was diagnosed with pneumonia will order chest xray. Scheduled for tomorrow (11/12/24) 3:45pm at [facility] for xray. If xray positive, patient will need treatment for pneumonia. Feel this is most likely a post viral cough that will improve with time rx for tessalon pearls and albuterol sent to [facility]. If no improvement in symptoms over next 3-4 days follow up in person, if symptoms worsen go to ER.     SUBJECTIVE: Pt reports that his 5 year old was recently diagnosed with pneumonia, he is concerned he might have it to. Reports cough and increased phlem x 3-4 days. Symptoms are still lingering but improving slowly. Denies shortness of breath, but states sometimes hard to take a full breath. Denies fever or sinus complaints. Good energy      ROS: Pertinent ROS neg other than the symptoms noted above in the HPI.     OBJECTIVE:  Vitals not done due to this being a virtual visit    GENERAL: healthy, alert and no distress  EYES: Eyes grossly normal to inspection,conjunctivae and sclerae normal  RESP: Able to speak in complete sentences, no audible wheeze or cough  SKIN: no suspicious lesions or rashes  NEURO: mentation intact and speech normal  PSYCH: mentation appears normal, affect  normal/bright    Video-Visit Details    Type of service:  Video Visit  Video Start Time: 1623  Video End Time: 1628    Originating Location: [place]    Distant Location:  [facility]     Platform used for Video Visit: MORRIS Barboza CNP

## 2024-11-12 ENCOUNTER — ANCILLARY PROCEDURE (OUTPATIENT)
Dept: GENERAL RADIOLOGY | Facility: OTHER | Age: 34
End: 2024-11-12
Attending: NURSE PRACTITIONER
Payer: COMMERCIAL

## 2024-11-12 DIAGNOSIS — R05.1 ACUTE COUGH: ICD-10-CM

## 2024-11-12 PROCEDURE — 71046 X-RAY EXAM CHEST 2 VIEWS: CPT | Mod: TC | Performed by: RADIOLOGY

## 2024-11-20 ENCOUNTER — OFFICE VISIT (OUTPATIENT)
Dept: INTERNAL MEDICINE | Facility: CLINIC | Age: 34
End: 2024-11-20
Payer: COMMERCIAL

## 2024-11-20 VITALS
HEIGHT: 72 IN | HEART RATE: 80 BPM | DIASTOLIC BLOOD PRESSURE: 70 MMHG | TEMPERATURE: 98.1 F | RESPIRATION RATE: 16 BRPM | OXYGEN SATURATION: 98 % | SYSTOLIC BLOOD PRESSURE: 100 MMHG | WEIGHT: 165 LBS | BODY MASS INDEX: 22.35 KG/M2

## 2024-11-20 DIAGNOSIS — I95.9 HYPOTENSION, UNSPECIFIED HYPOTENSION TYPE: ICD-10-CM

## 2024-11-20 DIAGNOSIS — R68.89 HEAT INTOLERANCE: Primary | ICD-10-CM

## 2024-11-20 DIAGNOSIS — F90.0 ADHD (ATTENTION DEFICIT HYPERACTIVITY DISORDER), INATTENTIVE TYPE: ICD-10-CM

## 2024-11-20 DIAGNOSIS — R53.83 FATIGUE, UNSPECIFIED TYPE: ICD-10-CM

## 2024-11-20 PROCEDURE — 99213 OFFICE O/P EST LOW 20 MIN: CPT | Performed by: INTERNAL MEDICINE

## 2024-11-20 ASSESSMENT — PAIN SCALES - GENERAL: PAINLEVEL_OUTOF10: NO PAIN (0)

## 2024-11-20 NOTE — PROGRESS NOTES
Assessment & Plan   Problem List Items Addressed This Visit       ADHD (attention deficit hyperactivity disorder), inattentive type     Other Visit Diagnoses       Heat intolerance    -  Primary    Fatigue, unspecified type        Hypotension, unspecified hypotension type               Patient is here to establish care and follow-up as he has had multiple different things going on.  He does have known ADHD and is on Adderall.  Unfortunately also has these episodes of heat intolerance worse in the summer in the winter.  He gets lightheaded nearly passes out but has not any seizures or truly loss of consciousness.  He has been worked up with multiple things seeing endocrinology, cardiology, neurology.  We did check him for adrenal insufficiency, vitamin deficiencies he also has some irritable bowel syndrome and lives on  specialized FODMAPs diet.    I do believe this is autonomic dysfunction and he does have a tilt table test which fortunately got moved up from next year to next week and I think this will be the best treatment for him.  Hopefully this can give us some answers and help him have more normal quality of life.     The longitudinal plan of care for the diagnosis(es)/condition(s) as documented were addressed during this visit. Due to the added complexity in care, I will continue to support Chance in the subsequent management and with ongoing continuity of care.        Nadine Fletcher is a 33 year old, presenting for the following health issues:  Establish Care (Follow up on blood work)      11/20/2024    12:56 PM   Additional Questions   Roomed by Simran     History of Present Illness       Reason for visit:  Est care and follow up on blood work    He eats 2-3 servings of fruits and vegetables daily.He consumes 0 sweetened beverage(s) daily.He exercises with enough effort to increase his heart rate 9 or less minutes per day.  He exercises with enough effort to increase his heart rate 3 or less days per  week. He is missing 2 dose(s) of medications per week.  He is not taking prescribed medications regularly due to other.     Few years of symptoms.  Question whether from covid or not. Question of POTS.     Chronic low energy and low blood pressure. Light head quite a bit, close to passing out.     Tilt table testing next week,     Weight is actually back up to normal range now.     No tobacco, no marijuana, minimal alcohol.           Objective    /70   Pulse 80   Temp 98.1  F (36.7  C) (Temporal)   Resp 16   Ht 1.829 m (6')   Wt 74.8 kg (165 lb)   SpO2 98%   BMI 22.38 kg/m    Body mass index is 22.38 kg/m .  Physical Exam   NAD  Heart regular   Lungs are clear  Ext no edema,         Signed Electronically by: Nikolai Ross MD

## 2024-11-26 DIAGNOSIS — R55 SYNCOPE, UNSPECIFIED SYNCOPE TYPE: Primary | ICD-10-CM

## 2024-11-26 RX ORDER — SODIUM CHLORIDE 9 MG/ML
INJECTION, SOLUTION INTRAVENOUS CONTINUOUS
Status: CANCELLED | OUTPATIENT
Start: 2024-11-26

## 2024-11-26 RX ORDER — LIDOCAINE 40 MG/G
CREAM TOPICAL
Status: CANCELLED | OUTPATIENT
Start: 2024-11-26

## 2024-11-27 ENCOUNTER — HOSPITAL ENCOUNTER (OUTPATIENT)
Facility: CLINIC | Age: 34
Discharge: HOME | End: 2024-11-27
Attending: INTERNAL MEDICINE | Admitting: INTERNAL MEDICINE
Payer: COMMERCIAL

## 2024-11-27 ENCOUNTER — APPOINTMENT (OUTPATIENT)
Dept: MEDSURG UNIT | Facility: CLINIC | Age: 34
End: 2024-11-27
Attending: INTERNAL MEDICINE
Payer: COMMERCIAL

## 2024-11-27 VITALS
DIASTOLIC BLOOD PRESSURE: 92 MMHG | SYSTOLIC BLOOD PRESSURE: 134 MMHG | OXYGEN SATURATION: 100 % | TEMPERATURE: 97.7 F | RESPIRATION RATE: 16 BRPM | BODY MASS INDEX: 22.51 KG/M2 | HEART RATE: 74 BPM | WEIGHT: 166 LBS

## 2024-11-27 DIAGNOSIS — R55 SYNCOPE, UNSPECIFIED SYNCOPE TYPE: ICD-10-CM

## 2024-11-27 LAB
ATRIAL RATE - MUSE: 65 BPM
DIASTOLIC BLOOD PRESSURE - MUSE: NORMAL MMHG
INTERPRETATION ECG - MUSE: NORMAL
P AXIS - MUSE: 77 DEGREES
PR INTERVAL - MUSE: 166 MS
QRS DURATION - MUSE: 94 MS
QT - MUSE: 390 MS
QTC - MUSE: 405 MS
R AXIS - MUSE: 67 DEGREES
SYSTOLIC BLOOD PRESSURE - MUSE: NORMAL MMHG
T AXIS - MUSE: 61 DEGREES
VENTRICULAR RATE- MUSE: 65 BPM

## 2024-11-27 PROCEDURE — 999N000054 HC STATISTIC EKG NON-CHARGEABLE

## 2024-11-27 PROCEDURE — 93660 TILT TABLE EVALUATION: CPT | Performed by: INTERNAL MEDICINE

## 2024-11-27 PROCEDURE — 95921 AUTONOMIC NRV PARASYM INERVJ: CPT | Performed by: INTERNAL MEDICINE

## 2024-11-27 PROCEDURE — 93660 TILT TABLE EVALUATION: CPT | Mod: 26 | Performed by: INTERNAL MEDICINE

## 2024-11-27 PROCEDURE — 999N000142 HC STATISTIC PROCEDURE PREP ONLY

## 2024-11-27 PROCEDURE — 258N000003 HC RX IP 258 OP 636: Performed by: INTERNAL MEDICINE

## 2024-11-27 PROCEDURE — 95921 AUTONOMIC NRV PARASYM INERVJ: CPT | Mod: 26 | Performed by: INTERNAL MEDICINE

## 2024-11-27 PROCEDURE — 999N000132 HC STATISTIC PP CARE STAGE 1

## 2024-11-27 PROCEDURE — 272N000001 HC OR GENERAL SUPPLY STERILE: Performed by: INTERNAL MEDICINE

## 2024-11-27 RX ORDER — FLUDROCORTISONE ACETATE 0.1 MG/1
0.1 TABLET ORAL DAILY
Qty: 90 TABLET | Refills: 3 | Status: SHIPPED | OUTPATIENT
Start: 2024-11-27

## 2024-11-27 RX ORDER — LIDOCAINE 40 MG/G
CREAM TOPICAL
Status: DISCONTINUED | OUTPATIENT
Start: 2024-11-27 | End: 2024-11-27 | Stop reason: HOSPADM

## 2024-11-27 RX ORDER — SODIUM CHLORIDE 9 MG/ML
INJECTION, SOLUTION INTRAVENOUS CONTINUOUS
Status: DISCONTINUED | OUTPATIENT
Start: 2024-11-27 | End: 2024-11-27 | Stop reason: HOSPADM

## 2024-11-27 RX ADMIN — SODIUM CHLORIDE 300 ML: 9 INJECTION, SOLUTION INTRAVENOUS at 07:38

## 2024-11-27 ASSESSMENT — ACTIVITIES OF DAILY LIVING (ADL)
ADLS_ACUITY_SCORE: 41

## 2024-11-27 NOTE — H&P
Electrophysiology Pre-Procedure History and Physical    Chance Torres MRN# [id number]   Age: 33 year old YOB: 1990      Date of Procedure: 11/27/24 [facility]      Date of Exam 11/27/2024 Facility (Same day)       HPI:  Chance Torres is a 33 year old male with past medical history significant for IBS, migraines and ADHD. Patient was recently evaluated by Dr Mora 8/1/24 for dizziness. Reported dizzy spells starting 11 years ago, within the past 5 years dizziness seemed to be more orthostatic. He also reports issues with temperature intolerance including night sweats. Also notes possible raynaud's, fatigue, dysphagia/achalasia symptoms and palpations. He has had lab workup including normal troponin, CRP BNP, EZE. Mildly elevated kappa free light chain, referred to general surgery. He had CMR done with normal biventricular function, no ischemia fibrosis or LGE. Negative stress test. Tilt table study was discussed as well, for which he presents today.        Active problem list:     Patient Active Problem List    Diagnosis Date Noted    Migraine with aura and without status migrainosus, not intractable 03/20/2024     Priority: Medium    ADHD (attention deficit hyperactivity disorder), inattentive type 02/26/2020     Priority: Medium    Irritable bowel syndrome with both constipation and diarrhea 07/12/2017     Priority: Medium    Labral tear of shoulder 07/24/2012     Priority: Medium            Medications (include herbals and vitamins):      Current Facility-Administered Medications   Medication Dose Route Frequency Provider Last Rate Last Admin    lidocaine (LMX4) cream   Topical Q1H PRN Garland Denton MD        lidocaine 1 % 0.1-1 mL  0.1-1 mL Other Q1H PRN Garland Denton MD        sodium chloride (PF) 0.9% PF flush 3 mL  3 mL Intracatheter Q8H Garland Denton MD        sodium chloride (PF) 0.9% PF flush 3 mL  3 mL  Intracatheter q1 min prn Garland Denton MD        sodium chloride 0.9 % infusion   Intravenous Continuous Garland Denton MD        sodium chloride 0.9% BOLUS 200-600 mL  200-600 mL Intravenous Once Garland Denton MD               Medication List      There are no discharge medications for this visit.              Allergies:    No Known Allergies          Social History:     Social History     Tobacco Use    Smoking status: Never    Smokeless tobacco: Former     Quit date: 5/11/2016   Substance Use Topics    Alcohol use: Not Currently     Comment: rarely            Physical Exam:   All vitals have been reviewed  No data found.  No intake/output data recorded.  Airway assessment:   Patient is able to open mouth wide  Patient is able to stick out tongue}      ENT:   normocephalic, without obvious abnormality     Neck:   supple, symmetrical, trachea midline     Lungs:   No increased work of breathing, good air exchange, clear to auscultation bilaterally, no crackles or wheezing     Cardiovascular:   normal S1 and S2 and no edema             Lab / Radiology Results:     Lab Results   Component Value Date    WBC 7.8 10/31/2024    WBC 6.0 10/06/2020    RBC 5.68 10/31/2024    RBC 5.48 10/06/2020    HGB 15.8 10/31/2024    HGB 15.4 10/06/2020    HCT 48.4 10/31/2024    HCT 45.8 10/06/2020    MCV 85 10/31/2024    MCV 84 10/06/2020    RDW 13.7 10/31/2024    RDW 14.2 10/06/2020     10/31/2024     10/06/2020      Lab Results   Component Value Date    WBC 7.8 10/31/2024    WBC 6.0 10/06/2020     Lab Results   Component Value Date     10/31/2024     10/06/2020     Lab Results   Component Value Date    HGB 15.8 10/31/2024    HGB 15.4 10/06/2020    HCT 48.4 10/31/2024    HCT 45.8 10/06/2020     Lab Results   Component Value Date     11/02/2023     10/06/2020    CO2 26 11/02/2023    CO2 30 08/31/2022    CO2 29 10/06/2020    BUN 16.0 11/02/2023    BUN 19 08/31/2022    BUN 19  10/06/2020     Lab Results   Component Value Date     11/02/2023     10/06/2020    CO2 26 11/02/2023    CO2 30 08/31/2022    CO2 29 10/06/2020    BUN 16.0 11/02/2023    BUN 19 08/31/2022    BUN 19 10/06/2020     Lab Results   Component Value Date    TSH 0.46 11/02/2023    TSH 0.86 10/06/2020          Plan:   Patient's active problems diagnostically and therapeutically optimized for the planned procedure. Patient here for tilt table test. Procedure explained in detail to patient including indications, risks, and benefits. Patient states understanding and wishes to procced.       Usha Melendez PA-C  [facility]  Electrophysiology Consult Service  Pager: [phone]

## 2024-11-27 NOTE — PROGRESS NOTES
Pt arrived back to Unit 2a post tilt table study procedure in EP lab. AFVSS. Denies pain. Pt tolerating PO. Pt's Dad at bedside. Pt stable.

## 2024-11-27 NOTE — DISCHARGE INSTRUCTIONS
Plan:    - Start Florinef 0.1 mg daily  - Use compression shorts (athletic compression shorts)  - Increase hydration with goal to take in 64 oz of water/electrolyte fluids per day.   - Liberalize salt and electrolytes intake  - Change positions carefully and slowly and maintain safe environment.   -Standing training and supine isometric exercises.   - Follow up in 3 months with Dr. Denton      Cardiovascular Clinic:   [address] [place] [address]. [place] [address]  Your Care Team:  EP Cardiology   Telephone Number     Nursing:  Betzy GARCIA [phone]    After business hours: [phone], select option 4, ask for EP Fellow on call to be paged.   Non-procedure scheduling:  Tenisha FERGUSON   [phone]   Procedure scheduling:    Candice LISA   [phone]   Device Clinic (Pacemakers, ICDs, Loop Recorders)    During business hours: [phone]  After business hours:   [phone]- select option 4 and ask for job code [id number].       As always, Thank you for trusting us with your health care needs!

## 2024-11-27 NOTE — PROGRESS NOTES
Pt arrived to Unit 2a for tilt table study with EP. AFVSS. Denies pain. Pt's Dad at bedside. Pt stable, ready for consent.

## 2024-11-27 NOTE — PROCEDURES
EP PROCEDURE NOTE    *Procedures:*    1. TILT table test.  2. Autonomic function test.     *Cardiologist:*  Garland Denton    *EP Fellow:*  N/A    *Referring MD: *   Harvinder Mora MD    *Pre-operative Diagnosis:*  Near-syncope Syncope.(Bending over, stretch arms up)    *Complications:*  None.     *Medications:*  NTG 0.4mg SL/None.     *Clinical Profile:*  Mr. Torres is a 33-year-old male with approximately 3-year history of lightheadedness/dizziness.  Symptoms seem to be associated with bending over and standing up but less so with standing up from a chair or from a seated position.  He notes that when working in the yard bending over and then standing up his symptoms will often be initiated.  In addition he notes lightheadedness when he stretches his arms above his head.  He has not experienced falls and injury but is able to recover.    The patient is here for autonomic testing including tilt testing.     Please see Dr. Mora's clinic visit note  for details.      PROCEDURE    The risks and benefits of the procedure were explained to the patient in   full. Written informed consent was obtained. The patient was brought to the   EP lab in a fasting and hemodynamically stable condition. Physical   examination of the patient did not reveal any carotid bruits, and review of   the chart did not reveal the presence of stroke or TIA within the past   three months.     The following maneuvers were done sequentially:    1- Sitting for 5 minutes:   End of 5 min:  HR 68, /67    2- Standing for 10 minutes:   Baseline HR 76, /78  Immediate Wiliam HR 74   /67     time from standing to wiliam 5 seconds      time from wiliam to recovery 5 seconds recovery HR 78 /75  30 sec: HR 77, /72  1 min: HR 89, BP 99/64  2 min: HR 82, /67  3 min: HR 92, /64  4 min: HR 79, /68  5 min: HR 86, /72  6 min: HR 85, /69  7 min: HR 86 111/72, /72  8 min: HR 91, /74  9 min:  HR 99, /70  10 min: HR 96, /72    2- Maneuvers in seated position:    A. Carotid sinus massage:  Deferred due to age  Symptoms: N/A    B. Valsalva: #2  Baseline: HR 80, /98  Phase 1: HR 82, Max /93  Phase 2:  BP  126/98  Phase 3: Present/  Phase 4 (Overshoot): HR 74, Max /88    Valsalva ratio 100/74= 1.35 (marginally low)    Symptoms: None    C.  Respiratory sinus arrhythmia  In 68 out 57 delta 11  In 73 out 60 delta 13  In 78 out 59 delta 19  In 82 out 58 delta 24  In 78 out 58 delta 20    Average delta= 17.4 (normal)    D shrug test (seated)\  Baseline HR 71 /86  Wiliam HR 91 BP 97/74 time to wiliam 9.4 seconds    E standing bending over and returning to upright  Test #1  Baseline HR 97 /83  Wiliam  BP 97/63--time to wiliam 17 seconds  Recovery  /77--time to recovery 10 seconds    Test #2  Baseline HR 82 /92  Wiliam HR at 107 /69 time to wiliam 14 seconds  Recovery HR 73 /92 time to recovery 14.7 seconds      3- Supine rest for 5 minutes:   HR 76, Max /80    4- TILT at 70 degrees for 20 minutes: (1 liter fluid was given before tilt)  Baseline HR 76 /80  Immediate Wiliam HR N/A   BP N/A     time from standing to wiliam N/A      time from wiliam to recovery N/A  30 seconds HR 74 /73  1 min: HR 90, /75  2 min: HR 93, /71  3 min: HR 91, /70  4 min: HR 87, /76  5 min: HR 94, /73  6 min: HR 84, /74  7 min: HR 93, /68  8 min: HR 97, /67        5-no drugs administered  Symptoms: N/A    DISCUSSION:  Autonomic testing was essentially normal;.  Patient did experience lightheadedness  with movement to the upright posture especially during tilt but this was less severe than spontaneous episodes: 12.  Light headedness was more marked with bending over and standing up in which there was a drop of systolic pressure of approximately 20-25 mmHg that was transient and associated with modest  tachycardia and documented hypotension lasting approximately 10 to 15 seconds    Stretch test response did mimic lightheadedness symptoms similar to those described to occur spontaneously with drop of pressure of approximately 30 mmHg systolic.  This test was undertaken with the patient seated.  A similar response was seen with him standing.    Mr. Torres has tried salt tablets but they tend to upset his stomach and have been stopped.    Findings were discussed with the patient on station 2A after the procedure.  A trial of fludrocortisone was suggested along with increased dietary salt and electrolyte fluids (apprtox 30 oz/day).  Patient indicates that he uses much salt during the day normally.  Follow-up by video visit in about 2 to 3 months to assess status.    Dr Denton was present throughout the entire procedure.      I appreciated the opportunity to see and assess Mr Torres and direct the procedure described above with EP Fellow. The above note summarizes my findings and current recommendations. Please do not hesitate to contact me if you have any questions or concerns.      Garland Denton MD [facility]   Pager [phone]  Office [phone]

## 2024-12-10 ENCOUNTER — MYC REFILL (OUTPATIENT)
Dept: FAMILY MEDICINE | Facility: OTHER | Age: 34
End: 2024-12-10
Payer: COMMERCIAL

## 2024-12-10 DIAGNOSIS — F90.0 ADHD (ATTENTION DEFICIT HYPERACTIVITY DISORDER), INATTENTIVE TYPE: ICD-10-CM

## 2024-12-10 RX ORDER — DEXTROAMPHETAMINE SACCHARATE, AMPHETAMINE ASPARTATE MONOHYDRATE, DEXTROAMPHETAMINE SULFATE AND AMPHETAMINE SULFATE 6.25; 6.25; 6.25; 6.25 MG/1; MG/1; MG/1; MG/1
25 CAPSULE, EXTENDED RELEASE ORAL EVERY MORNING
Qty: 30 CAPSULE | Refills: 0 | Status: SHIPPED | OUTPATIENT
Start: 2024-12-10

## 2025-01-15 ENCOUNTER — MYC REFILL (OUTPATIENT)
Dept: INTERNAL MEDICINE | Facility: CLINIC | Age: 35
End: 2025-01-15
Payer: COMMERCIAL

## 2025-01-15 ENCOUNTER — MYC REFILL (OUTPATIENT)
Dept: FAMILY MEDICINE | Facility: OTHER | Age: 35
End: 2025-01-15
Payer: COMMERCIAL

## 2025-01-15 DIAGNOSIS — R55 SYNCOPE, UNSPECIFIED SYNCOPE TYPE: ICD-10-CM

## 2025-01-15 DIAGNOSIS — F90.0 ADHD (ATTENTION DEFICIT HYPERACTIVITY DISORDER), INATTENTIVE TYPE: ICD-10-CM

## 2025-01-15 RX ORDER — DEXTROAMPHETAMINE SACCHARATE, AMPHETAMINE ASPARTATE MONOHYDRATE, DEXTROAMPHETAMINE SULFATE AND AMPHETAMINE SULFATE 6.25; 6.25; 6.25; 6.25 MG/1; MG/1; MG/1; MG/1
25 CAPSULE, EXTENDED RELEASE ORAL EVERY MORNING
Qty: 30 CAPSULE | Refills: 0 | Status: SHIPPED | OUTPATIENT
Start: 2025-01-15

## 2025-01-16 RX ORDER — FLUDROCORTISONE ACETATE 0.1 MG/1
0.1 TABLET ORAL DAILY
Qty: 90 TABLET | Refills: 3 | OUTPATIENT
Start: 2025-01-16

## 2025-01-26 ENCOUNTER — HOSPITAL ENCOUNTER (EMERGENCY)
Age: 35
Discharge: HOME HEALTH | End: 2025-01-26
Attending: FAMILY MEDICINE
Payer: COMMERCIAL

## 2025-01-26 VITALS
HEIGHT: 67 IN | TEMPERATURE: 97.8 F | HEART RATE: 97 BPM | RESPIRATION RATE: 16 BRPM | OXYGEN SATURATION: 100 % | WEIGHT: 125 LBS | SYSTOLIC BLOOD PRESSURE: 148 MMHG | BODY MASS INDEX: 19.62 KG/M2 | DIASTOLIC BLOOD PRESSURE: 94 MMHG

## 2025-01-26 PROCEDURE — 99281 EMR DPT VST MAYX REQ PHY/QHP: CPT

## 2025-01-26 ASSESSMENT — PAIN - FUNCTIONAL ASSESSMENT: PAIN_FUNCTIONAL_ASSESSMENT: NONE - DENIES PAIN

## 2025-01-26 NOTE — ED TRIAGE NOTES
Pt presents to ED with c/o of L arm numbness and tingling that started 1 week ago after he had fallen asleep on top of his arm. Pt states that the bottom of his arm is fine but the top of his fingers all the way to his elbow is numb and tingling. Pt states decreased strength in affected hand. Pulses strong. Cap refill <3 sec.

## 2025-01-27 ENCOUNTER — TELEPHONE (OUTPATIENT)
Dept: FAMILY MEDICINE CLINIC | Age: 35
End: 2025-01-27

## 2025-01-27 NOTE — TELEPHONE ENCOUNTER
Pt was seen in the ED and is requesting an appointment to follow up    Future Appointments   Date Time Provider Department Center   1/29/2025  2:00 PM Marco A Lentz, APRN - CNP [facility] Med [facility] DEP

## 2025-01-29 ENCOUNTER — VIRTUAL VISIT (OUTPATIENT)
Dept: FAMILY MEDICINE | Facility: OTHER | Age: 35
End: 2025-01-29
Payer: COMMERCIAL

## 2025-01-29 DIAGNOSIS — J02.9 VIRAL PHARYNGITIS: Primary | ICD-10-CM

## 2025-01-29 LAB
DEPRECATED S PYO AG THROAT QL EIA: NEGATIVE
FLUAV AG SPEC QL IA: NEGATIVE
FLUBV AG SPEC QL IA: NEGATIVE
S PYO DNA THROAT QL NAA+PROBE: NOT DETECTED

## 2025-01-29 PROCEDURE — 87651 STREP A DNA AMP PROBE: CPT | Performed by: PHYSICIAN ASSISTANT

## 2025-01-29 PROCEDURE — 87804 INFLUENZA ASSAY W/OPTIC: CPT | Performed by: PHYSICIAN ASSISTANT

## 2025-01-29 PROCEDURE — 99213 OFFICE O/P EST LOW 20 MIN: CPT | Performed by: PHYSICIAN ASSISTANT

## 2025-01-29 ASSESSMENT — ENCOUNTER SYMPTOMS: SORE THROAT: 1

## 2025-01-29 NOTE — PROGRESS NOTES
Chance is a 34 year old who is being evaluated via a billable video visit.    How would you like to obtain your AVS? Resilience patient checked in on [employer].   If the video visit is dropped, the invitation should be resent by: Text to cell phone: [phone]  Will anyone else be joining your video visit? No    Assessment & Plan       ICD-10-CM    1. Viral pharyngitis  J02.9 Streptococcus A Rapid Screen w/Reflex to PCR - Clinic Collect     Influenza A & B Antigen - Clinic Collect     Group A Streptococcus PCR Throat Swab            Strep and influenza testing are negative.  Symptoms likely viral.  I recommend plenty of fluids, rest, Tylenol and ibuprofen.  Can try salt water gargles and chloraseptic lozenges.  Follow-up if not improving.    Subjective   Chance is a 34 year old, presenting for the following health issues:  Pharyngitis      History of Present Illness       Reason for visit:  Sore throat and fatigue. Want to make sure if it's strep or not  Symptom onset:  1-3 days ago  Symptoms include:  Sore throat and fatigue  Symptom intensity:  Moderate  Symptom progression:  Staying the same  Had these symptoms before:  Yes  Has tried/received treatment for these symptoms:  Yes  Previous treatment was successful:  Yes  Prior treatment description:  Antibiotics when i was younger at least  What makes it better:  Ibuprofen and rest He is missing 2 dose(s) of medications per week.  He is not taking prescribed medications regularly due to other.     He has had some body aches but no fevers. It hurts to swallow.       Review of Systems  Constitutional, HEENT, cardiovascular, pulmonary, gi and gu systems are negative, except as otherwise noted.      Objective       Vitals:  No vitals were obtained today due to virtual visit.    Physical Exam   GENERAL: alert and no distress  EYES: Eyes grossly normal to inspection.  No discharge or erythema, or obvious scleral/conjunctival abnormalities.  RESP: No audible wheeze, cough,  or visible cyanosis.    SKIN: Visible skin clear. No significant rash, abnormal pigmentation or lesions.  NEURO: Cranial nerves grossly intact. Mentation and speech appropriate for age.  PSYCH: Appropriate affect, tone, and pace of words      Results for orders placed or performed in visit on 01/29/25   Streptococcus A Rapid Screen w/Reflex to PCR - Clinic Collect     Status: Normal    Specimen: Throat; Swab   Result Value Ref Range    Group A Strep antigen Negative Negative   Influenza A & B Antigen - Clinic Collect     Status: Normal    Specimen: Nose; Swab   Result Value Ref Range    Influenza A antigen Negative Negative    Influenza B antigen Negative Negative    Narrative    Test results must be correlated with clinical data. If necessary, results should be confirmed by a molecular assay or viral culture.       Video-Visit Details    Type of service:  Video Visit   Originating Location (pt. Location): Home  Distant Location (provider location):  On-site  Platform used for Video Visit: [employer]  Signed Electronically by: Prashant Escobar PA-C

## 2025-02-04 ENCOUNTER — OFFICE VISIT (OUTPATIENT)
Dept: FAMILY MEDICINE CLINIC | Age: 35
End: 2025-02-04

## 2025-02-04 VITALS
RESPIRATION RATE: 20 BRPM | HEIGHT: 67 IN | DIASTOLIC BLOOD PRESSURE: 62 MMHG | TEMPERATURE: 97.6 F | SYSTOLIC BLOOD PRESSURE: 116 MMHG | WEIGHT: 125.4 LBS | BODY MASS INDEX: 19.68 KG/M2 | OXYGEN SATURATION: 96 % | HEART RATE: 103 BPM

## 2025-02-04 DIAGNOSIS — J40 SINOBRONCHITIS: ICD-10-CM

## 2025-02-04 DIAGNOSIS — J32.9 SINOBRONCHITIS: ICD-10-CM

## 2025-02-04 DIAGNOSIS — G56.32: Primary | ICD-10-CM

## 2025-02-04 LAB
INFLUENZA VIRUS A RNA: NEGATIVE
INFLUENZA VIRUS B RNA: NEGATIVE
Lab: NORMAL
QC PASS/FAIL: NORMAL
SARS-COV-2 RDRP RESP QL NAA+PROBE: NEGATIVE

## 2025-02-04 RX ORDER — PREDNISONE 20 MG/1
40 TABLET ORAL DAILY
Qty: 10 TABLET | Refills: 0 | Status: SHIPPED | OUTPATIENT
Start: 2025-02-04 | End: 2025-02-09

## 2025-02-04 SDOH — ECONOMIC STABILITY: FOOD INSECURITY: WITHIN THE PAST 12 MONTHS, YOU WORRIED THAT YOUR FOOD WOULD RUN OUT BEFORE YOU GOT MONEY TO BUY MORE.: NEVER TRUE

## 2025-02-04 SDOH — ECONOMIC STABILITY: FOOD INSECURITY: WITHIN THE PAST 12 MONTHS, THE FOOD YOU BOUGHT JUST DIDN'T LAST AND YOU DIDN'T HAVE MONEY TO GET MORE.: NEVER TRUE

## 2025-02-04 ASSESSMENT — PATIENT HEALTH QUESTIONNAIRE - PHQ9
1. LITTLE INTEREST OR PLEASURE IN DOING THINGS: NOT AT ALL
SUM OF ALL RESPONSES TO PHQ9 QUESTIONS 1 & 2: 0
2. FEELING DOWN, DEPRESSED OR HOPELESS: NOT AT ALL
SUM OF ALL RESPONSES TO PHQ QUESTIONS 1-9: 0

## 2025-02-04 NOTE — PROGRESS NOTES
provided today regarding the following topics: Healthy eating habits, Regular exercise, substance abuse and healthy sleep habits.    Jaden received counseling on the following healthy behaviors: medication adherence and tobacco cessation    Patient given educational materials on: See Attached    I have instructed Jaden to complete a self tracking handout onnone  and instructed them to bring it with them to his next appointment.     Barriers to learning and self management: none    Discussed use, benefit, and side effects of prescribed medications.  Barriers to medication compliance addressed.  All patient questions answered.  Pt voiced understanding.       Electronically signed by HALI Parra CNP on 2/4/2025 at 4:53 PM

## 2025-02-07 ENCOUNTER — TELEPHONE (OUTPATIENT)
Dept: FAMILY MEDICINE CLINIC | Age: 35
End: 2025-02-07

## 2025-02-07 NOTE — TELEPHONE ENCOUNTER
Left message on answering machine. Requested pt to call back at [phone], at their earliest convenience.

## 2025-02-07 NOTE — TELEPHONE ENCOUNTER
So apparently [facility] PT/OT is out of network. Can he call and check with his insurance to see who is in network? That would help.

## 2025-02-07 NOTE — TELEPHONE ENCOUNTER
----- Message from Laura FOWLER sent at 2/7/2025 11:15 AM EST -----  Regarding: [facility] Referral Request  [facility] Referral Request    Reason for referral request: Specialty Provider    Specialist/Lab/Test patient is requesting (if known): Physical Therapist    Specialist Phone Number (if applicable):     Additional Information Patient wants it to send over a referral request for a physical therapist for his hand  --------------------------------------------------------------------------------------------------------------------------    Relationship to Patient: Self     Call Back Information: OK to leave message on voicemail  Preferred Call Back Number: Phone [phone]

## 2025-02-10 NOTE — TELEPHONE ENCOUNTER
Left message on answering machine. Requested pt to call back at [phone], at their earliest convenience.

## 2025-02-17 ENCOUNTER — MYC REFILL (OUTPATIENT)
Dept: FAMILY MEDICINE | Facility: OTHER | Age: 35
End: 2025-02-17
Payer: COMMERCIAL

## 2025-02-17 DIAGNOSIS — F90.0 ADHD (ATTENTION DEFICIT HYPERACTIVITY DISORDER), INATTENTIVE TYPE: ICD-10-CM

## 2025-02-17 RX ORDER — DEXTROAMPHETAMINE SACCHARATE, AMPHETAMINE ASPARTATE MONOHYDRATE, DEXTROAMPHETAMINE SULFATE AND AMPHETAMINE SULFATE 6.25; 6.25; 6.25; 6.25 MG/1; MG/1; MG/1; MG/1
25 CAPSULE, EXTENDED RELEASE ORAL EVERY MORNING
Qty: 30 CAPSULE | Refills: 0 | Status: SHIPPED | OUTPATIENT
Start: 2025-02-17

## 2025-02-18 ENCOUNTER — PATIENT OUTREACH (OUTPATIENT)
Dept: CARE COORDINATION | Facility: CLINIC | Age: 35
End: 2025-02-18
Payer: COMMERCIAL

## 2025-03-04 ENCOUNTER — PATIENT OUTREACH (OUTPATIENT)
Dept: CARE COORDINATION | Facility: CLINIC | Age: 35
End: 2025-03-04
Payer: COMMERCIAL

## 2025-03-13 ENCOUNTER — VIRTUAL VISIT (OUTPATIENT)
Dept: CARDIOLOGY | Facility: CLINIC | Age: 35
End: 2025-03-13
Attending: INTERNAL MEDICINE
Payer: COMMERCIAL

## 2025-03-13 VITALS — WEIGHT: 165 LBS | BODY MASS INDEX: 22.35 KG/M2 | HEIGHT: 72 IN

## 2025-03-13 DIAGNOSIS — I95.1 ORTHOSTATIC HYPOTENSION: Primary | ICD-10-CM

## 2025-03-13 DIAGNOSIS — I73.00 RAYNAUD'S PHENOMENON WITHOUT GANGRENE: ICD-10-CM

## 2025-03-13 RX ORDER — DILTIAZEM HYDROCHLORIDE 120 MG/1
120 CAPSULE, EXTENDED RELEASE ORAL DAILY
Qty: 90 CAPSULE | Refills: 3 | Status: SHIPPED | OUTPATIENT
Start: 2025-03-13

## 2025-03-13 ASSESSMENT — PAIN SCALES - GENERAL: PAINLEVEL_OUTOF10: NO PAIN (0)

## 2025-03-13 NOTE — PROGRESS NOTES
Virtual Visit Details    Type of service:  Video Visit     HPI: Chance is being seen today in follow-up of postural lightheadedness.  Autonomic studies were normal but did also reveal tendency to stretch induced hypotension.  In retrospect the patient's history does include stretch induced symptoms which continued despite his having been treated recently with fludrocortisone and increased dietary salt.    At today's visit I indicated that his positional symptoms should respond to increase salt intake and we will add Vitassium-2 g of sodium chloride daily along with his current electrolyte drinks and usual dietary salt.  However his stretch related symptoms may not respond so well.  Of note he is taking Adderall although not on a regular basis and does not find any substantial benefit on days that he takes it versus those that he does not.    In addition to dietary means described above I have recommended that he also begin using exercise bands with the goal to improve lower body smooth muscle activation and strengthening muscle pump activity.  Patient does note that when he moves his symptoms are less than standing still consistent with muscle pump.    Chance does note symptoms consistent with Raynaud's in cold weather.  He does take magnesium for GI issues which is likely to be of some help but we will add diltiazem.  I indicated to him that he may not need this in the summertime.    Plan will be to revisit in a couple months time with the increased sodium intake and isometric activity.    PAST MEDICAL HISTORY:  Past Medical History:   Diagnosis Date    Depressive disorder 2016    Marijuana use 1/4/2019       CURRENT MEDICATIONS:  Current Outpatient Medications   Medication Sig Dispense Refill    amphetamine-dextroamphetamine (ADDERALL XR) 25 MG 24 hr capsule Take 1 capsule (25 mg) by mouth every morning. 30 capsule 0    fludrocortisone (FLORINEF) 0.1 MG tablet Take 1 tablet (0.1 mg) by mouth daily. 90 tablet 3     Magnesium Gluconate (MAGNESIUM 27 PO) Take 840 mg by mouth daily.         PAST SURGICAL HISTORY:  Past Surgical History:   Procedure Laterality Date    ABDOMEN SURGERY  2013/2016    Hernia repair    ARTHROSCOPY SHOULDER SUPERIOR LABRUM ANTERIOR TO POSTERIOR REPAIR Left 2012    COLONOSCOPY N/A 7/23/2021    Procedure: Colonoscopy, With Polypectomy And Biopsy;  Surgeon: Geneva Patel DO;  Location: [facility] OR    COLONOSCOPY WITH CO2 INSUFFLATION N/A 7/23/2021    Procedure: COLONOSCOPY, WITH CO2 INSUFFLATION;  Surgeon: Geneva Patel DO;  Location: [facility] OR    EP STUDY TILT TABLE N/A 11/27/2024    Procedure: Tilt Table Study;  Surgeon: Garland Denton MD;  Location:  HE[facility] CARDI[facility] CATH LAB    HERNIA REPAIR Left 2013    open    HERNIA REPAIR Right 2016    laparoscopic       ALLERGIES:   No Known Allergies    FAMILY HISTORY:  Family History   Problem Relation Age of Onset    Thyroid Disease Mother     Unknown/Adopted Mother     Snoring Mother     Snoring Father     Mental Illness Brother         Not diagnosed by doctor or therapist.  Refuses to contact them    Mental Illness Brother     Cerebrovascular Disease Paternal Grandmother      SOCIAL HISTORY:  Social History     Tobacco Use    Smoking status: Never    Smokeless tobacco: Former     Quit date: 5/11/2016   Vaping Use    Vaping status: Never Used   Substance Use Topics    Alcohol use: Not Currently     Comment: rarely    Drug use: Not Currently     Types: Marijuana     Comment: has been over a year       ROS:   Constitutional: No fever, chills, or sweats. Weight stable.   ENT: No visual disturbance, ear ache, epistaxis, sore throat.   Cardiovascular: As per HPI.   Respiratory: No cough, hemoptysis.    GI: No nausea, vomiting,  : No hematuria.   Integument: Negative.   Psychiatric: Negative.   Hematologic:   no easy bleeding.  Neuro: Negative.   Endocrinology: No significant heat or cold intolerance   Musculoskeletal: No myalgia.    Exam:  Ht 1.829 m (6')    "Wt 74.8 kg (165 lb)   BMI 22.38 kg/m    GENERAL APPEARANCE: healthy, alert and no distress  HEENT: no icterus, no central cyanosis  NECK: JVP not elevated  RESPIRATORY:  no rales, rhonchi or wheezes, no use of accessory muscles, no retractions, respirations are unlabored, normal respiratory rate  NEURO: alert and oriented to person/place/time, normal speech, gait and affect  SKIN: no ecchymoses, no rashes    Labs:  CBC RESULTS:   Lab Results   Component Value Date    WBC 7.8 10/31/2024    WBC 6.0 10/06/2020    RBC 5.68 10/31/2024    RBC 5.48 10/06/2020    HGB 15.8 10/31/2024    HGB 15.4 10/06/2020    HCT 48.4 10/31/2024    HCT 45.8 10/06/2020    MCV 85 10/31/2024    MCV 84 10/06/2020    MCH 27.8 10/31/2024    MCH 28.1 10/06/2020    MCHC 32.6 10/31/2024    MCHC 33.6 10/06/2020    RDW 13.7 10/31/2024    RDW 14.2 10/06/2020     10/31/2024     10/06/2020       BMP RESULTS:  Lab Results   Component Value Date     11/02/2023     10/06/2020    POTASSIUM 4.1 11/02/2023    POTASSIUM 4.2 08/31/2022    POTASSIUM 3.9 10/06/2020    CHLORIDE 100 11/02/2023    CHLORIDE 106 08/31/2022    CHLORIDE 106 10/06/2020    CO2 26 11/02/2023    CO2 30 08/31/2022    CO2 29 10/06/2020    ANIONGAP 14 11/02/2023    ANIONGAP 3 08/31/2022    ANIONGAP 4 10/06/2020    GLC 92 11/02/2023    GLC 90 08/31/2022    GLC 81 10/06/2020    BUN 16.0 11/02/2023    BUN 19 08/31/2022    BUN 19 10/06/2020    CR 1.01 04/17/2024    CR 0.94 10/06/2020    GFRESTIMATED >90 04/17/2024    GFRESTIMATED >90 10/06/2020    GFRESTBLACK >90 10/06/2020    ZENA 9.4 11/02/2023    ZENA 9.0 10/06/2020        INR RESULTS:  No results found for: \"INR\"    Procedures:  PULMONARY FUNCTION TESTS:       Latest Ref Rng & Units 9/15/2021     8:44 AM   PFT   FVC L 6.03    FEV1 L 4.93    FVC% % 107    FEV1% % 107          ECHOCARDIOGRAM:   No results found for this or any previous visit (from the past 8760 hours).      Assessment and Plan:   1.  Orthostatic " hypotension in the absence of autonomic dysfunction based on prior autonomic testing  2.  Plan to increase dietary salt and continue electrolyte intake along with lower body isometric exercise.  3.  Patient has Raynaud's type symptoms in cold weather and we will try diltiazem    Plan  1.  Add Vitassium 500 mg-4 tablets daily-patient will obtain these through the Internet  2.  Provide prescription for diltiazem long-acting 120 mg once daily for Raynaud's type symptoms  3.  Follow-up video visit 4 months approximately    Total elapsed time today with chart review, clinic visit and documentation 40 minutes    Video on 11: 00 AM; off 11: 20 5 AM  Platform [employer]  Patient at home; clinic [facility] [facility] heart    I very much appreciated the opportunity to see and assess Chance Torres in the clinic today . Please do not hesitate to contact my office if you have any questions or concerns.      Garland Denton MD  Cardiac Arrhythmia Service  [facility]  [phone]       CC  AWA GOLDSTEIN

## 2025-03-13 NOTE — LETTER
3/13/2025      RE: Chance Torres  [address]  [facility] [address]       Dear Colleague,    Thank you for the opportunity to participate in the care of your patient, Chance Torres, at the [facility] [place] [facility]. Please see a copy of my visit note below.    Virtual Visit Details    Type of service:  Video Visit     HPI: Chance is being seen today in follow-up of postural lightheadedness.  Autonomic studies were normal but did also reveal tendency to stretch induced hypotension.  In retrospect the patient's history does include stretch induced symptoms which continued despite his having been treated recently with fludrocortisone and increased dietary salt.    At today's visit I indicated that his positional symptoms should respond to increase salt intake and we will add Vitassium-2 g of sodium chloride daily along with his current electrolyte drinks and usual dietary salt.  However his stretch related symptoms may not respond so well.  Of note he is taking Adderall although not on a regular basis and does not find any substantial benefit on days that he takes it versus those that he does not.    In addition to dietary means described above I have recommended that he also begin using exercise bands with the goal to improve lower body smooth muscle activation and strengthening muscle pump activity.  Patient does note that when he moves his symptoms are less than standing still consistent with muscle pump.    Chance does note symptoms consistent with Raynaud's in cold weather.  He does take magnesium for GI issues which is likely to be of some help but we will add diltiazem.  I indicated to him that he may not need this in the summertime.    Plan will be to revisit in a couple months time with the increased sodium intake and isometric activity.    PAST MEDICAL HISTORY:  Past Medical History:   Diagnosis Date     Depressive disorder  2016     Marijuana use 1/4/2019       CURRENT MEDICATIONS:  Current Outpatient Medications   Medication Sig Dispense Refill     amphetamine-dextroamphetamine (ADDERALL XR) 25 MG 24 hr capsule Take 1 capsule (25 mg) by mouth every morning. 30 capsule 0     fludrocortisone (FLORINEF) 0.1 MG tablet Take 1 tablet (0.1 mg) by mouth daily. 90 tablet 3     Magnesium Gluconate (MAGNESIUM 27 PO) Take 840 mg by mouth daily.         PAST SURGICAL HISTORY:  Past Surgical History:   Procedure Laterality Date     ABDOMEN SURGERY  2013/2016    Hernia repair     ARTHROSCOPY SHOULDER SUPERIOR LABRUM ANTERIOR TO POSTERIOR REPAIR Left 2012     COLONOSCOPY N/A 7/23/2021    Procedure: Colonoscopy, With Polypectomy And Biopsy;  Surgeon: Geneva Patel DO;  Location: [facility] OR     COLONOSCOPY WITH CO2 INSUFFLATION N/A 7/23/2021    Procedure: COLONOSCOPY, WITH CO2 INSUFFLATION;  Surgeon: Geneva Patel DO;  Location: [facility] OR     EP STUDY TILT TABLE N/A 11/27/2024    Procedure: Tilt Table Study;  Surgeon: Garland Denton MD;  Location:  HEART CARDIAC CATH LAB     HERNIA REPAIR Left 2013    open     HERNIA REPAIR Right 2016    laparoscopic       ALLERGIES:   No Known Allergies    FAMILY HISTORY:  Family History   Problem Relation Age of Onset     Thyroid Disease Mother      Unknown/Adopted Mother      Snoring Mother      Snoring Father      Mental Illness Brother         Not diagnosed by doctor or therapist.  Refuses to contact them     Mental Illness Brother      Cerebrovascular Disease Paternal Grandmother      SOCIAL HISTORY:  Social History     Tobacco Use     Smoking status: Never     Smokeless tobacco: Former     Quit date: 5/11/2016   Vaping Use     Vaping status: Never Used   Substance Use Topics     Alcohol use: Not Currently     Comment: rarely     Drug use: Not Currently     Types: Marijuana     Comment: has been over a year       ROS:   Constitutional: No fever, chills, or sweats. Weight stable.   ENT: No visual  disturbance, ear ache, epistaxis, sore throat.   Cardiovascular: As per HPI.   Respiratory: No cough, hemoptysis.    GI: No nausea, vomiting,  : No hematuria.   Integument: Negative.   Psychiatric: Negative.   Hematologic:   no easy bleeding.  Neuro: Negative.   Endocrinology: No significant heat or cold intolerance   Musculoskeletal: No myalgia.    Exam:  Ht 1.829 m (6')   Wt 74.8 kg (165 lb)   BMI 22.38 kg/m    GENERAL APPEARANCE: healthy, alert and no distress  HEENT: no icterus, no central cyanosis  NECK: JVP not elevated  RESPIRATORY:  no rales, rhonchi or wheezes, no use of accessory muscles, no retractions, respirations are unlabored, normal respiratory rate  NEURO: alert and oriented to person/place/time, normal speech, gait and affect  SKIN: no ecchymoses, no rashes    Labs:  CBC RESULTS:   Lab Results   Component Value Date    WBC 7.8 10/31/2024    WBC 6.0 10/06/2020    RBC 5.68 10/31/2024    RBC 5.48 10/06/2020    HGB 15.8 10/31/2024    HGB 15.4 10/06/2020    HCT 48.4 10/31/2024    HCT 45.8 10/06/2020    MCV 85 10/31/2024    MCV 84 10/06/2020    MCH 27.8 10/31/2024    MCH 28.1 10/06/2020    MCHC 32.6 10/31/2024    MCHC 33.6 10/06/2020    RDW 13.7 10/31/2024    RDW 14.2 10/06/2020     10/31/2024     10/06/2020       BMP RESULTS:  Lab Results   Component Value Date     11/02/2023     10/06/2020    POTASSIUM 4.1 11/02/2023    POTASSIUM 4.2 08/31/2022    POTASSIUM 3.9 10/06/2020    CHLORIDE 100 11/02/2023    CHLORIDE 106 08/31/2022    CHLORIDE 106 10/06/2020    CO2 26 11/02/2023    CO2 30 08/31/2022    CO2 29 10/06/2020    ANIONGAP 14 11/02/2023    ANIONGAP 3 08/31/2022    ANIONGAP 4 10/06/2020    GLC 92 11/02/2023    GLC 90 08/31/2022    GLC 81 10/06/2020    BUN 16.0 11/02/2023    BUN 19 08/31/2022    BUN 19 10/06/2020    CR 1.01 04/17/2024    CR 0.94 10/06/2020    GFRESTIMATED >90 04/17/2024    GFRESTIMATED >90 10/06/2020    GFRESTBLACK >90 10/06/2020    ZENA 9.4 11/02/2023     "ZENA 9.0 10/06/2020        INR RESULTS:  No results found for: \"INR\"    Procedures:  PULMONARY FUNCTION TESTS:       Latest Ref Rng & Units 9/15/2021     8:44 AM   PFT   FVC L 6.03    FEV1 L 4.93    FVC% % 107    FEV1% % 107          ECHOCARDIOGRAM:   No results found for this or any previous visit (from the past 8760 hours).      Assessment and Plan:   1.  Orthostatic hypotension in the absence of autonomic dysfunction based on prior autonomic testing  2.  Plan to increase dietary salt and continue electrolyte intake along with lower body isometric exercise.  3.  Patient has Raynaud's type symptoms in cold weather and we will try diltiazem    Plan  1.  Add Vitassium 500 mg-4 tablets daily-patient will obtain these through the Internet  2.  Provide prescription for diltiazem long-acting 120 mg once daily for Raynaud's type symptoms  3.  Follow-up video visit 4 months approximately    Total elapsed time today with chart review, clinic visit and documentation 40 minutes    Video on 11: 00 AM; off 11: 20 5 AM  Platform Doximity  Patient at home; clinic [facility] heart    I very much appreciated the opportunity to see and assess Chance Torres in the clinic today . Please do not hesitate to contact my office if you have any questions or concerns.      Garland Denton MD  Cardiac Arrhythmia Service  [facility]  [phone]       CC  [facility] [place]      Please do not hesitate to contact me if you have any questions/concerns.     Sincerely,     Garland Denton MD  "

## 2025-03-13 NOTE — NURSING NOTE
Current patient location:  [place]    Is the patient currently in the state of [place]? YES    Visit mode: VIDEO    If the visit is dropped, the patient can be reconnected by:VIDEO VISIT: Text to cell phone:   Telephone Information:   Mobile [phone]       Will anyone else be joining the visit? NO  (If patient encounters technical issues they should call [phone]    Are changes needed to the allergy or medication list? No    Patient denies any changes since echeck-in completion and states all information entered during echeck-in remains accurate.    Are refills needed on medications prescribed by this physician? Discuss with provider    Rooming Documentation:  Questionnaire(s) completed    No other vitals to report today    Reason for visit: WENDY Rodriguez MA VVF

## 2025-03-13 NOTE — PATIENT INSTRUCTIONS
Plan:   1.  Add Vitassium 500 mg-4 tablets daily-patient will obtain these through the Internet  2.  Provide prescription for diltiazem long-acting 120 mg once daily for Raynaud's type symptoms  3.  Follow-up video visit 4 months approximately      Your Care Team:  EP Cardiology   Telephone Number     Nabeel Tobias RN [phone]    After business hours: [phone], ask for cardiologist on-call   Non-procedure scheduling:    Tenisha LO   [phone]   Procedure scheduling:    Candice Douglas   [phone]   Device Clinic (Pacemakers, ICDs, Loop Recorders)    During business hours: [phone]  After business hours:   [phone]- select option 4 and ask for job code [id number].       Cardiovascular Clinic:   [address] [place] [address]. [place] [address]      As always, thank you for trusting us with your health care needs!

## 2025-03-24 ENCOUNTER — MYC REFILL (OUTPATIENT)
Dept: FAMILY MEDICINE | Facility: OTHER | Age: 35
End: 2025-03-24
Payer: COMMERCIAL

## 2025-03-24 DIAGNOSIS — F90.0 ADHD (ATTENTION DEFICIT HYPERACTIVITY DISORDER), INATTENTIVE TYPE: ICD-10-CM

## 2025-03-24 RX ORDER — DEXTROAMPHETAMINE SACCHARATE, AMPHETAMINE ASPARTATE MONOHYDRATE, DEXTROAMPHETAMINE SULFATE AND AMPHETAMINE SULFATE 6.25; 6.25; 6.25; 6.25 MG/1; MG/1; MG/1; MG/1
25 CAPSULE, EXTENDED RELEASE ORAL EVERY MORNING
Qty: 30 CAPSULE | Refills: 0 | Status: SHIPPED | OUTPATIENT
Start: 2025-03-24

## 2025-04-20 ENCOUNTER — HEALTH MAINTENANCE LETTER (OUTPATIENT)
Age: 35
End: 2025-04-20

## 2025-04-27 ENCOUNTER — MYC REFILL (OUTPATIENT)
Dept: FAMILY MEDICINE | Facility: OTHER | Age: 35
End: 2025-04-27
Payer: COMMERCIAL

## 2025-04-27 DIAGNOSIS — F90.0 ADHD (ATTENTION DEFICIT HYPERACTIVITY DISORDER), INATTENTIVE TYPE: ICD-10-CM

## 2025-04-28 RX ORDER — DEXTROAMPHETAMINE SACCHARATE, AMPHETAMINE ASPARTATE MONOHYDRATE, DEXTROAMPHETAMINE SULFATE AND AMPHETAMINE SULFATE 6.25; 6.25; 6.25; 6.25 MG/1; MG/1; MG/1; MG/1
25 CAPSULE, EXTENDED RELEASE ORAL EVERY MORNING
Qty: 30 CAPSULE | Refills: 0 | Status: SHIPPED | OUTPATIENT
Start: 2025-04-28

## 2025-06-05 ENCOUNTER — RESULTS FOLLOW-UP (OUTPATIENT)
Dept: URGENT CARE | Facility: CLINIC | Age: 35
End: 2025-06-05

## 2025-06-05 ENCOUNTER — LAB (OUTPATIENT)
Dept: LAB | Facility: OTHER | Age: 35
End: 2025-06-05
Payer: COMMERCIAL

## 2025-06-05 ENCOUNTER — VIRTUAL VISIT (OUTPATIENT)
Dept: URGENT CARE | Facility: CLINIC | Age: 35
End: 2025-06-05
Payer: COMMERCIAL

## 2025-06-05 DIAGNOSIS — R10.13 EPIGASTRIC PAIN: ICD-10-CM

## 2025-06-05 DIAGNOSIS — R53.83 FATIGUE, UNSPECIFIED TYPE: ICD-10-CM

## 2025-06-05 DIAGNOSIS — R53.83 FATIGUE, UNSPECIFIED TYPE: Primary | ICD-10-CM

## 2025-06-05 LAB — TSH SERPL DL<=0.005 MIU/L-ACNC: 1.39 UIU/ML (ref 0.3–4.2)

## 2025-06-05 NOTE — PROGRESS NOTES
Assessment & Plan     Fatigue, unspecified type  - EBV Capsid Antibody IgG; Future  - CMV Antibody IgG; Future  - CMV antibody IgM; Future  - EBV Capsid Antibody IgM; Future  - TSH with free T4 reflex; Future  - Tick-Borne Disease Panel (Non-Lyme) by PCR; Future  - LYME DISEASE TOTAL ANTIBODIES WITH REFLEX TO CONFIRMATION; Future    Epigastric pain  - EBV Capsid Antibody IgG; Future  - CMV Antibody IgG; Future  - CMV antibody IgM; Future  - EBV Capsid Antibody IgM; Future  - TSH with free T4 reflex; Future  - Tick-Borne Disease Panel (Non-Lyme) by PCR; Future  - LYME DISEASE TOTAL ANTIBODIES WITH REFLEX TO CONFIRMATION; Future    Ordered Lyme and other Tick borne illness, mono and TSH. Advised follow up with primary care provider next week.    Return in about 1 week (around 6/12/2025) for Recheck with primary care provider.       Bonnie Cantor PA-C  [facility]    Subjective   Chance Torres is a 34 year old who presents for the following health issues    HPI    Symptom onset:  Current symptoms: significant fatigue, chest tightness, pain in esophagus- tight feeling  Difficulty eating- not really a sore throat but heartburn  Has had mono- around 2016  Feels his immune system is not as good since having COVID  Maalox, Pepcid, prevacid- no relief  Medication review complete.    Review of Systems   ROS negative except as stated above.      Objective    Physical Exam   GENERAL: Healthy, alert and no distress  EYES: Eyes grossly normal to inspection.  No discharge or erythema, or obvious scleral/conjunctival abnormalities.  HENT: Normal cephalic/atraumatic.  External ears, nose and mouth without ulcers or lesions.  No nasal drainage visible.  RESP: No audible cough wheeze or visible cyanosis.  No visible retractions or increased work of breathing.    SKIN: Visible skin clear. No significant rash, abnormal pigmentation or lesions.  NEURO: Cranial nerves grossly intact.  Mentation and speech  appropriate for age.  PSYCH: Mentation appears normal, affect normal/bright, judgement and insight intact, normal speech and appearance well-groomed.    Type of service:  Video Visit  Video Start Time: 10:30AM  Video End Time: 10:57AM  Originating Location (pt. Location): Home  Distant Location (provider location):  [facility] U[facility]- offsite at [facility]  Platform used for Video Visit: RedBee    No results found for any visits on 06/05/25.

## 2025-06-06 ENCOUNTER — MYC MEDICAL ADVICE (OUTPATIENT)
Dept: INTERNAL MEDICINE | Facility: CLINIC | Age: 35
End: 2025-06-06
Payer: COMMERCIAL

## 2025-06-06 DIAGNOSIS — K21.00 GASTROESOPHAGEAL REFLUX DISEASE WITH ESOPHAGITIS, UNSPECIFIED WHETHER HEMORRHAGE: Primary | ICD-10-CM

## 2025-06-09 ENCOUNTER — PATIENT OUTREACH (OUTPATIENT)
Dept: CARE COORDINATION | Facility: CLINIC | Age: 35
End: 2025-06-09

## 2025-06-09 ENCOUNTER — HOSPITAL ENCOUNTER (EMERGENCY)
Facility: CLINIC | Age: 35
Discharge: HOME | End: 2025-06-09
Attending: FAMILY MEDICINE | Admitting: FAMILY MEDICINE
Payer: COMMERCIAL

## 2025-06-09 ENCOUNTER — TELEPHONE (OUTPATIENT)
Dept: INTERNAL MEDICINE | Facility: CLINIC | Age: 35
End: 2025-06-09

## 2025-06-09 ENCOUNTER — APPOINTMENT (OUTPATIENT)
Dept: CT IMAGING | Facility: CLINIC | Age: 35
End: 2025-06-09
Attending: FAMILY MEDICINE
Payer: COMMERCIAL

## 2025-06-09 VITALS
HEART RATE: 74 BPM | WEIGHT: 158 LBS | TEMPERATURE: 98.2 F | SYSTOLIC BLOOD PRESSURE: 124 MMHG | RESPIRATION RATE: 18 BRPM | OXYGEN SATURATION: 96 % | HEIGHT: 71 IN | BODY MASS INDEX: 22.12 KG/M2 | DIASTOLIC BLOOD PRESSURE: 85 MMHG

## 2025-06-09 DIAGNOSIS — E86.0 DEHYDRATION: ICD-10-CM

## 2025-06-09 DIAGNOSIS — I95.9 HYPOTENSION, UNSPECIFIED HYPOTENSION TYPE: Primary | ICD-10-CM

## 2025-06-09 DIAGNOSIS — R13.10 DYSPHAGIA, UNSPECIFIED TYPE: ICD-10-CM

## 2025-06-09 DIAGNOSIS — K20.90 ESOPHAGITIS: ICD-10-CM

## 2025-06-09 LAB
ALBUMIN SERPL BCG-MCNC: 4.1 G/DL (ref 3.5–5.2)
ALBUMIN UR-MCNC: NEGATIVE MG/DL
ALP SERPL-CCNC: 53 U/L (ref 40–150)
ALT SERPL W P-5'-P-CCNC: 26 U/L (ref 0–70)
AMORPH CRY #/AREA URNS HPF: ABNORMAL /HPF
ANION GAP SERPL CALCULATED.3IONS-SCNC: 9 MMOL/L (ref 7–15)
APPEARANCE UR: ABNORMAL
AST SERPL W P-5'-P-CCNC: 23 U/L (ref 0–45)
ATRIAL RATE - MUSE: 81 BPM
BASOPHILS # BLD AUTO: 0 10E3/UL (ref 0–0.2)
BASOPHILS NFR BLD AUTO: 0 %
BILIRUB SERPL-MCNC: 1.1 MG/DL
BILIRUB UR QL STRIP: NEGATIVE
BUN SERPL-MCNC: 16.5 MG/DL (ref 6–20)
CALCIUM SERPL-MCNC: 9.4 MG/DL (ref 8.8–10.4)
CHLORIDE SERPL-SCNC: 95 MMOL/L (ref 98–107)
COLOR UR AUTO: YELLOW
CREAT SERPL-MCNC: 0.95 MG/DL (ref 0.67–1.17)
DIASTOLIC BLOOD PRESSURE - MUSE: NORMAL MMHG
EGFRCR SERPLBLD CKD-EPI 2021: >90 ML/MIN/1.73M2
EOSINOPHIL # BLD AUTO: 0.2 10E3/UL (ref 0–0.7)
EOSINOPHIL NFR BLD AUTO: 3 %
ERYTHROCYTE [DISTWIDTH] IN BLOOD BY AUTOMATED COUNT: 13.6 % (ref 10–15)
GLUCOSE SERPL-MCNC: 88 MG/DL (ref 70–99)
GLUCOSE UR STRIP-MCNC: NEGATIVE MG/DL
HCO3 SERPL-SCNC: 32 MMOL/L (ref 22–29)
HCT VFR BLD AUTO: 43 % (ref 40–53)
HGB BLD-MCNC: 14.4 G/DL (ref 13.3–17.7)
HGB UR QL STRIP: NEGATIVE
IMM GRANULOCYTES # BLD: 0 10E3/UL
IMM GRANULOCYTES NFR BLD: 0 %
INTERPRETATION ECG - MUSE: NORMAL
KETONES UR STRIP-MCNC: 60 MG/DL
LEUKOCYTE ESTERASE UR QL STRIP: NEGATIVE
LIPASE SERPL-CCNC: 41 U/L (ref 13–60)
LYMPHOCYTES # BLD AUTO: 2 10E3/UL (ref 0.8–5.3)
LYMPHOCYTES NFR BLD AUTO: 27 %
MAGNESIUM SERPL-MCNC: 2.1 MG/DL (ref 1.7–2.3)
MCH RBC QN AUTO: 27.7 PG (ref 26.5–33)
MCHC RBC AUTO-ENTMCNC: 33.5 G/DL (ref 31.5–36.5)
MCV RBC AUTO: 83 FL (ref 78–100)
MONOCYTES # BLD AUTO: 0.9 10E3/UL (ref 0–1.3)
MONOCYTES NFR BLD AUTO: 12 %
MUCOUS THREADS #/AREA URNS LPF: PRESENT /LPF
NEUTROPHILS # BLD AUTO: 4.3 10E3/UL (ref 1.6–8.3)
NEUTROPHILS NFR BLD AUTO: 58 %
NITRATE UR QL: NEGATIVE
NRBC # BLD AUTO: 0 10E3/UL
NRBC BLD AUTO-RTO: 0 /100
P AXIS - MUSE: 81 DEGREES
PH UR STRIP: 6.5 [PH] (ref 5–7)
PLAT MORPH BLD: NORMAL
PLATELET # BLD AUTO: 219 10E3/UL (ref 150–450)
POTASSIUM SERPL-SCNC: 3.6 MMOL/L (ref 3.4–5.3)
PR INTERVAL - MUSE: 140 MS
PROT SERPL-MCNC: 7.5 G/DL (ref 6.4–8.3)
QRS DURATION - MUSE: 98 MS
QT - MUSE: 388 MS
QTC - MUSE: 450 MS
R AXIS - MUSE: 81 DEGREES
RBC # BLD AUTO: 5.2 10E6/UL (ref 4.4–5.9)
RBC MORPH BLD: NORMAL
RBC URINE: 2 /HPF
SODIUM SERPL-SCNC: 136 MMOL/L (ref 135–145)
SP GR UR STRIP: 1.02 (ref 1–1.03)
SYSTOLIC BLOOD PRESSURE - MUSE: NORMAL MMHG
T AXIS - MUSE: 54 DEGREES
TROPONIN T SERPL HS-MCNC: <6 NG/L
UROBILINOGEN UR STRIP-MCNC: NORMAL MG/DL
VENTRICULAR RATE- MUSE: 81 BPM
WBC # BLD AUTO: 7.4 10E3/UL (ref 4–11)
WBC URINE: <1 /HPF

## 2025-06-09 PROCEDURE — 83690 ASSAY OF LIPASE: CPT | Performed by: FAMILY MEDICINE

## 2025-06-09 PROCEDURE — 85025 COMPLETE CBC W/AUTO DIFF WBC: CPT | Performed by: FAMILY MEDICINE

## 2025-06-09 PROCEDURE — 93010 ELECTROCARDIOGRAM REPORT: CPT | Performed by: FAMILY MEDICINE

## 2025-06-09 PROCEDURE — 99284 EMERGENCY DEPT VISIT MOD MDM: CPT | Performed by: FAMILY MEDICINE

## 2025-06-09 PROCEDURE — 93005 ELECTROCARDIOGRAM TRACING: CPT | Performed by: FAMILY MEDICINE

## 2025-06-09 PROCEDURE — 96375 TX/PRO/DX INJ NEW DRUG ADDON: CPT | Performed by: FAMILY MEDICINE

## 2025-06-09 PROCEDURE — 250N000011 HC RX IP 250 OP 636: Performed by: FAMILY MEDICINE

## 2025-06-09 PROCEDURE — 250N000009 HC RX 250: Performed by: FAMILY MEDICINE

## 2025-06-09 PROCEDURE — 250N000013 HC RX MED GY IP 250 OP 250 PS 637: Performed by: FAMILY MEDICINE

## 2025-06-09 PROCEDURE — 81001 URINALYSIS AUTO W/SCOPE: CPT | Performed by: FAMILY MEDICINE

## 2025-06-09 PROCEDURE — 36415 COLL VENOUS BLD VENIPUNCTURE: CPT | Performed by: FAMILY MEDICINE

## 2025-06-09 PROCEDURE — 84450 TRANSFERASE (AST) (SGOT): CPT | Performed by: FAMILY MEDICINE

## 2025-06-09 PROCEDURE — 96361 HYDRATE IV INFUSION ADD-ON: CPT | Performed by: FAMILY MEDICINE

## 2025-06-09 PROCEDURE — 83735 ASSAY OF MAGNESIUM: CPT | Performed by: FAMILY MEDICINE

## 2025-06-09 PROCEDURE — 99285 EMERGENCY DEPT VISIT HI MDM: CPT | Mod: 25 | Performed by: FAMILY MEDICINE

## 2025-06-09 PROCEDURE — 258N000003 HC RX IP 258 OP 636: Performed by: FAMILY MEDICINE

## 2025-06-09 PROCEDURE — 74177 CT ABD & PELVIS W/CONTRAST: CPT

## 2025-06-09 PROCEDURE — 84484 ASSAY OF TROPONIN QUANT: CPT | Performed by: FAMILY MEDICINE

## 2025-06-09 PROCEDURE — 96374 THER/PROPH/DIAG INJ IV PUSH: CPT | Mod: 59 | Performed by: FAMILY MEDICINE

## 2025-06-09 RX ORDER — IOPAMIDOL 755 MG/ML
500 INJECTION, SOLUTION INTRAVASCULAR ONCE
Status: COMPLETED | OUTPATIENT
Start: 2025-06-09 | End: 2025-06-09

## 2025-06-09 RX ORDER — HYDROCODONE BITARTRATE AND ACETAMINOPHEN 5; 325 MG/1; MG/1
1-2 TABLET ORAL EVERY 4 HOURS PRN
Qty: 15 TABLET | Refills: 0 | Status: SHIPPED | OUTPATIENT
Start: 2025-06-09

## 2025-06-09 RX ORDER — OMEPRAZOLE 20 MG/1
20 CAPSULE, DELAYED RELEASE ORAL DAILY
Qty: 30 CAPSULE | Refills: 0 | Status: SHIPPED | OUTPATIENT
Start: 2025-06-09 | End: 2025-06-09

## 2025-06-09 RX ORDER — FLUCONAZOLE 40 MG/ML
200 POWDER, FOR SUSPENSION ORAL DAILY
Qty: 100 ML | Refills: 0 | Status: SHIPPED | OUTPATIENT
Start: 2025-06-10

## 2025-06-09 RX ORDER — FAMOTIDINE 40 MG/1
40 TABLET, FILM COATED ORAL 2 TIMES DAILY
Qty: 60 TABLET | Refills: 0 | Status: SHIPPED | OUTPATIENT
Start: 2025-06-09 | End: 2025-06-09

## 2025-06-09 RX ORDER — FAMOTIDINE 40 MG/1
40 TABLET, FILM COATED ORAL 2 TIMES DAILY
Qty: 60 TABLET | Refills: 0 | Status: SHIPPED | OUTPATIENT
Start: 2025-06-09

## 2025-06-09 RX ORDER — OMEPRAZOLE 20 MG/1
20 CAPSULE, DELAYED RELEASE ORAL DAILY
Qty: 30 CAPSULE | Refills: 0 | Status: SHIPPED | OUTPATIENT
Start: 2025-06-09

## 2025-06-09 RX ORDER — HYDROMORPHONE HYDROCHLORIDE 1 MG/ML
0.5 INJECTION, SOLUTION INTRAMUSCULAR; INTRAVENOUS; SUBCUTANEOUS ONCE
Refills: 0 | Status: COMPLETED | OUTPATIENT
Start: 2025-06-09 | End: 2025-06-09

## 2025-06-09 RX ORDER — HYDROCODONE BITARTRATE AND ACETAMINOPHEN 5; 325 MG/1; MG/1
1-2 TABLET ORAL EVERY 4 HOURS PRN
Qty: 20 TABLET | Refills: 0 | Status: SHIPPED | OUTPATIENT
Start: 2025-06-09 | End: 2025-06-09

## 2025-06-09 RX ORDER — ONDANSETRON 2 MG/ML
4 INJECTION INTRAMUSCULAR; INTRAVENOUS EVERY 30 MIN PRN
Status: DISCONTINUED | OUTPATIENT
Start: 2025-06-09 | End: 2025-06-09 | Stop reason: HOSPADM

## 2025-06-09 RX ORDER — FLUCONAZOLE 40 MG/ML
200 POWDER, FOR SUSPENSION ORAL DAILY
Qty: 100 ML | Refills: 0 | Status: SHIPPED | OUTPATIENT
Start: 2025-06-10 | End: 2025-06-09

## 2025-06-09 RX ORDER — FLUCONAZOLE 40 MG/ML
200 POWDER, FOR SUSPENSION ORAL ONCE
Status: COMPLETED | OUTPATIENT
Start: 2025-06-09 | End: 2025-06-09

## 2025-06-09 RX ORDER — MAGNESIUM HYDROXIDE/ALUMINUM HYDROXICE/SIMETHICONE 120; 1200; 1200 MG/30ML; MG/30ML; MG/30ML
15 SUSPENSION ORAL ONCE
Status: COMPLETED | OUTPATIENT
Start: 2025-06-09 | End: 2025-06-09

## 2025-06-09 RX ORDER — SUCRALFATE ORAL 1 G/10ML
1 SUSPENSION ORAL ONCE
Status: COMPLETED | OUTPATIENT
Start: 2025-06-09 | End: 2025-06-09

## 2025-06-09 RX ORDER — LIDOCAINE HYDROCHLORIDE 20 MG/ML
5 SOLUTION OROPHARYNGEAL ONCE
Status: COMPLETED | OUTPATIENT
Start: 2025-06-09 | End: 2025-06-09

## 2025-06-09 RX ORDER — HYDROCODONE BITARTRATE AND ACETAMINOPHEN 5; 325 MG/1; MG/1
1-2 TABLET ORAL EVERY 4 HOURS PRN
Qty: 15 TABLET | Refills: 0 | Status: SHIPPED | OUTPATIENT
Start: 2025-06-09 | End: 2025-06-09

## 2025-06-09 RX ADMIN — LIDOCAINE HYDROCHLORIDE 5 ML: 20 SOLUTION ORAL at 07:57

## 2025-06-09 RX ADMIN — FLUCONAZOLE 200 MG: 40 POWDER, FOR SUSPENSION ORAL at 08:41

## 2025-06-09 RX ADMIN — SUCRALFATE 1 G: 1 SUSPENSION ORAL at 07:21

## 2025-06-09 RX ADMIN — SODIUM CHLORIDE 1000 ML: 0.9 INJECTION, SOLUTION INTRAVENOUS at 04:54

## 2025-06-09 RX ADMIN — PANTOPRAZOLE SODIUM 40 MG: 40 INJECTION, POWDER, FOR SOLUTION INTRAVENOUS at 04:52

## 2025-06-09 RX ADMIN — SODIUM CHLORIDE 60 ML: 9 INJECTION, SOLUTION INTRAVENOUS at 05:51

## 2025-06-09 RX ADMIN — IOPAMIDOL 80 ML: 755 INJECTION, SOLUTION INTRAVENOUS at 05:51

## 2025-06-09 RX ADMIN — FAMOTIDINE 40 MG: 10 INJECTION, SOLUTION INTRAVENOUS at 04:55

## 2025-06-09 RX ADMIN — HYDROMORPHONE HYDROCHLORIDE 0.5 MG: 1 INJECTION, SOLUTION INTRAMUSCULAR; INTRAVENOUS; SUBCUTANEOUS at 08:07

## 2025-06-09 RX ADMIN — ALUMINUM HYDROXIDE, MAGNESIUM HYDROXIDE, AND SIMETHICONE 15 ML: 200; 200; 20 SUSPENSION ORAL at 07:59

## 2025-06-09 ASSESSMENT — COLUMBIA-SUICIDE SEVERITY RATING SCALE - C-SSRS
1. IN THE PAST MONTH, HAVE YOU WISHED YOU WERE DEAD OR WISHED YOU COULD GO TO SLEEP AND NOT WAKE UP?: NO
6. HAVE YOU EVER DONE ANYTHING, STARTED TO DO ANYTHING, OR PREPARED TO DO ANYTHING TO END YOUR LIFE?: NO
2. HAVE YOU ACTUALLY HAD ANY THOUGHTS OF KILLING YOURSELF IN THE PAST MONTH?: NO

## 2025-06-09 ASSESSMENT — ACTIVITIES OF DAILY LIVING (ADL)
ADLS_ACUITY_SCORE: 41

## 2025-06-09 NOTE — ED TRIAGE NOTES
Pt comes in today with c/o epigastric pain since last Tuesday. Pt has recently recovered from a fever, fatigue sore throat etc. Has been taking tylenol every 6 hours. Unable to tolerate foods. Emesis x 2. Denies blood in stool or emesis. Took a Tylenol # 3 at 0330.      Triage Assessment (Adult)       Row Name 06/09/25 0406          Triage Assessment    Airway WDL WDL        Respiratory WDL    Respiratory WDL WDL;cough        Skin Circulation/Temperature WDL    Skin Circulation/Temperature WDL WDL        Cardiac WDL    Cardiac WDL WDL        Peripheral/Neurovascular WDL    Peripheral Neurovascular WDL WDL        Cognitive/Neuro/Behavioral WDL    Cognitive/Neuro/Behavioral WDL WDL

## 2025-06-09 NOTE — ED PROVIDER NOTES
Patient was discharged when available ride came, I was told this patient at signout but told he was stable for discharge and waiting for a ride.  When his wife arrived, they requested medications be changed to different pharmacy, so this was done.  After changing over to different pharmacy, received call later in the afternoon asking if we can send a paper prescription to [place] retail pharmacy again since e-prescribing failed.  This was printed out and tubed over to retail pharmacy     Julee Guzman,   06/09/25 0765

## 2025-06-09 NOTE — ED PROVIDER NOTES
History     Chief Complaint   Patient presents with    Abdominal Pain     HPI  Chance Torres is a 34 year old male who presents to the ED with severe lower chest and epigastric pain for the last 6 days.  For several days prior to that he had some bodyaches, fatigue and a sore throat starting on Tuesday of last week lower chest and epigastric pain has been very constant.  Severe heartburn.  Whenever he tries to drink anything it feels like it gets stuck and then oscillates back up and finally goes through.  Severe burning with any kind of attempted ingestion.  He has not had any solid food for the last 6 days because it hurts too bad.  He was taking tiny sips of Gatorade, water and some protein shakes but that made things worse.  Even taking antiacids or acid medicine was painful.  He took some Pepto-Bismol which made things worse.  The last couple of days he has only been able to take tiny sips of water, diluted apple juice and bone broth.    Sounds like things get stuck quite often when he tries to eat.  He has to drink water, it gets fills up his esophagus and then he will be able to forcefully vomit it out.  Has not had to go in for an endoscopy to have any food boluses removed.  His last endoscopy was in 2016 around the time that this all started.    He went to the [place] emergency department on Wednesday, 5 days ago with chest pain, dizziness, fatigue and low-grade fever.  They suspected GERD.  He started on Prevacid the following day and stopped his ibuprofen.  Friday he drank some Pepto-Bismol which made things worse.  He had dry heaves and worsening pain.  He took some Norco from his mother-in-law and that helped calm the pain down just enough so he could start taking a little bit of fluids.  You take 1 every 6 hours it would last about 4 hours.  He gets severe pain once any kind of fluid hits his stomach and gets the reflux sensation.  He has received IV fluids at home but prior to that was  tachycardic.  He has had issues with hypotension and syncope and is on Florinef.  Might have POTS syndrome.  His wife is a physical therapist here at [facility].    Allergies:  No Known Allergies    Problem List:    Patient Active Problem List    Diagnosis Date Noted    Migraine with aura and without status migrainosus, not intractable 03/20/2024     Priority: Medium    ADHD (attention deficit hyperactivity disorder), inattentive type 02/26/2020     Priority: Medium    Irritable bowel syndrome with both constipation and diarrhea 07/12/2017     Priority: Medium    Labral tear of shoulder 07/24/2012     Priority: Medium        Past Medical History:    Past Medical History:   Diagnosis Date    Depressive disorder 2016    Marijuana use 1/4/2019       Past Surgical History:    Past Surgical History:   Procedure Laterality Date    ABDOMEN SURGERY  2013/2016    Hernia repair    ARTHROSCOPY SHOULDER SUPERIOR LABRUM ANTERIOR TO POSTERIOR REPAIR Left 2012    COLONOSCOPY N/A 7/23/2021    Procedure: Colonoscopy, With Polypectomy And Biopsy;  Surgeon: Geneva Patel DO;  Location: [facility] OR    COLONOSCOPY WITH CO2 INSUFFLATION N/A 7/23/2021    Procedure: COLONOSCOPY, WITH CO2 INSUFFLATION;  Surgeon: Geneva Patel DO;  Location: [facility] OR    EP STUDY TILT TABLE N/A 11/27/2024    Procedure: Tilt Table Study;  Surgeon: Garland Denton MD;  Location:  [facility]    HERNIA REPAIR Left 2013    open    HERNIA REPAIR Right 2016    laparoscopic       Family History:    Family History   Problem Relation Age of Onset    Thyroid Disease Mother     Unknown/Adopted Mother     Snoring Mother     Snoring Father     Mental Illness Brother         Not diagnosed by doctor or therapist.  Refuses to contact them    Mental Illness Brother     Cerebrovascular Disease Paternal Grandmother        Social History:  Marital Status:   [2]  Social History     Tobacco Use    Smoking status: Never    Smokeless tobacco: Former  "    Quit date: 5/11/2016   Vaping Use    Vaping status: Never Used   Substance Use Topics    Alcohol use: Not Currently     Comment: rarely    Drug use: Not Currently     Types: Marijuana     Comment: has been over a year        Medications:    famotidine (PEPCID) 40 MG tablet  [START ON 6/10/2025] fluconazole (DIFLUCAN) 40 MG/ML suspension  HYDROcodone-acetaminophen (NORCO) 5-325 MG tablet  omeprazole (PRILOSEC) 20 MG DR capsule  amphetamine-dextroamphetamine (ADDERALL XR) 25 MG 24 hr capsule  diltiazem ER (TIAZAC) 120 MG 24 hr ER beaded capsule  fludrocortisone (FLORINEF) 0.1 MG tablet  Magnesium Gluconate (MAGNESIUM 27 PO)          Review of Systems   All other systems reviewed and are negative.      Physical Exam   BP: (!) 136/94  Pulse: 82  Temp: 98.2  F (36.8  C)  Resp: 18  Height: 180.3 cm (5' 11\")  Weight: 71.7 kg (158 lb)  SpO2: 97 %      Physical Exam  Constitutional:       General: He is in acute distress (moderate).   HENT:      Mouth/Throat:      Comments: Mucosa is sticky  Cardiovascular:      Rate and Rhythm: Normal rate and regular rhythm.   Pulmonary:      Effort: Pulmonary effort is normal.      Breath sounds: Normal breath sounds.   Abdominal:      Tenderness: There is abdominal tenderness (mild) in the epigastric area.   Musculoskeletal:         General: Normal range of motion.   Skin:     General: Skin is warm and dry.   Neurological:      General: No focal deficit present.      Mental Status: He is alert.         ED Course        Procedures         EKG:  Interpreted by Juan Lombardo MD   Normal sinus rhythm.  Rate:  81 bpm  Normal axis.  No acute ST/T changes.  Normal EKG.      Critical Care time:  none     None         Results for orders placed or performed during the hospital encounter of 06/09/25 (from the past 24 hours)   EKG 12-lead, tracing only   Result Value Ref Range    Systolic Blood Pressure  mmHg    Diastolic Blood Pressure  mmHg    Ventricular Rate 81 BPM    Atrial Rate " 81 BPM    MS Interval 140 ms    QRS Duration 98 ms     ms    QTc 450 ms    P Axis 81 degrees    R AXIS 81 degrees    T Axis 54 degrees    Interpretation ECG       Sinus rhythm  Normal ECG  When compared with ECG of 27-Nov-2024 07:44,  No significant change was found     CBC with platelets differential    Narrative    The following orders were created for panel order CBC with platelets differential.  Procedure                               Abnormality         Status                     ---------                               -----------         ------                     CBC with platelets and ...[[id number]]                      Final result               RBC and Platelet Morpho...[[id number]]                      Final result                 Please view results for these tests on the individual orders.   Comprehensive metabolic panel   Result Value Ref Range    Sodium 136 135 - 145 mmol/L    Potassium 3.6 3.4 - 5.3 mmol/L    Carbon Dioxide (CO2) 32 (H) 22 - 29 mmol/L    Anion Gap 9 7 - 15 mmol/L    Urea Nitrogen 16.5 6.0 - 20.0 mg/dL    Creatinine 0.95 0.67 - 1.17 mg/dL    GFR Estimate >90 >60 mL/min/1.73m2    Calcium 9.4 8.8 - 10.4 mg/dL    Chloride 95 (L) 98 - 107 mmol/L    Glucose 88 70 - 99 mg/dL    Alkaline Phosphatase 53 40 - 150 U/L    AST 23 0 - 45 U/L    ALT 26 0 - 70 U/L    Protein Total 7.5 6.4 - 8.3 g/dL    Albumin 4.1 3.5 - 5.2 g/dL    Bilirubin Total 1.1 <=1.2 mg/dL   Lipase   Result Value Ref Range    Lipase 41 13 - 60 U/L   Troponin T, High Sensitivity   Result Value Ref Range    Troponin T, High Sensitivity <6 <=22 ng/L   Magnesium   Result Value Ref Range    Magnesium 2.1 1.7 - 2.3 mg/dL   UA with Microscopic reflex to Culture    Specimen: Urine, Clean Catch   Result Value Ref Range    Color Urine Yellow Colorless, Straw, Light Yellow, Yellow    Appearance Urine Slightly Cloudy (A) Clear    Glucose Urine Negative Negative mg/dL    Bilirubin Urine Negative Negative    Ketones Urine 60 (A)  Negative mg/dL    Specific Gravity Urine 1.017 1.003 - 1.035    Blood Urine Negative Negative    pH Urine 6.5 5.0 - 7.0    Protein Albumin Urine Negative Negative mg/dL    Urobilinogen Urine Normal Normal mg/dL    Nitrite Urine Negative Negative    Leukocyte Esterase Urine Negative Negative    Mucus Urine Present (A) None Seen /LPF    Amorphous Crystals Urine Few (A) None Seen /HPF    RBC Urine 2 <=2 /HPF    WBC Urine <1 <=5 /HPF    Narrative    Urine Culture not indicated   CBC with platelets and differential   Result Value Ref Range    WBC Count 7.4 4.0 - 11.0 10e3/uL    RBC Count 5.20 4.40 - 5.90 10e6/uL    Hemoglobin 14.4 13.3 - 17.7 g/dL    Hematocrit 43.0 40.0 - 53.0 %    MCV 83 78 - 100 fL    MCH 27.7 26.5 - 33.0 pg    MCHC 33.5 31.5 - 36.5 g/dL    RDW 13.6 10.0 - 15.0 %    Platelet Count 219 150 - 450 10e3/uL    % Neutrophils 58 %    % Lymphocytes 27 %    % Monocytes 12 %    % Eosinophils 3 %    % Basophils 0 %    % Immature Granulocytes 0 %    NRBCs per 100 WBC 0 <1 /100    Absolute Neutrophils 4.3 1.6 - 8.3 10e3/uL    Absolute Lymphocytes 2.0 0.8 - 5.3 10e3/uL    Absolute Monocytes 0.9 0.0 - 1.3 10e3/uL    Absolute Eosinophils 0.2 0.0 - 0.7 10e3/uL    Absolute Basophils 0.0 0.0 - 0.2 10e3/uL    Absolute Immature Granulocytes 0.0 <=0.4 10e3/uL    Absolute NRBCs 0.0 10e3/uL   RBC and Platelet Morphology   Result Value Ref Range    RBC Morphology Confirmed RBC Indices     Platelet Assessment  Automated Count Confirmed. Platelet morphology is normal.     Automated Count Confirmed. Platelet morphology is normal.   CT Chest/Abdomen/Pelvis w Contrast    Narrative    EXAM: CT CHEST/ABDOMEN/PELVIS W CONTRAST  LOCATION: [facility]  DATE: 6/9/2025    INDICATION: Severe lower chest and epigastric pain, reflux, inability to tolerate oral intake.  COMPARISON: CT abdomen pelvis 8/31/2022  TECHNIQUE: CT scan of the chest, abdomen, and pelvis was performed following injection of IV contrast.  Multiplanar reformats were obtained. Dose reduction techniques were used.   CONTRAST: 80 mL Isovue 370    FINDINGS:   LUNGS AND PLEURA: Mild atelectasis in the dependent lower lobes and right middle lobe. No consolidation, pleural effusion or pneumothorax.    MEDIASTINUM/AXILLAE: No lymphadenopathy. Heart size is normal without pericardial effusion. Diffuse wall thickening seen in the mid to distal esophagus. No esophageal distention.    CORONARY ARTERY CALCIFICATION: None.    HEPATOBILIARY: Normal liver and gallbladder. Scattered too small to characterize cystic lesions redemonstrated, stable and most compatible with cysts.  .    PANCREAS: Normal.    SPLEEN: Normal.    ADRENAL GLANDS: Normal.    KIDNEYS/BLADDER: Normal.    BOWEL: Normal including the appendix. No free fluid or free air.    LYMPH NODES: Normal.    VASCULATURE: Mild atherosclerotic calcification in the bilateral common iliac arteries. No abdominal aortic aneurysm.    PELVIC ORGANS: Normal.    MUSCULOSKELETAL: Bilateral inguinal hernia repair change redemonstrated. No significant osseous abnormalities.        Impression    IMPRESSION:    1.  Diffuse wall thickening of the mid to distal esophagus compatible with esophagitis.    2.  No acute process/significant abnormality in the abdomen and pelvis.       Medications   ondansetron (ZOFRAN) injection 4 mg (has no administration in time range)   famotidine (PEPCID) injection 40 mg (40 mg Intravenous $Given 6/9/25 1735)   fluconazole (DIFLUCAN) suspension 200 mg (has no administration in time range)   HYDROmorphone (PF) (DILAUDID) injection 0.5 mg (has no administration in time range)   sodium chloride 0.9% BOLUS 1,000 mL (0 mLs Intravenous Stopped 6/9/25 3877)   pantoprazole (PROTONIX) injection 40 mg (40 mg Intravenous $Given 6/9/25 1402)   iopamidol (ISOVUE-370) solution 500 mL (80 mLs Intravenous $Given 6/9/25 3432)   sodium chloride 0.9 % bag for CT scan flush (60 mLs Intravenous $Given 6/9/25 7221)    sucralfate (CARAFATE) suspension 1 g (1 g Oral $Given 6/9/25 4075)   alum & mag hydroxide-simethicone (MAALOX) suspension 15 mL (15 mLs Oral $Given 6/9/25 0109)   lidocaine (viscous) (XYLOCAINE) 2 % solution 5 mL (5 mLs Mouth/Throat $Given 6/9/25 8166)       Assessments & Plan (with Medical Decision Making)  34-year-old with long history of reflux symptoms.  Suspect distal esophageal stricture as food often gets stuck and he has to self-induced vomiting.  Last EGD was in 2016 but he does not recall if they did any dilatation.  Has had reflux symptoms since then but really worsened this past week.  Was ill for several days leading up to this but the last 6 days has had no significant solid food intake due to severe pain with any attempted ingestion.  Only able to take small sips of liquids but that is accompanied by a severe burning sensation in his lower chest and upper abdomen.  Went to the [place] ED 5 days ago and was felt to have GERD.  Started Prevacid and omeprazole the following day.  Had some Norco from his mother-in-law which helped decrease the pain to the point where he can least take small sips of fluids.  Increase his omeprazole to twice daily a couple of days ago and got some IV fluids at home.  Surprisingly not tachycardic but he looks quite dry.  Oral mucosa is sticky.  Just mild epigastric tenderness.  Pain is in the lower chest and upper abdomen in the area of the LES.  Labs are overall reassuring.    CT shows diffuse wall thickening of the mid to distal esophagus compatible with esophagitis which fits clinically.  No other acute process in the chest or abdomen noted.  Reviewed these results with him.  Going to try some Carafate slurry to see if that might help give him some topical relief.  Is using liquid antiacids over the weekend to give brief relief for a few minutes so he could take his pills.  He would also sneak a little more water before the soothing effect wore off.  He really needs  an upper endoscopy sooner rather than later.  I spoke with Dr. Merlos who is on-call for general surgery.  They could certainly scope him in the next day or 2 if needed.  Might be reasonable to at least determine if he has anything else going on such as a candidal esophagitis.  He will also need evaluation for Solis's esophagus and probably have biopsies done.  Once things calm down he may need a dilatation as well.  Feels a little bit better after the Carafate.  He was given a GI cocktail with lidocaine gel for symptomatic relief and after further discussion, he is on Florinef, corticosteroid which would increase his risk for Candida esophagitis.  Using shared decision making, we elected to start him on Diflucan suspension empirically.  He was given the first dose in the ED and prescription sent to his pharmacy.  Limited prescription of Norco 1 tablet every 4 hours as needed for pain so that he can drink adequately and maintain hydration.  He will continue on the omeprazole and famotidine.    I sent a note to Dr. Ross, his primary physician asking if he could help him get set up for an upper endoscopy in the very near future.  We discussed reportable signs and when to return.  Verbal and written discharge instructions given.  He is comfortable this plan.       I have reviewed the nursing notes.    I have reviewed the findings, diagnosis, plan and need for follow up with the patient.           Medical Decision Making  The patient's presentation was of moderate complexity (an undiagnosed new problem with uncertain prognosis).    The patient's evaluation involved:  ordering and/or review of 3+ test(s) in this encounter (see separate area of note for details)  discussion of management or test interpretation with another health professional (see separate area of note for details)    The patient's management necessitated moderate risk (prescription drug management including medications given in the ED) and high  risk (a parenteral controlled substance).        New Prescriptions    FAMOTIDINE (PEPCID) 40 MG TABLET    Take 1 tablet (40 mg) by mouth 2 times daily.    FLUCONAZOLE (DIFLUCAN) 40 MG/ML SUSPENSION    Take 5 mLs (200 mg) by mouth daily for 20 days.    HYDROCODONE-ACETAMINOPHEN (NORCO) 5-325 MG TABLET    Take 1-2 tablets by mouth every 4 hours as needed for severe pain.    OMEPRAZOLE (PRILOSEC) 20 MG DR CAPSULE    Take 1 capsule (20 mg) by mouth daily.       Final diagnoses:   Esophagitis - suspect Candida (on steroids)   Dehydration       6/9/2025   [facility] EMERGENCY DEPT       Juan Lombardo MD  06/09/25 2821

## 2025-06-09 NOTE — DISCHARGE INSTRUCTIONS
Continue on the omeprazole and Pepcid for acid suppression.    Take the Diflucan suspension daily to help treat probable Candida esophagitis.  Being on steroids would put you at risk for that.  I sent Dr. Ross a note to let him know what is going on and asked him to help you facilitate getting in for an upper endoscopy.    You can use the Norco sparingly for pain so that you are able to drink adequate fluids.    All narcotics can cause drowsiness and constipation so be careful to avoid those problems.  Take an OTC stool softener if needed.  Narcotics also have addictive potential and can actually make you more sensitive to pain in as little as 3 days so only use them for a very short time.  You should not drive or operate machinery while taking narcotics.    Return to the ED if you worsen or have any concerns.    It was nice visiting with you this morning.  I hope this settles down quickly for you.    Thank you for choosing [facility]. We appreciate the opportunity to meet your urgent medical needs. Please let us know if we could have done anything to make your stay more satisfying.    After discharge, please closely monitor for any new or worsening symptoms. Return to the Emergency Department if you develop any acute worsening signs or symptoms.    If you received an opiate pain medication or sedative during your visit, please do not drive for at least 8 hours.     If you had lab work, cultures or imaging studies done during your stay, the final results may still be pending. We will call you if your plan of care needs to change. However, if you are not improving as expected, please follow up with your primary care provider or clinic.     Start any prescription medications that were prescribed to you and take them as directed.     Please see additional handouts that may be pertinent to your condition.

## 2025-06-10 ENCOUNTER — TELEPHONE (OUTPATIENT)
Dept: INTERNAL MEDICINE | Facility: CLINIC | Age: 35
End: 2025-06-10
Payer: COMMERCIAL

## 2025-06-10 DIAGNOSIS — K21.00 GASTROESOPHAGEAL REFLUX DISEASE WITH ESOPHAGITIS, UNSPECIFIED WHETHER HEMORRHAGE: ICD-10-CM

## 2025-06-10 RX ORDER — OXYCODONE HYDROCHLORIDE 5 MG/1
5 TABLET ORAL EVERY 6 HOURS PRN
Qty: 12 TABLET | Refills: 0 | Status: CANCELLED | OUTPATIENT
Start: 2025-06-10

## 2025-06-10 RX ORDER — OXYCODONE HYDROCHLORIDE 5 MG/1
5 TABLET ORAL EVERY 6 HOURS PRN
Qty: 12 TABLET | Refills: 0 | Status: SHIPPED | OUTPATIENT
Start: 2025-06-10

## 2025-06-10 RX ORDER — OXYCODONE HYDROCHLORIDE 5 MG/1
5 TABLET ORAL EVERY 6 HOURS PRN
Qty: 12 TABLET | Refills: 0 | Status: SHIPPED | OUTPATIENT
Start: 2025-06-10 | End: 2025-06-10

## 2025-06-10 NOTE — TELEPHONE ENCOUNTER
Spoke with the patient, relayed the message and got him scheduled for an ED follow up with Dr. Ross.    Eugenia

## 2025-06-10 NOTE — TELEPHONE ENCOUNTER
Patient called in reporting he had an electronic prescription for Oxycodone sent to [facility] in [place] today. He states they told him they can't fill it as an electronic prescription, stating he needs to obtain a printed prescription to fill it there, or have it sent somewhere else.     Patient states his wife works at [facility] and could  the printed prescription today, or we could try to send it to [facility] [place] and she can  the script that way.     Writer did attempt to call [facility] to gather more information, but the call would not go through stating the call cannot be completed as dialed.     Please also review telephone encounter from yesterday 6/9/25, pharmacist sent a note to ED provider regarding duplicate printed scripts for Norco.     Janie Pradhan, BSN, RN

## 2025-06-10 NOTE — TELEPHONE ENCOUNTER
Earlier today for Chance we received a paper RX for hydrocodone 5/325mg tablets #15 (Rx order ID# [id number]) for Chance at ~1603, this was dispensed and sold at 1704. We received another printed RX (RX order ID# [id number]) for the same thing, for the same patient. I wanted to let you know we interpreted this as an accidental duplication and destroyed it. No further actions are needed that I am aware of, this FYI and in the case you wanted both filled.    Thank you,  Emilie Landaverde, PharmD  [facility] ([facility])

## 2025-06-12 ENCOUNTER — TRANSFERRED RECORDS (OUTPATIENT)
Dept: ADMINISTRATIVE | Facility: CLINIC | Age: 35
End: 2025-06-12
Payer: COMMERCIAL

## 2025-06-17 NOTE — PROCEDURES
[facility]   [address], [address], [place] [address]     Patient Name: Chance Torres  Gender:  Male  Exam Date: 06/12/2025 Visit Number:  [id number]  Age: 34 Years YOB: 1990  Attending MD: Ni Garnica MD Medical Record#:  [id number]  -----------------------------------------------------------------------------------------------------------------------------   Procedure:    Upper GI Endoscopy   Indications:    Dysphagia   Heartburn  Provider:        Ni Garnica MD   Referring MD: Referral Self   Primary MD:      Nikolai Ross MD  Medications:   Admitting Medication:   0.9% Normal Saline at TKO   Intra Procedure Medications:   Patient received monitored anesthesia care.     Complications: No immediate complications  ___________________________________________________________________________________________  Procedure:   An examination of the heart and lungs was performed within acceptable limits.  . The patient was therefore deemed a reasonable candidate for sedation.   The risks and benefits were explained to the patient, who appeared to understand. After obtaining informed consent, the scope was passed under direct vision. Throughout the procedure the patient's blood pressure, pulse and oxygen saturations were monitored.  The scope was introduced through the mouth and advanced to the second portion of duodenum.         Findings:   Esophagus:  The z-line is 42 centimeters from the incisors.  Top of the gastric folds is 42 centimeters from the incisors.  *Esophagus Comments:  Severe ulcerative esophagitis beginning at 28 cm from the incisors and continuous down to the level of the gastroesophageal junction.  This was friable with mild oozing even prior to endoscope advancement.  Mucosal ringing and pitting of the mucosa was also seen in the midesophagus.  Cold forceps biopsies obtained from distal and midesophagus.  Stomach:    H. Pylori biopsies taken.    The diaphragm hiatus is at 42 centimeters from the incisors.  *Stomach Comments:  Mild patchy erythema in the antrum and body.  Duodenum:    Normal duodenum.    Celiac Sprue biopsies taken.  Celiac Sprue biopsies taken.  Impression:   Ulcerative esophagitis    Preliminary Plan:  Repeat EGD in 2 months.  Recommendation Comments:  Switch omeprazole to pantoprazole 40 mg twice daily.  Start sucralfate 1 g tablet crushed in a small amount of water to create a slurry 3 times daily to help coat and soothe the esophagus.  We will repeat upper endoscopy in 2 months to assess for healing and if necessary consider dilation at that time after improvement in the underlying tissue inflammation.  Pathology Results:  A: DUODENUM, BIOPSY:           1. Normal duodenal mucosa           2. Negative for celiac disease and other enteropathy      B: STOMACH, BIOPSY:           1. Normal gastric antral and body mucosae           2. Negative for Helicobacter      C: ESOPHAGUS, DISTAL, BIOPSY:           1. Ulcerative esophagitis with no specific features (see comment)           2. Negative for changes of eosinophilic esophagitis, infectious esophagitis and neoplasia           3. Gastric cardio-oxyntic type mucosa with no diagnostic abnormalities      D: ESOPHAGUS, MID, BIOPSY:           1. Ulcerative esophagitis with no specific features (see comment)           2. Negative for changes of eosinophilic esophagitis, infectious esophagitis and neoplasia           3. Negative for columnar mucosa      COMMENTS  C,D. This biopsy shows ulcer with background intact esophageal squamous mucosa showing mild nonspecific reactive changes. The findings are etiologically nonspecific, however, the primary differential diagnostic considerations include a reflux related lesion and pill esophagitis among others. There is no histologic evidence of an infectious etiology as no fungal organisms are present are identified on PAS histochemical stain and no  evidence of CMV ( H&E stain reviewed) or HSV ( H&E stain reviewed). There is no evidence of a neoplastic process.      MICROSCOPIC  A: Performed   B: Performed   C: Performed. PAS stain is negative.   D: Performed. PAS stain is negative.   SPECIAL STAINING/DEEPER  C: PAS/alcian blue for fungus   D: PAS/alcian blue for fungus     Electronically signed by: Tristan Yeager MD    Interpreted at [facility], [address] [place], [place] [address], [place] [address]    Final Plan:  Repeat Upper Endoscopy (EGD) in 2 months for Follow up esophagitis.    We will attempt to contact you at appropriate intervals via [place] mail.  We may not be able to find you or contact you at that time, therefore you should know that the responsibility for following our recommendation rests with you.  If you don't hear from us at the time your procedure is due, please contact our office to schedule an appointment.  If your contact information should change, please contact our office so that we can update your record.      _Electronically signed by:___________________  Ni Garnica MD                 06/12/2025    cc: Nikolai Ross MD

## 2025-06-18 ENCOUNTER — OFFICE VISIT (OUTPATIENT)
Dept: INTERNAL MEDICINE | Facility: CLINIC | Age: 35
End: 2025-06-18
Payer: COMMERCIAL

## 2025-06-18 VITALS
BODY MASS INDEX: 22.73 KG/M2 | OXYGEN SATURATION: 97 % | HEART RATE: 88 BPM | HEIGHT: 71 IN | SYSTOLIC BLOOD PRESSURE: 123 MMHG | TEMPERATURE: 98.1 F | WEIGHT: 162.4 LBS | DIASTOLIC BLOOD PRESSURE: 82 MMHG | RESPIRATION RATE: 18 BRPM

## 2025-06-18 DIAGNOSIS — K21.00 GASTROESOPHAGEAL REFLUX DISEASE WITH ESOPHAGITIS, UNSPECIFIED WHETHER HEMORRHAGE: ICD-10-CM

## 2025-06-18 DIAGNOSIS — K22.10 ULCER OF ESOPHAGUS WITHOUT BLEEDING: Primary | ICD-10-CM

## 2025-06-18 DIAGNOSIS — F90.0 ADHD (ATTENTION DEFICIT HYPERACTIVITY DISORDER), INATTENTIVE TYPE: ICD-10-CM

## 2025-06-18 PROCEDURE — G2211 COMPLEX E/M VISIT ADD ON: HCPCS | Performed by: INTERNAL MEDICINE

## 2025-06-18 PROCEDURE — 99214 OFFICE O/P EST MOD 30 MIN: CPT | Performed by: INTERNAL MEDICINE

## 2025-06-18 PROCEDURE — 1126F AMNT PAIN NOTED NONE PRSNT: CPT | Performed by: INTERNAL MEDICINE

## 2025-06-18 PROCEDURE — 3074F SYST BP LT 130 MM HG: CPT | Performed by: INTERNAL MEDICINE

## 2025-06-18 PROCEDURE — 3079F DIAST BP 80-89 MM HG: CPT | Performed by: INTERNAL MEDICINE

## 2025-06-18 RX ORDER — DILTIAZEM HYDROCHLORIDE 120 MG/1
120 CAPSULE, EXTENDED RELEASE ORAL DAILY
COMMUNITY
Start: 2025-06-18

## 2025-06-18 RX ORDER — PANTOPRAZOLE SODIUM 40 MG/1
40 TABLET, DELAYED RELEASE ORAL 2 TIMES DAILY
COMMUNITY
Start: 2025-06-18

## 2025-06-18 RX ORDER — FLUDROCORTISONE ACETATE 0.1 MG/1
0.1 TABLET ORAL DAILY
COMMUNITY
Start: 2025-06-18

## 2025-06-18 RX ORDER — DEXTROAMPHETAMINE SACCHARATE, AMPHETAMINE ASPARTATE MONOHYDRATE, DEXTROAMPHETAMINE SULFATE AND AMPHETAMINE SULFATE 6.25; 6.25; 6.25; 6.25 MG/1; MG/1; MG/1; MG/1
25 CAPSULE, EXTENDED RELEASE ORAL EVERY MORNING
COMMUNITY
Start: 2025-06-18

## 2025-06-18 ASSESSMENT — PAIN SCALES - GENERAL: PAINLEVEL_OUTOF10: NO PAIN (0)

## 2025-06-18 NOTE — PROGRESS NOTES
Assessment & Plan   Problem List Items Addressed This Visit       ADHD (attention deficit hyperactivity disorder), inattentive type    Relevant Medications    amphetamine-dextroamphetamine (ADDERALL XR) 25 MG 24 hr capsule     Other Visit Diagnoses         Ulcer of esophagus without bleeding    -  Primary      Gastroesophageal reflux disease with esophagitis, unspecified whether hemorrhage               Patient who has a history of pots disease, ADHD and recently had esophageal inflammation and ulceration.  Possibly been going on for years does not really have reflux symptoms but irritation in his chest he did have an EGD done and had no infection, no candidiasis no helical back to pylori, no bleeding with normal hemoglobin but he did have ulcers.  Patient has been on Pepcid and Protonix.  He is doing better he is able eat bland foods.  We will continue this.  He has follow-up in 2 months with [facility] for repeat endoscopy.  He should keep that his follow-up and I may be able to more diagnostic biopsies.  He should avoid all NSAIDs at this point.     MED REC REQUIRED  Post Medication Reconciliation Status: discharge medications reconciled and changed, per note/orders      The longitudinal plan of care for the diagnosis(es)/condition(s) as documented were addressed during this visit. Due to the added complexity in care, I will continue to support Chance in the subsequent management and with ongoing continuity of care.        Subjective   Chance is a 34 year old, presenting for the following health issues:  ER F/U        6/18/2025    10:52 AM   Additional Questions   Roomed by Asad   Accompanied by Wife and kids         6/18/2025    10:52 AM   Patient Reported Additional Medications   Patient reports taking the following new medications Pantoprazole E 40mg Tab Camb, SUCRALFATE 1 GM TAB ELBERT     HPI        ED/UC Followup:    Facility:  [facility]   Date of visit:  "06/09/2025  Reason for visit: Abdominal pain Dx: Esophagitis   Current Status: Patient having trouble eating/drinking, trouble taking deep breaths       ER on the 4th, 9th, and EGD with esophageal ulcers.     History of dysphagia and food stickening.     Getting better, eating soft foods.  Some pain but not 10 out of 10.      Taking pepcid, protonix, and carafate,     No coffee,           Objective    /82 (BP Location: Left arm, Patient Position: Sitting, Cuff Size: Adult Regular)   Pulse 88   Temp 98.1  F (36.7  C) (Temporal)   Resp 18   Ht 1.803 m (5' 11\")   Wt 73.7 kg (162 lb 6.4 oz)   SpO2 97%   BMI 22.65 kg/m    Body mass index is 22.65 kg/m .  Physical Exam   NAD              Signed Electronically by: Nikolai Ross MD    "

## 2025-07-07 DIAGNOSIS — R00.2 PALPITATIONS: Primary | ICD-10-CM

## 2025-07-07 RX ORDER — DILTIAZEM HYDROCHLORIDE 120 MG/1
120 CAPSULE, EXTENDED RELEASE ORAL DAILY
Qty: 90 CAPSULE | Refills: 2 | Status: SHIPPED | OUTPATIENT
Start: 2025-07-07

## 2025-08-12 ENCOUNTER — TRANSFERRED RECORDS (OUTPATIENT)
Dept: ADMINISTRATIVE | Facility: CLINIC | Age: 35
End: 2025-08-12
Payer: COMMERCIAL

## 2025-08-12 ENCOUNTER — MYC REFILL (OUTPATIENT)
Dept: INTERNAL MEDICINE | Facility: CLINIC | Age: 35
End: 2025-08-12
Payer: COMMERCIAL

## 2025-08-12 DIAGNOSIS — K21.00 GASTROESOPHAGEAL REFLUX DISEASE WITH ESOPHAGITIS, UNSPECIFIED WHETHER HEMORRHAGE: ICD-10-CM

## 2025-08-12 DIAGNOSIS — F90.0 ADHD (ATTENTION DEFICIT HYPERACTIVITY DISORDER), INATTENTIVE TYPE: ICD-10-CM

## 2025-08-13 RX ORDER — PANTOPRAZOLE SODIUM 40 MG/1
40 TABLET, DELAYED RELEASE ORAL 2 TIMES DAILY
Qty: 180 TABLET | Refills: 2 | Status: SHIPPED | OUTPATIENT
Start: 2025-08-13

## 2025-08-13 RX ORDER — DEXTROAMPHETAMINE SACCHARATE, AMPHETAMINE ASPARTATE MONOHYDRATE, DEXTROAMPHETAMINE SULFATE AND AMPHETAMINE SULFATE 6.25; 6.25; 6.25; 6.25 MG/1; MG/1; MG/1; MG/1
25 CAPSULE, EXTENDED RELEASE ORAL EVERY MORNING
Qty: 30 CAPSULE | Refills: 0 | Status: SHIPPED | OUTPATIENT
Start: 2025-08-13

## (undated) DEVICE — BAG SPEC REM 224ML W4XL6IN DIA10MM 1 HND GYN DISP ENDOPCH

## (undated) DEVICE — GLOVE ORANGE PI 7 1/2   MSG9075

## (undated) DEVICE — SUTURE MCRYL SZ 4-0 L27IN ABSRB UD L19MM PS-2 1/2 CIR PRIM Y426H

## (undated) DEVICE — CUFF FINGER CLEARSIGHT LG CSCL

## (undated) DEVICE — PREP CHLORAPREP 26ML TINTED ORANGE  260815

## (undated) DEVICE — GENERAL LAPAROSCOPY-LF: Brand: MEDLINE INDUSTRIES, INC.

## (undated) DEVICE — UNIVERSAL PLUS LAPAROSCOPIC ELECTRODE WITH SUCTION/IRRIGATION, SPATULA 5 MM X 32 CM: Brand: UNIVERSAL PLUS

## (undated) DEVICE — KIT ENDO FIRST STEP DISINFECTANT 200ML W/POUCH EP-4

## (undated) DEVICE — CAP BX OLYMPUS AUTOCAP RX

## (undated) DEVICE — TROCAR: Brand: KII FIOS FIRST ENTRY

## (undated) DEVICE — UNIVERSAL PLUS MULTIFUNCTION INSTRUMENT SYSTEM (STRAIGHT GRIP HANDLE), STRAIGHT HANDLE ELECTROSURGICAL SUCTION/IRRIGATION INSTRUMENT WITH HAND CONTROLLED ELECTROSURGERY (BUTTONS): Brand: UNIVERSAL PLUS

## (undated) DEVICE — GLOVE ORTHO 8   MSG9480

## (undated) DEVICE — CUFF FINGER CLEARSIGHT MED CSCM

## (undated) DEVICE — PAD CHUX UNDERPAD 23X24" 7136

## (undated) DEVICE — RETRIEVAL BALLOON CATHETER: Brand: EXTRACTOR™ PRO RX

## (undated) DEVICE — SPHINCTEROTOME: Brand: HYDRATOME RX 44

## (undated) DEVICE — APPLIER CLP M L L11.4IN DIA10MM ENDOSCP ROT MULT FOR LIG

## (undated) DEVICE — TROCAR: Brand: KII® SLEEVE

## (undated) DEVICE — TUBING INSUFFLATION SMK EVAC HI FLO SET PNEUMOCLEAR

## (undated) RX ORDER — TRIAMCINOLONE ACETONIDE 40 MG/ML
INJECTION, SUSPENSION INTRA-ARTICULAR; INTRAMUSCULAR
Status: DISPENSED
Start: 2019-08-13

## (undated) RX ORDER — SODIUM CHLORIDE 9 MG/ML
INJECTION, SOLUTION INTRAVENOUS
Status: DISPENSED
Start: 2024-11-27

## (undated) RX ORDER — LIDOCAINE HYDROCHLORIDE 10 MG/ML
INJECTION, SOLUTION EPIDURAL; INFILTRATION; INTRACAUDAL; PERINEURAL
Status: DISPENSED
Start: 2019-08-13

## (undated) RX ORDER — LIDOCAINE HYDROCHLORIDE 10 MG/ML
INJECTION, SOLUTION INFILTRATION; PERINEURAL
Status: DISPENSED
Start: 2024-08-21